# Patient Record
Sex: FEMALE | Race: WHITE | NOT HISPANIC OR LATINO | Employment: OTHER | URBAN - METROPOLITAN AREA
[De-identification: names, ages, dates, MRNs, and addresses within clinical notes are randomized per-mention and may not be internally consistent; named-entity substitution may affect disease eponyms.]

---

## 2017-01-03 DIAGNOSIS — E11.65 TYPE 2 DIABETES MELLITUS WITH HYPERGLYCEMIA, WITHOUT LONG-TERM CURRENT USE OF INSULIN (H): Primary | ICD-10-CM

## 2017-01-05 NOTE — TELEPHONE ENCOUNTER
insulin NPH (NOVOLIN N VIAL) 100 UNIT/ML VIAL         Last Written Prescription Date: 05/31/16  Last Fill Quantity: 81 mL, # refills: 1  Last Office Visit with [facility], [facility]MA[facility] or [facility] prescribing provider:  12/05/16.     Next 5 appointments (look out 90 days)     Jan 12, 2017  9:40 AM   Return Visit with Trice Medeiros MD   [facility] ([facility]    [address] [facility] [address] [phone]                   BP Readings from Last 3 Encounters:   12/05/16 124/74   08/01/16 120/60   07/23/16 120/68     MICROL       41   5/4/2016  No results found for this basename: microalbumin  CREATININE   Date Value Ref Range Status   08/01/2016 0.69 0.52 - 1.04 mg/dL Final   ]  GFR ESTIMATE   Date Value Ref Range Status   08/01/2016 85 >60 mL/min/1.7m2 Final     Comment:     Non  GFR Calc   05/04/2016 >90  Non  GFR Calc   >60 mL/min/1.7m2 Final   12/02/2015 >90  Non  GFR Calc   >60 mL/min/1.7m2 Final     GFR ESTIMATE IF BLACK   Date Value Ref Range Status   08/01/2016 >90   GFR Calc   >60 mL/min/1.7m2 Final   05/04/2016 >90   GFR Calc   >60 mL/min/1.7m2 Final   12/02/2015 >90   GFR Calc   >60 mL/min/1.7m2 Final     CHOL      159   5/4/2016  HDL       51   5/4/2016  LDL       88   5/4/2016  TRIG      101   5/4/2016  CHOLHDLRATIO      2.6   6/23/2015  AST       29   8/1/2016  ALT       37   8/1/2016  A1C      6.5   12/5/2016  A1C      7.8   8/1/2016  A1C      9.5   6/13/2016  A1C     10.7   5/4/2016  A1C      7.5   12/2/2015  POTASSIUM   Date Value Ref Range Status   08/01/2016 4.0 3.4 - 5.3 mmol/L Final

## 2017-01-06 RX ORDER — HUMAN INSULIN 100 [IU]/ML
INJECTION, SUSPENSION SUBCUTANEOUS
Qty: 80 ML | Refills: 1 | Status: SHIPPED | OUTPATIENT
Start: 2017-01-06 | End: 2017-04-03

## 2017-01-06 NOTE — TELEPHONE ENCOUNTER
Medication filled  per protocol.      Rosaura Keating RN, [facility]

## 2017-01-19 ENCOUNTER — OFFICE VISIT (OUTPATIENT)
Dept: PEDIATRICS | Facility: CLINIC | Age: 67
End: 2017-01-19
Payer: COMMERCIAL

## 2017-01-19 VITALS
SYSTOLIC BLOOD PRESSURE: 130 MMHG | HEART RATE: 109 BPM | DIASTOLIC BLOOD PRESSURE: 80 MMHG | BODY MASS INDEX: 35.7 KG/M2 | OXYGEN SATURATION: 96 % | WEIGHT: 228 LBS | TEMPERATURE: 98.1 F

## 2017-01-19 DIAGNOSIS — R26.9 GAIT DISTURBANCE: ICD-10-CM

## 2017-01-19 DIAGNOSIS — N39.41 URGE INCONTINENCE OF URINE: Primary | ICD-10-CM

## 2017-01-19 DIAGNOSIS — G89.29 CHRONIC MIDLINE LOW BACK PAIN WITHOUT SCIATICA: ICD-10-CM

## 2017-01-19 DIAGNOSIS — R29.6 FALLS FREQUENTLY: ICD-10-CM

## 2017-01-19 DIAGNOSIS — N81.6 RECTOCELE: ICD-10-CM

## 2017-01-19 DIAGNOSIS — M54.50 CHRONIC MIDLINE LOW BACK PAIN WITHOUT SCIATICA: ICD-10-CM

## 2017-01-19 PROCEDURE — 99215 OFFICE O/P EST HI 40 MIN: CPT | Performed by: INTERNAL MEDICINE

## 2017-01-19 RX ORDER — OXYBUTYNIN CHLORIDE 5 MG/1
5 TABLET, EXTENDED RELEASE ORAL DAILY
Qty: 30 TABLET | Refills: 1 | Status: SHIPPED | OUTPATIENT
Start: 2017-01-19 | End: 2017-01-19

## 2017-01-19 RX ORDER — TOLTERODINE 4 MG/1
4 CAPSULE, EXTENDED RELEASE ORAL DAILY
Qty: 30 CAPSULE | Refills: 1 | Status: SHIPPED | OUTPATIENT
Start: 2017-01-19 | End: 2017-01-31

## 2017-01-19 RX ORDER — TOLTERODINE 4 MG/1
4 CAPSULE, EXTENDED RELEASE ORAL DAILY
Qty: 30 CAPSULE | Refills: 1 | Status: SHIPPED | OUTPATIENT
Start: 2017-01-19 | End: 2017-01-19

## 2017-01-19 NOTE — MR AVS SNAPSHOT
After Visit Summary   1/19/2017    Radha Corbett    MRN: [id number]           Patient Information     Date Of Birth          1950        Visit Information        Provider Department      1/19/2017 2:00 PM Trice Medeiros MD [facility]        Today's Diagnoses     Urge incontinence of urine    -  1     Gait disturbance         Falls frequently         Rectocele         Chronic midline low back pain without sciatica           Care Instructions    You can call [phone] to schedule MRI of brain and lumbar spine.    See referral for urology.       Medication(s) prescribed today:    Orders Placed This Encounter   Medications     tolterodine (DETROL LA) 4 MG 24 hr capsule     Sig: Take 1 capsule (4 mg) by mouth daily     Dispense:  30 capsule     Refill:  1             Follow-ups after your visit        Additional Services     UROLOGY ADULT REFERRAL       Your provider has referred you to: [facility]: [facility] [phone]   [email or web address]    Please be aware that coverage of these services is subject to the terms and limitations of your health insurance plan.  Call member services at your health plan with any benefit or coverage questions.      Please bring the following with you to your appointment:    (1) Any X-Rays, CTs or MRIs which have been performed.  Contact the facility where they were done to arrange for  prior to your scheduled appointment.    (2) List of current medications  (3) This referral request   (4) Any documents/labs given to you for this referral                  Your next 10 appointments already scheduled     May 02, 2017 11:15 AM   Return Visit with Mary Lira MD   [facility] ([facility])    [id number] [place] [address] [phone]              Future tests that were ordered for you today     Open Future Orders         Priority Expected Expires Ordered    MR Lumbar Spine w/o Contrast Routine  1/19/2018 1/19/2017    MR Brain w/o & w Contrast Routine  1/19/2018 1/19/2017            Who to contact     If you have questions or need follow up information about today's clinic visit or your schedule please contact [facility] directly at [phone].  Normal or non-critical lab and imaging results will be communicated to you by [email or web address], letter or phone within 4 business days after the clinic has received the results. If you do not hear from us within 7 days, please contact the clinic through [email or web address] or phone. If you have a critical or abnormal lab result, we will notify you by phone as soon as possible.  Submit refill requests through [employer] or call your pharmacy and they will forward the refill request to us. Please allow 3 business days for your refill to be completed.          Additional Information About Your Visit        [email or web address] Information     [employer] gives you secure access to your electronic health record. If you see a primary care provider, you can also send messages to your care team and make appointments. If you have questions, please call your primary care clinic.  If you do not have a primary care provider, please call [phone] and they will assist you.      [employer] is an electronic gateway that provides easy, online access to your medical records. With [employer], you can request a clinic appointment, read your test results, renew a prescription or communicate with your care team.     To access your existing account, please contact your [facility] or call [phone] for assistance.        Care EveryWhere ID     This is your Care EveryWhere ID. This could be used by other organizations to access your [place] medical records  [id number]        Your Vitals Were     Pulse Temperature Pulse Oximetry Last Period          109 98.1  F (36.7  C) (Temporal) 96% 11/18/1991  (Within Months)         Blood Pressure from Last 3 Encounters:   01/19/17 130/80   12/05/16 124/74   08/01/16 120/60    Weight from Last 3 Encounters:   01/19/17 228 lb (103.42 kg)   12/05/16 229 lb (103.874 kg)   08/01/16 231 lb 4.8 oz (104.917 kg)              We Performed the Following     UROLOGY ADULT REFERRAL          Today's Medication Changes          These changes are accurate as of: 1/19/17  2:56 PM.  If you have any questions, ask your nurse or doctor.               Start taking these medicines.        Dose/Directions    tolterodine 4 MG 24 hr capsule   Commonly known as:  DETROL LA   Used for:  Urge incontinence of urine   Started by:  Trice Medeiros MD        Dose:  4 mg   Take 1 capsule (4 mg) by mouth daily   Quantity:  30 capsule   Refills:  1            Where to get your medicines      These medications were sent to [facility] - [place], [place] - [address], [address]  [address], [address], [facility] [address]     Phone:  [phone]    - tolterodine 4 MG 24 hr capsule             Primary Care Provider Office Phone # Fax #    Trice Medeiros [phone]       [facility] [address]  [facility] [address]        Thank you!     Thank you for choosing [facility]  for your care. Our goal is always to provide you with excellent care. Hearing back from our patients is one way we can continue to improve our services. Please take a few minutes to complete the written survey that you may receive in the mail after your visit with us. Thank you!             Your Updated Medication List - Protect others around you: Learn how to safely use, store and throw away your medicines at www.disposemymeds.org.          This list is accurate as of: 1/19/17  2:56 PM.  Always use your most recent med list.                   Brand Name Dispense Instructions for use    aspirin 81 MG tablet      1 TABLET DAILY       buPROPion 300 MG 24 hr tablet     "WELLBUTRIN XL    90 tablet    Take 1 tablet (300 mg) by mouth every morning       CALCIUM 600+D PO      1 tab daily       citalopram 20 MG tablet    celeXA    90 tablet    Take 1 tablet (20 mg) by mouth daily       glipiZIDE 10 MG tablet    GLUCOTROL    180 tablet    TAKE 1 TABLET TWICE A DAY BEFORE MEALS       IMODIUM A-D 2 MG tablet   Generic drug:  loperamide      Take 2 mg by mouth as needed       insulin syringe-needle U-100 31G X 5/16\" 1 ML    RELION INSULIN SYRINGE    200 each    Use 2 syringes daily or as directed.       lisinopril 10 MG tablet    PRINIVIL/ZESTRIL    90 tablet    Take 1 tablet (10 mg) by mouth daily       MELATONIN PO      15 mg 11mg at bedtime       metFORMIN 500 MG 24 hr tablet    GLUCOPHAGE-XR    180 tablet    Take 2 tablets (1,000 mg) by mouth daily (with dinner)       minocycline 100 MG capsule    MINOCIN/DYNACIN    180 capsule    Take 1 capsule (100 mg) by mouth every 12 hours       NovoLIN N VIAL 100 UNIT/ML injection   Generic drug:  insulin NPH     80 mL    INJECT 45 UNITS UNDER THE SKIN TWICE A DAY       omeprazole 40 MG capsule    priLOSEC    90 capsule    Take 1 capsule (40 mg) by mouth daily Take 30-60 minutes before a meal.       ONE TOUCH LANCETS      None Entered       ONE TOUCH TEST STRIPS VI      None Entered       simvastatin 20 MG tablet    ZOCOR    90 tablet    Take 1 tablet (20 mg) by mouth At Bedtime       spironolactone 100 MG tablet    ALDACTONE    90 tablet    Take 1 tablet (100 mg) by mouth every morning       tolterodine 4 MG 24 hr capsule    DETROL LA    30 capsule    Take 1 capsule (4 mg) by mouth daily       vitamin D 1000 UNITS capsule      1 CAPSULE DAILY         "

## 2017-01-19 NOTE — NURSING NOTE
"Chief Complaint   Patient presents with     Incontinence     Urine in continence, back pain, balance is off, not improved by improving A1c.        Initial /80 mmHg  Pulse 109  Temp(Src) 98.1  F (36.7  C) (Temporal)  Wt 228 lb (103.42 kg)  SpO2 96%  LMP 11/18/1991 (Within Months) Estimated body mass index is 35.7 kg/(m^2) as calculated from the following:    Height as of 1/21/16: 5' 7.01\" (1.702 m).    Weight as of this encounter: 228 lb (103.42 kg).  BP completed using cuff size: abraham Gross RMA        "

## 2017-01-19 NOTE — PROGRESS NOTES
SUBJECTIVE:                                                    Radha Corbett is a 66 year old female who presents to clinic today for the following health issues:        Urine incontinence, onset several years would like something to fix the problem.    back pain, l;ow back on left side. Has a hard time standing for long periods. Even doing the dishes.    balance is off, not improved by improving A1c.   Mary Ann Gross RMA      Multiple concerns.   1. Long-standing urinary incontinence, primarily urge incontinence. Oxybutynin didn't help. She is now wearing an adult diaper regularly. Patient reported seeing urologist a few years back. She didn't remember the recommendations. Chart review indicated urology visit back in 2013. Gave her a trial of Vesicare 10 mg daily. Patient didn't remember side effects with the medication but thought that he didn't work. A number of options were discussed including physical therapy interstim, surgery and others. The patient did not recall these.   2. Poor balance: Increasing in the last year. Patient reports mild neuropathy symptoms in terms of numbness in the feet. This is not worse. Diabetes well controlled. Patient has fallen twice. One time causing rotator cuff tear. Surgery was an option, but patient does not want to do surgery. She has done physical therapy with improvement of range of motion. She has another fall without injury. There are many times she feels like she is falling. She has to be very careful with walking and showering.  3. Reports chronic low back pain for many years. Head lumbar spine x-ray done last year showing moderate degenerative changes, more pronounced at L4 to L5. Pain is worse with prolonged standing. Is a little better with sitting and leaning forward. She has trouble walking even 2-3 blocks. Denies any calf pain.  4. Patient also has had skin breakouts from stress. Has seen dermatologist. No specific cause was discussed. Patient has improvement  of the rash from using a home remedy by applying apple cider vinegar on the face.         Problem list, Medication list, Allergies, and Medical/Social/Surgical histories reviewed in [facility] and updated as appropriate.    OBJECTIVE:                                                    /80 mmHg  Pulse 109  Temp(Src) 98.1  F (36.7  C) (Temporal)  Wt 228 lb (103.42 kg)  SpO2 96%  LMP 11/18/1991 (Within Months)    GEN: healthy, alert and no distress  HEENT: unremarkable.  Neck:     supple, no thyromegaly, no cervical lymphadenopathy.   USHA:  CTA B/L, no wheezing or crackles.  CV:  RRR no murmur.  Intact distal pulses, good cap refill.  Abd: soft, normal active bowel sounds, non tender, non distended. No mass or organomegaly.   Ext:  no cyanosis, clubbing or edema.    NEURO:  equal  strength, neg Romberg, DTR II/IV bilaterally (UE and LE), CN intact, slight shuffled gait.   Back: tender left paravertebral area around L4-5. DTR symmetric.         Diagnostic test results:  No results found for this or any previous visit (from the past 24 hour(s)).       ASSESSMENT/PLAN:                                                      66 year old female with the following diagnoses and treatment plan:      ICD-10-CM    1. Urge incontinence of urine N39.41 UROLOGY ADULT REFERRAL     tolterodine (DETROL LA) 4 MG 24 hr capsule     DISCONTINUED: oxybutynin (DITROPAN-XL) 5 MG 24 hr tablet     DISCONTINUED: tolterodine (DETROL LA) 4 MG 24 hr capsule   2. Gait disturbance R26.9 MR Brain w/o & w Contrast   3. Falls frequently R29.6 MR Brain w/o & w Contrast   4. Rectocele N81.6 UROLOGY ADULT REFERRAL   5. Chronic midline low back pain without sciatica M54.5 MR Lumbar Spine w/o Contrast    G89.29        -- Urinary incontinence, gait disturbance. They not be related to each other. We'll obtain brain MRI to see if any evidence of normal pressure hydrocephalus.  -- Chronic low back pain, possible neuro claudication. Lumbar spine MRI to  rule out spinal stenosis.  -- Urinary incontinence: Also no rectocele. I'll have her try Detrol. Refer back to urology for further evaluation, and other treatment options.    A total of 40 minutes was spent face to face with this patient. More than 50% of the time was spent on education for the above problems and management plans.     Trice Medeiros MD-PhD  [facility]    (Note: Chart documentation was done in part with Dragon Voice Recognition software. Although reviewed after completion, some word and grammatical errors may remain.)

## 2017-01-19 NOTE — PATIENT INSTRUCTIONS
You can call [phone] to schedule MRI of brain and lumbar spine.    See referral for urology.       Medication(s) prescribed today:    Orders Placed This Encounter   Medications     tolterodine (DETROL LA) 4 MG 24 hr capsule     Sig: Take 1 capsule (4 mg) by mouth daily     Dispense:  30 capsule     Refill:  1

## 2017-01-20 ENCOUNTER — PRE VISIT (OUTPATIENT)
Dept: UROLOGY | Facility: CLINIC | Age: 67
End: 2017-01-20

## 2017-01-20 NOTE — TELEPHONE ENCOUNTER
PREVISIT INFORMATION                                                    Radha Corbett scheduled for future visit at [facility] specialty clinics.    Patient is scheduled to see Huma Purvis ([place]) on 2/23/17  Reason for visit: rectocele and urge incontinence   Referring provider: Trice Medeiros  Has patient seen previous specialist? YES - Dada Baltazar  Medical Records:  Available in chart.  Patient was previously seen at a [facility] or [facility] facility.     REVIEW                                                      New patient packet mailed to patient: No  Medication reconciliation complete: No      PLAN/FOLLOW-UP NEEDED                                                      Patient Reminders Given:    Informed patient to bring an updated list of allergies, medications, pharmacy details and insurance information. Directed patient to come to the 2nd floor, check-in #4 for their appointment. Informed patient to call back if appointment needs to be cancelled or rescheduled at [phone].    Reminded patient to bring any outside records regarding this appointment or have them faxed to clinic at [phone].    Reminded patient to complete and bring in urology questionnaire. Also for patient to please come with a full bladder and to ask , if early to get staff member for sample.

## 2017-01-30 ENCOUNTER — RADIANT APPOINTMENT (OUTPATIENT)
Dept: MRI IMAGING | Facility: CLINIC | Age: 67
End: 2017-01-30
Attending: INTERNAL MEDICINE
Payer: COMMERCIAL

## 2017-01-30 ENCOUNTER — TELEPHONE (OUTPATIENT)
Dept: PEDIATRICS | Facility: CLINIC | Age: 67
End: 2017-01-30

## 2017-01-30 DIAGNOSIS — R29.6 FALLS FREQUENTLY: ICD-10-CM

## 2017-01-30 DIAGNOSIS — G89.29 CHRONIC MIDLINE LOW BACK PAIN WITHOUT SCIATICA: ICD-10-CM

## 2017-01-30 DIAGNOSIS — M54.50 CHRONIC MIDLINE LOW BACK PAIN WITHOUT SCIATICA: ICD-10-CM

## 2017-01-30 DIAGNOSIS — S22.080A T12 COMPRESSION FRACTURE, INITIAL ENCOUNTER (H): Primary | ICD-10-CM

## 2017-01-30 DIAGNOSIS — R26.9 GAIT DISTURBANCE: ICD-10-CM

## 2017-01-30 LAB — RADIOLOGIST FLAGS: ABNORMAL

## 2017-01-30 PROCEDURE — 82565 ASSAY OF CREATININE: CPT | Performed by: FAMILY MEDICINE

## 2017-01-30 PROCEDURE — 70553 MRI BRAIN STEM W/O & W/DYE: CPT | Performed by: RADIOLOGY

## 2017-01-30 PROCEDURE — 36415 COLL VENOUS BLD VENIPUNCTURE: CPT | Performed by: FAMILY MEDICINE

## 2017-01-30 PROCEDURE — 72148 MRI LUMBAR SPINE W/O DYE: CPT | Performed by: RADIOLOGY

## 2017-01-30 PROCEDURE — A9585 GADOBUTROL INJECTION: HCPCS | Performed by: RADIOLOGY

## 2017-01-30 RX ORDER — GADOBUTROL 604.72 MG/ML
10 INJECTION INTRAVENOUS ONCE
Status: ACTIVE | OUTPATIENT
Start: 2017-01-30

## 2017-01-30 NOTE — TELEPHONE ENCOUNTER
----- Message from Falguni Hammer RN sent at 1/30/2017  3:17 PM CST -----  Regarding: Lumbar Spin results  Hi Dr. Medeiros,     Dr. Durand radiologist from the [facility] called regarding this patient.    Found T12 compression fracture with 30-40% loss of vertebral body height.    He said this is not new, is subacute.  It is new since July. He states it is substantial.    Is currently in brain MR now.     Dr. Durand call back number is [phone].      Falguni SOLARES RN

## 2017-01-31 ENCOUNTER — TELEPHONE (OUTPATIENT)
Dept: PEDIATRICS | Facility: CLINIC | Age: 67
End: 2017-01-31

## 2017-01-31 DIAGNOSIS — N39.41 URGE INCONTINENCE OF URINE: Primary | ICD-10-CM

## 2017-01-31 DIAGNOSIS — M54.16 LUMBAR RADICULOPATHY: ICD-10-CM

## 2017-01-31 DIAGNOSIS — S22.080A COMPRESSION FRACTURE OF T12 VERTEBRA, INITIAL ENCOUNTER (H): Primary | ICD-10-CM

## 2017-01-31 NOTE — TELEPHONE ENCOUNTER
Call placed to patient to review Dr. Medeiros's recommendations as noted below.  Patient unavailable at this time.   left for patient requesting patient call PCP office back.      Notes Recorded by Trice Medeiros MD on 1/30/2017 at 11:06 PM  Let patient know that she had a subacute T12 compression fracture (likely more than 4 weeks old but new since last July). Can't pinpoint the date.   -- also lots of arthritis, bulging discs, narrowing of nerve roots exit sites and possibly nerve impingement.   -- needs to see spine specialist. Options are [facility] spine specialist, [facility] orthopedics. Let me know if she has a preference where she wants to go

## 2017-01-31 NOTE — TELEPHONE ENCOUNTER
Notes Recorded by Trice Medeiros MD on 1/30/2017 at 11:06 PM  Let patient know that she had a subacute T12 compression fracture (likely more than 4 weeks old but new since last July). Can't pinpoint the date.   -- also lots of arthritis, bulging discs, narrowing of nerve roots exit sites and possibly nerve impingement.   -- needs to see spine specialist. Options are [facility] spine specialist, [facility] orthopedics. Let me know if she has a preference where she wants to go

## 2017-01-31 NOTE — TELEPHONE ENCOUNTER
[facility] Call Center    Phone Message    Name of Caller: Radha    Phone Number: Home number on file [phone] (home)    Best time to return call: any    May a detailed message be left on voicemail: yes    Relation to patient: Self    Reason for Call: Medication Refill Request    Has the patient contacted the pharmacy for the refill? Yes   Name of medication being requested: tolterodine (DETROL LA) 4 MG 24 hr capsule [51948]   Provider who prescribed the medication: Dr. Medeiros  Pharmacy: [facility]  Date medication is needed: Asap         Action Taken: Message routed to:  Primary Care p [id number]

## 2017-01-31 NOTE — PROGRESS NOTES
Quick Note:    Dear Radha,   Here are your recent results.   -- brain MRI does not have findings to suggest normal pressure hydrocephalus. There is aging related changes in small vessels in the brain in the setting of hypertension, diabetes or other chronic diseases.   -- there is no specific treatment for these changes. The key is to control diabetes, hypertension and other chronic health issues.   Please call or Mychart to our office if you have further questions.     Trice Medeiros MD  ______

## 2017-02-01 ENCOUNTER — TELEPHONE (OUTPATIENT)
Dept: PHARMACY | Facility: CLINIC | Age: 67
End: 2017-02-01

## 2017-02-01 NOTE — TELEPHONE ENCOUNTER
Called patient to schedule f/u MTM appt. Patient is scheduled for 2/7 at 1pm.  Falguni Chavis, Pharm.D, BCACP  Medication Therapy Management Pharmacist

## 2017-02-01 NOTE — TELEPHONE ENCOUNTER
Gave patient results below from Dr. Medeiros.      She would like to go to [facility] in [place].      When faxing referral please write on their to call patient to schedule appt (per patients request).    Routed to Dr. Medeiros to order referral    Rosaura Keating RN,   [facility]

## 2017-02-02 ENCOUNTER — TELEPHONE (OUTPATIENT)
Dept: PEDIATRICS | Facility: CLINIC | Age: 67
End: 2017-02-02

## 2017-02-02 NOTE — TELEPHONE ENCOUNTER
Let patient know referral placed.   Unfortunately we are not able to tell them what to do about scheduling. They are not part of our system although within same network from insurance coverage purpose. We would be able to do this with [facility] spine specialist group. Here is there number for patient to call for [facility].    [facility]  Phone: [phone]

## 2017-02-02 NOTE — TELEPHONE ENCOUNTER
Called the number for [facility] [phone].  This patient is already scheduled with Dr. Solis at [facility]. They just need the referral faxed. This number is [phone].    Referral is faxed.    Falguni Hammer RN, [facility]

## 2017-02-02 NOTE — TELEPHONE ENCOUNTER
[facility] Call Center    Phone Message    Name of Caller: Radha    Phone Number: Home number on file [phone] (home)    Best time to return call: any    May a detailed message be left on voicemail: yes    Relation to patient: Self    Reason for Call: Order(s): Other:   What is being requested: referral to spine specialist, and she has appt. But she needs a referral.  She needs the images and MRI report.  Will put her through to medical records.  [facility].   Phone: [phone]  Reason for requested: needs referral and records  Date needed: 02.20.17  Provider name: Waldemar      Action Taken: Message routed to:  Primary Care p [id number]

## 2017-02-02 NOTE — TELEPHONE ENCOUNTER
Called patient, she states she left a message with Medical Records that she would like to obtain her records and report from her MRI.      If she does not hear back, she will call again.    Let her know the referral was faxed to [facility].    Falguni Hammer RN, [facility]

## 2017-02-06 ENCOUNTER — TELEPHONE (OUTPATIENT)
Dept: PEDIATRICS | Facility: CLINIC | Age: 67
End: 2017-02-06

## 2017-02-06 NOTE — TELEPHONE ENCOUNTER
Left message for pt to call back. Mar yAnn Nieto        Calling pt to clarify if she is taking both Tolterodine and Oxybutynin,Or is pt going back and forth between the two.  [facility] needing [facility].   Notice on Marianna's desk to fill out and send back.   Mary Ann LANDIS

## 2017-02-07 NOTE — TELEPHONE ENCOUNTER
Attempt #2:  Left message for patient to return call to clinic. Clinic number given.  Priyanka Cerna, FLACO

## 2017-02-08 RX ORDER — TOLTERODINE 4 MG/1
4 CAPSULE, EXTENDED RELEASE ORAL DAILY
Qty: 90 CAPSULE | Refills: 3 | Status: SHIPPED | OUTPATIENT
Start: 2017-02-08 | End: 2018-05-15

## 2017-02-08 NOTE — TELEPHONE ENCOUNTER
Pending Prescriptions:                       Disp   Refills    tolterodine (DETROL LA) 4 MG 24 hr [id number] cap*1            Sig: Take 1 capsule (4 mg) by mouth daily    Called onsite pharmacy and verified patient filled #30 on 1/19/17. Pharmacy has 1 refill of #30 remaining.    Patient is requesting to fill next refill through her mail order pharmacy. Routing to Dr. Trice Medeiros to send new Rx.  Rajwinder Rosario CMA

## 2017-02-08 NOTE — TELEPHONE ENCOUNTER
[facility] Call Center    Phone Message    Name of Caller: Radha    Phone Number: Home number on file [phone]     Best time to return call: Any    May a detailed message be left on voicemail: yes    Relation to patient: Self    Reason for Call: Patient returned Marianna's call.  She will wait for a call back.      Action Taken: Message routed to:  Primary Care p [id number]

## 2017-02-09 LAB
CREAT BLD-MCNC: 0.7 MG/DL (ref 0.52–1.04)
GFR SERPL CREATININE-BSD FRML MDRD: 84 ML/MIN/1.7M2

## 2017-02-20 ENCOUNTER — TRANSFERRED RECORDS (OUTPATIENT)
Dept: HEALTH INFORMATION MANAGEMENT | Facility: CLINIC | Age: 67
End: 2017-02-20

## 2017-02-21 ENCOUNTER — TELEPHONE (OUTPATIENT)
Dept: PHARMACY | Facility: CLINIC | Age: 67
End: 2017-02-21

## 2017-02-21 NOTE — TELEPHONE ENCOUNTER
Called patient to set up an MTM appt. Patient will look at her calendar and call to schedule.  Falguni Chavis, Pharm.D, [facility]  Medication Therapy Management Pharmacist

## 2017-02-23 ENCOUNTER — THERAPY VISIT (OUTPATIENT)
Dept: PHYSICAL THERAPY | Facility: CLINIC | Age: 67
End: 2017-02-23
Payer: COMMERCIAL

## 2017-02-23 DIAGNOSIS — M54.9 BACK PAIN, UNSPECIFIED BACK LOCATION, UNSPECIFIED BACK PAIN LATERALITY, UNSPECIFIED CHRONICITY: Primary | ICD-10-CM

## 2017-02-23 PROCEDURE — 97110 THERAPEUTIC EXERCISES: CPT | Mod: GP | Performed by: PHYSICAL THERAPIST

## 2017-02-23 PROCEDURE — 97112 NEUROMUSCULAR REEDUCATION: CPT | Mod: GP | Performed by: PHYSICAL THERAPIST

## 2017-02-23 PROCEDURE — 97161 PT EVAL LOW COMPLEX 20 MIN: CPT | Mod: GP | Performed by: PHYSICAL THERAPIST

## 2017-02-23 NOTE — LETTER
[facility]  [address]  [facility] [address]  [phone]    2017    Re: Radha Corbett   :   1950  MRN:  [id number]   REFERRING PHYSICIAN:   Kai Ledesma    [facility]    Date of Initial Evaluation:  2017  Visits:  Rxs Used: 1  Reason for Referral:  Back pain, unspecified back location, unspecified back pain laterality, unspecified chronicity    EVALUATION SUMMARY    Subjective:    Radha Corbett is a 66 year old female with a lumbar condition.  Condition occurred with:  A fall/slip.  Condition occurred: at home.  This is a chronic condition  Patient presents to PT today with c/o ongoing LBP. Patient stated she has been dealing with back pain for many years; Recent increase in pain over the past year for no reason.  Patient did try PT in 2016; unable to complete the PT due to suffering another injury due to a fall in late July.  Suffered a R shoulder dislocation and humerus fracture (had no surgery).  Patient recently told she has a T12 compression fx by MRI.  Patient referred to PT by Dr Ledesma..    Patient reports pain:  Central lumbar spine, lumbar spine left and lumbar spine right.    Pain is described as aching and sharp and is constant and reported as 3/10 and 10/10.  Associated symptoms:  Loss of motion/stiffness and loss of strength. Pain is the same all the time.  Symptoms are exacerbated by bending, standing, walking, lifting, twisting, lying down and other (transitions) and relieved by rest.  Since onset symptoms are unchanged (minimal changes).  Special tests:  MRI (see epic).  Previous treatment includes physical therapy.  There was mild improvement following previous treatment.  General health as reported by patient is fair.  Pertinent medical history includes:  History of fractures, diabetes, overweight, high blood pressure, depression,  smoking, sleep disorder/apnea, osteoarthritis and other (degenerative changes in LB).  Medical allergies: yes (sulpha and codeine).  Other surgeries include:  Orthopedic surgery and other (mulitple orthopedic and female surgeries).  Current medications:  High blood pressure medication, anti-depressants and other (diabetes medication).  Current occupation is Retired  Barriers include:  None as reported by the patient.  Red flags:  None as reported by the patient.  Oswestry Score: 44 %                     Lumbar/SI Evaluation  ROM:  Arom wnl lumbar: pain with all movements.  AROM Lumbar:   Flexion:          WNL  Ext:                    Mod loss   Side Bend:        Left:  Mid thigh    Right:  Mid thigh  Rotation:           Left:     Right:   Side Glide:        Left:     Right:   Lumbar Myotomes:    T12-L3 (Hip Flex):  Left: 4    Right: 4  L2-4 (Quads):  Left:  4    Right:  4  Lumbar DTR's:  not assessed  Lumbar Dermtomes:  normal       Hip Evaluation  Hip Strength:    Flexion:   Left: 4-/5   Pain:  Right: 4-/5   Pain:  Abduction:  Left: 4-/5     Pain:Right: 4-/5    Pain:    Assessment/Plan:    Patient is a 66 year old female with lumbar complaints.    Patient has the following significant findings with corresponding treatment plan.                Diagnosis 1:  Back pain (T12 compression Fx)  Pain -  hot/cold therapy and manual therapy  Decreased strength - therapeutic exercise, therapeutic activities and home program  Impaired gait - gait training  Impaired muscle performance - neuro re-education  Decreased function - therapeutic activities  Impaired posture - neuro re-education  Therapy Evaluation Codes:   1) History comprised of:   Personal factors that impact the plan of care:      Past/current experiences and Time since onset of symptoms.    Comorbidity factors that impact the plan of care are:      Diabetes, Depression, Dizziness, High blood pressure, Osteoarthritis, Overweight, Sleep disorder/apnea and Smoking.      Medications impacting care: Anti-depressant, High blood pressure and Diabetes.  2) Examination of Body Systems comprised of:   Body structures and functions that impact the plan of care:      Lumbar spine and Thoracic Spine.   Activity limitations that impact the plan of care are:      Bending, Dressing, Lifting, Squatting/kneeling, Stairs, Standing and Walking.  3) Clinical presentation characteristics are:   Stable/Uncomplicated.  4) Decision-Making    Low complexity using standardized patient assessment instrument and/or measureable assessment of functional outcome.  Cumulative Therapy Evaluation is: Low complexity.      Previous and current functional limitations:  (See Goal Flow Sheet for this information)    Short term and Long term goals: (See Goal Flow Sheet for this information)   Communication ability:  Patient appears to be able to clearly communicate and understand verbal and written communication and follow directions correctly.  Treatment Explanation - The following has been discussed with the patient:   RX ordered/plan of care  Anticipated outcomes  Possible risks and side effects  This patient would benefit from PT intervention to resume normal activities.   Rehab potential is good.  Frequency:  1 X week, once daily  Duration:  for 6-8 visits  Discharge Plan:  Achieve all LTG.  Independent in home treatment program.  Reach maximal therapeutic benefit.            Thank you for your referral.    INQUIRIES  Therapist: Elena Boggs, [facility]  [facility] PHYSICAL THERAPY  [address]  [place] [address]  Phone: [phone]  Fax: [phone]

## 2017-02-23 NOTE — MR AVS SNAPSHOT
After Visit Summary   2/23/2017    Radha Corbett    MRN: [id number]           Patient Information     Date Of Birth          1950        Visit Information        Provider Department      2/23/2017 1:20 PM Elena Boggs, PT [facility] Physical Therapy        Today's Diagnoses     Back pain, unspecified back location, unspecified back pain laterality, unspecified chronicity    -  1       Follow-ups after your visit        Your next 10 appointments already scheduled     Mar 02, 2017 11:30 AM CST   LOLA Spine with Elena Boggs PT   [facility] Physical Therapy ([facility]  )    [address]  [place] [facility] [address]   [phone]            Mar 09, 2017  1:20 PM CST   LOLA Spine with Elena Boggs PT   [facility] Physical Therapy ([facility]  )    [address]  [facility] [address]   [phone]            May 02, 2017 11:15 AM CDT   [facility] Visit with Mary Lira MD   [facility]    [address] [place] [address] [phone]              Who to contact     If you have questions or need follow up information about today's clinic visit or your schedule please contact [facility] PHYSICAL THERAPY directly at [phone].  Normal or non-critical lab and imaging results will be communicated to you by MyChart, letter or phone within 4 business days after the clinic has received the results. If you do not hear from us within 7 days, please contact the clinic through MyChart or phone. If you have a critical or abnormal lab result, we will notify you by phone as soon as possible.  Submit refill requests through [employer] or call your pharmacy and they will forward the refill request to us. Please allow 3 business days for your refill to be  completed.          Additional Information About Your Visit        [email or web address] Information     [employer] gives you secure access to your electronic health record. If you see a primary care provider, you can also send messages to your care team and make appointments. If you have questions, please call your primary care clinic.  If you do not have a primary care provider, please call [phone] and they will assist you.        Care EveryWhere ID     This is your Care EveryWhere ID. This could be used by other organizations to access your [facility] medical records  [id number]        Your Vitals Were     Last Period                   11/18/1991 (Within Months)            Blood Pressure from Last 3 Encounters:   01/19/17 130/80   12/05/16 124/74   08/01/16 120/60    Weight from Last 3 Encounters:   01/19/17 103.4 kg (228 lb)   12/05/16 103.9 kg (229 lb)   08/01/16 104.9 kg (231 lb 4.8 oz)              We Performed the Following     HC PT EVAL, LOW COMPLEXITY     LOLA INITIAL EVAL REPORT     NEUROMUSCULAR RE-EDUCATION     THERAPEUTIC EXERCISES        Primary Care Provider Office Phone # Fax #    Trice Medeiros [phone]       [facility] [address]  [facility] [address]        Thank you!     Thank you for choosing [facility] PHYSICAL THERAPY  for your care. Our goal is always to provide you with excellent care. Hearing back from our patients is one way we can continue to improve our services. Please take a few minutes to complete the written survey that you may receive in the mail after your visit with us. Thank you!             Your Updated Medication List - Protect others around you: Learn how to safely use, store and throw away your medicines at www.disposemymeds.org.          This list is accurate as of: 2/23/17  2:26 PM.  Always use your most recent med list.                   Brand Name Dispense Instructions for use    aspirin 81 MG tablet      1 TABLET  "DAILY       buPROPion 300 MG 24 hr tablet    WELLBUTRIN XL    90 tablet    Take 1 tablet (300 mg) by mouth every morning       CALCIUM 600+D PO      1 tab daily       citalopram 20 MG tablet    celeXA    90 tablet    Take 1 tablet (20 mg) by mouth daily       glipiZIDE 10 MG tablet    GLUCOTROL    180 tablet    TAKE 1 TABLET TWICE A DAY BEFORE MEALS       IMODIUM A-D 2 MG tablet   Generic drug:  loperamide      Take 2 mg by mouth as needed       insulin syringe-needle U-100 31G X 5/16\" 1 ML    RELION INSULIN SYRINGE    200 each    Use 2 syringes daily or as directed.       lisinopril 10 MG tablet    PRINIVIL/ZESTRIL    90 tablet    Take 1 tablet (10 mg) by mouth daily       MELATONIN PO      15 mg 11mg at bedtime       metFORMIN 500 MG 24 hr tablet    GLUCOPHAGE-XR    180 tablet    Take 2 tablets (1,000 mg) by mouth daily (with dinner)       minocycline 100 MG capsule    MINOCIN/DYNACIN    180 capsule    Take 1 capsule (100 mg) by mouth every 12 hours       NovoLIN N VIAL 100 UNIT/ML injection   Generic drug:  insulin NPH     80 mL    INJECT 45 UNITS UNDER THE SKIN TWICE A DAY       omeprazole 40 MG capsule    priLOSEC    90 capsule    Take 1 capsule (40 mg) by mouth daily Take 30-60 minutes before a meal.       ONE TOUCH LANCETS      None Entered       ONE TOUCH TEST STRIPS VI      None Entered       simvastatin 20 MG tablet    ZOCOR    90 tablet    Take 1 tablet (20 mg) by mouth At Bedtime       spironolactone 100 MG tablet    ALDACTONE    90 tablet    Take 1 tablet (100 mg) by mouth every morning       tolterodine 4 MG 24 hr capsule    DETROL LA    90 capsule    Take 1 capsule (4 mg) by mouth daily       vitamin D 1000 UNITS capsule      1 CAPSULE DAILY         "

## 2017-02-23 NOTE — PROGRESS NOTES
Subjective:    Radha Corbett is a 66 year old female with a lumbar condition.  Condition occurred with:  A fall/slip.  Condition occurred: at home.  This is a chronic condition  Patient presents to PT today with c/o ongoing LBP. Patient stated she has been dealing with back pain for many years; Recent increase in pain over the past year for no reason.  Patient did try PT in June 2016; unable to complete the PT due to suffering another injury due to a fall in late July.  Suffered a R shoulder dislocation and humerus fracture (had no surgery).  Patient recently told she has a T12 compression fx by MRI.  Patient referred to PT by Dr Ledesma..    Patient reports pain:  Central lumbar spine, lumbar spine left and lumbar spine right.    Pain is described as aching and sharp and is constant and reported as 3/10 and 10/10.  Associated symptoms:  Loss of motion/stiffness and loss of strength. Pain is the same all the time.  Symptoms are exacerbated by bending, standing, walking, lifting, twisting, lying down and other (transitions) and relieved by rest.  Since onset symptoms are unchanged (minimal changes).  Special tests:  MRI (see epic).  Previous treatment includes physical therapy.  There was mild improvement following previous treatment.  General health as reported by patient is fair.  Pertinent medical history includes:  History of fractures, diabetes, overweight, high blood pressure, depression, smoking, sleep disorder/apnea, osteoarthritis and other (degenerative changes in LB).  Medical allergies: yes (sulpha and codeine).  Other surgeries include:  Orthopedic surgery and other (mulitple orthopedic and female surgeries).  Current medications:  High blood pressure medication, anti-depressants and other (diabetes medication).  Current occupation is Retired  .        Barriers include:  None as reported by the patient.    Red flags:  None as reported by the patient.    Oswestry Score: 44 %                  Objective:    System         Lumbar/SI Evaluation  ROM:  Arom wnl lumbar: pain with all movements.  AROM Lumbar:   Flexion:          WNL  Ext:                    Mod loss   Side Bend:        Left:  Mid thigh    Right:  Mid thigh  Rotation:           Left:     Right:   Side Glide:        Left:     Right:           Lumbar Myotomes:    T12-L3 (Hip Flex):  Left: 4    Right: 4  L2-4 (Quads):  Left:  4    Right:  4        Lumbar DTR's:  not assessed        Lumbar Dermtomes:  normal                                                            Hip Evaluation    Hip Strength:    Flexion:   Left: 4-/5   Pain:  Right: 4-/5   Pain:                      Abduction:  Left: 4-/5     Pain:Right: 4-/5    Pain:                                 General     ROS    Assessment/Plan:      Patient is a 66 year old female with lumbar complaints.    Patient has the following significant findings with corresponding treatment plan.                Diagnosis 1:  Back pain (T12 compression Fx)  Pain -  hot/cold therapy and manual therapy  Decreased strength - therapeutic exercise, therapeutic activities and home program  Impaired gait - gait training  Impaired muscle performance - neuro re-education  Decreased function - therapeutic activities  Impaired posture - neuro re-education    Therapy Evaluation Codes:   1) History comprised of:   Personal factors that impact the plan of care:      Past/current experiences and Time since onset of symptoms.    Comorbidity factors that impact the plan of care are:      Diabetes, Depression, Dizziness, High blood pressure, Osteoarthritis, Overweight, Sleep disorder/apnea and Smoking.     Medications impacting care: Anti-depressant, High blood pressure and Diabetes.  2) Examination of Body Systems comprised of:   Body structures and functions that impact the plan of care:      Lumbar spine and Thoracic Spine.   Activity limitations that impact the plan of care are:      Bending, Dressing, Lifting,  Squatting/kneeling, Stairs, Standing and Walking.  3) Clinical presentation characteristics are:   Stable/Uncomplicated.  4) Decision-Making    Low complexity using standardized patient assessment instrument and/or measureable assessment of functional outcome.  Cumulative Therapy Evaluation is: Low complexity.    Previous and current functional limitations:  (See Goal Flow Sheet for this information)    Short term and Long term goals: (See Goal Flow Sheet for this information)     Communication ability:  Patient appears to be able to clearly communicate and understand verbal and written communication and follow directions correctly.  Treatment Explanation - The following has been discussed with the patient:   RX ordered/plan of care  Anticipated outcomes  Possible risks and side effects  This patient would benefit from PT intervention to resume normal activities.   Rehab potential is good.    Frequency:  1 X week, once daily  Duration:  for 6-8 visits  Discharge Plan:  Achieve all LTG.  Independent in home treatment program.  Reach maximal therapeutic benefit.    Please refer to the daily flowsheet for treatment today, total treatment time and time spent performing 1:1 timed codes.

## 2017-03-02 ENCOUNTER — THERAPY VISIT (OUTPATIENT)
Dept: PHYSICAL THERAPY | Facility: CLINIC | Age: 67
End: 2017-03-02
Payer: COMMERCIAL

## 2017-03-02 DIAGNOSIS — M54.9 BACK PAIN, UNSPECIFIED BACK LOCATION, UNSPECIFIED BACK PAIN LATERALITY, UNSPECIFIED CHRONICITY: ICD-10-CM

## 2017-03-02 PROCEDURE — 97110 THERAPEUTIC EXERCISES: CPT | Mod: GP | Performed by: PHYSICAL THERAPIST

## 2017-03-02 PROCEDURE — 97112 NEUROMUSCULAR REEDUCATION: CPT | Mod: GP | Performed by: PHYSICAL THERAPIST

## 2017-03-09 ENCOUNTER — THERAPY VISIT (OUTPATIENT)
Dept: PHYSICAL THERAPY | Facility: CLINIC | Age: 67
End: 2017-03-09
Payer: COMMERCIAL

## 2017-03-09 DIAGNOSIS — M54.9 BACK PAIN, UNSPECIFIED BACK LOCATION, UNSPECIFIED BACK PAIN LATERALITY, UNSPECIFIED CHRONICITY: ICD-10-CM

## 2017-03-09 PROCEDURE — 97112 NEUROMUSCULAR REEDUCATION: CPT | Mod: GP | Performed by: PHYSICAL THERAPIST

## 2017-03-09 PROCEDURE — 97110 THERAPEUTIC EXERCISES: CPT | Mod: GP | Performed by: PHYSICAL THERAPIST

## 2017-03-23 ENCOUNTER — TELEPHONE (OUTPATIENT)
Dept: PHARMACY | Facility: CLINIC | Age: 67
End: 2017-03-23

## 2017-03-23 DIAGNOSIS — K21.9 GASTROESOPHAGEAL REFLUX DISEASE WITHOUT ESOPHAGITIS: Primary | ICD-10-CM

## 2017-03-23 NOTE — TELEPHONE ENCOUNTER
Patient is wondering if she could get something else for acid reflux; patient is coughing more. Would like to try ranitidine in addition to omeprazole to help with her cough. [facility] [place] (off [address]). If no improvement, patient will schedule a f/u appt.    V.O. Per Dr. Medeiros for ranitidine 150mg hs.    Patient did go back to smoking (less than 1/2 pack). Has a couple of cigarettes in the evening with a drink.    Likely, increased risk of coughing is due to smoking but patient states she has been smoking for 2 years.    Patient will call back to schedule an MTM appointment.      Falguni Chavis, Pharm.D, BCACP  Medication Therapy Management Pharmacist

## 2017-04-03 ENCOUNTER — ALLIED HEALTH/NURSE VISIT (OUTPATIENT)
Dept: PHARMACY | Facility: CLINIC | Age: 67
End: 2017-04-03
Payer: COMMERCIAL

## 2017-04-03 DIAGNOSIS — R32 URINARY INCONTINENCE, UNSPECIFIED TYPE: ICD-10-CM

## 2017-04-03 DIAGNOSIS — R15.9 STOOL INCONTINENCE: ICD-10-CM

## 2017-04-03 DIAGNOSIS — E11.65 TYPE 2 DIABETES MELLITUS WITH HYPERGLYCEMIA, WITHOUT LONG-TERM CURRENT USE OF INSULIN (H): ICD-10-CM

## 2017-04-03 DIAGNOSIS — I10 HYPERTENSION GOAL BP (BLOOD PRESSURE) < 140/90: ICD-10-CM

## 2017-04-03 DIAGNOSIS — G47.00 INSOMNIA, UNSPECIFIED TYPE: ICD-10-CM

## 2017-04-03 DIAGNOSIS — M85.80 OSTEOPENIA: ICD-10-CM

## 2017-04-03 DIAGNOSIS — F41.8 DEPRESSION WITH ANXIETY: ICD-10-CM

## 2017-04-03 DIAGNOSIS — E78.5 HYPERLIPIDEMIA LDL GOAL <100: ICD-10-CM

## 2017-04-03 DIAGNOSIS — K21.9 GASTROESOPHAGEAL REFLUX DISEASE WITHOUT ESOPHAGITIS: Primary | ICD-10-CM

## 2017-04-03 DIAGNOSIS — L71.9 ROSACEA: ICD-10-CM

## 2017-04-03 PROCEDURE — 99605 MTMS BY PHARM NP 15 MIN: CPT | Performed by: PHARMACIST

## 2017-04-03 PROCEDURE — 99607 MTMS BY PHARM ADDL 15 MIN: CPT | Performed by: PHARMACIST

## 2017-04-03 NOTE — PROGRESS NOTES
SUBJECTIVE/OBJECTIVE:                                                    Radha Corbett is a 66 year old female called for an initial visit for Medication Therapy Management for 2017.  She was referred to me from Trice Medeiros.     Chief Complaint: Medication Review.    Allergies/ADRs: Reviewed in Epic  Tobacco: 0-1 pack per day - is interested in quitting Tobacco Cessation Action Plan: Pharmacotherapies : cold turkey  Alcohol: 10 or more beverages /week (3-4 drinks per night; wine or vodka)  Caffeine: no caffeine  Activity: none  PMH: Reviewed in Epic    Medication Adherence: no issues reported    Diabetes:  Pt currently taking NPH 60 units bid, metformin XR 1000mg qd, glucotrol 10mg bid. Pt is not experiencing side effects. patient is looking a larger syringe; currently is having to inject 30 units twice  SMBG: one time daily.   Ranges (patient reported): 100-130mg/dL.  Symptoms of low blood sugar? none. Frequency of hypoglycemia? rarely.  Recent symptoms of high blood sugar? none  Eye exam: up to date  Foot exam: up to date  Microalbumin is < 30 mg/g. Pt is taking an ACEi/ARB.  Aspirin: Taking 81mg daily and denies side effects     Incontinence: current therapy includes tolterodine LA 4mg daily. This has been effective in decreasing her trips to her bathroom. Patient does complain of dry mouth but feels this is tolerable at this time.  Patient has balance issues but this has been going on prior to     Hypertension: Current medications include lisinopril 10mg daily, spironolactone 100mg qam.  Patient does not self-monitor BP.  Patient reports no current medication side effects.    Depression:  Current medications include: Citalopram 20mg once daily and Bupropion 300mg XL once daily. Pt reports that depression symptoms are worsened. Patient reports the following stressors: child/teen difficulties. Patient is not currently seeing a therapist. She has thought about it in the past. Patient is unsure of what she has tried in  the past. If things aren't bad for her daughter then she is ok but if things are rough for her daughter she struggles.  PHQ-9 SCORE 5/4/2016 6/13/2016 12/5/2016   Total Score - - -   Total Score MyChart - - -   Total Score 14 13 17     GERD: Current medications include: Prilosec (omeprazole) 40 and Zantac (ranitidine) 150 once daily. Pt c/o cough. Patient just started ranitidine last week because she was experiencing more coughing and thinking it was reflux. Patient thinks this has maybe been helping a little bit.     Diarrhea: patient has immodium that she uses on long drives or if she is going out on the boat.    Rosacea: current therapy includes minocycline 100mg bid and spironolactone 100mg daily. Patient has only been taking one minocycline daily. If she goes without the medication she will breakout.     Insomnia: current therapy includes melatonin 15mg hs. Feels like this is helpful but she still wakes up in the middle of night.    Osteopenia: Current therapy includes: calcium 600/Vitamin D 800 and Vitamin D supplements: 1000 IU. Pt is not experiencing side effects.  Pt is getting the following sources of dietary calcium: rarely.  Last vitamin D level: 58 in 2015  DEXA History: 2015  Risk factors: post-menopausal  chronic PPI use  Recent broke her back    Hyperlipidemia: Current therapy includes simvastatin 20mg once daily.  Pt reports no significant myalgias or other side effects.   The 10-year ASCVD risk score ([place] KISHORE Jr, et al., 2013) is: 14.7%    Values used to calculate the score:      Age: 66 years      Sex: Female      Is Non- : No      Diabetic: Yes      Tobacco smoker: No      Systolic Blood Pressure: 130 mmHg      Is BP treated: Yes      HDL Cholesterol: 51 mg/dL      Total Cholesterol: 159 mg/dL    Current labs include:  BP Readings from Last 3 Encounters:   01/19/17 130/80   12/05/16 124/74   08/01/16 120/60     Today's Vitals: LMP 11/18/1991 (Within Months)  Lab Results    Component Value Date    A1C 6.5 12/05/2016   .  Lab Results   Component Value Date    CHOL 159 05/04/2016     Lab Results   Component Value Date    TRIG 101 05/04/2016     Lab Results   Component Value Date    HDL 51 05/04/2016     Lab Results   Component Value Date    LDL 88 05/04/2016       Liver Function Studies -   Recent Labs   Lab Test  08/01/16   1344   PROTTOTAL  7.7   ALBUMIN  3.8   BILITOTAL  0.4   ALKPHOS  117   AST  29   ALT  37       Lab Results   Component Value Date    UCRR 148 05/04/2016    MICROL 41 05/04/2016    UMALCR 27.91 (H) 05/04/2016       Last Basic Metabolic Panel:  Lab Results   Component Value Date     08/01/2016      Lab Results   Component Value Date    POTASSIUM 4.0 08/01/2016     Lab Results   Component Value Date    CHLORIDE 102 08/01/2016     Lab Results   Component Value Date    BUN 20 08/01/2016     Lab Results   Component Value Date    CR 0.69 08/01/2016     GFR Estimate   Date Value Ref Range Status   01/30/2017 84 >60 mL/min/1.7m2 Final   08/01/2016 85 >60 mL/min/1.7m2 Final     Comment:     Non  GFR Calc   05/04/2016 >90  Non  GFR Calc   >60 mL/min/1.7m2 Final     GFR Estimate If Black   Date Value Ref Range Status   01/30/2017 >90 >60 mL/min/1.7m2 Final   08/01/2016 >90   GFR Calc   >60 mL/min/1.7m2 Final   05/04/2016 >90   GFR Calc   >60 mL/min/1.7m2 Final     TSH   Date Value Ref Range Status   12/05/2016 0.79 0.40 - 4.00 mU/L Final   ]    Most Recent Immunizations   Administered Date(s) Administered     Influenza (High Dose) 3 valent vaccine 12/05/2016     Influenza (IIV3) 09/15/2014     Pneumococcal (PCV 13) 12/05/2016     Pneumococcal 23 valent 06/02/2008     TD (ADULT, 7+) 04/12/2006     TDAP Vaccine (Adacel) 03/05/2013     Zoster vaccine, live 06/10/2014     ASSESSMENT:                                                       Current medications were reviewed today.     Medication Adherence: no issues  identified    Diabetes: Stable. Patient is meeting A1c goal of < 7%. Syringes patient has on her med list should allow for larger volume of insulin. Patient will check with pharmacy.    Incontinence: Stable    Hypertension: Stable. Patient is meeting BP goal of < 140/90mmHg.     Depression: Stable. Patient would benefit from counseling. Patient declines at this time.    GERD: Continue to assess effectiveness of ranitidine.    Insomnia: Needs Improvement. Pt may benefit from decreasing melatonin to 10mg at bedtime. 15mg exceeds recommended dose.    Osteopenia: Needs Improvement. Should repeat DEXA. Pt is not meeting RDI of calcium 1200mg/day. Pt is exceeding RDI of Vitamin D 1000 IU/day.    Hyperlipidemia: Stable. No change in current medication necessary at this time.    PLAN:                                                      Recommend increasing calcium to 1 tablet twice daily and discontinue additional vitamin d supplementation.  Recommend repeat DEXA scan  Recommend decreasing melatonin to 10mg daily.    I spent 30 minutes with this patient today.  All changes were made via collaborative practice agreement with Trice Medeiros. A copy of the visit note was provided to the patient's primary care provider.    Will follow up in 3-4 weeks.    The patient was sent via [employer] a summary of these recommendations as an after visit summary.     Falguni Chavis, Pharm.D, BCACP  Medication Therapy Management Pharmacist

## 2017-04-03 NOTE — PATIENT INSTRUCTIONS
Recommendations from today's MTM visit:                                                        1. Stop Vitamin D supplement and increase calcium supplement to 1 tablet twice daily    2. Follow up with DEXA scan    3. Decrease melatonin to 10mg daily    Next MTM visit:  3-4 weeks    To schedule another MTM appointment, please call the clinic directly or you may call the MTM scheduling line at [phone] or toll-free at [phone].     My Clinical Pharmacist's contact information:                                                      It was a pleasure seeing you today!  Please feel free to contact me with any questions or concerns you have.      Falguni Chavis, Pharm.D, [facility]  Medication Therapy Management Pharmacist  [phone]    You may receive a survey about the MTM services you received.  I would appreciate your feedback to help me serve you better in the future. Please fill it out and return it when you can. Your comments will be anonymous.

## 2017-04-27 ENCOUNTER — THERAPY VISIT (OUTPATIENT)
Dept: PHYSICAL THERAPY | Facility: CLINIC | Age: 67
End: 2017-04-27
Payer: COMMERCIAL

## 2017-04-27 DIAGNOSIS — M54.9 BACK PAIN, UNSPECIFIED BACK LOCATION, UNSPECIFIED BACK PAIN LATERALITY, UNSPECIFIED CHRONICITY: ICD-10-CM

## 2017-04-27 PROCEDURE — 97112 NEUROMUSCULAR REEDUCATION: CPT | Mod: GP | Performed by: PHYSICAL THERAPIST

## 2017-04-27 PROCEDURE — 97110 THERAPEUTIC EXERCISES: CPT | Mod: GP | Performed by: PHYSICAL THERAPIST

## 2017-04-27 NOTE — PROGRESS NOTES
"Subjective:    HPI                    Objective:    Standing Alignment:    Cervical/Thoracic:  Forward head, Dowager's hump and thoracic kyphosis increased  Shoulder/UE:  Rounded shoulders  Lumbar:  Lordosis decr            Gait:  Very unstable: buckling of L knee and hip reported by pt at least 5 times during PT.  Gait Type:  Antalgic   Assistive Devices:  None  Deviations:  Lumbar:  Trunk flexionHip:  Hip hiking LKnee:  Excessive extension LAnkle:  Push off decr L and heel strike decr LGeneral Deviations:  Toe out L, base of support incr and stance time decr               Lumbar/SI Evaluation  ROM:    AROM Lumbar:   Flexion:          2\" above toes; slight pain  Ext:                    Mod loss   Side Bend:        Left:  Upper thigh; pain in back    Right:  Upper thigh; pain in back  Rotation:           Left:     Right:   Side Glide:        Left:     Right:           Lumbar Myotomes:    T12-L3 (Hip Flex):  Left: 4    Right: 4  L2-4 (Quads):  Left:  3    Right:  4-        Lumbar DTR's:  not assessed        Lumbar Dermtomes:  not assessed                  Lumbar Palpation:    Tenderness present at Left:    Quadratus Lumborum and Erector Spinae  Tenderness present at Right: Quadratus Lumborum and Erector Spinae                  Shoulder Evaluation:  ROM:  AROM:  not assessed                            PROM:  not assessed                                                                     Knee Evaluation:  ROM:  AROM: normal (c/o pain with at end range of flexion bilaterally)            Strength:     Extension:  Left: 4-/5    Pain:+/-      Right: 4/5   Pain:  Flexion:  Left: 4/5    Pain:-      Right: 4/5   Pain:    Quad Set Left: Fair    Pain:         Palpation:  Palpation of knee: bruising noted medial and anterior knee (? rupture of patellar bursae)      Edema:  Edema of the knee: pocket swelling noted over the L patellar tendon.            General     ROS    Assessment/Plan:      PROGRESS  REPORT    Progress " "reporting period is from 2/23/17 to 4/27/17.       SUBJECTIVE  Subjective changes noted by patient: Patient returns to PT today with c/o L knee and hip \"giving out\" on her when walking causing her to fall.  Patient was initially referred for treatment of LBP; has been seen 4 times over the past 2 months.  Patient cancelled multiple appointments due to car mechanical issues and injuries related to a fall on 4/17/17 at her daughters home in [place]. Patient stated prior to her fall she was doing her HEP as tolerated; noted some improvement in her pain levels and tolerance for activity.  Patient stated on 4/17/17she was walking in her daughters kitchen and the L knee buckled under her. She fell directly on her knees (L more than the R) and hit the front of the L shoulder on the corner of the fridge.   Patient stated since the fall the L knee and hip have \"give out\" on her several times causing her to almost fall. Patient also has c/o L side rib pain (pt thinks she might have broken ribs because the pain in her side is very similar to when she broke her ribs a few years ago).  Patient has chosen to self treat and has not seen a MD since her fall. She has done activities around the home and exercises as she tolerates.      Current pain level is: 3/10 L knee, 2/10 PL with L shoulder,  3/10 PL LBP .       Changes in function:  Due to recent fall pt has had an exacerbation of all pain and loss of function.  Adverse reaction to treatment or activity: activity - increase all body pain.    OBJECTIVE  Changes noted in objective findings:  Unable to do current HEP due to pain in the L shoulder, L hip and L knee area. Modifications made to current HEP.  Visual Observation: Bruising L knee and Anterior L shoulder; noted swelling along the L patellar tendon.      ASSESSMENT/PLAN  Updated problem list and treatment plan: Diagnosis 1:  LBP  Pain -  hot/cold therapy and manual therapy  Decreased ROM/flexibility - manual therapy and " therapeutic exercise  Decreased strength - therapeutic exercise and therapeutic activities  Impaired muscle performance - neuro re-education  Decreased function - therapeutic activities  Impaired posture - neuro re-education  STG/LTGs have been met or progress has been made towards goals:  None  Assessment of Progress: The patient has had set backs in their progress.  The patient's condition has exacerbated.  Self Management Plans:  Patient has been instructed in a home treatment program.  I have re-evaluated this patient and find that the nature, scope, duration and intensity of the therapy is appropriate for the medical condition of the patient.  Radha continues to require the following intervention to meet STG and LTG's:  PT    Recommendations:  This patient would benefit from continued therapy.     Frequency:  1 X week, once daily  Duration:  for 6 visits for LBP.    Orders and PN to be sent to Dr Varun Medeiros for evaluation and treatment of L knee/hip.        Please refer to the daily flowsheet for treatment today, total treatment time and time spent performing 1:1 timed codes.

## 2017-04-27 NOTE — MR AVS SNAPSHOT
After Visit Summary   4/27/2017    Radha Corbett    MRN: [id number]           Patient Information     Date Of Birth          1950        Visit Information        Provider Department      4/27/2017 2:00 PM Elena Boggs, PT [facility] Physical Therapy        Today's Diagnoses     Back pain, unspecified back location, unspecified back pain laterality, unspecified chronicity           Follow-ups after your visit        Your next 10 appointments already scheduled     May 05, 2017  1:30 PM CDT   [facility] Spine with Elena Boggs PT   [facility] Therapy ([facility]  )    [address]  [facility] [place] [address]   [phone]            May 18, 2017  1:20 PM CDT   [facility] Spine with Elena Boggs PT   [facility] Therapy ([facility]  )    [address]  [place] [address]   [phone]            May 25, 2017  1:20 PM CDT   [facility] Spine with Elena Boggs PT   [facility] Therapy ([facility]  )    [address]  [place] [address]   [phone]            Jul 28, 2017  1:00 PM CDT   Return Visit with Mary Lira MD   [facility]    [address] [place] [address] [phone]              Who to contact     If you have questions or need follow up information about today's clinic visit or your schedule please contact [facility] THERAP[facility] directly at [phone].  Normal or non-critical lab and imaging results will be communicated to you by MyChart, letter or phone within 4 business days after the clinic has received the results. If you do not hear from us within 7 days, please contact the clinic through MyChart or phone. If you have a critical  or abnormal lab result, we will notify you by phone as soon as possible.  Submit refill requests through [employer] or call your pharmacy and they will forward the refill request to us. Please allow 3 business days for your refill to be completed.          Additional Information About Your Visit        [employer] Information     [employer] gives you secure access to your electronic health record. If you see a primary care provider, you can also send messages to your care team and make appointments. If you have questions, please call your primary care clinic.  If you do not have a primary care provider, please call [phone] and they will assist you.        Care EveryWhere ID     This is your Care EveryWhere ID. This could be used by other organizations to access your [facility] medical records  [id number]        Your Vitals Were     Last Period                   11/18/1991 (Within Months)            Blood Pressure from Last 3 Encounters:   01/19/17 130/80   12/05/16 124/74   08/01/16 120/60    Weight from Last 3 Encounters:   01/19/17 103.4 kg (228 lb)   12/05/16 103.9 kg (229 lb)   08/01/16 104.9 kg (231 lb 4.8 oz)              We Performed the Following     [facility] PROGRESS NOTES REPORT     NEUROMUSCULAR RE-EDUCATION     THERAPEUTIC EXERCISES          Today's Medication Changes          These changes are accurate as of: 4/27/17  3:27 PM.  If you have any questions, ask your nurse or doctor.               These medicines have changed or have updated prescriptions.        Dose/Directions    minocycline 100 MG capsule   Commonly known as:  MINOCIN/DYNACIN   This may have changed:  when to take this   Used for:  Acne vulgaris        Dose:  100 mg   Take 1 capsule (100 mg) by mouth every 12 hours   Quantity:  180 capsule   Refills:  3                Primary Care Provider Office Phone # Fax #    Trice Medeiros [phone]       [facility] [address]  [facility] [address]        Thank you!     Thank you  "for choosing [facility]ER[facility]  for your care. Our goal is always to provide you with excellent care. Hearing back from our patients is one way we can continue to improve our services. Please take a few minutes to complete the written survey that you may receive in the mail after your visit with us. Thank you!             Your Updated Medication List - Protect others around you: Learn how to safely use, store and throw away your medicines at www.disposemymeds.org.          This list is accurate as of: 4/27/17  3:27 PM.  Always use your most recent med list.                   Brand Name Dispense Instructions for use    aspirin 81 MG tablet      1 TABLET DAILY       buPROPion 300 MG 24 hr tablet    WELLBUTRIN XL    90 tablet    Take 1 tablet (300 mg) by mouth every morning       CALCIUM 600+D PO      1 tab daily       citalopram 20 MG tablet    celeXA    90 tablet    Take 1 tablet (20 mg) by mouth daily       glipiZIDE 10 MG tablet    GLUCOTROL    180 tablet    TAKE 1 TABLET TWICE A DAY BEFORE MEALS       IMODIUM A-D 2 MG tablet   Generic drug:  loperamide      Take 2 mg by mouth as needed       insulin  UNIT/ML injection    NovoLIN N VIAL    80 mL    Inject 60 Units Subcutaneous 2 times daily       insulin syringe-needle U-100 31G X 5/16\" 1 ML    RELION INSULIN SYRINGE    200 each    Use 2 syringes daily or as directed.       lisinopril 10 MG tablet    PRINIVIL/ZESTRIL    90 tablet    Take 1 tablet (10 mg) by mouth daily       MELATONIN PO      10 mg At Bedtime       metFORMIN 500 MG 24 hr tablet    GLUCOPHAGE-XR    180 tablet    Take 2 tablets (1,000 mg) by mouth daily (with dinner)       minocycline 100 MG capsule    MINOCIN/DYNACIN    180 capsule    Take 1 capsule (100 mg) by mouth every 12 hours       omeprazole 40 MG capsule    priLOSEC    90 capsule    Take 1 capsule (40 mg) by mouth daily Take 30-60 minutes before a meal.       ONE TOUCH LANCETS      None " Entered       ONE TOUCH TEST STRIPS VI      None Entered       ranitidine 150 MG tablet    ZANTAC    30 tablet    Take 1 tablet (150 mg) by mouth At Bedtime       simvastatin 20 MG tablet    ZOCOR    90 tablet    Take 1 tablet (20 mg) by mouth At Bedtime       spironolactone 100 MG tablet    ALDACTONE    90 tablet    Take 1 tablet (100 mg) by mouth every morning       tolterodine 4 MG 24 hr capsule    DETROL LA    90 capsule    Take 1 capsule (4 mg) by mouth daily       vitamin D 1000 UNITS capsule      1 CAPSULE DAILY

## 2017-04-27 NOTE — LETTER
"[facility] PHYSICAL THERAPY  [address]  [place] [address]  [phone]    May 2, 2017    Re: Radha Corbett   :   1950  MRN:  [id number]   REFERRING PHYSICIAN:   Kai Ledesma    [facility] PHYSICAL THERAPY    Date of Initial Evaluation:  2017  Visits:  Rxs Used: 4  Reason for Referral:  Back pain, unspecified back location, unspecified back pain laterality, unspecified chronicity    EVALUATION SUMMARY      PROGRESS  REPORT  Progress reporting period is from 17 to 17.       SUBJECTIVE  Subjective changes noted by patient: Patient returns to PT today with c/o L knee and hip \"giving out\" on her when walking causing her to fall.  Patient was initially referred for treatment of LBP; has been seen 4 times over the past 2 months.  Patient cancelled multiple appointments due to car mechanical issues and injuries related to a fall on 17 at her daughters home in [place]. Patient stated prior to her fall she was doing her HEP as tolerated; noted some improvement in her pain levels and tolerance for activity.  Patient stated on 17she was walking in her daughters kitchen and the L knee buckled under her. She fell directly on her knees (L more than the R) and hit the front of the L shoulder on the corner of the fridge.   Patient stated since the fall the L knee and hip have \"give out\" on her several times causing her to almost fall. Patient also has c/o L side rib pain (pt thinks she might have broken ribs because the pain in her side is very similar to when she broke her ribs a few years ago).  Patient has chosen to self treat and has not seen a MD since her fall. She has done activities around the home and exercises as she tolerates.  Current pain level is: 3/10 L knee, 2/10 PL with L shoulder,  3/10 PL LBP .     Changes in function:  Due to recent fall pt has had an exacerbation of all pain " "and loss of function.  Adverse reaction to treatment or activity: activity - increase all body pain.  OBJECTIVE  Changes noted in objective findings:  Unable to do current HEP due to pain in the L shoulder, L hip and L knee area. Modifications made to current HEP.  Visual Observation: Bruising L knee and Anterior L shoulder; noted swelling along the L patellar tendon.      Standing Alignment:    Cervical/Thoracic:  Forward head, Dowager's hump and thoracic kyphosis increased  Shoulder/UE:  Rounded shoulders  Lumbar:  Lordosis decr  Gait:  Very unstable: buckling of L knee and hip reported by pt at least 5 times during PT.  Gait Type:  Antalgic   Assistive Devices:  None  Deviations:  Lumbar:  Trunk flexionHip:  Hip hiking LKnee:  Excessive extension LAnkle:  Push off decr L and heel strike decr LGeneral Deviations:  Toe out L, base of support incr and stance time decr       Lumbar/SI Evaluation  ROM:    AROM Lumbar:   Flexion:          2\" above toes; slight pain  Ext:                    Mod loss   Side Bend:        Left:  Upper thigh; pain in back    Right:  Upper thigh; pain in back  Rotation:           Left:     Right:   Side Glide:        Left:     Right:   Lumbar Myotomes:    T12-L3 (Hip Flex):  Left: 4    Right: 4  L2-4 (Quads):  Left:  3    Right:  4-  Lumbar DTR's:  not assessed  Lumbar Dermtomes:  not assessed  Lumbar Palpation:    Tenderness present at Left:    Quadratus Lumborum and Erector Spinae  Tenderness present at Right: Quadratus Lumborum and Erector Spinae  Shoulder Evaluation:  ROM:  AROM:  not assessed  PROM:  not assessed  Knee Evaluation:  ROM:  AROM: normal (c/o pain with at end range of flexion bilaterally)    Strength:   Extension:  Left: 4-/5    Pain:+/-      Right: 4/5   Pain:  Flexion:  Left: 4/5    Pain:-      Right: 4/5   Pain:    Quad Set Left: Fair    Pain:   Palpation:  Palpation of knee: bruising noted medial and anterior knee (? rupture of patellar bursae)  Edema:  Edema of the " knee: pocket swelling noted over the L patellar tendon.      ASSESSMENT/PLAN  Updated problem list and treatment plan: Diagnosis 1:  LBP  Pain -  hot/cold therapy and manual therapy  Decreased ROM/flexibility - manual therapy and therapeutic exercise  Decreased strength - therapeutic exercise and therapeutic activities  Impaired muscle performance - neuro re-education  Decreased function - therapeutic activities  Impaired posture - neuro re-education  STG/LTGs have been met or progress has been made towards goals:  None  Assessment of Progress: The patient has had set backs in their progress.  The patient's condition has exacerbated.  Self Management Plans:  Patient has been instructed in a home treatment program.  I have re-evaluated this patient and find that the nature, scope, duration and intensity of the therapy is appropriate for the medical condition of the patient.  Radha continues to require the following intervention to meet STG and LTG's:  PT    Recommendations:  This patient would benefit from continued therapy.     Frequency:  1 X week, once daily  Duration:  for 6 visits for LBP.    Orders and PN to be sent to Dr Varun Medeiros for evaluation and treatment of L knee/hip.                    Thank you for your referral.    INQUIRIES  Therapist: Elena Boggs, [facility]  [facility] PHYSICAL THERAPY  [address]  [facility] [place] [address]  Phone: [phone]  Fax: [phone]

## 2017-05-18 ENCOUNTER — THERAPY VISIT (OUTPATIENT)
Dept: PHYSICAL THERAPY | Facility: CLINIC | Age: 67
End: 2017-05-18
Payer: COMMERCIAL

## 2017-05-18 DIAGNOSIS — M54.9 BACK PAIN, UNSPECIFIED BACK LOCATION, UNSPECIFIED BACK PAIN LATERALITY, UNSPECIFIED CHRONICITY: ICD-10-CM

## 2017-05-18 PROCEDURE — 97112 NEUROMUSCULAR REEDUCATION: CPT | Mod: GP | Performed by: PHYSICAL THERAPIST

## 2017-05-18 PROCEDURE — 97110 THERAPEUTIC EXERCISES: CPT | Mod: GP | Performed by: PHYSICAL THERAPIST

## 2017-05-18 NOTE — PROGRESS NOTES
"Subjective:    HPI  Oswestry Score: 38 %                 Objective:    System         Lumbar/SI Evaluation  ROM:    AROM Lumbar:   Flexion:            WNL  Ext:                    MOD; pain   Side Bend:        Left:  Mid thigh    Right:  Mid thigh  Rotation:           Left:  = R    Right:  =L  Side Glide:        Left:     Right:           Lumbar Myotomes:    T12-L3 (Hip Flex):  Left: 4+    Right: 4+  L2-4 (Quads):  Left:  4+    Right:  4+  L4 (Ankle DF):  Left:  5    Right:  5      Lumbar DTR's:  not assessed        Lumbar Dermtomes:  not assessed                                                                       General     ROS    Assessment/Plan:      PROGRESS  REPORT    Progress reporting period is from 4/27/17 to 5/18/17.       SUBJECTIVE  Subjective changes noted by patient:  Patient since she was last seen on 4/27/17;  Noted improvement in the L knee.  States the L knee \"robert\" ~1x/week since she started doing the exercises which has reduced the risk for falls (last fall 4/17/17).  Patient [place]ed walk[place] but is no longer using it to help her walk; walking ~ 100 yards without AD.     Patient stated standing (>10') increases low back pain requiring her to sit down.  Patient is able to sit as long as she wants and has no issues with sit to stand, able to sleep as long as she wants and lift items that are placed conveniently.    Current pain level is 2/10 (has not done much today to cause pain) .     .   Changes in function:  Minimal changes in progress along with set backs due to fall  Adverse reaction to treatment or activity: activity - increases     OBJECTIVE  Changes noted in objective findings:  Slow with all transitions; Slow Gait.        ASSESSMENT/PLAN  Updated problem list and treatment plan: Diagnosis 1:  LBP  Pain -  hot/cold therapy  Decreased ROM/flexibility - manual therapy, therapeutic exercise and therapeutic activity  Decreased strength - therapeutic " exercise and therapeutic activities  Impaired muscle performance - neuro re-education  Decreased function - therapeutic activities  STG/LTGs have been met or progress has been made towards goals:  None  Assessment of Progress: The patient has had set backs in their progress.  The patient's condition has exacerbated.  Self Management Plans:  Patient has been instructed in a home treatment program.  I have re-evaluated this patient and find that the nature, scope, duration and intensity of the therapy is appropriate for the medical condition of the patient.  Radha continues to require the following intervention to meet STG and LTG's:  PT    Recommendations:  This patient would benefit from continued therapy.     Frequency:  1 X week, once daily  Duration:  for 6 visits        Please refer to the daily flowsheet for treatment today, total treatment time and time spent performing 1:1 timed codes.

## 2017-05-18 NOTE — MR AVS SNAPSHOT
After Visit Summary   5/18/2017    Radha Corbett    MRN: [id number]           Patient Information     Date Of Birth          1950        Visit Information        Provider Department      5/18/2017 1:20 PM Elena Boggs, PT [facility]        Today's Diagnoses     Back pain, unspecified back location, unspecified back pain laterality, unspecified chronicity           Follow-ups after your visit        Your next 10 appointments already scheduled     May 25, 2017  1:20 PM CDT   [facility] Spine with Elena Boggs PT   [facility] ([facility]  )    [address]  [place] [address]   [phone]            Jun 01, 2017  1:20 PM CDT   [facility] Spine with Elena Boggs PT   [facility] ([facility]  )    [address]  [place] [address]   [phone]            Jul 28, 2017  1:00 PM CDT   Return Visit with Mary Lira MD   [facility] ([facility])    [address] [place] [address] [phone]              Who to contact     If you have questions or need follow up information about today's clinic visit or your schedule please contact [facility] ATHLETIC [facility] PHYSICAL THERAPY directly at [phone].  Normal or non-critical lab and imaging results will be communicated to you by MyChart, letter or phone within 4 business days after the clinic has received the results. If you do not hear from us within 7 days, please contact the clinic through MyChart or phone. If you have a critical or abnormal lab result, we will notify you by phone as soon as possible.  Submit refill requests through [facility] or call your pharmacy and they will forward the refill request to us. Please allow 3 business days for your refill to be  completed.          Additional Information About Your Visit        [employer] Information     [employer] gives you secure access to your electronic health record. If you see a primary care provider, you can also send messages to your care team and make appointments. If you have questions, please call your primary care clinic.  If you do not have a primary care provider, please call [phone] and they will assist you.        Care EveryWhere ID     This is your Care EveryWhere ID. This could be used by other organizations to access your Mobile medical records  [id number]        Your Vitals Were     Last Period                   11/18/1991 (Within Months)            Blood Pressure from Last 3 Encounters:   01/19/17 130/80   12/05/16 124/74   08/01/16 120/60    Weight from Last 3 Encounters:   01/19/17 103.4 kg (228 lb)   12/05/16 103.9 kg (229 lb)   08/01/16 104.9 kg (231 lb 4.8 oz)              We Performed the Following     [facility] PROGRESS NOTES REPORT     NEUROMUSCULAR RE-EDUCATION     THERAPEUTIC EXERCISES          Today's Medication Changes          These changes are accurate as of: 5/18/17  7:22 PM.  If you have any questions, ask your nurse or doctor.               These medicines have changed or have updated prescriptions.        Dose/Directions    minocycline 100 MG capsule   Commonly known as:  MINOCIN/DYNACIN   This may have changed:  when to take this   Used for:  Acne vulgaris        Dose:  100 mg   Take 1 capsule (100 mg) by mouth every 12 hours   Quantity:  180 capsule   Refills:  3                Primary Care Provider Office Phone # Fax #    Trice Medeiros [phone]       [place] [address]  [facility] [address]        Thank you!     Thank you for choosing [facility] PHYSICAL THERAPY  for your care. Our goal is always to provide you with excellent care. Hearing back from our patients is one way we can continue to improve our services.  "Please take a few minutes to complete the written survey that you may receive in the mail after your visit with us. Thank you!             Your Updated Medication List - Protect others around you: Learn how to safely use, store and throw away your medicines at www.disposemymeds.org.          This list is accurate as of: 5/18/17  7:22 PM.  Always use your most recent med list.                   Brand Name Dispense Instructions for use    aspirin 81 MG tablet      1 TABLET DAILY       buPROPion 300 MG 24 hr tablet    WELLBUTRIN XL    90 tablet    Take 1 tablet (300 mg) by mouth every morning       CALCIUM 600+D PO      1 tab daily       citalopram 20 MG tablet    celeXA    90 tablet    Take 1 tablet (20 mg) by mouth daily       glipiZIDE 10 MG tablet    GLUCOTROL    180 tablet    TAKE 1 TABLET TWICE A DAY BEFORE MEALS       IMODIUM A-D 2 MG tablet   Generic drug:  loperamide      Take 2 mg by mouth as needed       insulin  UNIT/ML injection    NovoLIN N VIAL    80 mL    Inject 60 Units Subcutaneous 2 times daily       insulin syringe-needle U-100 31G X 5/16\" 1 ML    RELION INSULIN SYRINGE    200 each    Use 2 syringes daily or as directed.       lisinopril 10 MG tablet    PRINIVIL/ZESTRIL    90 tablet    Take 1 tablet (10 mg) by mouth daily       MELATONIN PO      10 mg At Bedtime       metFORMIN 500 MG 24 hr tablet    GLUCOPHAGE-XR    180 tablet    Take 2 tablets (1,000 mg) by mouth daily (with dinner)       minocycline 100 MG capsule    MINOCIN/DYNACIN    180 capsule    Take 1 capsule (100 mg) by mouth every 12 hours       omeprazole 40 MG capsule    priLOSEC    90 capsule    Take 1 capsule (40 mg) by mouth daily Take 30-60 minutes before a meal.       ONE TOUCH LANCETS      None Entered       ONE TOUCH TEST STRIPS VI      None Entered       ranitidine 150 MG tablet    ZANTAC    30 tablet    Take 1 tablet (150 mg) by mouth At Bedtime       simvastatin 20 MG tablet    ZOCOR    90 tablet    Take 1 tablet (20 " mg) by mouth At Bedtime       spironolactone 100 MG tablet    ALDACTONE    90 tablet    Take 1 tablet (100 mg) by mouth every morning       tolterodine 4 MG 24 hr capsule    DETROL LA    90 capsule    Take 1 capsule (4 mg) by mouth daily       vitamin D 1000 UNITS capsule      1 CAPSULE DAILY

## 2017-05-18 NOTE — LETTER
"[facility]  [address]  [place] [address]  [phone]    May 19, 2017    Re: Radha Corbett   :   1950  MRN:  [id number]   REFERRING PHYSICIAN:   Kai Ledesma    [facility]    Date of Initial Evaluation: 2017  Visits:  Rxs Used: 5  Reason for Referral:  Back pain, unspecified back location, unspecified back pain laterality, unspecified chronicity    EVALUATION SUMMARY      PROGRESS  REPORT  Progress reporting period is from 17 to 17.       SUBJECTIVE  Subjective changes noted by patient:  Patient since she was last seen on 17;  Noted improvement in the L knee.  States the L knee \"robert\" ~1x/week since she started doing the exercises which has reduced the risk for falls (last fall 17).  [place] walk[place] but is no longer using it to help her walk; walking ~ 100 yards without AD.     Patient stated standing (>10') increases low back pain requiring her to sit down.  Patient is able to sit as long as she wants and has no issues with sit to stand, able to sleep as long as she wants and lift items that are placed conveniently.    Current pain level is 2/10 (has not done much today to cause pain) .     Changes in function:  Minimal changes in progress along with set backs due to fall  Adverse reaction to treatment or activity: activity - increases   OBJECTIVE  Changes noted in objective findings:  Slow with all transitions; Slow Gait.  Oswestry Score: 38 %                    Lumbar/SI Evaluation  ROM:    AROM Lumbar:   Flexion:            WNL  Ext:                    MOD; pain   Side Bend:        Left:  Mid thigh    Right:  Mid thigh  Rotation:           Left:  = R    Right:  =L  Side Glide:        Left:     Right:         Lumbar Myotomes:    T12-L3 (Hip Flex):  Left: 4+    Right: 4+  L2-4 (Quads):  Left:  4+ "    Right:  4+  L4 (Ankle DF):  Left:  5    Right:  5  Lumbar DTR's:  not assessed  Lumbar Dermtomes:  not assessed     ASSESSMENT/PLAN  Updated problem list and treatment plan: Diagnosis 1:  LBP  Pain -  hot/cold therapy  Decreased ROM/flexibility - manual therapy, therapeutic exercise and therapeutic activity  Decreased strength - therapeutic exercise and therapeutic activities  Impaired muscle performance - neuro re-education  Decreased function - therapeutic activities  STG/LTGs have been met or progress has been made towards goals:  None  Assessment of Progress: The patient has had set backs in their progress.  The patient's condition has exacerbated.  Self Management Plans:  Patient has been instructed in a home treatment program.  I have re-evaluated this patient and find that the nature, scope, duration and intensity of the therapy is appropriate for the medical condition of the patient.  Radha continues to require the following intervention to meet STG and LTG's:  PT    Recommendations:  This patient would benefit from continued therapy.     Frequency:  1 X week, once daily  Duration:  for 6 visits                  Thank you for your referral.    INQUIRIES  Therapist: Elena Boggs, [facility]  [facility] - [facility] PHYSICAL THERAPY  [address]  [place] [address]  Phone: [phone]  Fax: [phone]

## 2017-05-25 ENCOUNTER — THERAPY VISIT (OUTPATIENT)
Dept: PHYSICAL THERAPY | Facility: CLINIC | Age: 67
End: 2017-05-25
Payer: COMMERCIAL

## 2017-05-25 DIAGNOSIS — M54.9 BACK PAIN, UNSPECIFIED BACK LOCATION, UNSPECIFIED BACK PAIN LATERALITY, UNSPECIFIED CHRONICITY: ICD-10-CM

## 2017-05-25 PROCEDURE — 97112 NEUROMUSCULAR REEDUCATION: CPT | Mod: GP | Performed by: PHYSICAL THERAPIST

## 2017-05-25 PROCEDURE — 97110 THERAPEUTIC EXERCISES: CPT | Mod: GP | Performed by: PHYSICAL THERAPIST

## 2017-05-25 NOTE — MR AVS SNAPSHOT
After Visit Summary   5/25/2017    Radha Corbett    MRN: [id number]           Patient Information     Date Of Birth          1950        Visit Information        Provider Department      5/25/2017 1:20 PM Elena Boggs, PT [facility] Physical Therapy        Today's Diagnoses     Back pain, unspecified back location, unspecified back pain laterality, unspecified chronicity           Follow-ups after your visit        Your next 10 appointments already scheduled     Jun 01, 2017  1:20 PM CDT   [place] [facility] with Elena Boggs PT   [facility] Physical Therapy ([facility]  )    [address]  [place] [address]   [phone]            Jun 08, 2017 12:10 PM CDT   [place] [facility] with Elena Boggs PT   [facility] Physical Therapy ([facility]  )    [address]  [place] [address]   [phone]            Jul 28, 2017  1:00 PM CDT   Return Visit with Mary Lira MD   [facility] ([facility])    [address] [place] [address] [phone]              Who to contact     If you have questions or need follow up information about today's clinic visit or your schedule please contact [facility] PHYSICAL THERAPY directly at [phone].  Normal or non-critical lab and imaging results will be communicated to you by MyChart, letter or phone within 4 business days after the clinic has received the results. If you do not hear from us within 7 days, please contact the clinic through MyChart or phone. If you have a critical or abnormal lab result, we will notify you by phone as soon as possible.  Submit refill requests through [employer] or call your pharmacy and they will forward the refill request to us. Please allow 3 business days for your refill to be  completed.          Additional Information About Your Visit        MyChart Information     [employer] gives you secure access to your electronic health record. If you see a primary care provider, you can also send messages to your care team and make appointments. If you have questions, please call your primary care clinic.  If you do not have a primary care provider, please call [phone] and they will assist you.        Care EveryWhere ID     This is your Care EveryWhere ID. This could be used by other organizations to access your [place] medical records  [id number]        Your Vitals Were     Last Period                   11/18/1991 (Within Months)            Blood Pressure from Last 3 Encounters:   01/19/17 130/80   12/05/16 124/74   08/01/16 120/60    Weight from Last 3 Encounters:   01/19/17 103.4 kg (228 lb)   12/05/16 103.9 kg (229 lb)   08/01/16 104.9 kg (231 lb 4.8 oz)              We Performed the Following     NEUROMUSCULAR RE-EDUCATION     THERAPEUTIC EXERCISES          Today's Medication Changes          These changes are accurate as of: 5/25/17  2:34 PM.  If you have any questions, ask your nurse or doctor.               These medicines have changed or have updated prescriptions.        Dose/Directions    minocycline 100 MG capsule   Commonly known as:  MINOCIN/DYNACIN   This may have changed:  when to take this   Used for:  Acne vulgaris        Dose:  100 mg   Take 1 capsule (100 mg) by mouth every 12 hours   Quantity:  180 capsule   Refills:  3                Primary Care Provider Office Phone # Fax #    Trice Medeiros [phone]       [facility] [address] [facility] [address]        Thank you!     Thank you for choosing [facility] PHYSICAL THERAPY  for your care. Our goal is always to provide you with excellent care. Hearing back from our patients is one way we can continue to improve our services. Please take a few minutes to  "complete the written survey that you may receive in the mail after your visit with us. Thank you!             Your Updated Medication List - Protect others around you: Learn how to safely use, store and throw away your medicines at www.disposemymeds.org.          This list is accurate as of: 5/25/17  2:34 PM.  Always use your most recent med list.                   Brand Name Dispense Instructions for use    aspirin 81 MG tablet      1 TABLET DAILY       buPROPion 300 MG 24 hr tablet    WELLBUTRIN XL    90 tablet    Take 1 tablet (300 mg) by mouth every morning       CALCIUM 600+D PO      1 tab daily       citalopram 20 MG tablet    celeXA    90 tablet    Take 1 tablet (20 mg) by mouth daily       glipiZIDE 10 MG tablet    GLUCOTROL    180 tablet    TAKE 1 TABLET TWICE A DAY BEFORE MEALS       IMODIUM A-D 2 MG tablet   Generic drug:  loperamide      Take 2 mg by mouth as needed       insulin  UNIT/ML injection    NovoLIN N VIAL    80 mL    Inject 60 Units Subcutaneous 2 times daily       insulin syringe-needle U-100 31G X 5/16\" 1 ML    RELION INSULIN SYRINGE    200 each    Use 2 syringes daily or as directed.       lisinopril 10 MG tablet    PRINIVIL/ZESTRIL    90 tablet    Take 1 tablet (10 mg) by mouth daily       MELATONIN PO      10 mg At Bedtime       metFORMIN 500 MG 24 hr tablet    GLUCOPHAGE-XR    180 tablet    Take 2 tablets (1,000 mg) by mouth daily (with dinner)       minocycline 100 MG capsule    MINOCIN/DYNACIN    180 capsule    Take 1 capsule (100 mg) by mouth every 12 hours       omeprazole 40 MG capsule    priLOSEC    90 capsule    Take 1 capsule (40 mg) by mouth daily Take 30-60 minutes before a meal.       ONE TOUCH LANCETS      None Entered       ONE TOUCH TEST STRIPS VI      None Entered       ranitidine 150 MG tablet    ZANTAC    30 tablet    Take 1 tablet (150 mg) by mouth At Bedtime       simvastatin 20 MG tablet    ZOCOR    90 tablet    Take 1 tablet (20 mg) by mouth At Bedtime       " spironolactone 100 MG tablet    ALDACTONE    90 tablet    Take 1 tablet (100 mg) by mouth every morning       tolterodine 4 MG 24 hr capsule    DETROL LA    90 capsule    Take 1 capsule (4 mg) by mouth daily       vitamin D 1000 UNITS capsule      1 CAPSULE DAILY

## 2017-05-25 NOTE — LETTER
[facility] PHYSICAL THERAPY  [address]  [facility] [facility] [address]  [phone]    2017    Re: Radha Corbett   :   1950  MRN:  [id number]   REFERRING PHYSICIAN:   Kai Ledesma    [facility]    Date of Initial Evaluation:  2017  Visits:  Rxs Used: 6  Reason for Referral:  Back pain, unspecified back location, unspecified back pain laterality, unspecified chronicity    EVALUATION SUMMARY    DISCHARGE REPORT  Progress reporting period is from 17 to 17. Patient cancelled last 2 scheduled appointment because she wanted to see the MD.     SUBJECTIVE  Subjective: pt reports no change in her back pain today; stated the knee pain got worse then got better;  patient stated the pain in the LB area gets worse with standing or walking activites.  Patient stated she has no issues with ADL.  Pt to follow up with the MD   Current Pain level: 5/10 (LB)   Changes in function: No changes noted in function since last SOAP note   The subjective and objective information are from the last SOAP note on this patient.  OBJECTIVE:The objective findings are from DOS 17   Objective: pt refuses to lay down on table (afraid she can not get back up)    ASSESSMENT/PLAN  Updated problem list and treatment plan: Diagnosis 1:  LBP  Impaired muscle performance - neuro re-education  Decreased function - therapeutic activities  STG/LTGs have been met or progress has been made towards goals:  Yes (See Goal flow sheet completed today.)  Assessment of Progress: The patient has not returned to therapy. Current status is unknown.  Self Management Plans:  Patient has been instructed in a home treatment program.    Radha continues to require the following intervention to meet STG and LTG's: PT intervention is no longer required to meet STG/LTG.  The patient failed to complete scheduled/ordered appointments so  current information is unknown.      Recommendations:  We will discharge this patient from PT            Thank you for your referral.    INQUIRIES  Therapist: Elena Boggs, [facility]  [facility]  [address]  [facility] [place] [address]  Phone: [phone]  Fax: [phone]

## 2017-05-26 DIAGNOSIS — E11.65 TYPE 2 DIABETES MELLITUS WITH HYPERGLYCEMIA, WITHOUT LONG-TERM CURRENT USE OF INSULIN (H): ICD-10-CM

## 2017-05-26 NOTE — TELEPHONE ENCOUNTER
inna N         Last Written Prescription Date: 4/3/17  Last Fill Quantity: 80ml, # refills: 1  NO PRINT OUT  Last Office Visit with [facility], [facility] or [facility] prescribing provider:  1/19/17   Next 5 appointments (look out 90 days)     Juan 15, 2017  1:40 PM CDT   Return Visit with Trice Medeiros MD   [facility] ([facility])    [address] [facility] [address]   [phone]            Jul 28, 2017  1:00 PM CDT   Return Visit with Mary Lira MD   [facility] ([facility])    [address] [facility] [address] [phone]                   BP Readings from Last 3 Encounters:   01/19/17 130/80   12/05/16 124/74   08/01/16 120/60     Lab Results   Component Value Date    MICROL 41 05/04/2016     Lab Results   Component Value Date    UMALCR 27.91 05/04/2016     Creatinine   Date Value Ref Range Status   08/01/2016 0.69 0.52 - 1.04 mg/dL Final   ]  GFR Estimate   Date Value Ref Range Status   01/30/2017 84 >60 mL/min/1.7m2 Final   08/01/2016 85 >60 mL/min/1.7m2 Final     Comment:     Non  GFR Calc   05/04/2016 >90  Non  GFR Calc   >60 mL/min/1.7m2 Final     GFR Estimate If Black   Date Value Ref Range Status   01/30/2017 >90 >60 mL/min/1.7m2 Final   08/01/2016 >90   GFR Calc   >60 mL/min/1.7m2 Final   05/04/2016 >90   GFR Calc   >60 mL/min/1.7m2 Final     Lab Results   Component Value Date    CHOL 159 05/04/2016     Lab Results   Component Value Date    HDL 51 05/04/2016     Lab Results   Component Value Date    LDL 88 05/04/2016     Lab Results   Component Value Date    TRIG 101 05/04/2016     Lab Results   Component Value Date    CHOLHDLRATIO 2.6 06/23/2015     Lab Results   Component Value Date    AST 29 08/01/2016     Lab Results   Component Value Date    ALT 37 08/01/2016     Lab Results   Component Value Date    A1C 6.5 12/05/2016    A1C 7.8 08/01/2016    A1C 9.5  06/13/2016    A1C 10.7 05/04/2016    A1C 7.5 12/02/2015     Potassium   Date Value Ref Range Status   08/01/2016 4.0 3.4 - 5.3 mmol/L Final     Medication filled for 3 months per protocol.      insulin NPH (NOVOLIN N VIAL) 100 UNIT/ML injection 80 mL 1 4/3/2017  No      Sig: Inject 60 Units Subcutaneous 2 times daily     Class: No Print Out         Rosaura Keating RN, [facility]

## 2017-06-13 ENCOUNTER — DOCUMENTATION ONLY (OUTPATIENT)
Dept: LAB | Facility: CLINIC | Age: 67
End: 2017-06-13

## 2017-06-13 DIAGNOSIS — E78.5 HYPERLIPIDEMIA LDL GOAL <100: ICD-10-CM

## 2017-06-13 DIAGNOSIS — E11.65 TYPE 2 DIABETES MELLITUS WITH HYPERGLYCEMIA, WITHOUT LONG-TERM CURRENT USE OF INSULIN (H): Primary | ICD-10-CM

## 2017-06-13 DIAGNOSIS — I10 HYPERTENSION GOAL BP (BLOOD PRESSURE) < 140/90: Chronic | ICD-10-CM

## 2017-06-16 ENCOUNTER — TELEPHONE (OUTPATIENT)
Dept: PEDIATRICS | Facility: CLINIC | Age: 67
End: 2017-06-16

## 2017-06-16 DIAGNOSIS — M54.5 CHRONIC BILATERAL LOW BACK PAIN, WITH SCIATICA PRESENCE UNSPECIFIED: Primary | ICD-10-CM

## 2017-06-16 DIAGNOSIS — G89.29 CHRONIC BILATERAL LOW BACK PAIN, WITH SCIATICA PRESENCE UNSPECIFIED: Primary | ICD-10-CM

## 2017-06-16 NOTE — TELEPHONE ENCOUNTER
[facility] Call Center    Phone Message     Name of Caller:      Phone Number:     Best time to return call:     May a detailed message be left on voicemail:     Relation to patient: Self    Reason for Call: Order(s): Other:   What is being requested: Sports med referall for back pain   Reason for requested:REFERAL  Date needed: APPT SCHEDULED FOR NEXT Tuesday PLEASE HAVE REFERRAL DONE BY THEN     Provider name: CHUCHO        Action Taken: routed to primary care

## 2017-06-16 NOTE — TELEPHONE ENCOUNTER
Called patient on home number to inform.     Patient notes her insulin NPH (NOVOLIN N VIAL) 100 UNIT/ML injection went from $80 copay to $400 because she is now in the donut hole. Patient has future OV scheduled with PCP Dr. Trice Medeiros to discuss further, but wanted to inform provider before coming in to see if anything else can be done to lower her drug cost.    Future Office visit:    Next 5 appointments (look out 90 days)     Jun 22, 2017  9:20 AM CDT   Return Visit with Trice Medeiros MD   [facility])    [address] [place] [id number] [phone]            Jul 28, 2017  1:00 PM CDT   Return Visit with Mayr Lira MD   [facility])    [address] [place] [address] [phone]                 Will route to Dr. Trice Medeiros for review and orders.  Rajwinder Rosario, [facility]

## 2017-06-19 NOTE — TELEPHONE ENCOUNTER
Please ask pt to make an appointment with our pharmacist Dr. Chavis to review her medication, and find options that is cheaper.

## 2017-06-20 ENCOUNTER — OFFICE VISIT (OUTPATIENT)
Dept: ORTHOPEDICS | Facility: CLINIC | Age: 67
End: 2017-06-20
Payer: COMMERCIAL

## 2017-06-20 VITALS — HEART RATE: 98 BPM | SYSTOLIC BLOOD PRESSURE: 127 MMHG | DIASTOLIC BLOOD PRESSURE: 71 MMHG

## 2017-06-20 DIAGNOSIS — M51.16 LUMBAR DISC HERNIATION WITH RADICULOPATHY: Primary | ICD-10-CM

## 2017-06-20 PROCEDURE — 99214 OFFICE O/P EST MOD 30 MIN: CPT | Performed by: PREVENTIVE MEDICINE

## 2017-06-20 ASSESSMENT — PAIN SCALES - GENERAL: PAINLEVEL: MODERATE PAIN (5)

## 2017-06-20 NOTE — PROGRESS NOTES
HISTORY OF PRESENT ILLNESS  Ms. Corbett is a pleasant 66 year old year old female who presents to clinic today with 2 years of lower back pain radiating into her legs.  Radha explains that the pain has been worsening she had a fall last summer in which she dislocated her shoulder. An MRI in January showed a fractured T12 and multiple degenerative changes, most severe at L4-L5.  Location: lower back  Quality:  achy pain    Severity: 9/10 at worst    Duration: hours  Timing: occurs intermittently  Context: occurs while walking or standing  Modifying factors:  resting and non-use makes it better, movement and use makes it worse  Associated signs & symptoms: leg pain  Previous similar pain: yes  Exercise: walking  Additional history: as documented    MEDICAL HISTORY  Patient Active Problem List   Diagnosis     Macular degeneration     Myopia     Hiatal hernia     IBS (irritable bowel syndrome)     Hypertension goal BP (blood pressure) < 140/90     GERD (gastroesophageal reflux disease)     Atypical ductal hyperplasia of breast     Atypical lobular hyperplasia of breast     Benign Breast Disease (PASH)     Family history of breast cancer in sister     Type 2 diabetes, HbA1C goal < 8% (H)     Hyperlipidemia LDL goal <100     Breast cancer screening-high risk     Tubular adenoma of colon     Osteopenia     Alcohol ingestion, more than 4 drinks/day on alcohol screening     Umbilical hernia     Incontinence of urine     Stool incontinence     Osteoarthritis, knee     Rosacea     Anxiety     Pseudophakia     Abnormal cervical Pap smear with positive HPV DNA test     Depression with anxiety     Retinal detachment     Elevated liver function tests     Rectocele     Unexplained endometrial cells on cervical cytology     Back pain, unspecified back location, unspecified back pain laterality, unspecified chronicity       Current Outpatient Prescriptions   Medication Sig Dispense Refill     insulin NPH (NOVOLIN N VIAL) 100 UNIT/ML  "injection Inject 60 units Subcutaneous 2 times daily 80 mL 0     ranitidine (ZANTAC) 150 MG tablet Take 1 tablet (150 mg) by mouth At Bedtime 30 tablet 0     tolterodine (DETROL LA) 4 MG 24 hr capsule Take 1 capsule (4 mg) by mouth daily 90 capsule 3     buPROPion (WELLBUTRIN XL) 300 MG 24 hr tablet Take 1 tablet (300 mg) by mouth every morning 90 tablet 1     glipiZIDE (GLUCOTROL) 10 MG tablet TAKE 1 TABLET TWICE A DAY BEFORE MEALS 180 tablet 1     metFORMIN (GLUCOPHAGE-XR) 500 MG 24 hr tablet Take 2 tablets (1,000 mg) by mouth daily (with dinner) 180 tablet 1     omeprazole (PRILOSEC) 40 MG capsule Take 1 capsule (40 mg) by mouth daily Take 30-60 minutes before a meal. 90 capsule 3     citalopram (CELEXA) 20 MG tablet Take 1 tablet (20 mg) by mouth daily 90 tablet 3     simvastatin (ZOCOR) 20 MG tablet Take 1 tablet (20 mg) by mouth At Bedtime 90 tablet 3     lisinopril (PRINIVIL,ZESTRIL) 10 MG tablet Take 1 tablet (10 mg) by mouth daily 90 tablet 3     minocycline (MINOCIN,DYNACIN) 100 MG capsule Take 1 capsule (100 mg) by mouth every 12 hours (Patient taking differently: Take 100 mg by mouth daily ) 180 capsule 3     insulin syringe-needle U-100 (RELION INSULIN SYRINGE) 31G X 5/16\" 1 ML Use 2 syringes daily or as directed. 200 each 3     spironolactone (ALDACTONE) 100 MG tablet Take 1 tablet (100 mg) by mouth every morning 90 tablet 3     MELATONIN PO 10 mg At Bedtime        loperamide (IMODIUM A-D) 2 MG tablet Take 2 mg by mouth as needed       ONE TOUCH TEST STRIPS VI None Entered       ONE TOUCH LANCETS None Entered       CALCIUM 600+D OR 1 tab daily       ASPIRIN 81 MG OR TABS 1 TABLET DAILY       VITAMIN D 1000 UNIT OR CAPS 1 CAPSULE DAILY         Allergies   Allergen Reactions     Codeine      Sulfa Drugs        Family History   Problem Relation Age of Onset     Hypertension Mother      CEREBROVASCULAR DISEASE Mother      Arthritis Mother      Cardiovascular Mother      Circulatory Mother      " CEREBROVASCULAR DISEASE Father      Prostate Cancer Father 65      at 69     Asthma Brother      Prostate Cancer Brother 48     bone metastasis     Breast Cancer Sister 39     also contralateral @53, mets to lungs     CANCER Sister 20     second uterine cancer in 30s also     DIABETES Sister      Hypertension Sister      OSTEOPOROSIS Sister      Breast Cancer Sister 57     unilateral     Depression Sister      Lipids Sister      CANCER Maternal Grandmother 95     spine     CANCER Paternal Aunt 40     unknown type     CANCER Paternal Uncle      stomach;  in his 80s     Glaucoma No family hx of        Additional medical/Social/Surgical histories reviewed in [facility] and updated as appropriate.     REVIEW OF SYSTEMS (2017)  10 point ROS of systems including Constitutional, Eyes, Respiratory, Cardiovascular, Gastroenterology, Genitourinary, Integumentary, Musculoskeletal, Psychiatric were all negative except for pertinent positives noted in my HPI.     PHYSICAL EXAM  Vitals:    17 0748   BP: 127/71   Pulse: 98     Vital Signs: /71  Pulse 98  LMP 1991 (Within Months) Patient declined being weighed. There is no height or weight on file to calculate BMI.    General  - normal appearance, in no obvious distress  CV  - normal peripheral perfusion  Pulm  - normal respiratory pattern, non-labored  Musculoskeletal - lumbar spine  - stance: antalgic gait, no obvious leg length discrepancy, normal heel and toe walk  - inspection: normal bone and joint alignment, no obvious scoliosis  - palpation: no paravertebral or bony tenderness  - ROM: flexion exacerbates pain, normal extension, sidebending, rotation  - strength: lower extremities 5/5 in all planes  - special tests:  (+) straight leg raise  (+) slump test  Neuro  - patellar and Achilles DTRs 2+ bilaterally, lower extremity sensory deficit throughout L4/5 distribution, grossly normal coordination, normal muscle tone  Skin  - no ecchymosis, erythema,  warmth, or induration, no obvious rash  Psych  - interactive, appropriate, normal mood and affect    ASSESSMENT & PLAN  66-year-old female with lower back pain and radiculopathy secondary to degenerative disc disease.  Reviewed MRI: shows herniated disc/ddd  Ordered MERON  Cont. HEP  RTC 2 weeks after MERON      Juan Jose Narvaez MD, CAQSM

## 2017-06-20 NOTE — PATIENT INSTRUCTIONS
Thanks for coming today.  Ortho/Sports Medicine Clinic  [address] [place] [address]    To schedule future appointments in Ortho Clinic, you may call [phone].    To schedule ordered imaging by your Provider: Call [facility] at [phone]    [employer] available online at:   [email or web address]    Please call if any further questions or concerns [phone] and ask for the Orthopedic Department. Clinic hours 8 am to 5 pm.    Return to clinic if symptoms worsen.

## 2017-06-20 NOTE — MR AVS SNAPSHOT
After Visit Summary   6/20/2017    Radha Corbett    MRN: [id number]           Patient Information     Date Of Birth          1950        Visit Information        Provider Department      6/20/2017 7:40 AM [place] Jose Narvaez MD [facility]        Today's Diagnoses     Lumbar disc herniation with radiculopathy    -  1      Care Instructions    Thanks for coming today.  Ortho[facility] Medicine Clinic  [address] [place] [id number]    To schedule future appointments in Ortho Clinic, you may call [phone].    To schedule ordered imaging by your Provider: Call [facility] at [phone]    [employer] available online at:   [employer]    Please call if any further questions or concerns [phone] and ask for the Orthopedic Department. Clinic hours 8 am to 5 pm.    Return to clinic if symptoms worsen.          Follow-ups after your visit        Your next 10 appointments already scheduled     Jun 22, 2017  9:00 AM CDT   LAB with LAB FIRST FLOOR [facility])    [id number] [place] [id number] [phone]           Patient must bring picture ID.  Patient should be prepared to give a urine specimen  Please do not eat 10-12 hours before your appointment if you are coming in fasting for labs on lipids, cholesterol, or glucose (sugar).  Pregnant women should follow their Care Team instructions. Water with medications is okay. Do not drink coffee or other fluids.   If you have concerns about taking  your medications, please ask at office or if scheduling via [employer], send a message by clicking on Secure Messaging, Message Your Care Team.            Jun 22, 2017  9:20 AM CDT   Return Visit with Trice Medeiros MD   [facility] ([facility])    [address] [place] [id number] [phone]            Jun 27, 2017 11:00 AM CDT   XR LUMBAR EPIDURAL  INJECTION with MGXR3, MG NEURO RAD   [facility] ([facility])    [address] [place] [id number] [phone]           For nerve root injection, please send or bring copies of any MRIs or other scans you have had.  Bring a list of your current medicines to your exam. (Include vitamins, minerals and over-the-counter medicines.) Leave your valuables at home.  Plan to have someone drive you home afterward.  Stop taking the following medicines (but talk to your doctor first):   If you take blood thinners, you may need to stop taking them a few days before treatment. Talk to your doctor before stopping these medicines.Stop taking Coumadin (warfarin) 3 days before treatment. Restart the day after treatment.   If you take Plavix, Ticlid, Pletal or Persantine, please ask your doctor if you should stop these medicines. You may need extra tests on the morning of your scan. You may take your other medicines as normal.  Stop all food and drink (including water) 3 hours before your test or treatment.  Please tell the doctor:   If you are allergic to X-ray dye (contrast fluid).   If you may be pregnant.  Injections take about 30 to 45 minutes. Most people spend up to 2 hours in the clinic or hospital.  Please call the Imaging Department at your exam site with any questions            Jul 11, 2017 10:40 AM CDT   Return Visit with [place] Narvaez MD   [facility])    [address] [place] [id number] [phone]            Jul 28, 2017  1:00 PM CDT   Return Visit with Mary Lira MD   [facility])    [address] [place] [id number] [phone]              Future tests that were ordered for you today     Open Future Orders        Priority Expected Expires Ordered    X-ray Lumbar epidural injection Routine 6/20/2017 6/20/2018 6/20/2017             Who to contact     If you have questions or need follow up information about today's clinic visit or your schedule please contact [facility] directly at [phone].  Normal or non-critical lab and imaging results will be communicated to you by [employer], letter or phone within 4 business days after the clinic has received the results. If you do not hear from us within 7 days, please contact the clinic through [employer] or phone. If you have a critical or abnormal lab result, we will notify you by phone as soon as possible.  Submit refill requests through [employer] or call your pharmacy and they will forward the refill request to us. Please allow 3 business days for your refill to be completed.          Additional Information About Your Visit        basno Information     [employer] gives you secure access to your electronic health record. If you see a primary care provider, you can also send messages to your care team and make appointments. If you have questions, please call your primary care clinic.  If you do not have a primary care provider, please call [phone] and they will assist you.      [employer] is an electronic gateway that provides easy, online access to your medical records. With [employer]no, you can request a clinic appointment, read your test results, renew a prescription or communicate with your care team.     To access your existing account, please contact your [facility] or call [phone] for assistance.        Care EveryWhere ID     This is your Care EveryWhere ID. This could be used by other organizations to access your [facility] medical records  [id number]        Your Vitals Were     Pulse Last Period                98 11/18/1991 (Within Months)           Blood Pressure from Last 3 Encounters:   06/20/17 127/71   01/19/17 130/80   12/05/16 124/74    Weight from Last 3 Encounters:   01/19/17 103.4 kg (228 lb)   12/05/16 103.9 kg (229 lb)   08/01/16  104.9 kg (231 lb 4.8 oz)                 Today's Medication Changes          These changes are accurate as of: 6/20/17  8:39 AM.  If you have any questions, ask your nurse or doctor.               These medicines have changed or have updated prescriptions.        Dose/Directions    minocycline 100 MG capsule   Commonly known as:  MINOCIN/DYNACIN   This may have changed:  when to take this   Used for:  Acne vulgaris        Dose:  100 mg   Take 1 capsule (100 mg) by mouth every 12 hours   Quantity:  180 capsule   Refills:  3                Primary Care Provider Office Phone # Fax #    Trice Medeiros [phone]       [facility] [address]  [facility] [address]        Thank you!     Thank you for choosing [facility]  for your care. Our goal is always to provide you with excellent care. Hearing back from our patients is one way we can continue to improve our services. Please take a few minutes to complete the written survey that you may receive in the mail after your visit with us. Thank you!             Your Updated Medication List - Protect others around you: Learn how to safely use, store and throw away your medicines at www.disposemymeds.org.          This list is accurate as of: 6/20/17  8:39 AM.  Always use your most recent med list.                   Brand Name Dispense Instructions for use    aspirin 81 MG tablet      1 TABLET DAILY       buPROPion 300 MG 24 hr tablet    WELLBUTRIN XL    90 tablet    Take 1 tablet (300 mg) by mouth every morning       CALCIUM 600+D PO      1 tab daily       citalopram 20 MG tablet    celeXA    90 tablet    Take 1 tablet (20 mg) by mouth daily       glipiZIDE 10 MG tablet    GLUCOTROL    180 tablet    TAKE 1 TABLET TWICE A DAY BEFORE MEALS       IMODIUM A-D 2 MG tablet   Generic drug:  loperamide      Take 2 mg by mouth as needed       insulin  UNIT/ML injection    NovoLIN N VIAL    80 mL    Inject 60 units Subcutaneous 2 times  "daily       insulin syringe-needle U-100 31G X 5/16\" 1 ML    RELION INSULIN SYRINGE    200 each    Use 2 syringes daily or as directed.       lisinopril 10 MG tablet    PRINIVIL/ZESTRIL    90 tablet    Take 1 tablet (10 mg) by mouth daily       MELATONIN PO      10 mg At Bedtime       metFORMIN 500 MG 24 hr tablet    GLUCOPHAGE-XR    180 tablet    Take 2 tablets (1,000 mg) by mouth daily (with dinner)       minocycline 100 MG capsule    MINOCIN/DYNACIN    180 capsule    Take 1 capsule (100 mg) by mouth every 12 hours       omeprazole 40 MG capsule    priLOSEC    90 capsule    Take 1 capsule (40 mg) by mouth daily Take 30-60 minutes before a meal.       ONE TOUCH LANCETS      None Entered       ONE TOUCH TEST STRIPS VI      None Entered       ranitidine 150 MG tablet    ZANTAC    30 tablet    Take 1 tablet (150 mg) by mouth At Bedtime       simvastatin 20 MG tablet    ZOCOR    90 tablet    Take 1 tablet (20 mg) by mouth At Bedtime       spironolactone 100 MG tablet    ALDACTONE    90 tablet    Take 1 tablet (100 mg) by mouth every morning       tolterodine 4 MG 24 hr capsule    DETROL LA    90 capsule    Take 1 capsule (4 mg) by mouth daily       vitamin D 1000 UNITS capsule      1 CAPSULE DAILY         "

## 2017-06-22 ENCOUNTER — OFFICE VISIT (OUTPATIENT)
Dept: PEDIATRICS | Facility: CLINIC | Age: 67
End: 2017-06-22
Payer: COMMERCIAL

## 2017-06-22 VITALS
SYSTOLIC BLOOD PRESSURE: 125 MMHG | DIASTOLIC BLOOD PRESSURE: 77 MMHG | TEMPERATURE: 97.7 F | HEART RATE: 101 BPM | BODY MASS INDEX: 35.91 KG/M2 | OXYGEN SATURATION: 95 % | WEIGHT: 228.8 LBS | HEIGHT: 67 IN

## 2017-06-22 DIAGNOSIS — M85.88 OSTEOPENIA OF SPINE: ICD-10-CM

## 2017-06-22 DIAGNOSIS — Z78.0 ASYMPTOMATIC POSTMENOPAUSAL STATUS: ICD-10-CM

## 2017-06-22 DIAGNOSIS — E11.65 TYPE 2 DIABETES MELLITUS WITH HYPERGLYCEMIA, WITHOUT LONG-TERM CURRENT USE OF INSULIN (H): ICD-10-CM

## 2017-06-22 DIAGNOSIS — E78.5 HYPERLIPIDEMIA LDL GOAL <100: ICD-10-CM

## 2017-06-22 DIAGNOSIS — F33.1 MODERATE EPISODE OF RECURRENT MAJOR DEPRESSIVE DISORDER (H): ICD-10-CM

## 2017-06-22 DIAGNOSIS — F41.1 GAD (GENERALIZED ANXIETY DISORDER): ICD-10-CM

## 2017-06-22 DIAGNOSIS — I10 HYPERTENSION GOAL BP (BLOOD PRESSURE) < 140/90: Chronic | ICD-10-CM

## 2017-06-22 DIAGNOSIS — E11.65 TYPE 2 DIABETES MELLITUS WITH HYPERGLYCEMIA, WITHOUT LONG-TERM CURRENT USE OF INSULIN (H): Primary | ICD-10-CM

## 2017-06-22 DIAGNOSIS — F17.200 TOBACCO USE DISORDER: ICD-10-CM

## 2017-06-22 DIAGNOSIS — K21.9 LARYNGOPHARYNGEAL REFLUX: ICD-10-CM

## 2017-06-22 LAB
ALT SERPL W P-5'-P-CCNC: 28 U/L (ref 0–50)
CHOLEST SERPL-MCNC: 162 MG/DL
CREAT UR-MCNC: 107 MG/DL
HBA1C MFR BLD: 5.9 % (ref 4.3–6)
HDLC SERPL-MCNC: 69 MG/DL
LDLC SERPL CALC-MCNC: 72 MG/DL
MICROALBUMIN UR-MCNC: 13 MG/L
MICROALBUMIN/CREAT UR: 11.87 MG/G CR (ref 0–25)
NONHDLC SERPL-MCNC: 93 MG/DL
TRIGL SERPL-MCNC: 106 MG/DL

## 2017-06-22 PROCEDURE — 82043 UR ALBUMIN QUANTITATIVE: CPT | Performed by: INTERNAL MEDICINE

## 2017-06-22 PROCEDURE — 80061 LIPID PANEL: CPT | Performed by: INTERNAL MEDICINE

## 2017-06-22 PROCEDURE — 83036 HEMOGLOBIN GLYCOSYLATED A1C: CPT | Performed by: INTERNAL MEDICINE

## 2017-06-22 PROCEDURE — 36415 COLL VENOUS BLD VENIPUNCTURE: CPT | Performed by: INTERNAL MEDICINE

## 2017-06-22 PROCEDURE — 84460 ALANINE AMINO (ALT) (SGPT): CPT | Performed by: INTERNAL MEDICINE

## 2017-06-22 PROCEDURE — 99215 OFFICE O/P EST HI 40 MIN: CPT | Performed by: INTERNAL MEDICINE

## 2017-06-22 RX ORDER — GLIPIZIDE 10 MG/1
TABLET ORAL
Qty: 180 TABLET | Refills: 1 | Status: SHIPPED | OUTPATIENT
Start: 2017-06-22 | End: 2017-07-03

## 2017-06-22 NOTE — NURSING NOTE
"Chief Complaint   Patient presents with     Cough     Recheck Medication       Initial /77  Pulse 101  Temp 97.7  F (36.5  C) (Oral)  Ht 5' 7\" (1.702 m)  Wt 228 lb 12.8 oz (103.8 kg)  LMP 11/18/1991 (Within Months)  SpO2 95%  BMI 35.84 kg/m2 Estimated body mass index is 35.84 kg/(m^2) as calculated from the following:    Height as of this encounter: 5' 7\" (1.702 m).    Weight as of this encounter: 228 lb 12.8 oz (103.8 kg).  Medication Reconciliation: complete     Catherine Hernandez, FLACO    "

## 2017-06-22 NOTE — PROGRESS NOTES
Dear Radha,   Here are your recent results which are within the expected range. Please continue with your current plan of care.     Please call or Mychart to our office if you have further questions.     Trice Medeiros MD

## 2017-06-22 NOTE — PATIENT INSTRUCTIONS
Make appointment(s) for:   -- dexa  -- return for annual wellness in 6 months.         Medication(s) prescribed today:    Orders Placed This Encounter   Medications     glipiZIDE (GLUCOTROL) 10 MG tablet     Sig: TAKE 1 TABLET TWICE A DAY BEFORE MEALS     Dispense:  180 tablet     Refill:  1

## 2017-06-22 NOTE — PROGRESS NOTES
SUBJECTIVE:                                                    Radha Corbett is a 66 year old female who presents to clinic today for the following health issues:    Radha has Atypical ductal hyperplasia of breast; Benign Breast Disease (PASH); Type 2 diabetes, HbA1C goal < 8% (H); Breast cancer screening-high risk; Tubular adenoma of colon; and Abnormal cervical Pap smear with positive HPV DNA test on her pertinent problem list.      Acute Illness   Acute illness concerns: Cough  Onset: Ongoing for a few years, worsened over last year    Fever: no    Chills/Sweats: no    Headache (location?): no    Sinus Pressure:no    Conjunctivitis:  no    Ear Pain: no    Rhinorrhea: no    Congestion: no    Sore Throat: no     Cough: YES-non-productive, with shortness of breath, waxing and waning over time    Wheeze: no    Decreased Appetite: no    Nausea: no    Vomiting: no    Diarrhea:  no    Dysuria/Freq.: no    Fatigue/Achiness: no    Sick/Strep Exposure: no     Therapies Tried and outcome: Redenitine , Prilosec     We have even evaluated this cough in the past, thought to be related to laryngo-acid reflux. She has been treated as such. She feels that things has resolved.    She has also started smoking due to stress. Her daughter is going through a very nasty divorce in [place]. Patient has to help her financially for legal fees. Her daughter's divorce will be finalized in a few months. She is also going to move back into the house she grew up in, which is the patient's old home.    Patient was smoking 10 cigarettes a day for a couple months, has been cutting down gradually.4 cigarettes a day.    PHQ9 is 13, TARAS 7 is 17    Medication Followup of  All medications    Pt. States she would like to go over all medications since there is some she is unable to afford.       Low back pain and T12 compression fracture. Still a lot of pain, seeing sports medicine, will have epidural injection next Tuesday.     ROS:  Constitutional,  "HEENT, cardiovascular, pulmonary, gi and gu systems are negative, except as otherwise noted.         Problem list, Medication list, Allergies, and Medical/Social/Surgical histories reviewed in EPIC and updated as appropriate.    OBJECTIVE:                                                    /77  Pulse 101  Temp 97.7  F (36.5  C) (Oral)  Ht 5' 7\" (1.702 m)  Wt 228 lb 12.8 oz (103.8 kg)  LMP 11/18/1991 (Within Months)  SpO2 95%  BMI 35.84 kg/m2    GEN: healthy, alert and no distress      Diagnostic test results:  Results for orders placed or performed in visit on 06/22/17 (from the past 24 hour(s))   ALT   Result Value Ref Range    ALT 28 0 - 50 U/L     Orders Only on 06/22/2017   Component Date Value Ref Range Status     Cholesterol 06/22/2017 162  <200 mg/dL Final     Triglycerides 06/22/2017 106  <150 mg/dL Final    Fasting specimen     HDL Cholesterol 06/22/2017 69  >49 mg/dL Final     LDL Cholesterol Calculated 06/22/2017 72  <100 mg/dL Final    Desirable:       <100 mg/dl     Non HDL Cholesterol 06/22/2017 93  <130 mg/dL Final     Creatinine Urine 06/22/2017 107  mg/dL Final     Albumin Urine mg/L 06/22/2017 13  mg/L Final     Albumin Urine mg/g Cr 06/22/2017 11.87  0 - 25 mg/g Cr Final     Hemoglobin A1C 06/22/2017 5.9  4.3 - 6.0 % Final          ASSESSMENT/PLAN:                                                      66 year old female with the following diagnoses and treatment plan:      ICD-10-CM    1. Type 2 diabetes mellitus with hyperglycemia, without long-term current use of insulin (H) E11.65 glipiZIDE (GLUCOTROL) 10 MG tablet     CANCELED: HEMOGLOBIN A1C     CANCELED: Albumin Random Urine Quantitative   2. Asymptomatic postmenopausal status Z78.0    3. Hyperlipidemia LDL goal <100 E78.5 ALT     CANCELED: Lipid panel reflex to direct LDL   4. Laryngopharyngeal reflux J38.7    5. Moderate episode of recurrent major depressive disorder (H) F33.1    6. TARAS (generalized anxiety disorder) F41.1  "   7. Tobacco use disorder F17.200    8. Hypertension goal BP (blood pressure) < 140/90 I10    9. Osteopenia of spine M85.88 ALT       -- Diabetes is under excellent control. When the options of starting glipizide. In light of her anticipated epidural injection, possibly 3 series, we will have her stay on the glipizide for now. The most expensive medication for her is her insulin. She plans to meet with them [facility] pharmacist to review our options.  -- Depression and anxiety are a little worse, also patient started smoking. Patient does not want to make any changes of her antidepressant, or any medications to help with smoking cessation. She believes someone her daughter's situation resolves, her stress level calmed down, everything will be better.  -- GERD: discussed diet change, quit smoking, offered ENT referral. Pt declined.   -- other chronic health issues stable.  -- return in 6 months for annual wellness and revisit the above issues.     A total of 40 minutes was spent face to face with this patient. More than 50% of the time was spent on education for the above problems and management plans.     Trice Medeiros MD-PhD  [facility]    (Note: Chart documentation was done in part with Dragon Voice Recognition software. Although reviewed after completion, some word and grammatical errors may remain.)    .

## 2017-06-22 NOTE — MR AVS SNAPSHOT
After Visit Summary   6/22/2017    Radha Corbett    MRN: [id number]           Patient Information     Date Of Birth          1950        Visit Information        Provider Department      6/22/2017 9:20 AM Trice Medeiros MD [facility]        Today's Diagnoses     Type 2 diabetes mellitus with hyperglycemia, without long-term current use of insulin (H)    -  1    Asymptomatic postmenopausal status        Hyperlipidemia LDL goal <100        Laryngopharyngeal reflux        Moderate episode of recurrent major depressive disorder (H)        TARAS (generalized anxiety disorder)        Tobacco use disorder          Care Instructions    Make appointment(s) for:   -- dexa  -- return for annual wellness in 6 months.         Medication(s) prescribed today:    Orders Placed This Encounter   Medications     glipiZIDE (GLUCOTROL) 10 MG tablet     Sig: TAKE 1 TABLET TWICE A DAY BEFORE MEALS     Dispense:  180 tablet     Refill:  1                    Follow-ups after your visit        Your next 10 appointments already scheduled     Jun 27, 2017 11:00 AM CDT   XR LUMBAR EPIDURAL INJECTION with MGXR3, MG NEURO RAD   [facility] ([facility])    [address] [place] [id number] [phone]           For nerve root injection, please send or bring copies of any MRIs or other scans you have had.  Bring a list of your current medicines to your exam. (Include vitamins, minerals and over-the-counter medicines.) Leave your valuables at home.  Plan to have someone drive you home afterward.  Stop taking the following medicines (but talk to your doctor first):   If you take blood thinners, you may need to stop taking them a few days before treatment. Talk to your doctor before stopping these medicines.Stop taking Coumadin (warfarin) 3 days before treatment. Restart the day after treatment.   If you take Plavix, Ticlid, Pletal or Persantine, please ask your doctor if  you should stop these medicines. You may need extra tests on the morning of your scan. You may take your other medicines as normal.  Stop all food and drink (including water) 3 hours before your test or treatment.  Please tell the doctor:   If you are allergic to X-ray dye (contrast fluid).   If you may be pregnant.  Injections take about 30 to 45 minutes. Most people spend up to 2 hours in the clinic or hospital.  Please call the Imaging Department at your exam site with any questions            Jul 11, 2017 10:40 AM CDT   Return Visit with Juan Jose Narvaez MD   [facility]    [address] [place] [id number] [phone]            Jul 28, 2017  1:00 PM CDT   Return Visit with Mary Lira MD   [facility] ([facility])    [address] [place] [id number] [phone]              Future tests that were ordered for you today     Open Future Orders        Priority Expected Expires Ordered    DEXA HIP/PELVIS/SPINE - Future Routine  6/22/2018 6/22/2017            Who to contact     If you have questions or need follow up information about today's clinic visit or your schedule please contact [facility] directly at [phone].  Normal or non-critical lab and imaging results will be communicated to you by Usermindhart, letter or phone within 4 business days after the clinic has received the results. If you do not hear from us within 7 days, please contact the clinic through Usermindhart or phone. If you have a critical or abnormal lab result, we will notify you by phone as soon as possible.  Submit refill requests through [employer] or call your pharmacy and they will forward the refill request to us. Please allow 3 business days for your refill to be completed.          Additional Information About Your Visit        Geo[employer] Information     [employer] gives you secure access to your electronic  "health record. If you see a primary care provider, you can also send messages to your care team and make appointments. If you have questions, please call your primary care clinic.  If you do not have a primary care provider, please call [phone] and they will assist you.      Frogdice is an electronic gateway that provides easy, online access to your medical records. With Frogdice, you can request a clinic appointment, read your test results, renew a prescription or communicate with your care team.     To access your existing account, please contact your [facility] or call [phone] for assistance.        Care EveryWhere ID     This is your Care EveryWhere ID. This could be used by other organizations to access your [facility] medical records  [id number]        Your Vitals Were     Pulse Temperature Height Last Period Pulse Oximetry BMI (Body Mass Index)    101 97.7  F (36.5  C) (Oral) 5' 7\" (1.702 m) 11/18/1991 (Within Months) 95% 35.84 kg/m2       Blood Pressure from Last 3 Encounters:   06/22/17 125/77   06/20/17 127/71   01/19/17 130/80    Weight from Last 3 Encounters:   06/22/17 228 lb 12.8 oz (103.8 kg)   01/19/17 228 lb (103.4 kg)   12/05/16 229 lb (103.9 kg)              We Performed the Following     ALT          Today's Medication Changes          These changes are accurate as of: 6/22/17  9:49 AM.  If you have any questions, ask your nurse or doctor.               These medicines have changed or have updated prescriptions.        Dose/Directions    minocycline 100 MG capsule   Commonly known as:  MINOCIN/DYNACIN   This may have changed:  when to take this   Used for:  Acne vulgaris        Dose:  100 mg   Take 1 capsule (100 mg) by mouth every 12 hours   Quantity:  180 capsule   Refills:  3            Where to get your medicines      These medications were sent to [facility] - [place], [place] - [address], [address]  [address], [address], [facility] [address]     Phone:  [phone]     glipiZIDE 10 MG tablet                Primary Care Provider Office Phone # Fax #    Trice Medeiros [phone]       [facility] [address]  [facility] [address]        Equal Access to Services     MAIA GARCIA : Hadii aad ku hadasho Soomaali, waaxda luqadaha, qaybta kaalmada adeegyada, waxay idiin hayaan adeeg kharash la'aan . So [facility] [phone].    ATENCIÓN: Si habla español, tiene a arauz disposición servicios gratuitos de asistencia lingüística. Llame al [phone].    We comply with applicable federal civil rights laws and [place] laws. We do not discriminate on the basis of race, color, national origin, age, disability sex, sexual orientation or gender identity.            Thank you!     Thank you for choosing [facility]  for your care. Our goal is always to provide you with excellent care. Hearing back from our patients is one way we can continue to improve our services. Please take a few minutes to complete the written survey that you may receive in the mail after your visit with us. Thank you!             Your Updated Medication List - Protect others around you: Learn how to safely use, store and throw away your medicines at www.disposemymeds.org.          This list is accurate as of: 6/22/17  9:49 AM.  Always use your most recent med list.                   Brand Name Dispense Instructions for use Diagnosis    aspirin 81 MG tablet      1 TABLET DAILY        buPROPion 300 MG 24 hr tablet    WELLBUTRIN XL    90 tablet    Take 1 tablet (300 mg) by mouth every morning    Depression with anxiety       CALCIUM 600+D PO      1 tab daily        citalopram 20 MG tablet    celeXA    90 tablet    Take 1 tablet (20 mg) by mouth daily    Major depressive disorder, recurrent episode, mild (H)       glipiZIDE 10 MG tablet    GLUCOTROL    180 tablet    TAKE 1 TABLET TWICE A DAY BEFORE MEALS    Type 2 diabetes mellitus  "with hyperglycemia, without long-term current use of insulin (H)       IMODIUM A-D 2 MG tablet   Generic drug:  loperamide      Take 2 mg by mouth as needed        insulin  UNIT/ML injection    NovoLIN N VIAL    80 mL    Inject 60 units Subcutaneous 2 times daily    Type 2 diabetes mellitus with hyperglycemia, without long-term current use of insulin (H)       insulin syringe-needle U-100 31G X 5/16\" 1 ML    RELION INSULIN SYRINGE    200 each    Use 2 syringes daily or as directed.    Type 2 diabetes mellitus without complication (H)       lisinopril 10 MG tablet    PRINIVIL/ZESTRIL    90 tablet    Take 1 tablet (10 mg) by mouth daily    Essential hypertension with goal blood pressure less than 140/90       MELATONIN PO      10 mg At Bedtime        metFORMIN 500 MG 24 hr tablet    GLUCOPHAGE-XR    180 tablet    Take 2 tablets (1,000 mg) by mouth daily (with dinner)    Type 2 diabetes mellitus with hyperglycemia, without long-term current use of insulin (H)       minocycline 100 MG capsule    MINOCIN/DYNACIN    180 capsule    Take 1 capsule (100 mg) by mouth every 12 hours    Acne vulgaris       omeprazole 40 MG capsule    priLOSEC    90 capsule    Take 1 capsule (40 mg) by mouth daily Take 30-60 minutes before a meal.    Gastroesophageal reflux disease without esophagitis       ONE TOUCH LANCETS      None Entered        ONE TOUCH TEST STRIPS VI      None Entered        ranitidine 150 MG tablet    ZANTAC    30 tablet    Take 1 tablet (150 mg) by mouth At Bedtime    Gastroesophageal reflux disease without esophagitis       simvastatin 20 MG tablet    ZOCOR    90 tablet    Take 1 tablet (20 mg) by mouth At Bedtime    Hyperlipidemia LDL goal <100       spironolactone 100 MG tablet    ALDACTONE    90 tablet    Take 1 tablet (100 mg) by mouth every morning    Essential hypertension with goal blood pressure less than 140/90       tolterodine 4 MG 24 hr capsule    DETROL LA    90 capsule    Take 1 capsule (4 mg) by " mouth daily    Urge incontinence of urine       vitamin D 1000 UNITS capsule      1 CAPSULE DAILY

## 2017-06-23 ASSESSMENT — PATIENT HEALTH QUESTIONNAIRE - PHQ9: SUM OF ALL RESPONSES TO PHQ QUESTIONS 1-9: 13

## 2017-06-27 ENCOUNTER — RADIANT APPOINTMENT (OUTPATIENT)
Dept: GENERAL RADIOLOGY | Facility: CLINIC | Age: 67
End: 2017-06-27
Attending: PREVENTIVE MEDICINE
Payer: COMMERCIAL

## 2017-06-27 VITALS — SYSTOLIC BLOOD PRESSURE: 133 MMHG | HEART RATE: 104 BPM | OXYGEN SATURATION: 95 % | DIASTOLIC BLOOD PRESSURE: 85 MMHG

## 2017-06-27 DIAGNOSIS — M51.16 LUMBAR DISC HERNIATION WITH RADICULOPATHY: ICD-10-CM

## 2017-06-27 PROCEDURE — 62323 NJX INTERLAMINAR LMBR/SAC: CPT | Performed by: RADIOLOGY

## 2017-06-27 RX ORDER — LIDOCAINE HYDROCHLORIDE 10 MG/ML
5 INJECTION, SOLUTION EPIDURAL; INFILTRATION; INTRACAUDAL; PERINEURAL ONCE
Status: COMPLETED | OUTPATIENT
Start: 2017-06-27 | End: 2017-06-27

## 2017-06-27 RX ORDER — IOPAMIDOL 408 MG/ML
10 INJECTION, SOLUTION INTRATHECAL ONCE
Status: COMPLETED | OUTPATIENT
Start: 2017-06-27 | End: 2017-06-27

## 2017-06-27 RX ORDER — BUPIVACAINE HYDROCHLORIDE 5 MG/ML
3 INJECTION, SOLUTION PERINEURAL ONCE
Status: COMPLETED | OUTPATIENT
Start: 2017-06-27 | End: 2017-06-27

## 2017-06-27 RX ORDER — METHYLPREDNISOLONE ACETATE 80 MG/ML
80 INJECTION, SUSPENSION INTRA-ARTICULAR; INTRALESIONAL; INTRAMUSCULAR; SOFT TISSUE ONCE
Status: COMPLETED | OUTPATIENT
Start: 2017-06-27 | End: 2017-06-27

## 2017-06-27 RX ADMIN — IOPAMIDOL 10 ML: 408 INJECTION, SOLUTION INTRATHECAL at 12:04

## 2017-06-27 RX ADMIN — LIDOCAINE HYDROCHLORIDE 50 MG: 10 INJECTION, SOLUTION EPIDURAL; INFILTRATION; INTRACAUDAL; PERINEURAL at 12:04

## 2017-06-27 RX ADMIN — BUPIVACAINE HYDROCHLORIDE 15 MG: 5 INJECTION, SOLUTION PERINEURAL at 12:04

## 2017-06-27 RX ADMIN — METHYLPREDNISOLONE ACETATE 80 MG: 80 INJECTION, SUSPENSION INTRA-ARTICULAR; INTRALESIONAL; INTRAMUSCULAR; SOFT TISSUE at 12:04

## 2017-07-03 ENCOUNTER — ALLIED HEALTH/NURSE VISIT (OUTPATIENT)
Dept: PHARMACY | Facility: CLINIC | Age: 67
End: 2017-07-03
Payer: COMMERCIAL

## 2017-07-03 DIAGNOSIS — G47.00 INSOMNIA, UNSPECIFIED TYPE: ICD-10-CM

## 2017-07-03 DIAGNOSIS — R32 URINARY INCONTINENCE, UNSPECIFIED TYPE: ICD-10-CM

## 2017-07-03 DIAGNOSIS — E11.65 TYPE 2 DIABETES MELLITUS WITH HYPERGLYCEMIA, WITHOUT LONG-TERM CURRENT USE OF INSULIN (H): ICD-10-CM

## 2017-07-03 DIAGNOSIS — K21.9 GASTROESOPHAGEAL REFLUX DISEASE WITHOUT ESOPHAGITIS: Primary | ICD-10-CM

## 2017-07-03 PROCEDURE — 99606 MTMS BY PHARM EST 15 MIN: CPT | Performed by: PHARMACIST

## 2017-07-03 PROCEDURE — 99607 MTMS BY PHARM ADDL 15 MIN: CPT | Performed by: PHARMACIST

## 2017-07-03 NOTE — PROGRESS NOTES
SUBJECTIVE/OBJECTIVE:                Radha Corbett is a 66 year old female called for a follow-up visit for Medication Therapy Management.  She was referred to me from Trice Medeiros.     Chief Complaint: Follow up from our visit on 4/3/2017.  Cost of medications. Patient is in the coverage gap.    Tobacco: 0-1 pack per day - is interested in quitting Tobacco Cessation Action Plan: pt is planning on quitting when her daughter quits    Medication Adherence: no issues reported    Diabetes:  Pt currently taking NPH 60u bid, metformin XR 1000mg at night . Pt is not experiencing side effects. Insulin was costing her ~$80 per month and now has gone up to ~$400/month. Patient is not sure what her copay is on her metformin XR. She has been on IR version in the past but was thinking this was contributing to her diarrhea but she would be willing to try again.   SMBG: one time daily.   Ranges (patient reported): 80-150mg/dL.  Patient is not experiencing hypoglycemia  Recent symptoms of high blood sugar? none  Eye exam: up to date  Foot exam: up to date  Microalbumin is < 30 mg/g. Pt is taking an ACEi/ARB.  Aspirin: Taking 81mg daily and denies side effects     Urinary Incontinence: current therapy includes tolterodine LA 4mg daily. Patient is wondering if this could decrease her blood sugars. Patient feels this is helping her decrease her trips to the bathroom during the night. Patient has noticed more firm stools but she is not constipated. Patient has tried oxybutynin in the past and doesn't feel like this has helped.     GERD: Current medications include: Prilosec (omeprazole) 40mg once daily. Pt c/o cough.  Patient feels that current regimen is effective. Patient feels like this is effective but she does have cough. Patient did try ranitidine in addition to omeprazole but didn't feel like this improved her cough.     Sleep: current therapy includes melatonin 10mg daily and diphenhydramine 25mg nightly. Patient has been taking  melatonin but doesn't seem like it is not helping anymore.    Current labs include:  BP Readings from Last 3 Encounters:   06/27/17 133/85   06/22/17 125/77   06/20/17 127/71     Today's Vitals: LMP 11/18/1991 (Within Months)  Lab Results   Component Value Date    A1C 5.9 06/22/2017   .  Lab Results   Component Value Date    CHOL 162 06/22/2017     Lab Results   Component Value Date    TRIG 106 06/22/2017     Lab Results   Component Value Date    HDL 69 06/22/2017     Lab Results   Component Value Date    LDL 72 06/22/2017       Liver Function Studies -   Recent Labs   Lab Test  06/22/17   0858  08/01/16   1344   PROTTOTAL   --   7.7   ALBUMIN   --   3.8   BILITOTAL   --   0.4   ALKPHOS   --   117   AST   --   29   ALT  28  37       Lab Results   Component Value Date    UCRR 107 06/22/2017    MICROL 13 06/22/2017    UMALCR 11.87 06/22/2017       Last Basic Metabolic Panel:  Lab Results   Component Value Date     08/01/2016      Lab Results   Component Value Date    POTASSIUM 4.0 08/01/2016     Lab Results   Component Value Date    CHLORIDE 102 08/01/2016     Lab Results   Component Value Date    BUN 20 08/01/2016     Lab Results   Component Value Date    CR 0.69 08/01/2016     GFR Estimate   Date Value Ref Range Status   01/30/2017 84 >60 mL/min/1.7m2 Final   08/01/2016 85 >60 mL/min/1.7m2 Final     Comment:     Non  GFR Calc   05/04/2016 >90  Non  GFR Calc   >60 mL/min/1.7m2 Final     GFR Estimate If Black   Date Value Ref Range Status   01/30/2017 >90 >60 mL/min/1.7m2 Final   08/01/2016 >90   GFR Calc   >60 mL/min/1.7m2 Final   05/04/2016 >90   GFR Calc   >60 mL/min/1.7m2 Final     TSH   Date Value Ref Range Status   12/05/2016 0.79 0.40 - 4.00 mU/L Final   ]    Most Recent Immunizations   Administered Date(s) Administered     Influenza (High Dose) 3 valent vaccine 12/05/2016     Influenza (IIV3) 09/15/2014     Pneumococcal (PCV 13) 12/05/2016      Pneumococcal 23 valent 06/02/2008     TD (ADULT, 7+) 04/12/2006     TDAP Vaccine (Adacel) 03/05/2013     Zoster vaccine, live 06/10/2014       ASSESSMENT:              Current medications were reviewed today as discussed above.      Medication Adherence: no issues identified    Diabetes: Stable. Patient is meeting A1c goal of < 7%. Patient could buy insulin over the counter at [facility] for $75/month. Patient was also given the Prescription Assistance Program phone number. Patient will check on cost of metformin XR with insurance. Could consider a trial of IR metformin if ER formulation too expensive.    Urinary Incontinence: Stable; patient to explore cost of tolterodine through insurance.    GERD: Stable.  Current treatment is effective.    Sleep: patient would benefit from avoiding diphenhydramine on a regular basis. Patient could consider magnesium supplementation (magnesium glycinate) or could explore prescription options.    PLAN:                  Patient to follow-up with insurance company and  Prescription Assistance Progarm  Recommend patient avoid diphenhydramine for sleep.    I spent 45 minutes with this patient today.  All changes were made via collaborative practice agreement with Trice Medeiros. A copy of the visit note was provided to the patient's primary care provider.     Will follow up in 1 month.    The patient was sent via [employer] a summary of these recommendations as an after visit summary.    Falguni Chavis, Pharm.D, [facility]  Medication Therapy Management Pharmacist

## 2017-07-03 NOTE — MR AVS SNAPSHOT
After Visit Summary   7/3/2017    Radha Corbett    MRN: [id number]           Patient Information     Date Of Birth          1950        Visit Information        Provider Department      7/3/2017 1:30 PM Falguni Chavis, [facility] MTM        Today's Diagnoses     Gastroesophageal reflux disease without esophagitis    -  1    Type 2 diabetes mellitus with hyperglycemia, without long-term current use of insulin (H)        Insomnia, unspecified type        Urinary incontinence, unspecified type          Care Instructions    Recommendations from today's MTM visit:                                                        1. Stop taking diphenydramine (allergy pill) for sleep. Can try magnesium glycinate over the counter for sleep or we can consider a prescription option.    2. Call your insurance company to check on copays    3. Call [email or web address] Prescription Assistance Program [phone]    Next MTM visit:  1 month    To schedule another MTM appointment, please call the clinic directly or you may call the MTM scheduling line at [phone] or toll-free at [phone].     My Clinical Pharmacist's contact information:                                                      It was a pleasure seeing you today!  Please feel free to contact me with any questions or concerns you have.      Falguni Chavis, Pharm.D, [facility]  Medication Therapy Management Pharmacist  [phone]    You may receive a survey about the MTM services you received.  I would appreciate your feedback to help me serve you better in the future. Please fill it out and return it when you can. Your comments will be anonymous.      My healthcare goals:                                                      Tips for Getting Restful Sleep (Also known as-Sleep Hygiene)  Healthy sleep is about more than not feeling tired! Sleep influences our mood, concentration and memory, it can influence disorders such as high blood  pressure, cardiovascular health, and even diabetes.  While most of us will experience occasional sleepless nights due to anxiety, excitement, too much coffee, shift work, or staying up with sick children, disruption of sleep that lasts more than a few days can be a sign of, or contribute, to serious medical conditions.  Be sure to keep your medical provider informed of changes in your sleep pattern and amount of sleep.   Here are some simple hints to a better nights sleep.  Feel the rhythm:  Like the stars and the tides, our bodies have cycles and rhythms. Your own individual sleep cycle is set by genes, habits, work schedules and a host of other factors. Most people do best with around eight hours of sleep per night. Children and adolescents sleep somewhat more, seniors  somewhat less. If you are experiencing a sleep disturbance, most often it is due to a disruption of the normal sleep rhythm or cycle. The key will be to reestablish a normal sleep cycle.  Set a time to go to sleep and a time to wake up and stick to it, even on the weekends.    Avoid napping. A 30-40 minute catnap in the afternoon is OK, anything longer will start to scramble your sleep pattern.   Avoid caffeine (including coffee, tea, many sodas and, sadly, chocolate) 4-6 hours before bedtime. This will mean 3-5pm for most people.   Avoid alcohol 4-6 hours before bedtime. Small amounts of alcohol may help people get to sleep, however, as the alcohol wears off it actually has a stimulant effect which wakes people up. Drinking alcohol is one of the worst things you can do to your sleep cycle!  Avoid heavy, spicy, sugary foods 4-6 hours before sleep. These can make it hard to stay asleep by causing heartburn or other problems.    Exercise moderately and regularly but not right before bed.  Strenuous exercise right before bed can make it harder to fall asleep.     Make your sleep area a refuge.  Many people with sleep problems come to fear their  bedrooms. Anything you can do to avoid this and make it a safe, restful place will help.   Comfortable bedding: Is your bed to hard, too soft, is your pillow to hard or soft, do you wake up with a stiff neck? Sometimes adding or removing an extra blanket depending on the weather can make a big difference.  Pillows need to be replaced from time to time.   Temperature and Ventilation: Most people find a cool but not cold room with good ventilation to be most conductive to a good nights sleep. Fans (depending on noise level) can add  white noise  which may help keep outside noise (traffic, airplanes) from interfering with your sleep.  Block out outside light and noise.  Reserve your bed for sleep and sex:  Your bed is not an office, workroom or study salguero. Your bed needs to be associated with relaxation, not with things that might cause tension.  Getting in sleep mode:  A light snack before bed can help: Warm milk actually works! So do foods rich in the amino acid Tryptophan (Bananas, Turkey). We re not talking a feast here. A small slice of turkey or   a banana is all that s needed.  Relax on purpose: Relaxation techniques including meditation, deep diaphragmatic breathing, and progressive muscle relaxation can help produce a  relaxation response  which relieves tension and anxiety.   Leave your worries outside:  If you feel you need to worry about something schedule a  worry period  during the day but not before bed. Once the worry time is up, move on to other things.   Establish a pre sleep ritual: This can include a shower or bath, listening to music, inspirational readings. Many find incorporating a relaxing visualization once they are in bed completes the ritual.   If at first you don t succeed: Get into a comfortable position relax and see if you fall asleep.  If you don t fall asleep within 15-30 minutes GET OUT OF BED and do some reading or relaxation until you are tired. Then go back to bed. Remember,  associate bed with rest and relaxation!   Get off the clock: Most people reporting sleep problems actually sleep about twice as much as they think they do. Staring at the clock when trying to fall asleep only heightens frustration. Make sure your alarm is set (if needed) and then forget about it! Turn your clock to the wall or put it someplace you can not see it. This will reduce your sleep stress.   See your healthcare provider:  Physical ailments known to upset sleep include arthritis, acid reflux, menstruation, headaches, hot flashes and many more.   Mental health issues such as depression, anxiety and stress are also known to upset sleep.   Chemical abuse, especially alcohol abuse upsets sleep patterns.  The best long term solution is to reestablish normal sleep patterns. Medications can help in the short term but should not be considered long term solutions.  For chronic insomnia the research overwhelmingly supports cognitive-behavioral therapy (including sleep hygiene) over medication.                  Follow-ups after your visit        Your next 10 appointments already scheduled     Jul 03, 2017  1:30 PM CDT   TELEMEDICINE with Falguni Chavis [facility] ([facility])    [address] [place] [address] [address]  [facility] [place] [id number] [phone]           Note: this is not an onsite visit; there is no need to come to the facility.            Jul 11, 2017 10:40 AM CDT   Return Visit with Juan Jose Narvaez MD   [facility])    [address] [facility] [address]   [phone]            Jul 25, 2017  1:30 PM CDT   DX HIP/PELVIS/SPINE with MGDX1   [facility])    [address] [facility] [id number] [phone]           Please do not take any of the following 24 hours prior to the day of your exam: vitamins, calcium tablets, antacids.             Jul 28, 2017  1:00 PM CDT   [facility] Visit with Mary Lira MD   [facility]    [address] [place] [id number] [phone]              Who to contact     If you have questions or need follow up information about today's clinic visit or your schedule please contact [facility] MTM directly at [phone].  Normal or non-critical lab and imaging results will be communicated to you by Appercodehart, letter or phone within 4 business days after the clinic has received the results. If you do not hear from us within 7 days, please contact the clinic through Minutizert or phone. If you have a critical or abnormal lab result, we will notify you by phone as soon as possible.  Submit refill requests through Ifinity or call your pharmacy and they will forward the refill request to us. Please allow 3 business days for your refill to be completed.          Additional Information About Your Visit        AppercodeharMy Luv My Life My Heartbeats Information     Ifinity gives you secure access to your electronic health record. If you see a primary care provider, you can also send messages to your care team and make appointments. If you have questions, please call your primary care clinic.  If you do not have a primary care provider, please call [phone] and they will assist you.        Care EveryWhere ID     This is your Care EveryWhere ID. This could be used by other organizations to access your [facility] medical records  [id number]        Your Vitals Were     Last Period                   11/18/1991 (Within Months)            Blood Pressure from Last 3 Encounters:   06/27/17 133/85   06/22/17 125/77   06/20/17 127/71    Weight from Last 3 Encounters:   06/22/17 228 lb 12.8 oz (103.8 kg)   01/19/17 228 lb (103.4 kg)   12/05/16 229 lb (103.9 kg)              Today, you had the following     No orders found for display         Today's Medication Changes          These changes are  accurate as of: 7/3/17 11:46 AM.  If you have any questions, ask your nurse or doctor.               These medicines have changed or have updated prescriptions.        Dose/Directions    minocycline 100 MG capsule   Commonly known as:  MINOCIN/DYNACIN   This may have changed:  when to take this   Used for:  Acne vulgaris        Dose:  100 mg   Take 1 capsule (100 mg) by mouth every 12 hours   Quantity:  180 capsule   Refills:  3                Primary Care Provider Office Phone # Fax #    Trice Medeiros [phone]       [facility] [address]  [facility] [address]        Equal Access to Services     [facility]: Hadii kyree dawson hadasho Sokaren, waaxda luqadaha, qaybta kaalmada adetyleryasayda, michael hallman . So [facility] [phone].    ATENCIÓN: Si habla español, tiene a arauz disposición servicios gratuitos de asistencia lingüística. [facility] [phone].    We comply with applicable federal civil rights laws and [place] laws. We do not discriminate on the basis of race, color, national origin, age, disability sex, sexual orientation or gender identity.            Thank you!     Thank you for choosing [facility]  for your care. Our goal is always to provide you with excellent care. Hearing back from our patients is one way we can continue to improve our services. Please take a few minutes to complete the written survey that you may receive in the mail after your visit with us. Thank you!             Your Updated Medication List - Protect others around you: Learn how to safely use, store and throw away your medicines at www.disposemymeds.org.          This list is accurate as of: 7/3/17 11:46 AM.  Always use your most recent med list.                   Brand Name Dispense Instructions for use Diagnosis    aspirin 81 MG tablet      1 TABLET DAILY        buPROPion 300 MG 24 hr tablet    WELLBUTRIN XL    90 tablet    Take 1 tablet (300 mg) by mouth  "every morning    Depression with anxiety       CALCIUM 600+D PO      1 tab daily        citalopram 20 MG tablet    celeXA    90 tablet    Take 1 tablet (20 mg) by mouth daily    Major depressive disorder, recurrent episode, mild (H)       IMODIUM A-D 2 MG tablet   Generic drug:  loperamide      Take 2 mg by mouth as needed        insulin  UNIT/ML injection    NovoLIN N VIAL    80 mL    Inject 60 units Subcutaneous 2 times daily    Type 2 diabetes mellitus with hyperglycemia, without long-term current use of insulin (H)       insulin syringe-needle U-100 31G X 5/16\" 1 ML    RELION INSULIN SYRINGE    200 each    Use 2 syringes daily or as directed.    Type 2 diabetes mellitus without complication (H)       lisinopril 10 MG tablet    PRINIVIL/ZESTRIL    90 tablet    Take 1 tablet (10 mg) by mouth daily    Essential hypertension with goal blood pressure less than 140/90       MELATONIN PO      10 mg At Bedtime        metFORMIN 500 MG 24 hr tablet    GLUCOPHAGE-XR    180 tablet    Take 2 tablets (1,000 mg) by mouth daily (with dinner)    Type 2 diabetes mellitus with hyperglycemia, without long-term current use of insulin (H)       minocycline 100 MG capsule    MINOCIN/DYNACIN    180 capsule    Take 1 capsule (100 mg) by mouth every 12 hours    Acne vulgaris       omeprazole 40 MG capsule    priLOSEC    90 capsule    Take 1 capsule (40 mg) by mouth daily Take 30-60 minutes before a meal.    Gastroesophageal reflux disease without esophagitis       ONE TOUCH LANCETS      None Entered        ONE TOUCH TEST STRIPS VI      None Entered        simvastatin 20 MG tablet    ZOCOR    90 tablet    Take 1 tablet (20 mg) by mouth At Bedtime    Hyperlipidemia LDL goal <100       spironolactone 100 MG tablet    ALDACTONE    90 tablet    Take 1 tablet (100 mg) by mouth every morning    Essential hypertension with goal blood pressure less than 140/90       tolterodine 4 MG 24 hr capsule    DETROL LA    90 capsule    Take 1 " capsule (4 mg) by mouth daily    Urge incontinence of urine       VISION FORMULA EYE HEALTH PO      Take 1 tablet by mouth daily        vitamin D 1000 UNITS capsule      1 CAPSULE DAILY

## 2017-07-03 NOTE — PATIENT INSTRUCTIONS
Recommendations from today's MTM visit:                                                        1. Stop taking diphenydramine (allergy pill) for sleep. Can try magnesium glycinate over the counter for sleep or we can consider a prescription option.    2. Call your insurance company to check on copays    3. Call [employer] Prescription Assistance Program [phone]    Next MTM visit:  1 month    To schedule another MTM appointment, please call the clinic directly or you may call the MTM scheduling line at [phone] or toll-free at [phone].     My Clinical Pharmacist's contact information:                                                      It was a pleasure seeing you today!  Please feel free to contact me with any questions or concerns you have.      Falguni Cahvis, Pharm.D, [facility]  Medication Therapy Management Pharmacist  [phone]    You may receive a survey about the MTM services you received.  I would appreciate your feedback to help me serve you better in the future. Please fill it out and return it when you can. Your comments will be anonymous.      My healthcare goals:                                                      Tips for Getting Restful Sleep (Also known as-Sleep Hygiene)  Healthy sleep is about more than not feeling tired! Sleep influences our mood, concentration and memory, it can influence disorders such as high blood pressure, cardiovascular health, and even diabetes.  While most of us will experience occasional sleepless nights due to anxiety, excitement, too much coffee, shift work, or staying up with sick children, disruption of sleep that lasts more than a few days can be a sign of, or contribute, to serious medical conditions.  Be sure to keep your medical provider informed of changes in your sleep pattern and amount of sleep.   Here are some simple hints to a better nights sleep.  Feel the rhythm:  Like the stars and the tides, our bodies have cycles and rhythms. Your own  individual sleep cycle is set by genes, habits, work schedules and a host of other factors. Most people do best with around eight hours of sleep per night. Children and adolescents sleep somewhat more, seniors  somewhat less. If you are experiencing a sleep disturbance, most often it is due to a disruption of the normal sleep rhythm or cycle. The key will be to reestablish a normal sleep cycle.  Set a time to go to sleep and a time to wake up and stick to it, even on the weekends.    Avoid napping. A 30-40 minute catnap in the afternoon is OK, anything longer will start to scramble your sleep pattern.   Avoid caffeine (including coffee, tea, many sodas and, sadly, chocolate) 4-6 hours before bedtime. This will mean 3-5pm for most people.   Avoid alcohol 4-6 hours before bedtime. Small amounts of alcohol may help people get to sleep, however, as the alcohol wears off it actually has a stimulant effect which wakes people up. Drinking alcohol is one of the worst things you can do to your sleep cycle!  Avoid heavy, spicy, sugary foods 4-6 hours before sleep. These can make it hard to stay asleep by causing heartburn or other problems.    Exercise moderately and regularly but not right before bed.  Strenuous exercise right before bed can make it harder to fall asleep.     Make your sleep area a refuge.  Many people with sleep problems come to fear their bedrooms. Anything you can do to avoid this and make it a safe, restful place will help.   Comfortable bedding: Is your bed to hard, too soft, is your pillow to hard or soft, do you wake up with a stiff neck? Sometimes adding or removing an extra blanket depending on the weather can make a big difference.  Pillows need to be replaced from time to time.   Temperature and Ventilation: Most people find a cool but not cold room with good ventilation to be most conductive to a good nights sleep. Fans (depending on noise level) can add  white noise  which may help keep outside  noise (traffic, airplanes) from interfering with your sleep.  Block out outside light and noise.  Reserve your bed for sleep and sex:  Your bed is not an office, workroom or study salguero. Your bed needs to be associated with relaxation, not with things that might cause tension.  Getting in sleep mode:  A light snack before bed can help: Warm milk actually works! So do foods rich in the amino acid Tryptophan (Bananas, Turkey). We re not talking a feast here. A small slice of turkey or   a banana is all that s needed.  Relax on purpose: Relaxation techniques including meditation, deep diaphragmatic breathing, and progressive muscle relaxation can help produce a  relaxation response  which relieves tension and anxiety.   Leave your worries outside:  If you feel you need to worry about something schedule a  worry period  during the day but not before bed. Once the worry time is up, move on to other things.   Establish a pre sleep ritual: This can include a shower or bath, listening to music, inspirational readings. Many find incorporating a relaxing visualization once they are in bed completes the ritual.   If at first you don t succeed: Get into a comfortable position relax and see if you fall asleep.  If you don t fall asleep within 15-30 minutes GET OUT OF BED and do some reading or relaxation until you are tired. Then go back to bed. Remember, associate bed with rest and relaxation!   Get off the clock: Most people reporting sleep problems actually sleep about twice as much as they think they do. Staring at the clock when trying to fall asleep only heightens frustration. Make sure your alarm is set (if needed) and then forget about it! Turn your clock to the wall or put it someplace you can not see it. This will reduce your sleep stress.   See your healthcare provider:  Physical ailments known to upset sleep include arthritis, acid reflux, menstruation, headaches, hot flashes and many more.   Mental health issues such  as depression, anxiety and stress are also known to upset sleep.   Chemical abuse, especially alcohol abuse upsets sleep patterns.  The best long term solution is to reestablish normal sleep patterns. Medications can help in the short term but should not be considered long term solutions.  For chronic insomnia the research overwhelmingly supports cognitive-behavioral therapy (including sleep hygiene) over medication.

## 2017-07-11 ENCOUNTER — TELEPHONE (OUTPATIENT)
Dept: PEDIATRICS | Facility: CLINIC | Age: 67
End: 2017-07-11

## 2017-07-11 NOTE — TELEPHONE ENCOUNTER
Social Work Contact - Initial - 7-11-17    SW received call from pt regarding pt's inability to pay for her insulin now that pt is in Medicare Part D prescription coverage cyndie brown.  Pt contacted the [facility] though was told she is not eligible as she does not go to a [facility].  Pt had a recent visit, 7-3-17, with CHU Beck, who offered pt the option [facility].  SW discussed with pt the additional options of:    1.  Contacting or going to [facility] in [place] to see if pt eligible for any [facility] assistance programs  2.  Taking advantage of [facility] food shelf in [place] to free up grocery money to pay for medications  3.  The [employer] resource where pt can e-mail them, explain need, and they will let pt know if she is eligible for any financial assistance from them  4.  Applying for the [employer] Assistance Program for pt's novolog insulin once she has paid $1,000.00 out of pocket expenses for medications.    5.  Pt contacting the [employer] to determine if there is a comparable insurance plan that would provide better coverage of pt's medications/insulin.  Pt understands above options and may pursue some of them though is reluctant to consider going to a food shelf.  SW provided active listening, affirmations, and support to pt during contact.    IBRAHIMA Mcclain  Care Coordinator - Social Work  [facility]  Office:  [phone]  7/11/2017 2:03 PM

## 2017-07-20 ENCOUNTER — OFFICE VISIT (OUTPATIENT)
Dept: ORTHOPEDICS | Facility: CLINIC | Age: 67
End: 2017-07-20
Payer: COMMERCIAL

## 2017-07-20 VITALS — HEART RATE: 111 BPM | DIASTOLIC BLOOD PRESSURE: 70 MMHG | SYSTOLIC BLOOD PRESSURE: 125 MMHG

## 2017-07-20 DIAGNOSIS — M51.26 LUMBAR HERNIATED DISC: ICD-10-CM

## 2017-07-20 DIAGNOSIS — M54.5 LOW BACK PAIN, UNSPECIFIED BACK PAIN LATERALITY, UNSPECIFIED CHRONICITY, WITH SCIATICA PRESENCE UNSPECIFIED: Primary | ICD-10-CM

## 2017-07-20 PROCEDURE — 99214 OFFICE O/P EST MOD 30 MIN: CPT | Performed by: PREVENTIVE MEDICINE

## 2017-07-20 ASSESSMENT — PAIN SCALES - GENERAL: PAINLEVEL: MODERATE PAIN (5)

## 2017-07-20 NOTE — PROGRESS NOTES
HISTORY OF PRESENT ILLNESS  Ms. Corbett returns after having MERON. She did not get much relief from her MERON.  She does say that her back pain comes and goes with her activity. An MRI in January showed a fractured T12 and multiple degenerative changes, most severe at L4-L5.    Location: lower back  Quality:  achy pain    Severity: 9/10 at worst    Duration: hours  Timing: occurs intermittently  Context: occurs while walking or standing  Modifying factors:  resting and non-use makes it better, movement and use makes it worse  Associated signs & symptoms: leg pain  Previous similar pain: yes  Exercise: walking  Additional history: as documented    MEDICAL HISTORY  Patient Active Problem List   Diagnosis     Macular degeneration     Myopia     Hiatal hernia     IBS (irritable bowel syndrome)     Hypertension goal BP (blood pressure) < 140/90     GERD (gastroesophageal reflux disease)     Atypical ductal hyperplasia of breast     Atypical lobular hyperplasia of breast     Benign Breast Disease (PASH)     Family history of breast cancer in sister     Type 2 diabetes, HbA1C goal < 8% (H)     Hyperlipidemia LDL goal <100     Breast cancer screening-high risk     Tubular adenoma of colon     Osteopenia     Alcohol ingestion, more than 4 drinks/day on alcohol screening     Umbilical hernia     Incontinence of urine     Stool incontinence     Osteoarthritis, knee     Rosacea     Anxiety     Pseudophakia     Abnormal cervical Pap smear with positive HPV DNA test     Depression with anxiety     Retinal detachment     Elevated liver function tests     Rectocele     Unexplained endometrial cells on cervical cytology     Back pain, unspecified back location, unspecified back pain laterality, unspecified chronicity       Current Outpatient Prescriptions   Medication Sig Dispense Refill     Multiple Vitamins-Minerals (VISION FORMULA EYE HEALTH PO) Take 1 tablet by mouth daily       insulin NPH (NOVOLIN N VIAL) 100 UNIT/ML injection Inject  "60 units Subcutaneous 2 times daily 80 mL 0     tolterodine (DETROL LA) 4 MG 24 hr capsule Take 1 capsule (4 mg) by mouth daily 90 capsule 3     buPROPion (WELLBUTRIN XL) 300 MG 24 hr tablet Take 1 tablet (300 mg) by mouth every morning 90 tablet 1     metFORMIN (GLUCOPHAGE-XR) 500 MG 24 hr tablet Take 2 tablets (1,000 mg) by mouth daily (with dinner) 180 tablet 1     omeprazole (PRILOSEC) 40 MG capsule Take 1 capsule (40 mg) by mouth daily Take 30-60 minutes before a meal. 90 capsule 3     citalopram (CELEXA) 20 MG tablet Take 1 tablet (20 mg) by mouth daily 90 tablet 3     simvastatin (ZOCOR) 20 MG tablet Take 1 tablet (20 mg) by mouth At Bedtime 90 tablet 3     lisinopril (PRINIVIL,ZESTRIL) 10 MG tablet Take 1 tablet (10 mg) by mouth daily 90 tablet 3     minocycline (MINOCIN,DYNACIN) 100 MG capsule Take 1 capsule (100 mg) by mouth every 12 hours (Patient taking differently: Take 100 mg by mouth daily ) 180 capsule 3     insulin syringe-needle U-100 (RELION INSULIN SYRINGE) 31G X 5/16\" 1 ML Use 2 syringes daily or as directed. 200 each 3     spironolactone (ALDACTONE) 100 MG tablet Take 1 tablet (100 mg) by mouth every morning 90 tablet 3     MELATONIN PO 10 mg At Bedtime        loperamide (IMODIUM A-D) 2 MG tablet Take 2 mg by mouth as needed       ONE TOUCH TEST STRIPS VI None Entered       ONE TOUCH LANCETS None Entered       CALCIUM 600+D OR 1 tab daily       ASPIRIN 81 MG OR TABS 1 TABLET DAILY       VITAMIN D 1000 UNIT OR CAPS 1 CAPSULE DAILY         Allergies   Allergen Reactions     Codeine      Sulfa Drugs        Family History   Problem Relation Age of Onset     Hypertension Mother      CEREBROVASCULAR DISEASE Mother      Arthritis Mother      Cardiovascular Mother      Circulatory Mother      CEREBROVASCULAR DISEASE Father      Prostate Cancer Father 65      at 69     Asthma Brother      Prostate Cancer Brother 48     bone metastasis     DIABETES Sister      Hypertension Sister      OSTEOPOROSIS " Sister      Depression Sister      Lipids Sister      CANCER Maternal Grandmother 95     spine     Breast Cancer Sister 39     also contralateral @53, mets to lungs     CANCER Sister 20     second uterine cancer in 30s also     Breast Cancer Sister 57     unilateral     CANCER Paternal Aunt 40     unknown type     CANCER Paternal Uncle      stomach;  in his 80s     Glaucoma No family hx of        Additional medical/Social/Surgical histories reviewed in [facility] and updated as appropriate.     REVIEW OF SYSTEMS (2017)  10 point ROS of systems including Constitutional, Eyes, Respiratory, Cardiovascular, Gastroenterology, Genitourinary, Integumentary, Musculoskeletal, Psychiatric were all negative except for pertinent positives noted in my HPI.     PHYSICAL EXAM  Vitals:    17 1341   BP: 125/70   Pulse: 111     Vital Signs: /70  Pulse 111  LMP 1991 (Within Months) Patient declined being weighed. There is no height or weight on file to calculate BMI.    General  - normal appearance, in no obvious distress  CV  - normal peripheral perfusion  Pulm  - normal respiratory pattern, non-labored  Musculoskeletal - lumbar spine  - stance: antalgic gait, no obvious leg length discrepancy, normal heel and toe walk  - inspection: normal bone and joint alignment, no obvious scoliosis  - palpation: no paravertebral or bony tenderness  - ROM: flexion exacerbates pain, normal extension, sidebending, rotation  - strength: lower extremities 5/5 in all planes  - special tests:  (+) straight leg raise  (+) slump test  Neuro  - patellar and Achilles DTRs 2+ bilaterally, lower extremity sensory deficit throughout L4/5 distribution, grossly normal coordination, normal muscle tone  Skin  - no ecchymosis, erythema, warmth, or induration, no obvious rash  Psych  - interactive, appropriate, normal mood and affect    ASSESSMENT & PLAN  66-year-old female with lower back pain and radiculopathy secondary to degenerative disc  disease, not resolved  Reviewed MRI: shows herniated disc/ddd     Ordered MERON #2 to occur in august  Cont. HEP  Will refer to spine surgery for consultation  Will refer to balance center as well as she has had some balance issues addressed by Dr Medeiros and would like to go to PT  RTC 2 weeks after MERON in August      Juan Jose Narvaez MD, CAQSM

## 2017-07-20 NOTE — PATIENT INSTRUCTIONS
Thanks for coming today.  Ortho/Sports Medicine Clinic  [address] [place] [address]    To schedule future appointments in Ortho Clinic, you may call [phone].    To schedule ordered imaging by your Provider: Call [facility] at [phone]    [employer] available online at:   [email or web address]    Please call if any further questions or concerns [phone] and ask for the Orthopedic Department. Clinic hours 8 am to 5 pm.    Return to clinic if symptoms worsen.

## 2017-07-20 NOTE — MR AVS SNAPSHOT
After Visit Summary   7/20/2017    Radha Corbett    MRN: [id number]           Patient Information     Date Of Birth          1950        Visit Information        Provider Department      7/20/2017 2:00 PM Juan Jose Narvaez MD [facility]        Today's Diagnoses     Low back pain, unspecified back pain laterality, unspecified chronicity, with sciatica presence unspecified    -  1    Lumbar herniated disc          Care Instructions    Thanks for coming today.  Ortho/Sports Medicine Clinic  [address] [place] [address]    To schedule future appointments in Ortho Clinic, you may call [phone].    To schedule ordered imaging by your Provider: Call [facility] at [phone]    [employer] available online at:   [email or web address]    Please call if any further questions or concerns [phone] and ask for the Orthopedic Department. Clinic hours 8 am to 5 pm.    Return to clinic if symptoms worsen.          Follow-ups after your visit        Additional Services     PHYSICAL THERAPY REFERRAL       *This order will print in the [facility] Central Scheduling Office*    [facility] provides Physical Therapy evaluation and treatment and many specialty services across the [place] system.  If requesting a specialty program, please choose from the list below.    Call one number to schedule at any [facility] location   [phone].    Treatment: Evaluation & Treatment  Special Instructions/Modalities: Balance Therapy  Special Programs: Balance/Vestibular    Please be aware that coverage of these services is subject to the terms and limitations of your health insurance plan.  Call member services at your health plan with any benefit or coverage questions.      **Note to Provider** To refer patients to therapy outside of the location list, change the order class to External Referral in the order  .            SPINE SURGERY REFERRAL       Please choose Medical Spine Specialist (unless patient was seen by a Medical Spine Specialist within the past 6 months).  Surgical Evaluation is advised if the patient presents with one or more of the following red flags:     **Cauda Equina Syndrome  **Evidence of Spinal Tumor  **Fracture  **Infection  **Loss of Bowel or Bladder Control  **Sudden or Progressive Weakness  **Any other documented emergent neurological condition resulting from a Lumbar Spinal Condition.    You have been referred to: Spine Lumbar: Spine Surgeon: Dr. Carpio    Please be aware that coverage of these services is subject to the terms and limitations of your health insurance plan.  Call member services at your health plan with any benefit or coverage questions.      Please bring the following to your appointment:    **Any x-rays, CTs or MRIs which have been performed.  Contact the facility where they were done to arrange for  prior to your scheduled appointment.    **List of current medications   **This referral request   **Any documents/labs given to you regarding this referral                  Your next 10 appointments already scheduled     Jul 25, 2017  1:30 PM CDT   DX HIP/PELVIS/SPINE with MGDX1   [facility])    [address] [place] [address]   [phone]           Please do not take any of the following 24 hours prior to the day of your exam: vitamins, calcium tablets, antacids.            Jul 25, 2017  3:45 PM CDT   Vestibular Eval with Ruby Shook PT   [facility] ([facility])    [address] [facility] [address]   [phone]            Jul 28, 2017  1:00 PM CDT   Return Visit with Mary Lira MD   [facility])    [address] [facility] [address]   [phone]            Aug 15, 2017  9:30 AM CDT    XR LUMBAR EPIDURAL INJECTION with MGXR3, MG NEURO RAD   [facility]    [address] [place] [address] [phone]           For nerve root injection, please send or bring copies of any MRIs or other scans you have had.  Bring a list of your current medicines to your exam. (Include vitamins, minerals and over-the-counter medicines.) Leave your valuables at home.  Plan to have someone drive you home afterward.  Stop taking the following medicines (but talk to your doctor first):   If you take blood thinners, you may need to stop taking them a few days before treatment. Talk to your doctor before stopping these medicines.Stop taking Coumadin (warfarin) 3 days before treatment. Restart the day after treatment.   If you take Plavix, Ticlid, Pletal or Persantine, please ask your doctor if you should stop these medicines. You may need extra tests on the morning of your scan. You may take your other medicines as normal.  Stop all food and drink (including water) 3 hours before your test or treatment.  Please tell the doctor:   If you are allergic to X-ray dye (contrast fluid).   If you may be pregnant.  Injections take about 30 to 45 minutes. Most people spend up to 2 hours in the clinic or hospital.  Please call the Imaging Department at your exam site with any questions            Oct 12, 2017  8:00 AM CDT   (Arrive by 7:30 AM)   NEW SPINE with Antonio Carpio MD   [facility] ([facility])    [address] [address] [phone]              Future tests that were ordered for you today     Open Future Orders        Priority Expected Expires Ordered    X-ray Lumbar epidural injection Routine 8/14/2017 7/20/2018 7/20/2017            Who to contact     If you have questions or need follow up information about today's clinic visit or your schedule please contact [facility] directly at [phone].  Normal or non-critical lab and imaging results will be communicated to you by [employer], letter or phone within 4 business days after the clinic has received the results. If you do not hear from us within 7 days, please contact the clinic through [employer] or phone. If you have a critical or abnormal lab result, we will notify you by phone as soon as possible.  Submit refill requests through [employer] or call your pharmacy and they will forward the refill request to us. Please allow 3 business days for your refill to be completed.          Additional Information About Your Visit        [employer] Information     [employer] gives you secure access to your electronic health record. If you see a primary care provider, you can also send messages to your care team and make appointments. If you have questions, please call your primary care clinic.  If you do not have a primary care provider, please call [phone] and they will assist you.      [employer] is an electronic gateway that provides easy, online access to your medical records. With [employer], you can request a clinic appointment, read your test results, renew a prescription or communicate with your care team.     To access your existing account, please contact your [facility] or call [phone] for assistance.        Care EveryWhere ID     This is your Care EveryWhere ID. This could be used by other organizations to access your [facility] medical records  [id number]        Your Vitals Were     Pulse Last Period                111 11/18/1991 (Within Months)           Blood Pressure from Last 3 Encounters:   07/20/17 125/70   06/27/17 133/85   06/22/17 125/77    Weight from Last 3 Encounters:   06/22/17 103.8 kg (228 lb 12.8 oz)   01/19/17 103.4 kg (228 lb)   12/05/16 103.9 kg (229 lb)              We Performed the Following     PHYSICAL THERAPY REFERRAL     SPINE SURGERY REFERRAL          Today's  Medication Changes          These changes are accurate as of: 7/20/17  2:45 PM.  If you have any questions, ask your nurse or doctor.               These medicines have changed or have updated prescriptions.        Dose/Directions    minocycline 100 MG capsule   Commonly known as:  MINOCIN/DYNACIN   This may have changed:  when to take this   Used for:  Acne vulgaris        Dose:  100 mg   Take 1 capsule (100 mg) by mouth every 12 hours   Quantity:  180 capsule   Refills:  3                Primary Care Provider Office Phone # Fax #    Trice Medeiros [phone]       [facility] [address]  [facility] [address]        Equal Access to Services     [facility]: Hadii kyree dawson hadasho Soomaali, waaxda luqadaha, qaybta kaalmada adetyleryada, michael hallman . So [facility] [phone].    ATENCIÓN: Si habla español, tiene a arauz disposición servicios gratuitos de asistencia lingüística. Llame al [phone].    We comply with applicable federal civil rights laws and [place] laws. We do not discriminate on the basis of race, color, national origin, age, disability sex, sexual orientation or gender identity.            Thank you!     Thank you for choosing [facility]  for your care. Our goal is always to provide you with excellent care. Hearing back from our patients is one way we can continue to improve our services. Please take a few minutes to complete the written survey that you may receive in the mail after your visit with us. Thank you!             Your Updated Medication List - Protect others around you: Learn how to safely use, store and throw away your medicines at www.disposemymeds.org.          This list is accurate as of: 7/20/17  2:45 PM.  Always use your most recent med list.                   Brand Name Dispense Instructions for use Diagnosis    aspirin 81 MG tablet      1 TABLET DAILY        buPROPion 300 MG 24 hr tablet    WELLBUTRIN XL    90  "tablet    Take 1 tablet (300 mg) by mouth every morning    Depression with anxiety       CALCIUM 600+D PO      1 tab daily        citalopram 20 MG tablet    celeXA    90 tablet    Take 1 tablet (20 mg) by mouth daily    Major depressive disorder, recurrent episode, mild (H)       IMODIUM A-D 2 MG tablet   Generic drug:  loperamide      Take 2 mg by mouth as needed        insulin  UNIT/ML injection    NovoLIN N VIAL    80 mL    Inject 60 units Subcutaneous 2 times daily    Type 2 diabetes mellitus with hyperglycemia, without long-term current use of insulin (H)       insulin syringe-needle U-100 31G X 5/16\" 1 ML    RELION INSULIN SYRINGE    200 each    Use 2 syringes daily or as directed.    Type 2 diabetes mellitus without complication (H)       lisinopril 10 MG tablet    PRINIVIL/ZESTRIL    90 tablet    Take 1 tablet (10 mg) by mouth daily    Essential hypertension with goal blood pressure less than 140/90       MELATONIN PO      10 mg At Bedtime        metFORMIN 500 MG 24 hr tablet    GLUCOPHAGE-XR    180 tablet    Take 2 tablets (1,000 mg) by mouth daily (with dinner)    Type 2 diabetes mellitus with hyperglycemia, without long-term current use of insulin (H)       minocycline 100 MG capsule    MINOCIN/DYNACIN    180 capsule    Take 1 capsule (100 mg) by mouth every 12 hours    Acne vulgaris       omeprazole 40 MG capsule    priLOSEC    90 capsule    Take 1 capsule (40 mg) by mouth daily Take 30-60 minutes before a meal.    Gastroesophageal reflux disease without esophagitis       ONE TOUCH LANCETS      None Entered        ONE TOUCH TEST STRIPS VI      None Entered        simvastatin 20 MG tablet    ZOCOR    90 tablet    Take 1 tablet (20 mg) by mouth At Bedtime    Hyperlipidemia LDL goal <100       spironolactone 100 MG tablet    ALDACTONE    90 tablet    Take 1 tablet (100 mg) by mouth every morning    Essential hypertension with goal blood pressure less than 140/90       tolterodine 4 MG 24 hr capsule "    DETROL LA    90 capsule    Take 1 capsule (4 mg) by mouth daily    Urge incontinence of urine       VISION FORMULA EYE HEALTH PO      Take 1 tablet by mouth daily        vitamin D 1000 UNITS capsule      1 CAPSULE DAILY

## 2017-07-21 DIAGNOSIS — I10 ESSENTIAL HYPERTENSION WITH GOAL BLOOD PRESSURE LESS THAN 140/90: ICD-10-CM

## 2017-07-21 DIAGNOSIS — E11.65 TYPE 2 DIABETES MELLITUS WITH HYPERGLYCEMIA, WITHOUT LONG-TERM CURRENT USE OF INSULIN (H): ICD-10-CM

## 2017-07-21 DIAGNOSIS — E78.5 HYPERLIPIDEMIA LDL GOAL <100: ICD-10-CM

## 2017-07-21 DIAGNOSIS — N39.41 URGE INCONTINENCE OF URINE: ICD-10-CM

## 2017-07-21 DIAGNOSIS — L70.0 ACNE VULGARIS: ICD-10-CM

## 2017-07-21 DIAGNOSIS — F41.8 DEPRESSION WITH ANXIETY: ICD-10-CM

## 2017-07-21 DIAGNOSIS — F33.0 MAJOR DEPRESSIVE DISORDER, RECURRENT EPISODE, MILD (H): ICD-10-CM

## 2017-07-21 RX ORDER — TOLTERODINE 4 MG/1
4 CAPSULE, EXTENDED RELEASE ORAL DAILY
Qty: 90 CAPSULE | Refills: 3 | OUTPATIENT
Start: 2017-07-21

## 2017-07-21 RX ORDER — MINOCYCLINE HYDROCHLORIDE 100 MG/1
100 CAPSULE ORAL EVERY 12 HOURS
Qty: 180 CAPSULE | Refills: 2 | Status: SHIPPED | OUTPATIENT
Start: 2017-07-21 | End: 2018-05-15

## 2017-07-21 RX ORDER — SPIRONOLACTONE 100 MG/1
100 TABLET, FILM COATED ORAL EVERY MORNING
Qty: 90 TABLET | Refills: 0 | Status: SHIPPED | OUTPATIENT
Start: 2017-07-21 | End: 2018-01-05

## 2017-07-21 RX ORDER — BUPROPION HYDROCHLORIDE 300 MG/1
300 TABLET ORAL EVERY MORNING
Qty: 90 TABLET | Refills: 1 | Status: SHIPPED | OUTPATIENT
Start: 2017-07-21 | End: 2017-09-18

## 2017-07-21 RX ORDER — CITALOPRAM HYDROBROMIDE 20 MG/1
20 TABLET ORAL DAILY
Qty: 90 TABLET | Refills: 1 | Status: SHIPPED | OUTPATIENT
Start: 2017-07-21 | End: 2017-09-18

## 2017-07-21 RX ORDER — LISINOPRIL 10 MG/1
TABLET ORAL
Qty: 90 TABLET | Refills: 2 | Status: SHIPPED | OUTPATIENT
Start: 2017-07-21 | End: 2017-07-24

## 2017-07-21 RX ORDER — SIMVASTATIN 20 MG
TABLET ORAL
Qty: 90 TABLET | Refills: 2 | Status: SHIPPED | OUTPATIENT
Start: 2017-07-21 | End: 2017-07-24

## 2017-07-21 RX ORDER — METFORMIN HCL 500 MG
TABLET, EXTENDED RELEASE 24 HR ORAL
Qty: 180 TABLET | Refills: 1 | Status: SHIPPED | OUTPATIENT
Start: 2017-07-21 | End: 2017-09-22

## 2017-07-21 NOTE — TELEPHONE ENCOUNTER
lisinopril (PRINIVIL/ZESTRIL) 10 MG tablet    Last Written Prescription Date: 5/27/2016  Last Fill Quantity: 90, # refills: 3    Last Office Visit with [facility], [facility] or [facility] prescribing provider:  6/22/2017   Future Office Visit:        BP Readings from Last 3 Encounters:   07/20/17 125/70   06/27/17 133/85   06/22/17 125/77     simvastatin (ZOCOR) 20 MG tablet  Last Written Prescription Date: 5/27/2016  Last Fill Quantity: 90, # refills: 3  Last Office Visit with [facility], [facility] or [facility] prescribing provider: 6/22/2017       Lab Results   Component Value Date    CHOL 162 06/22/2017     Lab Results   Component Value Date    HDL 69 06/22/2017     Lab Results   Component Value Date    LDL 72 06/22/2017     Lab Results   Component Value Date    TRIG 106 06/22/2017     Lab Results   Component Value Date    CHOLHDLRATIO 2.6 06/23/2015       metFORMIN (GLUCOPHAGE-XR) 500 MG 24 hr tablet      Last Written Prescription Date: 12/5/2016  Last Fill Quantity: 180, # refills: 1  Last Office Visit with [facility], [facility] or [facility] prescribing provider:  6/22/2017        BP Readings from Last 3 Encounters:   07/20/17 125/70   06/27/17 133/85   06/22/17 125/77     Lab Results   Component Value Date    MICROL 13 06/22/2017     Lab Results   Component Value Date    UMALCR 11.87 06/22/2017     Creatinine   Date Value Ref Range Status   08/01/2016 0.69 0.52 - 1.04 mg/dL Final   ]  GFR Estimate   Date Value Ref Range Status   01/30/2017 84 >60 mL/min/1.7m2 Final   08/01/2016 85 >60 mL/min/1.7m2 Final     Comment:     Non  GFR Calc   05/04/2016 >90  Non  GFR Calc   >60 mL/min/1.7m2 Final     GFR Estimate If Black   Date Value Ref Range Status   01/30/2017 >90 >60 mL/min/1.7m2 Final   08/01/2016 >90   GFR Calc   >60 mL/min/1.7m2 Final   05/04/2016 >90   GFR Calc   >60 mL/min/1.7m2 Final     Lab Results   Component Value Date    CHOL 162 06/22/2017     Lab Results   Component Value Date     HDL 69 06/22/2017     Lab Results   Component Value Date    LDL 72 06/22/2017     Lab Results   Component Value Date    TRIG 106 06/22/2017     Lab Results   Component Value Date    CHOLHDLRATIO 2.6 06/23/2015     Lab Results   Component Value Date    AST 29 08/01/2016     Lab Results   Component Value Date    ALT 28 06/22/2017     Lab Results   Component Value Date    A1C 5.9 06/22/2017    A1C 6.5 12/05/2016    A1C 7.8 08/01/2016    A1C 9.5 06/13/2016    A1C 10.7 05/04/2016     Potassium   Date Value Ref Range Status   08/01/2016 4.0 3.4 - 5.3 mmol/L Final       buPROPion (WELLBUTRIN XL) 300 mg 24 hr tablet  Last Written Prescription Date: 12/5/2016  Last Fill Quantity: 90; # refills: 1  Last Office Visit with [facility] or [facility] prescribing provider:  6/22/2017     Last PHQ-9 score on record= Depression with anxiety [F41.8]   PHQ-9 SCORE 6/22/2017   Total Score -   Total Score MyChart -   Total Score 13     Citalopram (CELEXA) 20 MG tablet  Last Written Prescription Date: 5/27/2016  Last Fill Quantity: 90, # refills: 3  Last Office Visit with [facility] primary care provider:  6/22/2017      Last PHQ-9 score on record= Depression with anxiety [F41.8]   PHQ-9 SCORE 6/22/2017   Total Score -   Total Score MyChart -   Total Score 13       Spironolactone (ALDACTONE 100 MG tablet  Last Written Prescription Date: 5/27/2016  Last Fill Quantity: 90, # refills: 3  Last Office Visit with [facility], [facility] or [facility] prescribing provider: 6/22/2017       Potassium   Date Value Ref Range Status   08/01/2016 4.0 3.4 - 5.3 mmol/L Final     Creatinine   Date Value Ref Range Status   08/01/2016 0.69 0.52 - 1.04 mg/dL Final     BP Readings from Last 3 Encounters:   07/20/17 125/70   06/27/17 133/85   06/22/17 125/77       tolterodone (DELTROL LA) 4 mg 24 hr capsule  Refill refused too soon to refill. Patient notified and verbalized understanding.   Last Written Prescription Date: 2/8/2017  Last Fill Quantity: 90,  # refills: 3   Last Office  Visit with [facility], [facility] or [facility] prescribing provider: 6/22/2017                                             Minocycline (MINOCIN/DYNACIN) 100 mg capsule  Last Written Prescription Date: 5/27/2016  Last Fill Quantity: 180,  # refills: 3   Last Office Visit with [facility], [facility] or [facility] prescribing provider: 6/22/2017                                             Refilled per [facility] protocol.    Charity Mcdonough RN

## 2017-07-24 ENCOUNTER — TELEPHONE (OUTPATIENT)
Dept: PHYSICAL THERAPY | Facility: CLINIC | Age: 67
End: 2017-07-24

## 2017-07-24 ENCOUNTER — TELEPHONE (OUTPATIENT)
Dept: PHARMACY | Facility: CLINIC | Age: 67
End: 2017-07-24

## 2017-07-24 DIAGNOSIS — E78.5 HYPERLIPIDEMIA LDL GOAL <100: ICD-10-CM

## 2017-07-24 DIAGNOSIS — I10 ESSENTIAL HYPERTENSION WITH GOAL BLOOD PRESSURE LESS THAN 140/90: ICD-10-CM

## 2017-07-24 RX ORDER — LISINOPRIL 10 MG/1
10 TABLET ORAL DAILY
Qty: 90 TABLET | Refills: 3 | Status: SHIPPED | OUTPATIENT
Start: 2017-07-24 | End: 2017-09-29

## 2017-07-24 RX ORDER — SIMVASTATIN 20 MG
20 TABLET ORAL AT BEDTIME
Qty: 90 TABLET | Refills: 3 | Status: SHIPPED | OUTPATIENT
Start: 2017-07-24 | End: 2018-05-14

## 2017-07-24 NOTE — TELEPHONE ENCOUNTER
"Pt called  speciality rehab to cancel her appt for raiza. She is confused about why she is having balance therapy. She had some back PT at [facility] and that cost her $20.00 a co-pay per visit she is wondering if she will have to pay that again. I explained that I did not know what her co-pay amount would be. She has [employer] and we have to submit for authorization after the evaluation. We would make recommendations for how many PT appts needed based on the evaluation. I explained how we would assess her balance and this is different than the [facility] back therapy. I read her Dr Narvaez's assessment and recommendation for balance therapy. She told me \"something came up\" so she wants to cancel the appt for raiza and she will call back when she wants to schedule the appt. I gave her the direct number to call  speciality rehab.    Barbie Hussein DPT, MPT, NCS  "

## 2017-07-24 NOTE — TELEPHONE ENCOUNTER
Medina,    Pt needs these 2 rx's because her mail order did not get them out to her on time.  Can you please send 30 days rx's for both so we can get her what she needs.        Thank You,  Huma Browning  Pharmacy Technician  [facility]  [phone]

## 2017-07-24 NOTE — TELEPHONE ENCOUNTER
Patient paged to ask questions about wellbutrin and generic insulin.     Patient is wondering if she can she taper off of wellbutrin. Patient recently tapered off of citalopram and didn't experience any withdrawal effects and feels like it did not affect her mood. Patient would really like to try coming off of her wellbutrin. Patient has wellbutrin XL 300mg daily. Patient advised not to split tablet in 1/2. Patient would like to try taking 1 tablet every other day. Patient does not want a prescription for lower strength tablet.    Patient is wondering if there is any generic insulin. Patient informed that there is no generic insulin. The least expensive insulin is the Relion brand.     Patient will call with any questions or concerns.    Falguni Chavis, Pharm.D, [facility]  Medication Therapy Management Pharmacist

## 2017-07-27 ENCOUNTER — TELEPHONE (OUTPATIENT)
Dept: PHARMACY | Facility: CLINIC | Age: 67
End: 2017-07-27

## 2017-07-27 NOTE — TELEPHONE ENCOUNTER
Care Coordination Contact - Social Work - Follow-Up - 7-27-17    SW contacted pt via phone today to follow-up regarding resources SW had provided to pt in previous contact regarding pt being unable to afford her insulin while she is in her doughnut hole of her Medicare Part D prescription coverage.  Pt indicated that she decided to purchase her insulin at [facility], as Falguni Roberts MT, recommended, as it costs much less there.  Pt reports that she is able to manage the cost of insulin there.  Pt denies further SW/CC needs though SW is available for future needs of pt.    IBRAHIMA Mcclain     Social Work  [facility]  Office:  [phone]  E-Mail:  [email or web address]  7/27/2017 9:09 AM

## 2017-07-30 ENCOUNTER — NURSE TRIAGE (OUTPATIENT)
Dept: NURSING | Facility: CLINIC | Age: 67
End: 2017-07-30

## 2017-07-30 ENCOUNTER — TELEPHONE (OUTPATIENT)
Dept: NURSING | Facility: CLINIC | Age: 67
End: 2017-07-30

## 2017-07-31 ENCOUNTER — TRANSFERRED RECORDS (OUTPATIENT)
Dept: HEALTH INFORMATION MANAGEMENT | Facility: CLINIC | Age: 67
End: 2017-07-31

## 2017-07-31 NOTE — TELEPHONE ENCOUNTER
Reason for Call: Patient called stating that she started to have loose bowel movements on Sunday and she had an episode of rectal bleeding with the stool.  The blood was cheyenne red, she has no fever or pain.  She will call the clinic this morning to see if she could be seen today.      Routed to: Care Team    Rosaura Ochoa RN  [facility]  [phone]

## 2017-07-31 NOTE — TELEPHONE ENCOUNTER
"  Reason for Disposition    [1] Blood in the stool AND [2] small amount of blood   (Exception: only on toilet paper. Reason: diarrhea can cause rectal irritation with blood on wiping)    Additional Information    Negative: Shock suspected (e.g., cold/pale/clammy skin, too weak to stand, low BP, rapid pulse)    Negative: Difficult to awaken or acting confused  (e.g., disoriented, slurred speech)    Negative: Passed out (i.e., lost consciousness, collapsed and was not responding)    Negative: [1] Vomiting AND [2] contains red blood or black (\"coffee ground\") material  (Exception: few red streaks in vomit that only happened once)    Negative: Sounds like a life-threatening emergency to the triager    Diarrhea is main symptom    Negative: Shock suspected (e.g., cold/pale/clammy skin, too weak to stand, low BP, rapid pulse)    Negative: Difficult to awaken or acting confused  (e.g., disoriented, slurred speech)    Negative: Sounds like a life-threatening emergency to the triager    Negative: Vomiting also present and worse than the diarrhea    Negative: [1] Blood in stool AND [2] without diarrhea    Negative: [1] SEVERE abdominal pain (e.g., excruciating) AND [2] present > 1 hour    Negative: [1] SEVERE abdominal pain AND [2] age > 60    Negative: [1] Blood in the stool AND [2] moderate or large amount of blood    Negative: Black or tarry bowel movements  (Exception: chronic-unchanged  black-grey bowel movements AND is taking iron pills or Pepto-bismol)    Negative: [1] Drinking very little AND [2] dehydration suspected (e.g., no urine > 12 hours, very dry mouth, very lightheaded)    Negative: Patient sounds very sick or weak to the triager    Negative: [1] SEVERE diarrhea (e.g., 7 or more times / day more than normal) AND [2]  age > 60 years    Negative: [1] Constant abdominal pain AND [2] present > 2 hours    Negative: [1] Fever > 103 F (39.4 C) AND [2] not able to get the fever down using Fever Care Advice    " Negative: [1] SEVERE diarrhea (e.g., 7 or more times / day more than normal) AND [2] present > 24 hours (1 day)    Negative: [1] MODERATE diarrhea (e.g., 4-6 times / day more than normal) AND [2] present > 48 hours (2 days)    Negative: [1] MODERATE diarrhea (e.g., 4-6 times / day more than normal) AND [2] age > 70 years    Negative: Fever > 101 F (38.3 C)    Negative: Fever present > 3 days (72 hours)    Negative: Abdominal pain  (Exception: Pain clears with each passage of diarrhea stool)    Protocols used: DIARRHEA-ADULT-AH, RECTAL BLEEDING-ADULT-AH  Patient called stating that she started having loose stools today and noted that there was cheyenne blood in her stool.  She will call her clinic in the morning to see about an appointment.  Message sent to care team.

## 2017-07-31 NOTE — TELEPHONE ENCOUNTER
Patient called this morning.  She states she had called last evening and spoke to FNA regarding having blood in stool with diarrhea. She has no other symptoms such as lightheadedness, weakness abdominal pain.  She is alert and feels ok.  Please see FNA triage note.  She has a history of polyps, different cancers in her immediate family. This has never happened before.   The diarrhea has stopped, but still sees cheyenne blood in her stool when she wipes.      Patient is hoping not to go to the hospital.    Scheduled with Grace Carias NP this morning, but will ask to advise next step.  If patient should go to the hospital, which hospital do you recommend?     Her last colonoscopy was 1/2016 and was to recheck in 3-5 years due to polyps.      Please advise.      Falguni Hammer RN, [facility]

## 2017-07-31 NOTE — TELEPHONE ENCOUNTER
Huddle with Grace Carias NP.   Would recommend ER, called patient, she will go to [facility] per one of the hospitals Grace Carias's recommended.  Patient agrees to plan.    Falguni Hammer RN, [facility]

## 2017-08-01 ENCOUNTER — TRANSFERRED RECORDS (OUTPATIENT)
Dept: HEALTH INFORMATION MANAGEMENT | Facility: CLINIC | Age: 67
End: 2017-08-01

## 2017-08-04 DIAGNOSIS — K21.9 GASTROESOPHAGEAL REFLUX DISEASE WITHOUT ESOPHAGITIS: ICD-10-CM

## 2017-08-04 RX ORDER — OMEPRAZOLE 40 MG/1
CAPSULE, DELAYED RELEASE ORAL
Qty: 90 CAPSULE | Refills: 2 | Status: SHIPPED | OUTPATIENT
Start: 2017-08-04 | End: 2018-05-14

## 2017-08-04 NOTE — TELEPHONE ENCOUNTER
Signed Prescriptions:                        Disp   Refills    omeprazole (PRILOSEC) 40 MG capsule        90 cap*2        Sig: TAKE 1 CAPSULE DAILY 30 TO 60 MINUTES BEFORE A MEAL  Authorizing Provider: DEV RANIKN  Ordering User: UTE GAONA    Prescription approved per [facility] Refill Protocol.    Ute Gaona RN   .[facility], [facility]

## 2017-08-14 ENCOUNTER — TELEPHONE (OUTPATIENT)
Dept: PEDIATRICS | Facility: CLINIC | Age: 67
End: 2017-08-14

## 2017-08-14 DIAGNOSIS — K92.2 GASTROINTESTINAL HEMORRHAGE, UNSPECIFIED GASTROINTESTINAL HEMORRHAGE TYPE: Primary | ICD-10-CM

## 2017-08-14 NOTE — TELEPHONE ENCOUNTER
Spoke to patient.  She went to [facility] on 7/30/17 for rectal hemmorhage.  She wants to thank all of the staff for referring her their.  It was excellent care.   She is coming in to have a spine injection tomorrow at 0930 and was wondering since she is coming in if she could have her hgb checked prior to her appt.  She states when she was at [facility] her hgb went from 11 down to 9, then 9.4 then 9.1.  She knows she needs to get in for a follow up but since she is in the clinic tomorrow would like to check her hgb.    Rosaura Keating RN,   [facility], [facility]

## 2017-08-14 NOTE — TELEPHONE ENCOUNTER
[facility] Call Center    Phone Message    Name of Caller: Radha    Phone Number: Cell number on file:    Telephone Information:   Mobile [phone]       Best time to return call: ANY    May a detailed message be left on voicemail: yes    Relation to patient: Self    Reason for Call: Other: Patient is wondering if she can get hemoglobin levels checked tomorrow. she is coming in for an injection     Action Taken: Message routed to:  Primary Care p [phone]

## 2017-08-15 ENCOUNTER — RADIANT APPOINTMENT (OUTPATIENT)
Dept: GENERAL RADIOLOGY | Facility: CLINIC | Age: 67
End: 2017-08-15
Attending: PREVENTIVE MEDICINE
Payer: COMMERCIAL

## 2017-08-15 VITALS — HEART RATE: 103 BPM | OXYGEN SATURATION: 95 % | SYSTOLIC BLOOD PRESSURE: 151 MMHG | DIASTOLIC BLOOD PRESSURE: 72 MMHG

## 2017-08-15 DIAGNOSIS — K92.2 GASTROINTESTINAL HEMORRHAGE, UNSPECIFIED GASTROINTESTINAL HEMORRHAGE TYPE: ICD-10-CM

## 2017-08-15 DIAGNOSIS — M51.26 LUMBAR HERNIATED DISC: ICD-10-CM

## 2017-08-15 DIAGNOSIS — M54.5 LOW BACK PAIN, UNSPECIFIED BACK PAIN LATERALITY, UNSPECIFIED CHRONICITY, WITH SCIATICA PRESENCE UNSPECIFIED: ICD-10-CM

## 2017-08-15 LAB
ERYTHROCYTE [DISTWIDTH] IN BLOOD BY AUTOMATED COUNT: 14 % (ref 10–15)
HCT VFR BLD AUTO: 30 % (ref 35–47)
HGB BLD-MCNC: 9.6 G/DL (ref 11.7–15.7)
MCH RBC QN AUTO: 29.3 PG (ref 26.5–33)
MCHC RBC AUTO-ENTMCNC: 32 G/DL (ref 31.5–36.5)
MCV RBC AUTO: 92 FL (ref 78–100)
PLATELET # BLD AUTO: 322 10E9/L (ref 150–450)
RBC # BLD AUTO: 3.28 10E12/L (ref 3.8–5.2)
WBC # BLD AUTO: 8.6 10E9/L (ref 4–11)

## 2017-08-15 PROCEDURE — 36415 COLL VENOUS BLD VENIPUNCTURE: CPT | Performed by: NURSE PRACTITIONER

## 2017-08-15 PROCEDURE — 62323 NJX INTERLAMINAR LMBR/SAC: CPT | Performed by: RADIOLOGY

## 2017-08-15 PROCEDURE — 85027 COMPLETE CBC AUTOMATED: CPT | Performed by: NURSE PRACTITIONER

## 2017-08-15 RX ORDER — IOPAMIDOL 408 MG/ML
10 INJECTION, SOLUTION INTRATHECAL ONCE
Status: COMPLETED | OUTPATIENT
Start: 2017-08-15 | End: 2017-08-15

## 2017-08-15 RX ORDER — METHYLPREDNISOLONE ACETATE 80 MG/ML
80 INJECTION, SUSPENSION INTRA-ARTICULAR; INTRALESIONAL; INTRAMUSCULAR; SOFT TISSUE ONCE
Status: COMPLETED | OUTPATIENT
Start: 2017-08-15 | End: 2017-08-15

## 2017-08-15 RX ORDER — BUPIVACAINE HYDROCHLORIDE 5 MG/ML
10 INJECTION, SOLUTION EPIDURAL; INTRACAUDAL ONCE
Status: COMPLETED | OUTPATIENT
Start: 2017-08-15 | End: 2017-08-15

## 2017-08-15 RX ORDER — LIDOCAINE HYDROCHLORIDE 10 MG/ML
5 INJECTION, SOLUTION EPIDURAL; INFILTRATION; INTRACAUDAL; PERINEURAL ONCE
Status: COMPLETED | OUTPATIENT
Start: 2017-08-15 | End: 2017-08-15

## 2017-08-15 RX ADMIN — BUPIVACAINE HYDROCHLORIDE 2 ML: 5 INJECTION, SOLUTION EPIDURAL; INTRACAUDAL at 09:47

## 2017-08-15 RX ADMIN — IOPAMIDOL 2 ML: 408 INJECTION, SOLUTION INTRATHECAL at 09:47

## 2017-08-15 RX ADMIN — METHYLPREDNISOLONE ACETATE 80 MG: 80 INJECTION, SUSPENSION INTRA-ARTICULAR; INTRALESIONAL; INTRAMUSCULAR; SOFT TISSUE at 09:47

## 2017-08-15 RX ADMIN — LIDOCAINE HYDROCHLORIDE 50 MG: 10 INJECTION, SOLUTION EPIDURAL; INFILTRATION; INTRACAUDAL; PERINEURAL at 09:47

## 2017-08-15 NOTE — PROGRESS NOTES
: Radha was seen in X-ray today for a lumbar epidural injection. Patient rated pain before procedure 1/10. After procedure patient rated pain 1/10. This pain level is (is/is not) acceptable to patient. Patient discharged home with .

## 2017-08-15 NOTE — PROGRESS NOTES
Medina Corbett,    Attached are your test results.  -Hemoglobin is decreased indicating anemia but it looks like you were actually lower at the hospital so looks like it has improved   -White blood cell and platelet counts are normal.   Please contact us if you have any questions.    Perri Carias, CNP

## 2017-09-18 ENCOUNTER — OFFICE VISIT (OUTPATIENT)
Dept: PSYCHOLOGY | Facility: CLINIC | Age: 67
End: 2017-09-18
Payer: COMMERCIAL

## 2017-09-18 ENCOUNTER — OFFICE VISIT (OUTPATIENT)
Dept: PEDIATRICS | Facility: CLINIC | Age: 67
End: 2017-09-18
Payer: COMMERCIAL

## 2017-09-18 VITALS
HEART RATE: 108 BPM | DIASTOLIC BLOOD PRESSURE: 70 MMHG | BODY MASS INDEX: 37.37 KG/M2 | SYSTOLIC BLOOD PRESSURE: 120 MMHG | TEMPERATURE: 97.8 F | WEIGHT: 238.6 LBS | OXYGEN SATURATION: 95 %

## 2017-09-18 DIAGNOSIS — R82.90 ABNORMAL URINE FINDINGS: ICD-10-CM

## 2017-09-18 DIAGNOSIS — E11.65 TYPE 2 DIABETES MELLITUS WITH HYPERGLYCEMIA, WITHOUT LONG-TERM CURRENT USE OF INSULIN (H): Primary | ICD-10-CM

## 2017-09-18 DIAGNOSIS — F41.8 DEPRESSION WITH ANXIETY: ICD-10-CM

## 2017-09-18 DIAGNOSIS — Z23 NEED FOR PROPHYLACTIC VACCINATION AND INOCULATION AGAINST INFLUENZA: ICD-10-CM

## 2017-09-18 DIAGNOSIS — F41.8 DEPRESSION WITH ANXIETY: Primary | ICD-10-CM

## 2017-09-18 DIAGNOSIS — N39.0 URINARY TRACT INFECTION WITHOUT HEMATURIA, SITE UNSPECIFIED: ICD-10-CM

## 2017-09-18 DIAGNOSIS — R53.83 OTHER FATIGUE: ICD-10-CM

## 2017-09-18 DIAGNOSIS — M54.9 BACK PAIN, UNSPECIFIED BACK LOCATION, UNSPECIFIED BACK PAIN LATERALITY, UNSPECIFIED CHRONICITY: ICD-10-CM

## 2017-09-18 DIAGNOSIS — M25.50 MULTIPLE JOINT PAIN: ICD-10-CM

## 2017-09-18 DIAGNOSIS — R30.0 DYSURIA: ICD-10-CM

## 2017-09-18 DIAGNOSIS — D64.9 ANEMIA, UNSPECIFIED TYPE: ICD-10-CM

## 2017-09-18 DIAGNOSIS — F41.9 ANXIETY: ICD-10-CM

## 2017-09-18 LAB
ALBUMIN UR-MCNC: NEGATIVE MG/DL
APPEARANCE UR: ABNORMAL
BACTERIA #/AREA URNS HPF: ABNORMAL /HPF
BASOPHILS # BLD AUTO: 0 10E9/L (ref 0–0.2)
BASOPHILS NFR BLD AUTO: 0.2 %
BILIRUB UR QL STRIP: NEGATIVE
CK SERPL-CCNC: 35 U/L (ref 30–225)
COLOR UR AUTO: YELLOW
CREAT UR-MCNC: 144 MG/DL
CRP SERPL-MCNC: 5.4 MG/L (ref 0–8)
DIFFERENTIAL METHOD BLD: ABNORMAL
EOSINOPHIL # BLD AUTO: 0.3 10E9/L (ref 0–0.7)
EOSINOPHIL NFR BLD AUTO: 3.1 %
ERYTHROCYTE [DISTWIDTH] IN BLOOD BY AUTOMATED COUNT: 17.6 % (ref 10–15)
ERYTHROCYTE [SEDIMENTATION RATE] IN BLOOD BY WESTERGREN METHOD: 24 MM/H (ref 0–30)
FERRITIN SERPL-MCNC: 9 NG/ML (ref 8–252)
GLUCOSE UR STRIP-MCNC: 30 MG/DL
HBA1C MFR BLD: 7.1 % (ref 4.3–6)
HCT VFR BLD AUTO: 35.8 % (ref 35–47)
HGB BLD-MCNC: 11.4 G/DL (ref 11.7–15.7)
HGB UR QL STRIP: NEGATIVE
KETONES UR STRIP-MCNC: NEGATIVE MG/DL
LEUKOCYTE ESTERASE UR QL STRIP: ABNORMAL
LYMPHOCYTES # BLD AUTO: 2.5 10E9/L (ref 0.8–5.3)
LYMPHOCYTES NFR BLD AUTO: 26.8 %
MCH RBC QN AUTO: 28.5 PG (ref 26.5–33)
MCHC RBC AUTO-ENTMCNC: 31.8 G/DL (ref 31.5–36.5)
MCV RBC AUTO: 90 FL (ref 78–100)
MICROALBUMIN UR-MCNC: 19 MG/L
MICROALBUMIN/CREAT UR: 12.99 MG/G CR (ref 0–25)
MONOCYTES # BLD AUTO: 0.6 10E9/L (ref 0–1.3)
MONOCYTES NFR BLD AUTO: 6.5 %
NEUTROPHILS # BLD AUTO: 6 10E9/L (ref 1.6–8.3)
NEUTROPHILS NFR BLD AUTO: 63.4 %
NITRATE UR QL: POSITIVE
NON-SQ EPI CELLS #/AREA URNS LPF: ABNORMAL /LPF
PH UR STRIP: 5.5 PH (ref 5–7)
PLATELET # BLD AUTO: 275 10E9/L (ref 150–450)
RBC # BLD AUTO: 4 10E12/L (ref 3.8–5.2)
RBC #/AREA URNS AUTO: ABNORMAL /HPF
SOURCE: ABNORMAL
SP GR UR STRIP: 1.02 (ref 1–1.03)
TSH SERPL DL<=0.005 MIU/L-ACNC: 0.63 MU/L (ref 0.4–4)
UROBILINOGEN UR STRIP-MCNC: NORMAL MG/DL (ref 0–2)
WBC # BLD AUTO: 9.4 10E9/L (ref 4–11)
WBC #/AREA URNS AUTO: ABNORMAL /HPF

## 2017-09-18 PROCEDURE — 87086 URINE CULTURE/COLONY COUNT: CPT | Performed by: NURSE PRACTITIONER

## 2017-09-18 PROCEDURE — 85652 RBC SED RATE AUTOMATED: CPT | Performed by: NURSE PRACTITIONER

## 2017-09-18 PROCEDURE — 85025 COMPLETE CBC W/AUTO DIFF WBC: CPT | Performed by: NURSE PRACTITIONER

## 2017-09-18 PROCEDURE — 82043 UR ALBUMIN QUANTITATIVE: CPT | Performed by: NURSE PRACTITIONER

## 2017-09-18 PROCEDURE — 86140 C-REACTIVE PROTEIN: CPT | Performed by: NURSE PRACTITIONER

## 2017-09-18 PROCEDURE — 87088 URINE BACTERIA CULTURE: CPT | Performed by: NURSE PRACTITIONER

## 2017-09-18 PROCEDURE — 86200 CCP ANTIBODY: CPT | Performed by: NURSE PRACTITIONER

## 2017-09-18 PROCEDURE — 82728 ASSAY OF FERRITIN: CPT | Performed by: NURSE PRACTITIONER

## 2017-09-18 PROCEDURE — 86618 LYME DISEASE ANTIBODY: CPT | Performed by: NURSE PRACTITIONER

## 2017-09-18 PROCEDURE — 82550 ASSAY OF CK (CPK): CPT | Performed by: NURSE PRACTITIONER

## 2017-09-18 PROCEDURE — 36415 COLL VENOUS BLD VENIPUNCTURE: CPT | Performed by: NURSE PRACTITIONER

## 2017-09-18 PROCEDURE — 90662 IIV NO PRSV INCREASED AG IM: CPT | Performed by: NURSE PRACTITIONER

## 2017-09-18 PROCEDURE — 99207 ZZC NO CHARGE BEHAVIORAL WARM HANDOFF: CPT | Performed by: SOCIAL WORKER

## 2017-09-18 PROCEDURE — G0008 ADMIN INFLUENZA VIRUS VAC: HCPCS | Performed by: NURSE PRACTITIONER

## 2017-09-18 PROCEDURE — 99214 OFFICE O/P EST MOD 30 MIN: CPT | Mod: 25 | Performed by: NURSE PRACTITIONER

## 2017-09-18 PROCEDURE — 83036 HEMOGLOBIN GLYCOSYLATED A1C: CPT | Performed by: NURSE PRACTITIONER

## 2017-09-18 PROCEDURE — 84443 ASSAY THYROID STIM HORMONE: CPT | Performed by: NURSE PRACTITIONER

## 2017-09-18 PROCEDURE — 86431 RHEUMATOID FACTOR QUANT: CPT | Performed by: NURSE PRACTITIONER

## 2017-09-18 PROCEDURE — 81001 URINALYSIS AUTO W/SCOPE: CPT | Performed by: NURSE PRACTITIONER

## 2017-09-18 PROCEDURE — 86038 ANTINUCLEAR ANTIBODIES: CPT | Performed by: NURSE PRACTITIONER

## 2017-09-18 PROCEDURE — 87186 SC STD MICRODIL/AGAR DIL: CPT | Performed by: NURSE PRACTITIONER

## 2017-09-18 RX ORDER — GABAPENTIN 300 MG/1
CAPSULE ORAL
Qty: 90 CAPSULE | Refills: 1 | Status: SHIPPED | OUTPATIENT
Start: 2017-09-18 | End: 2017-09-22

## 2017-09-18 RX ORDER — DULOXETIN HYDROCHLORIDE 30 MG/1
30 CAPSULE, DELAYED RELEASE ORAL 2 TIMES DAILY
Qty: 90 CAPSULE | Refills: 1 | Status: SHIPPED | OUTPATIENT
Start: 2017-09-18 | End: 2018-01-04

## 2017-09-18 RX ORDER — CEPHALEXIN 500 MG/1
500 CAPSULE ORAL 2 TIMES DAILY
Qty: 14 CAPSULE | Refills: 0 | Status: SHIPPED | OUTPATIENT
Start: 2017-09-18 | End: 2017-09-22

## 2017-09-18 ASSESSMENT — ANXIETY QUESTIONNAIRES
3. WORRYING TOO MUCH ABOUT DIFFERENT THINGS: NEARLY EVERY DAY
7. FEELING AFRAID AS IF SOMETHING AWFUL MIGHT HAPPEN: NEARLY EVERY DAY
IF YOU CHECKED OFF ANY PROBLEMS ON THIS QUESTIONNAIRE, HOW DIFFICULT HAVE THESE PROBLEMS MADE IT FOR YOU TO DO YOUR WORK, TAKE CARE OF THINGS AT HOME, OR GET ALONG WITH OTHER PEOPLE: VERY DIFFICULT
2. NOT BEING ABLE TO STOP OR CONTROL WORRYING: NEARLY EVERY DAY
5. BEING SO RESTLESS THAT IT IS HARD TO SIT STILL: NEARLY EVERY DAY
GAD7 TOTAL SCORE: 21
6. BECOMING EASILY ANNOYED OR IRRITABLE: NEARLY EVERY DAY
1. FEELING NERVOUS, ANXIOUS, OR ON EDGE: NEARLY EVERY DAY

## 2017-09-18 ASSESSMENT — PATIENT HEALTH QUESTIONNAIRE - PHQ9
SUM OF ALL RESPONSES TO PHQ QUESTIONS 1-9: 24
5. POOR APPETITE OR OVEREATING: NEARLY EVERY DAY

## 2017-09-18 NOTE — NURSING NOTE
"Chief Complaint   Patient presents with     Pain     patient notes pain is feeling worst, patient feels like its all over body, would like to have a pain patch     Bladder Problems     notes having urinary frequency more often       Initial /70 (BP Location: Right arm, Patient Position: Sitting, Cuff Size: Adult Large)  Pulse 108  Temp 97.8  F (36.6  C) (Temporal)  Wt 238 lb 9.6 oz (108.2 kg)  LMP 11/18/1991 (Within Months)  SpO2 95%  Breastfeeding? No  BMI 37.37 kg/m2 Estimated body mass index is 37.37 kg/(m^2) as calculated from the following:    Height as of 6/22/17: 5' 7\" (1.702 m).    Weight as of this encounter: 238 lb 9.6 oz (108.2 kg).  Medication Reconciliation: complete      BOBY Lyons      "

## 2017-09-18 NOTE — MR AVS SNAPSHOT
After Visit Summary   9/18/2017    Radha Corbett    MRN: [id number]           Patient Information     Date Of Birth          1950        Visit Information        Provider Department      9/18/2017 1:30 PM Perri Carias APRN CNP [facility]        Today's Diagnoses     Back pain, unspecified back location, unspecified back pain laterality, unspecified chronicity    -  1    Depression with anxiety        Anxiety        Multiple joint pain        Anemia, unspecified type        Other fatigue        Type 2 diabetes mellitus with hyperglycemia, without long-term current use of insulin (H)        Need for prophylactic vaccination and inoculation against influenza        Dysuria          Care Instructions    PLAN:   1.   Symptomatic therapy suggested:   Will start on Cymbalta and gabapentin.   2.  Orders Placed This Encounter   Medications     DULoxetine (CYMBALTA) 30 MG EC capsule     Sig: Take 1 capsule (30 mg) by mouth 2 times daily     Dispense:  90 capsule     Refill:  1     gabapentin (NEURONTIN) 300 MG capsule     Sig: Take 1 tablet (300 mg) every night for 1-3 days, then 1 tablet twice daily for 1-3 days, then 1 tablet three times daily     Dispense:  90 capsule     Refill:  1     Orders Placed This Encounter   Procedures     FLU VACCINE, INCREASED ANTIGEN, PRESV FREE, AGE 65+ [51975]     ADMIN INFLUENZA (For MEDICARE Patients ONLY) []     TSH with free T4 reflex     CBC with platelets differential     Ferritin     UA with Microscopic reflex to Culture     CRP inflammation     Hemoglobin A1c     Rheumatoid factor     Cyclic Citrullinated Peptide Antibody IgG     Erythrocyte sedimentation rate auto     CK total     Lyme Disease Rosi with reflex to WB Serum     Albumin Random Urine Quantitative with Creat Ratio     Anti Nuclear Rosi IgG by IFA with Reflex     [facility] ([place]) REFERRAL       3. Patient needs to follow up in if no improvement,or sooner if  worsening of symptoms or other symptoms develop.  CONSULTATION/REFERRAL to Pain management   Will follow up and/or notify patient of  results via My Chart to determine further need for followup      It was a pleasure seeing you today at the [facility]. Thank you for allowing us to care for you today. We truly hope we provided you with the excellent service you deserve. Please let us know if there is anything else we can do for you so we can be sure you are leaving completley satisfied with your care experience.       General information about your clinic   Clinic Hours Lab Hours (Appointments are required)   Mon-Thurs: 7:30 AM - 7 PM Mon-Thurs: 7:30 AM - 7 PM   Fri: 7:30 AM - 5 PM Fri: 7:30 AM - 5 PM        After Hours Nurse Advise & Appts:  [facility]: [phone]  [facility] On Call: to make appointments anytime: [phone] On Call Physician: call [phone] and answering service will page the on call physician.        For urgent appointments, please call [phone] and ask for the triage nurse or your care team clinic nurse.  How to contact my care team:  MyChart: [email or web address]   Phone: [phone]   Fax: [phone]       [facility]:   Phone: [phone]  Hours: 8:00 AM - 6:00 PM  Medication Refills:  Call your pharmacy and they will forward the refill to us. Please allow 3 business days for your refills to be completed.       Normal or non-critical lab and imaging results will be communicated to you by MyChart, letter or phone within 7 days.  If you do not hear from us within 10 days, please call the clinic. If you have a critical or abnormal lab result, we will notify you by phone as soon as possible.       We now have PWIC (Pediatric Walk in Care)  Monday-Friday from 7:30-4. Simply walk in and be seen for your urgent needs like cough, fever, rash, diarrhea or vomiting, pink eye, UTI. No appointments needed. Ask one of the team for more  information      -Your Care Team:    Dr. Trice Medeiros - Internal Medicine/Pediatrics   Dr. Guillaume Pisano - Family Medicine  Dr. Areli Pathak - Pediatrics  Dr. Jacqui Gil - Pediatrics  Perri Carias CNP - Family Practice Nurse Practitioner                           Follow-ups after your visit        Additional Services     PAIN [facility]NAGEMENT CENTER ([place]) REFERRAL       Your provider has referred you to the [facility].    Reason for Referral: Comprehensive Evaluation and Management    Please complete the following questions:    What is your diagnosis for the patient's pain? Low back pain     Do you have any specific questions for the pain specialist? No    Are there any red flags that may impact the assessment or management of the patient? Mental Illness / Communication Difficulties (explain): depression which she attributes to the pain     **ANY DIAGNOSTIC TESTS THAT ARE NOT IN EPIC SHOULD BE SENT TO THE PAIN CENTER**    Please note the Pre-Op Pain Consult must be scheduled 2-3 weeks prior to the patient's surgery.  Patient's trying to schedule within 2 weeks of surgery may not be accommodated.     Pre-Op Pain Consults are only good for 30 days.    REGARDING OPIOID MEDICATIONS:  We will always address appropriateness of opioid pain medications, but we generally will not automatically take on a prescribing role. When we do take on prescribing of opioids for chronic pain, it is in collaboration with the referring physician for an intermediate period of time (months), with an expectation that the primary physician or provider will assume the prescribing role if medications are effective at stable doses with demonstrated compliance.  Therefore, please do not assume that your prescribing responsibilities end on the day of pain clinic consultation.  Is this agreeable to you? YES    For any questions, contact the [facility] at [phone].    Please be aware that coverage of  these services is subject to the terms and limitations of your health insurance plan.  Call member services at your health plan with any benefit or coverage questions.      Please bring the following with you to your appointment:    (1) Any X-Rays, CTs or MRIs which have been performed.  Contact the facility where they were done to arrange for  prior to your scheduled appointment.    (2) List of current medications   (3) This referral request   (4) Any documents/labs given to you for this referral                  Your next 10 appointments already scheduled     Oct 12, 2017  8:00 AM CDT   (Arrive by 7:30 AM)   NEW SPINE with Antonio Carpio MD   [facility] ([facility])    [address]  4th Floor  [facility] [address] [phone]            Nov 16, 2017  1:45 PM CST   [facility]    [id number] [place] [address] [phone]              Who to contact     If you have questions or need follow up information about today's clinic visit or your schedule please contact [facility] directly at [phone].  Normal or non-critical lab and imaging results will be communicated to you by MyChart, letter or phone within 4 business days after the clinic has received the results. If you do not hear from us within 7 days, please contact the clinic through [employer] or phone. If you have a critical or abnormal lab result, we will notify you by phone as soon as possible.  Submit refill requests through [employer] or call your pharmacy and they will forward the refill request to us. Please allow 3 business days for your refill to be completed.          Additional Information About Your Visit        [employer] Information     [employer] gives you secure access to your electronic health record. If you see a primary care provider, you can also send  messages to your care team and make appointments. If you have questions, please call your primary care clinic.  If you do not have a primary care provider, please call [phone] and they will assist you.      [employer] is an electronic gateway that provides easy, online access to your medical records. With [employer], you can request a clinic appointment, read your test results, renew a prescription or communicate with your care team.     To access your existing account, please contact your [facility] or call [phone] for assistance.        Care EveryWhere ID     This is your Care EveryWhere ID. This could be used by other organizations to access your [facility] medical records  [id number]        Your Vitals Were     Pulse Temperature Last Period Pulse Oximetry Breastfeeding? BMI (Body Mass Index)    108 97.8  F (36.6  C) (Temporal) 11/18/1991 (Within Months) 95% No 37.37 kg/m2       Blood Pressure from Last 3 Encounters:   09/18/17 120/70   08/15/17 151/72   07/20/17 125/70    Weight from Last 3 Encounters:   09/18/17 238 lb 9.6 oz (108.2 kg)   06/22/17 228 lb 12.8 oz (103.8 kg)   01/19/17 228 lb (103.4 kg)              We Performed the Following     ADMIN INFLUENZA (For MEDICARE Patients ONLY) []     Albumin Random Urine Quantitative with Creat Ratio     Anti Nuclear Rosi IgG by IFA with Reflex     CBC with platelets differential     CK total     CRP inflammation     Cyclic Citrullinated Peptide Antibody IgG     Erythrocyte sedimentation rate auto     Ferritin     FLU VACCINE, INCREASED ANTIGEN, PRESV FREE, AGE 65+ [00161]     Hemoglobin A1c     Lyme Disease Rosi with reflex to  Serum     PAIN MANAGEMENT CENTER ([place]) REFERRAL     Rheumatoid factor     TSH with free T4 reflex     UA with Microscopic reflex to Culture          Today's Medication Changes          These changes are accurate as of: 9/18/17  2:24 PM.  If you have any questions, ask your nurse or doctor.                Start taking these medicines.        Dose/Directions    DULoxetine 30 MG EC capsule   Commonly known as:  CYMBALTA   Used for:  Anxiety, Depression with anxiety   Started by:  Perri Carias APRN CNP        Dose:  30 mg   Take 1 capsule (30 mg) by mouth 2 times daily   Quantity:  90 capsule   Refills:  1       gabapentin 300 MG capsule   Commonly known as:  NEURONTIN   Used for:  Back pain, unspecified back location, unspecified back pain laterality, unspecified chronicity   Started by:  Perri Carias APRN CNP        Take 1 tablet (300 mg) every night for 1-3 days, then 1 tablet twice daily for 1-3 days, then 1 tablet three times daily   Quantity:  90 capsule   Refills:  1            Where to get your medicines      These medications were sent to [facility] - [place], [place] - [address], [address]  [address], [address], [facility] [address]     Phone:  [phone]     DULoxetine 30 MG EC capsule    gabapentin 300 MG capsule                Primary Care Provider Office Phone # Fax #    Beatapresley Medeiros, PhD [phone]       [address]  [facility] [address]        Equal Access to Services     [facility] CenterGLORIA : Hadii aad ku hadasho Soomaali, waaxda luqadaha, qaybta kaalmada adeegyada, waxay yasir hayleonardo hallman . So [facility] [phone].    ATENCIÓN: Si habla español, tiene a arauz disposición servicios gratuitos de asistencia lingüística. Llame al [phone].    We comply with applicable federal civil rights laws and [place] laws. We do not discriminate on the basis of race, color, national origin, age, disability sex, sexual orientation or gender identity.            Thank you!     Thank you for choosing [facility]  for your care. Our goal is always to provide you with excellent care. Hearing back from our patients is one way we can continue to improve our services. Please take a few minutes to complete the written  "survey that you may receive in the mail after your visit with us. Thank you!             Your Updated Medication List - Protect others around you: Learn how to safely use, store and throw away your medicines at www.disposemcoCommenteds.org.          This list is accurate as of: 9/18/17  2:24 PM.  Always use your most recent med list.                   Brand Name Dispense Instructions for use Diagnosis    aspirin 81 MG tablet      1 TABLET DAILY        CALCIUM 600+D PO      1 tab daily        DULoxetine 30 MG EC capsule    CYMBALTA    90 capsule    Take 1 capsule (30 mg) by mouth 2 times daily    Anxiety, Depression with anxiety       gabapentin 300 MG capsule    NEURONTIN    90 capsule    Take 1 tablet (300 mg) every night for 1-3 days, then 1 tablet twice daily for 1-3 days, then 1 tablet three times daily    Back pain, unspecified back location, unspecified back pain laterality, unspecified chronicity       insulin  UNIT/ML injection    NovoLIN N VIAL    80 mL    Inject 60 units Subcutaneous 2 times daily    Type 2 diabetes mellitus with hyperglycemia, without long-term current use of insulin (H)       insulin syringe-needle U-100 31G X 5/16\" 1 ML    RELION INSULIN SYRINGE    200 each    Use 2 syringes daily or as directed.    Type 2 diabetes mellitus without complication (H)       lisinopril 10 MG tablet    PRINIVIL/ZESTRIL    90 tablet    Take 1 tablet (10 mg) by mouth daily    Essential hypertension with goal blood pressure less than 140/90       MELATONIN PO      10 mg At Bedtime        metFORMIN 500 MG 24 hr tablet    GLUCOPHAGE-XR    180 tablet    TAKE 2 TABLETS DAILY WITH DINNER    Type 2 diabetes mellitus with hyperglycemia, without long-term current use of insulin (H)       minocycline 100 MG capsule    MINOCIN/DYNACIN    180 capsule    Take 1 capsule (100 mg) by mouth every 12 hours    Acne vulgaris       omeprazole 40 MG capsule    priLOSEC    90 capsule    TAKE 1 CAPSULE DAILY 30 TO 60 MINUTES BEFORE A " MEAL    Gastroesophageal reflux disease without esophagitis       ONE TOUCH LANCETS      None Entered        ONE TOUCH TEST STRIPS VI      None Entered        simvastatin 20 MG tablet    ZOCOR    90 tablet    Take 1 tablet (20 mg) by mouth At Bedtime    Hyperlipidemia LDL goal <100       spironolactone 100 MG tablet    ALDACTONE    90 tablet    Take 1 tablet (100 mg) by mouth every morning    Essential hypertension with goal blood pressure less than 140/90       tolterodine 4 MG 24 hr capsule    DETROL LA    90 capsule    Take 1 capsule (4 mg) by mouth daily    Urge incontinence of urine       VISION FORMULA EYE HEALTH PO      Take 1 tablet by mouth daily        vitamin D 1000 UNITS capsule      1 CAPSULE DAILY

## 2017-09-18 NOTE — PATIENT INSTRUCTIONS
PLAN:   1.   Symptomatic therapy suggested:   Will start on Cymbalta and gabapentin.   2.  Orders Placed This Encounter   Medications     DULoxetine (CYMBALTA) 30 MG EC capsule     Sig: Take 1 capsule (30 mg) by mouth 2 times daily     Dispense:  90 capsule     Refill:  1     gabapentin (NEURONTIN) 300 MG capsule     Sig: Take 1 tablet (300 mg) every night for 1-3 days, then 1 tablet twice daily for 1-3 days, then 1 tablet three times daily     Dispense:  90 capsule     Refill:  1     Orders Placed This Encounter   Procedures     FLU VACCINE, INCREASED ANTIGEN, PRESV FREE, AGE 65+ [28552]     ADMIN INFLUENZA (For MEDICARE Patients ONLY) []     TSH with free T4 reflex     CBC with platelets differential     Ferritin     UA with Microscopic reflex to Culture     CRP inflammation     Hemoglobin A1c     Rheumatoid factor     Cyclic Citrullinated Peptide Antibody IgG     Erythrocyte sedimentation rate auto     CK total     Lyme Disease Rosi with reflex to WB Serum     Albumin Random Urine Quantitative with Creat Ratio     Anti Nuclear Rosi IgG by IFA with Reflex     PAIN MANAGEMENT CENTER ([place]) REFERRAL       3. Patient needs to follow up in if no improvement,or sooner if worsening of symptoms or other symptoms develop.  CONSULTATION/REFERRAL to Pain management   Will follow up and/or notify patient of  results via My Chart to determine further need for followup      It was a pleasure seeing you today at the [facility]. Thank you for allowing us to care for you today. We truly hope we provided you with the excellent service you deserve. Please let us know if there is anything else we can do for you so we can be sure you are leaving completley satisfied with your care experience.       General information about your clinic   Clinic Hours Lab Hours (Appointments are required)   Mon-Thurs: 7:30 AM - 7 PM Mon-Thurs: 7:30 AM - 7 PM   Fri: 7:30 AM - 5 PM Fri: 7:30 AM - 5 PM        After  Hours Nurse Advise & Appts:  [facility] Nurse Advisors: [phone]  [facility] On Call: to make appointments anytime: [phone] On Call Physician: call [phone] and answering service will page the on call physician.        For urgent appointments, please call [phone] and ask for the triage nurse or your care team clinic nurse.  How to contact my care team:  MyChart: [email or web address]   Phone: [phone]   Fax: [phone]       [facility]:   Phone: [phone]  Hours: 8:00 AM - 6:00 PM  Medication Refills:  Call your pharmacy and they will forward the refill to us. Please allow 3 business days for your refills to be completed.       Normal or non-critical lab and imaging results will be communicated to you by MyChart, letter or phone within 7 days.  If you do not hear from us within 10 days, please call the clinic. If you have a critical or abnormal lab result, we will notify you by phone as soon as possible.       We now have PWIC (Pediatric Walk in Care)  Monday-Friday from 7:30-4. Simply walk in and be seen for your urgent needs like cough, fever, rash, diarrhea or vomiting, pink eye, UTI. No appointments needed. Ask one of the team for more information      -Your Care Team:    Dr. Trice Medeiros - Internal Medicine/Pediatrics   Dr. Guillaume Pisano - Family Medicine  Dr. Areli Pathak - Pediatrics  Dr. Jacqui Gil - Pediatrics  Perri Carias CNP - Family Practice Nurse Practitioner

## 2017-09-18 NOTE — PROGRESS NOTES
SUBJECTIVE:   Radha Corbett is a 66 year old female who presents to clinic today for the following health issues:    1. Patient notes pain is feeling worst, patient feels like its all over body, would like to have a pain patch.  Low back pain and T12 compression fracture. Still a lot of pain, seeing sports medicine,  States has pain everywhere not just her back  Started off pretty well in her back   Pain in now everywhere     2. Notes having urinary frequency more often, patient is on tolterodine for the urinary frequency.     3. Has been more fatigue ongoing for awhile.  The fatigue has been going on for while. Now she is worried about her heart as well.   Lost her job last year.     4. Would like to discuss iron levels.    Depression and Anxiety Follow-Up    Status since last visit: Worsened     Was taking Citalapram and Wellbutrin and stopped over the summer. Thought she took enough stuff.     States is safe. Does not see a counselor     States it is hurting her marriage     Other associated symptoms:now can not get out of bed.     Complicating factors:     Significant life event: Yes-  Pain      Current substance abuse: Alcohol about 3 a night and started smoking again     PHQ-9 SCORE 12/5/2016 6/22/2017 9/18/2017   Total Score - - -   Total Score MyChart - - -   Total Score 17 13 24     TARAS-7 SCORE 6/13/2016 12/5/2016 9/18/2017   Total Score - - -   Total Score 12 15 21       PHQ-9  English  PHQ-9   Any Language  GAD7      Problem list and histories reviewed & adjusted, as indicated.  Additional history: as documented    Patient Active Problem List   Diagnosis     Macular degeneration     Myopia     Hiatal hernia     IBS (irritable bowel syndrome)     Hypertension goal BP (blood pressure) < 140/90     GERD (gastroesophageal reflux disease)     Atypical ductal hyperplasia of breast     Atypical lobular hyperplasia of breast     Benign Breast Disease (PASH)     Family history of breast cancer in sister     Type 2  diabetes, HbA1C goal < 8% (H)     Hyperlipidemia LDL goal <100     Breast cancer screening-high risk     Tubular adenoma of colon     Osteopenia     Alcohol ingestion, more than 4 drinks/day on alcohol screening     Umbilical hernia     Incontinence of urine     Stool incontinence     Osteoarthritis, knee     Rosacea     Anxiety     Pseudophakia     Abnormal cervical Pap smear with positive HPV DNA test     Depression with anxiety     Retinal detachment     Elevated liver function tests     Rectocele     Unexplained endometrial cells on cervical cytology     Back pain, unspecified back location, unspecified back pain laterality, unspecified chronicity     Past Surgical History:   Procedure Laterality Date     BREAST LUMPECTOMY, RT/LT      x2, left     CATARACT IOL, RT/LT  4/99    left, [id number] 7.0D     COLONOSCOPY       COLONOSCOPY N/A 1/22/2016    Procedure: COMBINED COLONOSCOPY, SINGLE OR MULTIPLE BIOPSY/POLYPECTOMY BY BIOPSY;  Surgeon: Praful Benjamin MD;  Location: [facility]     COLONOSCOPY WITH CO2 INSUFFLATION N/A 1/22/2016    Procedure: COLONOSCOPY WITH CO2 INSUFFLATION;  Surgeon: Praful Benjamin MD;  Location: [facility]     CRYOTHERAPY  2000    cervical     CRYOTHERAPY Left 3/19/2015    Procedure: CRYOTHERAPY;  Surgeon: Keiko Puri MD;  Location: [facility]     DILATION AND CURETTAGE, HYSTEROSCOPY DIAGNOSTIC, COMBINED N/A 1/19/2016    Procedure: COMBINED DILATION AND CURETTAGE, HYSTEROSCOPY DIAGNOSTIC;  Surgeon: Yeni Aparicio DO;  Location: [facility]     LAPAROSCOPIC TUBAL LIGATION  1981     PHACOEMULSIFICATION CLEAR CORNEA WITH STANDARD INTRAOCULAR LENS IMPLANT  8/15/2013    Procedure: PHACOEMULSIFICATION CLEAR CORNEA WITH STANDARD INTRAOCULAR LENS IMPLANT;  RIGHT PHACOEMULSIFICATION CLEAR CORNEA WITH STANDARD INTRAOCULAR LENS IMPLANT ;  Surgeon: Trae Taylor MD;  Location: [facility]     SURGICAL HISTORY OF -       x4, ankle surgery       Social History   Substance Use Topics      Smoking status: Former Smoker     Smokeless tobacco: Never Used     Alcohol use Yes      Comment: 2-3/night     Family History   Problem Relation Age of Onset     Hypertension Mother      CEREBROVASCULAR DISEASE Mother      Arthritis Mother      Cardiovascular Mother      Circulatory Mother      CEREBROVASCULAR DISEASE Father      Prostate Cancer Father 65      at 69     Asthma Brother      Prostate Cancer Brother 48     bone metastasis     DIABETES Sister      Hypertension Sister      OSTEOPOROSIS Sister      Depression Sister      Lipids Sister      CANCER Maternal Grandmother 95     spine     Breast Cancer Sister 39     also contralateral @53, mets to lungs     CANCER Sister 20     second uterine cancer in 30s also     DIABETES Sister      Hypertension Sister      Depression Sister      OSTEOPOROSIS Sister      Breast Cancer Sister 57     unilateral     CANCER Paternal Aunt 40     unknown type     CANCER Paternal Uncle      stomach;  in his 80s     Prostate Cancer Brother      Glaucoma No family hx of          Current Outpatient Prescriptions   Medication Sig Dispense Refill     omeprazole (PRILOSEC) 40 MG capsule TAKE 1 CAPSULE DAILY 30 TO 60 MINUTES BEFORE A MEAL 90 capsule 2     simvastatin (ZOCOR) 20 MG tablet Take 1 tablet (20 mg) by mouth At Bedtime 90 tablet 3     lisinopril (PRINIVIL/ZESTRIL) 10 MG tablet Take 1 tablet (10 mg) by mouth daily 90 tablet 3     metFORMIN (GLUCOPHAGE-XR) 500 MG 24 hr tablet TAKE 2 TABLETS DAILY WITH DINNER 180 tablet 1     spironolactone (ALDACTONE) 100 MG tablet Take 1 tablet (100 mg) by mouth every morning 90 tablet 0     minocycline (MINOCIN/DYNACIN) 100 MG capsule Take 1 capsule (100 mg) by mouth every 12 hours 180 capsule 2     Multiple Vitamins-Minerals (VISION FORMULA EYE HEALTH PO) Take 1 tablet by mouth daily       insulin NPH (NOVOLIN N VIAL) 100 UNIT/ML injection Inject 60 units Subcutaneous 2 times daily 80 mL 0     tolterodine (DETROL LA) 4 MG 24 hr  "capsule Take 1 capsule (4 mg) by mouth daily 90 capsule 3     insulin syringe-needle U-100 (RELION INSULIN SYRINGE) 31G X 5/16\" 1 ML Use 2 syringes daily or as directed. 200 each 3     MELATONIN PO 10 mg At Bedtime        ONE TOUCH TEST STRIPS VI None Entered       ONE TOUCH LANCETS None Entered       CALCIUM 600+D OR 1 tab daily       ASPIRIN 81 MG OR TABS 1 TABLET DAILY       VITAMIN D 1000 UNIT OR CAPS 1 CAPSULE DAILY       Allergies   Allergen Reactions     Codeine      Sulfa Drugs      Recent Labs   Lab Test  06/22/17   0858  06/22/17   0857  01/30/17   1407  12/05/16   1045  08/01/16   1344   05/04/16   1116   06/23/15   1030   10/01/14   0925   A1C   --   5.9   --   6.5*  7.8*   < >  10.7*   < >  6.9*   < >   --    LDL   --   72   --    --    --    --   88   --   60   --    --    HDL   --   69   --    --    --    --   51   --   49*   --    --    TRIG   --   106   --    --    --    --   101   --   79   --    --    ALT  28   --    --    --   37   --   49   < >  49   < >   --    CR   --    --    --    --   0.69   --   0.43*   < >  0.43*   --    --    GFRESTIMATED   --    --   84   --   85   --   >90  Non  GFR Calc     < >  >90  Non  GFR Calc     < >   --    GFRESTBLACK   --    --   >90   --   >90   GFR Calc     --   >90   GFR Calc     < >  >90   GFR Calc     < >   --    POTASSIUM   --    --    --    --   4.0   --   4.1   < >   --    --    --    TSH   --    --    --   0.79   --    --    --    --    --    --   1.00    < > = values in this interval not displayed.      BP Readings from Last 3 Encounters:   09/18/17 120/70   08/15/17 151/72   07/20/17 125/70    Wt Readings from Last 3 Encounters:   09/18/17 238 lb 9.6 oz (108.2 kg)   06/22/17 228 lb 12.8 oz (103.8 kg)   01/19/17 228 lb (103.4 kg)            Labs reviewed in EPIC      Reviewed and updated as needed this visit by clinical staffTobacco  Allergies  Meds  Med Hx  Surg Hx  " Fam Hx  Soc Hx      Reviewed and updated as needed this visit by Provider         ROS:  CONSTITUTIONAL:POSITIVE  for fatigue and NEGATIVE  for weight gain and weight loss  RESP:NEGATIVE for significant cough or SOB  CV: NEGATIVE for chest pain, palpitations or peripheral edema  GI: NEGATIVE for nausea, abdominal pain, heartburn, or change in bowel habits  MUSCULOSKELETAL: POSITIVE  for joint pain  and joint stiffness  and NEGATIVE for joint swelling  and joint warmth   NEURO: POSITIVE for tremor  and NEGATIVE for HX CVA, HX seizure D/O, involuntary movements and loss of consciousness  ENDOCRINE: NEGATIVE for temperature intolerance, skin/hair changes and POSITIVE  PSYCHIATRIC: POSITIVE foralcohol abuse, Hx anxiety, Hx depression and suicidal thoughts with out a plan and NEGATIVE forsuicidal thoughts with specific plan and thoughts of hurting someone else    OBJECTIVE:     /70 (BP Location: Right arm, Patient Position: Sitting, Cuff Size: Adult Large)  Pulse 108  Temp 97.8  F (36.6  C) (Temporal)  Wt 238 lb 9.6 oz (108.2 kg)  LMP 11/18/1991 (Within Months)  SpO2 95%  Breastfeeding? No  BMI 37.37 kg/m2  Body mass index is 37.37 kg/(m^2).  GENERAL APPEARANCE: alert, active and no distress  RESP: lungs clear to auscultation - no rales, rhonchi or wheezes  CV: regular rates and rhythm, no murmur, click or rub and no irregular beats  MS: gait normal and normal muscle tone  no musculoskeletal defects are noted, gait is age appropriate without ataxia  SKIN: no suspicious lesions or rashes  NEURO: Normal strength and tone, mentation intact and speech normal  PSYCH: patient appearance--, affect flat and anxious , fatigued and hearing poor  MENTAL STATUS EXAM:  Appearance/Behavior: No apparent distress  Speech: Normal  Mood/Affect: flat  Insight: Fair      Diagnostic Test Results:  Results for orders placed or performed in visit on 09/18/17   TSH with free T4 reflex   Result Value Ref Range    TSH 0.63 0.40 -  4.00 mU/L   CBC with platelets differential   Result Value Ref Range    WBC 9.4 4.0 - 11.0 10e9/L    RBC Count 4.00 3.8 - 5.2 10e12/L    Hemoglobin 11.4 (L) 11.7 - 15.7 g/dL    Hematocrit 35.8 35.0 - 47.0 %    MCV 90 78 - 100 fl    MCH 28.5 26.5 - 33.0 pg    MCHC 31.8 31.5 - 36.5 g/dL    RDW 17.6 (H) 10.0 - 15.0 %    Platelet Count 275 150 - 450 10e9/L    Diff Method Automated Method     % Neutrophils 63.4 %    % Lymphocytes 26.8 %    % Monocytes 6.5 %    % Eosinophils 3.1 %    % Basophils 0.2 %    Absolute Neutrophil 6.0 1.6 - 8.3 10e9/L    Absolute Lymphocytes 2.5 0.8 - 5.3 10e9/L    Absolute Monocytes 0.6 0.0 - 1.3 10e9/L    Absolute Eosinophils 0.3 0.0 - 0.7 10e9/L    Absolute Basophils 0.0 0.0 - 0.2 10e9/L   Ferritin   Result Value Ref Range    Ferritin 9 8 - 252 ng/mL   UA with Microscopic reflex to Culture   Result Value Ref Range    Color Urine Yellow     Appearance Urine Slightly Cloudy     Glucose Urine 30 (A) NEG^Negative mg/dL    Bilirubin Urine Negative NEG^Negative    Ketones Urine Negative NEG^Negative mg/dL    Specific Gravity Urine 1.018 1.003 - 1.035    Blood Urine Negative NEG^Negative    pH Urine 5.5 5.0 - 7.0 pH    Protein Albumin Urine Negative NEG^Negative mg/dL    Urobilinogen mg/dL Normal 0.0 - 2.0 mg/dL    Nitrite Urine Positive (A) NEG^Negative    Leukocyte Esterase Urine Moderate (A) NEG^Negative    Source Midstream Urine     WBC Urine 5-10 (A) OTO2^O - 2 /HPF    RBC Urine O - 2 OTO2^O - 2 /HPF    Bacteria Urine Many (A) NEG^Negative /HPF    Squamous Epithelial /LPF Urine Many (A) FEW^Few /LPF   CRP inflammation   Result Value Ref Range    CRP Inflammation 5.4 0.0 - 8.0 mg/L   Hemoglobin A1c   Result Value Ref Range    Hemoglobin A1C 7.1 (H) 4.3 - 6.0 %   Rheumatoid factor   Result Value Ref Range    Rheumatoid Factor <20 <20 IU/mL   Cyclic Citrullinated Peptide Antibody IgG   Result Value Ref Range    Cyclic Citrullinated Peptide Antibody, IgG 1 <7 U/mL   Erythrocyte sedimentation rate  auto   Result Value Ref Range    Sed Rate 24 0 - 30 mm/h   CK total   Result Value Ref Range    CK Total 35 30 - 225 U/L   Lyme Disease Rosi with reflex to WB Serum   Result Value Ref Range    Lyme Disease Antibodies Serum 0.03 0.00 - 0.89   Albumin Random Urine Quantitative with Creat Ratio   Result Value Ref Range    Creatinine Urine 144 mg/dL    Albumin Urine mg/L 19 mg/L    Albumin Urine mg/g Cr 12.99 0 - 25 mg/g Cr   Anti Nuclear Rosi IgG by IFA with Reflex   Result Value Ref Range    SHAKEEL interpretation Negative NEG^Negative   Urine Culture Aerobic Bacterial   Result Value Ref Range    Specimen Description Unspecified Urine     Culture Micro >100,000 colonies/mL  Escherichia coli   (A)        Susceptibility    Escherichia coli - ALICE     AMPICILLIN >=32 Resistant ug/mL     CEFAZOLIN* >=64 Resistant ug/mL      * Cefazolin ALICE breakpoints are for the treatment of uncomplicated urinary tract infections.  For the treatment of systemic infections, please contact the laboratory for additional testing.     CEFOXITIN 16 Intermediate ug/mL     CEFTAZIDIME <=1 Sensitive ug/mL     CEFTRIAXONE <=1 Sensitive ug/mL     CIPROFLOXACIN <=0.25 Sensitive ug/mL     GENTAMICIN <=1 Sensitive ug/mL     LEVOFLOXACIN <=0.12 Sensitive ug/mL     NITROFURANTOIN 32 Sensitive ug/mL     TOBRAMYCIN <=1 Sensitive ug/mL     Trimethoprim/Sulfa <=1/19 Sensitive ug/mL     AMPICILLIN/SULBACTAM >=32 Resistant ug/mL     Piperacillin/Tazo 8 Sensitive ug/mL     CEFEPIME <=1 Sensitive ug/mL       ASSESSMENT/PLAN:     Radha was seen today for pain and bladder problems.    Diagnoses and all orders for this visit:    Type 2 diabetes mellitus with hyperglycemia, without long-term current use of insulin (H)  -     UA with Microscopic reflex to Culture  -     Hemoglobin A1c    Urinary tract infection without hematuria, site unspecified  -     nitroFURantoin, macrocrystal-monohydrate, (MACROBID) 100 MG capsule; Take 1 capsule (100 mg) by mouth 2 times  daily    Back pain, unspecified back location, unspecified back pain laterality, unspecified chronicity  -     Start:  gabapentin (NEURONTIN) 300 MG capsule; Take 1 tablet (300 mg) every night for 1-3 days, then 1 tablet twice daily for 1-3 days, then 1 tablet three times daily  -     [facility]    Depression with anxiety  -     DULoxetine (CYMBALTA) 30 MG EC capsule; Take 1 capsule (30 mg) by mouth 2 times daily  Initiate medication with cymbalta 30 mg a day   Reviewed concept of depression as function of biochemical imbalance of neurotransmitters/rationale for treatment.  Risks and benefits of medication(s) reviewed with patient.  Questions answered.  Counseling advised  Followup appointment in 6 month(s)  Patient instructed to call for significant side effects medications or problems  Patient advised immediate presentation to hospital for suicidal thought, etc.      Anxiety  -     DULoxetine (CYMBALTA) 30 MG EC capsule; Take 1 capsule (30 mg) by mouth 2 times daily  Discussed the pathophysiology of anxiety episodes and the various symptoms seen associated with anxiety episodes.  Discussed possible triggers including fatigue, depression, stress, and chemicals such as alcohol, caffeine and certain drugs.  Discussed the treatment including an aerobic exercise program, adequate rest, and both rescue meds and maintenance meds.    Multiple joint pain  -     CRP inflammation  -     Rheumatoid factor  -     Cyclic Citrullinated Peptide Antibody IgG  -     Erythrocyte sedimentation rate auto  -     CK total  -     Lyme Disease Rosi with reflex to WB Serum  -     Anti Nuclear Rosi IgG by IFA with Reflex    Anemia, unspecified type  -     TSH with free T4 reflex  -     CBC with platelets differential  -     Ferritin    Other fatigue    Dysuria  -     Albumin Random Urine Quantitative with Creat Ratio    Need for prophylactic vaccination and inoculation against influenza  -     FLU VACCINE,  INCREASED ANTIGEN, PRESV FREE, AGE 65+ [01858]  -     ADMIN INFLUENZA (For MEDICARE Patients ONLY) []    Abnormal urine findings  -     Urine Culture Aerobic Bacterial  -     Discontinue: cephALEXin (KEFLEX) 500 MG capsule; Take 1 capsule (500 mg) by mouth 2 times daily for 7 days      PLAN:   Symptomatic therapy suggested:   Will start on Cymbalta and gabapentin.    Patient needs to follow up in if no improvement,or sooner if worsening of symptoms or other symptoms develop.  CONSULTATION/REFERRAL to Pain management   Will follow up and/or notify patient of  results via My Chart to determine further need for followup      See Patient Instructions    NIKKY Lutz CNP  [facility]  Injectable Influenza Immunization Documentation    1.  Is the person to be vaccinated sick today? NO     2. Does the person to be vaccinated have an allergy to a component   of the vaccine? NO     3. Has the person to be vaccinated ever had a serious reaction   to influenza vaccine in the past? NO    4. Has the person to be vaccinated ever had Guillain-Barré syndrome? NO    Form completed by Perri Carias NP, APRN CNP

## 2017-09-18 NOTE — PROGRESS NOTES
Patient had appointment with her primary care physician. [facility] services were offered. No immediate safety/risk issues were reported or identified.  Explained the role of the [facility] and provided contact information for the [facility].  Patient expressed belief that depression and anxiety is closely linked with uncontrolled pain. She stated that she has felt suicidal when she feels overwhelmed by pain.  Patient denied a plan, but expressed concern that thoughts will return if pain does not improve. Patient expressed hope after today's appointment with PCP since she felt heard and appreciates knowing that there is a plan in place. Patient also stated that she feels hopeful when she thinks about her grandson.  Patient stated that she will contact [facility] to schedule in the future if needs arise.    Catherine Kwok, [facility]   Behavioral Health Clinician

## 2017-09-18 NOTE — PROGRESS NOTES
Subjective:    HPI                    Objective:    System    Physical Exam    General     ROS    Assessment/Plan:      DISCHARGE REPORT    Progress reporting period is from 5/18/17 to 5/25/17. Patient cancelled last 2 scheduled appointment because she wanted to see the MD.     SUBJECTIVE  Subjective: pt reports no change in her back pain today; stated the knee pain got worse then got better;  patient stated the pain in the LB area gets worse with standing or walking activites.  Patient stated she has no issues with ADL.  Pt to follow up with the MD   Current Pain level: 5/10 (LB)       Changes in function: No changes noted in function since last SOAP note     The subjective and objective information are from the last SOAP note on this patient.  .    OBJECTIVE:The objective findings are from DOS 5/25/17   Objective: pt refuses to lay down on table (afraid she can not get back up)      ASSESSMENT/PLAN  Updated problem list and treatment plan: Diagnosis 1:  LBP  Impaired muscle performance - neuro re-education  Decreased function - therapeutic activities  STG/LTGs have been met or progress has been made towards goals:  Yes (See Goal flow sheet completed today.)  Assessment of Progress: The patient has not returned to therapy. Current status is unknown.  Self Management Plans:  Patient has been instructed in a home treatment program.    Radha continues to require the following intervention to meet STG and LTG's: PT intervention is no longer required to meet STG/LTG.  The patient failed to complete scheduled/ordered appointments so current information is unknown.  .    Recommendations:  We will discharge this patient from PT    Please refer to the daily flowsheet for treatment today, total treatment time and time spent performing 1:1 timed codes.

## 2017-09-19 LAB
ANA SER QL IF: NEGATIVE
B BURGDOR IGG+IGM SER QL: 0.03 (ref 0–0.89)
RHEUMATOID FACT SER NEPH-ACNC: <20 IU/ML (ref 0–20)

## 2017-09-19 ASSESSMENT — ANXIETY QUESTIONNAIRES: GAD7 TOTAL SCORE: 21

## 2017-09-19 NOTE — PROGRESS NOTES
Medina Corbett,    Attached are your test results.  It looks like you have a urinary tract infection. I am sending you out an antibiotic   -TSH (thyroid stimulating hormone) level is normal which indicates normal thyroid function.  -Normal red blood cell (hgb) levels, normal white blood cell count and normal platelet levels.  -Ferritin (iron) level is normal.  -Microalbumin (urine protein) test is normal.  ADVISE: recheck annually   Please contact us if you have any questions.    Perri Carias, CNP

## 2017-09-20 ENCOUNTER — OFFICE VISIT (OUTPATIENT)
Dept: PSYCHOLOGY | Facility: CLINIC | Age: 67
End: 2017-09-20
Payer: COMMERCIAL

## 2017-09-20 ENCOUNTER — TELEPHONE (OUTPATIENT)
Dept: PEDIATRICS | Facility: CLINIC | Age: 67
End: 2017-09-20

## 2017-09-20 DIAGNOSIS — F41.9 ANXIETY: Primary | ICD-10-CM

## 2017-09-20 LAB
BACTERIA SPEC CULT: ABNORMAL
CCP AB SER IA-ACNC: 1 U/ML
SPECIMEN SOURCE: ABNORMAL

## 2017-09-20 PROCEDURE — 90834 PSYTX W PT 45 MINUTES: CPT | Performed by: SOCIAL WORKER

## 2017-09-20 NOTE — PROGRESS NOTES
"[facility]  September 20, 2017      Behavioral Health Clinician Progress Note    Patient Name: Radha Corbett           Service Type:  Individual      Service Location:   Face to Face in Clinic     Session Start Time: 11:49 am  Session End Time: 12:30 pm      Session Length: 38 - 52      Attendees: Patient    Visit Activities (Refresh list every visit): NEW and [facility] Only    Diagnostic Assessment Date: To be completed by end of second visit.   Treatment Plan Review Date: To be completed upon completion of DA.  See Flowsheets for today's PHQ-9 and TARAS-7 results  Previous PHQ-9:   PHQ-9 SCORE 12/5/2016 6/22/2017 9/18/2017   Total Score - - -   Total Score MyChart - - -   Total Score 17 13 24     Previous TARAS-7:   TARAS-7 SCORE 6/13/2016 12/5/2016 9/18/2017   Total Score - - -   Total Score 12 15 21       MACHELLE LEVEL:  MACHELLE Score (Last Two) 5/23/2011   MACHELLE Raw Score 49   Activation Score 82.8   MACHELLE Level 4       DATA  Extended Session (60+ minutes): No  Interactive Complexity: No  Crisis: No  [facility] Patient: No    Treatment Objective(s) Addressed in This Session:  Target Behavior(s): disease management/lifestyle changes , mental health management    Anxiety: will experience a reduction in anxiety    Current Stressors / Issues:  Patient arrived to clinic requesting a same day appointment with PCP.  PCP recommended [facility] visit. Patient agreed to speak with [facility], who [facility] met during a warm hand-off on 9/18/17.  Patient discussed anxiety due to continuing to feeling frustrated and overwhelmed for not knowing why she continues to experience pain.  Per patient, she felt \"hope\" that she would receive an answer after her visit with Grace Carias.  She shared that her test results came back \"normal\", and she feels that she has lost hope again.  [facility] provided supportive listening, and explored strategies with patient to assist her to cope with anxiety and increase feelings of hope. Patient stated that she has an appointment " with her PCP on 9/22, and discussed awareness of how there will continue to be attempts to be able to identify source of her health problems.  [facility] explored with patient distraction techniques and activity scheduling to assist her to cope until her appointment on 9/22.  [facility] continued to also discuss potential benefits of scheduling appointments with [facility] to assist her to learn additional relaxation techniques.  Patient shared that she may schedule an appointment next week.        Progress on Treatment Objective(s) / Homework:  No improvement - CONTEMPLATION (Considering change and yet undecided); Intervened by assessing the negative and positive thinking (ambivalence) about behavior change.    Motivational Interviewing    MI Intervention: Expressed Empathy/Understanding, Supported Autonomy, Collaboration, Evocation, Permission to raise concern or advise, Open-ended questions, Rolled with resistance: Emphasized patient autonomy, Simple reflection and Shifted topic to defuse resistance, Change talk (evoked) and Reframe     Change Talk Expressed by the Patient: Reasons to change Need to change    Provider Response to Change Talk: E - Evoked more info from patient about behavior change, A - Affirmed patient's thoughts, decisions, or attempts at behavior change, R - Reflected patient's change talk and S - Summarized patient's change talk statements      Care Plan review completed: No    Medication Review:  No changes to current psychiatric medication(s)    Medication Compliance:  Yes    Changes in Health Issues:   Yes: ongoing pain, no answers from lab results    Chemical Use Review:   Substance Use: Chemical use reviewed, no active concerns identified      Tobacco Use: No current tobacco use.      Assessment: Current Emotional / Mental Status (status of significant symptoms):  Risk status (Self / Other harm or suicidal ideation)  Patient has had a history of suicidal ideation: fears SI if pan does not resolve.  Patient  denies current fears or concerns for personal safety.  Patient denies current or recent suicidal ideation or behaviors.  Patient denies current or recent homicidal ideation or behaviors.  Patient denies current or recent self injurious behavior or ideation.  Patient denies other safety concerns.  A safety and risk management plan has not been developed at this time, however patient was encouraged to call [facility] should there be a change in any of these risk factors.    Appearance:   Appropriate   Eye Contact:   Fair   Psychomotor Behavior: Restless   Attitude:   Cooperative   Orientation:   All  Speech   Rate / Production: Hyperverbal    Volume:  Normal   Mood:    Anxious   Affect:    Appropriate   Thought Content:  Clear   Thought Form:  Tangential   Insight:    Fair     Diagnoses:  1. Anxiety        Collateral Reports Completed:  Communicated with: PCP    Plan: (Homework, other):  Patient was given information about behavioral services and encouraged to schedule a follow up appointment with the clinic [facility] when ready.  She was also given Cognitive Behavioral Therapy skills to practice when experiencing anxiety.  CD Recommendations: did not assess.  JOSE Ngo

## 2017-09-20 NOTE — TELEPHONE ENCOUNTER
Please schedule her to see me to evaluate. I haven't seen her since June, she was doing quite well back in June. Can use 2 same day spots on Friday.

## 2017-09-20 NOTE — TELEPHONE ENCOUNTER
Patient already scheduled for Friday 9/22 2:20pm.  Spoke to patient.  Gave her the message.  She will discuss the lab results with Dr. Medeiros on Friday.    Next 5 appointments (look out 90 days)     Sep 22, 2017  2:20 PM CDT   Return Visit with Trice Medeiros, PhD   [facility] ([facility]    [address] [place] [id number]   [phone]            Nov 16, 2017  1:45 PM CST   Return Visit with Mary Lira MD   [facility] ([facility])    [address] [place] [id number]   [phone]                Rosaura Keating RN,   [facility]

## 2017-09-20 NOTE — TELEPHONE ENCOUNTER
[facility] Call Center    Phone Message    Name of Caller: self     Phone Number: Cell number on file:    Telephone Information:   Mobile [phone]       Best time to return call: soon    May a detailed message be left on voicemail: no    Relation to patient: Self    Reason for Call: Requesting Results   Name/type of test: pt states she received [employer] messages about results and has some questions  Date of test: 9/18/17   Was test done at a location other than [facility] (Please fill in the location if not [facility])?: No        Action Taken: Message routed to:  Primary Care p [phone]

## 2017-09-20 NOTE — TELEPHONE ENCOUNTER
"Attached are your test results.   It looks like you have a urinary tract infection. I am sending you out an antibiotic   -TSH (thyroid stimulating hormone) level is normal which indicates normal thyroid function.   -Normal red blood cell (hgb) levels, normal white blood cell count and normal platelet levels.   -Ferritin (iron) level is normal.   -Microalbumin (urine protein) test is normal.  ADVISE: recheck annually    Please contact us if you have any questions    Patient calling clinic with concern and distress.    Patient blind in one eye, so she only drives to near by healthcare facilities. All of the pain clinics are far away and patient does not feel comfortable driving this far. Patient feels helpless. Patient wondering what she should do. Patient can still not walk very well, lower back pain is still severe. Patient wondering what the plan is? Patient say's that she is beginning to feel depressed and her marriage is affected by everything. Patient said that Grace mentioned Lupus, but the test was never done - patient expressed being frustrated about this. Patient was tearful on the phone, saying she is absolutely desperate for help. Patient will be coming to the clinic around noon today and wondering if Dr. Medeiros would see her. Writer informed patient that Dr. Medeiros is booked back to back all day today but would mention it to her. Patient was very appreciative for the phone call. Patient kept asking \"what am I supposed to do now, something is not right?\"    Okay to leave detailed message per patient. Charity Mcdonough RN    "

## 2017-09-21 RX ORDER — NITROFURANTOIN 25; 75 MG/1; MG/1
100 CAPSULE ORAL 2 TIMES DAILY
Qty: 14 CAPSULE | Refills: 0 | Status: SHIPPED | OUTPATIENT
Start: 2017-09-21 | End: 2018-01-04

## 2017-09-21 NOTE — PROGRESS NOTES
Medina Corbett,    Attached are your test results.  -Urine culture is abnormal and grew out bacteria that is NOT sensitive to the antibiotic you have been given.   I have sent a new antibiotic to your pharmacy to replace the previous antibiotic.    Please contact us if you have any questions.    Perri Carias, CNP

## 2017-09-22 ENCOUNTER — OFFICE VISIT (OUTPATIENT)
Dept: PEDIATRICS | Facility: CLINIC | Age: 67
End: 2017-09-22
Payer: COMMERCIAL

## 2017-09-22 ENCOUNTER — OFFICE VISIT (OUTPATIENT)
Dept: PSYCHOLOGY | Facility: CLINIC | Age: 67
End: 2017-09-22
Payer: COMMERCIAL

## 2017-09-22 VITALS
OXYGEN SATURATION: 96 % | DIASTOLIC BLOOD PRESSURE: 72 MMHG | TEMPERATURE: 98.2 F | BODY MASS INDEX: 37.43 KG/M2 | WEIGHT: 239 LBS | HEART RATE: 117 BPM | SYSTOLIC BLOOD PRESSURE: 126 MMHG

## 2017-09-22 DIAGNOSIS — M54.9 BACK PAIN, UNSPECIFIED BACK LOCATION, UNSPECIFIED BACK PAIN LATERALITY, UNSPECIFIED CHRONICITY: Primary | ICD-10-CM

## 2017-09-22 DIAGNOSIS — F41.9 ANXIETY: Primary | ICD-10-CM

## 2017-09-22 DIAGNOSIS — E11.65 TYPE 2 DIABETES MELLITUS WITH HYPERGLYCEMIA, WITHOUT LONG-TERM CURRENT USE OF INSULIN (H): ICD-10-CM

## 2017-09-22 PROCEDURE — 99214 OFFICE O/P EST MOD 30 MIN: CPT | Performed by: INTERNAL MEDICINE

## 2017-09-22 PROCEDURE — 99207 ZZC NO CHARGE LOS: CPT | Performed by: SOCIAL WORKER

## 2017-09-22 RX ORDER — METFORMIN HCL 500 MG
TABLET, EXTENDED RELEASE 24 HR ORAL
Qty: 180 TABLET | Refills: 1 | Status: SHIPPED | OUTPATIENT
Start: 2017-09-22 | End: 2018-03-08

## 2017-09-22 RX ORDER — METHYLPREDNISOLONE 4 MG
TABLET, DOSE PACK ORAL
Qty: 21 TABLET | Refills: 0 | Status: SHIPPED | OUTPATIENT
Start: 2017-09-22 | End: 2017-11-30

## 2017-09-22 RX ORDER — GABAPENTIN 600 MG/1
600 TABLET ORAL 3 TIMES DAILY
Qty: 270 TABLET | Refills: 1 | Status: SHIPPED | OUTPATIENT
Start: 2017-09-22 | End: 2018-02-08

## 2017-09-22 NOTE — PROGRESS NOTES
SUBJECTIVE:   Radha Corbett is a 66 year old female who presents to clinic today for the following health issues:      Wants to know what is wrong, had labs done 9-18-17, Wants to be treated for pain here, unable to get to pain clinic. Black toenail left 2nd toe, concerned about melanoma.     Patient with history of low back pain. Seen two spine surgeons, didn't think it was surgical. Sent to sports medicine for medical management. Had cortisone injection without improvement. Had two injections, advised to get a third one but she has not returned. She has an appointment with a spine surgeon next month.     Had GI bleed in August, colonoscopy showed diverticular bleed. No recurrence.     Yesterday at dental office BP 99 systolic. She didn't take lisinopril today. BP is normal  Today.     She was also dx with UTI. Given nitrofurantoin. She just picked it up today.     She was given duloxetine for anxiety/depression, she started taking it yesterday. She was also given gabapentin for pain but she started taking it 3 days ago.     She was given referral to see pain specialist. She cannot go to [facility] due to distance. Only drives locally due to legally blind in one eye.     She reports if she has to live with this pain and there is no cure, she does not want to live. She said she will end her life if there is no cure for the pain. She will not do any thing right now. After talking with me she feels hopeful.       Current Outpatient Prescriptions on File Prior to Visit:  DULoxetine (CYMBALTA) 30 MG EC capsule Take 1 capsule (30 mg) by mouth 2 times daily   omeprazole (PRILOSEC) 40 MG capsule TAKE 1 CAPSULE DAILY 30 TO 60 MINUTES BEFORE A MEAL   simvastatin (ZOCOR) 20 MG tablet Take 1 tablet (20 mg) by mouth At Bedtime   lisinopril (PRINIVIL/ZESTRIL) 10 MG tablet Take 1 tablet (10 mg) by mouth daily   spironolactone (ALDACTONE) 100 MG tablet Take 1 tablet (100 mg) by mouth every morning   minocycline (MINOCIN/DYNACIN)  "100 MG capsule Take 1 capsule (100 mg) by mouth every 12 hours   Multiple Vitamins-Minerals (VISION FORMULA EYE HEALTH PO) Take 1 tablet by mouth daily   insulin NPH (NOVOLIN N VIAL) 100 UNIT/ML injection Inject 60 units Subcutaneous 2 times daily   tolterodine (DETROL LA) 4 MG 24 hr capsule Take 1 capsule (4 mg) by mouth daily   CALCIUM 600+D OR 1 tab daily   ASPIRIN 81 MG OR TABS 1 TABLET DAILY   VITAMIN D 1000 UNIT OR CAPS 1 CAPSULE DAILY   nitroFURantoin, macrocrystal-monohydrate, (MACROBID) 100 MG capsule Take 1 capsule (100 mg) by mouth 2 times daily   [DISCONTINUED] gabapentin (NEURONTIN) 300 MG capsule Take 1 tablet (300 mg) every night for 1-3 days, then 1 tablet twice daily for 1-3 days, then 1 tablet three times daily   [DISCONTINUED] metFORMIN (GLUCOPHAGE-XR) 500 MG 24 hr tablet TAKE 2 TABLETS DAILY WITH DINNER   insulin syringe-needle U-100 (RELION INSULIN SYRINGE) 31G X 5/16\" 1 ML Use 2 syringes daily or as directed.   MELATONIN PO 10 mg At Bedtime    ONE TOUCH TEST STRIPS VI None Entered   ONE TOUCH LANCETS None Entered     Current Facility-Administered Medications on File Prior to Visit:  gadobutrol (GADAVIST) injection 10 mL         Problem list, Medication list, Allergies, and Medical/Social/Surgical histories reviewed in [facility] and updated as appropriate.    OBJECTIVE:                                                    /72  Pulse 117  Temp 98.2  F (36.8  C) (Temporal)  Wt 239 lb (108.4 kg)  LMP 11/18/1991 (Within Months)  SpO2 96%  BMI 37.43 kg/m2    GEN: healthy, alert and no distress  HEENT: unremarkable.  Neck:     supple, no thyromegaly, no cervical lymphadenopathy.   USHA:  CTA B/L, no wheezing or crackles.  CV:  RRR no murmur.  Intact distal pulses, good cap refill.  Abd: soft, normal active bowel sounds, non tender, non distended. No mass or organomegaly.   Ext:  no cyanosis, clubbing or edema.         Diagnostic test results:  No results found for this or any previous visit (from " the past 24 hour(s)).       ASSESSMENT/PLAN:                                                      66 year old female with the following diagnoses and treatment plan:      ICD-10-CM    1. Back pain, unspecified back location, unspecified back pain laterality, unspecified chronicity M54.9 gabapentin (NEURONTIN) 600 MG tablet     methylPREDNISolone (MEDROL DOSEPAK) 4 MG tablet   2. Type 2 diabetes mellitus with hyperglycemia, without long-term current use of insulin (H) E11.65 metFORMIN (GLUCOPHAGE-XR) 500 MG 24 hr tablet       -- I asked her to titrate gabapentin to 600 mg tid if she can tolerate it. She is upward titrating on the 300 mg to tid. I sent her 600 mg tabs to her mail order pharmacy for upward titration.   -- duloxetine will also help with her chronic pain, she feels much relieved to know that both of these medications can potentially help with pain.   -- discussed trial of Medrol dose pack. Pt is worried about steroids. Ok to hold off.  -- follow up in one week.     Will call or return to clinic if worsening or symptoms not improving as discussed.  See Patient Instructions.    A total of 25 minutes was spent face to face with this patient. More than 50% of the time was spent on education for the above problems and management plans.     Trice Medeiros MD-PhD  [facility]    (Note: Chart documentation was done in part with Dragon Voice Recognition software. Although reviewed after completion, some word and grammatical errors may remain.)

## 2017-09-22 NOTE — MR AVS SNAPSHOT
After Visit Summary   9/22/2017    Radha Corbett    MRN: [id number]           Patient Information     Date Of Birth          1950        Visit Information        Provider Department      9/22/2017 2:20 PM Trice Medeiros MD PhD [facility]        Today's Diagnoses     Back pain, unspecified back location, unspecified back pain laterality, unspecified chronicity    -  1    Type 2 diabetes mellitus with hyperglycemia, without long-term current use of insulin (H)          Care Instructions    Make appointment(s) for:   -- clinic follow up in one week.     Additional instructions:  1. Titrate gabapentin dose up to 600 mg three times a day  2. Hold lisinopril for now.         Medication(s) prescribed today:    Orders Placed This Encounter   Medications     metFORMIN (GLUCOPHAGE-XR) 500 MG 24 hr tablet     Sig: TAKE 2 TABLETS DAILY WITH DINNER     Dispense:  180 tablet     Refill:  1     gabapentin (NEURONTIN) 600 MG tablet     Sig: Take 1 tablet (600 mg) by mouth 3 times daily     Dispense:  270 tablet     Refill:  1     methylPREDNISolone (MEDROL DOSEPAK) 4 MG tablet     Sig: Follow package instructions     Dispense:  21 tablet     Refill:  0                  Follow-ups after your visit        Your next 10 appointments already scheduled     Oct 12, 2017  8:00 AM CDT   (Arrive by 7:30 AM)   NEW SPINE with Antonio Carpio MD   [facility] ([facility])    [address] [address] [phone]            Nov 16, 2017  1:45 PM CST   Return Visit with Mary Lira MD   [facility] ([facility])    [address] [place] [address] [phone]              Who to contact     If you have questions or need follow up information about today's clinic visit or your schedule please contact [facility] directly at [phone].  Normal or non-critical lab  and imaging results will be communicated to you by [employer], letter or phone within 4 business days after the clinic has received the results. If you do not hear from us within 7 days, please contact the clinic through [employer] or phone. If you have a critical or abnormal lab result, we will notify you by phone as soon as possible.  Submit refill requests through [employer] or call your pharmacy and they will forward the refill request to us. Please allow 3 business days for your refill to be completed.          Additional Information About Your Visit        [employer] Information     [employer] gives you secure access to your electronic health record. If you see a primary care provider, you can also send messages to your care team and make appointments. If you have questions, please call your primary care clinic.  If you do not have a primary care provider, please call [phone] and they will assist you.      [employer] is an electronic gateway that provides easy, online access to your medical records. With [employer], you can request a clinic appointment, read your test results, renew a prescription or communicate with your care team.     To access your existing account, please contact your [facility] or call [phone] for assistance.        Care EveryWhere ID     This is your Care EveryWhere ID. This could be used by other organizations to access your [place] medical records  [id number]        Your Vitals Were     Pulse Temperature Last Period Pulse Oximetry BMI (Body Mass Index)       117 98.2  F (36.8  C) (Temporal) 11/18/1991 (Within Months) 96% 37.43 kg/m2        Blood Pressure from Last 3 Encounters:   09/22/17 126/72   09/18/17 120/70   08/15/17 151/72    Weight from Last 3 Encounters:   09/22/17 239 lb (108.4 kg)   09/18/17 238 lb 9.6 oz (108.2 kg)   06/22/17 228 lb 12.8 oz (103.8 kg)              Today, you had the following     No orders found for display         Today's Medication  Changes          These changes are accurate as of: 9/22/17  3:03 PM.  If you have any questions, ask your nurse or doctor.               Start taking these medicines.        Dose/Directions    gabapentin 600 MG tablet   Commonly known as:  NEURONTIN   Used for:  Back pain, unspecified back location, unspecified back pain laterality, unspecified chronicity   Replaces:  gabapentin 300 MG capsule   Started by:  Trice Medeiros MD PhD        Dose:  600 mg   Take 1 tablet (600 mg) by mouth 3 times daily   Quantity:  270 tablet   Refills:  1       methylPREDNISolone 4 MG tablet   Commonly known as:  MEDROL DOSEPAK   Used for:  Back pain, unspecified back location, unspecified back pain laterality, unspecified chronicity   Started by:  Trice Medeiros MD PhD        Follow package instructions   Quantity:  21 tablet   Refills:  0         These medicines have changed or have updated prescriptions.        Dose/Directions    metFORMIN 500 MG 24 hr tablet   Commonly known as:  GLUCOPHAGE-XR   This may have changed:  See the new instructions.   Used for:  Type 2 diabetes mellitus with hyperglycemia, without long-term current use of insulin (H)   Changed by:  Trice Medeiros MD PhD        TAKE 2 TABLETS DAILY WITH DINNER   Quantity:  180 tablet   Refills:  1         Stop taking these medicines if you haven't already. Please contact your care team if you have questions.     gabapentin 300 MG capsule   Commonly known as:  NEURONTIN   Replaced by:  gabapentin 600 MG tablet   Stopped by:  Trice Medeiros MD PhD                Where to get your medicines      These medications were sent to [facility] - [address] [address]     Phone:  [phone]     gabapentin 600 MG tablet    metFORMIN 500 MG 24 hr tablet         These medications were sent to [facility] - [place], [place] - [address]  [address], [facility] [address]      Phone:  [phone]     methylPREDNISolone 4 MG tablet                Primary Care Provider Office Phone # Fax #    Trice Medeiros MD PhD [phone] [address]  [facility] [address]        Equal Access to Services     GABINOCAM RADHA : Hadii kyree dawson jacquelineo Sodipakali, waaxda luqadaha, qaybta kaalmada adeegyada, michael sheridanin hayaan tristontyler soliman lexx tineo. So [facility] [phone].    ATENCIÓN: Si habla español, tiene a arauz disposición servicios gratuitos de asistencia lingüística. Llame al [phone].    We comply with applicable federal civil rights laws and [place] laws. We do not discriminate on the basis of race, color, national origin, age, disability sex, sexual orientation or gender identity.            Thank you!     Thank you for choosing [facility]  for your care. Our goal is always to provide you with excellent care. Hearing back from our patients is one way we can continue to improve our services. Please take a few minutes to complete the written survey that you may receive in the mail after your visit with us. Thank you!             Your Updated Medication List - Protect others around you: Learn how to safely use, store and throw away your medicines at www.disposemymeds.org.          This list is accurate as of: 9/22/17  3:03 PM.  Always use your most recent med list.                   Brand Name Dispense Instructions for use Diagnosis    aspirin 81 MG tablet      1 TABLET DAILY        CALCIUM 600+D PO      1 tab daily        DULoxetine 30 MG EC capsule    CYMBALTA    90 capsule    Take 1 capsule (30 mg) by mouth 2 times daily    Anxiety, Depression with anxiety       gabapentin 600 MG tablet    NEURONTIN    270 tablet    Take 1 tablet (600 mg) by mouth 3 times daily    Back pain, unspecified back location, unspecified back pain laterality, unspecified chronicity       insulin  UNIT/ML injection    NovoLIN N VIAL    80 mL    Inject 60 units Subcutaneous 2  "times daily    Type 2 diabetes mellitus with hyperglycemia, without long-term current use of insulin (H)       insulin syringe-needle U-100 31G X 5/16\" 1 ML    RELION INSULIN SYRINGE    200 each    Use 2 syringes daily or as directed.    Type 2 diabetes mellitus without complication (H)       lisinopril 10 MG tablet    PRINIVIL/ZESTRIL    90 tablet    Take 1 tablet (10 mg) by mouth daily    Essential hypertension with goal blood pressure less than 140/90       MELATONIN PO      10 mg At Bedtime        metFORMIN 500 MG 24 hr tablet    GLUCOPHAGE-XR    180 tablet    TAKE 2 TABLETS DAILY WITH DINNER    Type 2 diabetes mellitus with hyperglycemia, without long-term current use of insulin (H)       methylPREDNISolone 4 MG tablet    MEDROL DOSEPAK    21 tablet    Follow package instructions    Back pain, unspecified back location, unspecified back pain laterality, unspecified chronicity       minocycline 100 MG capsule    MINOCIN/DYNACIN    180 capsule    Take 1 capsule (100 mg) by mouth every 12 hours    Acne vulgaris       nitroFURantoin (macrocrystal-monohydrate) 100 MG capsule    MACROBID    14 capsule    Take 1 capsule (100 mg) by mouth 2 times daily    Urinary tract infection without hematuria, site unspecified       omeprazole 40 MG capsule    priLOSEC    90 capsule    TAKE 1 CAPSULE DAILY 30 TO 60 MINUTES BEFORE A MEAL    Gastroesophageal reflux disease without esophagitis       ONE TOUCH LANCETS      None Entered        ONE TOUCH TEST STRIPS VI      None Entered        simvastatin 20 MG tablet    ZOCOR    90 tablet    Take 1 tablet (20 mg) by mouth At Bedtime    Hyperlipidemia LDL goal <100       spironolactone 100 MG tablet    ALDACTONE    90 tablet    Take 1 tablet (100 mg) by mouth every morning    Essential hypertension with goal blood pressure less than 140/90       tolterodine 4 MG 24 hr capsule    DETROL LA    90 capsule    Take 1 capsule (4 mg) by mouth daily    Urge incontinence of urine       VISION " FORMULA EYE HEALTH PO      Take 1 tablet by mouth daily        vitamin D 1000 UNITS capsule      1 CAPSULE DAILY

## 2017-09-22 NOTE — PATIENT INSTRUCTIONS
Make appointment(s) for:   -- clinic follow up in one week.     Additional instructions:  1. Titrate gabapentin dose up to 600 mg three times a day  2. Hold lisinopril for now.         Medication(s) prescribed today:    Orders Placed This Encounter   Medications     metFORMIN (GLUCOPHAGE-XR) 500 MG 24 hr tablet     Sig: TAKE 2 TABLETS DAILY WITH DINNER     Dispense:  180 tablet     Refill:  1     gabapentin (NEURONTIN) 600 MG tablet     Sig: Take 1 tablet (600 mg) by mouth 3 times daily     Dispense:  270 tablet     Refill:  1     methylPREDNISolone (MEDROL DOSEPAK) 4 MG tablet     Sig: Follow package instructions     Dispense:  21 tablet     Refill:  0

## 2017-09-22 NOTE — NURSING NOTE
"Chief Complaint   Patient presents with     Mult issues     Wants to know what is wrong, had labs done 9-18-17, Wants to be treated for pain here, unable to get to pain clinic. Black toenail left 2nd toe, concerned about melanoma.        Initial /72  Pulse 117  Temp 98.2  F (36.8  C) (Temporal)  Wt 239 lb (108.4 kg)  LMP 11/18/1991 (Within Months)  SpO2 96%  BMI 37.43 kg/m2 Estimated body mass index is 37.43 kg/(m^2) as calculated from the following:    Height as of 6/22/17: 5' 7\" (1.702 m).    Weight as of this encounter: 239 lb (108.4 kg).  Medication Reconciliation: complete    "

## 2017-09-29 ENCOUNTER — OFFICE VISIT (OUTPATIENT)
Dept: PEDIATRICS | Facility: CLINIC | Age: 67
End: 2017-09-29
Payer: COMMERCIAL

## 2017-09-29 VITALS
DIASTOLIC BLOOD PRESSURE: 87 MMHG | HEART RATE: 99 BPM | WEIGHT: 239.1 LBS | TEMPERATURE: 97.5 F | OXYGEN SATURATION: 94 % | BODY MASS INDEX: 37.45 KG/M2 | SYSTOLIC BLOOD PRESSURE: 143 MMHG

## 2017-09-29 DIAGNOSIS — M48.062 SPINAL STENOSIS, LUMBAR REGION, WITH NEUROGENIC CLAUDICATION: Primary | ICD-10-CM

## 2017-09-29 DIAGNOSIS — I10 ESSENTIAL HYPERTENSION WITH GOAL BLOOD PRESSURE LESS THAN 140/90: ICD-10-CM

## 2017-09-29 PROCEDURE — 99213 OFFICE O/P EST LOW 20 MIN: CPT | Performed by: INTERNAL MEDICINE

## 2017-09-29 RX ORDER — LISINOPRIL 10 MG/1
5 TABLET ORAL DAILY
Refills: 3 | COMMUNITY
Start: 2017-09-29 | End: 2018-05-15

## 2017-09-29 ASSESSMENT — PAIN SCALES - GENERAL: PAINLEVEL: WORST PAIN (10)

## 2017-09-29 NOTE — NURSING NOTE
SUBJECTIVE:   Radha Corbett is a 66 year old female who presents to clinic today for the following health issues:        Problem list and histories reviewed & adjusted, as indicated.  Additional history: as documented    Labs reviewed in EPIC    Reviewed and updated as needed this visit by clinical staffTobacco  Allergies       Reviewed and updated as needed this visit by Provider         {PROVIDER CHARTING PREFERENCE:[id number]}

## 2017-09-29 NOTE — MR AVS SNAPSHOT
After Visit Summary   9/29/2017    Radha Corbett    MRN: [id number]           Patient Information     Date Of Birth          1950        Visit Information        Provider Department      9/29/2017 2:20 PM Trice Medeiros MD PhD [facility]        Today's Diagnoses     Spinal stenosis, lumbar region, with neurogenic claudication    -  1    Essential hypertension with goal blood pressure less than 140/90          Care Instructions    Make appointment(s) for:   -- clinic follow up in 2 weeks.         Medication(s) prescribed today:    Orders Placed This Encounter   Medications     lisinopril (PRINIVIL/ZESTRIL) 10 MG tablet     Sig: Take 0.5 tablets (5 mg) by mouth daily     Refill:  3     Restart at 5 mg, prescription not sent.                  Follow-ups after your visit        Follow-up notes from your care team     Return in about 2 weeks (around 10/13/2017).      Your next 10 appointments already scheduled     Oct 12, 2017  8:00 AM CDT   (Arrive by 7:30 AM)   NEW SPINE with Antonio Carpio MD   [facility] ([facility])    [address] [address] [phone]            Nov 16, 2017  1:45 PM CST   Return Visit with Mary Lira MD   [facility] ([facility])    [address] [place] [address] [phone]              Who to contact     If you have questions or need follow up information about today's clinic visit or your schedule please contact [facility] directly at [phone].  Normal or non-critical lab and imaging results will be communicated to you by MyChart, letter or phone within 4 business days after the clinic has received the results. If you do not hear from us within 7 days, please contact the clinic through MyChart or phone. If you have a critical or abnormal lab result, we will notify you by phone as soon as  possible.  Submit refill requests through [employer] or call your pharmacy and they will forward the refill request to us. Please allow 3 business days for your refill to be completed.          Additional Information About Your Visit        [employer] Information     [employer] gives you secure access to your electronic health record. If you see a primary care provider, you can also send messages to your care team and make appointments. If you have questions, please call your primary care clinic.  If you do not have a primary care provider, please call [phone] and they will assist you.      [employer] is an electronic gateway that provides easy, online access to your medical records. With [employer], you can request a clinic appointment, read your test results, renew a prescription or communicate with your care team.     To access your existing account, please contact your [facility] or call [phone] for assistance.        Care EveryWhere ID     This is your Care EveryWhere ID. This could be used by other organizations to access your [place] medical records  [id number]        Your Vitals Were     Pulse Temperature Last Period Pulse Oximetry BMI (Body Mass Index)       99 97.5  F (36.4  C) (Oral) 11/18/1991 (Within Months) 94% 37.45 kg/m2        Blood Pressure from Last 3 Encounters:   09/29/17 143/87   09/22/17 126/72   09/18/17 120/70    Weight from Last 3 Encounters:   09/29/17 239 lb 1.6 oz (108.5 kg)   09/22/17 239 lb (108.4 kg)   09/18/17 238 lb 9.6 oz (108.2 kg)              Today, you had the following     No orders found for display         Today's Medication Changes          These changes are accurate as of: 9/29/17  3:32 PM.  If you have any questions, ask your nurse or doctor.               These medicines have changed or have updated prescriptions.        Dose/Directions    lisinopril 10 MG tablet   Commonly known as:  PRINIVIL/ZESTRIL   This may have changed:  how much to  take   Used for:  Essential hypertension with goal blood pressure less than 140/90   Changed by:  Trice Medeiros MD PhD        Dose:  5 mg   Take 0.5 tablets (5 mg) by mouth daily   Refills:  3                Primary Care Provider Office Phone # Fax #    Trice Medeiros MD PhD [phone] [address]  [facility] [address]        Equal Access to Services     [facility]: Hadii aad ku hadasho Soomaali, waaxda luqadaha, qaybta kaalmada adeegyada, waxay idiin hayaan adeeg kharash la'aan ah. So [facility] [phone].    ATENCIÓN: Si habla español, tiene a arauz disposición servicios gratuitos de asistencia lingüística. Llame al [phone].    We comply with applicable federal civil rights laws and [place] laws. We do not discriminate on the basis of race, color, national origin, age, disability, sex, sexual orientation, or gender identity.            Thank you!     Thank you for choosing [facility]  for your care. Our goal is always to provide you with excellent care. Hearing back from our patients is one way we can continue to improve our services. Please take a few minutes to complete the written survey that you may receive in the mail after your visit with us. Thank you!             Your Updated Medication List - Protect others around you: Learn how to safely use, store and throw away your medicines at www.disposemymeds.org.          This list is accurate as of: 9/29/17  3:32 PM.  Always use your most recent med list.                   Brand Name Dispense Instructions for use Diagnosis    aspirin 81 MG tablet      1 TABLET DAILY        CALCIUM 600+D PO      1 tab daily        DULoxetine 30 MG EC capsule    CYMBALTA    90 capsule    Take 1 capsule (30 mg) by mouth 2 times daily    Anxiety, Depression with anxiety       gabapentin 600 MG tablet    NEURONTIN    270 tablet    Take 1 tablet (600 mg) by mouth 3 times daily    Back pain, unspecified back location, unspecified back pain  "laterality, unspecified chronicity       insulin  UNIT/ML injection    NovoLIN N VIAL    80 mL    Inject 60 units Subcutaneous 2 times daily    Type 2 diabetes mellitus with hyperglycemia, without long-term current use of insulin (H)       insulin syringe-needle U-100 31G X 5/16\" 1 ML    RELION INSULIN SYRINGE    200 each    Use 2 syringes daily or as directed.    Type 2 diabetes mellitus without complication (H)       lisinopril 10 MG tablet    PRINIVIL/ZESTRIL     Take 0.5 tablets (5 mg) by mouth daily    Essential hypertension with goal blood pressure less than 140/90       MELATONIN PO      10 mg At Bedtime        metFORMIN 500 MG 24 hr tablet    GLUCOPHAGE-XR    180 tablet    TAKE 2 TABLETS DAILY WITH DINNER    Type 2 diabetes mellitus with hyperglycemia, without long-term current use of insulin (H)       methylPREDNISolone 4 MG tablet    MEDROL DOSEPAK    21 tablet    Follow package instructions    Back pain, unspecified back location, unspecified back pain laterality, unspecified chronicity       minocycline 100 MG capsule    MINOCIN/DYNACIN    180 capsule    Take 1 capsule (100 mg) by mouth every 12 hours    Acne vulgaris       nitroFURantoin (macrocrystal-monohydrate) 100 MG capsule    MACROBID    14 capsule    Take 1 capsule (100 mg) by mouth 2 times daily    Urinary tract infection without hematuria, site unspecified       omeprazole 40 MG capsule    priLOSEC    90 capsule    TAKE 1 CAPSULE DAILY 30 TO 60 MINUTES BEFORE A MEAL    Gastroesophageal reflux disease without esophagitis       ONE TOUCH LANCETS      None Entered        ONE TOUCH TEST STRIPS VI      None Entered        simvastatin 20 MG tablet    ZOCOR    90 tablet    Take 1 tablet (20 mg) by mouth At Bedtime    Hyperlipidemia LDL goal <100       spironolactone 100 MG tablet    ALDACTONE    90 tablet    Take 1 tablet (100 mg) by mouth every morning    Essential hypertension with goal blood pressure less than 140/90       tolterodine 4 MG " 24 hr capsule    DETROL LA    90 capsule    Take 1 capsule (4 mg) by mouth daily    Urge incontinence of urine       VISION FORMULA EYE HEALTH PO      Take 1 tablet by mouth daily        vitamin D 1000 UNITS capsule      1 CAPSULE DAILY

## 2017-09-29 NOTE — PATIENT INSTRUCTIONS
Make appointment(s) for:   -- clinic follow up in 2 weeks.         Medication(s) prescribed today:    Orders Placed This Encounter   Medications     lisinopril (PRINIVIL/ZESTRIL) 10 MG tablet     Sig: Take 0.5 tablets (5 mg) by mouth daily     Refill:  3     Restart at 5 mg, prescription not sent.

## 2017-09-29 NOTE — PROGRESS NOTES
SUBJECTIVE:                                                    Radha Corbett is a 66 year old female who presents to clinic today for the following health issues:      Joint or Musculoskeletal Pain  Duration of complaint: recheck gabapentin and cymbalta   Description:   Location: Back     Intensity: moderate  Progression of Symptoms: worse  Accompanying Signs & Symptoms: Other symptoms: none  History: Previous similar pain: YES    Precipitating factors: Trauma or overuse: no   Alleviating factors: Improved by: nothing  Therapies Tried and outcome:         Constantly tired       Duration: Week     Description (location/character/radiation): sleeping 4-5 hours and up for a few then sleeping again    Intensity:  NA    Accompanying signs and symptoms:     History (similar episodes/previous evaluation): Uti was keeping her up,  Resolved now.    Precipitating or alleviating factors: None    Therapies tried and outcome: None         Follow up on back pain. With increased gabapentin, she had a good day yesterday. Her pain is manageable. She has an appointment in 2 weeks with spine specialist.     She has a delay in starting Cybambalta and only on it for a few days.     Sitting pain level 1-2/10. When she gets up the first few steps are bad. Yesterday she was able to do a little more. She is sleeping more than her normal.     Face is breaking out, saw dermatology in the past, has follow up in 6 weeks.        Current Outpatient Prescriptions on File Prior to Visit:  metFORMIN (GLUCOPHAGE-XR) 500 MG 24 hr tablet TAKE 2 TABLETS DAILY WITH DINNER   gabapentin (NEURONTIN) 600 MG tablet Take 1 tablet (600 mg) by mouth 3 times daily   methylPREDNISolone (MEDROL DOSEPAK) 4 MG tablet Follow package instructions   nitroFURantoin, macrocrystal-monohydrate, (MACROBID) 100 MG capsule Take 1 capsule (100 mg) by mouth 2 times daily   DULoxetine (CYMBALTA) 30 MG EC capsule Take 1 capsule (30 mg) by mouth 2 times daily   omeprazole (PRILOSEC)  "40 MG capsule TAKE 1 CAPSULE DAILY 30 TO 60 MINUTES BEFORE A MEAL   simvastatin (ZOCOR) 20 MG tablet Take 1 tablet (20 mg) by mouth At Bedtime   spironolactone (ALDACTONE) 100 MG tablet Take 1 tablet (100 mg) by mouth every morning   minocycline (MINOCIN/DYNACIN) 100 MG capsule Take 1 capsule (100 mg) by mouth every 12 hours   Multiple Vitamins-Minerals (VISION FORMULA EYE HEALTH PO) Take 1 tablet by mouth daily   insulin NPH (NOVOLIN N VIAL) 100 UNIT/ML injection Inject 60 units Subcutaneous 2 times daily   tolterodine (DETROL LA) 4 MG 24 hr capsule Take 1 capsule (4 mg) by mouth daily   insulin syringe-needle U-100 (RELION INSULIN SYRINGE) 31G X 5/16\" 1 ML Use 2 syringes daily or as directed.   MELATONIN PO 10 mg At Bedtime    ONE TOUCH TEST STRIPS VI None Entered   ONE TOUCH LANCETS None Entered   CALCIUM 600+D OR 1 tab daily   ASPIRIN 81 MG OR TABS 1 TABLET DAILY   VITAMIN D 1000 UNIT OR CAPS 1 CAPSULE DAILY     Current Facility-Administered Medications on File Prior to Visit:  gadobutrol (GADAVIST) injection 10 mL       Problem list, Medication list, Allergies, and Medical/Social/Surgical histories reviewed in [facility] and updated as appropriate.    OBJECTIVE:                                                    /87  Pulse 99  Temp 97.5  F (36.4  C) (Oral)  Wt 239 lb 1.6 oz (108.5 kg)  LMP 11/18/1991 (Within Months)  SpO2 94%  BMI 37.45 kg/m2    GEN: healthy, alert and no distress         Diagnostic test results:  No results found for this or any previous visit (from the past 24 hour(s)).       ASSESSMENT/PLAN:                                                      66 year old female with the following diagnoses and treatment plan:      ICD-10-CM    1. Spinal stenosis, lumbar region, with neurogenic claudication M48.06    2. Essential hypertension with goal blood pressure less than 140/90 I10 lisinopril (PRINIVIL/ZESTRIL) 10 MG tablet       Overall patient is doing better. Her BP is a little high. She has " quit taking her BP med's, will restart lisinopril at 5 mg. She has old med's to use.     She wants to give gabapentin a little more time. Will see spine specialist and see what the next step is.     Follow up in 2 weeks after seeing spine specialist.     Will call or return to clinic if worsening or symptoms not improving as discussed.  See Patient Instructions.          Trice Medeiros MD-PhD  [facility]    (Note: Chart documentation was done in part with Dragon Voice Recognition software. Although reviewed after completion, some word and grammatical errors may remain.)

## 2017-10-02 ENCOUNTER — PRE VISIT (OUTPATIENT)
Dept: ORTHOPEDICS | Facility: CLINIC | Age: 67
End: 2017-10-02

## 2017-10-02 NOTE — TELEPHONE ENCOUNTER
1.  Date/reason for appt: 10/12/17 8AM - Low Back Pain    2.  Referring provider: Dr. Trice Medeiros    3.  Call to patient (Yes / No - short description): no, pt is referred    4.  Previous care at / records requested from:   - [facility] -- Records in [facility] with Dr. Trice Medeiros   - [facility] -- Records in [facility] with Dr. Narvaez    - [facility] -- Office note 2/20/17 scanned in Epic, faxed request to O for XR Thoracolumbar Spine 2/20/17 to push imaging (report within office notes)   - [facility] -- XR L Spine 6/13/16, XR L Spine Injection 6/27/17 & 8/15/17, MRI L Spine 1/30/17   - PT notes are in Epic

## 2017-10-04 ENCOUNTER — TELEPHONE (OUTPATIENT)
Dept: ORTHOPEDICS | Facility: CLINIC | Age: 67
End: 2017-10-04

## 2017-10-04 NOTE — TELEPHONE ENCOUNTER
[facility] Call Center    Phone Message    Name of Caller: self    Phone Number: Cell number on file:    Telephone Information:   Mobile [phone]       Best time to return call: soon    May a detailed message be left on voicemail: no    Relation to patient: Self    Reason for Call: Other: Patient states the 2 injections she recieved did not work and would like to get information on a spine specialist in [place] because she wont make it to the [facility]     Action Taken: Message routed to:  Adult Clinics: Sports Medicine p [phone]

## 2017-10-05 NOTE — TELEPHONE ENCOUNTER
Returned call to patient and discussed. She is wondering if there are any other spine surgeons in the [facility] area that she could see. She was unaware that the appointment on 10/12 with Dr. Carpio was in [place] and she is unable to get down to the [place]. I let her know that I will talk with Dr. Narvaez and see what he can suggest. She also states she would like me to look into Dr. Sachin Luevano in [place], [place]. I let her know that I will do some research for her and get back to her.

## 2017-10-06 ENCOUNTER — TELEPHONE (OUTPATIENT)
Dept: PSYCHOLOGY | Facility: CLINIC | Age: 67
End: 2017-10-06

## 2017-10-06 NOTE — TELEPHONE ENCOUNTER
Noted.    [facility] provided financial counselor, Milvia Will, with information. Financial counselor to follow up regarding patient's request.

## 2017-10-10 NOTE — TELEPHONE ENCOUNTER
Spoke to Enedina at [facility] - she stated they did receive my request to have imaging pushed. She will let her IT dept to push imaging. Informed her appt is Thurs 10/12/17

## 2017-10-11 ENCOUNTER — TELEPHONE (OUTPATIENT)
Dept: ORTHOPEDICS | Facility: CLINIC | Age: 67
End: 2017-10-11

## 2017-10-11 DIAGNOSIS — M54.9 BACK PAIN, UNSPECIFIED BACK LOCATION, UNSPECIFIED BACK PAIN LATERALITY, UNSPECIFIED CHRONICITY: Primary | ICD-10-CM

## 2017-10-11 DIAGNOSIS — M51.26 DISPLACEMENT OF LUMBAR INTERVERTEBRAL DISC WITHOUT MYELOPATHY: Primary | ICD-10-CM

## 2017-10-11 NOTE — TELEPHONE ENCOUNTER
Spoke to Kai at [facility] -- he will send another request to their IT dept to push imaging

## 2017-10-11 NOTE — TELEPHONE ENCOUNTER
[facility] Call Center    Phone Message    Name of Caller: HAVEN FORTE    Phone Number: Home number on file [phone] (home)    Best time to return call: TODAY    May a detailed message be left on voicemail: yes    Relation to patient: Self    Reason for Call: Other: Dr Narvaez referred patient to a spine doctor at the .  Pt cannot get a ride to the  and is asking for a different doctor closer to her home.  Thanks.      Action Taken: Message routed to:  Adult Clinics: Sports Medicine p [phone]

## 2017-10-12 NOTE — TELEPHONE ENCOUNTER
Returned call to patient. Gave her the information for [facility] here in [place]. Will place LYNN to be signed by patient at check in [place]. Patient will sign tomorrow, Friday 10/13.     Will fax referral to [facility].

## 2017-10-20 ENCOUNTER — TRANSFERRED RECORDS (OUTPATIENT)
Dept: HEALTH INFORMATION MANAGEMENT | Facility: CLINIC | Age: 67
End: 2017-10-20

## 2017-10-31 NOTE — PROGRESS NOTES
[facility] briefly spoke with patient following appointment with PCP.  Patient reported that she continues to be anxious about her health, and discussed fears that she has cancer. C explored evidence for and against this belief, and patient acknowledged that her anxiety is leading her to have this fear.  Patient stated that she is hopeful that she will feel better soon.  [facility] reviewed with patient how to schedule a full appointment with [facility] in the future. Patient to schedule as needs arise.   HTN (hypertension)

## 2017-11-17 ENCOUNTER — TELEPHONE (OUTPATIENT)
Dept: PEDIATRICS | Facility: CLINIC | Age: 67
End: 2017-11-17

## 2017-11-17 NOTE — TELEPHONE ENCOUNTER
"Patient states that she is having a procedure \"medial controlled block?\" (patient wasn't completely sure of name.)  On Tuesday and the surgery center wanted her to check with her PCP to make sure it was ok to hold her NPH insulin that morning.  Patient states she will be NPO prior to procedure.  Informed patient that yes, if she is NPO she can hold her insulin the morning of procedure.  Patient verbalized understanding.    She states she will be having spinal surgery on Dec 7.    Rosaura Keating RN,   [facility]      "

## 2017-11-17 NOTE — TELEPHONE ENCOUNTER
[facility] Call Center    Phone Message    Name of Caller: aquilino    Phone Number: Cell number on file:    Telephone Information:   Mobile [phone]       Best time to return call: soon    May a detailed message be left on voicemail: no    Relation to patient: Self    Reason for Call: Other: pt is requesting a call back regarding taking her insulin. Please give pt a call back to discuss     Action Taken: Message routed to:  Primary Care p [phone]

## 2017-11-29 ENCOUNTER — TELEPHONE (OUTPATIENT)
Dept: PHARMACY | Facility: CLINIC | Age: 67
End: 2017-11-29

## 2017-11-29 NOTE — TELEPHONE ENCOUNTER
Called patient to schedule f/u MTM appt. LM for patient to return call.  Falguni Chavis, Pharm.D, BCACP  Medication Therapy Management Pharmacist

## 2017-11-30 ENCOUNTER — OFFICE VISIT (OUTPATIENT)
Dept: PEDIATRICS | Facility: CLINIC | Age: 67
End: 2017-11-30
Payer: COMMERCIAL

## 2017-11-30 VITALS
BODY MASS INDEX: 37.67 KG/M2 | TEMPERATURE: 97.2 F | SYSTOLIC BLOOD PRESSURE: 110 MMHG | WEIGHT: 240 LBS | HEIGHT: 67 IN | HEART RATE: 112 BPM | DIASTOLIC BLOOD PRESSURE: 50 MMHG | OXYGEN SATURATION: 94 %

## 2017-11-30 DIAGNOSIS — Z01.818 PREOP GENERAL PHYSICAL EXAM: Primary | ICD-10-CM

## 2017-11-30 DIAGNOSIS — M54.9 BACK PAIN, UNSPECIFIED BACK LOCATION, UNSPECIFIED BACK PAIN LATERALITY, UNSPECIFIED CHRONICITY: ICD-10-CM

## 2017-11-30 LAB
ANION GAP SERPL CALCULATED.3IONS-SCNC: 9 MMOL/L (ref 3–14)
BUN SERPL-MCNC: 18 MG/DL (ref 7–30)
CALCIUM SERPL-MCNC: 9.1 MG/DL (ref 8.5–10.1)
CHLORIDE SERPL-SCNC: 101 MMOL/L (ref 94–109)
CO2 SERPL-SCNC: 26 MMOL/L (ref 20–32)
CREAT SERPL-MCNC: 0.53 MG/DL (ref 0.52–1.04)
ERYTHROCYTE [DISTWIDTH] IN BLOOD BY AUTOMATED COUNT: 17.2 % (ref 10–15)
GFR SERPL CREATININE-BSD FRML MDRD: >90 ML/MIN/1.7M2
GLUCOSE SERPL-MCNC: 371 MG/DL (ref 70–99)
HCT VFR BLD AUTO: 39.1 % (ref 35–47)
HGB BLD-MCNC: 12 G/DL (ref 11.7–15.7)
MCH RBC QN AUTO: 25.6 PG (ref 26.5–33)
MCHC RBC AUTO-ENTMCNC: 30.7 G/DL (ref 31.5–36.5)
MCV RBC AUTO: 83 FL (ref 78–100)
PLATELET # BLD AUTO: 302 10E9/L (ref 150–450)
POTASSIUM SERPL-SCNC: 4 MMOL/L (ref 3.4–5.3)
RBC # BLD AUTO: 4.69 10E12/L (ref 3.8–5.2)
SODIUM SERPL-SCNC: 136 MMOL/L (ref 133–144)
WBC # BLD AUTO: 10.3 10E9/L (ref 4–11)

## 2017-11-30 PROCEDURE — 80048 BASIC METABOLIC PNL TOTAL CA: CPT | Performed by: NURSE PRACTITIONER

## 2017-11-30 PROCEDURE — 36415 COLL VENOUS BLD VENIPUNCTURE: CPT | Performed by: NURSE PRACTITIONER

## 2017-11-30 PROCEDURE — 99214 OFFICE O/P EST MOD 30 MIN: CPT | Performed by: NURSE PRACTITIONER

## 2017-11-30 PROCEDURE — 85027 COMPLETE CBC AUTOMATED: CPT | Performed by: NURSE PRACTITIONER

## 2017-11-30 PROCEDURE — 93000 ELECTROCARDIOGRAM COMPLETE: CPT | Performed by: NURSE PRACTITIONER

## 2017-11-30 NOTE — NURSING NOTE
"Chief Complaint   Patient presents with     Pre-Op Exam     DOS:12/15/17       Initial /50 (BP Location: Right arm, Patient Position: Sitting, Cuff Size: Adult Large)  Pulse 112  Temp 97.2  F (36.2  C) (Temporal)  Ht 5' 7\" (1.702 m)  Wt 240 lb (108.9 kg)  LMP 11/18/1991 (Within Months)  SpO2 94%  Breastfeeding? No  BMI 37.59 kg/m2 Estimated body mass index is 37.59 kg/(m^2) as calculated from the following:    Height as of this encounter: 5' 7\" (1.702 m).    Weight as of this encounter: 240 lb (108.9 kg).  Medication Reconciliation: complete      BOBY Lyons      "

## 2017-11-30 NOTE — MR AVS SNAPSHOT
After Visit Summary   11/30/2017    Radha Corbett    MRN: [id number]           Patient Information     Date Of Birth          1950        Visit Information        Provider Department      11/30/2017 2:10 PM Perri Carias APRN CNP [facility]        Today's Diagnoses     Preop general physical exam    -  1    Back pain, unspecified back location, unspecified back pain laterality, unspecified chronicity          Care Instructions      Diabetes Medication Use  -----Hold usual  oral diabetic meds (e.g. Metformin, Actos, Glipizide) while NPO.   -----Take 80% of long acting insulin (e.g. Lantus, NPH) while NPO (fasting)      ACE Inhibitor or Angiotensin Receptor Blocker (ARB) Use  Ace inhibitor or Angiotensin Receptor Blocker (ARB) and should HOLD this medication for the 24 hours prior to surgery.      Will follow up and/or notify patient of  results via My Chart to determine further need for followup      Before Your Surgery      Call your surgeon if there is any change in your health. This includes signs of a cold or flu (such as a sore throat, runny nose, cough, rash or fever).    Do not smoke, drink alcohol or take over the counter medicine (unless your surgeon or primary care doctor tells you to) for the 24 hours before and after surgery.    If you take prescribed drugs: Follow your doctor s orders about which medicines to take and which to stop until after surgery.    Eating and drinking prior to surgery: follow the instructions from your surgeon    Take a shower or bath the night before surgery. Use the soap your surgeon gave you to gently clean your skin. If you do not have soap from your surgeon, use your regular soap. Do not shave or scrub the surgery site.  Wear clean pajamas and have clean sheets on your bed.     It was a pleasure seeing you today at the [facility]. Thank you for allowing us to care for you today. We truly hope we  provided you with the excellent service you deserve. Please let us know if there is anything else we can do for you so we can be sure you are leaving completley satisfied with your care experience.       General information about your clinic   Clinic Hours Lab Hours (Appointments are required)   Mon-Thurs: 7:30 AM - 7 PM Mon-Thurs: 7:30 AM - 7 PM   Fri: 7:30 AM - 5 PM Fri: 7:30 AM - 5 PM        After Hours Nurse Advise & Appts:  [facility] Nurse Advisors: [phone]  [facility] On Call: to make appointments anytime: [phone] On Call Physician: call [phone] and answering service will page the on call physician.        For urgent appointments, please call [phone] and ask for the triage nurse or your care team clinic nurse.  How to contact my care team:  MyChart: [email or web address]   Phone: [phone]   Fax: [phone]       [facility]:   Phone: [phone]  Hours: 8:00 AM - 6:00 PM  Medication Refills:  Call your pharmacy and they will forward the refill to us. Please allow 3 business days for your refills to be completed.       Normal or non-critical lab and imaging results will be communicated to you by MyChart, letter or phone within 7 days.  If you do not hear from us within 10 days, please call the clinic. If you have a critical or abnormal lab result, we will notify you by phone as soon as possible.       We now have PWIC (Pediatric Walk in Care)  Monday-Friday from 7:30-4. Simply walk in and be seen for your urgent needs like cough, fever, rash, diarrhea or vomiting, pink eye, UTI. No appointments needed. Ask one of the team for more information      -Your Care Team:    Dr. Trice Medeiros - Internal Medicine/Pediatrics   Dr. Guillaume Pisano - Family Medicine  Dr. Areli Pathak - Pediatrics  Perri Carias CNP - Family Practice Nurse Practitioner  Dr. Jacqui Gil - Pediatrics                       Follow-ups after your visit        Your next 10 appointments already scheduled     Jan 04,  2018  1:10 PM CST   PHYSICAL with Trice Medeiros MD PhD   [facility] ([facility])    [address] [place] [id number] [phone]              Who to contact     If you have questions or need follow up information about today's clinic visit or your schedule please contact [facility] directly at [phone].  Normal or non-critical lab and imaging results will be communicated to you by Room n Househart, letter or phone within 4 business days after the clinic has received the results. If you do not hear from us within 7 days, please contact the clinic through Room n Househart or phone. If you have a critical or abnormal lab result, we will notify you by phone as soon as possible.  Submit refill requests through FORMTEK or call your pharmacy and they will forward the refill request to us. Please allow 3 business days for your refill to be completed.          Additional Information About Your Visit        FORMTEK Information     [employer]TEK gives you secure access to your electronic health record. If you see a primary care provider, you can also send messages to your care team and make appointments. If you have questions, please call your primary care clinic.  If you do not have a primary care provider, please call [phone] and they will assist you.      FORMTEK is an electronic gateway that provides easy, online access to your medical records. With FORMTEK, you can request a clinic appointment, read your test results, renew a prescription or communicate with your care team.     To access your existing account, please contact your [facility] or call [phone] for assistance.        Care EveryWhere ID     This is your Care EveryWhere ID. This could be used by other organizations to access your [place] medical records  [id number]        Your Vitals Were     Pulse Temperature Height Last Period Pulse Oximetry Breastfeeding?    112 97.2  F  "(36.2  C) (Temporal) 5' 7\" (1.702 m) 11/18/1991 (Within Months) 94% No    BMI (Body Mass Index)                   37.59 kg/m2            Blood Pressure from Last 3 Encounters:   11/30/17 110/50   09/29/17 143/87   09/22/17 126/72    Weight from Last 3 Encounters:   11/30/17 240 lb (108.9 kg)   09/29/17 239 lb 1.6 oz (108.5 kg)   09/22/17 239 lb (108.4 kg)              We Performed the Following     Basic metabolic panel     CBC with platelets     EKG 12-lead complete w/read - Clinics        Primary Care Provider Office Phone # Fax #    Trice Medeiros MD PhD [phone]       [address] [facility] [address]        Equal Access to Services     MAIA GARCIA : Hadii aad ku hadasho Sokaren, waaxda luqadaha, qaybta kaalmada adesocorro, michael hallman . So [facility] [phone].    ATENCIÓN: Si habla español, tiene a arauz disposición servicios gratuitos de asistencia lingüística. Nadja al [phone].    We comply with applicable federal civil rights laws and [place] laws. We do not discriminate on the basis of race, color, national origin, age, disability, sex, sexual orientation, or gender identity.            Thank you!     Thank you for choosing [facility]  for your care. Our goal is always to provide you with excellent care. Hearing back from our patients is one way we can continue to improve our services. Please take a few minutes to complete the written survey that you may receive in the mail after your visit with us. Thank you!             Your Updated Medication List - Protect others around you: Learn how to safely use, store and throw away your medicines at www.disposemymeds.org.          This list is accurate as of: 11/30/17  3:10 PM.  Always use your most recent med list.                   Brand Name Dispense Instructions for use Diagnosis    aspirin 81 MG tablet      1 TABLET DAILY        CALCIUM 600+D PO      1 tab daily        DULoxetine 30 MG EC " "capsule    CYMBALTA    90 capsule    Take 1 capsule (30 mg) by mouth 2 times daily    Anxiety, Depression with anxiety       gabapentin 600 MG tablet    NEURONTIN    270 tablet    Take 1 tablet (600 mg) by mouth 3 times daily    Back pain, unspecified back location, unspecified back pain laterality, unspecified chronicity       insulin  UNIT/ML injection    NovoLIN N VIAL    80 mL    Inject 60 units Subcutaneous 2 times daily    Type 2 diabetes mellitus with hyperglycemia, without long-term current use of insulin (H)       insulin syringe-needle U-100 31G X 5/16\" 1 ML    RELION INSULIN SYRINGE    200 each    Use 2 syringes daily or as directed.    Type 2 diabetes mellitus without complication (H)       lisinopril 10 MG tablet    PRINIVIL/ZESTRIL     Take 0.5 tablets (5 mg) by mouth daily    Essential hypertension with goal blood pressure less than 140/90       MELATONIN PO      10 mg At Bedtime        metFORMIN 500 MG 24 hr tablet    GLUCOPHAGE-XR    180 tablet    TAKE 2 TABLETS DAILY WITH DINNER    Type 2 diabetes mellitus with hyperglycemia, without long-term current use of insulin (H)       minocycline 100 MG capsule    MINOCIN/DYNACIN    180 capsule    Take 1 capsule (100 mg) by mouth every 12 hours    Acne vulgaris       nitroFURantoin (macrocrystal-monohydrate) 100 MG capsule    MACROBID    14 capsule    Take 1 capsule (100 mg) by mouth 2 times daily    Urinary tract infection without hematuria, site unspecified       omeprazole 40 MG capsule    priLOSEC    90 capsule    TAKE 1 CAPSULE DAILY 30 TO 60 MINUTES BEFORE A MEAL    Gastroesophageal reflux disease without esophagitis       ONE TOUCH LANCETS      None Entered        ONE TOUCH TEST STRIPS VI      None Entered        simvastatin 20 MG tablet    ZOCOR    90 tablet    Take 1 tablet (20 mg) by mouth At Bedtime    Hyperlipidemia LDL goal <100       spironolactone 100 MG tablet    ALDACTONE    90 tablet    Take 1 tablet (100 mg) by mouth every morning "    Essential hypertension with goal blood pressure less than 140/90       tolterodine 4 MG 24 hr capsule    DETROL LA    90 capsule    Take 1 capsule (4 mg) by mouth daily    Urge incontinence of urine       VISION Layer EYE HEALTH PO      Take 1 tablet by mouth daily        vitamin D 1000 UNITS capsule      1 CAPSULE DAILY

## 2017-11-30 NOTE — PROGRESS NOTES
Medina Corbett,    Attached are your test results.  -Normal red blood cell (hgb) levels, normal white blood cell count and normal platelet levels.   Please contact us if you have any questions.    Perri Carias, CNP

## 2017-11-30 NOTE — PROGRESS NOTES
[address] [address]  [phone]  Dept: [phone]    PRE-OP EVALUATION:  Today's date: 2017    Radha Corbett (: 1950) presents for pre-operative evaluation assessment as requested by Dr. Sina Ansari.  She requires evaluation and anesthesia risk assessment prior to undergoing surgery/procedure for treatment of back .  Proposed procedure: Endoscopic nerve resection     Date of Surgery/ Procedure: 12/15/17  Time of Surgery/ Procedure: [facility]  Hospital/Surgical Facility: [facility]   Fax number for surgical facility: [phone]  Primary Physician: Trice Medeiros  Type of Anesthesia Anticipated: to be determined    Patient has a Health Care Directive or Living Will:  NO    1. NO - Do you have a history of heart attack, stroke, stent, bypass or surgery on an artery in the head, neck, heart or legs?  2. NO - Do you ever have any pain or discomfort in your chest?  3. NO - Do you have a history of  Heart Failure?  4. NO - Are you troubled by shortness of breath when: walking on the level, up a slight hill or at night?  5. NO - Do you currently have a cold, bronchitis or other respiratory infection?  6. NO - Do you have a cough, shortness of breath or wheezing?  7. NO - Do you sometimes get pains in the calves of your legs when you walk?  8. NO - Do you or anyone in your family have previous history of blood clots?  9. NO - Do you or does anyone in your family have a serious bleeding problem such as prolonged bleeding following surgeries or cuts?  10. NO - Have you ever had problems with anemia or been told to take iron pills?  11. NO - Have you had any abnormal blood loss such as black, tarry or bloody stools, or abnormal vaginal bleeding?  12. NO - Have you ever had a blood transfusion?  13. NO - Have you or any of your relatives ever had problems with anesthesia?  14. NO - Do you have sleep apnea, excessive snoring or  daytime drowsiness? Has snoring and patient thinks may have sleep apnea as will wake up self snoring   15. NO - Do you have any prosthetic heart valves?  16. NO - Do you have prosthetic joints?  17. NO - Is there any chance that you may be pregnant?      HPI:                                                      Brief HPI related to upcoming procedure: treatment of back .  Proposed procedure: Endoscopic nerve resection         See problem list for active medical problems.  Problems all longstanding and stable, except as noted/documented.  See ROS for pertinent symptoms related to these conditions.                                                                                                  .  DIABETES - Patient has a longstanding history of DiabetesType Type II . Patient is being treated with diet, oral agents and insulin injections and denies significant side effects. Control has been good. Complicating factors include but are not limited to: high cholesterol  and HTN .                                                                                                             .  HYPERTENSION - Patient has longstanding history of HTN , currently denies any symptoms referable to elevated blood pressure. Specifically denies chest pain, palpitations, dyspnea, orthopnea, PND or peripheral edema. Blood pressure readings have been in normal range. Current medication regimen is as listed below. Patient denies any side effects of medication.                                                                                                                                                                                          .  DEPRESSION - Patient has a long history of Depression of moderate severity requiring medication for control with recent symptoms being stable..Current symptoms of depression include none.                                                                                                                                                                                     .    MEDICAL HISTORY:                                                    Patient Active Problem List    Diagnosis Date Noted     Type 2 diabetes, HbA1C goal < 8% (H) 10/31/2010     Priority: High     Back pain, unspecified back location, unspecified back pain laterality, unspecified chronicity 02/23/2017     Priority: Medium     Unexplained endometrial cells on cervical cytology 12/08/2015     Priority: Medium     12/2/15 pap NIL/neg HPV with endometrial cells.   12/4/15 pelvic ultrasound completed.  12/17/15 gyn consult. Tracking.       Rectocele 12/02/2015     Priority: Medium     Elevated liver function tests 06/23/2015     Priority: Medium     Alcohol related.        Retinal detachment 03/16/2015     Priority: Medium     Depression with anxiety 10/04/2014     Priority: Medium     Abnormal cervical Pap smear with positive HPV DNA test 10/01/2014     Priority: Medium     Dx 2001. Normal since.        Pseudophakia 08/22/2013     Priority: Medium     Rosacea 03/05/2013     Priority: Medium     Alcohol ingestion, more than 4 drinks/day on alcohol screening 07/21/2012     Priority: Medium     4 vodka at night before dinner --> recommend changing to 1-2 wine instead.       Umbilical hernia 07/21/2012     Priority: Medium     Incontinence of urine 07/21/2012     Priority: Medium     Stool incontinence 07/21/2012     Priority: Medium     Tubular adenoma of colon 12/31/2010     Priority: Medium     Last colonoscopy 2008, due in 2011  Tubular adenoma  Next colonoscopy due in 2015         Breast cancer screening-high risk 12/22/2010     Priority: Medium     Mammogram every 6 months   On tamoxifen       Hyperlipidemia LDL goal <100 10/31/2010     Priority: Medium     Atypical ductal hyperplasia of breast 08/24/2010     Priority: Medium     Atypical lobular hyperplasia of breast 08/24/2010     Priority: Medium     Benign Breast Disease (PASH) 08/24/2010      Priority: Medium     Family history of breast cancer in sister 08/24/2010     Priority: Medium     Hypertension goal BP (blood pressure) < 140/90      Priority: Medium     Anxiety 07/29/2013     Priority: Low     Osteoarthritis, knee 03/05/2013     Priority: Low     Osteopenia 12/31/2010     Priority: Low     dexa 2008. Repeat in 2010  (Problem list name updated by automated process. Provider to review and confirm.)       Macular degeneration      Priority: Low     Myopia      Priority: Low     Hiatal hernia      Priority: Low     IBS (irritable bowel syndrome)      Priority: Low     GERD (gastroesophageal reflux disease)      Priority: Low      Past Medical History:   Diagnosis Date     Arthritis      DM (diabetes mellitus) (H)      GERD (gastroesophageal reflux disease)      Hiatal hernia      HPV in female      HTN (hypertension)      IBS (irritable bowel syndrome)      Major depression      Myopia      Other and unspecified hyperlipidemia      Pseudoangiomatous stromal hyperplasia of breast 2010    [facility]     Rotator cuff injury 7/2016     Sleep apnea      Vitamin D deficiency      Past Surgical History:   Procedure Laterality Date     BREAST LUMPECTOMY, RT/LT      x2, left     CATARACT IOL, RT/LT  4/99    left, MZ60CD 7.0D     COLONOSCOPY       COLONOSCOPY N/A 1/22/2016    Procedure: COMBINED COLONOSCOPY, SINGLE OR MULTIPLE BIOPSY/POLYPECTOMY BY BIOPSY;  Surgeon: Praful Benjamin MD;  Location: [facility] OR     COLONOSCOPY WITH CO2 INSUFFLATION N/A 1/22/2016    Procedure: COLONOSCOPY WITH CO2 INSUFFLATION;  Surgeon: Praful Benjamin MD;  Location: [facility] OR     CRYOTHERAPY  2000    cervical     CRYOTHERAPY Left 3/19/2015    Procedure: CRYOTHERAPY;  Surgeon: Keiko Puri MD;  Location: [facility]     DILATION AND CURETTAGE, HYSTEROSCOPY DIAGNOSTIC, COMBINED N/A 1/19/2016    Procedure: COMBINED DILATION AND CURETTAGE, HYSTEROSCOPY DIAGNOSTIC;  Surgeon: Yeni Aparicio DO;  Location:  OR      "LAPAROSCOPIC TUBAL LIGATION  1981     PHACOEMULSIFICATION CLEAR CORNEA WITH STANDARD INTRAOCULAR LENS IMPLANT  8/15/2013    Procedure: PHACOEMULSIFICATION CLEAR CORNEA WITH STANDARD INTRAOCULAR LENS IMPLANT;  RIGHT PHACOEMULSIFICATION CLEAR CORNEA WITH STANDARD INTRAOCULAR LENS IMPLANT ;  Surgeon: Trae Taylor MD;  Location: [facility]     SURGICAL HISTORY OF -       x4, ankle surgery     Current Outpatient Prescriptions   Medication Sig Dispense Refill     lisinopril (PRINIVIL/ZESTRIL) 10 MG tablet Take 0.5 tablets (5 mg) by mouth daily  3     metFORMIN (GLUCOPHAGE-XR) 500 MG 24 hr tablet TAKE 2 TABLETS DAILY WITH DINNER 180 tablet 1     gabapentin (NEURONTIN) 600 MG tablet Take 1 tablet (600 mg) by mouth 3 times daily 270 tablet 1     nitroFURantoin, macrocrystal-monohydrate, (MACROBID) 100 MG capsule Take 1 capsule (100 mg) by mouth 2 times daily 14 capsule 0     DULoxetine (CYMBALTA) 30 MG EC capsule Take 1 capsule (30 mg) by mouth 2 times daily 90 capsule 1     omeprazole (PRILOSEC) 40 MG capsule TAKE 1 CAPSULE DAILY 30 TO 60 MINUTES BEFORE A MEAL 90 capsule 2     simvastatin (ZOCOR) 20 MG tablet Take 1 tablet (20 mg) by mouth At Bedtime 90 tablet 3     spironolactone (ALDACTONE) 100 MG tablet Take 1 tablet (100 mg) by mouth every morning 90 tablet 0     minocycline (MINOCIN/DYNACIN) 100 MG capsule Take 1 capsule (100 mg) by mouth every 12 hours 180 capsule 2     Multiple Vitamins-Minerals (VISION FORMULA EYE HEALTH PO) Take 1 tablet by mouth daily       insulin NPH (NOVOLIN N VIAL) 100 UNIT/ML injection Inject 60 units Subcutaneous 2 times daily 80 mL 0     tolterodine (DETROL LA) 4 MG 24 hr capsule Take 1 capsule (4 mg) by mouth daily 90 capsule 3     insulin syringe-needle U-100 (RELION INSULIN SYRINGE) 31G X 5/16\" 1 ML Use 2 syringes daily or as directed. 200 each 3     MELATONIN PO 10 mg At Bedtime        ONE TOUCH TEST STRIPS VI None Entered       ONE TOUCH LANCETS None Entered       CALCIUM 600+D " "OR 1 tab daily       ASPIRIN 81 MG OR TABS 1 TABLET DAILY       VITAMIN D 1000 UNIT OR CAPS 1 CAPSULE DAILY       OTC products: no recent use of OTC ASA, NSAIDS or Steroids and no use of herbal medications or other supplements    Allergies   Allergen Reactions     Codeine      Sulfa Drugs       Latex Allergy: NO    Social History   Substance Use Topics     Smoking status: Current Every Day Smoker     Types: Cigarettes     Smokeless tobacco: Never Used     Alcohol use Yes      Comment: 2-3/night     History   Drug Use No       REVIEW OF SYSTEMS:                                                    CONSTITUTIONAL:POSITIVE  for weight gain and NEGATIVE  for anorexia and fever   INTEGUMENTARY/SKIN: NEGATIVE for rash   E/M: NEGATIVE for ear, mouth and throat problems  R: NEGATIVE for significant cough or SOB  CV: NEGATIVE for chest pain, palpitations or peripheral edema  GI: POSITIVE for Hx GERD and NEGATIVE for nausea, poor appetite and vomiting   female: incontinence-urge and incontinence-stress  MUSCULOSKELETAL:POSITIVE  for back pain  and NEGATIVE for joint swelling  and joint warmth   NEURO: POSITIVE for paresthesias in her feet  and NEGATIVE for involuntary movements, gait disturbance and loss of consciousness  ENDOCRINE: POSITIVE  for HX diabetes and NEGATIVE for HX thyroid disease  H: NEGATIVE for bleeding problems  PSYCHIATRIC: POSITIVE forHx anxiety and Hx depression and NEGATIVE forthoughts of hurting someone else and thoughts of self harm    EXAM:                                                    /50 (BP Location: Right arm, Patient Position: Sitting, Cuff Size: Adult Large)  Pulse 112  Temp 97.2  F (36.2  C) (Temporal)  Ht 5' 7\" (1.702 m)  Wt 240 lb (108.9 kg)  LMP 11/18/1991 (Within Months)  SpO2 94%  Breastfeeding? No  BMI 37.59 kg/m2   Wt Readings from Last 4 Encounters:   11/30/17 240 lb (108.9 kg)   09/29/17 239 lb 1.6 oz (108.5 kg)   09/22/17 239 lb (108.4 kg)   09/18/17 238 lb 9.6 oz " (108.2 kg)         GENERAL APPEARANCE: alert, active and obese     EYES: Eyes grossly normal to inspection and conjunctivae and sclerae normal     HENT: ear canals and TM's normal and nose and mouth without ulcers or lesions     NECK: no adenopathy, no asymmetry, masses, or scars and thyroid normal to palpation     RESP: lungs clear to auscultation - no rales, rhonchi or wheezes     CV: regular rates and rhythm, no murmur, click or rub and no irregular beats     ABDOMEN:  soft, nontender, no HSM or masses and bowel sounds normal     MS: extremities normal- no gross deformities noted, no evidence of inflammation in joints, FROM in all extremities.     SKIN: no suspicious lesions or rashes     NEURO: Normal strength and tone, sensory exam grossly normal, mentation intact and speech normal     PSYCH: mentation appears normal. and affect normal/bright    DIAGNOSTICS:                                                      EKG: sinus tachycardia, normal axis, normal intervals, no acute ST/T changes c/w ischemia, no LVH by voltage criteria,   Labs Drawn and in Process:   Results for orders placed or performed in visit on 11/30/17   Basic metabolic panel   Result Value Ref Range    Sodium 136 133 - 144 mmol/L    Potassium 4.0 3.4 - 5.3 mmol/L    Chloride 101 94 - 109 mmol/L    Carbon Dioxide 26 20 - 32 mmol/L    Anion Gap 9 3 - 14 mmol/L    Glucose 371 (H) 70 - 99 mg/dL    Urea Nitrogen 18 7 - 30 mg/dL    Creatinine 0.53 0.52 - 1.04 mg/dL    GFR Estimate >90 >60 mL/min/1.7m2    GFR Estimate If Black >90 >60 mL/min/1.7m2    Calcium 9.1 8.5 - 10.1 mg/dL   CBC with platelets   Result Value Ref Range    WBC 10.3 4.0 - 11.0 10e9/L    RBC Count 4.69 3.8 - 5.2 10e12/L    Hemoglobin 12.0 11.7 - 15.7 g/dL    Hematocrit 39.1 35.0 - 47.0 %    MCV 83 78 - 100 fl    MCH 25.6 (L) 26.5 - 33.0 pg    MCHC 30.7 (L) 31.5 - 36.5 g/dL    RDW 17.2 (H) 10.0 - 15.0 %    Platelet Count 302 150 - 450 10e9/L     Lab Results   Component Value Date     A1C 7.1 09/18/2017    A1C 5.9 06/22/2017    A1C 6.5 12/05/2016    A1C 7.8 08/01/2016    A1C 9.5 06/13/2016         IMPRESSION:                                                    Reason for surgery/procedure: treatment of back .  Proposed procedure: Endoscopic nerve resection     Diagnosis/reason for consult: preop evaluation     The proposed surgical procedure is considered INTERMEDIATE risk.    REVISED CARDIAC RISK INDEX  The patient has the following serious cardiovascular risks for perioperative complications such as (MI, PE, VFib and 3  AV Block):  Diabetes Mellitus (on Insulin)  INTERPRETATION: 2 risks: Class III (moderate risk - 6.6% complication rate)    The patient has the following additional risks for perioperative complications:  The 10-year ASCVD risk score ([place]joe NOVAK Jr, et al., 2013) is: 16.9%    Values used to calculate the score:      Age: 66 years      Sex: Female      Is Non- : No      Diabetic: Yes      Tobacco smoker: Yes      Systolic Blood Pressure: 110 mmHg      Is BP treated: Yes      HDL Cholesterol: 69 mg/dL      Total Cholesterol: 162 mg/dL    Radha was seen today for pre-op exam.    Diagnoses and all orders for this visit:    Preop general physical exam  -     EKG 12-lead complete w/read - Clinics  -     Basic metabolic panel  -     CBC with platelets    Back pain, unspecified back location, unspecified back pain laterality, unspecified chronicity  -     EKG 12-lead complete w/read - Clinics  -     Basic metabolic panel  -     CBC with platelets      RECOMMENDATIONS:                                                      --Consult hospital rounder / IM to assist post-op medical management    Obstructive Sleep Apnea (or suspected sleep apnea)  Hospital staff are advised to monitor for sleep related oxygen desaturations due to suspicion of NIKITA      Diabetes Medication Use  -----Hold usual  oral diabetic meds (e.g. Metformin, Actos, Glipizide) while NPO.   -----Take 80% of  long acting insulin (e.g. Lantus, NPH) while NPO (fasting)      ACE Inhibitor or Angiotensin Receptor Blocker (ARB) Use  Ace inhibitor or Angiotensin Receptor Blocker (ARB) and should HOLD this medication for the 24 hours prior to surgery.      APPROVAL GIVEN to proceed with proposed procedure, without further diagnostic evaluation       Signed Electronically by: NIKKY Lutz CNP    Copy of this evaluation report is provided to requesting physician.    [facility] Preop Guidelines

## 2017-11-30 NOTE — PATIENT INSTRUCTIONS
Diabetes Medication Use  -----Hold usual  oral diabetic meds (e.g. Metformin, Actos, Glipizide) while NPO.   -----Take 80% of long acting insulin (e.g. Lantus, NPH) while NPO (fasting)      ACE Inhibitor or Angiotensin Receptor Blocker (ARB) Use  Ace inhibitor or Angiotensin Receptor Blocker (ARB) and should HOLD this medication for the 24 hours prior to surgery.      Will follow up and/or notify patient of  results via My Chart to determine further need for followup      Before Your Surgery      Call your surgeon if there is any change in your health. This includes signs of a cold or flu (such as a sore throat, runny nose, cough, rash or fever).    Do not smoke, drink alcohol or take over the counter medicine (unless your surgeon or primary care doctor tells you to) for the 24 hours before and after surgery.    If you take prescribed drugs: Follow your doctor s orders about which medicines to take and which to stop until after surgery.    Eating and drinking prior to surgery: follow the instructions from your surgeon    Take a shower or bath the night before surgery. Use the soap your surgeon gave you to gently clean your skin. If you do not have soap from your surgeon, use your regular soap. Do not shave or scrub the surgery site.  Wear clean pajamas and have clean sheets on your bed.     It was a pleasure seeing you today at the [facility]. Thank you for allowing us to care for you today. We truly hope we provided you with the excellent service you deserve. Please let us know if there is anything else we can do for you so we can be sure you are leaving completley satisfied with your care experience.       General information about your clinic   Clinic Hours Lab Hours (Appointments are required)   Mon-Thurs: 7:30 AM - 7 PM Mon-Thurs: 7:30 AM - 7 PM   Fri: 7:30 AM - 5 PM Fri: 7:30 AM - 5 PM        After Hours Nurse Advise & Appts:  [facility]: [phone]  Rachel Cast  Call: to make appointments anytime: [phone] On Call Physician: call [phone] and answering service will page the on call physician.        For urgent appointments, please call [phone] and ask for the triage nurse or your care team clinic nurse.  How to contact my care team:  [facility]: [email or web address]   Phone: [phone]   Fax: [phone]       [facility]:   Phone: [phone]  Hours: 8:00 AM - 6:00 PM  Medication Refills:  Call your pharmacy and they will forward the refill to us. Please allow 3 business days for your refills to be completed.       Normal or non-critical lab and imaging results will be communicated to you by MyChart, letter or phone within 7 days.  If you do not hear from us within 10 days, please call the clinic. If you have a critical or abnormal lab result, we will notify you by phone as soon as possible.       We now have PWIC (Pediatric Walk in Care)  Monday-Friday from 7:30-4. Simply walk in and be seen for your urgent needs like cough, fever, rash, diarrhea or vomiting, pink eye, UTI. No appointments needed. Ask one of the team for more information      -Your Care Team:    Dr. Trice Medeiros - Internal Medicine/Pediatrics   Dr. Guillaume Pisano - Family Medicine  Dr. Areli Pathak - Pediatrics  Perri Carias CNP - Family Practice Nurse Practitioner  Dr. Jacqui Gil - Pediatrics

## 2017-12-03 NOTE — PROGRESS NOTES
Medina Corbett,    Attached are your test results.  -Kidney function (GFR) is normal.  -Sodium is normal.  -Potassium is normal.  Glucose elevated but not fasting and know diabetes.   Please contact us if you have any questions.    Perri Carias, CNP

## 2018-01-04 ENCOUNTER — OFFICE VISIT (OUTPATIENT)
Dept: PEDIATRICS | Facility: CLINIC | Age: 68
End: 2018-01-04
Payer: COMMERCIAL

## 2018-01-04 ENCOUNTER — TELEPHONE (OUTPATIENT)
Dept: PEDIATRICS | Facility: CLINIC | Age: 68
End: 2018-01-04

## 2018-01-04 VITALS
BODY MASS INDEX: 37.39 KG/M2 | DIASTOLIC BLOOD PRESSURE: 64 MMHG | WEIGHT: 238.2 LBS | TEMPERATURE: 99.1 F | SYSTOLIC BLOOD PRESSURE: 124 MMHG | HEIGHT: 67 IN | HEART RATE: 114 BPM | OXYGEN SATURATION: 96 %

## 2018-01-04 DIAGNOSIS — R32 URINARY INCONTINENCE, UNSPECIFIED TYPE: ICD-10-CM

## 2018-01-04 DIAGNOSIS — N81.6 RECTOCELE: ICD-10-CM

## 2018-01-04 DIAGNOSIS — Z78.0 MENOPAUSE: ICD-10-CM

## 2018-01-04 DIAGNOSIS — Z12.31 ENCOUNTER FOR SCREENING MAMMOGRAM FOR BREAST CANCER: ICD-10-CM

## 2018-01-04 DIAGNOSIS — Z00.00 MEDICARE ANNUAL WELLNESS VISIT, SUBSEQUENT: Primary | ICD-10-CM

## 2018-01-04 PROCEDURE — 99213 OFFICE O/P EST LOW 20 MIN: CPT | Mod: 25 | Performed by: INTERNAL MEDICINE

## 2018-01-04 PROCEDURE — 99397 PER PM REEVAL EST PAT 65+ YR: CPT | Performed by: INTERNAL MEDICINE

## 2018-01-04 NOTE — PROGRESS NOTES
SUBJECTIVE:   Radha Corbett is a 67 year old female who presents for Preventive Visit.    Are you in the first 12 months of your Medicare Part B coverage?  No    Healthy Habits:    Do you get at least three servings of calcium containing foods daily (dairy, green leafy vegetables, etc.)? yes and no, taking calcium and/or vitamin D supplement: yes - Calcium and Vitamin D    Amount of exercise or daily activities, outside of work: No    Problems taking medications regularly No    Medication side effects: No    Have you had an eye exam in the past two years? yes    Do you see a dentist twice per year? yes    Do you have sleep apnea, excessive snoring or daytime drowsiness?yes undiagnosed sleep apnea      Ability to successfully perform activities of daily living: No, needs assistance with: Uses her walker and carts for shopping etc    Home safety:  none identified     Hearing impairment: No    Fall risk: Fell twice this year after back injury         COGNITIVE SCREEN  1) Repeat 3 items (Banana, Sunrise, Chair)      2) Clock draw:   NORMAL  3) 3 item recall: Recalls 3 objects  Results: 3 items recalled: COGNITIVE IMPAIRMENT LESS LIKELY    Mini-CogTM Copyright S Eben. Licensed by the author for use in [facility]; reprinted with permission (aida@[facility]). All rights reserved.        PROBLEMS TO ADD ON...  -- had diverticular bleed in summer 2017  -- has urinary incontinence. Has to wear a diaper now. Takes Detrol without help.   -- had T12 compression fracture dx early last year, had surgery. Still having problems. Uses a walker with longer distance. She failed 2 steroid injections. She is following up with her orthopedics.        Reviewed and updated as needed this visit by clinical staff  Tobacco  Allergies  Meds  Med Hx  Surg Hx  Fam Hx  Soc Hx        Reviewed and updated as needed this visit by Provider        Social History   Substance Use Topics     Smoking status: Current Every Day Smoker      Types: Cigarettes     Smokeless tobacco: Never Used     Alcohol use Yes      Comment: 2-3/night       If you drink alcohol do you typically have >3 drinks per day or >7 drinks per week? No                        Today's PHQ-2 Score:   PHQ-2 ( 1999 Pf[employer]) 1/4/2018 11/30/2017   Q1: Little interest or pleasure in doing things 2 0   Q2: Feeling down, depressed or hopeless - 0   PHQ-2 Score - 0   Q1: Little interest or pleasure in doing things - -   Q2: Feeling down, depressed or hopeless - -   PHQ-2 Score - -         Do you feel safe in your environment - Yes    Do you have a Health Care Directive?: No: Advance care planning was reviewed with patient; patient declined at this time.      Current providers sharing in care for this patient include: Patient Care Team:  Trice Medeiros MD PhD as PCP - General (Internal Medicine)  Falguni Chavis [facility] as Pharmacist (Pharmacy)    The following health maintenance items are reviewed in Epic and correct as of today:  Health Maintenance   Topic Date Due     DEXA SCAN SCREENING (SYSTEM ASSIGNED)  12/09/2015     ADVANCE DIRECTIVE PLANNING Q5 YRS  05/23/2016     EYE EXAM Q1 YEAR  11/01/2017     FOOT EXAM Q1 YEAR  12/05/2017     DEPRESSION ACTION PLAN Q1 YR  12/05/2017     MAMMO SCREEN Q2 YR (SYSTEM ASSIGNED)  01/05/2018     A1C Q6 MO  03/18/2018     PHQ-9 Q6 MONTHS  03/18/2018     LIPID MONITORING Q1 YEAR  06/22/2018     MICROALBUMIN Q1 YEAR  09/18/2018     PNEUMOCOCCAL (2 of 2 - PPSV23) 11/03/2018     CREATININE Q1 YEAR  11/30/2018     FALL RISK ASSESSMENT  11/30/2018     TSH W/ FREE T4 REFLEX Q2 YEAR  09/18/2019     COLONOSCOPY Q5 YR  01/22/2021     TETANUS IMMUNIZATION (SYSTEM ASSIGNED)  03/05/2023     INFLUENZA VACCINE (SYSTEM ASSIGNED)  Completed     HEPATITIS C SCREENING  Completed     Labs reviewed in [facility]    Mammogram Screening: Patient over age 50, mutual decision to screen reflected in health maintenance.      ROS:  Constitutional, HEENT, cardiovascular, pulmonary, gi  "and gu systems are negative, except as otherwise noted.      OBJECTIVE:   /64  Pulse 114  Temp 99.1  F (37.3  C) (Temporal)  Ht 5' 6.5\" (1.689 m)  Wt 238 lb 3.2 oz (108 kg)  LMP 11/18/1991 (Within Months)  SpO2 96%  BMI 37.87 kg/m2 Estimated body mass index is 37.87 kg/(m^2) as calculated from the following:    Height as of this encounter: 5' 6.5\" (1.689 m).    Weight as of this encounter: 238 lb 3.2 oz (108 kg).  EXAM:   GENERAL: 67 year old female, alert and no distress  EYES: Eyes grossly normal to inspection, PERRL and conjunctivae and sclerae normal  HENT: ear canals and TM's normal, nose and mouth without ulcers or lesions  NECK: no adenopathy, no asymmetry, masses, or scars and thyroid normal to palpation  RESP: lungs clear to auscultation - no rales, rhonchi or wheezes  BREAST: normal without masses, tenderness or nipple discharge and no palpable axillary masses or adenopathy  CV: regular rate and rhythm, normal S1 S2, no S3 or S4, no murmur, click or rub, no peripheral edema and peripheral pulses strong  ABDOMEN: soft, nontender, no hepatosplenomegaly, no masses and bowel sounds normal  : prominent rectocele present. Cervix in normal position. Not protruding with valsalva.   MS: no gross musculoskeletal defects noted, no edema  SKIN: no suspicious lesions or rashes  NEURO: walks with walker, unsteady  PSYCH: mentation appears normal, affect normal/bright    ASSESSMENT / PLAN:       ICD-10-CM    1. Medicare annual wellness visit, subsequent Z00.00    2. Encounter for screening mammogram for breast cancer Z12.31 *MA Screening Digital Bilateral   3. Menopause Z78.0 DX Hip/Pelvis/Spine   4. Urinary incontinence, unspecified type R32 UROLOGY ADULT REFERRAL   5. Rectocele N81.6 UROLOGY ADULT REFERRAL     -- rectocele present. May be contributing to her incontinence. Will refer to urology for evaluation  -- recent spine fracture from a fall. Check Dexa.   -- return in March for diabetes follow up. " "    End of Life Planning:  Patient currently has an advanced directive: No.  I have verified the patient's ablity to prepare an advanced directive/make health care decisions.  Literature was provided to assist patient in preparing an advanced directive.    COUNSELING:  Reviewed preventive health counseling, as reflected in patient instructions        Estimated body mass index is 37.87 kg/(m^2) as calculated from the following:    Height as of this encounter: 5' 6.5\" (1.689 m).    Weight as of this encounter: 238 lb 3.2 oz (108 kg).  Weight management plan: unable to exercise due to back pain     reports that she has been smoking Cigarettes.  She has never used smokeless tobacco.  Tobacco Cessation Action Plan: Information offered: Patient not interested at this time    Appropriate preventive services were discussed with this patient, including applicable screening as appropriate for cardiovascular disease, diabetes, osteopenia/osteoporosis, and glaucoma.  As appropriate for age/gender, discussed screening for colorectal cancer, prostate cancer, breast cancer, and cervical cancer. Checklist reviewing preventive services available has been given to the patient.    Reviewed patients plan of care and provided an AVS. The Intermediate Care Plan ( asthma action plan, low back pain action plan, and migraine action plan) for Radha meets the Care Plan requirement. This Care Plan has been established and reviewed with the Patient.    Counseling Resources:  ATP IV Guidelines  Pooled Cohorts Equation Calculator  Breast Cancer Risk Calculator  FRAX Risk Assessment  ICSI Preventive Guidelines  Dietary Guidelines for Americans, 2010  USDA's MyPlate  ASA Prophylaxis  Lung CA Screening    Trice Medeiros MD PhD  [facility]  "

## 2018-01-04 NOTE — MR AVS SNAPSHOT
After Visit Summary   1/4/2018    Radha Corbett    MRN: [id number]           Patient Information     Date Of Birth          1950        Visit Information        Provider Department      1/4/2018 1:10 PM Trice Medeiros MD PhD [facility]        Today's Diagnoses     Medicare annual wellness visit, subsequent    -  1    Encounter for screening mammogram for breast cancer        Menopause        Urinary incontinence, unspecified type        Rectocele          Care Instructions    Make appointment(s) for:   -- diabetes follow up in March with non fasting lab.   -- urology consult with Dr. Baltazar.            Preventive Health Recommendations    Female Ages 65 +    Yearly exam:     See your health care provider every year in order to  o Review health changes.   o Discuss preventive care.    o Review your medicines if your doctor has prescribed any.      You no longer need a yearly Pap test unless you've had an abnormal Pap test in the past 10 years. If you have vaginal symptoms, such as bleeding or discharge, be sure to talk with your provider about a Pap test.      Every 1 to 2 years, have a mammogram.  If you are over 69, talk with your health care provider about whether or not you want to continue having screening mammograms.      Every 10 years, have a colonoscopy. Or, have a yearly FIT test (stool test). These exams will check for colon cancer.       Have a cholesterol test every 5 years, or more often if your doctor advises it.       Have a diabetes test (fasting glucose) every three years. If you are at risk for diabetes, you should have this test more often.       At age 65, have a bone density scan (DEXA) to check for osteoporosis (brittle bone disease).    Shots:    Get a flu shot each year.    Get a tetanus shot every 10 years.    Talk to your doctor about your pneumonia vaccines. There are now two you should receive - Pneumovax (PPSV 23) and Prevnar (PCV 13).    Talk to your doctor  about the shingles vaccine.    Talk to your doctor about the hepatitis B vaccine.    Nutrition:     Eat at least 5 servings of fruits and vegetables each day.      Eat whole-grain bread, whole-wheat pasta and brown rice instead of white grains and rice.      Talk to your provider about Calcium and Vitamin D.     Lifestyle    Exercise at least 150 minutes a week (30 minutes a day, 5 days a week). This will help you control your weight and prevent disease.      Limit alcohol to one drink per day.      No smoking.       Wear sunscreen to prevent skin cancer.       See your dentist twice a year for an exam and cleaning.      See your eye doctor every 1 to 2 years to screen for conditions such as glaucoma, macular degeneration and cataracts.          Follow-ups after your visit        Additional Services     UROLOGY ADULT REFERRAL       Your provider has referred you to: [facility]: [facility] [phone]   [email or web address]    Urinary incontinence and rectocele    Please be aware that coverage of these services is subject to the terms and limitations of your health insurance plan.  Call member services at your health plan with any benefit or coverage questions.      Please bring the following with you to your appointment:    (1) Any X-Rays, CTs or MRIs which have been performed.  Contact the facility where they were done to arrange for  prior to your scheduled appointment.    (2) List of current medications  (3) This referral request   (4) Any documents/labs given to you for this referral                  Future tests that were ordered for you today     Open Future Orders        Priority Expected Expires Ordered    DX Hip/Pelvis/Spine Routine  1/4/2019 1/4/2018    *MA Screening Digital Bilateral Routine  1/4/2019 1/4/2018            Who to contact     If you have questions or need follow up information about  "today's clinic visit or your schedule please contact [facility] directly at [phone].  Normal or non-critical lab and imaging results will be communicated to you by Vyuhart, letter or phone within 4 business days after the clinic has received the results. If you do not hear from us within 7 days, please contact the clinic through Vyuhart or phone. If you have a critical or abnormal lab result, we will notify you by phone as soon as possible.  Submit refill requests through [employer] or call your pharmacy and they will forward the refill request to us. Please allow 3 business days for your refill to be completed.          Additional Information About Your Visit        [employer] State Information     [employer] gives you secure access to your electronic health record. If you see a primary care provider, you can also send messages to your care team and make appointments. If you have questions, please call your primary care clinic.  If you do not have a primary care provider, please call [phone] and they will assist you.      [employer] is an electronic gateway that provides easy, online access to your medical records. With [employer], you can request a clinic appointment, read your test results, renew a prescription or communicate with your care team.     To access your existing account, please contact your [facility] or call [phone] for assistance.        Care EveryWhere ID     This is your Care EveryWhere ID. This could be used by other organizations to access your [facility] medical records  [id number]        Your Vitals Were     Pulse Temperature Height Last Period Pulse Oximetry BMI (Body Mass Index)    114 99.1  F (37.3  C) (Temporal) 5' 6.5\" (1.689 m) 11/18/1991 (Within Months) 96% 37.87 kg/m2       Blood Pressure from Last 3 Encounters:   01/04/18 124/64   11/30/17 110/50   09/29/17 143/87    Weight from Last 3 Encounters:   01/04/18 238 lb 3.2 oz (108 kg)   11/30/17 " 240 lb (108.9 kg)   09/29/17 239 lb 1.6 oz (108.5 kg)              We Performed the Following     UROLOGY ADULT REFERRAL        Primary Care Provider Office Phone # Fax #    Trice Medeiros MD PhD [phone] [address]  [facility] [address]        Equal Access to Services     [facility] : Hadii aad ku hadasho Soomaali, waaxda luqadaha, qaybta kaalmada adeegyada, waxay idiin hayaan adeeg kharash laMunaaan . So [facility] [phone].    ATENCIÓN: Si habla español, tiene a arauz disposición servicios gratuitos de asistencia lingüística. Llame al [phone].    We comply with applicable federal civil rights laws and [place] laws. We do not discriminate on the basis of race, color, national origin, age, disability, sex, sexual orientation, or gender identity.            Thank you!     Thank you for choosing [facility]  for your care. Our goal is always to provide you with excellent care. Hearing back from our patients is one way we can continue to improve our services. Please take a few minutes to complete the written survey that you may receive in the mail after your visit with us. Thank you!             Your Updated Medication List - Protect others around you: Learn how to safely use, store and throw away your medicines at www.disposemymeds.org.          This list is accurate as of: 1/4/18  2:12 PM.  Always use your most recent med list.                   Brand Name Dispense Instructions for use Diagnosis    aspirin 81 MG tablet      1 TABLET DAILY        CALCIUM 600+D PO      1 tab daily        gabapentin 600 MG tablet    NEURONTIN    270 tablet    Take 1 tablet (600 mg) by mouth 3 times daily    Back pain, unspecified back location, unspecified back pain laterality, unspecified chronicity       insulin  UNIT/ML injection    NovoLIN N VIAL    80 mL    Inject 60 units Subcutaneous 2 times daily    Type 2 diabetes mellitus with hyperglycemia, without long-term current use of  "insulin (H)       insulin syringe-needle U-100 31G X 5/16\" 1 ML    RELION INSULIN SYRINGE    200 each    Use 2 syringes daily or as directed.    Type 2 diabetes mellitus without complication (H)       lisinopril 10 MG tablet    PRINIVIL/ZESTRIL     Take 0.5 tablets (5 mg) by mouth daily    Essential hypertension with goal blood pressure less than 140/90       MELATONIN PO      10 mg At Bedtime        metFORMIN 500 MG 24 hr tablet    GLUCOPHAGE-XR    180 tablet    TAKE 2 TABLETS DAILY WITH DINNER    Type 2 diabetes mellitus with hyperglycemia, without long-term current use of insulin (H)       minocycline 100 MG capsule    MINOCIN/DYNACIN    180 capsule    Take 1 capsule (100 mg) by mouth every 12 hours    Acne vulgaris       omeprazole 40 MG capsule    priLOSEC    90 capsule    TAKE 1 CAPSULE DAILY 30 TO 60 MINUTES BEFORE A MEAL    Gastroesophageal reflux disease without esophagitis       ONE TOUCH LANCETS      None Entered        ONE TOUCH TEST STRIPS VI      None Entered        simvastatin 20 MG tablet    ZOCOR    90 tablet    Take 1 tablet (20 mg) by mouth At Bedtime    Hyperlipidemia LDL goal <100       spironolactone 100 MG tablet    ALDACTONE    90 tablet    Take 1 tablet (100 mg) by mouth every morning    Essential hypertension with goal blood pressure less than 140/90       tolterodine 4 MG 24 hr capsule    DETROL LA    90 capsule    Take 1 capsule (4 mg) by mouth daily    Urge incontinence of urine       VISION FORMULA EYE HEALTH PO      Take 1 tablet by mouth daily        vitamin D 1000 UNITS capsule      1 CAPSULE DAILY          "

## 2018-01-04 NOTE — PATIENT INSTRUCTIONS
Make appointment(s) for:   -- diabetes follow up in March with non fasting lab.   -- urology consult with Dr. Baltazar.            Preventive Health Recommendations    Female Ages 65 +    Yearly exam:     See your health care provider every year in order to  o Review health changes.   o Discuss preventive care.    o Review your medicines if your doctor has prescribed any.      You no longer need a yearly Pap test unless you've had an abnormal Pap test in the past 10 years. If you have vaginal symptoms, such as bleeding or discharge, be sure to talk with your provider about a Pap test.      Every 1 to 2 years, have a mammogram.  If you are over 69, talk with your health care provider about whether or not you want to continue having screening mammograms.      Every 10 years, have a colonoscopy. Or, have a yearly FIT test (stool test). These exams will check for colon cancer.       Have a cholesterol test every 5 years, or more often if your doctor advises it.       Have a diabetes test (fasting glucose) every three years. If you are at risk for diabetes, you should have this test more often.       At age 65, have a bone density scan (DEXA) to check for osteoporosis (brittle bone disease).    Shots:    Get a flu shot each year.    Get a tetanus shot every 10 years.    Talk to your doctor about your pneumonia vaccines. There are now two you should receive - Pneumovax (PPSV 23) and Prevnar (PCV 13).    Talk to your doctor about the shingles vaccine.    Talk to your doctor about the hepatitis B vaccine.    Nutrition:     Eat at least 5 servings of fruits and vegetables each day.      Eat whole-grain bread, whole-wheat pasta and brown rice instead of white grains and rice.      Talk to your provider about Calcium and Vitamin D.     Lifestyle    Exercise at least 150 minutes a week (30 minutes a day, 5 days a week). This will help you control your weight and prevent disease.      Limit alcohol to one drink per day.      No  smoking.       Wear sunscreen to prevent skin cancer.       See your dentist twice a year for an exam and cleaning.      See your eye doctor every 1 to 2 years to screen for conditions such as glaucoma, macular degeneration and cataracts.

## 2018-01-04 NOTE — NURSING NOTE
"Chief Complaint   Patient presents with     Physical       Initial /64  Pulse 114  Temp 99.1  F (37.3  C) (Temporal)  Ht 5' 6.5\" (1.689 m)  Wt 238 lb 3.2 oz (108 kg)  LMP 11/18/1991 (Within Months)  SpO2 96%  BMI 37.87 kg/m2 Estimated body mass index is 37.87 kg/(m^2) as calculated from the following:    Height as of this encounter: 5' 6.5\" (1.689 m).    Weight as of this encounter: 238 lb 3.2 oz (108 kg).  Medication Reconciliation: complete     Marimar Toure CMA  "

## 2018-01-05 DIAGNOSIS — I10 ESSENTIAL HYPERTENSION WITH GOAL BLOOD PRESSURE LESS THAN 140/90: ICD-10-CM

## 2018-01-08 RX ORDER — SPIRONOLACTONE 100 MG/1
TABLET, FILM COATED ORAL
Qty: 90 TABLET | Refills: 3 | Status: SHIPPED | OUTPATIENT
Start: 2018-01-08 | End: 2018-05-15

## 2018-01-08 NOTE — TELEPHONE ENCOUNTER
spironolactone (ALDACTONE) 100 MG tablet  Last Written Prescription Date:  7/21/2017  Last Fill Quantity: 90,  # refills: 0   Last Office Visit with MARJAN, ISHMAEL or [facility] prescribing provider:  1/4/2018   Future Office Visit:    Next 5 appointments (look out 90 days)     Mar 08, 2018  1:30 PM CST   Return Visit with Trice Medeiros MD PhD   [facility]    [address] [place] [id number] [phone]                 Creatinine   Date Value Ref Range Status   11/30/2017 0.53 0.52 - 1.04 mg/dL Final     Potassium   Date Value Ref Range Status   11/30/2017 4.0 3.4 - 5.3 mmol/L Final     Refilled per [facility] protocol.    Charity Mcdonough RN

## 2018-01-23 ENCOUNTER — TELEPHONE (OUTPATIENT)
Dept: UROLOGY | Facility: CLINIC | Age: 68
End: 2018-01-23

## 2018-01-23 NOTE — TELEPHONE ENCOUNTER
[facility] Call Center    Phone Message    Name of Caller: HAVEN FORTE    Phone Number: Home number on file [phone] (home)    Best time to return call: TODAY    May a detailed message be left on voicemail: yes    Relation to patient: Self    Reason for Call: Other: Pt is asking if she can get a 'ring' put in.  Has some questions. Please call.      Action Taken: Message routed to:  Adult Clinics: Urology p [phone]

## 2018-01-23 NOTE — TELEPHONE ENCOUNTER
"Returned call and spoke to patient regarding message below. Patient reports that she saw Dr. Medeiros who informed her that she has a rectocele and stated, \"They can put a ring in for that. I have had a lot of appointments lately and I am wondering if the ring could be done during my appointment.\" Informed patient that during your appointment with Dr. Baltazar and if a pessary ring is decided then we should be able to accommodate this on the same day. Patient verbalized understanding. The following pre-visit information was discussed:    PREVISIT INFORMATION                                                    Radha Corbett scheduled for future visit at [facility].    Patient is scheduled to see Dr. Baltazar on 2/12/18 at 3:00pm  Reason for visit: rectocele and urinary incontinence  Referring provider: Dr. Trice Medeiros  Has patient seen previous specialist? Yes, Dr. Baltazar in 2013  Medical Records:  Available in chart.  Patient was previously seen at a [facility] or [facility] facility.     REVIEW                                                      New patient packet mailed to patient: Yes  Medication reconciliation complete: No      Current Outpatient Prescriptions   Medication Sig Dispense Refill     spironolactone (ALDACTONE) 100 MG tablet TAKE 1 TABLET EVERY MORNING 90 tablet 3     lisinopril (PRINIVIL/ZESTRIL) 10 MG tablet Take 0.5 tablets (5 mg) by mouth daily  3     metFORMIN (GLUCOPHAGE-XR) 500 MG 24 hr tablet TAKE 2 TABLETS DAILY WITH DINNER 180 tablet 1     gabapentin (NEURONTIN) 600 MG tablet Take 1 tablet (600 mg) by mouth 3 times daily 270 tablet 1     omeprazole (PRILOSEC) 40 MG capsule TAKE 1 CAPSULE DAILY 30 TO 60 MINUTES BEFORE A MEAL 90 capsule 2     simvastatin (ZOCOR) 20 MG tablet Take 1 tablet (20 mg) by mouth At Bedtime 90 tablet 3     minocycline (MINOCIN/DYNACIN) 100 MG capsule Take 1 capsule (100 mg) by mouth every 12 hours 180 capsule 2     Multiple " "Vitamins-Minerals (VISION FORMULA EYE HEALTH PO) Take 1 tablet by mouth daily       insulin NPH (NOVOLIN N VIAL) 100 UNIT/ML injection Inject 60 units Subcutaneous 2 times daily 80 mL 0     tolterodine (DETROL LA) 4 MG 24 hr capsule Take 1 capsule (4 mg) by mouth daily 90 capsule 3     insulin syringe-needle U-100 (RELION INSULIN SYRINGE) 31G X 5/16\" 1 ML Use 2 syringes daily or as directed. 200 each 3     MELATONIN PO 10 mg At Bedtime        ONE TOUCH TEST STRIPS VI None Entered       ONE TOUCH LANCETS None Entered       CALCIUM 600+D OR 1 tab daily       ASPIRIN 81 MG OR TABS 1 TABLET DAILY       VITAMIN D 1000 UNIT OR CAPS 1 CAPSULE DAILY         Allergies: Codeine and Sulfa drugs      PLAN/FOLLOW-UP NEEDED                                                      Previsit review complete.  Patient will see provider at future scheduled appointment. Patient aware that she will likely need to leave a urine sample during the appointment time.      Sophia Seo RN, BSN    "

## 2018-02-08 ENCOUNTER — OFFICE VISIT (OUTPATIENT)
Dept: PEDIATRICS | Facility: CLINIC | Age: 68
End: 2018-02-08
Payer: COMMERCIAL

## 2018-02-08 VITALS
WEIGHT: 244 LBS | DIASTOLIC BLOOD PRESSURE: 70 MMHG | TEMPERATURE: 97.7 F | HEIGHT: 67 IN | SYSTOLIC BLOOD PRESSURE: 132 MMHG | BODY MASS INDEX: 38.3 KG/M2 | HEART RATE: 112 BPM | OXYGEN SATURATION: 96 %

## 2018-02-08 DIAGNOSIS — F10.90 ALCOHOL USE DISORDER: ICD-10-CM

## 2018-02-08 DIAGNOSIS — M54.16 LUMBAR RADICULOPATHY: ICD-10-CM

## 2018-02-08 DIAGNOSIS — R82.90 NONSPECIFIC FINDING ON EXAMINATION OF URINE: ICD-10-CM

## 2018-02-08 DIAGNOSIS — E08.22 DIABETES MELLITUS DUE TO UNDERLYING CONDITION WITH STAGE 3 CHRONIC KIDNEY DISEASE, WITH LONG-TERM CURRENT USE OF INSULIN (H): ICD-10-CM

## 2018-02-08 DIAGNOSIS — F17.200 TOBACCO USE DISORDER: ICD-10-CM

## 2018-02-08 DIAGNOSIS — N39.46 MIXED STRESS AND URGE URINARY INCONTINENCE: ICD-10-CM

## 2018-02-08 DIAGNOSIS — Z01.818 PREOP GENERAL PHYSICAL EXAM: Primary | ICD-10-CM

## 2018-02-08 DIAGNOSIS — Z79.4 DIABETES MELLITUS DUE TO UNDERLYING CONDITION WITH STAGE 3 CHRONIC KIDNEY DISEASE, WITH LONG-TERM CURRENT USE OF INSULIN (H): ICD-10-CM

## 2018-02-08 DIAGNOSIS — N18.30 DIABETES MELLITUS DUE TO UNDERLYING CONDITION WITH STAGE 3 CHRONIC KIDNEY DISEASE, WITH LONG-TERM CURRENT USE OF INSULIN (H): ICD-10-CM

## 2018-02-08 DIAGNOSIS — N30.00 ACUTE CYSTITIS WITHOUT HEMATURIA: ICD-10-CM

## 2018-02-08 DIAGNOSIS — I10 HYPERTENSION GOAL BP (BLOOD PRESSURE) < 140/90: Chronic | ICD-10-CM

## 2018-02-08 LAB
ALBUMIN UR-MCNC: 10 MG/DL
ANION GAP SERPL CALCULATED.3IONS-SCNC: 10 MMOL/L (ref 3–14)
APPEARANCE UR: ABNORMAL
BACTERIA #/AREA URNS HPF: ABNORMAL /HPF
BILIRUB UR QL STRIP: NEGATIVE
BUN SERPL-MCNC: 12 MG/DL (ref 7–30)
CALCIUM SERPL-MCNC: 9 MG/DL (ref 8.5–10.1)
CHLORIDE SERPL-SCNC: 101 MMOL/L (ref 94–109)
CO2 SERPL-SCNC: 26 MMOL/L (ref 20–32)
COLOR UR AUTO: YELLOW
CREAT SERPL-MCNC: 0.5 MG/DL (ref 0.52–1.04)
ERYTHROCYTE [DISTWIDTH] IN BLOOD BY AUTOMATED COUNT: 17.3 % (ref 10–15)
GFR SERPL CREATININE-BSD FRML MDRD: >90 ML/MIN/1.7M2
GLUCOSE SERPL-MCNC: 241 MG/DL (ref 70–99)
GLUCOSE UR STRIP-MCNC: 300 MG/DL
HBA1C MFR BLD: 9.2 % (ref 4.3–6)
HCT VFR BLD AUTO: 38.8 % (ref 35–47)
HGB BLD-MCNC: 11.6 G/DL (ref 11.7–15.7)
HGB UR QL STRIP: NEGATIVE
KETONES UR STRIP-MCNC: 10 MG/DL
LEUKOCYTE ESTERASE UR QL STRIP: ABNORMAL
MCH RBC QN AUTO: 24.8 PG (ref 26.5–33)
MCHC RBC AUTO-ENTMCNC: 29.9 G/DL (ref 31.5–36.5)
MCV RBC AUTO: 83 FL (ref 78–100)
NITRATE UR QL: POSITIVE
NON-SQ EPI CELLS #/AREA URNS LPF: ABNORMAL /LPF
PH UR STRIP: 5.5 PH (ref 5–7)
PLATELET # BLD AUTO: 318 10E9/L (ref 150–450)
POTASSIUM SERPL-SCNC: 3.8 MMOL/L (ref 3.4–5.3)
RBC # BLD AUTO: 4.68 10E12/L (ref 3.8–5.2)
RBC #/AREA URNS AUTO: ABNORMAL /HPF
SODIUM SERPL-SCNC: 137 MMOL/L (ref 133–144)
SOURCE: ABNORMAL
SP GR UR STRIP: 1.02 (ref 1–1.03)
UROBILINOGEN UR STRIP-MCNC: NORMAL MG/DL (ref 0–2)
WBC # BLD AUTO: 11.2 10E9/L (ref 4–11)
WBC #/AREA URNS AUTO: ABNORMAL /HPF

## 2018-02-08 PROCEDURE — 87086 URINE CULTURE/COLONY COUNT: CPT | Performed by: INTERNAL MEDICINE

## 2018-02-08 PROCEDURE — 83036 HEMOGLOBIN GLYCOSYLATED A1C: CPT | Performed by: INTERNAL MEDICINE

## 2018-02-08 PROCEDURE — 99215 OFFICE O/P EST HI 40 MIN: CPT | Performed by: INTERNAL MEDICINE

## 2018-02-08 PROCEDURE — 85027 COMPLETE CBC AUTOMATED: CPT | Performed by: INTERNAL MEDICINE

## 2018-02-08 PROCEDURE — 80048 BASIC METABOLIC PNL TOTAL CA: CPT | Performed by: INTERNAL MEDICINE

## 2018-02-08 PROCEDURE — 36415 COLL VENOUS BLD VENIPUNCTURE: CPT | Performed by: INTERNAL MEDICINE

## 2018-02-08 PROCEDURE — 81001 URINALYSIS AUTO W/SCOPE: CPT | Performed by: INTERNAL MEDICINE

## 2018-02-08 PROCEDURE — 93000 ELECTROCARDIOGRAM COMPLETE: CPT | Performed by: INTERNAL MEDICINE

## 2018-02-08 RX ORDER — TOLTERODINE 4 MG/1
4 CAPSULE, EXTENDED RELEASE ORAL DAILY
Qty: 30 CAPSULE | Refills: 0 | Status: SHIPPED | OUTPATIENT
Start: 2018-02-08 | End: 2018-05-15

## 2018-02-08 NOTE — MR AVS SNAPSHOT
After Visit Summary   2/8/2018    Radha Corbett    MRN: [id number]           Patient Information     Date Of Birth          1950        Visit Information        Provider Department      2/8/2018 1:30 PM Trice Medeiros MD PhD [facility]        Today's Diagnoses     Preop general physical exam    -  1    Lumbar radiculopathy        Mixed stress and urge urinary incontinence        Hypertension goal BP (blood pressure) < 140/90        Diabetes mellitus due to underlying condition with stage 3 chronic kidney disease, with long-term current use of insulin (H)          Care Instructions    Before your surgery:  10 days before: stop all over the counter supplements  1 week before: stop aspirin if you are taking aspirin.  2 days before: stop ibuprofen, naproxen or similar antiinflammatory medications. You may use Tylenol for pain or headache.     On the day of your surgery:  You may take minocycline and spironolactone with the smallest amount of water possible.     After surgery:   You may resume all your medications after the surgery unless your surgeon instructs you otherwise.     Insulin:  The evening before the surgery: take NPH 60 units  Morning of the surge yr: NPH 48 units  No metformin the morning of surgery.       Before Your Surgery      Call your surgeon if there is any change in your health. This includes signs of a cold or flu (such as a sore throat, runny nose, cough, rash or fever).    Do not smoke, drink alcohol or take over the counter medicine (unless your surgeon or primary care doctor tells you to) for the 24 hours before and after surgery.    If you take prescribed drugs: Follow your doctor s orders about which medicines to take and which to stop until after surgery.    Eating and drinking prior to surgery: follow the instructions from your surgeon    Take a shower or bath the night before surgery. Use the soap your surgeon gave you to gently clean your skin. If you do not  have soap from your surgeon, use your regular soap. Do not shave or scrub the surgery site.  Wear clean pajamas and have clean sheets on your bed.           Follow-ups after your visit        Your next 10 appointments already scheduled     Feb 12, 2018  3:00 PM CST   New Visit with Dada [facility]zar MD   [facility]    [id number] [place] [address] [id number] [phone]            Mar 08, 2018  1:00 PM CST   LAB with LAB FIRST FLOOR [facility] ([facility])    [address] [place] [address] [id number] [phone]           Please do not eat 10-12 hours before your appointment if you are coming in fasting for labs on lipids, cholesterol, or glucose (sugar). This does not apply to pregnant women. Water, hot tea and black coffee (with nothing added) are okay. Do not drink other fluids, diet soda or chew gum.            Mar 08, 2018  1:30 PM CST   Return Visit with Trice Medeiros MD PhD   [facility] ([facility])    [address] [place] [address] [id number] [phone]              Who to contact     If you have questions or need follow up information about today's clinic visit or your schedule please contact [facility] directly at [phone].  Normal or non-critical lab and imaging results will be communicated to you by MyChart, letter or phone within 4 business days after the clinic has received the results. If you do not hear from us within 7 days, please contact the clinic through [employer] or phone. If you have a critical or abnormal lab result, we will notify you by phone as soon as possible.  Submit refill requests through [employer] or call your pharmacy and they will forward the refill request to us. Please allow 3 business days for your refill to be completed.          Additional Information About Your Visit        MyChart Information     [employer] gives  "you secure access to your electronic health record. If you see a primary care provider, you can also send messages to your care team and make appointments. If you have questions, please call your primary care clinic.  If you do not have a primary care provider, please call [phone] and they will assist you.      [employer] is an electronic gateway that provides easy, online access to your medical records. With [employer], you can request a clinic appointment, read your test results, renew a prescription or communicate with your care team.     To access your existing account, please contact your [facility] or call [phone] for assistance.        Care EveryWhere ID     This is your Care EveryWhere ID. This could be used by other organizations to access your [place] medical records  [id number]        Your Vitals Were     Pulse Temperature Height Last Period Pulse Oximetry BMI (Body Mass Index)    112 97.7  F (36.5  C) (Temporal) 5' 6.5\" (1.689 m) 11/18/1991 (Within Months) 96% 38.79 kg/m2       Blood Pressure from Last 3 Encounters:   02/08/18 132/70   01/04/18 124/64   11/30/17 110/50    Weight from Last 3 Encounters:   02/08/18 244 lb (110.7 kg)   01/04/18 238 lb 3.2 oz (108 kg)   11/30/17 240 lb (108.9 kg)              We Performed the Following     Basic metabolic panel     CBC with platelets     Hemoglobin A1c     UA with Microscopic reflex to Culture ([place]; [facility])          Today's Medication Changes          These changes are accurate as of 2/8/18  2:23 PM.  If you have any questions, ask your nurse or doctor.               These medicines have changed or have updated prescriptions.        Dose/Directions    * tolterodine 4 MG 24 hr capsule   Commonly known as:  DETROL LA   This may have changed:  Another medication with the same name was added. Make sure you understand how and when to take each.   Used for:  Urge incontinence of urine   Changed by:  Waldemar, " MD Trice PhD        Dose:  4 mg   Take 1 capsule (4 mg) by mouth daily   Quantity:  90 capsule   Refills:  3       * tolterodine 4 MG 24 hr capsule   Commonly known as:  DETROL LA   This may have changed:  You were already taking a medication with the same name, and this prescription was added. Make sure you understand how and when to take each.   Used for:  Mixed stress and urge urinary incontinence, Preop general physical exam, Lumbar radiculopathy   Changed by:  Trice Medeiros MD PhD        Dose:  4 mg   Take 1 capsule (4 mg) by mouth daily   Quantity:  30 capsule   Refills:  0       * Notice:  This list has 2 medication(s) that are the same as other medications prescribed for you. Read the directions carefully, and ask your doctor or other care provider to review them with you.         Where to get your medicines      These medications were sent to [facility] - [place], [place] - [address], [address]  [address], [address], [facility] [address]     Phone:  [phone]     tolterodine 4 MG 24 hr capsule                Primary Care Provider Office Phone # Fax #    Trice Medeiros MD PhD [phone]       [address]  [facility] [address]        Equal Access to Services     CAM GARCIA : Hadii kyree ku hadasho Soomaali, waaxda luqadaha, qaybta kaalmada adeegyada, waxay yasir hayleonardo tineo. So [facility] [phone].    ATENCIÓN: Si habla español, tiene a arauz disposición servicios gratuitos de asistencia lingüística. Llame al [phone].    We comply with applicable federal civil rights laws and [place] laws. We do not discriminate on the basis of race, color, national origin, age, disability, sex, sexual orientation, or gender identity.            Thank you!     Thank you for choosing [facility]  for your care. Our goal is always to provide you with excellent care. Hearing back from our patients is one way we can continue to improve our  "services. Please take a few minutes to complete the written survey that you may receive in the mail after your visit with us. Thank you!             Your Updated Medication List - Protect others around you: Learn how to safely use, store and throw away your medicines at www.disposemymeds.org.          This list is accurate as of 2/8/18  2:23 PM.  Always use your most recent med list.                   Brand Name Dispense Instructions for use Diagnosis    aspirin 81 MG tablet      1 TABLET DAILY        CALCIUM 600+D PO      1 tab daily        insulin  UNIT/ML injection    NovoLIN N VIAL    80 mL    Inject 60 units Subcutaneous 2 times daily    Type 2 diabetes mellitus with hyperglycemia, without long-term current use of insulin (H)       insulin syringe-needle U-100 31G X 5/16\" 1 ML    RELION INSULIN SYRINGE    200 each    Use 2 syringes daily or as directed.    Type 2 diabetes mellitus without complication (H)       lisinopril 10 MG tablet    PRINIVIL/ZESTRIL     Take 0.5 tablets (5 mg) by mouth daily    Essential hypertension with goal blood pressure less than 140/90       MELATONIN PO      10 mg At Bedtime        metFORMIN 500 MG 24 hr tablet    GLUCOPHAGE-XR    180 tablet    TAKE 2 TABLETS DAILY WITH DINNER    Type 2 diabetes mellitus with hyperglycemia, without long-term current use of insulin (H)       minocycline 100 MG capsule    MINOCIN/DYNACIN    180 capsule    Take 1 capsule (100 mg) by mouth every 12 hours    Acne vulgaris       omeprazole 40 MG capsule    priLOSEC    90 capsule    TAKE 1 CAPSULE DAILY 30 TO 60 MINUTES BEFORE A MEAL    Gastroesophageal reflux disease without esophagitis       ONE TOUCH LANCETS      None Entered        ONE TOUCH TEST STRIPS VI      None Entered        simvastatin 20 MG tablet    ZOCOR    90 tablet    Take 1 tablet (20 mg) by mouth At Bedtime    Hyperlipidemia LDL goal <100       spironolactone 100 MG tablet    ALDACTONE    90 tablet    TAKE 1 TABLET EVERY MORNING    " Essential hypertension with goal blood pressure less than 140/90       * tolterodine 4 MG 24 hr capsule    DETROL LA    90 capsule    Take 1 capsule (4 mg) by mouth daily    Urge incontinence of urine       * tolterodine 4 MG 24 hr capsule    DETROL LA    30 capsule    Take 1 capsule (4 mg) by mouth daily    Mixed stress and urge urinary incontinence, Preop general physical exam, Lumbar radiculopathy       VISION FORMULA EYE HEALTH PO      Take 1 tablet by mouth daily        vitamin D 1000 UNITS capsule      1 CAPSULE DAILY        * Notice:  This list has 2 medication(s) that are the same as other medications prescribed for you. Read the directions carefully, and ask your doctor or other care provider to review them with you.

## 2018-02-08 NOTE — NURSING NOTE
"Chief Complaint   Patient presents with     Pre-Op Exam       Initial /70  Pulse 112  Temp 97.7  F (36.5  C) (Temporal)  Ht 5' 6.5\" (1.689 m)  Wt 244 lb (110.7 kg)  LMP 11/18/1991 (Within Months)  SpO2 96%  BMI 38.79 kg/m2 Estimated body mass index is 38.79 kg/(m^2) as calculated from the following:    Height as of this encounter: 5' 6.5\" (1.689 m).    Weight as of this encounter: 244 lb (110.7 kg).  Medication Reconciliation: complete    "

## 2018-02-08 NOTE — PATIENT INSTRUCTIONS
Before your surgery:  10 days before: stop all over the counter supplements  1 week before: stop aspirin if you are taking aspirin.  2 days before: stop ibuprofen, naproxen or similar antiinflammatory medications. You may use Tylenol for pain or headache.     On the day of your surgery:  You may take minocycline and spironolactone with the smallest amount of water possible.     After surgery:   You may resume all your medications after the surgery unless your surgeon instructs you otherwise.     Insulin:  The evening before the surgery: take NPH 60 units  Morning of the surge yr: NPH 48 units  No metformin the morning of surgery.       Before Your Surgery      Call your surgeon if there is any change in your health. This includes signs of a cold or flu (such as a sore throat, runny nose, cough, rash or fever).    Do not smoke, drink alcohol or take over the counter medicine (unless your surgeon or primary care doctor tells you to) for the 24 hours before and after surgery.    If you take prescribed drugs: Follow your doctor s orders about which medicines to take and which to stop until after surgery.    Eating and drinking prior to surgery: follow the instructions from your surgeon    Take a shower or bath the night before surgery. Use the soap your surgeon gave you to gently clean your skin. If you do not have soap from your surgeon, use your regular soap. Do not shave or scrub the surgery site.  Wear clean pajamas and have clean sheets on your bed.

## 2018-02-08 NOTE — PROGRESS NOTES
[address] [address]  [phone]  Dept: [phone]    PRE-OP EVALUATION:  Today's date: 2018    Radha Corbett (: 1950) presents for pre-operative evaluation assessment as requested by Dr. Sina Ansari.  She requires evaluation and anesthesia risk assessment prior to undergoing surgery/procedure for treatment of Lower back Laser Surgery .  Proposed procedure: Lower back Laser Surgery    Date of Surgery/ Procedure: 18  Time of Surgery/ Procedure: 2:00pm  Hospital/Surgical Facility: [facility]  Fax number for surgical facility:   Primary Physician: Trice Medeiros  Type of Anesthesia Anticipated: General?    Patient has a Health Care Directive or Living Will:  NO    1. NO - Do you have a history of heart attack, stroke, stent, bypass or surgery on an artery in the head, neck, heart or legs?  2. NO - Do you ever have any pain or discomfort in your chest?  3. NO - Do you have a history of  Heart Failure?  4. YES - Are you troubled by shortness of breath when: walking on the level, up a slight hill or at night? No URI. Thinks this is from GERD.   5. NO - Do you currently have a cold, bronchitis or other respiratory infection?  6. YES - Do you have a cough, shortness of breath or wheezing? Acid reflux?  7. NO - Do you sometimes get pains in the calves of your legs when you walk?  8. NO - Do you or anyone in your family have previous history of blood clots?  9. NO - Do you or does anyone in your family have a serious bleeding problem such as prolonged bleeding following surgeries or cuts?  10. NO - Have you ever had problems with anemia or been told to take iron pills?  11. YES - Have you had any abnormal blood loss such as black, tarry or bloody stools, or abnormal vaginal bleeding? Last Summer, rectal bleeding. Resolved.   12. NO - Have you ever had a blood transfusion?  13. NO - Have you or any of your relatives ever had problems with  anesthesia?  14. YES - Do you have sleep apnea, excessive snoring or daytime drowsiness? Snores and apnea spells but not formally diagnosed, no apnea.   15. NO - Do you have any prosthetic heart valves?  16. NO - Do you have prosthetic joints?  17. NO - Is there any chance that you may be pregnant?      HPI:                                                      Brief HPI related to upcoming procedure: patient with lumbar radiculopathy. She is scheduled for spine surgery. She didn't bring any preop paper. I'm not clear what procedure is going to be done.     Radha has Atypical ductal hyperplasia of breast; Benign Breast Disease (PASH); Type 2 diabetes, HbA1C goal < 8% (H); Breast cancer screening-high risk; Tubular adenoma of colon; and Abnormal cervical Pap smear with positive HPV DNA test on her pertinent problem list.     Diabetes: glucose is running high. Has not been good with diet. Too much pain to care about controlling diabetes.     HTN: controlled.     Depression: unchanged. She quit taking antidepressant, didn't think she is any worse    Quit taking pain medication, gabapentin, didn't help anyways.     Still smoking 1/2 pack per day and drinking 3-4 hard liquor every night. There is nothing that will give her pleasure now than smoking and drinking.     Other health issues are stable.     MEDICAL HISTORY:                                                      Patient Active Problem List    Diagnosis Date Noted     Abnormal cervical Pap smear with positive HPV DNA test 10/01/2014     Priority: High     Dx 2001. Normal since.        Tubular adenoma of colon 12/31/2010     Priority: High     Last colonoscopy 2008, due in 2011  Tubular adenoma  Next colonoscopy due in 2015         Breast cancer screening-high risk 12/22/2010     Priority: High     Mammogram every 6 months   On tamoxifen       Type 2 diabetes, HbA1C goal < 8% (H) 10/31/2010     Priority: High     Atypical ductal hyperplasia of breast 08/24/2010      Priority: High     Benign Breast Disease (PASH) 08/24/2010     Priority: High     Back pain, unspecified back location, unspecified back pain laterality, unspecified chronicity 02/23/2017     Priority: Medium     Unexplained endometrial cells on cervical cytology 12/08/2015     Priority: Medium     12/2/15 pap NIL/neg HPV with endometrial cells.   12/4/15 pelvic ultrasound completed.  12/17/15 gyn consult. Tracking.       Rectocele 12/02/2015     Priority: Medium     Elevated liver function tests 06/23/2015     Priority: Medium     Alcohol related.        Retinal detachment 03/16/2015     Priority: Medium     Depression with anxiety 10/04/2014     Priority: Medium     Alcohol ingestion, more than 4 drinks/day on alcohol screening 07/21/2012     Priority: Medium     4 vodka at night before dinner --> recommend changing to 1-2 wine instead.       Hyperlipidemia LDL goal <100 10/31/2010     Priority: Medium     Atypical lobular hyperplasia of breast 08/24/2010     Priority: Medium     Family history of breast cancer in sister 08/24/2010     Priority: Medium     Hypertension goal BP (blood pressure) < 140/90      Priority: Medium     Pseudophakia 08/22/2013     Priority: Low     Anxiety 07/29/2013     Priority: Low     Osteoarthritis, knee 03/05/2013     Priority: Low     Rosacea 03/05/2013     Priority: Low     Umbilical hernia 07/21/2012     Priority: Low     Incontinence of urine 07/21/2012     Priority: Low     Stool incontinence 07/21/2012     Priority: Low     Osteopenia 12/31/2010     Priority: Low     dexa 2008. Repeat in 2010  (Problem list name updated by automated process. Provider to review and confirm.)       Macular degeneration      Priority: Low     Myopia      Priority: Low     Hiatal hernia      Priority: Low     IBS (irritable bowel syndrome)      Priority: Low     GERD (gastroesophageal reflux disease)      Priority: Low      Past Medical History:   Diagnosis Date     Arthritis      DM (diabetes  mellitus) (H)      GERD (gastroesophageal reflux disease)      Hiatal hernia      HPV in female      HTN (hypertension)      IBS (irritable bowel syndrome)      Major depression      Myopia      Other and unspecified hyperlipidemia      Pseudoangiomatous stromal hyperplasia of breast 2010    [facility]     Rotator cuff injury 7/2016     Sleep apnea      Vitamin D deficiency      Past Surgical History:   Procedure Laterality Date     BREAST LUMPECTOMY, RT/LT      x2, left     CATARACT IOL, RT/LT  4/99    left, [id number] 7.0D     COLONOSCOPY       COLONOSCOPY N/A 1/22/2016    Procedure: COMBINED COLONOSCOPY, SINGLE OR MULTIPLE BIOPSY/POLYPECTOMY BY BIOPSY;  Surgeon: Praful Benjamin MD;  Location: [facility] OR     COLONOSCOPY WITH CO2 INSUFFLATION N/A 1/22/2016    Procedure: COLONOSCOPY WITH CO2 INSUFFLATION;  Surgeon: Praful Benjamin MD;  Location: [facility] OR     CRYOTHERAPY  2000    cervical     CRYOTHERAPY Left 3/19/2015    Procedure: CRYOTHERAPY;  Surgeon: Keiko Puri MD;  Location: [facility]     DILATION AND CURETTAGE, HYSTEROSCOPY DIAGNOSTIC, COMBINED N/A 1/19/2016    Procedure: COMBINED DILATION AND CURETTAGE, HYSTEROSCOPY DIAGNOSTIC;  Surgeon: Yeni Aparicio DO;  Location: [facility] OR     LAPAROSCOPIC TUBAL LIGATION  1981     PHACOEMULSIFICATION CLEAR CORNEA WITH STANDARD INTRAOCULAR LENS IMPLANT  8/15/2013    Procedure: PHACOEMULSIFICATION CLEAR CORNEA WITH STANDARD INTRAOCULAR LENS IMPLANT;  RIGHT PHACOEMULSIFICATION CLEAR CORNEA WITH STANDARD INTRAOCULAR LENS IMPLANT ;  Surgeon: Trae Taylor MD;  Location: [facility]     SURGICAL HISTORY OF -       x4, ankle surgery     Current Outpatient Prescriptions   Medication Sig Dispense Refill     tolterodine (DETROL LA) 4 MG 24 hr capsule Take 1 capsule (4 mg) by mouth daily 30 capsule 0     spironolactone (ALDACTONE) 100 MG tablet TAKE 1 TABLET EVERY MORNING 90 tablet 3     lisinopril (PRINIVIL/ZESTRIL) 10 MG tablet Take 0.5 tablets (5 mg) by mouth  "daily  3     metFORMIN (GLUCOPHAGE-XR) 500 MG 24 hr tablet TAKE 2 TABLETS DAILY WITH DINNER 180 tablet 1     omeprazole (PRILOSEC) 40 MG capsule TAKE 1 CAPSULE DAILY 30 TO 60 MINUTES BEFORE A MEAL 90 capsule 2     simvastatin (ZOCOR) 20 MG tablet Take 1 tablet (20 mg) by mouth At Bedtime 90 tablet 3     minocycline (MINOCIN/DYNACIN) 100 MG capsule Take 1 capsule (100 mg) by mouth every 12 hours 180 capsule 2     Multiple Vitamins-Minerals (VISION FORMULA EYE HEALTH PO) Take 1 tablet by mouth daily       insulin NPH (NOVOLIN N VIAL) 100 UNIT/ML injection Inject 60 units Subcutaneous 2 times daily 80 mL 0     tolterodine (DETROL LA) 4 MG 24 hr capsule Take 1 capsule (4 mg) by mouth daily 90 capsule 3     CALCIUM 600+D OR 1 tab daily       ASPIRIN 81 MG OR TABS 1 TABLET DAILY       VITAMIN D 1000 UNIT OR CAPS 1 CAPSULE DAILY       insulin syringe-needle U-100 (RELION INSULIN SYRINGE) 31G X 5/16\" 1 ML Use 2 syringes daily or as directed. 200 each 3     MELATONIN PO 10 mg At Bedtime        ONE TOUCH TEST STRIPS VI None Entered       ONE TOUCH LANCETS None Entered       OTC products: None, except as noted above    Allergies   Allergen Reactions     Codeine      Sulfa Drugs       Latex Allergy: NO    Social History   Substance Use Topics     Smoking status: Current Every Day Smoker     Types: Cigarettes     Smokeless tobacco: Never Used     Alcohol use Yes      Comment: 2-3/night     History   Drug Use No       REVIEW OF SYSTEMS:                                                      Constitutional, HEENT, cardiovascular, pulmonary, gi and gu systems are negative, except as otherwise noted.     EXAM:                                                    /70  Pulse 112  Temp 97.7  F (36.5  C) (Temporal)  Ht 5' 6.5\" (1.689 m)  Wt 244 lb (110.7 kg)  LMP 11/18/1991 (Within Months)  SpO2 96%  BMI 38.79 kg/m2  GENERAL APPEARANCE: healthy, alert and no distress  HENT: ear canals and TM's normal and nose and mouth without " ulcers or lesions  RESP: lungs clear to auscultation - no rales, rhonchi or wheezes  CV: regular rate and rhythm, normal S1 S2, no S3 or S4 and no murmur, click or rub   ABDOMEN: soft, nontender, no HSM or masses and bowel sounds normal  MS: extremities normal- no gross deformities noted    DIAGNOSTICS:                                                    EKG: sinus tachycardia;       IMPRESSION:                                                    Reason for surgery/procedure: lumbar radiculopathy  Diagnosis/reason for consult:     The proposed surgical procedure is considered INTERMEDIATE risk.    REVISED CARDIAC RISK INDEX  The patient has the following serious cardiovascular risks for perioperative complications such as (MI, PE, VFib and 3  AV Block):  Diabetes Mellitus (on Insulin)  INTERPRETATION: 1 risks: Class II (low risk - 0.9% complication rate)    The patient has the following additional risks for perioperative complications:  No identified additional risks      ICD-10-CM    1. Preop general physical exam Z01.818 CBC with platelets     Basic metabolic panel     Hemoglobin A1c     tolterodine (DETROL LA) 4 MG 24 hr capsule     UA with Microscopic reflex to Culture ([facility])   2. Lumbar radiculopathy M54.16 CBC with platelets     Basic metabolic panel     Hemoglobin A1c     tolterodine (DETROL LA) 4 MG 24 hr capsule     UA with Microscopic reflex to Culture ([facility])   3. Mixed stress and urge urinary incontinence N39.46 CBC with platelets     Basic metabolic panel     tolterodine (DETROL LA) 4 MG 24 hr capsule     UA with Microscopic reflex to Culture ([facility])   4. Hypertension goal BP (blood pressure) < 140/90 I10    5. Diabetes mellitus due to underlying condition with stage 3 chronic kidney disease, with long-term current use of insulin (H) E08.22 Hemoglobin A1c    N18.3     Z79.4    6. Tobacco use disorder F17.200    7. Alcohol use  disorder (H) F10.99        RECOMMENDATIONS:                                                        Cardiovascular Risk  Patient is already on a Beta Blocker. Continue Betablocker therapy after surgery, using Beta blocker order set as necessary for NPO status.      Pulmonary Risk  Incentive spirometry post op  Respiratory Therapy (Respiratory Care IP Consult)  post op  NG tube decompression if abdominal distension or significant vomiting       Obstructive Sleep Apnea (or suspected sleep apnea)  Suspected NIKITA, undiagnosed  Monitor for respiratory status.       --Patient is to take all scheduled medications on the day of surgery EXCEPT for modifications listed below.    Diabetes Medication Use  -----Take 80% of long acting insulin (e.g. Lantus, NPH) while NPO (fasting)  -----Hold short acting insulin (e.g. Novolog, Humalog) while NPO (fasting)  -----Hold metformin.       Anticoagulant or Antiplatelet Medication Use  ASPIRIN: Discontinue ASA 7-10 days prior to procedure to reduce bleeding risk.  It should be resumed post-operatively.        ACE Inhibitor or Angiotensin Receptor Blocker (ARB) Use  Ace inhibitor or Angiotensin Receptor Blocker (ARB) and should HOLD this medication for the 24 hours prior to surgery.    Alcohol use disorder  -----monitor for withdraw if patient needs to be hospitalized.       APPROVAL GIVEN to proceed with proposed procedure, without further diagnostic evaluation       Signed Electronically by: Trice Medeiros MD PhD    Copy of this evaluation report is provided to requesting physician.

## 2018-02-09 ENCOUNTER — TELEPHONE (OUTPATIENT)
Dept: PEDIATRICS | Facility: CLINIC | Age: 68
End: 2018-02-09

## 2018-02-09 LAB
BACTERIA SPEC CULT: NORMAL
SPECIMEN SOURCE: NORMAL

## 2018-02-09 RX ORDER — GLIMEPIRIDE 2 MG/1
2 TABLET ORAL
Qty: 30 TABLET | Refills: 1 | Status: SHIPPED | OUTPATIENT
Start: 2018-02-09 | End: 2018-02-09

## 2018-02-09 RX ORDER — CIPROFLOXACIN 500 MG/1
500 TABLET, FILM COATED ORAL 2 TIMES DAILY
Qty: 20 TABLET | Refills: 0 | Status: SHIPPED | OUTPATIENT
Start: 2018-02-09 | End: 2018-05-24

## 2018-02-09 NOTE — TELEPHONE ENCOUNTER
Called patient and gave her message per Dr. Medeiros.    She had a rough night getting up to use the bathroom frequently.    She asked the Rx be sent to the [facility] in [place].  She asked if RN can transfer the Rx.   Called [facility] and they will transfer the Rx.    Falguni Hammer RN, [facility]

## 2018-02-09 NOTE — TELEPHONE ENCOUNTER
Notes Recorded by Trice Medeiros MD PhD on 2/9/2018 at 7:00 AM  Let pt know that she has an urinary tract infection. Slight anemia and elevated white count. We need to treat the bladder infection. I will give her 10 days of cipro. We'll recheck UA on 2/15/18 to be sure this is cleared before her surgery on 2/19/18.     Diabetes is poorly controlled, at risk for difficult wound healing. I'd like her to increase Metformin to 2 tablets twice a day to get her sugar down some more prior to surgery.

## 2018-02-09 NOTE — TELEPHONE ENCOUNTER
Pre-op faxed.    Rosaura Keating RN,   [facility], [facility]

## 2018-02-09 NOTE — TELEPHONE ENCOUNTER
[facility] Call Center    Phone Message    May a detailed message be left on voicemail: yes    Reason for Call: Other: Fax number for pre op is [phone]. PAtient is having general anesthesia. Patient's operation is called an endoscopic interlaminar bilateral hemilaminectomy    Action Taken: Message routed to:  Primary Care p [phone]

## 2018-02-12 ENCOUNTER — TELEPHONE (OUTPATIENT)
Dept: PEDIATRICS | Facility: CLINIC | Age: 68
End: 2018-02-12

## 2018-02-12 ENCOUNTER — OFFICE VISIT (OUTPATIENT)
Dept: UROLOGY | Facility: CLINIC | Age: 68
End: 2018-02-12
Attending: INTERNAL MEDICINE
Payer: COMMERCIAL

## 2018-02-12 VITALS
OXYGEN SATURATION: 94 % | HEIGHT: 66 IN | HEART RATE: 99 BPM | SYSTOLIC BLOOD PRESSURE: 133 MMHG | WEIGHT: 244 LBS | DIASTOLIC BLOOD PRESSURE: 72 MMHG | BODY MASS INDEX: 39.21 KG/M2

## 2018-02-12 DIAGNOSIS — R35.0 URINARY FREQUENCY: ICD-10-CM

## 2018-02-12 DIAGNOSIS — R39.15 URINARY URGENCY: Primary | ICD-10-CM

## 2018-02-12 DIAGNOSIS — R39.15 URINARY URGENCY: ICD-10-CM

## 2018-02-12 DIAGNOSIS — N39.41 URGENCY INCONTINENCE: ICD-10-CM

## 2018-02-12 PROCEDURE — 99204 OFFICE O/P NEW MOD 45 MIN: CPT | Performed by: UROLOGY

## 2018-02-12 RX ORDER — OXYBUTYNIN CHLORIDE 15 MG/1
15 TABLET, EXTENDED RELEASE ORAL DAILY
Qty: 30 TABLET | Refills: 11 | Status: SHIPPED | OUTPATIENT
Start: 2018-02-12 | End: 2018-03-07

## 2018-02-12 RX ORDER — MIRABEGRON 50 MG/1
50 TABLET, EXTENDED RELEASE ORAL DAILY
Qty: 30 TABLET | Refills: 11 | Status: SHIPPED | OUTPATIENT
Start: 2018-02-12 | End: 2018-02-12

## 2018-02-12 RX ORDER — MIRABEGRON 50 MG/1
50 TABLET, EXTENDED RELEASE ORAL DAILY
Qty: 30 TABLET | Refills: 11 | Status: SHIPPED | OUTPATIENT
Start: 2018-02-12 | End: 2018-06-22

## 2018-02-12 RX ORDER — OXYBUTYNIN CHLORIDE 15 MG/1
15 TABLET, EXTENDED RELEASE ORAL DAILY
Qty: 30 TABLET | Refills: 0 | Status: SHIPPED | OUTPATIENT
Start: 2018-02-12 | End: 2018-02-12

## 2018-02-12 RX ORDER — OXYBUTYNIN CHLORIDE 15 MG/1
15 TABLET, EXTENDED RELEASE ORAL DAILY
Qty: 30 TABLET | Refills: 11 | Status: SHIPPED | OUTPATIENT
Start: 2018-02-12 | End: 2018-02-12

## 2018-02-12 RX ORDER — MIRABEGRON 50 MG/1
50 TABLET, EXTENDED RELEASE ORAL DAILY
Qty: 30 TABLET | Refills: 0 | Status: SHIPPED | OUTPATIENT
Start: 2018-02-12 | End: 2018-02-12

## 2018-02-12 ASSESSMENT — PAIN SCALES - GENERAL: PAINLEVEL: NO PAIN (0)

## 2018-02-12 NOTE — MR AVS SNAPSHOT
After Visit Summary   2/12/2018    Radha Corbett    MRN: [id number]           Patient Information     Date Of Birth          1950        Visit Information        Provider Department      2/12/2018 3:00 PM Dada Baltazar MD [facility]        Today's Diagnoses     Urinary urgency    -  1    Urgency incontinence        Urinary frequency           Follow-ups after your visit        Follow-up notes from your care team     Return in about 8 weeks (around 4/9/2018).      Your next 10 appointments already scheduled     Feb 15, 2018 11:00 AM CST   LAB with LAB FIRST FLOOR [facility] ([facility])    [address] [place] [id number]   [phone]           Please do not eat 10-12 hours before your appointment if you are coming in fasting for labs on lipids, cholesterol, or glucose (sugar). This does not apply to pregnant women. Water, hot tea and black coffee (with nothing added) are okay. Do not drink other fluids, diet soda or chew gum.            Mar 08, 2018  1:00 PM CST   LAB with LAB FIRST FLOOR [facility] ([facility])    [address] [place] [id number] [phone]           Please do not eat 10-12 hours before your appointment if you are coming in fasting for labs on lipids, cholesterol, or glucose (sugar). This does not apply to pregnant women. Water, hot tea and black coffee (with nothing added) are okay. Do not drink other fluids, diet soda or chew gum.            Mar 08, 2018  1:30 PM CST   Return Visit with Trice Medeiros MD PhD   [facility]    [address] [place] [id number]   [phone]            Mar 19, 2018  9:30 AM CDT   CYSTO with Dada Baltazar MD   [facility])    [address] [place] [id number]   [phone]              Who  "to contact     If you have questions or need follow up information about today's clinic visit or your schedule please contact [facility] directly at [phone].  Normal or non-critical lab and imaging results will be communicated to you by [employer], letter or phone within 4 business days after the clinic has received the results. If you do not hear from us within 7 days, please contact the clinic through [employer] or phone. If you have a critical or abnormal lab result, we will notify you by phone as soon as possible.  Submit refill requests through [facility] or call your pharmacy and they will forward the refill request to us. Please allow 3 business days for your refill to be completed.          Additional Information About Your Visit        [facility] Information     [facility] gives you secure access to your electronic health record. If you see a primary care provider, you can also send messages to your care team and make appointments. If you have questions, please call your primary care clinic.  If you do not have a primary care provider, please call [phone] and they will assist you.      [facility] is an electronic gateway that provides easy, online access to your medical records. With [facility], you can request a clinic appointment, read your test results, renew a prescription or communicate with your care team.     To access your existing account, please contact your [facility] or call [phone] for assistance.        Care EveryWhere ID     This is your Care EveryWhere ID. This could be used by other organizations to access your [place] medical records  [id number]        Your Vitals Were     Pulse Height Last Period Pulse Oximetry BMI (Body Mass Index)       99 1.676 m (5' 6\") 11/18/1991 (Within Months) 94% 39.38 kg/m2        Blood Pressure from Last 3 Encounters:   02/12/18 133/72   02/08/18 132/70   01/04/18 124/64    Weight from Last 3 Encounters:   02/12/18 " 110.7 kg (244 lb)   02/08/18 110.7 kg (244 lb)   01/04/18 108 kg (238 lb 3.2 oz)              Today, you had the following     No orders found for display         Today's Medication Changes          These changes are accurate as of 2/12/18  4:14 PM.  If you have any questions, ask your nurse or doctor.               Start taking these medicines.        Dose/Directions    mirabegron 50 MG 24 hr tablet   Commonly known as:  MYRBETRIQ   Used for:  Urinary urgency, Urgency incontinence, Urinary frequency   Started by:  Dada Baltazar MD        Dose:  50 mg   Take 1 tablet (50 mg) by mouth daily   Quantity:  30 tablet   Refills:  11       oxybutynin chloride 15 MG Tb24   Used for:  Urinary urgency, Urgency incontinence, Urinary frequency   Started by:  Dada Baltazar MD        Dose:  15 mg   Take 1 tablet (15 mg) by mouth daily   Quantity:  30 tablet   Refills:  11            Where to get your medicines      These medications were sent to [facility] [address] [address]     Phone:  [phone]     mirabegron 50 MG 24 hr tablet    oxybutynin chloride 15 MG Tb24                Primary Care Provider Office Phone # Fax #    Trice Medeiros MD PhD [phone]       [address] [facility] [address]        Equal Access to Services     [facility] Center[facility] : Hadii kyree ku hadasho Soomaali, waaxda luqadaha, qaybta kaalmada adeegyada, michael tineo. So [facility] [phone].    ATENCIÓN: Si habla español, tiene a arauz disposición servicios gratuitos de asistencia lingüística. Llkatlyn al [phone].    We comply with applicable federal civil rights laws and [place] laws. We do not discriminate on the basis of race, color, national origin, age, disability, sex, sexual orientation, or gender identity.            Thank you!     Thank you for choosing [facility]  for your care. Our goal is always to  "provide you with excellent care. Hearing back from our patients is one way we can continue to improve our services. Please take a few minutes to complete the written survey that you may receive in the mail after your visit with us. Thank you!             Your Updated Medication List - Protect others around you: Learn how to safely use, store and throw away your medicines at www.disposemymeds.org.          This list is accurate as of 2/12/18  4:14 PM.  Always use your most recent med list.                   Brand Name Dispense Instructions for use Diagnosis    aspirin 81 MG tablet      1 TABLET DAILY        CALCIUM 600+D PO      1 tab daily        ciprofloxacin 500 MG tablet    CIPRO    20 tablet    Take 1 tablet (500 mg) by mouth 2 times daily    Acute cystitis without hematuria       insulin  UNIT/ML injection    NovoLIN N VIAL    80 mL    Inject 60 units Subcutaneous 2 times daily    Type 2 diabetes mellitus with hyperglycemia, without long-term current use of insulin (H)       insulin syringe-needle U-100 31G X 5/16\" 1 ML    RELION INSULIN SYRINGE    200 each    Use 2 syringes daily or as directed.    Type 2 diabetes mellitus without complication (H)       lisinopril 10 MG tablet    PRINIVIL/ZESTRIL     Take 0.5 tablets (5 mg) by mouth daily    Essential hypertension with goal blood pressure less than 140/90       MELATONIN PO      10 mg At Bedtime        metFORMIN 500 MG 24 hr tablet    GLUCOPHAGE-XR    180 tablet    TAKE 2 TABLETS DAILY WITH DINNER    Type 2 diabetes mellitus with hyperglycemia, without long-term current use of insulin (H)       minocycline 100 MG capsule    MINOCIN/DYNACIN    180 capsule    Take 1 capsule (100 mg) by mouth every 12 hours    Acne vulgaris       mirabegron 50 MG 24 hr tablet    MYRBETRIQ    30 tablet    Take 1 tablet (50 mg) by mouth daily    Urinary urgency, Urgency incontinence, Urinary frequency       omeprazole 40 MG capsule    priLOSEC    90 capsule    TAKE 1 CAPSULE " DAILY 30 TO 60 MINUTES BEFORE A MEAL    Gastroesophageal reflux disease without esophagitis       ONE TOUCH LANCETS      None Entered        ONE TOUCH TEST STRIPS VI      None Entered        oxybutynin chloride 15 MG Tb24     30 tablet    Take 1 tablet (15 mg) by mouth daily    Urinary urgency, Urgency incontinence, Urinary frequency       simvastatin 20 MG tablet    ZOCOR    90 tablet    Take 1 tablet (20 mg) by mouth At Bedtime    Hyperlipidemia LDL goal <100       spironolactone 100 MG tablet    ALDACTONE    90 tablet    TAKE 1 TABLET EVERY MORNING    Essential hypertension with goal blood pressure less than 140/90       * tolterodine 4 MG 24 hr capsule    DETROL LA    90 capsule    Take 1 capsule (4 mg) by mouth daily    Urge incontinence of urine       * tolterodine 4 MG 24 hr capsule    DETROL LA    30 capsule    Take 1 capsule (4 mg) by mouth daily    Mixed stress and urge urinary incontinence, Preop general physical exam, Lumbar radiculopathy       VISION FORMULA EYE HEALTH PO      Take 1 tablet by mouth daily        vitamin D 1000 UNITS capsule      1 CAPSULE DAILY        * Notice:  This list has 2 medication(s) that are the same as other medications prescribed for you. Read the directions carefully, and ask your doctor or other care provider to review them with you.

## 2018-02-12 NOTE — TELEPHONE ENCOUNTER
Per note below, Prior Authorization is needed for patinet's mirabegron (MYRBETRIQ) 50 MG 24 hr tablet. Routing message to PCP for review and okay to initiate PA or to change medication.  Priyanka Cerna, CMA

## 2018-02-12 NOTE — TELEPHONE ENCOUNTER
**Please Do Not Close This Encounter**               ** Until This Has Been Addressed**          PRIOR AUTHORIZATION REQUIED PER INSURANCE       PATIENT: Radha Corbett YOB: 1950          MEDICATION: Myrbetiq 50mg tb24                    SIG: Take one tab po every day       NDC: [address]-92      INSURANCE: [facility]       INSURANCE PHONE #: [phone]      ID #: [id number]      INSURANCE REJECT: Product not on formulary      [facility]      PHARMACY NPI: [id number]      PHARMACY PHONE #: [phone]                                                          Please let us know when Prior Auth approved/denied, prescriber refusal to complete prior auth or if you would like to change medication/discontinue                                                            Thank you

## 2018-02-12 NOTE — PROGRESS NOTES
CC:  Urgency and frequency as well as urge incontinence    HPI:  Radha Corbett is a 67 year old female asked to be seen in consultation by Dr. Medeiros for the above.  This problem has been going on for a few years and has been getting worse.  It is associated with urgency incontinence.  She has multiple episodes of incontinence per day and has been using a large diaper.  She has tolteridine for several months but it did not help.  The patient voids q1-2 hours, nocturia X 1-2.  She drinks water mainly and a couple of alcoholic drinks in the evening.  She denies any dysuria, hematuria, hesitancy, intermittency, feeling of incomplete emptying, or any recent hx of UTI's or stones.    The patient has no constipation or splinting.  She is notsexually active and denies any dyspareunia or pelvic pain.   She denies any vaginal bulge.  She has a lot of back pain and is planning to have back surgery soon.     Obstetric Hx:  She is .  The weight of her largest baby was 7 lbs 1oz.  Babies were delivered vaginally.  Menopause around age 40, HRT:  For a while, and then her sisters got breast cancer so she stopped it.    Past Medical History:   Diagnosis Date     Arthritis      DM (diabetes mellitus) (H)      GERD (gastroesophageal reflux disease)      Hiatal hernia      HPV in female      HTN (hypertension)      IBS (irritable bowel syndrome)      Major depression      Myopia      Other and unspecified hyperlipidemia      Pseudoangiomatous stromal hyperplasia of breast     [facility]     Rotator cuff injury 2016     Sleep apnea      Vitamin D deficiency        Past Surgical History:   Procedure Laterality Date     BREAST LUMPECTOMY, RT/LT      x2, left     CATARACT IOL, RT/LT      left, [id number] 7.0D     COLONOSCOPY       COLONOSCOPY N/A 2016    Procedure: COMBINED COLONOSCOPY, SINGLE OR MULTIPLE BIOPSY/POLYPECTOMY BY BIOPSY;  Surgeon: Praful Benjamin MD;  Location: [facility] OR     COLONOSCOPY WITH CO2 INSUFFLATION N/A  "1/22/2016    Procedure: COLONOSCOPY WITH CO2 INSUFFLATION;  Surgeon: Praful Benjamin MD;  Location: [facility] OR     CRYOTHERAPY  2000    cervical     CRYOTHERAPY Left 3/19/2015    Procedure: CRYOTHERAPY;  Surgeon: Keiko Puri MD;  Location: [facility]     DILATION AND CURETTAGE, HYSTEROSCOPY DIAGNOSTIC, COMBINED N/A 1/19/2016    Procedure: COMBINED DILATION AND CURETTAGE, HYSTEROSCOPY DIAGNOSTIC;  Surgeon: Yeni Aparicio DO;  Location: [facility] OR     LAPAROSCOPIC TUBAL LIGATION  1981     PHACOEMULSIFICATION CLEAR CORNEA WITH STANDARD INTRAOCULAR LENS IMPLANT  8/15/2013    Procedure: PHACOEMULSIFICATION CLEAR CORNEA WITH STANDARD INTRAOCULAR LENS IMPLANT;  RIGHT PHACOEMULSIFICATION CLEAR CORNEA WITH STANDARD INTRAOCULAR LENS IMPLANT ;  Surgeon: Trae Taylor MD;  Location: [facility]     SURGICAL HISTORY OF -       x4, ankle surgery       Meds, Allergies, FHx and SHx reviewed per nurse's intake note.    ROS is negative on a 14 point scale except for severe back pain.  All other positive and pertinent information is mentioned in the HPI.    PEx:   Blood pressure 133/72, pulse 99, height 1.676 m (5' 6\"), weight 110.7 kg (244 lb), last menstrual period 11/18/1991, SpO2 94 %, not currently breastfeeding.  5' 6\", Body mass index is 39.38 kg/(m^2)., 244 lbs 0 oz  Gen appearance:  Well groomed  HEENT:  EOMI, AT NC  Psych:  Normal Affect  Neuro:  A/O X 3  Skin:  Warm to touch  Resp:  No increased respiratory effort  Vasc:  RRR  lymph:  1+ LE edema    Musk:  Full ROM in extremities  :  deferred    RU: 40 cc on bladder scan by nursing.    UA: UA RESULTS:  Recent Labs   Lab Test  02/08/18   1446   COLOR  Yellow   APPEARANCE  Slightly Cloudy   URINEGLC  300*   URINEBILI  Negative   URINEKETONE  10*   SG  1.020   UBLD  Negative   URINEPH  5.5   PROTEIN  10*   NITRITE  Positive*   LEUKEST  Small*   RBCU  O - 2   WBCU  5-10*       Office Visit on 02/08/2018   Component Date Value Ref Range Status     WBC " 02/08/2018 11.2* 4.0 - 11.0 10e9/L Final     RBC Count 02/08/2018 4.68  3.8 - 5.2 10e12/L Final     Hemoglobin 02/08/2018 11.6* 11.7 - 15.7 g/dL Final     Hematocrit 02/08/2018 38.8  35.0 - 47.0 % Final     MCV 02/08/2018 83  78 - 100 fl Final     MCH 02/08/2018 24.8* 26.5 - 33.0 pg Final     MCHC 02/08/2018 29.9* 31.5 - 36.5 g/dL Final     RDW 02/08/2018 17.3* 10.0 - 15.0 % Final     Platelet Count 02/08/2018 318  150 - 450 10e9/L Final     Sodium 02/08/2018 137  133 - 144 mmol/L Final     Potassium 02/08/2018 3.8  3.4 - 5.3 mmol/L Final     Chloride 02/08/2018 101  94 - 109 mmol/L Final     Carbon Dioxide 02/08/2018 26  20 - 32 mmol/L Final     Anion Gap 02/08/2018 10  3 - 14 mmol/L Final     Glucose 02/08/2018 241* 70 - 99 mg/dL Final    Non Fasting     Urea Nitrogen 02/08/2018 12  7 - 30 mg/dL Final     Creatinine 02/08/2018 0.50* 0.52 - 1.04 mg/dL Final     GFR Estimate 02/08/2018 >90  >60 mL/min/1.7m2 Final    Non  GFR Calc     GFR Estimate If Black 02/08/2018 >90  >60 mL/min/1.7m2 Final    African American GFR Calc     Calcium 02/08/2018 9.0  8.5 - 10.1 mg/dL Final     Hemoglobin A1C 02/08/2018 9.2* 4.3 - 6.0 % Final     Color Urine 02/08/2018 Yellow   Final     Appearance Urine 02/08/2018 Slightly Cloudy   Final     Glucose Urine 02/08/2018 300* NEG^Negative mg/dL Final     Bilirubin Urine 02/08/2018 Negative  NEG^Negative Final     Ketones Urine 02/08/2018 10* NEG^Negative mg/dL Final     Specific Gravity Urine 02/08/2018 1.020  1.003 - 1.035 Final     Blood Urine 02/08/2018 Negative  NEG^Negative Final     pH Urine 02/08/2018 5.5  5.0 - 7.0 pH Final     Protein Albumin Urine 02/08/2018 10* NEG^Negative mg/dL Final     Urobilinogen mg/dL 02/08/2018 Normal  0.0 - 2.0 mg/dL Final     Nitrite Urine 02/08/2018 Positive* NEG^Negative Final     Leukocyte Esterase Urine 02/08/2018 Small* NEG^Negative Final     Source 02/08/2018 Midstream Urine   Final     WBC Urine 02/08/2018 5-10* OTO2^O - 2 /HPF  Final     RBC Urine 02/08/2018 O - 2  OTO2^O - 2 /HPF Final     Bacteria Urine 02/08/2018 Many* NEG^Negative /HPF Final     Squamous Epithelial /LPF Urine 02/08/2018 Many* FEW^Few /LPF Final     Specimen Description 02/08/2018 Midstream Urine   Final     Culture Micro 02/08/2018    Final                    Value:>100,000 colonies/mL  mixed urogenital symone  Susceptibility testing not routinely done       ASSESSMENT and PLAN:  This is a 67 year old female with urinary urgency, frequency, and urge incontinence.  Different management options were discussed with the patient including observation, other medication, PT, interstim, PTNS, and botox.  The patient has elected to proceed with another medication for now.  -oxybutynin 15 mg daily to start with  -will also start Myrbetriq.  -f/u with me in 8 weeks for cystoscopy    Thank you for allowing me to participate in Ms. Corbett's care.  I will keep you updated on her progress.    Dada Baltazar MD

## 2018-02-12 NOTE — TELEPHONE ENCOUNTER
Spoke with patient. Relayed information from Dr. Medeiros. Patient verbalized understanding. No further questions/concerns at this time. Charity Mcdonough RN

## 2018-02-12 NOTE — PROGRESS NOTES
Please let pt know that:   -- urine culture actually didn't grow out any pathologic bacteria. Has only common bacteria in the urinary tract. Probably inadequate wiping when collecting the urine sample. She can stop Cipro now. Do not need to have urine culture recheck. She can proceed with surgery.

## 2018-02-12 NOTE — TELEPHONE ENCOUNTER
Notes Recorded by Trice Medeiros MD PhD on 2/12/2018 at 8:57 AM  Please let pt know that:   -- urine culture actually didn't grow out any pathologic bacteria. Has only common bacteria in the urinary tract. Probably inadequate wiping when collecting the urine sample. She can stop Cipro now. Do not need to have urine culture recheck. She can proceed with surgery.        4d ago       Specimen Description Midstream Urine     Culture Micro >100,000 colonies/mL  mixed urogenital symone  Susceptibility testing not routinely done      Resulting Agency [facility]          Specimen Collected: 02/08/18  2:46 PM Last Resulted: 02/09/18  1:52 PM

## 2018-02-12 NOTE — NURSING NOTE
"Radha Corbett's goals for this visit include:   Chief Complaint   Patient presents with     Consult     Rectocele and incontinence Urine done on the 2/8        She requests these members of her care team be copied on today's visit information: yes    PCP: Trice Medeiros    Referring Provider:  Trice Medeiros MD PhD  [address] [place] [address]    Chief Complaint   Patient presents with     Consult     Rectocele and incontinence Urine done on the 2/8        Initial /72  Pulse 99  Ht 1.676 m (5' 6\")  Wt 110.7 kg (244 lb)  LMP 11/18/1991 (Within Months)  SpO2 94%  BMI 39.38 kg/m2 Estimated body mass index is 39.38 kg/(m^2) as calculated from the following:    Height as of this encounter: 1.676 m (5' 6\").    Weight as of this encounter: 110.7 kg (244 lb).  Medication Reconciliation: complete    Do you need any medication refills at today's visit? Ania Nayak CMA        "

## 2018-02-13 NOTE — TELEPHONE ENCOUNTER
Central Prior Authorization Team   Phone: [phone]      PA Initiation    Medication: mirabegron (MYRBETRIQ) 50 MG 24 hr tablet  Insurance Company: [employer] - Phone [phone] Fax [phone]  Pharmacy Filling the Rx: [facility] [address] [facility]  Filling Pharmacy Phone: [phone]  Filling Pharmacy Fax:    Start Date: 2/13/2018

## 2018-02-13 NOTE — TELEPHONE ENCOUNTER
Prior Authorization Approval    Authorization Effective Date: 1/14/2018  Authorization Expiration Date: 2/13/2019  Medication: mirabegron (MYRBETRIQ) 50 MG 24 hr tablet- PA APPROVED  Approved Dose/Quantity:   Reference #:     Insurance Company: [employer] - Phone [phone] Fax [phone]  Expected CoPay:       CoPay Card Available:      Foundation Assistance Needed:    Which Pharmacy is filling the prescription (Not needed for infusion/clinic administered): [facility] - [place], [place] - [address], [address]  Pharmacy Notified: Yes  Patient Notified: Yes    PATIENT PICKED UP OXYBUTYNIN ALREADY.

## 2018-02-13 NOTE — TELEPHONE ENCOUNTER
"Patient called clinic to discuss medications. Patient stated, \"The myrbetric isn't covered and will cost me $400. The oxybutynin was $44.\" Informed patient that the prior authorization for mybetriq was approved and offered to send to the PA team for a possible tier reduction. Patient reports that she is on limited income and stated, \"Even the oxybutynin at $44 a month is too much.\" patient reports that she was on hold with insurance company for 2 hours and found out that the tolterodine would be more affordable and stated, \"But I already tried that and it didn't work for me.\" patient reports that she will take the oxybutynin for now, but wanted Dr. Baltazar to be updated about the myrbetriq.     Will send message to PA team regarding mybetriq tier-reduction.    Sophia Seo RN, BSN    "

## 2018-02-13 NOTE — TELEPHONE ENCOUNTER
Tier-reduction Request:  Please submit a request for tier-reduction  Medication/Dose: mirabegron (MYRBETRIQ) 50 MG 24 hr tablet Diagnosis and ICD code: urinary urgency R39.15, Urgency incontinence N39.41, Urinary Frequency R35.0  New/Renewal/Insurance Change PA:   Previously Tried and Failed Therapies: tolterodine    Insurance ID (if provided):   Insurance Phone (if provided):     Any additional info from fax request:     If you received a fax notification from an outside Pharmacy:  Pharmacy Name:  Pharmacy #:  Pharmacy Fax:    Sophia Seo RN, BSN

## 2018-02-14 NOTE — TELEPHONE ENCOUNTER
Called and spoke with WINIFRED Cordero rep, at insurance.  Was informed that the patient's plan is a Medicare Part D and the Myrbetriq was a non-formulary medication and it was approved and is not available for tier exceptions since they are now covering the non-formulary medication.

## 2018-02-16 NOTE — TELEPHONE ENCOUNTER
Discussed medications and prior authorizations with Huma Hernandez PA-C as Dr. Baltazar is out of the office. Per Huma Hernandez PA-C, the patient could try oxybutynin IR 5mg twice daily in place of the oxybutynin XL 15mg daily.     Called and spoke to patient who is aware of the above information and that the myrbetriq tier reduction request was denied. Patient reports that she just recently started the oxybutynin XL and would like to see how it goes. Patient reports that she will call the clinic if she would like to switch to the oxybutynin IR tablets. Will close encounter at this time.    Sophia Seo RN, BSN

## 2018-03-02 ENCOUNTER — TELEPHONE (OUTPATIENT)
Dept: UROLOGY | Facility: CLINIC | Age: 68
End: 2018-03-02

## 2018-03-02 NOTE — TELEPHONE ENCOUNTER
"PREVISIT INFORMATION                                                    Radha Corbett scheduled for future visit at [facility] specialty clinics.    Patient is scheduled to see Giovanny on 3/19 at 930  Reason for visit: CYSTO  Referring provider Giovanny  Has patient seen previous specialist? No  Medical Records:      REVIEW                                                          Current Outpatient Prescriptions   Medication Sig Dispense Refill     oxybutynin chloride 15 MG TB24 Take 1 tablet (15 mg) by mouth daily 30 tablet 11     mirabegron (MYRBETRIQ) 50 MG 24 hr tablet Take 1 tablet (50 mg) by mouth daily 30 tablet 11     ciprofloxacin (CIPRO) 500 MG tablet Take 1 tablet (500 mg) by mouth 2 times daily 20 tablet 0     tolterodine (DETROL LA) 4 MG 24 hr capsule Take 1 capsule (4 mg) by mouth daily 30 capsule 0     spironolactone (ALDACTONE) 100 MG tablet TAKE 1 TABLET EVERY MORNING 90 tablet 3     lisinopril (PRINIVIL/ZESTRIL) 10 MG tablet Take 0.5 tablets (5 mg) by mouth daily  3     metFORMIN (GLUCOPHAGE-XR) 500 MG 24 hr tablet TAKE 2 TABLETS DAILY WITH DINNER 180 tablet 1     omeprazole (PRILOSEC) 40 MG capsule TAKE 1 CAPSULE DAILY 30 TO 60 MINUTES BEFORE A MEAL 90 capsule 2     simvastatin (ZOCOR) 20 MG tablet Take 1 tablet (20 mg) by mouth At Bedtime 90 tablet 3     minocycline (MINOCIN/DYNACIN) 100 MG capsule Take 1 capsule (100 mg) by mouth every 12 hours 180 capsule 2     Multiple Vitamins-Minerals ([employer] EYE HEALTH PO) Take 1 tablet by mouth daily       insulin NPH (NOVOLIN N VIAL) 100 UNIT/ML injection Inject 60 units Subcutaneous 2 times daily 80 mL 0     tolterodine (DETROL LA) 4 MG 24 hr capsule Take 1 capsule (4 mg) by mouth daily 90 capsule 3     insulin syringe-needle U-100 (RELION INSULIN SYRINGE) 31G X 5/16\" 1 ML Use 2 syringes daily or as directed. 200 each 3     MELATONIN PO 10 mg At Bedtime        ONE TOUCH TEST STRIPS VI None Entered       ONE TOUCH LANCETS None Entered  "      CALCIUM 600+D OR 1 tab daily       ASPIRIN 81 MG OR TABS 1 TABLET DAILY       VITAMIN D 1000 UNIT OR CAPS 1 CAPSULE DAILY         Allergies: Codeine and Sulfa drugs        PLAN/FOLLOW-UP NEEDED                                                      Previsit review complete.  Patient will see provider at future scheduled appointment.     Patient Reminders Given:  Please, make sure you bring an updated list of your medications.   If you are having a procedure, please, present 15 minutes early.  If you need to cancel or reschedule,please call [phone].    Jay Nayak

## 2018-03-06 ENCOUNTER — DOCUMENTATION ONLY (OUTPATIENT)
Dept: LAB | Facility: CLINIC | Age: 68
End: 2018-03-06

## 2018-03-06 DIAGNOSIS — R35.0 URINARY FREQUENCY: ICD-10-CM

## 2018-03-06 DIAGNOSIS — N39.41 URGENCY INCONTINENCE: ICD-10-CM

## 2018-03-06 DIAGNOSIS — R39.15 URINARY URGENCY: ICD-10-CM

## 2018-03-06 NOTE — TELEPHONE ENCOUNTER
Hello,  last fill date:02-  Last quantity:30    Thank You,  Huma Browning  Pharmacy Technician  [facility]  [phone]

## 2018-03-06 NOTE — PROGRESS NOTES
Per health maintenance review, labs are up to date. She does, however, need an updated mammo and DEXA scan (has future orders for these). Patient has future orders for urine testing.    Routing patient provider for review.    Georgiana Bravo RN   [facility], Primary Care

## 2018-03-07 RX ORDER — OXYBUTYNIN CHLORIDE 15 MG/1
TABLET, EXTENDED RELEASE ORAL
Qty: 30 TABLET | Refills: 0 | Status: SHIPPED | OUTPATIENT
Start: 2018-03-07 | End: 2018-05-15

## 2018-03-08 ENCOUNTER — OFFICE VISIT (OUTPATIENT)
Dept: PEDIATRICS | Facility: CLINIC | Age: 68
End: 2018-03-08
Payer: COMMERCIAL

## 2018-03-08 VITALS
SYSTOLIC BLOOD PRESSURE: 128 MMHG | OXYGEN SATURATION: 94 % | HEART RATE: 103 BPM | BODY MASS INDEX: 38.41 KG/M2 | WEIGHT: 238 LBS | TEMPERATURE: 99.1 F | DIASTOLIC BLOOD PRESSURE: 72 MMHG

## 2018-03-08 DIAGNOSIS — Z98.890 H/O LUMBOSACRAL SPINE SURGERY: ICD-10-CM

## 2018-03-08 DIAGNOSIS — M17.0 PRIMARY OSTEOARTHRITIS OF BOTH KNEES: ICD-10-CM

## 2018-03-08 DIAGNOSIS — M16.0 BILATERAL HIP JOINT ARTHRITIS: ICD-10-CM

## 2018-03-08 PROCEDURE — 99214 OFFICE O/P EST MOD 30 MIN: CPT | Performed by: INTERNAL MEDICINE

## 2018-03-08 RX ORDER — METFORMIN HCL 500 MG
1000 TABLET, EXTENDED RELEASE 24 HR ORAL 2 TIMES DAILY WITH MEALS
Qty: 360 TABLET | Refills: 1 | Status: SHIPPED | OUTPATIENT
Start: 2018-03-08 | End: 2018-05-15

## 2018-03-08 NOTE — PROGRESS NOTES
SUBJECTIVE:   Radha Corbett is a 67 year old female who presents to clinic today for the following health issues:    Diabetes Follow-up    Patient is checking blood sugars: twice daily for the past few days, was testing 4 times a day prior and after back surgery   Blood sugar testing frequency justification: On insulin, frequency appropriate   Results are as follows: average of 150, sometimes below 100, rarely above 200    Diabetic concerns: None     Symptoms of hypoglycemia (low blood sugar): none     Paresthesias (numbness or burning in feet) or sores: Yes-numbness in bilateral feet     Date of last diabetic eye exam: over 1 year ago    She had lumbar spine surgery on 2/19/18. She took oxycodone for a few days and quit taking them. She didn't think it helped the back pain. She still has the back pain. She is not sure if this is better than before the surgery.  She has a phone follow-up with the surgeon's office next Tuesday.     When she came out of the surgery, they couldn't get her oxygen level. Almost got sent to the hospital. Her same day discharge was delayed by a few hours.     She quit smoking the day of the surgery and has not gone back.     She gets NPH from [employer], over-the-counter, not through prescription.  She is taking 60 units twice a day.    In addition...  1. Arthritis in knees and hips, bilateral leg pain while laying in bed. Azeb had Synvisc injection in 2011 that helped. Now left knee is worse.   2. Sore legs since the surgery especially when she lays in bed at night. Not as noticeable when she walks or sits.   3. Patient worried about Alzheimer's Diease- frequently can't find a word that she knows in the last few years. Lately it ha been a little more. She passed 3 word recall and clock draw.       BP Readings from Last 2 Encounters:   03/08/18 128/72   02/12/18 133/72     Hemoglobin A1C (%)   Date Value   02/08/2018 9.2 (H)   09/18/2017 7.1 (H)     LDL Cholesterol Calculated (mg/dL)  "  Date Value   06/22/2017 72   05/04/2016 88       Amount of exercise or physical activity: Unable currently due to back surgery    Problems taking medications regularly: No    Medication side effects: none    Diet: diabetic      ROS:  Constitutional, HEENT, cardiovascular, pulmonary, gi and gu systems are negative, except as otherwise noted.         Current Outpatient Prescriptions on File Prior to Visit:  oxybutynin chloride 15 MG TB24 TAKE ONE TABLET BY MOUTH EVERY DAY   mirabegron (MYRBETRIQ) 50 MG 24 hr tablet Take 1 tablet (50 mg) by mouth daily   spironolactone (ALDACTONE) 100 MG tablet TAKE 1 TABLET EVERY MORNING   lisinopril (PRINIVIL/ZESTRIL) 10 MG tablet Take 0.5 tablets (5 mg) by mouth daily   omeprazole (PRILOSEC) 40 MG capsule TAKE 1 CAPSULE DAILY 30 TO 60 MINUTES BEFORE A MEAL   simvastatin (ZOCOR) 20 MG tablet Take 1 tablet (20 mg) by mouth At Bedtime   minocycline (MINOCIN/DYNACIN) 100 MG capsule Take 1 capsule (100 mg) by mouth every 12 hours   Multiple Vitamins-Minerals (VISION FORMULA EYE HEALTH PO) Take 1 tablet by mouth daily   insulin syringe-needle U-100 (RELION INSULIN SYRINGE) 31G X 5/16\" 1 ML Use 2 syringes daily or as directed.   ONE TOUCH TEST STRIPS VI None Entered   ONE TOUCH LANCETS None Entered   CALCIUM 600+D OR 1 tab daily   ASPIRIN 81 MG OR TABS 1 TABLET DAILY   VITAMIN D 1000 UNIT OR CAPS 1 CAPSULE DAILY   ciprofloxacin (CIPRO) 500 MG tablet Take 1 tablet (500 mg) by mouth 2 times daily (Patient not taking: Reported on 3/8/2018)   tolterodine (DETROL LA) 4 MG 24 hr capsule Take 1 capsule (4 mg) by mouth daily (Patient not taking: Reported on 3/8/2018)   [DISCONTINUED] metFORMIN (GLUCOPHAGE-XR) 500 MG 24 hr tablet TAKE 2 TABLETS DAILY WITH DINNER (Patient taking differently: 2 times daily (with meals) TAKE 2 TABLETS DAILY WITH DINNER)   [DISCONTINUED] insulin NPH (NOVOLIN N VIAL) 100 UNIT/ML injection Inject 60 units Subcutaneous 2 times daily   tolterodine (DETROL LA) 4 MG 24 hr " capsule Take 1 capsule (4 mg) by mouth daily (Patient not taking: Reported on 3/8/2018)   MELATONIN PO 10 mg At Bedtime      Current Facility-Administered Medications on File Prior to Visit:  gadobutrol (GADAVIST) injection 10 mL          Patient Active Problem List   Diagnosis     Macular degeneration     Myopia     Hiatal hernia     IBS (irritable bowel syndrome)     Hypertension goal BP (blood pressure) < 140/90     GERD (gastroesophageal reflux disease)     Atypical ductal hyperplasia of breast     Atypical lobular hyperplasia of breast     Benign Breast Disease (PASH)     Family history of breast cancer in sister     Type 2 diabetes, HbA1C goal < 8% (H)     Hyperlipidemia LDL goal <100     Breast cancer screening-high risk     Tubular adenoma of colon     Osteopenia     Alcohol ingestion, more than 4 drinks/day on alcohol screening     Umbilical hernia     Incontinence of urine     Stool incontinence     Osteoarthritis, knee     Rosacea     Anxiety     Pseudophakia     Abnormal cervical Pap smear with positive HPV DNA test     Depression with anxiety     Retinal detachment     Elevated liver function tests     Rectocele     Unexplained endometrial cells on cervical cytology     Back pain, unspecified back location, unspecified back pain laterality, unspecified chronicity     Past Surgical History:   Procedure Laterality Date     BREAST LUMPECTOMY, RT/LT      x2, left     CATARACT IOL, RT/LT  4/99    left, [id number] 7.0D     COLONOSCOPY       COLONOSCOPY N/A 1/22/2016    Procedure: COMBINED COLONOSCOPY, SINGLE OR MULTIPLE BIOPSY/POLYPECTOMY BY BIOPSY;  Surgeon: Praful Benjamin MD;  Location:  OR     COLONOSCOPY WITH CO2 INSUFFLATION N/A 1/22/2016    Procedure: COLONOSCOPY WITH CO2 INSUFFLATION;  Surgeon: Praful Benjamin MD;  Location:  OR     CRYOTHERAPY  2000    cervical     CRYOTHERAPY Left 3/19/2015    Procedure: CRYOTHERAPY;  Surgeon: Keiko Puri MD;  Location: [facility]      DILATION AND CURETTAGE, HYSTEROSCOPY DIAGNOSTIC, COMBINED N/A 2016    Procedure: COMBINED DILATION AND CURETTAGE, HYSTEROSCOPY DIAGNOSTIC;  Surgeon: Yeni Aparicio DO;  Location: [facility] OR     LAPAROSCOPIC TUBAL LIGATION       PHACOEMULSIFICATION CLEAR CORNEA WITH STANDARD INTRAOCULAR LENS IMPLANT  8/15/2013    Procedure: PHACOEMULSIFICATION CLEAR CORNEA WITH STANDARD INTRAOCULAR LENS IMPLANT;  RIGHT PHACOEMULSIFICATION CLEAR CORNEA WITH STANDARD INTRAOCULAR LENS IMPLANT ;  Surgeon: Trae Taylor MD;  Location: [facility]     SURGICAL HISTORY OF -       x4, ankle surgery       Social History   Substance Use Topics     Smoking status: Current Every Day Smoker     Types: Cigarettes     Smokeless tobacco: Never Used     Alcohol use Yes      Comment: 2-3/night     Family History   Problem Relation Age of Onset     Hypertension Mother      CEREBROVASCULAR DISEASE Mother      Arthritis Mother      Cardiovascular Mother      Circulatory Mother      CEREBROVASCULAR DISEASE Father      Prostate Cancer Father 65      at 69     Asthma Brother      Prostate Cancer Brother 48     bone metastasis     DIABETES Sister      Hypertension Sister      OSTEOPOROSIS Sister      Depression Sister      Lipids Sister      CANCER Maternal Grandmother 95     spine     Breast Cancer Sister 39     also contralateral @53, mets to lungs     CANCER Sister 20     second uterine cancer in 30s also     DIABETES Sister      Hypertension Sister      Depression Sister      OSTEOPOROSIS Sister      Breast Cancer Sister 57     unilateral     CANCER Paternal Aunt 40     unknown type     CANCER Paternal Uncle      stomach;  in his 80s     Prostate Cancer Brother      Glaucoma No family hx of              Problem list, Medication list, Allergies, and Medical/Social/Surgical histories reviewed in [facility] and updated as appropriate.    OBJECTIVE:                                                    /72 (BP Location: Right arm,  Patient Position: Chair, Cuff Size: Adult Regular)  Pulse 103  Temp 99.1  F (37.3  C) (Temporal)  Wt 238 lb (108 kg)  LMP 11/18/1991 (Within Months)  SpO2 94%  BMI 38.41 kg/m2    GENERAL: healthy, alert and no distress  Back: There is a vertical incision at the lumbar spine area.  It is healing well.  No erythema.  Ext: no cyanosis/clubbing/edema; no calf tenderness, no tenderness to palpation over the thighs or lower extremity currently.      Diagnostic test results:        ASSESSMENT/PLAN:                                                      67 year old female with the following diagnoses and treatment plan:      ICD-10-CM    1. Uncontrolled type 2 diabetes mellitus with stage 3 chronic kidney disease, with long-term current use of insulin (H) E11.22 metFORMIN (GLUCOPHAGE-XR) 500 MG 24 hr tablet    E11.65 insulin NPH (NOVOLIN N VIAL) 100 UNIT/ML injection    N18.3 **A1C FUTURE anytime    Z79.4    2. H/O lumbosacral spine surgery Z98.890    3. Primary osteoarthritis of both knees M17.0 SPORTS MEDICINE REFERRAL   4. Bilateral hip joint arthritis M16.0 SPORTS MEDICINE REFERRAL       --Based on patient's glucose readings, diabetes likely has improved.  Her A1c was just done a month ago.  We opted to wait another 6 weeks before we check it again.  Continue her current dose without change.  -- Bilateral knee and hip osteoarthritis: Refer to sports medicine.  --Legs are sore bilaterally, exam is normal, not suggestive of DVT.  Advised the patient to discuss with her surgeon a follow-up phone call.    Will call or return to clinic if worsening or symptoms not improving as discussed.  See Patient Instructions.      Trice Medeiros MD-PhD  [facility]    (Note: Chart documentation was done in part with Dragon Voice Recognition software. Although reviewed after completion, some word and grammatical errors may remain.)

## 2018-03-08 NOTE — PATIENT INSTRUCTIONS
Make appointment(s) for:   -- clinic follow up in 6 weeks.   -- sports medicine.         Medication(s) prescribed today:    Orders Placed This Encounter   Medications     metFORMIN (GLUCOPHAGE-XR) 500 MG 24 hr tablet     Sig: Take 2 tablets (1,000 mg) by mouth 2 times daily (with meals)     Dispense:  360 tablet     Refill:  1     insulin NPH (NOVOLIN N VIAL) 100 UNIT/ML injection     Sig: Inject 60 units Subcutaneous 2 times daily. (gets this from OTC NPH at [facility])

## 2018-03-08 NOTE — NURSING NOTE
"Chief Complaint   Patient presents with     Recheck Medication       Initial /72 (BP Location: Right arm, Patient Position: Chair, Cuff Size: Adult Regular)  Pulse 103  Temp 99.1  F (37.3  C) (Temporal)  Wt 238 lb (108 kg)  LMP 11/18/1991 (Within Months)  SpO2 94%  BMI 38.41 kg/m2 Estimated body mass index is 38.41 kg/(m^2) as calculated from the following:    Height as of 2/12/18: 5' 6\" (1.676 m).    Weight as of this encounter: 238 lb (108 kg).  Medication Reconciliation: complete   Priyanka Cerna CMA      "

## 2018-03-08 NOTE — MR AVS SNAPSHOT
After Visit Summary   3/8/2018    Radha Corbett    MRN: [id number]           Patient Information     Date Of Birth          1950        Visit Information        Provider Department      3/8/2018 1:30 PM Trice Medeiros MD PhD [facility]        Today's Diagnoses     Uncontrolled type 1 diabetes mellitus without complication (H)    -  1    H/O lumbosacral spine surgery        Primary osteoarthritis of both knees        Bilateral hip joint arthritis        Type 2 diabetes mellitus with hyperglycemia, without long-term current use of insulin (H)          Care Instructions    Make appointment(s) for:   -- clinic follow up in 6 weeks.   -- sports medicine.         Medication(s) prescribed today:    Orders Placed This Encounter   Medications     metFORMIN (GLUCOPHAGE-XR) 500 MG 24 hr tablet     Sig: Take 2 tablets (1,000 mg) by mouth 2 times daily (with meals)     Dispense:  360 tablet     Refill:  1     insulin NPH (NOVOLIN N VIAL) 100 UNIT/ML injection     Sig: Inject 60 units Subcutaneous 2 times daily. (gets this from [facility] NPH at [facility])                     Follow-ups after your visit        Additional Services     SPORTS MEDICINE REFERRAL       Your provider has referred you to:  [facility]: [facility] [phone]   [email or web address]    Please be aware that coverage of these services is subject to the terms and limitations of your health insurance plan.  Call member services at your health plan with any benefit or coverage questions.      Please bring the following to your appointment:    >>   Any x-rays, CTs or MRIs which have been performed.  Contact the facility where they were done to arrange for  prior to your scheduled appointment.    >>   List of current medications   >>   This referral request   >>   Any documents/labs given to you for this referral                  Your next 10 appointments already scheduled      Mar 19, 2018  9:30 AM CDT   CYSTO with Dada Baltazar MD   [facility]    [address] [address] [place] [id number] [phone]              Who to contact     If you have questions or need follow up information about today's clinic visit or your schedule please contact [facility] directly at [phone].  Normal or non-critical lab and imaging results will be communicated to you by 4Bloxhart, letter or phone within 4 business days after the clinic has received the results. If you do not hear from us within 7 days, please contact the clinic through 4Bloxhart or phone. If you have a critical or abnormal lab result, we will notify you by phone as soon as possible.  Submit refill requests through SingleFeed or call your pharmacy and they will forward the refill request to us. Please allow 3 business days for your refill to be completed.          Additional Information About Your Visit        SingleFeed Information     SingleFeed gives you secure access to your electronic health record. If you see a primary care provider, you can also send messages to your care team and make appointments. If you have questions, please call your primary care clinic.  If you do not have a primary care provider, please call [phone] and they will assist you.      SingleFeed is an electronic gateway that provides easy, online access to your medical records. With SingleFeed, you can request a clinic appointment, read your test results, renew a prescription or communicate with your care team.     To access your existing account, please contact your [facility] or call [phone] for assistance.        Care EveryWhere ID     This is your Care EveryWhere ID. This could be used by other organizations to access your [facility] medical records  [id number]        Your Vitals Were     Pulse Temperature Last Period Pulse Oximetry BMI (Body Mass Index)        103 99.1  F (37.3  C) (Temporal) 11/18/1991 (Within Months) 94% 38.41 kg/m2        Blood Pressure from Last 3 Encounters:   03/08/18 128/72   02/12/18 133/72   02/08/18 132/70    Weight from Last 3 Encounters:   03/08/18 238 lb (108 kg)   02/12/18 244 lb (110.7 kg)   02/08/18 244 lb (110.7 kg)              We Performed the Following     SPORTS MEDICINE REFERRAL          Today's Medication Changes          These changes are accurate as of 3/8/18  2:23 PM.  If you have any questions, ask your nurse or doctor.               These medicines have changed or have updated prescriptions.        Dose/Directions    metFORMIN 500 MG 24 hr tablet   Commonly known as:  GLUCOPHAGE-XR   This may have changed:    - how much to take  - how to take this  - when to take this  - additional instructions   Used for:  Type 2 diabetes mellitus with hyperglycemia, without long-term current use of insulin (H)   Changed by:  Trice Medeiros MD PhD        Dose:  1000 mg   Take 2 tablets (1,000 mg) by mouth 2 times daily (with meals)   Quantity:  360 tablet   Refills:  1       NovoLIN N VIAL 100 UNIT/ML injection   This may have changed:  additional instructions   Used for:  Type 2 diabetes mellitus with hyperglycemia, without long-term current use of insulin (H)   Generic drug:  insulin NPH   Changed by:  Trice Medeiros MD PhD        Inject 60 units Subcutaneous 2 times daily. (gets this from [facility]TC NPH at [facility])   Refills:  0            Where to get your medicines      These medications were sent to [facility] - [address] [address]     Phone:  [phone]     metFORMIN 500 MG 24 hr tablet                Primary Care Provider Office Phone # Fax #    Trice Medeiros MD PhD [phone] [address]  [facility] [address]        Equal Access to Services     MAIA GARCIA AH: Claudine Siu, evelyne tanner, william hagan, michael cooley  "maribell moiselara ah. So [facility] [phone].    ATENCIÓN: Si beccala miryam, tiene a arauz disposición servicios gratuitos de asistencia lingüística. Nadja al [phone].    We comply with applicable federal civil rights laws and [place] laws. We do not discriminate on the basis of race, color, national origin, age, disability, sex, sexual orientation, or gender identity.            Thank you!     Thank you for choosing [facility]  for your care. Our goal is always to provide you with excellent care. Hearing back from our patients is one way we can continue to improve our services. Please take a few minutes to complete the written survey that you may receive in the mail after your visit with us. Thank you!             Your Updated Medication List - Protect others around you: Learn how to safely use, store and throw away your medicines at www.disposemymeds.org.          This list is accurate as of 3/8/18  2:23 PM.  Always use your most recent med list.                   Brand Name Dispense Instructions for use Diagnosis    aspirin 81 MG tablet      1 TABLET DAILY        CALCIUM 600+D PO      1 tab daily        ciprofloxacin 500 MG tablet    CIPRO    20 tablet    Take 1 tablet (500 mg) by mouth 2 times daily    Acute cystitis without hematuria       insulin syringe-needle U-100 31G X 5/16\" 1 ML    RELION INSULIN SYRINGE    200 each    Use 2 syringes daily or as directed.    Type 2 diabetes mellitus without complication (H)       lisinopril 10 MG tablet    PRINIVIL/ZESTRIL     Take 0.5 tablets (5 mg) by mouth daily    Essential hypertension with goal blood pressure less than 140/90       MELATONIN PO      10 mg At Bedtime        metFORMIN 500 MG 24 hr tablet    GLUCOPHAGE-XR    360 tablet    Take 2 tablets (1,000 mg) by mouth 2 times daily (with meals)    Type 2 diabetes mellitus with hyperglycemia, without long-term current use of insulin (H)       minocycline 100 MG capsule    " MINOCIN/DYNACIN    180 capsule    Take 1 capsule (100 mg) by mouth every 12 hours    Acne vulgaris       mirabegron 50 MG 24 hr tablet    MYRBETRIQ    30 tablet    Take 1 tablet (50 mg) by mouth daily    Urinary urgency, Urgency incontinence, Urinary frequency       NovoLIN N VIAL 100 UNIT/ML injection   Generic drug:  insulin NPH      Inject 60 units Subcutaneous 2 times daily. (gets this from [facility]TC NPH at [facility])    Type 2 diabetes mellitus with hyperglycemia, without long-term current use of insulin (H)       omeprazole 40 MG capsule    priLOSEC    90 capsule    TAKE 1 CAPSULE DAILY 30 TO 60 MINUTES BEFORE A MEAL    Gastroesophageal reflux disease without esophagitis       ONE TOUCH LANCETS      None Entered        ONE TOUCH TEST STRIPS VI      None Entered        oxybutynin chloride 15 MG Tb24     30 tablet    TAKE ONE TABLET BY MOUTH EVERY DAY    Urinary urgency, Urgency incontinence, Urinary frequency       simvastatin 20 MG tablet    ZOCOR    90 tablet    Take 1 tablet (20 mg) by mouth At Bedtime    Hyperlipidemia LDL goal <100       spironolactone 100 MG tablet    ALDACTONE    90 tablet    TAKE 1 TABLET EVERY MORNING    Essential hypertension with goal blood pressure less than 140/90       * tolterodine 4 MG 24 hr capsule    DETROL LA    90 capsule    Take 1 capsule (4 mg) by mouth daily    Urge incontinence of urine       * tolterodine 4 MG 24 hr capsule    DETROL LA    30 capsule    Take 1 capsule (4 mg) by mouth daily    Mixed stress and urge urinary incontinence, Preop general physical exam, Lumbar radiculopathy       VISION FORMULA EYE HEALTH PO      Take 1 tablet by mouth daily        vitamin D 1000 UNITS capsule      1 CAPSULE DAILY        * Notice:  This list has 2 medication(s) that are the same as other medications prescribed for you. Read the directions carefully, and ask your doctor or other care provider to review them with you.

## 2018-03-19 ENCOUNTER — OFFICE VISIT (OUTPATIENT)
Dept: UROLOGY | Facility: CLINIC | Age: 68
End: 2018-03-19
Payer: COMMERCIAL

## 2018-03-19 VITALS — HEART RATE: 98 BPM | SYSTOLIC BLOOD PRESSURE: 130 MMHG | DIASTOLIC BLOOD PRESSURE: 66 MMHG

## 2018-03-19 DIAGNOSIS — N39.41 URGE INCONTINENCE OF URINE: Primary | ICD-10-CM

## 2018-03-19 DIAGNOSIS — R82.90 NONSPECIFIC FINDING ON EXAMINATION OF URINE: ICD-10-CM

## 2018-03-19 LAB
ALBUMIN UR-MCNC: 10 MG/DL
APPEARANCE UR: ABNORMAL
BACTERIA #/AREA URNS HPF: ABNORMAL /HPF
BILIRUB UR QL STRIP: NEGATIVE
COLOR UR AUTO: YELLOW
GLUCOSE UR STRIP-MCNC: NEGATIVE MG/DL
HGB UR QL STRIP: NEGATIVE
KETONES UR STRIP-MCNC: NEGATIVE MG/DL
LEUKOCYTE ESTERASE UR QL STRIP: ABNORMAL
MUCOUS THREADS #/AREA URNS LPF: PRESENT /LPF
NITRATE UR QL: NEGATIVE
NON-SQ EPI CELLS #/AREA URNS LPF: ABNORMAL /LPF
PH UR STRIP: 6 PH (ref 5–7)
RBC #/AREA URNS AUTO: ABNORMAL /HPF
SOURCE: ABNORMAL
SP GR UR STRIP: 1.02 (ref 1–1.03)
UROBILINOGEN UR STRIP-MCNC: NORMAL MG/DL (ref 0–2)
WBC #/AREA URNS AUTO: ABNORMAL /HPF

## 2018-03-19 PROCEDURE — 87086 URINE CULTURE/COLONY COUNT: CPT | Performed by: UROLOGY

## 2018-03-19 PROCEDURE — 81001 URINALYSIS AUTO W/SCOPE: CPT | Performed by: UROLOGY

## 2018-03-19 PROCEDURE — 52000 CYSTOURETHROSCOPY: CPT | Performed by: UROLOGY

## 2018-03-19 ASSESSMENT — PAIN SCALES - GENERAL: PAINLEVEL: NO PAIN (0)

## 2018-03-19 NOTE — MR AVS SNAPSHOT
After Visit Summary   3/19/2018    Radha Corbett    MRN: [id number]           Patient Information     Date Of Birth          1950        Visit Information        Provider Department      3/19/2018 9:30 AM Dada Baltazar MD [facility], [place]        Today's Diagnoses     Urge incontinence of urine    -  1    Nonspecific finding on examination of urine          Care Instructions    URODYNAMIC TESTING      Where should I go for this test?  o The procedure is performed at:     [facility]   [address] [place] [address]   Floor 4    o If you have questions about your test, please call our nurse triage line at [phone], option #2. If you need to cancel or reschedule your test for any reason, please notify us as soon as possible.    o Please check in approximately 15 minutes prior to your procedure time.        What is urodynamic testing?   o Urodynamic testing refers to a group of tests used to assess bladder function by measuring various aspects of urine storage and emptying. The test takes about 75 minutes. For most patients, the test is not painful.       How should I get ready for this test?  o Please do not drink anything for 2 hours prior to your test. If you need to take medication it is okay to have sips of water with your medicines.    o Please try to arrive with a comfortably full bladder if possible because you will be asked to try and urinate prior to your study.  o If you received a bladder diary, please complete this prior to your urodynamic test and bring it with you to your appointment. A bladder diary measures how much fluid you are drinking and how often and how much you are urinating. You can also record any urinary leakage that may have occurred and what you were doing when you leaked.    o If you have chronic constipation, please take stool softeners for two days before your test.      What happens  "during the test?  o You will first be asked to empty your bladder in a private restroom into a special toilet called a uroflow machine which measures the rate of urine flow.  o A nurse will then place a very small tube (called a catheter) into your bladder. This drains any urine left over after urinating and also measures the pressures inside of your bladder during your test. Another small catheter will then be placed into your rectum to measure abdominal pressures. Two small sticky patches will be placed on the skin near your anus to measure pelvic floor function.   o We will then instill contrast dye into your bladder through the bladder catheter. The contrast is very dense and will allow us to take x-ray pictures of your bladder intermittently during your test.  o You will be asked to tell us when you first start to feel like your bladder is filling up, when you have moderate urgency to urinate, when you have very strong urgency to urinate and finally when you feel that your bladder is full. Once you feel full you will be asked to try and urinate.    o You may be asked to cough or bear down several times during your procedure. The provider running your study will be looking for urine leakage during this time.        What happens after the test?  o The provider running your urodynamic study will share the results of your test on the day of your procedure or very soon after.  o After your test, you may go about your day as normal. You may notice some blood in your urine for a couple of days which should clear up on its own. You may also feel a more urgent need to use the toilet or you may need to go more often - this is due to having a catheter placed and should resolve on its own in a few days.        AFTER YOUR CYSTOSCOPY        You have just completed a cystoscopy, or \"cysto\", which allowed your physician to learn more about your bladder (or to remove a stent placed after surgery). We suggest that you continue " "to avoid caffeine, fruit juice, and alcohol for the next 24 hours, however, you are encouraged to return to your normal activities.         A few things that are considered normal after your cystoscopy:     * Small amount of bleeding (or spotting) that clears within the next 24 hours     * Slight burning sensation with urination     * Sensation to of needing to avoid more frequently     * The feeling of \"air\" in your urine     * Mild discomfort that is relieved with Tylenol        Please contact our office promptly if you:     * Develop a fever above 101 degrees     * Are unable to urinate     * Develop bright red blood that does not stop     * Severe pain or swelling         Please contact our office with any concerns or questions @[facility].          Follow-ups after your visit        Follow-up notes from your care team     Return in 12 months (on 3/19/2019).      Who to contact     If you have questions or need follow up information about today's clinic visit or your schedule please contact [facility] directly at [phone].  Normal or non-critical lab and imaging results will be communicated to you by [employer], letter or phone within 4 business days after the clinic has received the results. If you do not hear from us within 7 days, please contact the clinic through [employer] or phone. If you have a critical or abnormal lab result, we will notify you by phone as soon as possible.  Submit refill requests through [employer] or call your pharmacy and they will forward the refill request to us. Please allow 3 business days for your refill to be completed.          Additional Information About Your Visit        [employer] Information     [employer] gives you secure access to your electronic health record. If you see a primary care provider, you can also send messages to your care team and make appointments. If you have questions, please call your primary care clinic.  If you do not have a primary care provider, " please call [phone] and they will assist you.      [employer] is an electronic gateway that provides easy, online access to your medical records. With [employer], you can request a clinic appointment, read your test results, renew a prescription or communicate with your care team.     To access your existing account, please contact your [facility] or call [phone] for assistance.        Care EveryWhere ID     This is your Care EveryWhere ID. This could be used by other organizations to access your [facility] medical records  [id number]        Your Vitals Were     Pulse Last Period                98 11/18/1991 (Within Months)           Blood Pressure from Last 3 Encounters:   03/19/18 130/66   03/08/18 128/72   02/12/18 133/72    Weight from Last 3 Encounters:   03/08/18 108 kg (238 lb)   02/12/18 110.7 kg (244 lb)   02/08/18 110.7 kg (244 lb)              We Performed the Following     CYSTOURETHROSCOPY     MEASURE POST-VOID RESIDUAL URINE/BLADDER CAPACITY, US NON-IMAGING     UA reflex to Microscopic and Culture     Urine Culture Aerobic Bacterial     Urine Microscopic        Primary Care Provider Office Phone # Fax #    Trice Medeiros MD PhD [phone]       [address]  [facility] [address]        Equal Access to Services     MAIA GARCIA : Hadii aad ku hadasho Soomaali, waaxda luqadaha, qaybta kaalmada adeegyada, waxay yasir tineo. So [facility] [phone].    ATENCIÓN: Si habla español, tiene a arauz disposición servicios gratuitos de asistencia lingüística. Llame al [phone].    We comply with applicable federal civil rights laws and [place] laws. We do not discriminate on the basis of race, color, national origin, age, disability, sex, sexual orientation, or gender identity.            Thank you!     Thank you for choosing [facility]  for your care. Our goal is always to provide you with excellent care. Hearing back from  "our patients is one way we can continue to improve our services. Please take a few minutes to complete the written survey that you may receive in the mail after your visit with us. Thank you!             Your Updated Medication List - Protect others around you: Learn how to safely use, store and throw away your medicines at www.disposemymeds.org.          This list is accurate as of 3/19/18 10:39 AM.  Always use your most recent med list.                   Brand Name Dispense Instructions for use Diagnosis    aspirin 81 MG tablet      1 TABLET DAILY        CALCIUM 600+D PO      1 tab daily        ciprofloxacin 500 MG tablet    CIPRO    20 tablet    Take 1 tablet (500 mg) by mouth 2 times daily    Acute cystitis without hematuria       insulin syringe-needle U-100 31G X 5/16\" 1 ML    RELION INSULIN SYRINGE    200 each    Use 2 syringes daily or as directed.    Type 2 diabetes mellitus without complication (H)       lisinopril 10 MG tablet    PRINIVIL/ZESTRIL     Take 0.5 tablets (5 mg) by mouth daily    Essential hypertension with goal blood pressure less than 140/90       MELATONIN PO      10 mg At Bedtime        metFORMIN 500 MG 24 hr tablet    GLUCOPHAGE-XR    360 tablet    Take 2 tablets (1,000 mg) by mouth 2 times daily (with meals)    Uncontrolled type 2 diabetes mellitus with stage 3 chronic kidney disease, with long-term current use of insulin (H)       minocycline 100 MG capsule    MINOCIN/DYNACIN    180 capsule    Take 1 capsule (100 mg) by mouth every 12 hours    Acne vulgaris       mirabegron 50 MG 24 hr tablet    MYRBETRIQ    30 tablet    Take 1 tablet (50 mg) by mouth daily    Urinary urgency, Urgency incontinence, Urinary frequency       NovoLIN N VIAL 100 UNIT/ML injection   Generic drug:  insulin NPH      Inject 60 units Subcutaneous 2 times daily. (gets this [facility] NPH at [facility])    Uncontrolled type 2 diabetes mellitus with stage 3 chronic kidney disease, with long-term current use of insulin " (H)       omeprazole 40 MG capsule    priLOSEC    90 capsule    TAKE 1 CAPSULE DAILY 30 TO 60 MINUTES BEFORE A MEAL    Gastroesophageal reflux disease without esophagitis       ONE TOUCH LANCETS      None Entered        ONE TOUCH TEST STRIPS VI      None Entered        oxybutynin chloride 15 MG Tb24     30 tablet    TAKE ONE TABLET BY MOUTH EVERY DAY    Urinary urgency, Urgency incontinence, Urinary frequency       simvastatin 20 MG tablet    ZOCOR    90 tablet    Take 1 tablet (20 mg) by mouth At Bedtime    Hyperlipidemia LDL goal <100       spironolactone 100 MG tablet    ALDACTONE    90 tablet    TAKE 1 TABLET EVERY MORNING    Essential hypertension with goal blood pressure less than 140/90       * tolterodine 4 MG 24 hr capsule    DETROL LA    90 capsule    Take 1 capsule (4 mg) by mouth daily    Urge incontinence of urine       * tolterodine 4 MG 24 hr capsule    DETROL LA    30 capsule    Take 1 capsule (4 mg) by mouth daily    Mixed stress and urge urinary incontinence, Preop general physical exam, Lumbar radiculopathy       VISION FORMULA EYE HEALTH PO      Take 1 tablet by mouth daily        vitamin D 1000 UNITS capsule      1 CAPSULE DAILY        * Notice:  This list has 2 medication(s) that are the same as other medications prescribed for you. Read the directions carefully, and ask your doctor or other care provider to review them with you.

## 2018-03-19 NOTE — PATIENT INSTRUCTIONS
URODYNAMIC TESTING      Where should I go for this test?  o The procedure is performed at:     [facility] [place] [facility]   [address] [place] [id number]   Floor 4    o If you have questions about your test, please call our nurse triage line at [phone], option #2. If you need to cancel or reschedule your test for any reason, please notify us as soon as possible.    o Please check in approximately 15 minutes prior to your procedure time.        What is urodynamic testing?   o Urodynamic testing refers to a group of tests used to assess bladder function by measuring various aspects of urine storage and emptying. The test takes about 75 minutes. For most patients, the test is not painful.       How should I get ready for this test?  o Please do not drink anything for 2 hours prior to your test. If you need to take medication it is okay to have sips of water with your medicines.    o Please try to arrive with a comfortably full bladder if possible because you will be asked to try and urinate prior to your study.  o If you received a bladder diary, please complete this prior to your urodynamic test and bring it with you to your appointment. A bladder diary measures how much fluid you are drinking and how often and how much you are urinating. You can also record any urinary leakage that may have occurred and what you were doing when you leaked.    o If you have chronic constipation, please take stool softeners for two days before your test.      What happens during the test?  o You will first be asked to empty your bladder in a private restroom into a special toilet called a uroflow machine which measures the rate of urine flow.  o A nurse will then place a very small tube (called a catheter) into your bladder. This drains any urine left over after urinating and also measures the pressures inside of your bladder during your test. Another small catheter will then be  "placed into your rectum to measure abdominal pressures. Two small sticky patches will be placed on the skin near your anus to measure pelvic floor function.   o We will then instill contrast dye into your bladder through the bladder catheter. The contrast is very dense and will allow us to take x-ray pictures of your bladder intermittently during your test.  o You will be asked to tell us when you first start to feel like your bladder is filling up, when you have moderate urgency to urinate, when you have very strong urgency to urinate and finally when you feel that your bladder is full. Once you feel full you will be asked to try and urinate.    o You may be asked to cough or bear down several times during your procedure. The provider running your study will be looking for urine leakage during this time.        What happens after the test?  o The provider running your urodynamic study will share the results of your test on the day of your procedure or very soon after.  o After your test, you may go about your day as normal. You may notice some blood in your urine for a couple of days which should clear up on its own. You may also feel a more urgent need to use the toilet or you may need to go more often - this is due to having a catheter placed and should resolve on its own in a few days.        AFTER YOUR CYSTOSCOPY        You have just completed a cystoscopy, or \"cysto\", which allowed your physician to learn more about your bladder (or to remove a stent placed after surgery). We suggest that you continue to avoid caffeine, fruit juice, and alcohol for the next 24 hours, however, you are encouraged to return to your normal activities.         A few things that are considered normal after your cystoscopy:     * Small amount of bleeding (or spotting) that clears within the next 24 hours     * Slight burning sensation with urination     * Sensation to of needing to avoid more frequently     * The feeling of \"air\" in " your urine     * Mild discomfort that is relieved with Tylenol        Please contact our office promptly if you:     * Develop a fever above 101 degrees     * Are unable to urinate     * Develop bright red blood that does not stop     * Severe pain or swelling         Please contact our office with any concerns or questions @[facility].

## 2018-03-19 NOTE — PROGRESS NOTES
Reason for Visit:  Cystoscopy    Clinical Data: Ms. Radha Corbett is a 67 year old female with a hx of urinary urgency and urge incontinence.  She was changed from Detrol to oxybutynin and also prescribed Myrbetriq but the latter  Was too expensive for her so she never started it.    Cystoscopy procedure:  Pt. Was consented and placed in the lithotomy position.  She was cleaned and preparred in the usual fashion.  Lidocain gel was inserted into the urethra and given time to take effect.  A 16 fr flexible cystoscope was then inserted through the urethra and into the bladder.  The urethra was wnl.  The bladder was with 2+ trabeculation.  No tumors, or stones.  But she also has a diverticulum at the dome of the bladder. Bilateral u/o's were effluxing clear urine.  The cystoscope was then withdrawn.  The pt. Tolerated the procedure well.    A/P:  67 year old female with a trabeculated bladder in the setting of back problems.  She has tried multiple anticholinergics with minimal relief.  We discussed the benefit of uds and she would like to do that.  -schedule UDS  -f/u with me to review.    Thank you for allowing me to participate in the care of  Ms. Radha Corbett and I will keep you updated on her progress.    Dada Baltazar MD

## 2018-03-20 LAB
BACTERIA SPEC CULT: NORMAL
SPECIMEN SOURCE: NORMAL

## 2018-04-03 ENCOUNTER — RADIANT APPOINTMENT (OUTPATIENT)
Dept: GENERAL RADIOLOGY | Facility: CLINIC | Age: 68
End: 2018-04-03
Attending: PREVENTIVE MEDICINE
Payer: COMMERCIAL

## 2018-04-03 ENCOUNTER — OFFICE VISIT (OUTPATIENT)
Dept: ORTHOPEDICS | Facility: CLINIC | Age: 68
End: 2018-04-03
Attending: INTERNAL MEDICINE
Payer: COMMERCIAL

## 2018-04-03 ENCOUNTER — TELEPHONE (OUTPATIENT)
Dept: ORTHOPEDICS | Facility: CLINIC | Age: 68
End: 2018-04-03

## 2018-04-03 ENCOUNTER — OFFICE VISIT (OUTPATIENT)
Dept: PSYCHOLOGY | Facility: CLINIC | Age: 68
End: 2018-04-03
Payer: COMMERCIAL

## 2018-04-03 VITALS — SYSTOLIC BLOOD PRESSURE: 121 MMHG | DIASTOLIC BLOOD PRESSURE: 51 MMHG | HEART RATE: 97 BPM

## 2018-04-03 DIAGNOSIS — F41.8 DEPRESSION WITH ANXIETY: Primary | ICD-10-CM

## 2018-04-03 DIAGNOSIS — M25.561 CHRONIC PAIN OF BOTH KNEES: Primary | ICD-10-CM

## 2018-04-03 DIAGNOSIS — M51.379 DEGENERATION OF LUMBAR OR LUMBOSACRAL INTERVERTEBRAL DISC: ICD-10-CM

## 2018-04-03 DIAGNOSIS — M25.561 CHRONIC PAIN OF BOTH KNEES: ICD-10-CM

## 2018-04-03 DIAGNOSIS — M25.562 CHRONIC PAIN OF BOTH KNEES: Primary | ICD-10-CM

## 2018-04-03 DIAGNOSIS — G89.29 CHRONIC PAIN OF BOTH KNEES: Primary | ICD-10-CM

## 2018-04-03 DIAGNOSIS — M17.12 PRIMARY LOCALIZED OSTEOARTHRITIS OF LEFT KNEE: ICD-10-CM

## 2018-04-03 DIAGNOSIS — M17.0 PRIMARY OSTEOARTHRITIS OF BOTH KNEES: ICD-10-CM

## 2018-04-03 DIAGNOSIS — G89.29 CHRONIC PAIN OF BOTH KNEES: ICD-10-CM

## 2018-04-03 DIAGNOSIS — M25.562 CHRONIC PAIN OF BOTH KNEES: ICD-10-CM

## 2018-04-03 PROCEDURE — 73562 X-RAY EXAM OF KNEE 3: CPT | Mod: RT | Performed by: RADIOLOGY

## 2018-04-03 PROCEDURE — 99214 OFFICE O/P EST MOD 30 MIN: CPT | Mod: 25 | Performed by: PREVENTIVE MEDICINE

## 2018-04-03 PROCEDURE — 20610 DRAIN/INJ JOINT/BURSA W/O US: CPT | Mod: LT | Performed by: PREVENTIVE MEDICINE

## 2018-04-03 PROCEDURE — 99207 ZZC NO CHARGE BEHAVIORAL WARM HANDOFF: CPT | Performed by: SOCIAL WORKER

## 2018-04-03 RX ORDER — GABAPENTIN 300 MG/1
CAPSULE ORAL
Qty: 90 CAPSULE | Refills: 0 | Status: SHIPPED | OUTPATIENT
Start: 2018-04-03 | End: 2018-06-22

## 2018-04-03 RX ORDER — TRAZODONE HYDROCHLORIDE 50 MG/1
50-100 TABLET, FILM COATED ORAL
Qty: 30 TABLET | Refills: 1 | Status: SHIPPED | OUTPATIENT
Start: 2018-04-03 | End: 2018-04-25

## 2018-04-03 ASSESSMENT — PAIN SCALES - GENERAL: PAINLEVEL: WORST PAIN (10)

## 2018-04-03 NOTE — TELEPHONE ENCOUNTER
Per Salena at [facility] Synvisc One injection CPT code:  no auth or pre-D is needed and is a covered product for this member. Call ref. # Salena 04/03/18 9:55am

## 2018-04-03 NOTE — Clinical Note
Hi Dr. Narvaez,  Unfortunately I was minimally successful getting her to schedule appointments with myself or with Dr. Medeiros as she continues to not believe that her depression and anxiety are worth treating, and continues to not see how her mental health is having an adverse effect on her physical health.  I talked about CBT for Chronic Pain, and how this treatment modality may assist her, and then she began to express some interest (it was the most interest I've heard from her when discussing therapy). I'll keep her on my radar, and will be sure to check in with her via telephone and/or during her next appointment with Dr. Medeiros if she does not schedule with me. Thanks for reaching out to us today.  Thanks, Catherine

## 2018-04-03 NOTE — MR AVS SNAPSHOT
After Visit Summary   4/3/2018    Radha Corbett    MRN: [id number]           Patient Information     Date Of Birth          1950        Visit Information        Provider Department      4/3/2018 9:00 AM Juan Jose Narvaez MD [facility]        Today's Diagnoses     Chronic pain of both knees    -  1    Primary osteoarthritis of both knees        Degeneration of lumbar or lumbosacral intervertebral disc        Primary localized osteoarthritis of left knee          Care Instructions    Thanks for coming today.  Ortho/Sports Medicine Clinic  [address] [place] [address]    To schedule future appointments in Ortho Clinic, you may call [phone].    To schedule ordered imaging by your provider:   Call Central Imaging [phone]    To schedule an injection ordered by your provider:  Call Central Imaging Injection scheduling line: [phone]  MyChart available online at:  [email or web address]    Please call if any further questions or concerns ([phone]).  Clinic hours 8 am to 5 pm.    Return to clinic (call) if symptoms worsen or fail to improve.          Follow-ups after your visit        Additional Services     PHYSICAL THERAPY REFERRAL (External-Prints)       Physical Therapy Referral                  Your next 10 appointments already scheduled     May 24, 2018  1:00 PM CDT   (Arrive by 12:45 PM)   Urodynamics with Huma Hernandez PA-C   [facility] ([facility])    [address] [place] [id number] [phone]            2018  4:00 PM CDT   CYSTO with Dada Baltazar MD   [facility] ([facility]    [address] [place] [id number] [phone]              Who to contact     If you have questions or need follow up information about today's clinic visit or your schedule please  contact [facility] directly at [phone].  Normal or non-critical lab and imaging results will be communicated to you by EDMdesignerhart, letter or phone within 4 business days after the clinic has received the results. If you do not hear from us within 7 days, please contact the clinic through EDMdesignerhart or phone. If you have a critical or abnormal lab result, we will notify you by phone as soon as possible.  Submit refill requests through geolad or call your pharmacy and they will forward the refill request to us. Please allow 3 business days for your refill to be completed.          Additional Information About Your Visit        geolad Information     geolad gives you secure access to your electronic health record. If you see a primary care provider, you can also send messages to your care team and make appointments. If you have questions, please call your primary care clinic.  If you do not have a primary care provider, please call [phone] and they will assist you.      geolad is an electronic gateway that provides easy, online access to your medical records. With geolad, you can request a clinic appointment, read your test results, renew a prescription or communicate with your care team.     To access your existing account, please contact your [facility] or call [phone] for assistance.        Care EveryWhere ID     This is your Care EveryWhere ID. This could be used by other organizations to access your [place] medical records  [id number]        Your Vitals Were     Pulse Last Period                97 11/18/1991 (Within Months)           Blood Pressure from Last 3 Encounters:   04/03/18 121/51   03/19/18 130/66   03/08/18 128/72    Weight from Last 3 Encounters:   03/08/18 108 kg (238 lb)   02/12/18 110.7 kg (244 lb)   02/08/18 110.7 kg (244 lb)              We Performed the Following     DRAIN/INJECT LARGE JOINT/BURSA     PHYSICAL THERAPY REFERRAL  (External-Prints)     SYNVISC PER 1 MG [] - specify units in quantity          Today's Medication Changes          These changes are accurate as of 4/3/18 11:59 PM.  If you have any questions, ask your nurse or doctor.               Start taking these medicines.        Dose/Directions    gabapentin 300 MG capsule   Commonly known as:  NEURONTIN   Used for:  Chronic pain of both knees   Started by:  Juan Jose Narvaez MD        Take 1 tablet (300 mg) every night for 1-3 days, then 1 tablet twice daily for 1-3 days, then 1 tablet three times daily   Quantity:  90 capsule   Refills:  0       traZODone 50 MG tablet   Commonly known as:  DESYREL   Used for:  Chronic pain of both knees   Started by:  Juan Jose Narvaez MD        Dose:   mg   Take 1-2 tablets ( mg) by mouth nightly as needed for sleep   Quantity:  30 tablet   Refills:  1            Where to get your medicines      These medications were sent to [facility] - [place], [place] - [address], [address]  [address], [address], [facility] [address]     Phone:  [phone]     gabapentin 300 MG capsule    traZODone 50 MG tablet                Primary Care Provider Office Phone # Fax #    Trice Medeiros MD PhD [phone]       [address]  [facility] [address]        Equal Access to Services     [facility]: Hadii kyree dawson hadasho Sodipakali, waaxda luqadaha, qaybta kaalmada adeegyada, michael hallman . So [facility] [phone].    ATENCIÓN: Si habla español, tiene a arauz disposición servicios gratuitos de asistencia lingüística. Nadja al [phone].    We comply with applicable federal civil rights laws and [place] laws. We do not discriminate on the basis of race, color, national origin, age, disability, sex, sexual orientation, or gender identity.            Thank you!     Thank you for choosing [facility]  for your care. Our goal is always to  "provide you with excellent care. Hearing back from our patients is one way we can continue to improve our services. Please take a few minutes to complete the written survey that you may receive in the mail after your visit with us. Thank you!             Your Updated Medication List - Protect others around you: Learn how to safely use, store and throw away your medicines at www.disposemymeds.org.          This list is accurate as of 4/3/18 11:59 PM.  Always use your most recent med list.                   Brand Name Dispense Instructions for use Diagnosis    aspirin 81 MG tablet      1 TABLET DAILY        CALCIUM 600+D PO      1 tab daily        ciprofloxacin 500 MG tablet    CIPRO    20 tablet    Take 1 tablet (500 mg) by mouth 2 times daily    Acute cystitis without hematuria       gabapentin 300 MG capsule    NEURONTIN    90 capsule    Take 1 tablet (300 mg) every night for 1-3 days, then 1 tablet twice daily for 1-3 days, then 1 tablet three times daily    Chronic pain of both knees       insulin syringe-needle U-100 31G X 5/16\" 1 ML    RELION INSULIN SYRINGE    200 each    Use 2 syringes daily or as directed.    Type 2 diabetes mellitus without complication (H)       lisinopril 10 MG tablet    PRINIVIL/ZESTRIL     Take 0.5 tablets (5 mg) by mouth daily    Essential hypertension with goal blood pressure less than 140/90       MELATONIN PO      10 mg At Bedtime        metFORMIN 500 MG 24 hr tablet    GLUCOPHAGE-XR    360 tablet    Take 2 tablets (1,000 mg) by mouth 2 times daily (with meals)    Uncontrolled type 2 diabetes mellitus with stage 3 chronic kidney disease, with long-term current use of insulin (H)       minocycline 100 MG capsule    MINOCIN/DYNACIN    180 capsule    Take 1 capsule (100 mg) by mouth every 12 hours    Acne vulgaris       mirabegron 50 MG 24 hr tablet    MYRBETRIQ    30 tablet    Take 1 tablet (50 mg) by mouth daily    Urinary urgency, Urgency incontinence, Urinary frequency       " NovoLIN N VIAL 100 UNIT/ML injection   Generic drug:  insulin NPH      Inject 60 units Subcutaneous 2 times daily. (gets this from OTC NPH at [facility])    Uncontrolled type 2 diabetes mellitus with stage 3 chronic kidney disease, with long-term current use of insulin (H)       omeprazole 40 MG capsule    priLOSEC    90 capsule    TAKE 1 CAPSULE DAILY 30 TO 60 MINUTES BEFORE A MEAL    Gastroesophageal reflux disease without esophagitis       ONE TOUCH LANCETS      None Entered        ONE TOUCH TEST STRIPS VI      None Entered        oxybutynin chloride 15 MG Tb24     30 tablet    TAKE ONE TABLET BY MOUTH EVERY DAY    Urinary urgency, Urgency incontinence, Urinary frequency       simvastatin 20 MG tablet    ZOCOR    90 tablet    Take 1 tablet (20 mg) by mouth At Bedtime    Hyperlipidemia LDL goal <100       spironolactone 100 MG tablet    ALDACTONE    90 tablet    TAKE 1 TABLET EVERY MORNING    Essential hypertension with goal blood pressure less than 140/90       * tolterodine 4 MG 24 hr capsule    DETROL LA    90 capsule    Take 1 capsule (4 mg) by mouth daily    Urge incontinence of urine       * tolterodine 4 MG 24 hr capsule    DETROL LA    30 capsule    Take 1 capsule (4 mg) by mouth daily    Mixed stress and urge urinary incontinence, Preop general physical exam, Lumbar radiculopathy       traZODone 50 MG tablet    DESYREL    30 tablet    Take 1-2 tablets ( mg) by mouth nightly as needed for sleep    Chronic pain of both knees       VISION FORMULA EYE HEALTH PO      Take 1 tablet by mouth daily        vitamin D 1000 UNITS capsule      1 CAPSULE DAILY        * Notice:  This list has 2 medication(s) that are the same as other medications prescribed for you. Read the directions carefully, and ask your doctor or other care provider to review them with you.

## 2018-04-03 NOTE — LETTER
4/3/2018         RE: Radha Corbett  [address] [facility] [address]        Dear Colleague,    Thank you for referring your patient, Radha Corbett, to the [facility]. Please see a copy of my visit note below.    See other note.      HISTORY OF PRESENT ILLNESS  Ms. Corbett is a pleasant 67 year old year old female who presents to clinic today with left knee pain that has worsened over the past week, when her left knee gave out on her.  She has recently had low back surgery(endoscopic interlaminar bilateral reed-laminectomy), which causes back pain    Radha explains that she has had synvisc in the right knee in the past that helped with similar pain in her knee.  Location: bilateral knees, left knee worse  Quality:  achy pain    Severity: 5/10 at worst    Duration: over a week getting worse, has been for many months worse  Timing: occurs with walking  Modifying factors:  resting and non-use makes it better, movement and use makes it worse  Associated signs & symptoms: swelling  Previous similar pain: yes, has had injection in right knee in the past of synvisc which really helped her  Additional history: as documented    MEDICAL HISTORY  Patient Active Problem List   Diagnosis     Macular degeneration     Myopia     Hiatal hernia     IBS (irritable bowel syndrome)     Hypertension goal BP (blood pressure) < 140/90     GERD (gastroesophageal reflux disease)     Atypical ductal hyperplasia of breast     Atypical lobular hyperplasia of breast     Benign Breast Disease (PASH)     Family history of breast cancer in sister     Type 2 diabetes, HbA1C goal < 8% (H)     Hyperlipidemia LDL goal <100     Breast cancer screening-high risk     Tubular adenoma of colon     Osteopenia     Alcohol ingestion, more than 4 drinks/day on alcohol screening     Umbilical hernia     Incontinence of urine     Stool incontinence     Osteoarthritis, knee     Rosacea     Anxiety     Pseudophakia     Abnormal cervical  "Pap smear with positive HPV DNA test     Depression with anxiety     Retinal detachment     Elevated liver function tests     Rectocele     Unexplained endometrial cells on cervical cytology     Back pain, unspecified back location, unspecified back pain laterality, unspecified chronicity       Current Outpatient Prescriptions   Medication Sig Dispense Refill     metFORMIN (GLUCOPHAGE-XR) 500 MG 24 hr tablet Take 2 tablets (1,000 mg) by mouth 2 times daily (with meals) 360 tablet 1     insulin NPH (NOVOLIN N VIAL) 100 UNIT/ML injection Inject 60 units Subcutaneous 2 times daily. (gets this from OTC NPH at [facility])       oxybutynin chloride 15 MG TB24 TAKE ONE TABLET BY MOUTH EVERY DAY 30 tablet 0     mirabegron (MYRBETRIQ) 50 MG 24 hr tablet Take 1 tablet (50 mg) by mouth daily 30 tablet 11     ciprofloxacin (CIPRO) 500 MG tablet Take 1 tablet (500 mg) by mouth 2 times daily 20 tablet 0     tolterodine (DETROL LA) 4 MG 24 hr capsule Take 1 capsule (4 mg) by mouth daily 30 capsule 0     spironolactone (ALDACTONE) 100 MG tablet TAKE 1 TABLET EVERY MORNING 90 tablet 3     lisinopril (PRINIVIL/ZESTRIL) 10 MG tablet Take 0.5 tablets (5 mg) by mouth daily  3     omeprazole (PRILOSEC) 40 MG capsule TAKE 1 CAPSULE DAILY 30 TO 60 MINUTES BEFORE A MEAL 90 capsule 2     simvastatin (ZOCOR) 20 MG tablet Take 1 tablet (20 mg) by mouth At Bedtime 90 tablet 3     minocycline (MINOCIN/DYNACIN) 100 MG capsule Take 1 capsule (100 mg) by mouth every 12 hours 180 capsule 2     Multiple Vitamins-Minerals (VISION FORMULA EYE HEALTH PO) Take 1 tablet by mouth daily       tolterodine (DETROL LA) 4 MG 24 hr capsule Take 1 capsule (4 mg) by mouth daily 90 capsule 3     insulin syringe-needle U-100 (RELION INSULIN SYRINGE) 31G X 5/16\" 1 ML Use 2 syringes daily or as directed. 200 each 3     MELATONIN PO 10 mg At Bedtime        ONE TOUCH TEST STRIPS VI None Entered       ONE TOUCH LANCETS None Entered       CALCIUM 600+D OR 1 tab daily       " ASPIRIN 81 MG OR TABS 1 TABLET DAILY       VITAMIN D 1000 UNIT OR CAPS 1 CAPSULE DAILY         Allergies   Allergen Reactions     Codeine      Sulfa Drugs        Family History   Problem Relation Age of Onset     Hypertension Mother      CEREBROVASCULAR DISEASE Mother      Arthritis Mother      Cardiovascular Mother      Circulatory Mother      CEREBROVASCULAR DISEASE Father      Prostate Cancer Father 65      at 69     Asthma Brother      Prostate Cancer Brother 48     bone metastasis     DIABETES Sister      Hypertension Sister      OSTEOPOROSIS Sister      Depression Sister      Lipids Sister      CANCER Maternal Grandmother 95     spine     Breast Cancer Sister 39     also contralateral @53, mets to lungs     CANCER Sister 20     second uterine cancer in 30s also     DIABETES Sister      Hypertension Sister      Depression Sister      OSTEOPOROSIS Sister      Breast Cancer Sister 57     unilateral     CANCER Paternal Aunt 40     unknown type     CANCER Paternal Uncle      stomach;  in his 80s     Prostate Cancer Brother      Glaucoma No family hx of        Additional medical/Social/Surgical histories reviewed in [facility] and updated as appropriate.     REVIEW OF SYSTEMS (4/3/2018)  10 point ROS of systems including Constitutional, Eyes, Respiratory, Cardiovascular, Gastroenterology, Genitourinary, Integumentary, Musculoskeletal, Psychiatric were all negative except for pertinent positives noted in my HPI.     PHYSICAL EXAM  Vitals:    18 0911   BP: 121/51   Pulse: 97     Vital Signs: /51  Pulse 97  LMP 1991 (Within Months) Patient declined being weighed. There is no height or weight on file to calculate BMI.    General  - normal appearance, in mild distress, overweight, NO HI or SI  CV  - normal popliteal pulse  Pulm  - normal respiratory pattern, non-labored  Musculoskeletal - left knee  - stance: mildly antalgic gait, genu varum  - inspection: no generalized swelling, trace effusion  -  palpation: medial joint line tenderness, patella and patellar tendon non-tender, normal popliteal pulse  - ROM: 100 degrees flexion, 0 degrees extension, painful active ROM  - strength: 5/5 in flexion, 5/5 in extension  - neuro: no sensory or motor deficit  - special tests:  (-) varus at 0 and 30 degrees flexion  (-) valgus at 0 and 30 degrees flexion  (+) Jayesh s compression test    Neuro  - no sensory or motor deficit, grossly normal coordination, normal muscle tone  Skin  - no ecchymosis, erythema, warmth, or induration, no obvious rash  Psych  - interactive, appropriate, normal mood and affect      ASSESSMENT & PLAN  68 yo female with bilateral knee osteoarthritis, left worse  Reviewed xrays: shows djd  Given synvisc injection  Given PT for pool   Recommend counseling as she has been struggling with depression since her back surgery, had primary care behavioral health speak with her about a plan  Start gabapentin again, and trazadone nightly for pain and sleep  F/u in a few months for her knee      Juan Jose Narvaez MD, CAQSM  PROCEDURE:           left Knee joint injection   SYNVISC-ONE NDC [address]-97 WAS INJECTED       The patient was apprised of the risks and the benefits of the procedure and consented and a written consent was signed.   The patient s  KNEE was sterilely prepped with chloraprep.   The patient was injected with synvisc one with a 1.5-inch 22-gauge needle at the_superiorlateral aspect of their knee joint  There were no complications. The patient tolerated the procedure well. There was minimal bleeding.   The patient was instructed to ice the knee upon leaving clinic and refrain from overuse over the next 3 days.   The patient was instructed to go to the emergency room with any usual pain, swelling, or redness occurred in the injected area.   The patient was given a followup appointment to evaluate response to the injection to their increased range of motion and reduction of pain.  Follow up  PRN  Dr Juan Jose Narvaez    Again, thank you for allowing me to participate in the care of your patient.        Sincerely,        Juan Jose Narvaez MD

## 2018-04-03 NOTE — PATIENT INSTRUCTIONS
Thanks for coming today.  Ortho/Sports Medicine Clinic  [address] [place] [address]    To schedule future appointments in Ortho Clinic, you may call [phone].    To schedule ordered imaging by your provider:   Call Central Imaging [phone]    To schedule an injection ordered by your provider:  Call Central Imaging Injection scheduling line: [phone]  Digital Riverhart available online at:  [email or web address]    Please call if any further questions or concerns ([phone]).  Clinic hours 8 am to 5 pm.    Return to clinic (call) if symptoms worsen or fail to improve.

## 2018-04-03 NOTE — PROGRESS NOTES
HISTORY OF PRESENT ILLNESS  Ms. Corbett is a pleasant 67 year old year old female who presents to clinic today with left knee pain that has worsened over the past week, when her left knee gave out on her.  She has recently had low back surgery(endoscopic interlaminar bilateral reed-laminectomy), which causes back pain    Radha explains that she has had synvisc in the right knee in the past that helped with similar pain in her knee.  Location: bilateral knees, left knee worse  Quality:  achy pain    Severity: 5/10 at worst    Duration: over a week getting worse, has been for many months worse  Timing: occurs with walking  Modifying factors:  resting and non-use makes it better, movement and use makes it worse  Associated signs & symptoms: swelling  Previous similar pain: yes, has had injection in right knee in the past of synvisc which really helped her  Additional history: as documented    MEDICAL HISTORY  Patient Active Problem List   Diagnosis     Macular degeneration     Myopia     Hiatal hernia     IBS (irritable bowel syndrome)     Hypertension goal BP (blood pressure) < 140/90     GERD (gastroesophageal reflux disease)     Atypical ductal hyperplasia of breast     Atypical lobular hyperplasia of breast     Benign Breast Disease (PASH)     Family history of breast cancer in sister     Type 2 diabetes, HbA1C goal < 8% (H)     Hyperlipidemia LDL goal <100     Breast cancer screening-high risk     Tubular adenoma of colon     Osteopenia     Alcohol ingestion, more than 4 drinks/day on alcohol screening     Umbilical hernia     Incontinence of urine     Stool incontinence     Osteoarthritis, knee     Rosacea     Anxiety     Pseudophakia     Abnormal cervical Pap smear with positive HPV DNA test     Depression with anxiety     Retinal detachment     Elevated liver function tests     Rectocele     Unexplained endometrial cells on cervical cytology     Back pain, unspecified back location, unspecified back pain laterality,  "unspecified chronicity       Current Outpatient Prescriptions   Medication Sig Dispense Refill     metFORMIN (GLUCOPHAGE-XR) 500 MG 24 hr tablet Take 2 tablets (1,000 mg) by mouth 2 times daily (with meals) 360 tablet 1     insulin NPH (NOVOLIN N VIAL) 100 UNIT/ML injection Inject 60 units Subcutaneous 2 times daily. (gets this from [facility] at [facility])       oxybutynin chloride 15 MG TB24 TAKE ONE TABLET BY MOUTH EVERY DAY 30 tablet 0     mirabegron (MYRBETRIQ) 50 MG 24 hr tablet Take 1 tablet (50 mg) by mouth daily 30 tablet 11     ciprofloxacin (CIPRO) 500 MG tablet Take 1 tablet (500 mg) by mouth 2 times daily 20 tablet 0     tolterodine (DETROL LA) 4 MG 24 hr capsule Take 1 capsule (4 mg) by mouth daily 30 capsule 0     spironolactone (ALDACTONE) 100 MG tablet TAKE 1 TABLET EVERY MORNING 90 tablet 3     lisinopril (PRINIVIL/ZESTRIL) 10 MG tablet Take 0.5 tablets (5 mg) by mouth daily  3     omeprazole (PRILOSEC) 40 MG capsule TAKE 1 CAPSULE DAILY 30 TO 60 MINUTES BEFORE A MEAL 90 capsule 2     simvastatin (ZOCOR) 20 MG tablet Take 1 tablet (20 mg) by mouth At Bedtime 90 tablet 3     minocycline (MINOCIN/DYNACIN) 100 MG capsule Take 1 capsule (100 mg) by mouth every 12 hours 180 capsule 2     Multiple Vitamins-Minerals (VISION FORMULA EYE HEALTH PO) Take 1 tablet by mouth daily       tolterodine (DETROL LA) 4 MG 24 hr capsule Take 1 capsule (4 mg) by mouth daily 90 capsule 3     insulin syringe-needle U-100 (RELION INSULIN SYRINGE) 31G X 5/16\" 1 ML Use 2 syringes daily or as directed. 200 each 3     MELATONIN PO 10 mg At Bedtime        ONE TOUCH TEST STRIPS VI None Entered       ONE TOUCH LANCETS None Entered       CALCIUM 600+D OR 1 tab daily       ASPIRIN 81 MG OR TABS 1 TABLET DAILY       VITAMIN D 1000 UNIT OR CAPS 1 CAPSULE DAILY         Allergies   Allergen Reactions     Codeine      Sulfa Drugs        Family History   Problem Relation Age of Onset     Hypertension Mother      CEREBROVASCULAR DISEASE Mother "      Arthritis Mother      Cardiovascular Mother      Circulatory Mother      CEREBROVASCULAR DISEASE Father      Prostate Cancer Father 65      at 69     Asthma Brother      Prostate Cancer Brother 48     bone metastasis     DIABETES Sister      Hypertension Sister      OSTEOPOROSIS Sister      Depression Sister      Lipids Sister      CANCER Maternal Grandmother 95     spine     Breast Cancer Sister 39     also contralateral @53, mets to lungs     CANCER Sister 20     second uterine cancer in 30s also     DIABETES Sister      Hypertension Sister      Depression Sister      OSTEOPOROSIS Sister      Breast Cancer Sister 57     unilateral     CANCER Paternal Aunt 40     unknown type     CANCER Paternal Uncle      stomach;  in his 80s     Prostate Cancer Brother      Glaucoma No family hx of        Additional medical/Social/Surgical histories reviewed in [facility] and updated as appropriate.     REVIEW OF SYSTEMS (4/3/2018)  10 point ROS of systems including Constitutional, Eyes, Respiratory, Cardiovascular, Gastroenterology, Genitourinary, Integumentary, Musculoskeletal, Psychiatric were all negative except for pertinent positives noted in my HPI.     PHYSICAL EXAM  Vitals:    18 0911   BP: 121/51   Pulse: 97     Vital Signs: /51  Pulse 97  LMP 1991 (Within Months) Patient declined being weighed. There is no height or weight on file to calculate BMI.    General  - normal appearance, in mild distress, overweight, NO HI or SI  CV  - normal popliteal pulse  Pulm  - normal respiratory pattern, non-labored  Musculoskeletal - left knee  - stance: mildly antalgic gait, genu varum  - inspection: no generalized swelling, trace effusion  - palpation: medial joint line tenderness, patella and patellar tendon non-tender, normal popliteal pulse  - ROM: 100 degrees flexion, 0 degrees extension, painful active ROM  - strength: 5/5 in flexion, 5/5 in extension  - neuro: no sensory or motor deficit  - special  tests:  (-) varus at 0 and 30 degrees flexion  (-) valgus at 0 and 30 degrees flexion  (+) Jayesh s compression test    Neuro  - no sensory or motor deficit, grossly normal coordination, normal muscle tone  Skin  - no ecchymosis, erythema, warmth, or induration, no obvious rash  Psych  - interactive, appropriate, normal mood and affect      ASSESSMENT & PLAN  68 yo female with bilateral knee osteoarthritis, left worse  Reviewed xrays: shows djd  Given synvisc injection  Given PT for pool   Recommend counseling as she has been struggling with depression since her back surgery, had primary care behavioral health speak with her about a plan  Start gabapentin again, and trazadone nightly for pain and sleep  F/u in a few months for her knee      [place] Jose Narvaez MD, CAQSM  PROCEDURE:           left Knee joint injection   SYNVISC-ONE NDC [address]-42 WAS INJECTED       The patient was apprised of the risks and the benefits of the procedure and consented and a written consent was signed.   The patient s  KNEE was sterilely prepped with chloraprep.   The patient was injected with synvisc one with a 1.5-inch 22-gauge needle at the_superiorlateral aspect of their knee joint  There were no complications. The patient tolerated the procedure well. There was minimal bleeding.   The patient was instructed to ice the knee upon leaving clinic and refrain from overuse over the next 3 days.   The patient was instructed to go to the emergency room with any usual pain, swelling, or redness occurred in the injected area.   The patient was given a followup appointment to evaluate response to the injection to their increased range of motion and reduction of pain.  Follow up PRN  Dr [place] Jose Narvaez

## 2018-04-03 NOTE — PROGRESS NOTES
"Patient had appointment with sports medicine. [facility] services were requested by Dr. Narvaez due to concerns about untreated depression and anxiety and request for assistance to create a mental health plan of care.  [facility] has briefly met with patient during clinic visits following appointments with providers, but has declined offer to schedule with [facility] for behavioral health intervention and therapy.      Patient remembered [facility] upon arrival to clinic room.  Patient stated that she continues to have pain despite recent back surgery, and reported that she now has knee pain. She voiced frustration due to ongoing pain, anxiety about the uncertainty of her health in the future, and stated that she it is difficult to be the mother and grandmother she wants to be because of her pain. Patient reported that she had made a gesture to the clinic today about wanting to shoot herself due to the pain, and she stated that she immediately regretted the decision.  Per patient, she is not suicidal, could never commit suicide, and just feels frustrated and overwhelmed by the pain.  Patient stated that her  also asked why she made that gesture since it was not a genuine reflection of how she feels. Patient displayed future orientated thought processes as she discussed upcoming trip to [place] in order to see her grandchildren.    Patient reported history of being prescribed psychotropic medications, but stated that she self-discontinued because she did not believe that they were helpful. Patient stated that she is not interested in following up with her PCP at this time to discuss medications since she does not feel as there is a need. Patient originally declined offer to schedule formal appointment with [facility], and stated ,\"there isn't anything wrong\", \"once I physically feel better, I'll emotionally feel better\".  [facility] provided information and education on CBT for Chronic Pain. Patient began to express interest in the treatment modality, " and asked how she could schedule with [facility].  Patient declined offer to schedule appointment at this time, and stated that she will be going to [place] in approximately one week. Patient agreed to continue to consider potential benefits of meeting with [facility] and scheduling when she returns.  She stated that she is going to have an appointment with Dr. Medeiros in 2-3 weeks to check her A1C, and agreed to have [facility] complete check-in during this appointment. [facility] to update PCP.    No additional needs or acute concerns.      Catherine Kwok, [facility]   Behavioral Health Clinician

## 2018-04-04 ENCOUNTER — TELEPHONE (OUTPATIENT)
Dept: PHARMACY | Facility: CLINIC | Age: 68
End: 2018-04-04

## 2018-04-04 NOTE — TELEPHONE ENCOUNTER
Called patient to schedule a follow-up MTM appt. Patient has been dealing with back surgery and knee pain. Patient is depressed and is doctoring too much lately and doesn't want to schedule at this time.    Falguni Chavis, Pharm.D, BCACP  Medication Therapy Management Pharmacist

## 2018-04-05 ENCOUNTER — TELEPHONE (OUTPATIENT)
Dept: ORTHOPEDICS | Facility: CLINIC | Age: 68
End: 2018-04-05

## 2018-04-05 NOTE — TELEPHONE ENCOUNTER
[facility] Call Center    Phone Message    May a detailed message be left on voicemail: no    Reason for Call: Other: pt states she had an injection on tuesday wants to know how long does it take to work? and she also would like refills on her Trazodone 50mg 3 month supply sent to [facility]. Please gve pt a call to discuss     Action Taken: Message routed to:  Adult Clinics: Orthopedics p [phone]

## 2018-04-20 ENCOUNTER — TELEPHONE (OUTPATIENT)
Dept: ORTHOPEDICS | Facility: CLINIC | Age: 68
End: 2018-04-20

## 2018-04-20 DIAGNOSIS — G89.29 CHRONIC PAIN OF BOTH KNEES: ICD-10-CM

## 2018-04-20 DIAGNOSIS — M25.561 CHRONIC PAIN OF BOTH KNEES: ICD-10-CM

## 2018-04-20 DIAGNOSIS — G47.01 INSOMNIA DUE TO MEDICAL CONDITION: Primary | ICD-10-CM

## 2018-04-20 DIAGNOSIS — M25.562 CHRONIC PAIN OF BOTH KNEES: ICD-10-CM

## 2018-04-20 NOTE — TELEPHONE ENCOUNTER
Received a phone call from Radha,   She is reporting that she feels the Trazadone has been helping her sleep. She would like to relay this to Dr. Narvaez. She is also requesting a refill but she would like the RX to be 2 a day for a 90 day supply since she goes through [facility]. I let her know that her refill request will be sent to Dr. Narvaez and we will get this refilled as soon as possible.     All questions answered and concerns addressed.

## 2018-04-25 RX ORDER — TRAZODONE HYDROCHLORIDE 50 MG/1
100 TABLET, FILM COATED ORAL
Qty: 90 TABLET | Refills: 0 | Status: SHIPPED | OUTPATIENT
Start: 2018-04-25 | End: 2018-05-15

## 2018-04-27 ENCOUNTER — PRE VISIT (OUTPATIENT)
Dept: UROLOGY | Facility: CLINIC | Age: 68
End: 2018-04-27

## 2018-05-14 DIAGNOSIS — E78.5 HYPERLIPIDEMIA LDL GOAL <100: ICD-10-CM

## 2018-05-14 DIAGNOSIS — K21.9 GASTROESOPHAGEAL REFLUX DISEASE WITHOUT ESOPHAGITIS: ICD-10-CM

## 2018-05-15 ENCOUNTER — OFFICE VISIT (OUTPATIENT)
Dept: PEDIATRICS | Facility: CLINIC | Age: 68
End: 2018-05-15
Payer: COMMERCIAL

## 2018-05-15 VITALS
WEIGHT: 238.1 LBS | SYSTOLIC BLOOD PRESSURE: 118 MMHG | OXYGEN SATURATION: 94 % | DIASTOLIC BLOOD PRESSURE: 72 MMHG | TEMPERATURE: 98.2 F | BODY MASS INDEX: 38.43 KG/M2 | HEART RATE: 110 BPM

## 2018-05-15 DIAGNOSIS — M25.561 CHRONIC PAIN OF BOTH KNEES: ICD-10-CM

## 2018-05-15 DIAGNOSIS — G89.29 CHRONIC PAIN OF BOTH KNEES: ICD-10-CM

## 2018-05-15 DIAGNOSIS — G47.19 EXCESSIVE DAYTIME SLEEPINESS: ICD-10-CM

## 2018-05-15 DIAGNOSIS — N39.41 URGENCY INCONTINENCE: ICD-10-CM

## 2018-05-15 DIAGNOSIS — F41.8 DEPRESSION WITH ANXIETY: Primary | ICD-10-CM

## 2018-05-15 DIAGNOSIS — Z79.4 TYPE 2 DIABETES MELLITUS WITH HYPERGLYCEMIA, WITH LONG-TERM CURRENT USE OF INSULIN (H): ICD-10-CM

## 2018-05-15 DIAGNOSIS — R39.15 URINARY URGENCY: ICD-10-CM

## 2018-05-15 DIAGNOSIS — E11.65 TYPE 2 DIABETES MELLITUS WITH HYPERGLYCEMIA, WITH LONG-TERM CURRENT USE OF INSULIN (H): ICD-10-CM

## 2018-05-15 DIAGNOSIS — K21.9 GASTROESOPHAGEAL REFLUX DISEASE WITHOUT ESOPHAGITIS: ICD-10-CM

## 2018-05-15 DIAGNOSIS — R10.84 ABDOMINAL PAIN, GENERALIZED: ICD-10-CM

## 2018-05-15 DIAGNOSIS — I10 ESSENTIAL HYPERTENSION WITH GOAL BLOOD PRESSURE LESS THAN 140/90: ICD-10-CM

## 2018-05-15 DIAGNOSIS — M25.562 CHRONIC PAIN OF BOTH KNEES: ICD-10-CM

## 2018-05-15 DIAGNOSIS — L70.0 ACNE VULGARIS: ICD-10-CM

## 2018-05-15 DIAGNOSIS — B37.0 THRUSH: ICD-10-CM

## 2018-05-15 DIAGNOSIS — E78.5 HYPERLIPIDEMIA LDL GOAL <100: ICD-10-CM

## 2018-05-15 LAB — HBA1C MFR BLD: 8.5 % (ref 0–5.6)

## 2018-05-15 PROCEDURE — 36415 COLL VENOUS BLD VENIPUNCTURE: CPT | Performed by: INTERNAL MEDICINE

## 2018-05-15 PROCEDURE — 99214 OFFICE O/P EST MOD 30 MIN: CPT | Performed by: NURSE PRACTITIONER

## 2018-05-15 PROCEDURE — 83036 HEMOGLOBIN GLYCOSYLATED A1C: CPT | Performed by: INTERNAL MEDICINE

## 2018-05-15 RX ORDER — LISINOPRIL 10 MG/1
5 TABLET ORAL DAILY
Qty: 30 TABLET | Refills: 3 | Status: SHIPPED | OUTPATIENT
Start: 2018-05-15 | End: 2018-08-23

## 2018-05-15 RX ORDER — DULOXETIN HYDROCHLORIDE 30 MG/1
30 CAPSULE, DELAYED RELEASE ORAL 2 TIMES DAILY
Qty: 180 CAPSULE | Refills: 1 | Status: SHIPPED | OUTPATIENT
Start: 2018-05-15 | End: 2018-05-15

## 2018-05-15 RX ORDER — OMEPRAZOLE 40 MG/1
CAPSULE, DELAYED RELEASE ORAL
Qty: 90 CAPSULE | Refills: 3 | Status: SHIPPED | OUTPATIENT
Start: 2018-05-15 | End: 2019-06-10

## 2018-05-15 RX ORDER — LISINOPRIL 10 MG/1
5 TABLET ORAL DAILY
Qty: 30 TABLET | Refills: 3 | Status: SHIPPED | OUTPATIENT
Start: 2018-05-15 | End: 2018-05-15

## 2018-05-15 RX ORDER — OXYBUTYNIN CHLORIDE 15 MG/1
1 TABLET, EXTENDED RELEASE ORAL DAILY
Qty: 90 TABLET | Refills: 1 | Status: SHIPPED | OUTPATIENT
Start: 2018-05-15 | End: 2019-01-10

## 2018-05-15 RX ORDER — SIMVASTATIN 20 MG
20 TABLET ORAL AT BEDTIME
Qty: 90 TABLET | Refills: 3 | Status: SHIPPED | OUTPATIENT
Start: 2018-05-15 | End: 2019-08-23

## 2018-05-15 RX ORDER — METFORMIN HCL 500 MG
1000 TABLET, EXTENDED RELEASE 24 HR ORAL 2 TIMES DAILY WITH MEALS
Qty: 360 TABLET | Refills: 1 | Status: SHIPPED | OUTPATIENT
Start: 2018-05-15 | End: 2018-05-15

## 2018-05-15 RX ORDER — OMEPRAZOLE 40 MG/1
CAPSULE, DELAYED RELEASE ORAL
Qty: 90 CAPSULE | Refills: 3 | Status: SHIPPED | OUTPATIENT
Start: 2018-05-15 | End: 2018-05-15

## 2018-05-15 RX ORDER — SPIRONOLACTONE 100 MG/1
TABLET, FILM COATED ORAL
Qty: 90 TABLET | Refills: 3 | Status: SHIPPED | OUTPATIENT
Start: 2018-05-15 | End: 2018-05-15

## 2018-05-15 RX ORDER — MINOCYCLINE HYDROCHLORIDE 100 MG/1
100 CAPSULE ORAL EVERY 12 HOURS
Qty: 180 CAPSULE | Refills: 2 | Status: SHIPPED | OUTPATIENT
Start: 2018-05-15 | End: 2019-12-09

## 2018-05-15 RX ORDER — SPIRONOLACTONE 100 MG/1
TABLET, FILM COATED ORAL
Qty: 90 TABLET | Refills: 3 | Status: SHIPPED | OUTPATIENT
Start: 2018-05-15 | End: 2019-06-10

## 2018-05-15 RX ORDER — OXYBUTYNIN CHLORIDE 15 MG/1
1 TABLET, EXTENDED RELEASE ORAL DAILY
Qty: 30 TABLET | Refills: 0 | Status: SHIPPED | OUTPATIENT
Start: 2018-05-15 | End: 2018-05-15

## 2018-05-15 RX ORDER — TRAZODONE HYDROCHLORIDE 50 MG/1
100 TABLET, FILM COATED ORAL
Qty: 90 TABLET | Refills: 0 | Status: SHIPPED | OUTPATIENT
Start: 2018-05-15 | End: 2018-05-15

## 2018-05-15 RX ORDER — OXYBUTYNIN CHLORIDE 15 MG/1
1 TABLET, EXTENDED RELEASE ORAL DAILY
Qty: 90 TABLET | Refills: 1 | Status: SHIPPED | OUTPATIENT
Start: 2018-05-15 | End: 2018-05-15

## 2018-05-15 RX ORDER — DULOXETIN HYDROCHLORIDE 30 MG/1
30 CAPSULE, DELAYED RELEASE ORAL 2 TIMES DAILY
Qty: 60 CAPSULE | Refills: 1 | Status: SHIPPED | OUTPATIENT
Start: 2018-05-15 | End: 2018-05-15

## 2018-05-15 RX ORDER — SIMVASTATIN 20 MG
20 TABLET ORAL AT BEDTIME
Qty: 90 TABLET | Refills: 3 | Status: SHIPPED | OUTPATIENT
Start: 2018-05-15 | End: 2018-05-15

## 2018-05-15 RX ORDER — TRAZODONE HYDROCHLORIDE 50 MG/1
100 TABLET, FILM COATED ORAL
Qty: 90 TABLET | Refills: 0 | Status: SHIPPED | OUTPATIENT
Start: 2018-05-15 | End: 2018-06-22

## 2018-05-15 RX ORDER — CLOTRIMAZOLE 10 MG/1
1 LOZENGE ORAL
Qty: 70 TROCHE | Refills: 0 | Status: SHIPPED | OUTPATIENT
Start: 2018-05-15 | End: 2018-06-14

## 2018-05-15 RX ORDER — MINOCYCLINE HYDROCHLORIDE 100 MG/1
100 CAPSULE ORAL EVERY 12 HOURS
Qty: 180 CAPSULE | Refills: 2 | Status: SHIPPED | OUTPATIENT
Start: 2018-05-15 | End: 2018-05-15

## 2018-05-15 RX ORDER — DULOXETIN HYDROCHLORIDE 30 MG/1
30 CAPSULE, DELAYED RELEASE ORAL 2 TIMES DAILY
Qty: 180 CAPSULE | Refills: 1 | Status: SHIPPED | OUTPATIENT
Start: 2018-05-15 | End: 2018-12-06

## 2018-05-15 RX ORDER — SIMVASTATIN 20 MG
TABLET ORAL
Qty: 90 TABLET | Refills: 0 | Status: SHIPPED | OUTPATIENT
Start: 2018-05-15 | End: 2018-05-15

## 2018-05-15 RX ORDER — METFORMIN HCL 500 MG
1000 TABLET, EXTENDED RELEASE 24 HR ORAL 2 TIMES DAILY WITH MEALS
Qty: 360 TABLET | Refills: 1 | Status: SHIPPED | OUTPATIENT
Start: 2018-05-15 | End: 2019-03-13

## 2018-05-15 ASSESSMENT — ANXIETY QUESTIONNAIRES
2. NOT BEING ABLE TO STOP OR CONTROL WORRYING: NEARLY EVERY DAY
5. BEING SO RESTLESS THAT IT IS HARD TO SIT STILL: NEARLY EVERY DAY
GAD7 TOTAL SCORE: 21
6. BECOMING EASILY ANNOYED OR IRRITABLE: NEARLY EVERY DAY
7. FEELING AFRAID AS IF SOMETHING AWFUL MIGHT HAPPEN: NEARLY EVERY DAY
1. FEELING NERVOUS, ANXIOUS, OR ON EDGE: NEARLY EVERY DAY
3. WORRYING TOO MUCH ABOUT DIFFERENT THINGS: NEARLY EVERY DAY

## 2018-05-15 ASSESSMENT — PAIN SCALES - GENERAL: PAINLEVEL: NO PAIN (0)

## 2018-05-15 ASSESSMENT — PATIENT HEALTH QUESTIONNAIRE - PHQ9: 5. POOR APPETITE OR OVEREATING: NEARLY EVERY DAY

## 2018-05-15 NOTE — PROGRESS NOTES
SUBJECTIVE:   Radha Corbett is a 67 year old female who presents to clinic today for the following health issues:    Concerns: Pt is here to follow up on her Diabetic status as well to discuss some other concerns that will discuss when the provider arrives. Pt states that she would like her prescription review by the provider and send the prescription to [facility]. Pt states that the only one that she wants to  here at [facility] is her Oxybutynin Chloride 15 MG    Diabetes Follow-up    Patient is checking blood sugars: once daily.  Results are as follows:              postprandial after breakfast - Over 100    Diabetic concerns: None     Symptoms of hypoglycemia (low blood sugar): none     Paresthesias (numbness or burning in feet) or sores: Yes , for years on the feets     Date of last diabetic eye exam: A year 1 1/2 ago    BP Readings from Last 2 Encounters:   05/15/18 118/72     Hemoglobin A1C (%)   Date Value   05/15/2018 8.5 (H)   02/08/2018 9.2 (H)     LDL Cholesterol Calculated (mg/dL)   Date Value   06/22/2017 72   05/04/2016 88       Amount of exercise or physical activity: None    Problems taking medications regularly: Yes,  problems remembering to take    Medication side effects: none    Diet: regular (no restrictions)      PROBLEMS TO ADD ON...  Depression and Anxiety Follow-Up    Status since last visit: Worsened     Worse in the last months with back pain     Was on Wellbutrin in the past     Feels like does not care about anything     Was very discouraged because she stated that she was going to shoot herself    She wants to move back to her daughter in [place]     She is worried about her daughter         Other associated symptoms:anxiety     Complicating factors:     Significant life event: Yes-  Back pain      Current substance abuse: None    PHQ-9 12/5/2016 6/22/2017 9/18/2017   Total Score 17 13 24   Q9: Suicide Ideation Not at all Not at all Nearly every day     TARAS-7 SCORE  6/13/2016 12/5/2016 9/18/2017   Total Score - - -   Total Score 12 15 21     In the past two weeks have you had thoughts of suicide or self-harm?  No.    Do you have concerns about your personal safety or the safety of others?   No  PHQ-9  English  PHQ-9   Any Language  TARAS-7  Suicide Assessment Five-step Evaluation and Treatment (SAFE-T)    Problem list and histories reviewed & adjusted, as indicated.  Additional history: as documented    Patient Active Problem List   Diagnosis     Macular degeneration     Myopia     Hiatal hernia     IBS (irritable bowel syndrome)     Hypertension goal BP (blood pressure) < 140/90     GERD (gastroesophageal reflux disease)     Atypical ductal hyperplasia of breast     Atypical lobular hyperplasia of breast     Benign Breast Disease (PASH)     Family history of breast cancer in sister     Type 2 diabetes, HbA1C goal < 8% (H)     Hyperlipidemia LDL goal <100     Breast cancer screening-high risk     Tubular adenoma of colon     Osteopenia     Alcohol ingestion, more than 4 drinks/day on alcohol screening     Umbilical hernia     Incontinence of urine     Stool incontinence     Osteoarthritis, knee     Rosacea     Anxiety     Pseudophakia     Abnormal cervical Pap smear with positive HPV DNA test     Depression with anxiety     Retinal detachment     Elevated liver function tests     Rectocele     Unexplained endometrial cells on cervical cytology     Back pain, unspecified back location, unspecified back pain laterality, unspecified chronicity     Past Surgical History:   Procedure Laterality Date     BREAST LUMPECTOMY, RT/LT      x2, left     CATARACT IOL, RT/LT  4/99    left, [id number] 7.0D     COLONOSCOPY       COLONOSCOPY N/A 1/22/2016    Procedure: COMBINED COLONOSCOPY, SINGLE OR MULTIPLE BIOPSY/POLYPECTOMY BY BIOPSY;  Surgeon: Praful Benjamin MD;  Location: [facility] OR     COLONOSCOPY WITH CO2 INSUFFLATION N/A 1/22/2016    Procedure: COLONOSCOPY WITH CO2 INSUFFLATION;   Surgeon: Praful Benjamin MD;  Location: [facility]     CRYOTHERAPY      cervical     CRYOTHERAPY Left 3/19/2015    Procedure: CRYOTHERAPY;  Surgeon: Keiko Puri MD;  Location: [facility]     DILATION AND CURETTAGE, HYSTEROSCOPY DIAGNOSTIC, COMBINED N/A 2016    Procedure: COMBINED DILATION AND CURETTAGE, HYSTEROSCOPY DIAGNOSTIC;  Surgeon: Yeni Aparicio DO;  Location: [facility] OR     LAPAROSCOPIC TUBAL LIGATION       PHACOEMULSIFICATION CLEAR CORNEA WITH STANDARD INTRAOCULAR LENS IMPLANT  8/15/2013    Procedure: PHACOEMULSIFICATION CLEAR CORNEA WITH STANDARD INTRAOCULAR LENS IMPLANT;  RIGHT PHACOEMULSIFICATION CLEAR CORNEA WITH STANDARD INTRAOCULAR LENS IMPLANT ;  Surgeon: Trae Taylor MD;  Location: [facility]     SURGICAL HISTORY OF -       x4, ankle surgery       Social History   Substance Use Topics     Smoking status: Current Every Day Smoker     Types: Cigarettes     Smokeless tobacco: Never Used     Alcohol use Yes      Comment: 2-3/night     Family History   Problem Relation Age of Onset     Hypertension Mother      CEREBROVASCULAR DISEASE Mother      Arthritis Mother      Cardiovascular Mother      Circulatory Mother      CEREBROVASCULAR DISEASE Father      Prostate Cancer Father 65      at 69     Asthma Brother      Prostate Cancer Brother 48     bone metastasis     DIABETES Sister      Hypertension Sister      OSTEOPOROSIS Sister      Depression Sister      Lipids Sister      CANCER Maternal Grandmother 95     spine     Breast Cancer Sister 39     also contralateral @53, mets to lungs     CANCER Sister 20     second uterine cancer in 30s also     DIABETES Sister      Hypertension Sister      Depression Sister      OSTEOPOROSIS Sister      Breast Cancer Sister 57     unilateral     CANCER Paternal Aunt 40     unknown type     CANCER Paternal Uncle      stomach;  in his 80s     Prostate Cancer Brother      Glaucoma No family hx of          Current Outpatient  "Prescriptions   Medication Sig Dispense Refill     ASPIRIN 81 MG OR TABS 1 TABLET DAILY       CALCIUM 600+D OR 1 tab daily       ciprofloxacin (CIPRO) 500 MG tablet Take 1 tablet (500 mg) by mouth 2 times daily 20 tablet 0     gabapentin (NEURONTIN) 300 MG capsule Take 1 tablet (300 mg) every night for 1-3 days, then 1 tablet twice daily for 1-3 days, then 1 tablet three times daily 90 capsule 0     insulin NPH (NOVOLIN N VIAL) 100 UNIT/ML injection Inject 60 units Subcutaneous 2 times daily. (gets this from [facility] at [facility])       insulin syringe-needle U-100 (RELION INSULIN SYRINGE) 31G X 5/16\" 1 ML Use 2 syringes daily or as directed. 200 each 3     lisinopril (PRINIVIL/ZESTRIL) 10 MG tablet Take 0.5 tablets (5 mg) by mouth daily  3     MELATONIN PO 10 mg At Bedtime        metFORMIN (GLUCOPHAGE-XR) 500 MG 24 hr tablet Take 2 tablets (1,000 mg) by mouth 2 times daily (with meals) 360 tablet 1     minocycline (MINOCIN/DYNACIN) 100 MG capsule Take 1 capsule (100 mg) by mouth every 12 hours 180 capsule 2     Multiple Vitamins-Minerals (VISION FORMULA EYE HEALTH PO) Take 1 tablet by mouth daily       omeprazole (PRILOSEC) 40 MG capsule TAKE 1 CAPSULE DAILY 30 TO 60 MINUTES BEFORE A MEAL 90 capsule 3     ONE TOUCH LANCETS None Entered       ONE TOUCH TEST STRIPS VI None Entered       oxybutynin chloride 15 MG TB24 TAKE ONE TABLET BY MOUTH EVERY DAY 30 tablet 0     simvastatin (ZOCOR) 20 MG tablet TAKE 1 TABLET AT BEDTIME 90 tablet 0     spironolactone (ALDACTONE) 100 MG tablet TAKE 1 TABLET EVERY MORNING 90 tablet 3     traZODone (DESYREL) 50 MG tablet Take 2 tablets (100 mg) by mouth nightly as needed for sleep 90 tablet 0     VITAMIN D 1000 UNIT OR CAPS 1 CAPSULE DAILY       mirabegron (MYRBETRIQ) 50 MG 24 hr tablet Take 1 tablet (50 mg) by mouth daily (Patient not taking: Reported on 5/15/2018) 30 tablet 11     tolterodine (DETROL LA) 4 MG 24 hr capsule Take 1 capsule (4 mg) by mouth daily (Patient not taking: " Reported on 5/15/2018) 90 capsule 3     tolterodine (DETROL LA) 4 MG 24 hr capsule Take 1 capsule (4 mg) by mouth daily (Patient not taking: Reported on 5/15/2018) 30 capsule 0     Allergies   Allergen Reactions     Codeine      Sulfa Drugs      Recent Labs   Lab Test  05/15/18   1318  02/08/18   1431  11/30/17   1522  09/18/17   1450  06/22/17   0858  06/22/17   0857   12/05/16   1045  08/01/16   1344   05/04/16   1116   06/23/15   1030   A1C  8.5*  9.2*   --   7.1*   --   5.9   --   6.5*  7.8*   < >  10.7*   < >  6.9*   LDL   --    --    --    --    --   72   --    --    --    --   88   --   60   HDL   --    --    --    --    --   69   --    --    --    --   51   --   49*   TRIG   --    --    --    --    --   106   --    --    --    --   101   --   79   ALT   --    --    --    --   28   --    --    --   37   --   49   < >  49   CR   --   0.50*  0.53   --    --    --    --    --   0.69   --   0.43*   < >  0.43*   GFRESTIMATED   --   >90  >90   --    --    --    < >   --   85   --   >90  Non  GFR Calc     < >  >90  Non  GFR Calc     GFRESTBLACK   --   >90  >90   --    --    --    < >   --   >90   GFR Calc     --   >90   GFR Calc     < >  >90   GFR Calc     POTASSIUM   --   3.8  4.0   --    --    --    --    --   4.0   --   4.1   < >   --    TSH   --    --    --   0.63   --    --    --   0.79   --    --    --    --    --     < > = values in this interval not displayed.      BP Readings from Last 3 Encounters:   05/15/18 118/72   04/03/18 121/51   03/19/18 130/66    Wt Readings from Last 3 Encounters:   05/15/18 238 lb 1.6 oz (108 kg)   03/08/18 238 lb (108 kg)   02/12/18 244 lb (110.7 kg)                  Labs reviewed in EPIC    Reviewed and updated as needed this visit by clinical staff       Reviewed and updated as needed this visit by Provider         ROS:  CONSTITUTIONAL:POSITIVE  for fatigue and NEGATIVE  for sweats  ENT/MOUTH:  POSITIVE for thrush  and NEGATIVE for epistaxis, fever and nasal congestion  RESP:NEGATIVE for significant cough or SOB  Can fall asleep on   CV: POSITIVE for dyspnea on exertion and NEGATIVE for palpitations and syncope or near-syncope  GI: POSITIVE for abdominal pain intermittently  and NEGATIVE for melena  : Does not drink much   Is peeing more at night. Urine does not burn. Occasional caffeine.   Alcohol at night maybe a couple drinks of alcohol in a week.   MUSCULOSKELETAL: NEGATIVE for significant arthralgias or myalgia  NEURO: NEGATIVE for weakness, dizziness or paresthesias  ENDOCRINE: NEGATIVE for temperature intolerance, skin/hair changes  PSYCHIATRIC: POSITIVE foranxiety and Hx anxiety and NEGATIVE forthoughts of hurting someone else and thoughts of self harm    OBJECTIVE:     /72 (BP Location: Right arm, Patient Position: Sitting, Cuff Size: Adult Large)  Pulse 110  Temp 98.2  F (36.8  C) (Temporal)  Wt 238 lb 1.6 oz (108 kg)  LMP 11/18/1991 (Approximate)  SpO2 94%  BMI 38.43 kg/m2  Body mass index is 38.43 kg/(m^2).   Wt Readings from Last 4 Encounters:   05/15/18 238 lb 1.6 oz (108 kg)   03/08/18 238 lb (108 kg)   02/12/18 244 lb (110.7 kg)   02/08/18 244 lb (110.7 kg)       GENERAL: Patient is well nourished, well developed, well groomed and in no acute distress, anxious,in no apparent distress  EYES: Eyes grossly normal to inspection and conjunctivae and sclerae normal  HENT:ear canals and TM's normal and nose and mouth without ulcers or lesions   NECK:normal, supple and no adenopathy  CARDIAC:regular rates and rhythm and no murmur, click or rub  without extra heart sounds and carotids with brisk upstroke/without bruit bilaterally  RESP: normal respiratory rate and rhythm, lungs clear to auscultation  unlabored respirations, no intercostal retractions or accessory muscle use  ABD:soft, nontender, without hepatosplenomegaly or masses  SKIN: Skin color, texture, turgor normal. No rashes  or lesions.  MS: extremities normal- no gross deformities noted, gait normal and normal muscle tone  NEURO: Normal strength and tone, sensory exam grossly normal, mentation intact and speech normal  PSYCH: Alert, oriented, thought content appropriate, affect: anxieties , thought content exhibits flight of ideas, when questioned about suicide, the patient expresses no suicidal ideation, no homicidal ideation,mentation appears normal., patient appears normal, oriented X 3    Diagnostic Test Results:  Results for orders placed or performed in visit on 05/15/18   **A1C FUTURE anytime   Result Value Ref Range    Hemoglobin A1C 8.5 (H) 0 - 5.6 %       ASSESSMENT/PLAN:     Radha was seen today for diabetes.    Diagnoses and all orders for this visit:    Depression with anxiety  --     DULoxetine (CYMBALTA) 30 MG EC capsule; Take 1 capsule (30 mg) by mouth 2 times daily  Initiate medication with Cymbalta   Reviewed concept of depression as function of biochemical imbalance of neurotransmitters/rationale for treatment.  Risks and benefits of medication(s) reviewed with patient.  Questions answered.  Counseling advised  Followup appointment in 1 month(s)  Patient instructed to call for significant side effects medications or problems  Patient advised immediate presentation to hospital for suicidal thought, etc.      Essential hypertension with goal blood pressure less than 140/90  -     lisinopril (PRINIVIL/ZESTRIL) 10 MG tablet; Take 0.5 tablets (5 mg) by mouth daily  -     spironolactone (ALDACTONE) 100 MG tablet; TAKE 1 TABLET EVERY MORNING  The current medical regimen is effective;  continue present plan and medications.  HTN Plan:  1)  Medication: continue current medication regimen unchanged  2)  Dietary sodium restriction  3)  Regular aerobic exercise  4)  Recheck in 6 months, sooner should new symptoms or   problems arise.  5) See todays orders.    Patient Education: Reviewed risks of hypertension and principles of    treatment.      Urinary urgency  -    Continue current medications.  -     oxybutynin chloride 15 MG TB24; Take 1 tablet (15 mg) by mouth daily    Urgency incontinence  -     oxybutynin chloride 15 MG TB24; Take 1 tablet (15 mg) by mouth daily    Acne vulgaris  --     minocycline (MINOCIN/DYNACIN) 100 MG capsule; Take 1 capsule (100 mg) by mouth every 12 hours    Gastroesophageal reflux disease without esophagitis  -  -     omeprazole (PRILOSEC) 40 MG capsule; TAKE 1 CAPSULE DAILY 30 TO 60 MINUTES BEFORE A MEAL    Hyperlipidemia LDL goal <100  -     simvastatin (ZOCOR) 20 MG tablet; Take 1 tablet (20 mg) by mouth At Bedtime  Continue current medications.    Chronic pain of both knees  -     traZODone (DESYREL) 50 MG tablet; Take 2 tablets (100 mg) by mouth nightly as needed for sleep  Continue current medications.    Thrush  -     clotrimazole 10 MG venkat; Place 1 Venkat (10 mg) inside cheek 5 times daily    Abdominal pain, generalized  -     XR Abdomen 2 Views; Future    Excessive daytime sleepiness  -     SLEEP EVALUATION & MANAGEMENT REFERRAL - [facility] [place]  [phone] (Age 15 and up); Future    Type 2 diabetes mellitus with hyperglycemia, with long-term current use of insulin (H)  -     metFORMIN (GLUCOPHAGE-XR) 500 MG 24 hr tablet; Take 2 tablets (1,000 mg) by mouth 2 times daily (with meals)  foot care discussed and Podiatry visits discussed, annual eye examinations at Ophthalmology discussed and glycohemoglobin monitoring discussed  Attempt to improve diet  Weight loss advised  Increased exercise advised      PLAN:   Symptomatic therapy suggested: Will start on Cymbalta once a day for week then increase to twice a day  Patient needs to follow up in if no improvement,or sooner if worsening of symptoms or other symptoms develop.  Xray abdomen   Sleep study   Follow up one month Dr Medeiros    See Patient Instructions    NIKKY Lutz [facility]  CLINICS

## 2018-05-15 NOTE — MR AVS SNAPSHOT
After Visit Summary   5/15/2018    Radha Corbett    MRN: [id number]           Patient Information     Date Of Birth          1950        Visit Information        Provider Department      5/15/2018 1:30 PM Perri Carias APRN CNP [facility]        Today's Diagnoses     Depression with anxiety    -  1    Uncontrolled type 2 diabetes mellitus with stage 3 chronic kidney disease, with long-term current use of insulin (H)        Essential hypertension with goal blood pressure less than 140/90        Urinary urgency        Urgency incontinence        Acne vulgaris        Gastroesophageal reflux disease without esophagitis        Hyperlipidemia LDL goal <100        Chronic pain of both knees        Thrush        Abdominal pain, generalized        Excessive daytime sleepiness          Care Instructions    PLAN:   1.   Symptomatic therapy suggested: Will start on Cymbalta once a day for week then increase to twice a day    2.  Orders Placed This Encounter   Medications     DISCONTD: oxybutynin chloride 15 MG TB24     Sig: Take 1 tablet (15 mg) by mouth daily     Dispense:  30 tablet     Refill:  0     DISCONTD: lisinopril (PRINIVIL/ZESTRIL) 10 MG tablet     Sig: Take 0.5 tablets (5 mg) by mouth daily     Dispense:  30 tablet     Refill:  3     DISCONTD: metFORMIN (GLUCOPHAGE-XR) 500 MG 24 hr tablet     Sig: Take 2 tablets (1,000 mg) by mouth 2 times daily (with meals)     Dispense:  360 tablet     Refill:  1     DISCONTD: minocycline (MINOCIN/DYNACIN) 100 MG capsule     Sig: Take 1 capsule (100 mg) by mouth every 12 hours     Dispense:  180 capsule     Refill:  2     DISCONTD: omeprazole (PRILOSEC) 40 MG capsule     Sig: TAKE 1 CAPSULE DAILY 30 TO 60 MINUTES BEFORE A MEAL     Dispense:  90 capsule     Refill:  3     DISCONTD: simvastatin (ZOCOR) 20 MG tablet     Sig: Take 1 tablet (20 mg) by mouth At Bedtime     Dispense:  90 tablet     Refill:  3     DISCONTD: traZODone (DESYREL) 50  MG tablet     Sig: Take 2 tablets (100 mg) by mouth nightly as needed for sleep     Dispense:  90 tablet     Refill:  0     DISCONTD: spironolactone (ALDACTONE) 100 MG tablet     Sig: TAKE 1 TABLET EVERY MORNING     Dispense:  90 tablet     Refill:  3     DISCONTD: oxybutynin chloride 15 MG TB24     Sig: Take 1 tablet (15 mg) by mouth daily     Dispense:  90 tablet     Refill:  1     DISCONTD: DULoxetine (CYMBALTA) 30 MG EC capsule     Sig: Take 1 capsule (30 mg) by mouth 2 times daily Take one a day for a week     Dispense:  60 capsule     Refill:  1     DISCONTD: DULoxetine (CYMBALTA) 30 MG EC capsule     Sig: Take 1 capsule (30 mg) by mouth 2 times daily     Dispense:  180 capsule     Refill:  1     clotrimazole 10 MG venkat     Sig: Place 1 Venkat (10 mg) inside cheek 5 times daily     Dispense:  70 Venkat     Refill:  0     DULoxetine (CYMBALTA) 30 MG EC capsule     Sig: Take 1 capsule (30 mg) by mouth 2 times daily     Dispense:  180 capsule     Refill:  1     lisinopril (PRINIVIL/ZESTRIL) 10 MG tablet     Sig: Take 0.5 tablets (5 mg) by mouth daily     Dispense:  30 tablet     Refill:  3     metFORMIN (GLUCOPHAGE-XR) 500 MG 24 hr tablet     Sig: Take 2 tablets (1,000 mg) by mouth 2 times daily (with meals)     Dispense:  360 tablet     Refill:  1     minocycline (MINOCIN/DYNACIN) 100 MG capsule     Sig: Take 1 capsule (100 mg) by mouth every 12 hours     Dispense:  180 capsule     Refill:  2     omeprazole (PRILOSEC) 40 MG capsule     Sig: TAKE 1 CAPSULE DAILY 30 TO 60 MINUTES BEFORE A MEAL     Dispense:  90 capsule     Refill:  3     oxybutynin chloride 15 MG TB24     Sig: Take 1 tablet (15 mg) by mouth daily     Dispense:  90 tablet     Refill:  1     simvastatin (ZOCOR) 20 MG tablet     Sig: Take 1 tablet (20 mg) by mouth At Bedtime     Dispense:  90 tablet     Refill:  3     spironolactone (ALDACTONE) 100 MG tablet     Sig: TAKE 1 TABLET EVERY MORNING     Dispense:  90 tablet     Refill:  3     traZODone  (DESYREL) 50 MG tablet     Sig: Take 2 tablets (100 mg) by mouth nightly as needed for sleep     Dispense:  90 tablet     Refill:  0     Orders Placed This Encounter   Procedures     XR Abdomen 2 Views     SLEEP EVALUATION & MANAGEMENT REFERRAL - ADULT -[facility] [place]  [phone] (Age 15 and up)       3. Patient needs to follow up in if no improvement,or sooner if worsening of symptoms or other symptoms develop.  Xray abdomen   Sleep study   Follow up one month Dr Medeiros         It was a pleasure seeing you today at the [facility]. Thank you for allowing us to care for you today. We truly hope we provided you with the excellent service you deserve. Please let us know if there is anything else we can do for you so we can be sure you are leaving completley satisfied with your care experience.       General information about your clinic   Clinic Hours Lab Hours (Appointments are required)   Mon-Thurs: 7:30 AM - 7 PM Mon-Thurs: 7:30 AM - 7 PM   Fri: 7:30 AM - 5 PM Fri: 7:30 AM - 5 PM        After Hours Nurse Advise & Appts:  [facility]: [phone]  [facility] On Call: to make appointments anytime: [phone] On Call Physician: call [phone] and answering service will page the on call physician.        For urgent appointments, please call [phone] and ask for the triage nurse or your care team clinic nurse.  How to contact my care team:  MyChart: [email or web address]   Phone: [phone]   Fax: [phone]       [facility]:   Phone: [phone]  Hours: 8:00 AM - 6:00 PM  Medication Refills:  Call your pharmacy and they will forward the refill to us. Please allow 3 business days for your refills to be completed.       Normal or non-critical lab and imaging results will be communicated to you by MyChart, letter or phone within 7 days.  If you do not hear from us within 10 days, please call the clinic. If you have a critical or  abnormal lab result, we will notify you by phone as soon as possible.       We now have PWIC (Pediatric Walk in Care)  Monday-Friday from 7:30-4. Simply walk in and be seen for your urgent needs like cough, fever, rash, diarrhea or vomiting, pink eye, UTI. No appointments needed. Ask one of the team for more information      -Your Care Team:    Dr. Trice Medeiros - Internal Medicine/Pediatrics   Dr. Guillaume Pisano - Family Medicine  Dr. Areli Pathak - Pediatrics  Dr. Jacqui Gil - Pediatrics  Perri Carias CNP - Family Practice Nurse Practitioner                         Follow-ups after your visit        Additional Services     SLEEP EVALUATION & MANAGEMENT REFERRAL - [facility] [place][note 2]  [phone] (Age 15 and up)       Please be aware that coverage of these services is subject to the terms and limitations of your health insurance plan.  Call member services at your health plan with any benefit or coverage questions.      Please bring the following to your appointment:    >>   List of current medications   >>   This referral request   >>   Any documents/labs given to you for this referral                      Your next 10 appointments already scheduled     May 24, 2018  1:00 PM CDT   (Arrive by 12:45 PM)   Urodynamics with Huma Hernandez PA-C   [facility] ([facility])    [address] [place] [address] [phone]            Jun 11, 2018  4:00 PM CDT   Return Visit with Dada Baltazar MD   [facility] ([facility])    [id number] [place] [address] [phone]              Future tests that were ordered for you today     Open Future Orders        Priority Expected Expires Ordered    SLEEP EVALUATION & MANAGEMENT REFERRAL - [facility] [place][note 2]  [phone] (Age 15 and up) Routine  5/15/2019 5/15/2018     XR Abdomen 2 Views Routine 5/15/2018 5/15/2019 5/15/2018            Who to contact     If you have questions or need follow up information about today's clinic visit or your schedule please contact [facility] directly at [phone].  Normal or non-critical lab and imaging results will be communicated to you by [employer], letter or phone within 4 business days after the clinic has received the results. If you do not hear from us within 7 days, please contact the clinic through [employer] or phone. If you have a critical or abnormal lab result, we will notify you by phone as soon as possible.  Submit refill requests through aaTag or call your pharmacy and they will forward the refill request to us. Please allow 3 business days for your refill to be completed.          Additional Information About Your Visit        [employer] Information     aaTag gives you secure access to your electronic health record. If you see a primary care provider, you can also send messages to your care team and make appointments. If you have questions, please call your primary care clinic.  If you do not have a primary care provider, please call [phone] and they will assist you.      aaTag is an electronic gateway that provides easy, online access to your medical records. With aaTag, you can request a clinic appointment, read your test results, renew a prescription or communicate with your care team.     To access your existing account, please contact your [facility] or call [phone] for assistance.        Care EveryWhere ID     This is your Care EveryWhere ID. This could be used by other organizations to access your [facility] medical records  [id number]        Your Vitals Were     Pulse Temperature Last Period Pulse Oximetry BMI (Body Mass Index)       110 98.2  F (36.8  C) (Temporal) 11/18/1991 (Approximate) 94% 38.43 kg/m2        Blood Pressure from Last 3 Encounters:    05/15/18 118/72   04/03/18 121/51   03/19/18 130/66    Weight from Last 3 Encounters:   05/15/18 238 lb 1.6 oz (108 kg)   03/08/18 238 lb (108 kg)   02/12/18 244 lb (110.7 kg)                 Today's Medication Changes          These changes are accurate as of 5/15/18  2:27 PM.  If you have any questions, ask your nurse or doctor.               Start taking these medicines.        Dose/Directions    clotrimazole 10 MG venkat   Used for:  Thrush   Started by:  Perri Carias APRN CNP        Dose:  1 Venkat   Place 1 Venkat (10 mg) inside cheek 5 times daily   Quantity:  70 Venkat   Refills:  0       DULoxetine 30 MG EC capsule   Commonly known as:  CYMBALTA   Used for:  Depression with anxiety   Started by:  Perri Carias APRN CNP        Dose:  30 mg   Take 1 capsule (30 mg) by mouth 2 times daily   Quantity:  180 capsule   Refills:  1       lisinopril 10 MG tablet   Commonly known as:  PRINIVIL/ZESTRIL   Used for:  Essential hypertension with goal blood pressure less than 140/90   Started by:  Perri Carias APRN CNP        Dose:  5 mg   Take 0.5 tablets (5 mg) by mouth daily   Quantity:  30 tablet   Refills:  3       metFORMIN 500 MG 24 hr tablet   Commonly known as:  GLUCOPHAGE-XR   Used for:  Uncontrolled type 2 diabetes mellitus with stage 3 chronic kidney disease, with long-term current use of insulin (H)   Started by:  Perri Carias APRN CNP        Dose:  1000 mg   Take 2 tablets (1,000 mg) by mouth 2 times daily (with meals)   Quantity:  360 tablet   Refills:  1       minocycline 100 MG capsule   Commonly known as:  MINOCIN/DYNACIN   Used for:  Acne vulgaris   Started by:  Perri Carias APRN CNP        Dose:  100 mg   Take 1 capsule (100 mg) by mouth every 12 hours   Quantity:  180 capsule   Refills:  2       omeprazole 40 MG capsule   Commonly known as:  priLOSEC   Used for:  Gastroesophageal reflux disease without esophagitis   Started by:  Perri Carias  APRN CNP        TAKE 1 CAPSULE DAILY 30 TO 60 MINUTES BEFORE A MEAL   Quantity:  90 capsule   Refills:  3       oxybutynin chloride 15 MG Tb24   Used for:  Urinary urgency, Urgency incontinence   Started by:  Perri Carias APRN CNP        Dose:  1 tablet   Take 1 tablet (15 mg) by mouth daily   Quantity:  90 tablet   Refills:  1       simvastatin 20 MG tablet   Commonly known as:  ZOCOR   Used for:  Hyperlipidemia LDL goal <100   Started by:  Perri Carias APRN CNP        Dose:  20 mg   Take 1 tablet (20 mg) by mouth At Bedtime   Quantity:  90 tablet   Refills:  3       spironolactone 100 MG tablet   Commonly known as:  ALDACTONE   Used for:  Essential hypertension with goal blood pressure less than 140/90   Started by:  Perri Carias APRN CNP        TAKE 1 TABLET EVERY MORNING   Quantity:  90 tablet   Refills:  3       traZODone 50 MG tablet   Commonly known as:  DESYREL   Used for:  Chronic pain of both knees   Started by:  Perri Carias APRN CNP        Dose:  100 mg   Take 2 tablets (100 mg) by mouth nightly as needed for sleep   Quantity:  90 tablet   Refills:  0            Where to get your medicines      These medications were sent to [facility] [address] [address]     Phone:  [phone]     DULoxetine 30 MG EC capsule    lisinopril 10 MG tablet    metFORMIN 500 MG 24 hr tablet    minocycline 100 MG capsule    omeprazole 40 MG capsule    oxybutynin chloride 15 MG Tb24    simvastatin 20 MG tablet    spironolactone 100 MG tablet    traZODone 50 MG tablet         These medications were sent to [facility] - [place], [place] - [address], [address]  [address], [address], [facility] [address]     Phone:  [phone]     clotrimazole 10 MG kelly                Primary Care Provider Office Phone # Fax #    Trice Medeiros MD PhD [phone] [address]  [facility] [address]        Equal Access to Services     MAIA GARCIA : Hadii kyree ku hadbola Sodipakali, waaxda luqadaha, qaybta kaalmada shekharjohnsonsayda, michael betancourtmlcindy tineo. So [facility] [phone].    ATENCIÓN: Si habla español, tiene a arauz disposición servicios gratuitos de asistencia lingüística. Llame al [phone].    We comply with applicable federal civil rights laws and [place] laws. We do not discriminate on the basis of race, color, national origin, age, disability, sex, sexual orientation, or gender identity.            Thank you!     Thank you for choosing [facility]  for your care. Our goal is always to provide you with excellent care. Hearing back from our patients is one way we can continue to improve our services. Please take a few minutes to complete the written survey that you may receive in the mail after your visit with us. Thank you!             Your Updated Medication List - Protect others around you: Learn how to safely use, store and throw away your medicines at www.disposemymeds.org.          This list is accurate as of 5/15/18  2:27 PM.  Always use your most recent med list.                   Brand Name Dispense Instructions for use Diagnosis    aspirin 81 MG tablet      1 TABLET DAILY        CALCIUM 600+D PO      1 tab daily        ciprofloxacin 500 MG tablet    CIPRO    20 tablet    Take 1 tablet (500 mg) by mouth 2 times daily    Acute cystitis without hematuria       clotrimazole 10 MG kelly     70 Kelly    Place 1 Kelly (10 mg) inside cheek 5 times daily    Thrush       DULoxetine 30 MG EC capsule    CYMBALTA    180 capsule    Take 1 capsule (30 mg) by mouth 2 times daily    Depression with anxiety       gabapentin 300 MG capsule    NEURONTIN    90 capsule    Take 1 tablet (300 mg) every night for 1-3 days, then 1 tablet twice daily for 1-3 days, then 1 tablet three times daily    Chronic pain of both knees       insulin syringe-needle  "U-100 31G X 5/16\" 1 ML    RELION INSULIN SYRINGE    200 each    Use 2 syringes daily or as directed.    Type 2 diabetes mellitus without complication (H)       lisinopril 10 MG tablet    PRINIVIL/ZESTRIL    30 tablet    Take 0.5 tablets (5 mg) by mouth daily    Essential hypertension with goal blood pressure less than 140/90       MELATONIN PO      10 mg At Bedtime        metFORMIN 500 MG 24 hr tablet    GLUCOPHAGE-XR    360 tablet    Take 2 tablets (1,000 mg) by mouth 2 times daily (with meals)    Uncontrolled type 2 diabetes mellitus with stage 3 chronic kidney disease, with long-term current use of insulin (H)       minocycline 100 MG capsule    MINOCIN/DYNACIN    180 capsule    Take 1 capsule (100 mg) by mouth every 12 hours    Acne vulgaris       mirabegron 50 MG 24 hr tablet    MYRBETRIQ    30 tablet    Take 1 tablet (50 mg) by mouth daily    Urinary urgency, Urgency incontinence, Urinary frequency       NovoLIN N VIAL 100 UNIT/ML injection   Generic drug:  insulin NPH      Inject 60 units Subcutaneous 2 times daily. (gets this from [facility] at [facility])    Uncontrolled type 2 diabetes mellitus with stage 3 chronic kidney disease, with long-term current use of insulin (H)       omeprazole 40 MG capsule    priLOSEC    90 capsule    TAKE 1 CAPSULE DAILY 30 TO 60 MINUTES BEFORE A MEAL    Gastroesophageal reflux disease without esophagitis       ONE TOUCH LANCETS      None Entered        ONE TOUCH TEST STRIPS VI      None Entered        oxybutynin chloride 15 MG Tb24     90 tablet    Take 1 tablet (15 mg) by mouth daily    Urinary urgency, Urgency incontinence       simvastatin 20 MG tablet    ZOCOR    90 tablet    Take 1 tablet (20 mg) by mouth At Bedtime    Hyperlipidemia LDL goal <100       spironolactone 100 MG tablet    ALDACTONE    90 tablet    TAKE 1 TABLET EVERY MORNING    Essential hypertension with goal blood pressure less than 140/90       traZODone 50 MG tablet    DESYREL    90 tablet    Take 2 tablets " (100 mg) by mouth nightly as needed for sleep    Chronic pain of both knees       VISION FORMULA EYE HEALTH PO      Take 1 tablet by mouth daily        vitamin D 1000 units capsule      1 CAPSULE DAILY

## 2018-05-15 NOTE — PATIENT INSTRUCTIONS
PLAN:   1.   Symptomatic therapy suggested: Will start on Cymbalta once a day for week then increase to twice a day    2.  Orders Placed This Encounter   Medications     DISCONTD: oxybutynin chloride 15 MG TB24     Sig: Take 1 tablet (15 mg) by mouth daily     Dispense:  30 tablet     Refill:  0     DISCONTD: lisinopril (PRINIVIL/ZESTRIL) 10 MG tablet     Sig: Take 0.5 tablets (5 mg) by mouth daily     Dispense:  30 tablet     Refill:  3     DISCONTD: metFORMIN (GLUCOPHAGE-XR) 500 MG 24 hr tablet     Sig: Take 2 tablets (1,000 mg) by mouth 2 times daily (with meals)     Dispense:  360 tablet     Refill:  1     DISCONTD: minocycline (MINOCIN/DYNACIN) 100 MG capsule     Sig: Take 1 capsule (100 mg) by mouth every 12 hours     Dispense:  180 capsule     Refill:  2     DISCONTD: omeprazole (PRILOSEC) 40 MG capsule     Sig: TAKE 1 CAPSULE DAILY 30 TO 60 MINUTES BEFORE A MEAL     Dispense:  90 capsule     Refill:  3     DISCONTD: simvastatin (ZOCOR) 20 MG tablet     Sig: Take 1 tablet (20 mg) by mouth At Bedtime     Dispense:  90 tablet     Refill:  3     DISCONTD: traZODone (DESYREL) 50 MG tablet     Sig: Take 2 tablets (100 mg) by mouth nightly as needed for sleep     Dispense:  90 tablet     Refill:  0     DISCONTD: spironolactone (ALDACTONE) 100 MG tablet     Sig: TAKE 1 TABLET EVERY MORNING     Dispense:  90 tablet     Refill:  3     DISCONTD: oxybutynin chloride 15 MG TB24     Sig: Take 1 tablet (15 mg) by mouth daily     Dispense:  90 tablet     Refill:  1     DISCONTD: DULoxetine (CYMBALTA) 30 MG EC capsule     Sig: Take 1 capsule (30 mg) by mouth 2 times daily Take one a day for a week     Dispense:  60 capsule     Refill:  1     DISCONTD: DULoxetine (CYMBALTA) 30 MG EC capsule     Sig: Take 1 capsule (30 mg) by mouth 2 times daily     Dispense:  180 capsule     Refill:  1     clotrimazole 10 MG venkat     Sig: Place 1 Venkat (10 mg) inside cheek 5 times daily     Dispense:  70 Venkat     Refill:  0      DULoxetine (CYMBALTA) 30 MG EC capsule     Sig: Take 1 capsule (30 mg) by mouth 2 times daily     Dispense:  180 capsule     Refill:  1     lisinopril (PRINIVIL/ZESTRIL) 10 MG tablet     Sig: Take 0.5 tablets (5 mg) by mouth daily     Dispense:  30 tablet     Refill:  3     metFORMIN (GLUCOPHAGE-XR) 500 MG 24 hr tablet     Sig: Take 2 tablets (1,000 mg) by mouth 2 times daily (with meals)     Dispense:  360 tablet     Refill:  1     minocycline (MINOCIN/DYNACIN) 100 MG capsule     Sig: Take 1 capsule (100 mg) by mouth every 12 hours     Dispense:  180 capsule     Refill:  2     omeprazole (PRILOSEC) 40 MG capsule     Sig: TAKE 1 CAPSULE DAILY 30 TO 60 MINUTES BEFORE A MEAL     Dispense:  90 capsule     Refill:  3     oxybutynin chloride 15 MG TB24     Sig: Take 1 tablet (15 mg) by mouth daily     Dispense:  90 tablet     Refill:  1     simvastatin (ZOCOR) 20 MG tablet     Sig: Take 1 tablet (20 mg) by mouth At Bedtime     Dispense:  90 tablet     Refill:  3     spironolactone (ALDACTONE) 100 MG tablet     Sig: TAKE 1 TABLET EVERY MORNING     Dispense:  90 tablet     Refill:  3     traZODone (DESYREL) 50 MG tablet     Sig: Take 2 tablets (100 mg) by mouth nightly as needed for sleep     Dispense:  90 tablet     Refill:  0     Orders Placed This Encounter   Procedures     XR Abdomen 2 Views     SLEEP EVALUATION & MANAGEMENT REFERRAL - [facility]  [phone] (Age 15 and up)       3. Patient needs to follow up in if no improvement,or sooner if worsening of symptoms or other symptoms develop.  Xray abdomen   Sleep study   Follow up one month Dr Medeiros         It was a pleasure seeing you today at the [facility]. Thank you for allowing us to care for you today. We truly hope we provided you with the excellent service you deserve. Please let us know if there is anything else we can do for you so we can be sure you are leaving completley satisfied with your  care experience.       General information about your clinic   Clinic Hours Lab Hours (Appointments are required)   Mon-Thurs: 7:30 AM - 7 PM Mon-Thurs: 7:30 AM - 7 PM   Fri: 7:30 AM - 5 PM Fri: 7:30 AM - 5 PM        After Hours Nurse Advise & Appts:  [facility]: [phone]  [facility] On Call: to make appointments anytime: [phone] On Call Physician: call [phone] and answering service will page the on call physician.        For urgent appointments, please call [phone] and ask for the triage nurse or your care team clinic nurse.  How to contact my care team:  MyChart: [email or web address]   Phone: [phone]   Fax: [phone]       [facility]:   Phone: [phone]  Hours: 8:00 AM - 6:00 PM  Medication Refills:  Call your pharmacy and they will forward the refill to us. Please allow 3 business days for your refills to be completed.       Normal or non-critical lab and imaging results will be communicated to you by MyChart, letter or phone within 7 days.  If you do not hear from us within 10 days, please call the clinic. If you have a critical or abnormal lab result, we will notify you by phone as soon as possible.       We now have PWIC (Pediatric Walk in Care)  Monday-Friday from 7:30-4. Simply walk in and be seen for your urgent needs like cough, fever, rash, diarrhea or vomiting, pink eye, UTI. No appointments needed. Ask one of the team for more information      -Your Care Team:    Dr. Trice Medeiros - Internal Medicine/Pediatrics   Dr. Guillaume Pisano - Family Medicine  Dr. Areli Pathak - Pediatrics  Dr. Jacqui Gil - Pediatrics  Perri Carias CNP - Family Practice Nurse Practitioner

## 2018-05-15 NOTE — TELEPHONE ENCOUNTER
simvastatin (ZOCOR) 20 MG tablet 90 tablet 3 7/24/2017  No   Sig: Take 1 tablet (20 mg) by mouth At Bedtime     Last OV with Dr. Medeiros: 3/8/2018    NOTE: patient has lab apt sched for 5/15/18    Next 5 appointments (look out 90 days)     May 15, 2018  1:30 PM CDT   [facility] Visit with NIKKY Lutz CNP   [facility]    [address] [address] [place] [address] [id number] [phone]            Jun 11, 2018  4:00 PM CDT   Return Visit with Dada Baltazar MD   [facility])    [address] [address] [place] [address] [id number] [phone]                LDL Cholesterol Calculated   Date Value Ref Range Status   06/22/2017 72 <100 mg/dL Final     Comment:     Desirable:       <100 mg/dl     Creatinine   Date Value Ref Range Status   02/08/2018 0.50 (L) 0.52 - 1.04 mg/dL Final     Statins Protocol Passed5/14 11:20 AM   LDL on file in past 12 months    No abnormal creatine kinase in past 12 months    Recent (12 mo) or future (30 days) visit within the authorizing provider's specialty    Patient is age 18 or older    No active pregnancy on record    No positive pregnancy test in past 12 months     omeprazole (PRILOSEC) 40 MG capsule 90 capsule 2 8/4/2017  No   Sig: TAKE 1 CAPSULE DAILY 30 TO 60 MINUTES BEFORE A MEAL     PPI Protocol Passed5/14 11:20 AM   Not on Clopidogrel (unless Pantoprazole ordered)    No diagnosis of osteoporosis on record    Recent (12 mo) or future (30 days) visit within the authorizing provider's specialty    Patient is age 18 or older    No active pregnacy on record    No positive pregnancy test in past 12 months

## 2018-05-16 ENCOUNTER — TELEPHONE (OUTPATIENT)
Dept: PEDIATRICS | Facility: CLINIC | Age: 68
End: 2018-05-16

## 2018-05-16 ASSESSMENT — PATIENT HEALTH QUESTIONNAIRE - PHQ9: SUM OF ALL RESPONSES TO PHQ QUESTIONS 1-9: 27

## 2018-05-16 ASSESSMENT — ANXIETY QUESTIONNAIRES: GAD7 TOTAL SCORE: 21

## 2018-05-16 NOTE — TELEPHONE ENCOUNTER
[facility] Call Center    Phone Message    May a detailed message be left on voicemail: no    Reason for Call: Symptoms or Concerns     If patient has red-flag symptoms, warm transfer to triage line    Current symptom or concern: hair loss, loose stools, pt also would like to discuss diagnosis she found on her paper work    Symptoms have been present for:  A couple day(s)    Has patient previously been seen for this? No    Are there any new or worsening symptoms? Yes      Action Taken: Message routed to:  Primary Care p [phone]

## 2018-05-16 NOTE — TELEPHONE ENCOUNTER
Left message for patient to return call to clinic and ask to speak with RN.    Rosaura Keating RN,   [facility], [facility]      Lab Results   Component Value Date    A1C 8.5 05/15/2018    A1C 9.2 02/08/2018

## 2018-05-16 NOTE — TELEPHONE ENCOUNTER
Patient states she saw her AVS and it stated   Diabetes mellitus due to underlying condition with stage 3 chronic kidney disease, with long-term current use of insulin.    She has never been told she has kidney disease let alone the stage 3?  She asks when did this happen and why?  She is very concerned about this.    Also she wanted to update Grace, she forgot to tell her yesterday at office visit:  She has been having hair loss, and she is still continuing to have diarrrhea.  She thought she was constipated but she is having diarrhea know.  She states she knows she still needs to get her abd xray imaging done.    She is aware that Grace is not in clinic today and is ok with waiting until tomorrow.  Will also route to patients PCP regarding the kidney disease.      Rosaura Keating RN,   . [facility], [facility]

## 2018-05-16 NOTE — TELEPHONE ENCOUNTER
The stress she is having can be aggravating her hair loss  Needs to get the xray done of abdomen  Fixed her diagnosis to remove the renal issue as in 2016 she was spilling protein in her urine which shows strain on the kidney but I see the last one done in September had no more protein

## 2018-05-16 NOTE — TELEPHONE ENCOUNTER
She does not have stage 3 kidney disease currently. Likely it is probably got on the AVS by mistake. She can ignore this. It is not on her medical diagnosis list on her chart.

## 2018-05-20 NOTE — PROGRESS NOTES
Dear Radha,   Here are your recent results.   -- your A1c improved some but not at goal yet.  -- you saw Grace last week, did you increase the NPH dose?     Please call or Mychart to our office if you have further questions.     Trice Medeiros MD-PhD

## 2018-05-22 ENCOUNTER — RADIANT APPOINTMENT (OUTPATIENT)
Dept: MAMMOGRAPHY | Facility: CLINIC | Age: 68
End: 2018-05-22
Attending: INTERNAL MEDICINE
Payer: COMMERCIAL

## 2018-05-22 ENCOUNTER — RADIANT APPOINTMENT (OUTPATIENT)
Dept: GENERAL RADIOLOGY | Facility: CLINIC | Age: 68
End: 2018-05-22
Attending: NURSE PRACTITIONER
Payer: COMMERCIAL

## 2018-05-22 DIAGNOSIS — J90 PLEURAL EFFUSION ON LEFT: Primary | ICD-10-CM

## 2018-05-22 DIAGNOSIS — R10.84 ABDOMINAL PAIN, GENERALIZED: ICD-10-CM

## 2018-05-22 DIAGNOSIS — Z12.31 ENCOUNTER FOR SCREENING MAMMOGRAM FOR BREAST CANCER: ICD-10-CM

## 2018-05-22 PROCEDURE — 74019 RADEX ABDOMEN 2 VIEWS: CPT | Performed by: RADIOLOGY

## 2018-05-22 PROCEDURE — 77063 BREAST TOMOSYNTHESIS BI: CPT

## 2018-05-22 PROCEDURE — 77067 SCR MAMMO BI INCL CAD: CPT

## 2018-05-24 ENCOUNTER — TELEPHONE (OUTPATIENT)
Dept: PEDIATRICS | Facility: CLINIC | Age: 68
End: 2018-05-24

## 2018-05-24 ENCOUNTER — RADIANT APPOINTMENT (OUTPATIENT)
Dept: RADIOLOGY | Facility: AMBULATORY SURGERY CENTER | Age: 68
End: 2018-05-24
Attending: PHYSICIAN ASSISTANT
Payer: COMMERCIAL

## 2018-05-24 ENCOUNTER — OFFICE VISIT (OUTPATIENT)
Dept: UROLOGY | Facility: CLINIC | Age: 68
End: 2018-05-24
Payer: COMMERCIAL

## 2018-05-24 VITALS
HEIGHT: 67 IN | SYSTOLIC BLOOD PRESSURE: 122 MMHG | BODY MASS INDEX: 37.35 KG/M2 | DIASTOLIC BLOOD PRESSURE: 69 MMHG | HEART RATE: 102 BPM | WEIGHT: 238 LBS

## 2018-05-24 DIAGNOSIS — N39.41 URGE INCONTINENCE OF URINE: ICD-10-CM

## 2018-05-24 DIAGNOSIS — R39.15 URINARY URGENCY: ICD-10-CM

## 2018-05-24 DIAGNOSIS — N30.00 ACUTE CYSTITIS WITHOUT HEMATURIA: ICD-10-CM

## 2018-05-24 DIAGNOSIS — R39.15 URINARY URGENCY: Primary | ICD-10-CM

## 2018-05-24 LAB
APPEARANCE UR: CLEAR
BILIRUB UR QL: NORMAL
COLOR UR: YELLOW
GLUCOSE URINE: 1000 MG/DL
HGB UR QL: NORMAL
KETONES UR QL: NORMAL MG/DL
LEUKOCYTE ESTERASE URINE: NORMAL
NITRITE UR QL STRIP: NORMAL
PH UR STRIP: 6 PH (ref 5–7)
PROTEIN ALBUMIN URINE: NORMAL MG/DL
SOURCE: NORMAL
SP GR UR STRIP: 1.02 (ref 1–1.03)
UROBILINOGEN UR QL STRIP: 0.2 EU/DL (ref 0.2–1)

## 2018-05-24 RX ORDER — OXYCODONE HYDROCHLORIDE 5 MG/1
TABLET ORAL
COMMUNITY
Start: 2018-02-19 | End: 2018-05-24

## 2018-05-24 RX ORDER — TOLTERODINE 4 MG/1
4 CAPSULE, EXTENDED RELEASE ORAL
COMMUNITY
End: 2018-05-24

## 2018-05-24 RX ORDER — CIPROFLOXACIN 250 MG/1
250 TABLET, FILM COATED ORAL 2 TIMES DAILY
Qty: 10 TABLET | Refills: 0 | Status: SHIPPED | OUTPATIENT
Start: 2018-05-24 | End: 2018-05-29

## 2018-05-24 RX ORDER — PNEUMOCOCCAL 13-VALENT CONJUGATE VACCINE 2.2; 2.2; 2.2; 2.2; 2.2; 4.4; 2.2; 2.2; 2.2; 2.2; 2.2; 2.2; 2.2 UG/.5ML; UG/.5ML; UG/.5ML; UG/.5ML; UG/.5ML; UG/.5ML; UG/.5ML; UG/.5ML; UG/.5ML; UG/.5ML; UG/.5ML; UG/.5ML; UG/.5ML
INJECTION, SUSPENSION INTRAMUSCULAR
COMMUNITY
Start: 2017-11-03 | End: 2018-08-14

## 2018-05-24 RX ORDER — GABAPENTIN 600 MG/1
TABLET ORAL
COMMUNITY
Start: 2017-09-26 | End: 2018-06-22

## 2018-05-24 RX ORDER — NITROFURANTOIN 25; 75 MG/1; MG/1
CAPSULE ORAL
COMMUNITY
Start: 2017-09-21 | End: 2018-06-11

## 2018-05-24 RX ORDER — DIPHENHYDRAMINE HCL 25 MG
50 CAPSULE ORAL
COMMUNITY
End: 2018-06-22 | Stop reason: ALTCHOICE

## 2018-05-24 RX ORDER — BUPROPION HYDROCHLORIDE 300 MG/1
300 TABLET ORAL
COMMUNITY
End: 2018-06-22

## 2018-05-24 RX ORDER — CEPHALEXIN 500 MG/1
CAPSULE ORAL
COMMUNITY
Start: 2018-02-19 | End: 2018-05-24

## 2018-05-24 ASSESSMENT — PAIN SCALES - GENERAL: PAINLEVEL: NO PAIN (0)

## 2018-05-24 NOTE — PROGRESS NOTES
PREPROCEDURE DIAGNOSES:    1. Urinary urgency and urge incontinence     POSTPROCEDURE DIAGNOSES:  1. Detrusor overactivity  2. UDS shows:  -Multiple uninhibited detrusor contractions starting at bladder volumes >100 mL with Pdet pressures ranging from  cm H2O. She never leaks with any of these episodes of DO.  -Otherwise good bladder compliance between UDC's.  -Small/normal bladder capacity (293 mL)  -Normal bladder filling sensations.  -No reproducible FRANCHESKA or DOI.  -Strong detrusor contraction during voiding to 81.5 cm H2O with slightly slow flow (Qmax 14.7 ml/s) and some incomplete bladder emptying ( mL).  -No evidence for DSD.  -No evidence for outlet obstruction.  -Fluoroscopy reveals a mildly trabeculated bladder wall without diverticuli or VUR. The bladder neck was closed during filling, open during episodes of DO with contrast stopping at the level of the pelvic floor vs. external urinary sphincter, and open during voiding.     PROCEDURE:    1. Sterile urethral atheterization for measurement of postvoid residual urine volume.  2. Complex filling cystometrogram with measurement of bladder and rectal pressures.  3. Complex voiding cystometrogram with measurement of bladder and rectal pressures.  4. Electromyography of the pelvic floor during urodynamics.  5. Fluoroscopic imaging of the bladder during urodynamics, at least 3 views.    6. Interpretation of urodynamics and flouroscopic imaging.      INDICATIONS FOR PROCEDURE:  Ms. Radha Corbett is a pleasant 67 year old female with urinary urgency and urge incontinence refractory to multiple anticholinergic medications in the setting of back problems. Recent cystoscopy revealed a moderately trabeculated bladder. Baseline video urodynamic assessment is requested today by Dr. Baltazar to better characterize Ms. Radha Corbett's voiding dysfunction.      VOIDING DIARY:  Patient did not complete.     DESCRIPTION OF PROCEDURE:  Risks, benefits, and alternatives to  urodynamics were discussed with the patient and she wished to proceed.  Urodynamics are planned to better assess the primary etiology for Ms. Corbett's urologic dysfunction.  The patient last took anticholinergic medication 16 hours ago.  After informed consent was obtained, the patient was taken to the procedure room where uroflowmetry was performed. Findings below.     PRE-STUDY UROFLOWMETRY:  Not performed as patient voided prior to appointment and had no urge to void.  Postvoid residual by catheter: 75 mL.  Pretest urine dipstick was positive for nitrites and leukocytes. The patient denies any UTI symptoms today; specifically, denies dysuria, frequency, urgency, hematuria, fevers, chills, N/V.   -Recommendation was to cancel today's urodynamic study due to possible infection, however, patient really wants to proceed with test as she is desperate for treatment of her urinary urgency and urge incontinence.   -We discussed the risks vs benefits of proceeding with today's study in the setting of possible infection. The patient verbalized understanding and wishes to proceed. Urine will be sent for formal culture and she will be started empirically on Cipro until culture results are available.     Next a 7F double-lumen urodynamics catheter was inserted into the bladder under sterile technique via native urethra.  A 7F abdominal manometry catheter was placed in the rectum.  EMG pads were placed on both sides of the anal verge.  The bladder was filled with 200 mL of Cystografin at 25 mL/minute and serial pressures were recorded.  With coughing there was an appropriate rise in vesical and abdominal pressures with no change in detrusor pressure, confirming good study catheter placement.    DURING THE FILLING PHASE:  First sensation: 98 mL.  First Desire: 113 mL.  Strong Desire: 248 mL.  Maximum Capacity: 293 mL.    Uninhibited detrusor contractions: several UDC's starting at bladder volumes >100 mL with Pdet pressures  ranging from  cm H2O. She never leaks with any of these episodes of DO.  Compliance: otherwise good between UDC's. PDet=19 cmH20 at capacity. Compliance ratio of 15.  Continence: no FRANCHESKA or DOI  EMG: concordant during filling.    DURING THE VOIDING PHASE:  Maximum detrusor contraction with void: 81.5 cm of H2O pressure.  Voided volume: 154 mL.  Maximum flow rate: 14.7 mL/sec.  Average flow rate: 3.0 mL/sec.  Postvoid Residual: 140 mL.  Detrusor sphincter dyssynergia: no evidence to suggest this.  Character of voiding curve: somewhat intermittent.  BOOI: -19.4 (suggesting no obstruction - see key below)  [obstructed (BECERRIL index [BOOI] ? 40); equivocal (no definite   obstruction; BOOI 20-40); and no obstruction (BOOI ? 20)]    FLUOROSCOPIC IMAGING OF THE BLADDER DURING URODYNAMICS:  Fluoroscopy during today's procedure demonstrated a mildly trabeculated bladder wall without diverticulae or cellules.  No vesicoureteral reflux was observed.  The bladder neck was closed during filling, open during episodes of DO with contrast stopping at the level of the pelvic floor vs external urinary sphincter, and open during voiding.  After voiding to completion, all catheters were removed and the patient was brought back into the consultation room to further discuss today's study results.      ASSESSMENT/PLAN:  Ms. Radha Corbett is a pleasant 67 year old female with urinary urgency and urge incontinence who demonstrated the following findings today on urodynamic evaluation:    -Multiple uninhibited detrusor contractions starting at bladder volumes >100 mL with Pdet pressures ranging from  cm H2O. She never leaks with any of these episodes of DO.  -Otherwise good bladder compliance between UDC's.  -Small/normal bladder capacity (293 mL)  -Normal bladder filling sensations.  -No reproducible FRANCHESKA or DOI.  -Strong detrusor contraction during voiding to 81.5 cm H2O with slightly slow flow (Qmax 14.7 ml/s) and some incomplete  bladder emptying ( mL).  -No evidence for DSD.  -No evidence for outlet obstruction.  -Fluoroscopy reveals a mildly trabeculated bladder wall without diverticuli or VUR. The bladder neck was closed during filling, open during episodes of DO with contrast stopping at the level of the pelvic floor vs external urinary sphincter, and open during voiding.     The patient will follow up as scheduled with Dr. Baltazar to further discuss today's study results and make plans for how best to proceed.      - A single Cipro antibiotic was provided for UTI prophylaxis following completion of today's study per department protocol.  The risk of UTI with VUDS is low at ~2.5-3%. Of note, pre-test urine dipstick was positive for nitrites and leukocytes. She denies any UTI symptoms. Recommendation was to cancel today's urodynamic study due to possible infection, however, patient really wants to proceed with the test as she is desperate for treatment of her urinary urgency and urge incontinence. We discussed the risks vs benefits of proceeding with today's study in the setting of possible infection. The patient verbalized understanding and wishes to proceed. Urine will be sent for formal culture and she will be started empirically on Cipro until culture results are available.     Thank you for allowing me to participate in the care of Ms. Radha Corbett and please don't hesitate to contact me with any questions or concerns.      Huma Hernandez PA-C  Urology Physician Assistant

## 2018-05-24 NOTE — TELEPHONE ENCOUNTER
XR Abdomen 2 Views   Status:  Final result   Visible to patient:  Yes (MyChart) Dx:  Abdominal pain, generalized Order: [id number]       Notes Recorded by Perri Carias APRN CNP on 5/23/2018 at 7:34 PM  Please call   Xray shows moderate amount of stool  Would start on Miralax one scoop a day  Also need a closer look at her lungs as some changes in the bases of lungs on xray   I will place order. Please call [phone] to schedule.    Perri Carias, NP, APRN CNP         Details        Reading Physician Reading Date Result Priority       Shakeel Fisher MD 5/22/2018            Narrative             Exam: Abdominal x-ray, 2 views, 5/20/2018.    COMPARISON: No similar study.    HISTORY: Abdominal pain, generalized.    FINDINGS: Supine and upright views of the abdomen were obtained.  Nonobstructive bowel gas pattern. Moderate colonic stool burden. No  pneumatosis. No portal venous gas. Radiodense structures projecting  over the pelvis, likely due to postoperative changes. No free air  beneath the diaphragm. Degenerative changes of the spine. Small left  pleural effusion. Bilateral lower lobes opacities, left greater than  right, likely representing streaky atelectasis versus consolidation.             Impression             IMPRESSION:  1. Nonobstructive bowel gas pattern. Moderate colonic stool burden.  2. Small left pleural effusion. Bilateral lower lobes opacities, left  greater than right, atelectasis versus consolidation.    SHAKEEL FISHER MD               Last Resulted: 05/22/18  2:57 PM                     Charity Mcdonough RN

## 2018-05-24 NOTE — TELEPHONE ENCOUNTER
Left message for patient to return call to clinic and ask to speak with RN OR can check results on [employer].      Rosaura Keating RN,   [facility], [facility]

## 2018-05-24 NOTE — LETTER
5/24/2018       RE: Radha Corbett  [address] [id number]     Dear Colleague,    Thank you for referring your patient, Radha Corbett, to the [facility] UR[facility] AND [facility]ST FOR PROSTATE AND UROLOGIC CANCERS at [facility]. Please see a copy of my visit note below.    PREPROCEDURE DIAGNOSES:    1. Urinary urgency and urge incontinence     POSTPROCEDURE DIAGNOSES:  1. Detrusor overactivity  2. UDS shows:  -Multiple uninhibited detrusor contractions starting at bladder volumes >100 mL with Pdet pressures ranging from  cm H2O. She never leaks with any of these episodes of DO.  -Otherwise good bladder compliance between UDC's.  -Small/normal bladder capacity (293 mL)  -Normal bladder filling sensations.  -No reproducible FRANCHESKA or DOI.  -Strong detrusor contraction during voiding to 81.5 cm H2O with slightly slow flow (Qmax 14.7 ml/s) and some incomplete bladder emptying ( mL).  -No evidence for DSD.  -No evidence for outlet obstruction.  -Fluoroscopy reveals a mildly trabeculated bladder wall without diverticuli or VUR. The bladder neck was closed during filling, open during episodes of DO with contrast stopping at the level of the pelvic floor vs. external urinary sphincter, and open during voiding.     PROCEDURE:    1. Sterile urethral atheterization for measurement of postvoid residual urine volume.  2. Complex filling cystometrogram with measurement of bladder and rectal pressures.  3. Complex voiding cystometrogram with measurement of bladder and rectal pressures.  4. Electromyography of the pelvic floor during urodynamics.  5. Fluoroscopic imaging of the bladder during urodynamics, at least 3 views.    6. Interpretation of urodynamics and flouroscopic imaging.      INDICATIONS FOR PROCEDURE:  Ms. Radha Corbett is a pleasant 67 year old female with urinary urgency and urge incontinence refractory to multiple anticholinergic medications in the setting of back  problems. Recent cystoscopy revealed a moderately trabeculated bladder. Baseline video urodynamic assessment is requested today by Dr. Baltazar to better characterize Ms. Radha Corbett's voiding dysfunction.      VOIDING DIARY:  Patient did not complete.     DESCRIPTION OF PROCEDURE:  Risks, benefits, and alternatives to urodynamics were discussed with the patient and she wished to proceed.  Urodynamics are planned to better assess the primary etiology for Ms. Corbett's urologic dysfunction.  The patient last took anticholinergic medication 16 hours ago.  After informed consent was obtained, the patient was taken to the procedure room where uroflowmetry was performed. Findings below.     PRE-STUDY UROFLOWMETRY:  Not performed as patient voided prior to appointment and had no urge to void.  Postvoid residual by catheter: 75 mL.  Pretest urine dipstick was positive for nitrites and leukocytes. The patient denies any UTI symptoms today; specifically, denies dysuria, frequency, urgency, hematuria, fevers, chills, N/V.   -Recommendation was to cancel today's urodynamic study due to possible infection, however, patient really wants to proceed with test as she is desperate for treatment of her urinary urgency and urge incontinence.   -We discussed the risks vs benefits of proceeding with today's study in the setting of possible infection. The patient verbalized understanding and wishes to proceed. Urine will be sent for formal culture and she will be started empirically on Cipro until culture results are available.     Next a 7F double-lumen urodynamics catheter was inserted into the bladder under sterile technique via native urethra.  A 7F abdominal manometry catheter was placed in the rectum.  EMG pads were placed on both sides of the anal verge.  The bladder was filled with 200 mL of Cystografin at 25 mL/minute and serial pressures were recorded.  With coughing there was an appropriate rise in vesical and abdominal pressures with  no change in detrusor pressure, confirming good study catheter placement.    DURING THE FILLING PHASE:  First sensation: 98 mL.  First Desire: 113 mL.  Strong Desire: 248 mL.  Maximum Capacity: 293 mL.    Uninhibited detrusor contractions: several UDC's starting at bladder volumes >100 mL with Pdet pressures ranging from  cm H2O. She never leaks with any of these episodes of DO.  Compliance: otherwise good between UDC's. PDet=19 cmH20 at capacity. Compliance ratio of 15.  Continence: no FRANCHESKA or DOI  EMG: concordant during filling.    DURING THE VOIDING PHASE:  Maximum detrusor contraction with void: 81.5 cm of H2O pressure.  Voided volume: 154 mL.  Maximum flow rate: 14.7 mL/sec.  Average flow rate: 3.0 mL/sec.  Postvoid Residual: 140 mL.  Detrusor sphincter dyssynergia: no evidence to suggest this.  Character of voiding curve: somewhat intermittent.  BOOI: -19.4 (suggesting no obstruction - see key below)  [obstructed (BECERRIL index [BOOI] ? 40); equivocal (no definite   obstruction; BOOI 20-40); and no obstruction (BOOI ? 20)]    FLUOROSCOPIC IMAGING OF THE BLADDER DURING URODYNAMICS:  Fluoroscopy during today's procedure demonstrated a mildly trabeculated bladder wall without diverticulae or cellules.  No vesicoureteral reflux was observed.  The bladder neck was closed during filling, open during episodes of DO with contrast stopping at the level of the pelvic floor vs external urinary sphincter, and open during voiding.  After voiding to completion, all catheters were removed and the patient was brought back into the consultation room to further discuss today's study results.      ASSESSMENT/PLAN:  Ms. Radha Corbett is a pleasant 67 year old female with urinary urgency and urge incontinence who demonstrated the following findings today on urodynamic evaluation:    -Multiple uninhibited detrusor contractions starting at bladder volumes >100 mL with Pdet pressures ranging from  cm H2O. She never leaks with any  of these episodes of DO.  -Otherwise good bladder compliance between UDC's.  -Small/normal bladder capacity (293 mL)  -Normal bladder filling sensations.  -No reproducible FRANCHESKA or DOI.  -Strong detrusor contraction during voiding to 81.5 cm H2O with slightly slow flow (Qmax 14.7 ml/s) and some incomplete bladder emptying ( mL).  -No evidence for DSD.  -No evidence for outlet obstruction.  -Fluoroscopy reveals a mildly trabeculated bladder wall without diverticuli or VUR. The bladder neck was closed during filling, open during episodes of DO with contrast stopping at the level of the pelvic floor vs external urinary sphincter, and open during voiding.     The patient will follow up as scheduled with Dr. Baltazar to further discuss today's study results and make plans for how best to proceed.      - A single Cipro antibiotic was provided for UTI prophylaxis following completion of today's study per department protocol.  The risk of UTI with VUDS is low at ~2.5-3%. Of note, pre-test urine dipstick was positive for nitrites and leukocytes. She denies any UTI symptoms. Recommendation was to cancel today's urodynamic study due to possible infection, however, patient really wants to proceed with the test as she is desperate for treatment of her urinary urgency and urge incontinence. We discussed the risks vs benefits of proceeding with today's study in the setting of possible infection. The patient verbalized understanding and wishes to proceed. Urine will be sent for formal culture and she will be started empirically on Cipro until culture results are available.     Thank you for allowing me to participate in the care of Ms. Radha Corbett and please don't hesitate to contact me with any questions or concerns.      Huma Hernandez PA-C  Urology Physician Assistant

## 2018-05-24 NOTE — TELEPHONE ENCOUNTER
Patient given results below.  Verbalized understanding.  She will call back to reschedule lung xray.    Rosaura Keating RN,   [facility]

## 2018-05-24 NOTE — MR AVS SNAPSHOT
After Visit Summary   5/24/2018    Radha Corbett    MRN: [id number]           Patient Information     Date Of Birth          1950        Visit Information        Provider Department      5/24/2018 1:00 PM Huma Hernandez PA-C [facility]        Today's Diagnoses     Urinary urgency    -  1    Urge incontinence of urine        Acute cystitis without hematuria           Follow-ups after your visit        Your next 10 appointments already scheduled     May 25, 2018 12:30 PM CDT   (Arrive by 12:15 PM)   XR CHEST 2 VIEWS with MGXR2   [facility]    [address] [place] [id number] [phone]           Please bring a list of your current medicines to your exam. (Include vitamins, minerals and over-thecounter medicines.) Leave your valuables at home.  Tell your doctor if there is a chance you may be pregnant.  You do not need to do anything special for this exam.            Jun 07, 2018  2:45 PM CDT   (Arrive by 2:15 PM)   MR LUMBAR SPINE W/O & W CONTRAST with MGMR1   [facility])    [address] [place] [id number] [phone]           Take your medicines as usual, unless your doctor tells you not to. Bring a list of your current medicines to your exam (including vitamins, minerals and over-the-counter drugs).  You may or may not receive intravenous (IV) contrast for this exam pending the discretion of the Radiologist.  You do not need to do anything special to prepare.  The MRI machine uses a strong magnet. Please wear clothes without metal (snaps, zippers). A sweatsuit works well, or we may give you a hospital gown.  Please remove any body piercings and hair extensions before you arrive. You will also remove watches, jewelry, hairpins, wallets, dentures, partial dental plates and hearing aids. You may wear contact lenses,  and you may be able to wear your rings. We have a safe place to keep your personal items, but it is safer to leave them at home.  **IMPORTANT** THE INSTRUCTIONS BELOW ARE ONLY FOR THOSE PATIENTS WHO HAVE BEEN PRESCRIBED SEDATION OR GENERAL ANESTHESIA DURING THEIR MRI PROCEDURE:  IF YOUR DOCTOR PRESCRIBED ORAL SEDATION (take medicine to help you relax during your exam):   You must get the medicine from your doctor (oral medication) before you arrive. Bring the medicine to the exam. Do not take it at home. You ll be told when to take it upon arriving for your exam.   Arrive one hour early. Bring someone who can take you home after the test. Your medicine will make you sleepy. After the exam, you may not drive, take a bus or take a taxi by yourself.  IF YOUR DOCTOR PRESCRIBED IV SEDATION:   Arrive one hour early. Bring someone who can take you home after the test. Your medicine will make you sleepy. After the exam, you may not drive, take a bus or take a taxi by yourself.   No eating 6 hours before your exam. You may have clear liquids up until 4 hours before your exam. (Clear liquids include water, clear tea, black coffee and fruit juice without pulp.)  IF YOUR DOCTOR PRESCRIBED ANESTHESIA (be asleep for your exam):   Arrive 1 1/2 hours early. Bring someone who can take you home after the test. You may not drive, take a bus or take a taxi by yourself.   No eating 8 hours before your exam. You may have clear liquids up until 4 hours before your exam. (Clear liquids include water, clear tea, black coffee and fruit juice without pulp.)   You will spend four to five hours in the recovery room.  Please call the Imaging Department at your exam site with any questions.            Jun 11, 2018  4:00 PM CDT   Return Visit with Dada Baltazar MD   [facility] ([facility])    [id number] [place] [id number] [phone]            Jun 22, 2018  2:30 PM CDT   Return Visit  "with Trice Medeiros MD PhD   [facility] ([facility])    [address] [address] [place] [id number] [phone]              Future tests that were ordered for you today     Open Future Orders        Priority Expected Expires Ordered    MR Lumbar Spine w/o & w Contrast Routine  5/23/2019 5/23/2018            Who to contact     Please call your clinic at [phone] to:    Ask questions about your health    Make or cancel appointments    Discuss your medicines    Learn about your test results    Speak to your doctor            Additional Information About Your Visit        [employer] Information     [employer] gives you secure access to your electronic health record. If you see a primary care provider, you can also send messages to your care team and make appointments. If you have questions, please call your primary care clinic.  If you do not have a primary care provider, please call [phone] and they will assist you.      [employer] is an electronic gateway that provides easy, online access to your medical records. With [employer], you can request a clinic appointment, read your test results, renew a prescription or communicate with your care team.     To access your existing account, please contact your [facility] or call [phone] for assistance.        Care EveryWhere ID     This is your Care EveryWhere ID. This could be used by other organizations to access your [place] medical records  [id number]        Your Vitals Were     Pulse Height Last Period BMI (Body Mass Index)          102 1.702 m (5' 7\") 11/18/1991 (Approximate) 37.28 kg/m2         Blood Pressure from Last 3 Encounters:   05/24/18 122/69   05/15/18 118/72   04/03/18 121/51    Weight from Last 3 Encounters:   05/24/18 108 kg (238 lb)   05/15/18 108 kg (238 lb 1.6 oz)   03/08/18 108 kg (238 lb)              We Performed the Following     COMPLEX UROFLOWMETRY     CYSTOMETROGRAM COMPLEX " W VOIDING PRESS PROFILE     EMG ANAL/URETH SPHINCTER, OTHER THAN NEEDLE     HC CONTRAST ISOVUE 370 MG/ML, PER ML     Urinalysis chemical screen POCT     Urine Culture Aerobic Bacterial     VOIDING PRESSURE STUDY, INTRA-ABDOMINAL          Today's Medication Changes          These changes are accurate as of 5/24/18  2:28 PM.  If you have any questions, ask your nurse or doctor.               Start taking these medicines.        Dose/Directions    ciprofloxacin 250 MG tablet   Commonly known as:  CIPRO   Used for:  Acute cystitis without hematuria   Started by:  Huma Hernandez PA-C        Dose:  250 mg   Take 1 tablet (250 mg) by mouth 2 times daily for 5 days   Quantity:  10 tablet   Refills:  0            Where to get your medicines      These medications were sent to [facility] [address] [place] [address] [phone]  [address] [place] [address] 1-[address] [address]    Hours:  TRANSPLANT PHONE NUMBER [phone] Phone:  [phone]     ciprofloxacin 250 MG tablet                Primary Care Provider Office Phone # Fax #    Trice Medeiros MD PhD [phone]       [address]  [facility] [address]        Equal Access to Services     [facility] AH: Hadii kyree ku hadasho Soomaali, waaxda luqadaha, qaybta kaalmada adeegyada, michael hallman . So [facility] [phone].    ATENCIÓN: Si habla español, tiene a arauz disposición servicios gratuitos de asistencia lingüística. Llame al [phone].    We comply with applicable federal civil rights laws and [place] laws. We do not discriminate on the basis of race, color, national origin, age, disability, sex, sexual orientation, or gender identity.            Thank you!     Thank you for choosing [facility] AND [facility] PROSTATE [facility] UROLOGIC CANCERS  for your care. Our goal is always to provide you with excellent care. Hearing back from our patients is one way we can continue to improve our  "services. Please take a few minutes to complete the written survey that you may receive in the mail after your visit with us. Thank you!             Your Updated Medication List - Protect others around you: Learn how to safely use, store and throw away your medicines at www.disposemymeds.org.          This list is accurate as of 5/24/18  2:28 PM.  Always use your most recent med list.                   Brand Name Dispense Instructions for use Diagnosis    aspirin 81 MG tablet      1 TABLET DAILY        buPROPion 300 MG 24 hr tablet    WELLBUTRIN XL     Take 300 mg by mouth        CALCIUM 600+D PO      1 tab daily        ciprofloxacin 250 MG tablet    CIPRO    10 tablet    Take 1 tablet (250 mg) by mouth 2 times daily for 5 days    Acute cystitis without hematuria       clotrimazole 10 MG venkat     70 Venkat    Place 1 Venkat (10 mg) inside cheek 5 times daily    Thrush       diphenhydrAMINE 25 MG capsule    BENADRYL     Take 50 mg by mouth        DULoxetine 30 MG EC capsule    CYMBALTA    180 capsule    Take 1 capsule (30 mg) by mouth 2 times daily    Depression with anxiety       * gabapentin 600 MG tablet    NEURONTIN          * gabapentin 300 MG capsule    NEURONTIN    90 capsule    Take 1 tablet (300 mg) every night for 1-3 days, then 1 tablet twice daily for 1-3 days, then 1 tablet three times daily    Chronic pain of both knees       insulin syringe-needle U-100 31G X 5/16\" 1 ML    RELION INSULIN SYRINGE    200 each    Use 2 syringes daily or as directed.    Type 2 diabetes mellitus without complication (H)       lisinopril 10 MG tablet    PRINIVIL/ZESTRIL    30 tablet    Take 0.5 tablets (5 mg) by mouth daily    Essential hypertension with goal blood pressure less than 140/90       MELATONIN PO      10 mg At Bedtime        metFORMIN 500 MG 24 hr tablet    GLUCOPHAGE-XR    360 tablet    Take 2 tablets (1,000 mg) by mouth 2 times daily (with meals)        minocycline 100 MG capsule    MINOCIN/DYNACIN    180 " capsule    Take 1 capsule (100 mg) by mouth every 12 hours    Acne vulgaris       mirabegron 50 MG 24 hr tablet    MYRBETRIQ    30 tablet    Take 1 tablet (50 mg) by mouth daily    Urinary urgency, Urgency incontinence, Urinary frequency       nitroFURantoin (macrocrystal-monohydrate) 100 MG capsule    MACROBID          NovoLIN N VIAL 100 UNIT/ML injection   Generic drug:  insulin NPH      Inject 60 units Subcutaneous 2 times daily. (gets this from [facility] at [facility])    Uncontrolled type 2 diabetes mellitus with stage 3 chronic kidney disease, with long-term current use of insulin (H)       omeprazole 40 MG capsule    priLOSEC    90 capsule    TAKE 1 CAPSULE DAILY 30 TO 60 MINUTES BEFORE A MEAL    Gastroesophageal reflux disease without esophagitis       ONE TOUCH LANCETS      None Entered        ONE TOUCH TEST STRIPS VI      None Entered        oxybutynin chloride 15 MG Tb24     90 tablet    Take 1 tablet (15 mg) by mouth daily    Urinary urgency, Urgency incontinence       PREVNAR 13 Susp injection   Generic drug:  pneumococcal           simvastatin 20 MG tablet    ZOCOR    90 tablet    Take 1 tablet (20 mg) by mouth At Bedtime    Hyperlipidemia LDL goal <100       spironolactone 100 MG tablet    ALDACTONE    90 tablet    TAKE 1 TABLET EVERY MORNING    Essential hypertension with goal blood pressure less than 140/90       tiZANidine 4 MG tablet    ZANAFLEX          traZODone 50 MG tablet    DESYREL    90 tablet    Take 2 tablets (100 mg) by mouth nightly as needed for sleep    Chronic pain of both knees       VISION FORMULA EYE HEALTH PO      Take 1 tablet by mouth daily        vitamin D 1000 units capsule      1 CAPSULE DAILY        * Notice:  This list has 2 medication(s) that are the same as other medications prescribed for you. Read the directions carefully, and ask your doctor or other care provider to review them with you.

## 2018-05-25 ENCOUNTER — TELEPHONE (OUTPATIENT)
Dept: PEDIATRICS | Facility: CLINIC | Age: 68
End: 2018-05-25

## 2018-05-25 ENCOUNTER — RADIANT APPOINTMENT (OUTPATIENT)
Dept: GENERAL RADIOLOGY | Facility: CLINIC | Age: 68
End: 2018-05-25
Attending: NURSE PRACTITIONER
Payer: COMMERCIAL

## 2018-05-25 DIAGNOSIS — J90 PLEURAL EFFUSION ON LEFT: ICD-10-CM

## 2018-05-25 PROCEDURE — 71046 X-RAY EXAM CHEST 2 VIEWS: CPT | Performed by: RADIOLOGY

## 2018-05-25 NOTE — TELEPHONE ENCOUNTER
[facility] Call Center    Phone Message    May a detailed message be left on voicemail: yes    Reason for Call: Other: Patient calling regarding her mychart message that was sent last night. After her surgery she couldn't get O2 levels up. Call to discuss     Action Taken: Message routed to:  Primary Care p [phone]

## 2018-05-25 NOTE — TELEPHONE ENCOUNTER
Patient got her abd xray results via [employer].  She googled atelectasis and it said it could be from post-op surgery or cancer.  She had back surgery 2/19/18 and they had a hard time getting her oxygen sats back up after surgery.  Was supposed to be a day surgery but she was there until midnight due to the oxygen levels.      Informed patient that usually atelectasis post-op happens within days to a week after surgery, not months.    She is going to have her chest xray done at 1215 today.      Rosaura Keating RN,   [facility], [facility]

## 2018-05-25 NOTE — PROGRESS NOTES
Medina Corbett,    Attached are your test results.  No pleural effusion which is good  Some small atelectasis on the left. Have you had any Upper respiratory infection recently?   Please contact us if you have any questions.    Perri Carias, CNP

## 2018-05-25 NOTE — TELEPHONE ENCOUNTER
Patient had her xray done today.  Looking for results to be released via [employer].    Rosaura Keating RN,   [facility]

## 2018-05-26 LAB
BACTERIA SPEC CULT: ABNORMAL
SPECIMEN SOURCE: ABNORMAL

## 2018-06-07 ENCOUNTER — RADIANT APPOINTMENT (OUTPATIENT)
Dept: MRI IMAGING | Facility: CLINIC | Age: 68
End: 2018-06-07
Attending: ORTHOPAEDIC SURGERY
Payer: COMMERCIAL

## 2018-06-07 DIAGNOSIS — M54.50 LOW BACK PAIN: ICD-10-CM

## 2018-06-07 LAB
CREAT BLD-MCNC: 0.6 MG/DL (ref 0.52–1.04)
GFR SERPL CREATININE-BSD FRML MDRD: >90 ML/MIN/1.7M2

## 2018-06-07 PROCEDURE — 82565 ASSAY OF CREATININE: CPT | Performed by: RADIOLOGY

## 2018-06-07 PROCEDURE — A9579 GAD-BASE MR CONTRAST NOS,1ML: HCPCS | Performed by: ORTHOPAEDIC SURGERY

## 2018-06-07 PROCEDURE — 72148 MRI LUMBAR SPINE W/O DYE: CPT | Performed by: RADIOLOGY

## 2018-06-07 RX ORDER — GADOBUTROL 604.72 MG/ML
10 INJECTION INTRAVENOUS ONCE
Status: DISCONTINUED | OUTPATIENT
Start: 2018-06-07 | End: 2018-06-07 | Stop reason: CLARIF

## 2018-06-11 ENCOUNTER — TELEPHONE (OUTPATIENT)
Dept: UROLOGY | Facility: CLINIC | Age: 68
End: 2018-06-11

## 2018-06-11 ENCOUNTER — OFFICE VISIT (OUTPATIENT)
Dept: UROLOGY | Facility: CLINIC | Age: 68
End: 2018-06-11
Payer: COMMERCIAL

## 2018-06-11 DIAGNOSIS — N39.41 URGE INCONTINENCE: Primary | ICD-10-CM

## 2018-06-11 PROCEDURE — 99212 OFFICE O/P EST SF 10 MIN: CPT | Performed by: UROLOGY

## 2018-06-11 NOTE — TELEPHONE ENCOUNTER
Spoke to the patient surgery scheduled for 06/19/18 at 9:05am with a 7:30am check in. She is aware a pre-op is needed and a . She said that she would work on getting her pre-op scheduled today. The patient was in the [facility] at the time I was talking to her so she was also going to schedule her 1 week post-op. Surgery packet being mailed out today.

## 2018-06-11 NOTE — NURSING NOTE
"Radha Corbett's goals for this visit include:   Chief Complaint   Patient presents with     RECHECK     discuss UDS results      She requests these members of her care team be copied on today's visit information: Yes - WaldemarTrice    Initial LMP 11/18/1991 (Approximate) Estimated body mass index is 37.28 kg/(m^2) as calculated from the following:    Height as of 5/24/18: 1.702 m (5' 7\").    Weight as of 5/24/18: 108 kg (238 lb).  BP completed using cuff size: NA (Not Taken)        "

## 2018-06-11 NOTE — MR AVS SNAPSHOT
After Visit Summary   6/11/2018    Radha Corbett    MRN: [id number]           Patient Information     Date Of Birth          1950        Visit Information        Provider Department      6/11/2018 4:00 PM Dada Baltazar MD [facility]        Today's Diagnoses     Urge incontinence    -  1      Care Instructions    Surgery Instructions    Always follow your surgeon s instructions. If you don t, your surgery could be cancelled. Please use the following checklist.  Your surgery is on: The surgery scheduler will contact you within 1 week of your consult with the surgeon. If you do not hear from them, please call the clinic or RN Care Coordinator for your provider.    Time: Prearrival times can vary depending on location/type of surgery.  [place] - 2 hour pre-arrival  [facility] - 2 hour pre-arrival  [place] - 1 hour pre-arrival    Note:  These times may change. A nurse will call you before surgery to confirm. If you have not received a call or if you have more questions, please call us on the working day before your surgery:  ? [place]: [phone] or [phone] (9am to 5:30pm)  ? [facility]: [phone] (8am to 6pm)  ? [place]: [phone] (9am to 5pm)  ? [facility] [phone] (7am to 4pm)  Prior to surgery  ? Have a pre-op physical exam with your Primary Doctor within 30 days of surgery  - Ask your doctor to send all of your results to the surgery center/hospital before surgery. Your doctor also may ask you to bring the results with you on the day of surgery.  - Tell your doctor if:  - You are allergic to latex or rubber (latex and rubber gloves are often used in medical care).  - You are taking any medicines (including aspirin), vitamins, or herbal products. You may need to stop taking some medicines before surgery.  - You have any medical problems (allergies, diabetes, or heart disease, for example).  - You have a pacemaker or an AICD (automatic  implanted cardiac defibrillator). If you do, please bring the ID card with you on the day of surgery.  - People who smoke have a higher risk of infection after surgery. Ask your doctor how you can quit smoking.  - If you Primary Doctor is not within the [employer] system, you will need to have your pre-op physical faxed to us to be scanned into your chart.  - [place]: [phone] or [phone]  - [facility] ([place]): [phone]  - [facility] ([facility]): [phone]  ? Call your insurance company. Ask if you need pre-approval for your surgery. If you do not have insurance, please let us know. If you wish to speak to the , please alert the clinic staff so this can be arranged.  ? Arrange for someone to drive you home after surgery.  will need to be a responsible adult (18 years or older) that will provide transportation to and from surgery and stay in the waiting room during your surgery. You may not drive yourself or take public transportation to and from surgery.  ? Arrange for someone to stay with you for 24 hours after you go home. This person must be a responsible adult (18 years or older).  ? Call your surgeon or their nurse if there is any change in your health (cold, flu, infections, hospitalizations).  ? Do not smoke, drink alcohol, or take over-the-counter medicine for 24 hours before and after surgery.  ? If you take prescribed drugs, you may need to stop them until after the surgery.  Discuss what medications to take or not take prior to surgery with your Primary Doctor at your pre-op physical. Avoid over-the-counter blood-thinning medications such as Aspirin, Ibuprofen, vitamin E, or fish oil 7 days prior to surgery (unless otherwise directed by your Primary Doctor). Tylenol is a good alternative for mild pain relief prior to surgery.  ? Eating and drinking guidelines prior to surgery:  - Stop all solid food consumption 8 hours prior to surgery  - You may  drink clear liquids up to 2 hours prior to surgery (water, fruit juices without pulp, jello, tea/coffee without creamer, sports drinks, clear-fat free broth (bouillon or consomme), popsicles (without milk, bits of fruit, or seeds/nuts)  ? Follow instructions given for showering or bathing before surgery.    - Use 8 ounces of antiseptic surgical soap, like:  - Hibiclens, Scrub Care, or Exidine  - You can find it at your local pharmacy, clinic, or retail store. If you have trouble, ask your pharmacist to help you find the right substitute.  - Please wash with one of the above soaps twice before coming to the hospital for your surgery. This will decrease bacteria (germs) on your skin. It will also help reduce your chance of infection after surgery.  - Items you will need for showering:  - 4 newly washed washcloths  - 2 newly washed towels  - 8 ounces of one of the above soaps  - Following these instructions:  - The evening before surgery: Shower or bathe as you normally would, using your regular soap and a clean washcloth. Give special attention to places where your incision (surgical cut) or catheters will be. This includes your groin area. Rinse well. You may wash your hair with your regular shampoo. Next, wash your body with 4 ounces of the antiseptic soap. Use a clean, damp washcloth and gently clean your body (from the chin down). If your surgery involves your head, use the special soap on your head and scalp. Rinse well and dry off using a newly washed towel.  - The morning of surgery: Repeat the same process as the evening shower.  - Other suggestions:    Do not shave within 12 inches of your incision (surgical cut) area for at least 3 days before surgery. Shaving can make small cuts in the skin. This puts you at higher risk of infection.    Wear freshly washed pajamas or clothing after your evening shower.    Wear freshly washed clothes the day of surgery.    Wash and change your bed sheets the day before  surgery to have clean bed sheets after your shower and when you get home from surgery.    If you have trouble washing all areas, make sure someone helps you.    Don't use any deodorant, lotion or powder after your shower.    Women who are menstruating should wear a fresh sanitary pad to the hospital.  ? Do not wear or add deodorant, cologne, lotion, makeup, nail polish or jewelry to surgery. If you wear fake nails, please remove at least one nail before coming to surgery (an oxygen monitor needs to be placed on your finger during surgery).  ? Bring these items to the surgery center/hospital:  - Insurance card  - Money for parking and co-pays, if needed  - A list of all the medicines you take. Include vitamins, minerals, herbs, and over-the-counter drugs.  Note any drug allergies.  - A copy of your advance health care directive, if you have one. This tells us what treatment you would want--and who would make health care decisions--if you could no longer speak for yourself. You may request this form in advance or download it from [email or web address].  - A case for glasses, contact lenses, hearing aids, or dentures.  - Your inhaler or CPAP machine, if you use these at home.  ? Leave extra cash, jewelry, and other valuables at home.  When you arrive  When you get to the surgery center/hospital, you will:  ? Check in. If you are under age 18, you must be with a parent or legal guardian.  ? Sign consent forms, if you haven t already. These forms state that you know the risks and benefits of surgery. When you sign the forms, you give us permission to do the surgery. Do not sign them unless you understand what will happen during and after your surgery. If you have any questions about your surgery, ask to speak with your doctor before you sign the forms. If you don t understand the answers, ask again.  ? Receive a copy of the Patient s Bill of Rights. If you do not receive a copy, please ask for one.  ? Change into  hospital clothes. Your belongings will be placed in a bag. We will return them to you after surgery.  ? Meet with the anesthesia provider. He or she will tell you what kind of anesthesia (medicine) will be used to keep you comfortable during surgery.  Remember: it s okay to remind doctors and nurses to wash their hands before touching you.  In most cases, your surgeon will use a marker to write his or her initials on the surgery site. This ensures that the exact site is operated on.  For safety reasons, we will ask you the same questions many times. For example, we may ask your name and birth date over and over again.  Friends and family can stay with you until it s time for surgery. While you re in surgery, they will be in the waiting area. Please note that cell phones are not allowed in some patient care areas.  If you have questions about what will happen in the operating room, talk to your care team.  After surgery  We will move you to a recovery room, where we will watch you closely. If you have any pain or discomfort, tell your nurse. He or she will try to make you comfortable.  If you are staying overnight, we will move you to your hospital room after you are awake.  If you are going home, we will move you to another room. Friends and family may be able to join you. The length of time you spend in recovery depend on the type of medicine you received, your medical condition, the type of surgery you had, or your response to the anesthesia given during your procedure.  When you are discharged from the recovery room, the nurses will review instructions with you and your caregiver.  ? Please wash your hands every time you touch the wound or change bandages or dressings.  ? Do not submerge the wound in water.  You may not use a bathtub or hot tub until the wound is closed. The wait time frame is generally 2-3 weeks, but any open area can be a source of incoming bacteria, so it is better to be on the safe side and  avoid water submersion until your wound is fully healed.  ? You may take a shower 24 hours after surgery. Double check with your surgeon if it is OK for water to run over the wound, whether it has been sutured, stapled, glued, or is open. You may gently wash the wound using the antiseptic soap provided for your pre-surgery showering (do not use a washcloth). Any mild soap will work as well.  ? Many surgical wounds will have small white strips of tape on them called steri-strips.  Do not remove these. The edges will curl and fall off within 7-10 days with normal showering.  ? If you are going home with sutures (stitches) or staples, you must return to the clinic to have them taken out, usually within 1-2 weeks. Some stitches are dissolvable and do not require removal. Make sure to clarify with your surgeon or surgery nurse reviewing discharge paperwork what kind of sutures you have.  ? Signs and symptoms of infection include:  - Fever, temperature over 101.5   F  - Redness  - Swelling  - Increased pain  - Green or yellow drainage which may or may not have a foul odor  Dealing with pain  A nurse will check your comfort level often during your stay. He or she will work with you to manage your pain.  Remember:  ? All pain is real. There are many ways to control pain. We can help you decide what works best for you.  ? Ask for pain medicine when you need it. Don t try to  tough it out --this can make you feel worse. Always take your medicine as ordered.  ? Medicine doesn t work the same for everyone. If your medicine isn t working, tell your nurse. There may be other medicines or treatments we can try.  Going home  We will let you know when you re ready to leave the surgery center or hospital. Before you leave, we will tell you how to care for yourself at home and prevent infections. If you do not understand something, please say so. We will answer any questions you have. We will then help you get ready to  leave.  Remember, you must have a responsible adult (18 years or older) to stay with you 24 hours after you leave the hospital.  Take it easy when you get home. You will need some time to recover--you may be more tired than you realize at first. Rest and relax for at least the first 24 hours at home. You ll feel better and heal faster if you take good care of yourself.  Follow the discharge instructions that are given to you when you leave the surgery center or hospital  Please call the clinic if you experience any problems during regular clinic hours (Monday-Friday 8:00am-5:00pm).  If you experience problems during non-clinic hours, please call the [facility] on-call line at [phone] and ask the  to page the on-call Provider for your specialty. The on-call Provider will call you back and can triage your symptoms and further advise. If you are having an emergency, always call 911 or seek immediate evaluation at the Emergency Room.  Locations  [facility] ([facility])  [address] [place] [address]  [phone]   [email or web address]              Follow-ups after your visit        Follow-up notes from your care team     Return for interstim trial.      Your next 10 appointments already scheduled     Jun 22, 2018  2:30 PM CDT   Return Visit with Trice Medeiros MD PhD   [facility] ([facility])    [id number] [place] [id number] [phone]              Who to contact     If you have questions or need follow up information about today's clinic visit or your schedule please contact [facility] directly at [phone].  Normal or non-critical lab and imaging results will be communicated to you by MyChart, letter or phone within 4 business days after the clinic has received the results. If you do not hear from us within 7 days,  please contact the clinic through [employer] or phone. If you have a critical or abnormal lab result, we will notify you by phone as soon as possible.  Submit refill requests through [employer] or call your pharmacy and they will forward the refill request to us. Please allow 3 business days for your refill to be completed.          Additional Information About Your Visit        [employer] Information     [employer] gives you secure access to your electronic health record. If you see a primary care provider, you can also send messages to your care team and make appointments. If you have questions, please call your primary care clinic.  If you do not have a primary care provider, please call [phone] and they will assist you.      [employer] is an electronic gateway that provides easy, online access to your medical records. With [employer], you can request a clinic appointment, read your test results, renew a prescription or communicate with your care team.     To access your existing account, please contact your [facility] or call [phone] for assistance.        Care EveryWhere ID     This is your Care EveryWhere ID. This could be used by other organizations to access your [facility] medical records  [id number]        Your Vitals Were     Last Period                   11/18/1991 (Approximate)            Blood Pressure from Last 3 Encounters:   05/24/18 122/69   05/15/18 118/72   04/03/18 121/51    Weight from Last 3 Encounters:   05/24/18 108 kg (238 lb)   05/15/18 108 kg (238 lb 1.6 oz)   03/08/18 108 kg (238 lb)              Today, you had the following     No orders found for display         Today's Medication Changes          These changes are accurate as of 6/11/18  4:43 PM.  If you have any questions, ask your nurse or doctor.               Stop taking these medicines if you haven't already. Please contact your care team if you have questions.     nitroFURantoin (macrocrystal-monohydrate)  100 MG capsule   Commonly known as:  MACROBID   Stopped by:  Dada Baltazar MD                    Primary Care Provider Office Phone # Fax #    Trice Medeiros MD PhD [phone] [address]  [facility] [address]        Equal Access to Services     [facility]: Hadii aad ku hadasho Soomaali, waaxda luqadaha, qaybta kaalmada adeegyada, waxay sheridanin haywiln shekhar kiaracindy layang . So [facility] [phone].    ATENCIÓN: Si habla español, tiene a arauz disposición servicios gratuitos de asistencia lingüística. Sheylaame al [phone].    We comply with applicable federal civil rights laws and [place] laws. We do not discriminate on the basis of race, color, national origin, age, disability, sex, sexual orientation, or gender identity.            Thank you!     Thank you for choosing [facility]  for your care. Our goal is always to provide you with excellent care. Hearing back from our patients is one way we can continue to improve our services. Please take a few minutes to complete the written survey that you may receive in the mail after your visit with us. Thank you!             Your Updated Medication List - Protect others around you: Learn how to safely use, store and throw away your medicines at www.disposemymeds.org.          This list is accurate as of 6/11/18  4:43 PM.  Always use your most recent med list.                   Brand Name Dispense Instructions for use Diagnosis    aspirin 81 MG tablet      1 TABLET DAILY        buPROPion 300 MG 24 hr tablet    WELLBUTRIN XL     Take 300 mg by mouth        CALCIUM 600+D PO      1 tab daily        clotrimazole 10 MG kelly     70 Kelly    Place 1 Kelly (10 mg) inside cheek 5 times daily    Thrush       diphenhydrAMINE 25 MG capsule    BENADRYL     Take 50 mg by mouth        DULoxetine 30 MG EC capsule    CYMBALTA    180 capsule    Take 1 capsule (30 mg) by mouth 2 times daily    Depression with anxiety       * gabapentin 600 MG  "tablet    NEURONTIN          * gabapentin 300 MG capsule    NEURONTIN    90 capsule    Take 1 tablet (300 mg) every night for 1-3 days, then 1 tablet twice daily for 1-3 days, then 1 tablet three times daily    Chronic pain of both knees       insulin syringe-needle U-100 31G X 5/16\" 1 ML    RELION INSULIN SYRINGE    200 each    Use 2 syringes daily or as directed.    Type 2 diabetes mellitus without complication (H)       lisinopril 10 MG tablet    PRINIVIL/ZESTRIL    30 tablet    Take 0.5 tablets (5 mg) by mouth daily    Essential hypertension with goal blood pressure less than 140/90       MELATONIN PO      10 mg At Bedtime        metFORMIN 500 MG 24 hr tablet    GLUCOPHAGE-XR    360 tablet    Take 2 tablets (1,000 mg) by mouth 2 times daily (with meals)    Type 2 diabetes mellitus with hyperglycemia, with long-term current use of insulin (H)       minocycline 100 MG capsule    MINOCIN/DYNACIN    180 capsule    Take 1 capsule (100 mg) by mouth every 12 hours    Acne vulgaris       mirabegron 50 MG 24 hr tablet    MYRBETRIQ    30 tablet    Take 1 tablet (50 mg) by mouth daily    Urinary urgency, Urgency incontinence, Urinary frequency       NovoLIN N VIAL 100 UNIT/ML injection   Generic drug:  insulin NPH      Inject 60 units Subcutaneous 2 times daily. (gets this from [facility] NPH at [facility])    Uncontrolled type 2 diabetes mellitus with stage 3 chronic kidney disease, with long-term current use of insulin (H)       omeprazole 40 MG capsule    priLOSEC    90 capsule    TAKE 1 CAPSULE DAILY 30 TO 60 MINUTES BEFORE A MEAL    Gastroesophageal reflux disease without esophagitis       ONE TOUCH LANCETS      None Entered        ONE TOUCH TEST STRIPS VI      None Entered        oxybutynin chloride 15 MG Tb24     90 tablet    Take 1 tablet (15 mg) by mouth daily    Urinary urgency, Urgency incontinence       PREVNAR 13 Susp injection   Generic drug:  pneumococcal           simvastatin 20 MG tablet    ZOCOR    90 tablet    " Take 1 tablet (20 mg) by mouth At Bedtime    Hyperlipidemia LDL goal <100       spironolactone 100 MG tablet    ALDACTONE    90 tablet    TAKE 1 TABLET EVERY MORNING    Essential hypertension with goal blood pressure less than 140/90       tiZANidine 4 MG tablet    ZANAFLEX          traZODone 50 MG tablet    DESYREL    90 tablet    Take 2 tablets (100 mg) by mouth nightly as needed for sleep    Chronic pain of both knees       VISION FORMULA EYE HEALTH PO      Take 1 tablet by mouth daily        vitamin D 1000 units capsule      1 CAPSULE DAILY        * Notice:  This list has 2 medication(s) that are the same as other medications prescribed for you. Read the directions carefully, and ask your doctor or other care provider to review them with you.

## 2018-06-11 NOTE — PROGRESS NOTES
Reason for Visit:  Review UDS    Clinical Data:  Ms. Corbett is a 66 y/o female with urinary urgency and urge incontinence.  She was changed from Detrol to oxybutynin and also prescribed Myrbetriq but the latter  Was too expensive for her so she never started it.      Cystoscopy on 3/19/18 was normal except for 2+ trabeculation and a diverticulum at the dome of the bladder.    UDS on 5/24/18:  -Multiple uninhibited detrusor contractions starting at bladder volumes >100 mL with Pdet pressures ranging from  cm H2O. She never leaks with any of these episodes of DO.  -Otherwise good bladder compliance between UDC's.  -Small/normal bladder capacity (293 mL)  -Normal bladder filling sensations.  -No reproducible FRANCHESKA or DOI.  -Strong detrusor contraction during voiding to 81.5 cm H2O with slightly slow flow (Qmax 14.7 ml/s) and some incomplete bladder emptying ( mL).  -No evidence for DSD.  -No evidence for outlet obstruction.  -Fluoroscopy reveals a mildly trabeculated bladder wall without diverticuli or VUR. The bladder neck was closed during filling, open during episodes of DO with contrast stopping at the level of the pelvic floor vs. external urinary sphincter, and open during voiding.     A/P:  66 y/o female with urinary urgency and urge incontinence.  She has some improvement at night as she is taking oxybutynin at that time but she has many accidents during the day.  She is planning to go to [place] in a month and would like to try something to help.  On UDS she has severe DO's.  We discussed options of PTNS, other medication, Interstim, and botox.  She would like to undergo an interstim trial.  -schedule interstim trial asap.    Thank you for allowing me to participate in the care of  Ms. Radha Corbett and I will keep you updated on her progress.    Dada Baltazar MD

## 2018-06-12 ENCOUNTER — TELEPHONE (OUTPATIENT)
Dept: UROLOGY | Facility: CLINIC | Age: 68
End: 2018-06-12

## 2018-06-12 DIAGNOSIS — N39.41 URGE INCONTINENCE: Primary | ICD-10-CM

## 2018-06-12 NOTE — TELEPHONE ENCOUNTER
Received call from patient who reports that she contacted her [employer] insurance company and per patient, [facility] needs for Dr. Baltazar's office to call [facility] to discuss the procedures and codes.     Attempted to reach [facility], but writer on hold for 15 minutes. Called and updated patient that we will continue to work on this.    Sophia Seo RN, BSN

## 2018-06-13 ENCOUNTER — CARE COORDINATION (OUTPATIENT)
Dept: UROLOGY | Facility: CLINIC | Age: 68
End: 2018-06-13

## 2018-06-13 NOTE — TELEPHONE ENCOUNTER
Called and discussed information with Hang (6/13/18) at [facility]. The following procedures and CPT codes were discussed.    -PNE (percutaneous neural examination- trial for interstim) temporary lead placement- CPT code 78978  Fluoroscopy 95507    -Full lead implant- Insterstim device  Permanent lead placement CPT code 44786  IPG placement CPT code 78627  Fluoroscopy 33569    Per Hang, the above procedures and CPT codes are billable codes and would be covered if medically necessary and no authorization is needed. Per Hang, the patient has an outpatient surgery copay of $150 and an out of pocket max of $3400 (pt has met $147 of out of pocket max).     Called and spoke to patient who is aware of the above information. Patient verbalized understanding and was grateful for the call and information.    Sophia Seo RN, BSN

## 2018-06-13 NOTE — PROGRESS NOTES
Pre Op Teaching Flowsheet       Pre and Post op Patient Education  Relevant Diagnosis:  Urge incontinence  Surgical procedure:  Percutaneous neural examination (trial for interstim)  Teaching Topic:  Pre and post op teaching  Person Involved in teaching: Radha Corbett    Motivation Level:  Asks Questions: Yes  Eager to Learn:  Yes  Cooperative: Yes  Receptive (willing/able to accept information):  Yes    Patient demonstrates understanding of the following:  Date of surgery:  6/19/18  Location of surgery:  [facility] [place]- 5th [facility]  History and Physical and any other testing necessary prior to surgery: Yes  Required time line for completion of History and Physical and any pre-op testing: Yes    Patient demonstrates understanding of the following:  Pre-op bowel prep:  None needed  Pre-op showering/scrub information with PCMX Soap: Yes  Blood thinner medications discussed and when to stop (if applicable):  Yes      Infection Prevention:   Patient demonstrates understanding of the following:  Surgical procedure site care taught: at time of discharge  Signs and symptoms of infection taught:  Yes      Post-op follow-up:  Discussed how to contact the hospital, nurse, and clinic scheduling staff if necessary.    Instructional materials used/given/mailed:  [place] [facility] Booklet, post op teaching sheet, Map, Soap, and arrival/location information.    Surgical instructions packet mailed to patient.     Patient has a  and someone to stay with her for the first 24 hours.

## 2018-06-14 ENCOUNTER — OFFICE VISIT (OUTPATIENT)
Dept: FAMILY MEDICINE | Facility: CLINIC | Age: 68
End: 2018-06-14
Payer: COMMERCIAL

## 2018-06-14 VITALS
DIASTOLIC BLOOD PRESSURE: 64 MMHG | HEIGHT: 67 IN | SYSTOLIC BLOOD PRESSURE: 131 MMHG | TEMPERATURE: 98.2 F | WEIGHT: 234 LBS | OXYGEN SATURATION: 95 % | BODY MASS INDEX: 36.73 KG/M2 | HEART RATE: 116 BPM

## 2018-06-14 DIAGNOSIS — N39.41 URGE INCONTINENCE: ICD-10-CM

## 2018-06-14 DIAGNOSIS — Z01.818 PRE-OP EXAM: Primary | ICD-10-CM

## 2018-06-14 DIAGNOSIS — N30.00 ACUTE CYSTITIS WITHOUT HEMATURIA: ICD-10-CM

## 2018-06-14 DIAGNOSIS — K21.9 GASTROESOPHAGEAL REFLUX DISEASE, ESOPHAGITIS PRESENCE NOT SPECIFIED: ICD-10-CM

## 2018-06-14 DIAGNOSIS — Z13.89 SCREENING FOR DIABETIC PERIPHERAL NEUROPATHY: ICD-10-CM

## 2018-06-14 DIAGNOSIS — E78.5 HYPERLIPIDEMIA LDL GOAL <100: ICD-10-CM

## 2018-06-14 DIAGNOSIS — B37.0 THRUSH: ICD-10-CM

## 2018-06-14 DIAGNOSIS — Z79.4 TYPE 2 DIABETES MELLITUS WITH HYPERGLYCEMIA, WITH LONG-TERM CURRENT USE OF INSULIN (H): ICD-10-CM

## 2018-06-14 DIAGNOSIS — R82.90 NONSPECIFIC FINDING ON EXAMINATION OF URINE: ICD-10-CM

## 2018-06-14 DIAGNOSIS — E11.65 TYPE 2 DIABETES MELLITUS WITH HYPERGLYCEMIA, WITH LONG-TERM CURRENT USE OF INSULIN (H): ICD-10-CM

## 2018-06-14 LAB
ALBUMIN UR-MCNC: NEGATIVE MG/DL
APPEARANCE UR: ABNORMAL
BACTERIA #/AREA URNS HPF: ABNORMAL /HPF
BILIRUB UR QL STRIP: NEGATIVE
COLOR UR AUTO: YELLOW
CREAT SERPL-MCNC: 0.56 MG/DL (ref 0.52–1.04)
GFR SERPL CREATININE-BSD FRML MDRD: >90 ML/MIN/1.7M2
GLUCOSE SERPL-MCNC: 130 MG/DL (ref 70–99)
GLUCOSE UR STRIP-MCNC: NEGATIVE MG/DL
HGB UR QL STRIP: NEGATIVE
KETONES UR STRIP-MCNC: NEGATIVE MG/DL
LDLC SERPL DIRECT ASSAY-MCNC: 138 MG/DL
LEUKOCYTE ESTERASE UR QL STRIP: ABNORMAL
MUCOUS THREADS #/AREA URNS LPF: PRESENT /LPF
NITRATE UR QL: POSITIVE
NON-SQ EPI CELLS #/AREA URNS LPF: ABNORMAL /LPF
PH UR STRIP: 6 PH (ref 5–7)
POTASSIUM SERPL-SCNC: 4.3 MMOL/L (ref 3.4–5.3)
RBC #/AREA URNS AUTO: ABNORMAL /HPF
SOURCE: ABNORMAL
SP GR UR STRIP: 1.02 (ref 1–1.03)
UROBILINOGEN UR STRIP-ACNC: 0.2 EU/DL (ref 0.2–1)
WBC #/AREA URNS AUTO: ABNORMAL /HPF

## 2018-06-14 PROCEDURE — 84132 ASSAY OF SERUM POTASSIUM: CPT | Performed by: NURSE PRACTITIONER

## 2018-06-14 PROCEDURE — 81001 URINALYSIS AUTO W/SCOPE: CPT | Performed by: NURSE PRACTITIONER

## 2018-06-14 PROCEDURE — 36415 COLL VENOUS BLD VENIPUNCTURE: CPT | Performed by: NURSE PRACTITIONER

## 2018-06-14 PROCEDURE — 83721 ASSAY OF BLOOD LIPOPROTEIN: CPT | Performed by: NURSE PRACTITIONER

## 2018-06-14 PROCEDURE — 82947 ASSAY GLUCOSE BLOOD QUANT: CPT | Performed by: NURSE PRACTITIONER

## 2018-06-14 PROCEDURE — 87086 URINE CULTURE/COLONY COUNT: CPT | Performed by: NURSE PRACTITIONER

## 2018-06-14 PROCEDURE — 99215 OFFICE O/P EST HI 40 MIN: CPT | Performed by: NURSE PRACTITIONER

## 2018-06-14 PROCEDURE — 82565 ASSAY OF CREATININE: CPT | Performed by: NURSE PRACTITIONER

## 2018-06-14 RX ORDER — CIPROFLOXACIN 500 MG/1
500 TABLET, FILM COATED ORAL 2 TIMES DAILY
Qty: 14 TABLET | Refills: 0 | Status: SHIPPED | OUTPATIENT
Start: 2018-06-14 | End: 2018-06-22

## 2018-06-14 RX ORDER — CLOTRIMAZOLE 10 MG/1
1 LOZENGE ORAL
Qty: 70 TROCHE | Refills: 0 | Status: SHIPPED | OUTPATIENT
Start: 2018-06-14 | End: 2018-07-19

## 2018-06-14 RX ORDER — CLOTRIMAZOLE 10 MG/1
1 LOZENGE ORAL
Qty: 70 TROCHE | Refills: 0 | Status: SHIPPED | OUTPATIENT
Start: 2018-06-14 | End: 2018-06-14

## 2018-06-14 NOTE — MR AVS SNAPSHOT
After Visit Summary   6/14/2018    Radha Corbett    MRN: [id number]           Patient Information     Date Of Birth          1950        Visit Information        Provider Department      6/14/2018 1:00 PM Mary Ann Patel APRN CNP [facility]        Today's Diagnoses     Pre-op exam    -  1    Urge incontinence        Type 2 diabetes mellitus with hyperglycemia, with long-term current use of insulin (H)        Hyperlipidemia LDL goal <100        Gastroesophageal reflux disease, esophagitis presence not specified        Screening for diabetic peripheral neuropathy        Thrush          Care Instructions    At [facility], we strive to deliver an exceptional experience to you, every time we see you.  If you receive a survey in the mail, please send us back your thoughts. We really do value your feedback.    Based on your medical history, these are the current health maintenance/preventive care services that you are due for (some may have been done at this visit.)  Health Maintenance Due   Topic Date Due     DEXA SCAN SCREENING (SYSTEM ASSIGNED)  12/09/2015     ADVANCE DIRECTIVE PLANNING Q5 YRS  05/23/2016     EYE EXAM Q1 YEAR  11/01/2017     FOOT EXAM Q1 YEAR  12/05/2017     DEPRESSION ACTION PLAN Q1 YR  12/05/2017     LIPID MONITORING Q1 YEAR  06/22/2018         Suggested websites for health information:  Www.[employer] : Up to date and easily searchable information on multiple topics.  Www.medlineplus.gov : medication info, interactive tutorials, watch real surgeries online  Www.familydoctor.org : good info from the Academy of Family Physicians  Www.cdc.gov : public health info, travel advisories, epidemics (H1N1)  Www.aap.org : children's health info, normal development, vaccinations  Www.health.[facility].[place].us : [place] dept of health, public health issues in [place], N1N1    Your care team:                            Family Medicine Internal Medicine   Antonio  MD Yann Resendiz MD Shantel Branch-Fleming, MD Katya Georgiev PA-C Nam Ho, MD Pediatrics   SERAFIN Cortez, MD Magaly Waller CNP, MD Deborah Mielke, MD Kim Thein, APRN [facility]      Clinic hours: Monday - Thursday 7 am-7 pm; Fridays 7 am-5 pm.   Urgent care: Monday - Friday 11 am-9 pm; Saturday and Sunday 9 am-5 pm.  Pharmacy : Monday -Thursday 8 am-8 pm; Friday 8 am-6 pm; Saturday and Sunday 9 am-5 pm.     Clinic: [phone]   Pharmacy: [phone]    Before Your Surgery      Call your surgeon if there is any change in your health. This includes signs of a cold or flu (such as a sore throat, runny nose, cough, rash or fever).    Do not smoke, drink alcohol or take over the counter medicine (unless your surgeon or primary care doctor tells you to) for the 24 hours before and after surgery.    If you take prescribed drugs: Follow your doctor s orders about which medicines to take and which to stop until after surgery.    Eating and drinking prior to surgery: follow the instructions from your surgeon  Take a shower or bath the night before surgery. Use the soap your surgeon gave you to gently clean your skin. If you do not have soap from your surgeon, use your regular soap. Do not shave or scrub the surgery site.  Wear clean pajamas and have clean sheets on your bed.   Presurgery Checklist  You are scheduled to have surgery. The healthcare staff will try to make your stay comfortable. Use the guidelines below to remind yourself what to do before surgery. Be sure to follow any specific pre-op instructions from your surgeon or nurse.  Preparing for Surgery  Ask your surgeon if you ll need a blood transfusion during surgery and if so, how to prepare for it. In some cases, you can donate blood before surgery. If needed, this blood can be given back (transfused) to you during or after surgery.  If you are having abdominal surgery, ask what  you need to do to clear your bowel.  Tell your surgeon if you have allergies to any medications or foods.  Arrange for an adult family member or friend to drive you home after surgery. If possible, have someone ready to help you at home as you recover.  Call the surgeon if you get a cold, fever, sore throat, diarrhea, or other health problem just before surgery. Your surgeon can decide whether or not to postpone the surgery.  Medications  Tell your surgeon about all medications you take, including prescription and over-the-counter products such as herbal remedies and vitamins. Ask if you should continue taking them.  If you take ibuprofen, naproxen, or  blood thinners  such as aspirin, clopidogrel (Plavix), or warfarin (Coumadin), ask your surgeon whether you should stop taking them and how long before surgery you should stop.  You may be told to take antibiotics just before surgery to prevent infection. If so, follow instructions carefully on how to take them.  If you are told to take medications called anticoagulants to prevent blood clots after surgery, be sure to follow the instructions on how to take them.  Stop Smoking  If you smoke, healing may take longer. So at least 2 week(s) before surgery, stop smoking.  Bathing or Showering Before Surgery  If instructed, wash with antibacterial soap. Afterward, do not use lotions or powders.  If you are having surgery on the head, you may be asked to shampoo with antibacterial soap. Follow instructions for doing so.  Do Not Remove Hair from the Surgery Site  Do not shave hair from the incision site, unless you are given specific instructions to do so. Usually, if hair needs to be removed, it will be done at the hospital right before surgery.  Don t Eat or Drink  Your doctor will tell you when to stop eating and drinking. If you do not follow your doctor's instructions, your procedure may be postponed or rescheduled for another day.  If your surgeon tells you to continue  any medications, take them with small sips of water.  You can brush your teeth and rinse your mouth, but don t swallow any water.  Day of Surgery  Do not wear makeup. Do not use perfume, deodorant, or hairspray. Remove nail polish and artificial nails.  Leave jewelry (including rings), watches, and other valuables at home.  Be sure to bring health insurance cards or forms and a photo ID.  Bring a list of your medications (include the name, dose, how often you take them, and the time last dose was taken).  Arrive on time at the hospital or surgery facility.    4341-5357 [employer]. [address] [place] [address]. All rights reserved. This information is not intended as a substitute for professional medical care. Always follow your healthcare professional's instructions.  This information has been modified by your health care provider with permission from the publisher.              Follow-ups after your visit        Your next 10 appointments already scheduled     Jun 19, 2018   Procedure with Dada Baltazar MD   [facility] ([facility])    [address] [place] [address]   [phone]           Located in the Clinics and Surgery Center at [address] [place] [address].   parking is very convenient and highly recommended.  is a $6 flat rate fee.  Both  and self parkers should enter the main arrival plaza from [facility]; parking attendants will direct you based on your parking preference.            Jun 22, 2018  2:30 PM CDT   Return Visit with Trice Medeiros MD PhD   [facility])    [id number] [place] [id number]   [phone]            Jun 25, 2018  9:45 AM CDT   Return Visit with Dada Baltazar MD   [facility])    [address] [place] [id number]  "  [phone]              Who to contact     If you have questions or need follow up information about today's clinic visit or your schedule please contact [facility] directly at [phone].  Normal or non-critical lab and imaging results will be communicated to you by MyChart, letter or phone within 4 business days after the clinic has received the results. If you do not hear from us within 7 days, please contact the clinic through [email or web address] or phone. If you have a critical or abnormal lab result, we will notify you by phone as soon as possible.  Submit refill requests through [employer] or call your pharmacy and they will forward the refill request to us. Please allow 3 business days for your refill to be completed.          Additional Information About Your Visit        whoplusyouhart Information     [employer] gives you secure access to your electronic health record. If you see a primary care provider, you can also send messages to your care team and make appointments. If you have questions, please call your primary care clinic.  If you do not have a primary care provider, please call [phone] and they will assist you.        Care EveryWhere ID     This is your Care EveryWhere ID. This could be used by other organizations to access your [facility] medical records  [id number]        Your Vitals Were     Pulse Temperature Height Last Period Pulse Oximetry BMI (Body Mass Index)    116 98.2  F (36.8  C) (Oral) 5' 7\" (1.702 m) 11/18/1991 (Approximate) 95% 36.65 kg/m2       Blood Pressure from Last 3 Encounters:   06/14/18 131/64   05/24/18 122/69   05/15/18 118/72    Weight from Last 3 Encounters:   06/14/18 234 lb (106.1 kg)   05/24/18 238 lb (108 kg)   05/15/18 238 lb 1.6 oz (108 kg)              We Performed the Following     Creatinine     FOOT EXAM  NO CHARGE [96014.114]     Glucose     LDL cholesterol direct     PAF COMPLETED     Potassium     UA reflex to Microscopic and Culture          Where to " get your medicines      These medications were sent to [facility] - [place], [place] - [address]  [address] [facility] [address]     Phone:  [phone]     clotrimazole 10 MG kelly          Primary Care Provider Office Phone # Fax #    Trice Medeiros MD PhD [phone] [address]  [facility] [address]        Equal Access to Services     [facility] : Hadii aad ku hadasho Soomaali, waaxda luqadaha, qaybta kaalmada adeegyada, waxay idiin hayaan adeeg kharash la'leonardo . So [facility] [phone].    ATENCIÓN: Si beccala espgerman, tiene a arauz disposición servicios gratuitos de asistencia lingüística. [facility] [phone].    We comply with applicable federal civil rights laws and [place] laws. We do not discriminate on the basis of race, color, national origin, age, disability, sex, sexual orientation, or gender identity.            Thank you!     Thank you for choosing [facility]  for your care. Our goal is always to provide you with excellent care. Hearing back from our patients is one way we can continue to improve our services. Please take a few minutes to complete the written survey that you may receive in the mail after your visit with us. Thank you!             Your Updated Medication List - Protect others around you: Learn how to safely use, store and throw away your medicines at www.disposemymeds.org.          This list is accurate as of 6/14/18  1:52 PM.  Always use your most recent med list.                   Brand Name Dispense Instructions for use Diagnosis    aspirin 81 MG tablet      1 TABLET DAILY        buPROPion 300 MG 24 hr tablet    WELLBUTRIN XL     Take 300 mg by mouth        CALCIUM 600+D PO      1 tab daily        clotrimazole 10 MG kelly     70 Kelly    Place 1 Kelly (10 mg) inside cheek 5 times daily    Thrush       diphenhydrAMINE 25 MG capsule    BENADRYL     Take 50 mg by mouth        DULoxetine 30 MG EC  "capsule    CYMBALTA    180 capsule    Take 1 capsule (30 mg) by mouth 2 times daily    Depression with anxiety       * gabapentin 600 MG tablet    NEURONTIN          * gabapentin 300 MG capsule    NEURONTIN    90 capsule    Take 1 tablet (300 mg) every night for 1-3 days, then 1 tablet twice daily for 1-3 days, then 1 tablet three times daily    Chronic pain of both knees       insulin syringe-needle U-100 31G X 5/16\" 1 ML    RELION INSULIN SYRINGE    200 each    Use 2 syringes daily or as directed.    Type 2 diabetes mellitus without complication (H)       lisinopril 10 MG tablet    PRINIVIL/ZESTRIL    30 tablet    Take 0.5 tablets (5 mg) by mouth daily    Essential hypertension with goal blood pressure less than 140/90       MELATONIN PO      10 mg At Bedtime        metFORMIN 500 MG 24 hr tablet    GLUCOPHAGE-XR    360 tablet    Take 2 tablets (1,000 mg) by mouth 2 times daily (with meals)    Type 2 diabetes mellitus with hyperglycemia, with long-term current use of insulin (H)       minocycline 100 MG capsule    MINOCIN/DYNACIN    180 capsule    Take 1 capsule (100 mg) by mouth every 12 hours    Acne vulgaris       mirabegron 50 MG 24 hr tablet    MYRBETRIQ    30 tablet    Take 1 tablet (50 mg) by mouth daily    Urinary urgency, Urgency incontinence, Urinary frequency       NovoLIN N VIAL 100 UNIT/ML injection   Generic drug:  insulin NPH      Inject 60 units Subcutaneous 2 times daily. (gets this from [facility] NPH at [facility])    Uncontrolled type 2 diabetes mellitus with stage 3 chronic kidney disease, with long-term current use of insulin (H)       omeprazole 40 MG capsule    priLOSEC    90 capsule    TAKE 1 CAPSULE DAILY 30 TO 60 MINUTES BEFORE A MEAL    Gastroesophageal reflux disease without esophagitis       ONE TOUCH LANCETS      None Entered        ONE TOUCH TEST STRIPS VI      None Entered        oxybutynin chloride 15 MG Tb24     90 tablet    Take 1 tablet (15 mg) by mouth daily    Urinary urgency, Urgency " incontinence       PREVNAR 13 Susp injection   Generic drug:  pneumococcal           simvastatin 20 MG tablet    ZOCOR    90 tablet    Take 1 tablet (20 mg) by mouth At Bedtime    Hyperlipidemia LDL goal <100       spironolactone 100 MG tablet    ALDACTONE    90 tablet    TAKE 1 TABLET EVERY MORNING    Essential hypertension with goal blood pressure less than 140/90       tiZANidine 4 MG tablet    ZANAFLEX          traZODone 50 MG tablet    DESYREL    90 tablet    Take 2 tablets (100 mg) by mouth nightly as needed for sleep    Chronic pain of both knees       VISION FORMULA EYE HEALTH PO      Take 1 tablet by mouth daily        vitamin D 1000 units capsule      1 CAPSULE DAILY        * Notice:  This list has 2 medication(s) that are the same as other medications prescribed for you. Read the directions carefully, and ask your doctor or other care provider to review them with you.

## 2018-06-14 NOTE — PATIENT INSTRUCTIONS
At [facility], we strive to deliver an exceptional experience to you, every time we see you.  If you receive a survey in the mail, please send us back your thoughts. We really do value your feedback.    Based on your medical history, these are the current health maintenance/preventive care services that you are due for (some may have been done at this visit.)  Health Maintenance Due   Topic Date Due     DEXA SCAN SCREENING (SYSTEM ASSIGNED)  12/09/2015     ADVANCE DIRECTIVE PLANNING Q5 YRS  05/23/2016     EYE EXAM Q1 YEAR  11/01/2017     FOOT EXAM Q1 YEAR  12/05/2017     DEPRESSION ACTION PLAN Q1 YR  12/05/2017     LIPID MONITORING Q1 YEAR  06/22/2018         Suggested websites for health information:  Www.[employer].org : Up to date and easily searchable information on multiple topics.  Www.medlineplus.gov : medication info, interactive tutorials, watch real surgeries online  Www.familydoctor.org : good info from the Academy of Family Physicians  Www.cdc.gov : public health info, travel advisories, epidemics (H1N1)  Www.aap.org : children's health info, normal development, vaccinations  Www.health.state.[place].us : [place] dept of health, public health issues in [place], N1N1    Your care team:                            Family Medicine Internal Medicine   MD Yann Oliver MD Shantel Branch-Fleming, MD Katya Georgiev PA-C Nam Ho, MD Pediatrics   SERAFIN Cortez, MD Magaly Waller CNP, MD Deborah Mielke, MD Kim Thein, APRN CNP      Clinic hours: Monday - Thursday 7 am-7 pm; Fridays 7 am-5 pm.   Urgent care: Monday - Friday 11 am-9 pm; Saturday and Sunday 9 am-5 pm.  Pharmacy : Monday -Thursday 8 am-8 pm; Friday 8 am-6 pm; Saturday and Sunday 9 am-5 pm.     Clinic: [phone]   Pharmacy: [phone]    Before Your Surgery      Call your surgeon if there is any change in your health. This includes signs of a cold or  flu (such as a sore throat, runny nose, cough, rash or fever).    Do not smoke, drink alcohol or take over the counter medicine (unless your surgeon or primary care doctor tells you to) for the 24 hours before and after surgery.    If you take prescribed drugs: Follow your doctor s orders about which medicines to take and which to stop until after surgery.    Eating and drinking prior to surgery: follow the instructions from your surgeon  Take a shower or bath the night before surgery. Use the soap your surgeon gave you to gently clean your skin. If you do not have soap from your surgeon, use your regular soap. Do not shave or scrub the surgery site.  Wear clean pajamas and have clean sheets on your bed.   Presurgery Checklist  You are scheduled to have surgery. The healthcare staff will try to make your stay comfortable. Use the guidelines below to remind yourself what to do before surgery. Be sure to follow any specific pre-op instructions from your surgeon or nurse.  Preparing for Surgery  Ask your surgeon if you ll need a blood transfusion during surgery and if so, how to prepare for it. In some cases, you can donate blood before surgery. If needed, this blood can be given back (transfused) to you during or after surgery.  If you are having abdominal surgery, ask what you need to do to clear your bowel.  Tell your surgeon if you have allergies to any medications or foods.  Arrange for an adult family member or friend to drive you home after surgery. If possible, have someone ready to help you at home as you recover.  Call the surgeon if you get a cold, fever, sore throat, diarrhea, or other health problem just before surgery. Your surgeon can decide whether or not to postpone the surgery.  Medications  Tell your surgeon about all medications you take, including prescription and over-the-counter products such as herbal remedies and vitamins. Ask if you should continue taking them.  If you take ibuprofen,  naproxen, or  blood thinners  such as aspirin, clopidogrel (Plavix), or warfarin (Coumadin), ask your surgeon whether you should stop taking them and how long before surgery you should stop.  You may be told to take antibiotics just before surgery to prevent infection. If so, follow instructions carefully on how to take them.  If you are told to take medications called anticoagulants to prevent blood clots after surgery, be sure to follow the instructions on how to take them.  Stop Smoking  If you smoke, healing may take longer. So at least 2 week(s) before surgery, stop smoking.  Bathing or Showering Before Surgery  If instructed, wash with antibacterial soap. Afterward, do not use lotions or powders.  If you are having surgery on the head, you may be asked to shampoo with antibacterial soap. Follow instructions for doing so.  Do Not Remove Hair from the Surgery Site  Do not shave hair from the incision site, unless you are given specific instructions to do so. Usually, if hair needs to be removed, it will be done at the hospital right before surgery.  Don t Eat or Drink  Your doctor will tell you when to stop eating and drinking. If you do not follow your doctor's instructions, your procedure may be postponed or rescheduled for another day.  If your surgeon tells you to continue any medications, take them with small sips of water.  You can brush your teeth and rinse your mouth, but don t swallow any water.  Day of Surgery  Do not wear makeup. Do not use perfume, deodorant, or hairspray. Remove nail polish and artificial nails.  Leave jewelry (including rings), watches, and other valuables at home.  Be sure to bring health insurance cards or forms and a photo ID.  Bring a list of your medications (include the name, dose, how often you take them, and the time last dose was taken).  Arrive on time at the hospital or surgery facility.    2209-2312 [employer]. [address] [place] [address], [place] [address].  All rights reserved. This information is not intended as a substitute for professional medical care. Always follow your healthcare professional's instructions.  This information has been modified by your health care provider with permission from the publisher.

## 2018-06-14 NOTE — PROGRESS NOTES
[address] [address]  [phone]  Dept: [phone]    PRE-OP EVALUATION:  Today's date: 2018    Radha Corbett (: 1950) presents for pre-operative evaluation assessment as requested by Dr. Baltazar.  She requires evaluation and anesthesia risk assessment prior to undergoing surgery/procedure for treatment of Bladder .    Proposed Surgery/ Procedure: IMPLANT STIMULATOR SACRAL NERVE PERCUTANEOUS TRIAL  Date of Surgery/ Procedure: 18  Time of Surgery/ Procedure: 9:05am  Hospital/Surgical Facility: [facility] [place]    Fax number for surgical facility:   Primary Physician: Trice Medeiros  Type of Anesthesia Anticipated: to be determined    Patient has a Health Care Directive or Living Will:  NO    1. NO - DO YOU HAVE A HISTORY OF HEART ATTACK, STROKE, STENT, BYPASS OR SURGERY ON AN ARTERY IN THE HEAD, NECK, HEART OR LEG?   1. NO - Do you have a history of heart attack, stroke, stent, bypass or surgery on an artery in the head, neck, heart or legs?  2. NO - Do you ever have any pain or discomfort in your chest?  3. NO - Do you have a history of  Heart Failure?  4. YES - Are you troubled by shortness of breath when: walking on the level, up a slight hill or at night? Due to poor conditioning per patient   5. NO - Do you currently have a cold, bronchitis or other respiratory infection?  6. YES - Do you have a cough, shortness of breath or wheezing?  7. NO - Do you sometimes get pains in the calves of your legs when you walk?  8. NO - Do you or anyone in your family have previous history of blood clots?  9. NO - Do you or does anyone in your family have a serious bleeding problem such as prolonged bleeding following surgeries or cuts?  10. NO - Have you ever had problems with anemia or been told to take iron pills?  11. YES - Have you had any abnormal blood loss such as black, tarry or bloody stools, or  abnormal vaginal bleeding?  Diverticular bleeding last summer, had colonoscopy  that showed diverticula throughout colon and has had no further episodes  12. NO - Have you ever had a blood transfusion?  13. NO - Have you or any of your relatives ever had problems with anesthesia?  14. YES - Do you have sleep apnea, excessive snoring or daytime drowsiness? Uriel, referred to sleep Medicine for sleep study but has not yet had it done.  15. NO - Do you have any prosthetic heart valves?  16. NO - Do you have prosthetic joints?  17. NO - Is there any chance that you may be pregnant?      HPI:     HPI related to upcoming procedure: Patient has urge incontinence and will have  IMPLANT STIMULATOR SACRAL NERVE PERCUTANEOUS TRIAL on  6/19/18 and if tolerated well will have Interstim.        See problem list for active medical problems.  Problems all longstanding and stable, except as noted/documented.  See ROS for pertinent symptoms related to these conditions.                                                                                                                                                          .  DIABETES - Patient has a longstanding history of DiabetesType Type II . Patient is being treated with oral agents, exercise, SMBG, insulin injections and ASA and denies significant side effects. Control has been poor. Complicating factors include but are not limited to: hypertension, hyperlipidemia, retinopathy, morbid obesity  and alcohol abuse.                                                                                                                            .  HYPERLIPIDEMIA - Patient has a long history of significant Hyperlipidemia requiring medication for treatment with recent good control. Patient reports no problems or side effects with the medication.                                                                                                                                                        .  HYPERTENSION - Patient has longstanding history of HTN , currently denies any symptoms referable to elevated blood pressure. Specifically denies chest pain, palpitations, dyspnea, orthopnea, PND or peripheral edema. Blood pressure readings have been in normal range. Current medication regimen is as listed below. Patient denies any side effects of medication.                                                                                                                                                                                          .  SLEEP PROBLEM - Patient has a longstanding history of snoring.. Patient has tried OTC medications with limited success. She has not yet schedule sleep study.                                                                                                                                         .    MEDICAL HISTORY:     Patient Active Problem List    Diagnosis Date Noted     Type 2 diabetes, HbA1C goal < 8% (H) 10/31/2010     Priority: High     Urge incontinence 06/11/2018     Priority: Medium     Back pain, unspecified back location, unspecified back pain laterality, unspecified chronicity 02/23/2017     Priority: Medium     Unexplained endometrial cells on cervical cytology 12/08/2015     Priority: Medium     12/2/15 pap NIL/neg HPV with endometrial cells.   12/4/15 pelvic ultrasound completed.  12/17/15 EMB--benign. Plan: ECC  01/19/16 ECC--benign. Plan: co-test 5 years from previous, due 12/02/20.          Rectocele 12/02/2015     Priority: Medium     Elevated liver function tests 06/23/2015     Priority: Medium     Alcohol related.        Retinal detachment 03/16/2015     Priority: Medium     Depression with anxiety 10/04/2014     Priority: Medium     Abnormal cervical Pap smear with positive HPV DNA test 10/01/2014     Priority: Medium     Dx 2001. Normal since.        Pseudophakia 08/22/2013     Priority: Medium     Rosacea 03/05/2013     Priority: Medium     Alcohol  ingestion, more than 4 drinks/day on alcohol screening 07/21/2012     Priority: Medium     4 vodka at night before dinner --> recommend changing to 1-2 wine instead.       Umbilical hernia 07/21/2012     Priority: Medium     Incontinence of urine 07/21/2012     Priority: Medium     Stool incontinence 07/21/2012     Priority: Medium     Tubular adenoma of colon 12/31/2010     Priority: Medium     Last colonoscopy 2008, due in 2011  Tubular adenoma  Next colonoscopy due in 2015         Breast cancer screening-high risk 12/22/2010     Priority: Medium     Mammogram every 6 months   On tamoxifen       Hyperlipidemia LDL goal <100 10/31/2010     Priority: Medium     Atypical ductal hyperplasia of breast 08/24/2010     Priority: Medium     Atypical lobular hyperplasia of breast 08/24/2010     Priority: Medium     Benign Breast Disease (PASH) 08/24/2010     Priority: Medium     Family history of breast cancer in sister 08/24/2010     Priority: Medium     Hypertension goal BP (blood pressure) < 140/90      Priority: Medium     Anxiety 07/29/2013     Priority: Low     Osteoarthritis, knee 03/05/2013     Priority: Low     Osteopenia 12/31/2010     Priority: Low     dexa 2008. Repeat in 2010  (Problem list name updated by automated process. Provider to review and confirm.)       Macular degeneration      Priority: Low     Myopia      Priority: Low     Hiatal hernia      Priority: Low     IBS (irritable bowel syndrome)      Priority: Low     GERD (gastroesophageal reflux disease)      Priority: Low      Past Medical History:   Diagnosis Date     Arthritis      DM (diabetes mellitus) (H)      GERD (gastroesophageal reflux disease)      Hiatal hernia      HPV in female      HTN (hypertension)      IBS (irritable bowel syndrome)      Major depression      Myopia      Other and unspecified hyperlipidemia      Pseudoangiomatous stromal hyperplasia of breast 2010    PASH     Rotator cuff injury 7/2016     Sleep apnea      Vitamin D  deficiency      Past Surgical History:   Procedure Laterality Date     BREAST LUMPECTOMY, RT/LT      x2, left     CATARACT IOL, RT/LT  4/99    left, [id number] 7.0D     COLONOSCOPY       COLONOSCOPY N/A 1/22/2016    Procedure: COMBINED COLONOSCOPY, SINGLE OR MULTIPLE BIOPSY/POLYPECTOMY BY BIOPSY;  Surgeon: Praful Benjamin MD;  Location: [facility]     COLONOSCOPY WITH CO2 INSUFFLATION N/A 1/22/2016    Procedure: COLONOSCOPY WITH CO2 INSUFFLATION;  Surgeon: Praful Benjamin MD;  Location: [facility]     CRYOTHERAPY  2000    cervical     CRYOTHERAPY Left 3/19/2015    Procedure: CRYOTHERAPY;  Surgeon: Keiko Puri MD;  Location: [facility]     DILATION AND CURETTAGE, HYSTEROSCOPY DIAGNOSTIC, COMBINED N/A 1/19/2016    Procedure: COMBINED DILATION AND CURETTAGE, HYSTEROSCOPY DIAGNOSTIC;  Surgeon: Yeni Aparicio DO;  Location: [facility]     LAPAROSCOPIC TUBAL LIGATION  1981     PHACOEMULSIFICATION CLEAR CORNEA WITH STANDARD INTRAOCULAR LENS IMPLANT  8/15/2013    Procedure: PHACOEMULSIFICATION CLEAR CORNEA WITH STANDARD INTRAOCULAR LENS IMPLANT;  RIGHT PHACOEMULSIFICATION CLEAR CORNEA WITH STANDARD INTRAOCULAR LENS IMPLANT ;  Surgeon: Trae Taylor MD;  Location: [facility]     SURGICAL HISTORY OF -       x4, ankle surgery     Current Outpatient Prescriptions   Medication Sig Dispense Refill     ASPIRIN 81 MG OR TABS 1 TABLET DAILY       buPROPion (WELLBUTRIN XL) 300 MG 24 hr tablet Take 300 mg by mouth       CALCIUM 600+D OR 1 tab daily       ciprofloxacin (CIPRO) 500 MG tablet Take 1 tablet (500 mg) by mouth 2 times daily 14 tablet 0     clotrimazole 10 MG kelly Place 1 Kelly (10 mg) inside cheek 5 times daily 70 Kelly 0     diphenhydrAMINE (BENADRYL) 25 MG capsule Take 50 mg by mouth       DULoxetine (CYMBALTA) 30 MG EC capsule Take 1 capsule (30 mg) by mouth 2 times daily 180 capsule 1     gabapentin (NEURONTIN) 300 MG capsule Take 1 tablet (300 mg) every night for 1-3 days, then 1 tablet twice  "daily for 1-3 days, then 1 tablet three times daily 90 capsule 0     gabapentin (NEURONTIN) 600 MG tablet        insulin NPH (NOVOLIN N VIAL) 100 UNIT/ML injection Inject 60 units Subcutaneous 2 times daily. (gets this from [facility] NPH at [facility])       insulin syringe-needle U-100 (RELION INSULIN SYRINGE) 31G X 5/16\" 1 ML Use 2 syringes daily or as directed. 200 each 3     lisinopril (PRINIVIL/ZESTRIL) 10 MG tablet Take 0.5 tablets (5 mg) by mouth daily 30 tablet 3     MELATONIN PO 10 mg At Bedtime        metFORMIN (GLUCOPHAGE-XR) 500 MG 24 hr tablet Take 2 tablets (1,000 mg) by mouth 2 times daily (with meals) 360 tablet 1     minocycline (MINOCIN/DYNACIN) 100 MG capsule Take 1 capsule (100 mg) by mouth every 12 hours 180 capsule 2     mirabegron (MYRBETRIQ) 50 MG 24 hr tablet Take 1 tablet (50 mg) by mouth daily 30 tablet 11     Multiple Vitamins-Minerals (VISION FORMULA EYE HEALTH PO) Take 1 tablet by mouth daily       omeprazole (PRILOSEC) 40 MG capsule TAKE 1 CAPSULE DAILY 30 TO 60 MINUTES BEFORE A MEAL 90 capsule 3     ONE TOUCH LANCETS None Entered       ONE TOUCH TEST STRIPS VI None Entered       oxybutynin chloride 15 MG TB24 Take 1 tablet (15 mg) by mouth daily 90 tablet 1     PREVNAR 13 SUSP injection        simvastatin (ZOCOR) 20 MG tablet Take 1 tablet (20 mg) by mouth At Bedtime 90 tablet 3     simvastatin (ZOCOR) 40 MG tablet Take 1 tablet (40 mg) by mouth At Bedtime 90 tablet 1     spironolactone (ALDACTONE) 100 MG tablet TAKE 1 TABLET EVERY MORNING 90 tablet 3     traZODone (DESYREL) 50 MG tablet Take 2 tablets (100 mg) by mouth nightly as needed for sleep 90 tablet 0     VITAMIN D 1000 UNIT OR CAPS 1 CAPSULE DAILY       OTC products: None, except as noted above    Allergies   Allergen Reactions     Codeine      Sulfa Drugs       Latex Allergy: NO    Social History   Substance Use Topics     Smoking status: Current Every Day Smoker     Types: Cigarettes     Smokeless tobacco: Never Used     Alcohol use " "Yes      Comment: 2-3/night     History   Drug Use No       REVIEW OF SYSTEMS:   Constitutional, HEENT, cardiovascular, pulmonary, gi and gu systems are negative, except as otherwise noted.    EXAM:   /64 (BP Location: Left arm, Patient Position: Chair, Cuff Size: Adult Large)  Pulse 116  Temp 98.2  F (36.8  C) (Oral)  Ht 5' 7\" (1.702 m)  Wt 234 lb (106.1 kg)  LMP 11/18/1991 (Approximate)  SpO2 95%  BMI 36.65 kg/m2    GENERAL APPEARANCE: healthy, alert and no distress     EYES: EOMI, PERRL     HENT: ear canals and TM's normal and nose and mouth without ulcers or lesions     NECK: no adenopathy, no asymmetry, masses, or scars and thyroid normal to palpation     RESP: lungs clear to auscultation - no rales, rhonchi or wheezes     CV: regular rates and rhythm, normal S1 S2, no S3 or S4 and no murmur, click or rub     ABDOMEN:  soft, nontender, no HSM or masses and bowel sounds normal     MS: extremities normal- no gross deformities noted, no evidence of inflammation in joints, FROM in all extremities.     SKIN: no suspicious lesions or rashes     NEURO: Normal strength and tone, sensory exam grossly normal, mentation intact and speech normal     PSYCH: mentation appears normal. and affect normal/bright     LYMPHATICS: No cervical adenopathy    DIAGNOSTICS:   EKG: Not indicated due to non-vascular surgery and last ekg on 11-30-17 (within 30 days for CAD history or last year for cardiac risk factors)    Recent Labs   Lab Test  05/15/18   1318  02/08/18   1431  11/30/17   1522   HGB   --   11.6*  12.0   PLT   --   318  302   NA   --   137  136   POTASSIUM   --   3.8  4.0   CR   --   0.50*  0.53   A1C  8.5*  9.2*   --       Component      Latest Ref Rng & Units 6/14/2018   Color Urine       Yellow   Appearance Urine       Slightly Cloudy   Glucose Urine      NEG:Negative mg/dL Negative   Bilirubin Urine      NEG:Negative Negative   Ketones Urine      NEG:Negative mg/dL Negative   Specific Gravity Urine      " 1.003 - 1.035 1.020   Blood Urine      NEG:Negative Negative   pH Urine      5.0 - 7.0 pH 6.0   Protein Albumin Urine      NEG:Negative mg/dL Negative   Urobilinogen Urine      0.2 - 1.0 EU/dL 0.2   Nitrite Urine      NEG:Negative Positive (A)   Leukocyte Esterase Urine      NEG:Negative Small (A)   Source       Midstream Urine   WBC Urine      OTO5:0 - 5 /HPF 5-10 (A)   RBC Urine      OTO2:O - 2 /HPF O - 2   Squamous Epithelial /LPF Urine      FEW:Few /LPF Few   Bacteria Urine      NEG:Negative /HPF Many (A)   Mucous Urine      NEG:Negative /LPF Present (A)     Component      Latest Ref Rng & Units 6/14/2018   Creatinine      0.52 - 1.04 mg/dL 0.56   GFR Estimate      >60 mL/min/1.7m2 >90   GFR Estimate If Black      >60 mL/min/1.7m2 >90   Glucose      70 - 99 mg/dL 130 (H)   Potassium      3.4 - 5.3 mmol/L 4.3   LDL Cholesterol Direct      <100 mg/dL 138 (H)       IMPRESSION:   Reason for surgery/procedure: urinary urge incontinence/  IMPLANT STIMULATOR SACRAL NERVE PERCUTANEOUS TRIAL    Diagnosis/reason for consult: preoperative exam    The proposed surgical procedure is considered INTERMEDIATE risk.    REVISED CARDIAC RISK INDEX  The patient has the following serious cardiovascular risks for perioperative complications such as (MI, PE, VFib and 3  AV Block):  Diabetes Mellitus (on Insulin)  INTERPRETATION: 1 risks: Class II (low risk - 0.9% complication rate)    The patient has the following additional risks for perioperative complications:  The 10-year ASCVD risk score (Shavon DC Jr, et al., 2013) is: 25.4%    Values used to calculate the score:      Age: 67 years      Sex: Female      Is Non- : No      Diabetic: Yes      Tobacco smoker: Yes      Systolic Blood Pressure: 131 mmHg      Is BP treated: Yes      HDL Cholesterol: 69 mg/dL      Total Cholesterol: 162 mg/dL  Morbid obesity      ICD-10-CM    1. Pre-op exam Z01.818 Glucose     Creatinine     Potassium     UA reflex to Microscopic  and Culture     Urine Microscopic     CANCELED: UA reflex to Microscopic and Culture   2. Urge incontinence N39.41 UA reflex to Microscopic and Culture     Urine Culture Aerobic Bacterial   3. Type 2 diabetes mellitus with hyperglycemia, with long-term current use of insulin (H) E11.65     Z79.4    4. Acute cystitis without hematuria N30.00 ciprofloxacin (CIPRO) 500 MG tablet   5. Hyperlipidemia LDL goal <100 E78.5 LDL cholesterol direct     simvastatin (ZOCOR) 40 MG tablet   6. Gastroesophageal reflux disease, esophagitis presence not specified K21.9    7. Screening for diabetic peripheral neuropathy Z13.89 FOOT EXAM  NO CHARGE [57282.114]   8. Thrush B37.0 clotrimazole 10 MG kelly     DISCONTINUED: clotrimazole 10 MG kelly   9. Nonspecific finding on examination of urine R82.90 Urine Culture Aerobic Bacterial       RECOMMENDATIONS:       Cardiovascular Risk  Performs 4 METs exercise without symptoms (Light housework (dusting, washing dishes) and Climb a flight of stairs) .       Pulmonary Risk  Incentive spirometry post op  NG tube decompression if abdominal distension or significant vomiting       --Patient is to take all scheduled medications on the day of surgery EXCEPT for modifications listed below.    Diabetes Medication Use    -----Hold usual oral and non-insulin diabetic meds (e.g. Metformin, Actos, Glipizide) while NPO.   -----Take 80% of long acting insulin (e.g. Lantus, NPH) while NPO (fasting)  -----Use usual sliding scale correction of insulin while NPO (fasting)      Anticoagulant or Antiplatelet Medication Use  ASPIRIN: Discontinue ASA 7-10 days prior to procedure to reduce bleeding risk.  It should be resumed post-operatively.        ACE Inhibitor or Angiotensin Receptor Blocker (ARB) Use  Ace inhibitor or Angiotensin Receptor Blocker (ARB) and should HOLD this medication for the 24 hours prior to surgery.      APPROVAL GIVEN to proceed with proposed procedure, without further diagnostic  evaluation       Signed Electronically by: NIKKY Alvarez CNP    Copy of this evaluation report is provided to requesting physician.    [place] Preop Guidelines    Revised Cardiac Risk Indexzocor

## 2018-06-15 PROBLEM — K92.2 ACUTE LOWER GI BLEEDING: Status: ACTIVE | Noted: 2017-07-31

## 2018-06-15 LAB
BACTERIA SPEC CULT: NORMAL
SPECIMEN SOURCE: NORMAL

## 2018-06-15 RX ORDER — SIMVASTATIN 40 MG
40 TABLET ORAL AT BEDTIME
Qty: 90 TABLET | Refills: 1 | Status: SHIPPED | OUTPATIENT
Start: 2018-06-15 | End: 2018-06-22

## 2018-06-18 ENCOUNTER — TELEPHONE (OUTPATIENT)
Dept: UROLOGY | Facility: CLINIC | Age: 68
End: 2018-06-18

## 2018-06-18 NOTE — TELEPHONE ENCOUNTER
Contacted patient and informed her about medications which she should stop prior to surgery, which per PCP is oral non-insulin diabetic medications and to use only 80% long acting insulin while NPO (see PCP H&P note). Patent state understanding and agreement with plan.   Well appearing, well nourished, awake, alert, oriented to person, place, time/situation and in no apparent distress. normal...

## 2018-06-18 NOTE — TELEPHONE ENCOUNTER
[facility] Call Center    Phone Message    May a detailed message be left on voicemail: no    Reason for Call: Other: Urgent call back about medication restrictions for tomorrows procedure.  Pt asking for a call back as soon as possible.      Action Taken: Message routed to:  [facility] ([facility]

## 2018-06-19 ENCOUNTER — HOSPITAL ENCOUNTER (OUTPATIENT)
Facility: AMBULATORY SURGERY CENTER | Age: 68
End: 2018-06-19
Attending: UROLOGY
Payer: COMMERCIAL

## 2018-06-19 ENCOUNTER — SURGERY (OUTPATIENT)
Age: 68
End: 2018-06-19

## 2018-06-19 ENCOUNTER — RADIANT APPOINTMENT (OUTPATIENT)
Dept: RADIOLOGY | Facility: AMBULATORY SURGERY CENTER | Age: 68
End: 2018-06-19
Attending: UROLOGY
Payer: COMMERCIAL

## 2018-06-19 VITALS
HEIGHT: 66 IN | WEIGHT: 234 LBS | SYSTOLIC BLOOD PRESSURE: 110 MMHG | HEART RATE: 93 BPM | OXYGEN SATURATION: 95 % | DIASTOLIC BLOOD PRESSURE: 66 MMHG | TEMPERATURE: 97 F | BODY MASS INDEX: 37.61 KG/M2 | RESPIRATION RATE: 16 BRPM

## 2018-06-19 DIAGNOSIS — R32 INCONTINENCE: ICD-10-CM

## 2018-06-19 LAB — GLUCOSE BLDC GLUCOMTR-MCNC: 147 MG/DL (ref 70–99)

## 2018-06-19 DEVICE — KIT NEUROSTIMULATIOR TEST INTERSTIM TL23: Type: IMPLANTABLE DEVICE | Site: BACK | Status: FUNCTIONAL

## 2018-06-19 RX ORDER — BUPIVACAINE HYDROCHLORIDE 2.5 MG/ML
INJECTION, SOLUTION INFILTRATION; PERINEURAL PRN
Status: DISCONTINUED | OUTPATIENT
Start: 2018-06-19 | End: 2018-06-19 | Stop reason: HOSPADM

## 2018-06-19 RX ORDER — ACETAMINOPHEN 325 MG/1
650 TABLET ORAL ONCE
Status: DISCONTINUED | OUTPATIENT
Start: 2018-06-19 | End: 2018-06-20 | Stop reason: HOSPADM

## 2018-06-19 RX ADMIN — BUPIVACAINE HYDROCHLORIDE 6 ML: 2.5 INJECTION, SOLUTION INFILTRATION; PERINEURAL at 09:24

## 2018-06-19 NOTE — IP AVS SNAPSHOT
[facility]    [address] [place] [address]    Phone:  [phone]    Fax:  [phone]                                       After Visit Summary   6/19/2018    Radha Corbett    MRN: [id number]           After Visit Summary Signature Page     I have received my discharge instructions, and my questions have been answered. I have discussed any challenges I see with this plan with the nurse or doctor.    ..........................................................................................................................................  Patient/Patient Representative Signature      ..........................................................................................................................................  Patient Representative Print Name and Relationship to Patient    ..................................................               ................................................  Date                                            Time    ..........................................................................................................................................  Reviewed by Signature/Title    ...................................................              ..............................................  Date                                                            Time

## 2018-06-19 NOTE — OP NOTE
Preop Diagnosis: Urinary urgency and frequency    Postop Diagnosis: Same    Procedure: Percutaneous neural examination    Surgeon: Dada Baltazar MD    Resident: Nazario Santana MD    EBL: 1cc    Specimens: None    Drains: None    Indications:  Ms. Radha Corbett is a 67 year old woman with a history of urinary urgency and urge incontinence.  On UDS she has severe DO's.  Anticholinergics have provided inadequate relief.  We discussed options of PTNS, other medication, Interstim, and botox.  She would like to undergo an interstim trial.    After discussing further management options, the patient has elected to proceed with a percutaneous neural examination as a trial for a permanent interstim implant.    Procedure:  The patient was identified correctly, consented and placed in the prone position.  The patient's sacral area was prepped and draped in the usual sterile fashion.  Using the fluoroscope in the AP position the medial aspects of the sacral foramena were identified and marked.  The fluoroscope was then placed in the lateral position and the S3 foramen was identified and marked.  Using marcaine with bicarb bilateral insertion sites were injected to anesthetize the skin.  Once this was achieved a 3 1/2 inch needle was inserted on the right side until it was passed through the S3 foramen as seen on fluoroscopy.  Next the needle was tested and the patient had a woody response at about 1.0 with toe flexion at 1.5.        The same was then done on the left and a woody response was seen at about 1.0 and toe flexion at 1.7.    The left was higher in the foramen.    At this point the stilette was removed from the insertion needle and the electrode was passed until the 3 1/2 inch temo on both sides.  The needle was pulled out leaving the electrode in place.  These were secured down with steri-strips and a dressing was applied.    All appropriate wires were put in place and attached to the generator and the WHILL  representative went over instructions with the patient.    Ms. Radha Corbett will f/u with me in 1 week for lead removal and to go over results of the trial.      Patient was seen, evaluated and plan was formulated in conjunction with me and I agree with the above.  I performed the entire procedure.  Dada Baltazar MD

## 2018-06-19 NOTE — DISCHARGE INSTRUCTIONS
[facility]  Home Care Following Your Procedure  Call a doctor if you have signs of infection (fever, growing tenderness at the surgery site, a large amount of drainage or bleeding, severe pain, foul-smelling drainage, redness, swelling).         Tylenol/Acetaminophen Consumption  To help encourage the safe use of acetaminophen, the makers of TYLENOL  have lowered the maximum daily dose for single-ingredient Extra Strength TYLENOL  (acetaminophen) products sold in the [place] from 8 pills per day (4,000 mg) to 6 pills per day (3,000 mg). The dosing interval has also changed from 2 pills every 4-6 hours to 2 pills every 6 hours.    If you feel your pain relief is insufficient, you may take Tylenol/Acetaminophen in addition to your narcotic pain medication.     Be careful not to exceed 3,000 mg of Tylenol/Acetaminophen in a 24 hour period from all sources.    If you are taking extra strength Tylenol/acetaminophen (500 mg), the maximum dose is 6 tablets in 24 hours.    If you are taking regular strength acetaminophen (325 mg), the maximum dose is 9 tablets in 24 hours.  Your doctor is:  Giovanny                                    Or daksha [phone] and ask for the resident on call for:  Prostate Urology  For emergency care, call the:  [place]:  [phone] (TTY for hearing impaired: [phone])

## 2018-06-19 NOTE — IP AVS SNAPSHOT
MRN:[id number]                      After Visit Summary   6/19/2018    Radha Corbett    MRN: [id number]           Thank you!     Thank you for choosing [facility] for your care. Our goal is always to provide you with excellent care. Hearing back from our patients is one way we can continue to improve our services. Please take a few minutes to complete the written survey that you may receive in the mail after you visit with us. Thank you!        Patient Information     Date Of Birth          1950        About your hospital stay     You were admitted on:  June 19, 2018 [facility]    You were discharged on:  June 19, 2018       Who to Call     For medical emergencies, please call 911.  For non-urgent questions about your medical care, please call your primary care provider or clinic, [phone]  For questions related to your surgery, please call your surgery clinic        Attending Provider     Provider Specialty    Dada Baltazar MD Urology       Primary Care Provider Office Phone # Fax #    Trice Medeiros MD PhD [phone]      After Care Instructions     Discharge Instructions       Discharge Instructions: Percutaneous Neural Examination    Diet:  You may return to your normal diet that you were taking before surgery.      Activity Restrictions: No lifting, pushing, or pulling more than 10 pounds. No strenuous exercising. You are encouraged to walk as much as possible to prevent DVT. No sexual intercourse for 1 to 2 weeks.     Incision Care: Keep device dry.    Bathing: Sponge baths only    Medications:  - No narcotic pain medications are required and over the counter tylenol should suffice.  Wean yourself off all pain medications as you are able.  - Some pain medications contain both tylenol (acetaminophen) and a narcotic (Norco, vicodin, percocet), do not take more than 4,000mg of Tylenol (acetaminophen) from all sources  in any 24 hour period.  - Resume all other home medications    Device:  -Follow instructions as provided by the [employer] representative.  Plan to switch stimulated side half way through the week.      Monitor: Call sooner or go the emergency department if you develop fevers, chills, uncontrolled pain, nausea or vomiting, or increased redness or drainage from the incision site.     Follow-up:  Call Dr. Baltazar's clinic to set up an appointment for follow up in one week's time  Be sure to record your symptoms in your diary as instructed  Call or return sooner than your regularly scheduled visit if you develop any of the following: fever (greater than 101.5), uncontrolled pain, uncontrolled nausea or vomiting    If you have any questions, please call.  1.  Nursing phone helpline at the Urology clinic (8A-5P M-F): [phone]  2.  If after hours, call [phone] and ask to speak with the Urology resident on call.  3. For emergencies, call 911                  Your next 10 appointments already scheduled     Jun 22, 2018  2:30 PM CDT   Return Visit with Trice Medeiros MD PhD   [facility])    [id number] [place] [address] [phone]            Jun 25, 2018  9:45 AM CDT   Return Visit with Dada Baltazar MD   [facility])    [address] [place] [phone]              Further instructions from your care team       [facility]  Home Care Following Your Procedure  Call a doctor if you have signs of infection (fever, growing tenderness at the surgery site, a large amount of drainage or bleeding, severe pain, foul-smelling drainage, redness, swelling).         Tylenol/Acetaminophen Consumption  To help encourage the safe use of acetaminophen, the makers of TYLENOL  have lowered the maximum daily dose for single-ingredient Extra Strength TYLENOL   "(acetaminophen) products sold in the [place] from 8 pills per day (4,000 mg) to 6 pills per day (3,000 mg). The dosing interval has also changed from 2 pills every 4-6 hours to 2 pills every 6 hours.    If you feel your pain relief is insufficient, you may take Tylenol/Acetaminophen in addition to your narcotic pain medication.     Be careful not to exceed 3,000 mg of Tylenol/Acetaminophen in a 24 hour period from all sources.    If you are taking extra strength Tylenol/acetaminophen (500 mg), the maximum dose is 6 tablets in 24 hours.    If you are taking regular strength acetaminophen (325 mg), the maximum dose is 9 tablets in 24 hours.  Your doctor is:  Giovanny                                    Or dial [phone] and ask for the resident on call for:  Prostate Urology  For emergency care, call the:  [place]:  [phone] (TT[place] for hearing impaired: [phone])                Pending Results     No orders found from 6/17/2018 to 6/20/2018.            Admission Information     Date & Time Provider Department Dept. Phone    6/19/2018 Dada Baltazar MD [facility] [phone]      Your Vitals Were     Blood Pressure Pulse Temperature Respirations Height Weight    110/66 93 97  F (36.1  C) 16 1.676 m (5' 6\") 106.1 kg (234 lb)    Last Period Pulse Oximetry BMI (Body Mass Index)             11/18/1991 (Approximate) 95% 37.77 kg/m2         [employer] Information     [employer] gives you secure access to your electronic health record. If you see a primary care provider, you can also send messages to your care team and make appointments. If you have questions, please call your primary care clinic.  If you do not have a primary care provider, please call [phone] and they will assist you.      [employer] is an electronic gateway that provides easy, online access to your medical records. With [employer], you can request a clinic appointment, read your test results, renew a prescription or communicate " with your care team.     To access your existing account, please contact your [facility] or call [phone] for assistance.        Care EveryWhere ID     This is your Care EveryWhere ID. This could be used by other organizations to access your [facility] medical records  [id number]        Equal Access to Services     MAIA GARCIA : Claudine corrigan Sokaren, waaxda luqadaha, qaybta kaalmada adetyleryada, michael tineo. So [facility] [phone].    ATENCIÓN: Si habla español, tiene a arauz disposición servicios gratuitos de asistencia lingüística. Nadja al [phone].    We comply with applicable federal civil rights laws and [place] laws. We do not discriminate on the basis of race, color, national origin, age, disability, sex, sexual orientation, or gender identity.               Review of your medicines      CONTINUE these medicines which have NOT CHANGED        Dose / Directions    aspirin 81 MG tablet        1 TABLET DAILY   Refills:  0       buPROPion 300 MG 24 hr tablet   Commonly known as:  WELLBUTRIN XL        Dose:  300 mg   Take 300 mg by mouth   Refills:  0       CALCIUM 600+D PO        1 tab daily   Refills:  0       ciprofloxacin 500 MG tablet   Commonly known as:  CIPRO   Used for:  Acute cystitis without hematuria        Dose:  500 mg   Take 1 tablet (500 mg) by mouth 2 times daily   Quantity:  14 tablet   Refills:  0       clotrimazole 10 MG kelly   Used for:  Thrush        Dose:  1 Kelly   Place 1 Kelly (10 mg) inside cheek 5 times daily   Quantity:  70 Kelly   Refills:  0       diphenhydrAMINE 25 MG capsule   Commonly known as:  BENADRYL        Dose:  50 mg   Take 50 mg by mouth   Refills:  0       DULoxetine 30 MG EC capsule   Commonly known as:  CYMBALTA   Used for:  Depression with anxiety        Dose:  30 mg   Take 1 capsule (30 mg) by mouth 2 times daily   Quantity:  180 capsule   Refills:  1       * gabapentin 600 MG tablet  "  Commonly known as:  NEURONTIN        Refills:  0       * gabapentin 300 MG capsule   Commonly known as:  NEURONTIN   Used for:  Chronic pain of both knees        Take 1 tablet (300 mg) every night for 1-3 days, then 1 tablet twice daily for 1-3 days, then 1 tablet three times daily   Quantity:  90 capsule   Refills:  0       insulin syringe-needle U-100 31G X 5/16\" 1 ML   Commonly known as:  RELION INSULIN SYRINGE   Used for:  Type 2 diabetes mellitus without complication (H)        Use 2 syringes daily or as directed.   Quantity:  200 each   Refills:  3       lisinopril 10 MG tablet   Commonly known as:  PRINIVIL/ZESTRIL   Used for:  Essential hypertension with goal blood pressure less than 140/90        Dose:  5 mg   Take 0.5 tablets (5 mg) by mouth daily   Quantity:  30 tablet   Refills:  3       MELATONIN PO        Dose:  10 mg   10 mg At Bedtime   Refills:  0       metFORMIN 500 MG 24 hr tablet   Commonly known as:  GLUCOPHAGE-XR   Used for:  Type 2 diabetes mellitus with hyperglycemia, with long-term current use of insulin (H)        Dose:  1000 mg   Take 2 tablets (1,000 mg) by mouth 2 times daily (with meals)   Quantity:  360 tablet   Refills:  1       minocycline 100 MG capsule   Commonly known as:  MINOCIN/DYNACIN   Used for:  Acne vulgaris        Dose:  100 mg   Take 1 capsule (100 mg) by mouth every 12 hours   Quantity:  180 capsule   Refills:  2       mirabegron 50 MG 24 hr tablet   Commonly known as:  MYRBETRIQ   Used for:  Urinary urgency, Urgency incontinence, Urinary frequency        Dose:  50 mg   Take 1 tablet (50 mg) by mouth daily   Quantity:  30 tablet   Refills:  11       NovoLIN N VIAL 100 UNIT/ML injection   Used for:  Uncontrolled type 2 diabetes mellitus with stage 3 chronic kidney disease, with long-term current use of insulin (H)   Generic drug:  insulin NPH        Inject 60 units Subcutaneous 2 times daily. (gets this from OTC NPH at [facility])   Refills:  0       omeprazole 40 MG " capsule   Commonly known as:  priLOSEC   Used for:  Gastroesophageal reflux disease without esophagitis        TAKE 1 CAPSULE DAILY 30 TO 60 MINUTES BEFORE A MEAL   Quantity:  90 capsule   Refills:  3       ONE TOUCH LANCETS        None Entered   Refills:  0       ONE TOUCH TEST STRIPS VI        None Entered   Refills:  0       oxybutynin chloride 15 MG Tb24   Used for:  Urinary urgency, Urgency incontinence        Dose:  1 tablet   Take 1 tablet (15 mg) by mouth daily   Quantity:  90 tablet   Refills:  1       PREVNAR 13 Susp injection   Generic drug:  pneumococcal        Refills:  0       * simvastatin 20 MG tablet   Commonly known as:  ZOCOR   Used for:  Hyperlipidemia LDL goal <100        Dose:  20 mg   Take 1 tablet (20 mg) by mouth At Bedtime   Quantity:  90 tablet   Refills:  3       * simvastatin 40 MG tablet   Commonly known as:  ZOCOR   Used for:  Hyperlipidemia LDL goal <100        Dose:  40 mg   Take 1 tablet (40 mg) by mouth At Bedtime   Quantity:  90 tablet   Refills:  1       spironolactone 100 MG tablet   Commonly known as:  ALDACTONE   Used for:  Essential hypertension with goal blood pressure less than 140/90        TAKE 1 TABLET EVERY MORNING   Quantity:  90 tablet   Refills:  3       traZODone 50 MG tablet   Commonly known as:  DESYREL   Used for:  Chronic pain of both knees        Dose:  100 mg   Take 2 tablets (100 mg) by mouth nightly as needed for sleep   Quantity:  90 tablet   Refills:  0       VISION FORMULA EYE HEALTH PO        Dose:  1 tablet   Take 1 tablet by mouth daily   Refills:  0       vitamin D 1000 units capsule        1 CAPSULE DAILY   Refills:  0       * Notice:  This list has 4 medication(s) that are the same as other medications prescribed for you. Read the directions carefully, and ask your doctor or other care provider to review them with you.             Protect others around you: Learn how to safely use, store and throw away your medicines at www.disposemymeds.org.       "       Medication List: This is a list of all your medications and when to take them. Check marks below indicate your daily home schedule. Keep this list as a reference.      Medications           Morning Afternoon Evening Bedtime As Needed    aspirin 81 MG tablet   1 TABLET DAILY                                buPROPion 300 MG 24 hr tablet   Commonly known as:  WELLBUTRIN XL   Take 300 mg by mouth                                CALCIUM 600+D PO   1 tab daily                                ciprofloxacin 500 MG tablet   Commonly known as:  CIPRO   Take 1 tablet (500 mg) by mouth 2 times daily                                clotrimazole 10 MG venkat   Place 1 Venkat (10 mg) inside cheek 5 times daily                                diphenhydrAMINE 25 MG capsule   Commonly known as:  BENADRYL   Take 50 mg by mouth                                DULoxetine 30 MG EC capsule   Commonly known as:  CYMBALTA   Take 1 capsule (30 mg) by mouth 2 times daily                                * gabapentin 600 MG tablet   Commonly known as:  NEURONTIN                                * gabapentin 300 MG capsule   Commonly known as:  NEURONTIN   Take 1 tablet (300 mg) every night for 1-3 days, then 1 tablet twice daily for 1-3 days, then 1 tablet three times daily                                insulin syringe-needle U-100 31G X 5/16\" 1 ML   Commonly known as:  RELION INSULIN SYRINGE   Use 2 syringes daily or as directed.                                lisinopril 10 MG tablet   Commonly known as:  PRINIVIL/ZESTRIL   Take 0.5 tablets (5 mg) by mouth daily                                MELATONIN PO   10 mg At Bedtime                                metFORMIN 500 MG 24 hr tablet   Commonly known as:  GLUCOPHAGE-XR   Take 2 tablets (1,000 mg) by mouth 2 times daily (with meals)                                minocycline 100 MG capsule   Commonly known as:  MINOCIN/DYNACIN   Take 1 capsule (100 mg) by mouth every 12 hours                     "            mirabegron 50 MG 24 hr tablet   Commonly known as:  MYRBETRIQ   Take 1 tablet (50 mg) by mouth daily                                NovoLIN N VIAL 100 UNIT/ML injection   Inject 60 units Subcutaneous 2 times daily. (gets this from [facility] NPH at [facility])   Generic drug:  insulin NPH                                omeprazole 40 MG capsule   Commonly known as:  priLOSEC   TAKE 1 CAPSULE DAILY 30 TO 60 MINUTES BEFORE A MEAL                                ONE TOUCH LANCETS   None Entered                                ONE TOUCH TEST STRIPS VI   None Entered                                oxybutynin chloride 15 MG Tb24   Take 1 tablet (15 mg) by mouth daily                                PREVNAR 13 Susp injection   Generic drug:  pneumococcal                                * simvastatin 20 MG tablet   Commonly known as:  ZOCOR   Take 1 tablet (20 mg) by mouth At Bedtime                                * simvastatin 40 MG tablet   Commonly known as:  ZOCOR   Take 1 tablet (40 mg) by mouth At Bedtime                                spironolactone 100 MG tablet   Commonly known as:  ALDACTONE   TAKE 1 TABLET EVERY MORNING                                traZODone 50 MG tablet   Commonly known as:  DESYREL   Take 2 tablets (100 mg) by mouth nightly as needed for sleep                                VISION FORMULA EYE HEALTH PO   Take 1 tablet by mouth daily                                vitamin D 1000 units capsule   1 CAPSULE DAILY                                * Notice:  This list has 4 medication(s) that are the same as other medications prescribed for you. Read the directions carefully, and ask your doctor or other care provider to review them with you.

## 2018-06-19 NOTE — BRIEF OP NOTE
[facility] Brief Operative Note      Pre-operative diagnosis: Urge Incontinence    Post-operative diagnosis: Same as above,    Procedure: Procedure(s):  Percutaneous Neural Examination (trial for interstim) - Wound Class: I-Clean     Surgeon: Surgeon(s) and Role:     * Dada Baltazar MD - Primary   Assistant(s): Nazario Santana MD - Resident          Anesthesia: Local   Estimated blood loss: Minimal           Drains: None     Specimens: * No specimens in log *   Complications: None   Condition: Patient taken to recovery in stable condition.         Findings: Unremarkable PNE     Postop plan: DC to home - follow up in 1 week

## 2018-06-22 ENCOUNTER — OFFICE VISIT (OUTPATIENT)
Dept: PEDIATRICS | Facility: CLINIC | Age: 68
End: 2018-06-22
Payer: COMMERCIAL

## 2018-06-22 ENCOUNTER — CARE COORDINATION (OUTPATIENT)
Dept: UROLOGY | Facility: CLINIC | Age: 68
End: 2018-06-22

## 2018-06-22 VITALS
BODY MASS INDEX: 37.82 KG/M2 | SYSTOLIC BLOOD PRESSURE: 103 MMHG | DIASTOLIC BLOOD PRESSURE: 65 MMHG | OXYGEN SATURATION: 96 % | TEMPERATURE: 98 F | HEART RATE: 112 BPM | WEIGHT: 234.3 LBS

## 2018-06-22 DIAGNOSIS — E78.5 HYPERLIPIDEMIA LDL GOAL <100: ICD-10-CM

## 2018-06-22 DIAGNOSIS — M54.50 CHRONIC BILATERAL LOW BACK PAIN WITHOUT SCIATICA: ICD-10-CM

## 2018-06-22 DIAGNOSIS — Z96.89 SPINAL CORD STIMULATOR STATUS: ICD-10-CM

## 2018-06-22 DIAGNOSIS — Z79.4 TYPE 2 DIABETES MELLITUS WITH HYPERGLYCEMIA, WITH LONG-TERM CURRENT USE OF INSULIN (H): Primary | ICD-10-CM

## 2018-06-22 DIAGNOSIS — M25.562 CHRONIC PAIN OF BOTH KNEES: ICD-10-CM

## 2018-06-22 DIAGNOSIS — G89.29 CHRONIC BILATERAL LOW BACK PAIN WITHOUT SCIATICA: ICD-10-CM

## 2018-06-22 DIAGNOSIS — M25.561 CHRONIC PAIN OF BOTH KNEES: ICD-10-CM

## 2018-06-22 DIAGNOSIS — G89.29 CHRONIC PAIN OF BOTH KNEES: ICD-10-CM

## 2018-06-22 DIAGNOSIS — E11.65 TYPE 2 DIABETES MELLITUS WITH HYPERGLYCEMIA, WITH LONG-TERM CURRENT USE OF INSULIN (H): Primary | ICD-10-CM

## 2018-06-22 PROBLEM — E66.01 MORBID OBESITY (H): Status: ACTIVE | Noted: 2018-06-22

## 2018-06-22 PROCEDURE — 99214 OFFICE O/P EST MOD 30 MIN: CPT | Performed by: INTERNAL MEDICINE

## 2018-06-22 RX ORDER — GLIPIZIDE 10 MG/1
10 TABLET ORAL
Qty: 90 TABLET | Refills: 0 | Status: SHIPPED | OUTPATIENT
Start: 2018-06-22 | End: 2019-04-01

## 2018-06-22 RX ORDER — GABAPENTIN 300 MG/1
300 CAPSULE ORAL 3 TIMES DAILY
Qty: 90 CAPSULE | Refills: 0 | COMMUNITY
Start: 2018-06-22 | End: 2018-11-16

## 2018-06-22 RX ORDER — TRAZODONE HYDROCHLORIDE 50 MG/1
100 TABLET, FILM COATED ORAL
Qty: 90 TABLET | Refills: 2 | Status: SHIPPED | OUTPATIENT
Start: 2018-06-22 | End: 2018-08-23

## 2018-06-22 ASSESSMENT — ANXIETY QUESTIONNAIRES
5. BEING SO RESTLESS THAT IT IS HARD TO SIT STILL: NOT AT ALL
3. WORRYING TOO MUCH ABOUT DIFFERENT THINGS: SEVERAL DAYS
7. FEELING AFRAID AS IF SOMETHING AWFUL MIGHT HAPPEN: MORE THAN HALF THE DAYS
1. FEELING NERVOUS, ANXIOUS, OR ON EDGE: SEVERAL DAYS
GAD7 TOTAL SCORE: 5
6. BECOMING EASILY ANNOYED OR IRRITABLE: NOT AT ALL
2. NOT BEING ABLE TO STOP OR CONTROL WORRYING: SEVERAL DAYS

## 2018-06-22 ASSESSMENT — PAIN SCALES - GENERAL: PAINLEVEL: NO PAIN (0)

## 2018-06-22 ASSESSMENT — PATIENT HEALTH QUESTIONNAIRE - PHQ9: 5. POOR APPETITE OR OVEREATING: NOT AT ALL

## 2018-06-22 NOTE — PROGRESS NOTES
SUBJECTIVE:   Radha Corbett is a 67 year old female who presents to clinic today for the following health issues:      Depression and Anxiety Follow-Up    Status since last visit: Improved     Other associated symptoms:None    Complicating factors:     Significant life event: Yes-  Surgery with improving symptoms since      Current substance abuse: None    PHQ-9 9/18/2017 5/15/2018 6/22/2018   Total Score 24 27 7   Q9: Suicide Ideation Nearly every day Nearly every day Not at all     TARAS-7 SCORE 9/18/2017 5/15/2018 6/22/2018   Total Score - - -   Total Score 21 21 5         Amount of exercise or physical activity: None    Problems taking medications regularly: No    Medication side effects: none    Diet: regular (no restrictions)      Has chronic back pain, failed spine surgery, now had a trial nerve stimulator implant on Tuesday. It has helped a great deal. She has a follow up next week but told to get this checked today as well.     Depression has lightened up a little bit. On Cymbalta, but did not take the Wellbutrin.  She does not want to increase her dose of Cymbalta.    She was seen for preop and had diabetes labs done in May.  A1c was 8.5.  She is on over-the-counter Novolin and 60 units twice a day.  She reports her morning glucose usually in the 130s.  She does not check it at any other time.  Cost is a big issue for her.  She cannot for brand-name medications.    ROS:  Constitutional, HEENT, cardiovascular, pulmonary, gi and gu systems are negative, except as otherwise noted.         Current Outpatient Prescriptions on File Prior to Visit:  ASPIRIN 81 MG OR TABS 1 TABLET DAILY   CALCIUM 600+D OR 1 tab daily   clotrimazole 10 MG kelly Place 1 Kelly (10 mg) inside cheek 5 times daily   DULoxetine (CYMBALTA) 30 MG EC capsule Take 1 capsule (30 mg) by mouth 2 times daily   insulin NPH (NOVOLIN N VIAL) 100 UNIT/ML injection Inject 60 units Subcutaneous 2 times daily. (gets this from OTC NPH at [facility])  "  insulin syringe-needle U-100 (RELION INSULIN SYRINGE) 31G X 5/16\" 1 ML Use 2 syringes daily or as directed.   lisinopril (PRINIVIL/ZESTRIL) 10 MG tablet Take 0.5 tablets (5 mg) by mouth daily   MELATONIN PO 10 mg At Bedtime    metFORMIN (GLUCOPHAGE-XR) 500 MG 24 hr tablet Take 2 tablets (1,000 mg) by mouth 2 times daily (with meals)   minocycline (MINOCIN/DYNACIN) 100 MG capsule Take 1 capsule (100 mg) by mouth every 12 hours   Multiple Vitamins-Minerals (VISION FORMULA EYE HEALTH PO) Take 1 tablet by mouth daily   omeprazole (PRILOSEC) 40 MG capsule TAKE 1 CAPSULE DAILY 30 TO 60 MINUTES BEFORE A MEAL   ONE TOUCH LANCETS None Entered   ONE TOUCH TEST STRIPS VI None Entered   oxybutynin chloride 15 MG TB24 Take 1 tablet (15 mg) by mouth daily   PREVNAR 13 SUSP injection    simvastatin (ZOCOR) 20 MG tablet Take 1 tablet (20 mg) by mouth At Bedtime   spironolactone (ALDACTONE) 100 MG tablet TAKE 1 TABLET EVERY MORNING   VITAMIN D 1000 UNIT OR CAPS 1 CAPSULE DAILY   [DISCONTINUED] gabapentin (NEURONTIN) 300 MG capsule Take 1 tablet (300 mg) every night for 1-3 days, then 1 tablet twice daily for 1-3 days, then 1 tablet three times daily   [DISCONTINUED] gabapentin (NEURONTIN) 600 MG tablet    [DISCONTINUED] simvastatin (ZOCOR) 40 MG tablet Take 1 tablet (40 mg) by mouth At Bedtime (Patient not taking: Reported on 6/22/2018)   [DISCONTINUED] traZODone (DESYREL) 50 MG tablet Take 2 tablets (100 mg) by mouth nightly as needed for sleep     Current Facility-Administered Medications on File Prior to Visit:  gadobutrol (GADAVIST) injection 10 mL          Patient Active Problem List   Diagnosis     Macular degeneration     Myopia     Hiatal hernia     IBS (irritable bowel syndrome)     Hypertension goal BP (blood pressure) < 140/90     GERD (gastroesophageal reflux disease)     Atypical ductal hyperplasia of breast     Atypical lobular hyperplasia of breast     Benign Breast Disease (PASH)     Family history of breast cancer " in sister     Type 2 diabetes, HbA1C goal < 8% (H)     Hyperlipidemia LDL goal <100     Breast cancer screening-high risk     Tubular adenoma of colon     Osteopenia     Alcohol ingestion, more than 4 drinks/day on alcohol screening     Umbilical hernia     Incontinence of urine     Stool incontinence     Osteoarthritis, knee     Rosacea     Anxiety     Pseudophakia     Abnormal cervical Pap smear with positive HPV DNA test     Depression with anxiety     Retinal detachment     Elevated liver function tests     Rectocele     Unexplained endometrial cells on cervical cytology     Back pain, unspecified back location, unspecified back pain laterality, unspecified chronicity     Urge incontinence     Dislocation of shoulder region     Acute lower GI bleeding     Morbid obesity (H)     Past Surgical History:   Procedure Laterality Date     BREAST LUMPECTOMY, RT/LT      x2, left     CATARACT IOL, RT/LT  4/99    left, MZ60CD 7.0D     COLONOSCOPY       COLONOSCOPY N/A 1/22/2016    Procedure: COMBINED COLONOSCOPY, SINGLE OR MULTIPLE BIOPSY/POLYPECTOMY BY BIOPSY;  Surgeon: Praful Benjamin MD;  Location: [facility] OR     COLONOSCOPY WITH CO2 INSUFFLATION N/A 1/22/2016    Procedure: COLONOSCOPY WITH CO2 INSUFFLATION;  Surgeon: Praful Benjamin MD;  Location: [facility] OR     CRYOTHERAPY  2000    cervical     CRYOTHERAPY Left 3/19/2015    Procedure: CRYOTHERAPY;  Surgeon: Keiko Puri MD;  Location:  [facility]     DILATION AND CURETTAGE, HYSTEROSCOPY DIAGNOSTIC, COMBINED N/A 1/19/2016    Procedure: COMBINED DILATION AND CURETTAGE, HYSTEROSCOPY DIAGNOSTIC;  Surgeon: Yeni Aparicio DO;  Location: [facility] OR     IMPLANT STIMULATOR SACRAL NERVE PERCUTANEOUS TRIAL N/A 6/19/2018    Procedure: IMPLANT STIMULATOR SACRAL NERVE PERCUTANEOUS TRIAL;  Percutaneous Neural Examination (trial for interstim);  Surgeon: Dada Baltazar MD;  Location: [facility]     LAPAROSCOPIC TUBAL LIGATION  1981     PHACOEMULSIFICATION CLEAR  CORNEA WITH STANDARD INTRAOCULAR LENS IMPLANT  8/15/2013    Procedure: PHACOEMULSIFICATION CLEAR CORNEA WITH STANDARD INTRAOCULAR LENS IMPLANT;  RIGHT PHACOEMULSIFICATION CLEAR CORNEA WITH STANDARD INTRAOCULAR LENS IMPLANT ;  Surgeon: Trae Taylor MD;  Location: [facility]     SURGICAL HISTORY OF -       x4, ankle surgery       Social History   Substance Use Topics     Smoking status: Current Every Day Smoker     Packs/day: 0.25     Types: Cigarettes     Smokeless tobacco: Never Used     Alcohol use Yes      Comment: social     Family History   Problem Relation Age of Onset     Hypertension Mother      Cerebrovascular Disease Mother      Arthritis Mother      Cardiovascular Mother      Circulatory Mother      Cerebrovascular Disease Father      Prostate Cancer Father 65      at 69     Asthma Brother      Prostate Cancer Brother 48     bone metastasis     Diabetes Sister      Hypertension Sister      Osteoperosis Sister      Depression Sister      Lipids Sister      Cancer Maternal Grandmother 95     spine     Breast Cancer Sister 39     also contralateral @53, mets to lungs     Cancer Sister 20     second uterine cancer in 30s also     Diabetes Sister      Hypertension Sister      Depression Sister      Osteoperosis Sister      Breast Cancer Sister 57     unilateral     Cancer Paternal Aunt 40     unknown type     Cancer Paternal Uncle      stomach;  in his 80s     Prostate Cancer Brother      Glaucoma No family hx of              Problem list, Medication list, Allergies, and Medical/Social/Surgical histories reviewed in [facility] and updated as appropriate.    OBJECTIVE:                                                    /65 (BP Location: Right arm, Patient Position: Sitting, Cuff Size: Adult Large)  Pulse 112  Temp 98  F (36.7  C) (Oral)  Wt 234 lb 4.8 oz (106.3 kg)  LMP 1991 (Approximate)  SpO2 96%  BMI 37.82 kg/m2    GENERAL: healthy, alert and no distress  Back: There are 2 large  pieces of gauze covering the low back in the right buttocks area.  The adhesive tapes are intact.  There is no significant drainage on the gauze.         Component      Latest Ref Rng & Units 5/15/2018 6/14/2018   Creatinine      0.52 - 1.04 mg/dL  0.56   GFR Estimate      >60 mL/min/1.7m2  >90   GFR Estimate If Black      >60 mL/min/1.7m2  >90   Hemoglobin A1C      0 - 5.6 % 8.5 (H)    Potassium      3.4 - 5.3 mmol/L  4.3   LDL Cholesterol Direct      <100 mg/dL  138 (H)        ASSESSMENT/PLAN:                                                      67 year old female with the following diagnoses and treatment plan:      ICD-10-CM    1. Type 2 diabetes mellitus with hyperglycemia, with long-term current use of insulin (H) E11.65 glipiZIDE (GLUCOTROL) 10 MG tablet    Z79.4 **A1C FUTURE anytime   2. Chronic pain of both knees M25.561 gabapentin (NEURONTIN) 300 MG capsule    M25.562 traZODone (DESYREL) 50 MG tablet    G89.29    3. Hyperlipidemia LDL goal <100 E78.5 Lipid panel reflex to direct LDL Fasting   4. Chronic bilateral low back pain without sciatica M54.5     G89.29    5. Spinal cord stimulator status Z96.89        --Diabetes: She will need to improve mealtime glucose control.  Add glipizide back into the dinnertime.  Patient reports she is not good in taking pills multiple times a day.  Extended release was previously too expensive to afford.  She also needs refill of Gabapentin and trazodone, this was done.  --Lipid panel elevated, LDL not at goal.  Patient reports that was related to excessive peripheral eating.  She has since cut grapefruit after she got the lab results.  We will plan to recheck this in 2 months.  --Follow-up in 2 months for diabetes and lipid recheck.    Will call or return to clinic if worsening or symptoms not improving as discussed.  See Patient Instructions.      Trice Medeiros MD-PhD  [facility]    (Note: Chart documentation was done in part with Dragon Voice Recognition  software. Although reviewed after completion, some word and grammatical errors may remain.)

## 2018-06-22 NOTE — PATIENT INSTRUCTIONS
Make appointment(s) for:   -- diabetes follow up in 2 months with fasting lab.          Medication(s) prescribed today:    Orders Placed This Encounter   Medications     gabapentin (NEURONTIN) 300 MG capsule     Sig: Take 1 capsule (300 mg) by mouth 3 times daily     Dispense:  90 capsule     Refill:  0     traZODone (DESYREL) 50 MG tablet     Sig: Take 2 tablets (100 mg) by mouth nightly as needed for sleep     Dispense:  90 tablet     Refill:  2     Please put on file. Do not fill until patient calls.     glipiZIDE (GLUCOTROL) 10 MG tablet     Sig: Take 1 tablet (10 mg) by mouth daily (with dinner)     Dispense:  90 tablet     Refill:  0

## 2018-06-22 NOTE — PROGRESS NOTES
Urology Postop Phone Note:    Ms. Radha Corbett is a 67 year old female who underwent  on Percutaneous neural examination (trial for interstim) with Dr. Baltazar for a history of urge incontinence.  Her postoperative course was unremarkable and the patient was d/c to home on 6/19/18.    Fevers/chills: Patient denies any fever or chills.  Eating/drinking: Patient is able to eat and drink without any complaints.  Bowel habits: Patient reports having a normal bowel movement.  Urine output voiding  Color yellow  Clarity clear Odor none    Incisions: Patient denies any signs and symptoms of infection.  Pain: Patient reports pain as a 0 out of 10.  Denies pain  Narcotics: The patient denies taking any pain medication.    Follow up appointment scheduled on 6/25/18 at 945 with Dr. Baltazar in [place].  Wants surgery on Tuesday for second stage  Informed the patient of the clinic triage nursing # ([phone] option 2) and urology resident on call # for after hours concerns.   Symptoms decreased to the point of she was able to hold her urine to prevent incontinence. Patient rates decrease of her symptoms  9/10. Patient is very anxious to have her second stage.  Thanks,   Noni Mcdaniel, RN  Department of Urologic Surgery

## 2018-06-22 NOTE — MR AVS SNAPSHOT
After Visit Summary   6/22/2018    Radha Corbett    MRN: [id number]           Patient Information     Date Of Birth          1950        Visit Information        Provider Department      6/22/2018 2:30 PM Trice Medeiros MD PhD [facility]        Today's Diagnoses     Type 2 diabetes mellitus with hyperglycemia, with long-term current use of insulin (H)    -  1    Chronic pain of both knees        Morbid obesity (H)        Hyperlipidemia LDL goal <100          Care Instructions    Make appointment(s) for:   -- diabetes follow up in 2 months with fasting lab.          Medication(s) prescribed today:    Orders Placed This Encounter   Medications     gabapentin (NEURONTIN) 300 MG capsule     Sig: Take 1 capsule (300 mg) by mouth 3 times daily     Dispense:  90 capsule     Refill:  0     traZODone (DESYREL) 50 MG tablet     Sig: Take 2 tablets (100 mg) by mouth nightly as needed for sleep     Dispense:  90 tablet     Refill:  2     Please put on file. Do not fill until patient calls.     glipiZIDE (GLUCOTROL) 10 MG tablet     Sig: Take 1 tablet (10 mg) by mouth daily (with dinner)     Dispense:  90 tablet     Refill:  0                  Follow-ups after your visit        Follow-up notes from your care team     Return in about 2 months (around 8/22/2018) for Fasting lab work, Diabetic check.      Your next 10 appointments already scheduled     Jun 25, 2018  9:45 AM CDT   Return Visit with Dada Baltazar MD   [facility] ([facility]    [address] [place] [id number] [phone]              Future tests that were ordered for you today     Open Future Orders        Priority Expected Expires Ordered    **A1C FUTURE anytime Routine 8/1/2018 12/22/2018 6/22/2018    Lipid panel reflex to direct LDL Fasting Routine 8/1/2018 12/22/2018 6/22/2018            Who to contact     If you have questions or need follow up information about today's  clinic visit or your schedule please contact [facility] directly at [phone].  Normal or non-critical lab and imaging results will be communicated to you by GiveLoophart, letter or phone within 4 business days after the clinic has received the results. If you do not hear from us within 7 days, please contact the clinic through GiveLoophart or phone. If you have a critical or abnormal lab result, we will notify you by phone as soon as possible.  Submit refill requests through [employer]FIN[employer] or call your pharmacy and they will forward the refill request to us. Please allow 3 business days for your refill to be completed.          Additional Information About Your Visit        SERV[employer] Information     [employer] gives you secure access to your electronic health record. If you see a primary care provider, you can also send messages to your care team and make appointments. If you have questions, please call your primary care clinic.  If you do not have a primary care provider, please call [phone] and they will assist you.      SERV[employer]FINITY is an electronic gateway that provides easy, online access to your medical records. With [employer], you can request a clinic appointment, read your test results, renew a prescription or communicate with your care team.     To access your existing account, please contact your [facility] or call [phone] for assistance.        Care EveryWhere ID     This is your Care EveryWhere ID. This could be used by other organizations to access your [facility] medical records  [id number]        Your Vitals Were     Pulse Temperature Last Period Pulse Oximetry BMI (Body Mass Index)       112 98  F (36.7  C) (Oral) 11/18/1991 (Approximate) 96% 37.82 kg/m2        Blood Pressure from Last 3 Encounters:   06/22/18 103/65   06/19/18 110/66   06/14/18 131/64    Weight from Last 3 Encounters:   06/22/18 234 lb 4.8 oz (106.3 kg)   06/19/18 234 lb (106.1 kg)   06/14/18 234 lb  (106.1 kg)                 Today's Medication Changes          These changes are accurate as of 6/22/18  3:06 PM.  If you have any questions, ask your nurse or doctor.               Start taking these medicines.        Dose/Directions    glipiZIDE 10 MG tablet   Commonly known as:  GLUCOTROL   Used for:  Type 2 diabetes mellitus with hyperglycemia, with long-term current use of insulin (H)   Started by:  Trice Medeiros MD PhD        Dose:  10 mg   Take 1 tablet (10 mg) by mouth daily (with dinner)   Quantity:  90 tablet   Refills:  0         These medicines have changed or have updated prescriptions.        Dose/Directions    gabapentin 300 MG capsule   Commonly known as:  NEURONTIN   This may have changed:    - how much to take  - how to take this  - when to take this  - additional instructions   Used for:  Chronic pain of both knees   Changed by:  Trice Medeiros MD PhD        Dose:  300 mg   Take 1 capsule (300 mg) by mouth 3 times daily   Quantity:  90 capsule   Refills:  0         Stop taking these medicines if you haven't already. Please contact your care team if you have questions.     diphenhydrAMINE 25 MG capsule   Commonly known as:  BENADRYL   Stopped by:  Trice Medeiros MD PhD                Where to get your medicines      These medications were sent to [facility] - [address] [address]     Phone:  [phone]     glipiZIDE 10 MG tablet    traZODone 50 MG tablet                Primary Care Provider Office Phone # Fax #    Trice Medeiros MD PhD [phone]       [address]  [facility] [address]        Equal Access to Services     [facility] AH: Hadii kyree phillipso Sokaren, waaxda luqadaha, qaybta kaalmada adeegyada, michael tineo. So [facility] [phone].    ATENCIÓN: Si habla español, tiene a arauz disposición servicios gratuitos de asistencia lingüística. Llame al [phone].    We comply with  "applicable federal civil rights laws and [place] laws. We do not discriminate on the basis of race, color, national origin, age, disability, sex, sexual orientation, or gender identity.            Thank you!     Thank you for choosing [facility]  for your care. Our goal is always to provide you with excellent care. Hearing back from our patients is one way we can continue to improve our services. Please take a few minutes to complete the written survey that you may receive in the mail after your visit with us. Thank you!             Your Updated Medication List - Protect others around you: Learn how to safely use, store and throw away your medicines at www.disposemymeds.org.          This list is accurate as of 6/22/18  3:06 PM.  Always use your most recent med list.                   Brand Name Dispense Instructions for use Diagnosis    aspirin 81 MG tablet      1 TABLET DAILY        CALCIUM 600+D PO      1 tab daily        clotrimazole 10 MG venkat     70 Venkat    Place 1 Venkat (10 mg) inside cheek 5 times daily    Thrush       DULoxetine 30 MG EC capsule    CYMBALTA    180 capsule    Take 1 capsule (30 mg) by mouth 2 times daily    Depression with anxiety       gabapentin 300 MG capsule    NEURONTIN    90 capsule    Take 1 capsule (300 mg) by mouth 3 times daily    Chronic pain of both knees       glipiZIDE 10 MG tablet    GLUCOTROL    90 tablet    Take 1 tablet (10 mg) by mouth daily (with dinner)    Type 2 diabetes mellitus with hyperglycemia, with long-term current use of insulin (H)       insulin syringe-needle U-100 31G X 5/16\" 1 ML    RELION INSULIN SYRINGE    200 each    Use 2 syringes daily or as directed.    Type 2 diabetes mellitus without complication (H)       lisinopril 10 MG tablet    PRINIVIL/ZESTRIL    30 tablet    Take 0.5 tablets (5 mg) by mouth daily    Essential hypertension with goal blood pressure less than 140/90       MELATONIN PO      10 mg At Bedtime        " metFORMIN 500 MG 24 hr tablet    GLUCOPHAGE-XR    360 tablet    Take 2 tablets (1,000 mg) by mouth 2 times daily (with meals)    Type 2 diabetes mellitus with hyperglycemia, with long-term current use of insulin (H)       minocycline 100 MG capsule    MINOCIN/DYNACIN    180 capsule    Take 1 capsule (100 mg) by mouth every 12 hours    Acne vulgaris       NovoLIN N VIAL 100 UNIT/ML injection   Generic drug:  insulin NPH      Inject 60 units Subcutaneous 2 times daily. (gets this from [facility] NPH at [facility])    Uncontrolled type 2 diabetes mellitus with stage 3 chronic kidney disease, with long-term current use of insulin (H)       omeprazole 40 MG capsule    priLOSEC    90 capsule    TAKE 1 CAPSULE DAILY 30 TO 60 MINUTES BEFORE A MEAL    Gastroesophageal reflux disease without esophagitis       ONE TOUCH LANCETS      None Entered        ONE TOUCH TEST STRIPS VI      None Entered        oxybutynin chloride 15 MG Tb24     90 tablet    Take 1 tablet (15 mg) by mouth daily    Urinary urgency, Urgency incontinence       PREVNAR 13 Susp injection   Generic drug:  pneumococcal           simvastatin 20 MG tablet    ZOCOR    90 tablet    Take 1 tablet (20 mg) by mouth At Bedtime    Hyperlipidemia LDL goal <100       spironolactone 100 MG tablet    ALDACTONE    90 tablet    TAKE 1 TABLET EVERY MORNING    Essential hypertension with goal blood pressure less than 140/90       traZODone 50 MG tablet    DESYREL    90 tablet    Take 2 tablets (100 mg) by mouth nightly as needed for sleep    Chronic pain of both knees       VISION FORMULA EYE HEALTH PO      Take 1 tablet by mouth daily        vitamin D 1000 units capsule      1 CAPSULE DAILY

## 2018-06-23 ASSESSMENT — PATIENT HEALTH QUESTIONNAIRE - PHQ9: SUM OF ALL RESPONSES TO PHQ QUESTIONS 1-9: 7

## 2018-06-23 ASSESSMENT — ANXIETY QUESTIONNAIRES: GAD7 TOTAL SCORE: 5

## 2018-06-25 ENCOUNTER — TELEPHONE (OUTPATIENT)
Dept: UROLOGY | Facility: CLINIC | Age: 68
End: 2018-06-25

## 2018-06-25 ENCOUNTER — OFFICE VISIT (OUTPATIENT)
Dept: UROLOGY | Facility: CLINIC | Age: 68
End: 2018-06-25
Payer: COMMERCIAL

## 2018-06-25 VITALS — DIASTOLIC BLOOD PRESSURE: 67 MMHG | SYSTOLIC BLOOD PRESSURE: 123 MMHG | HEART RATE: 98 BPM

## 2018-06-25 DIAGNOSIS — R39.15 URINARY URGENCY: ICD-10-CM

## 2018-06-25 DIAGNOSIS — N39.41 URGE INCONTINENCE: Primary | ICD-10-CM

## 2018-06-25 PROCEDURE — 99024 POSTOP FOLLOW-UP VISIT: CPT | Performed by: UROLOGY

## 2018-06-25 RX ORDER — CEFAZOLIN SODIUM 1 G
1 VIAL (EA) INJECTION SEE ADMIN INSTRUCTIONS
Status: CANCELLED | OUTPATIENT
Start: 2018-06-25

## 2018-06-25 ASSESSMENT — PAIN SCALES - GENERAL: PAINLEVEL: NO PAIN (0)

## 2018-06-25 NOTE — PATIENT INSTRUCTIONS
Surgery Instructions    Always follow your surgeon s instructions. If you don t, your surgery could be cancelled. Please use the following checklist.  Your surgery is on: The surgery scheduler will contact you within 1 week of your consult with the surgeon. If you do not hear from them, please call the clinic or RN Care Coordinator for your provider.    Time: Prearrival times can vary depending on location/type of surgery.  [place] - 2 hour pre-arrival  [facility] [place] - 2 hour pre-arrival  [place] - 1 hour pre-arrival    Note:  These times may change. A nurse will call you before surgery to confirm. If you have not received a call or if you have more questions, please call us on the working day before your surgery:  ? [place]: [phone] or [phone] (9am to 5:30pm)  ? [facility] [place] [phone] (8am to 6pm)  ? [place]: [phone] (9am to 5pm)  ? [facility] [phone] (7am to 4pm)  Prior to surgery  ? Have a pre-op physical exam with your Primary Doctor within 30 days of surgery  - Ask your doctor to send all of your results to the surgery center/hospital before surgery. Your doctor also may ask you to bring the results with you on the day of surgery.  - Tell your doctor if:  - You are allergic to latex or rubber (latex and rubber gloves are often used in medical care).  - You are taking any medicines (including aspirin), vitamins, or herbal products. You may need to stop taking some medicines before surgery.  - You have any medical problems (allergies, diabetes, or heart disease, for example).  - You have a pacemaker or an AICD (automatic implanted cardiac defibrillator). If you do, please bring the ID card with you on the day of surgery.  - People who smoke have a higher risk of infection after surgery. Ask your doctor how you can quit smoking.  - If you Primary Doctor is not within the [employer] system, you will need to have your pre-op physical faxed to us to be scanned into your  chart.  - [place]: [phone] or [phone]  - [facility] ([place]): [phone]  - [facility] ([facility] [place]): [phone]  ? Call your insurance company. Ask if you need pre-approval for your surgery. If you do not have insurance, please let us know. If you wish to speak to the , please alert the clinic staff so this can be arranged.  ? Arrange for someone to drive you home after surgery.  will need to be a responsible adult (18 years or older) that will provide transportation to and from surgery and stay in the waiting room during your surgery. You may not drive yourself or take public transportation to and from surgery.  ? Arrange for someone to stay with you for 24 hours after you go home. This person must be a responsible adult (18 years or older).  ? Call your surgeon or their nurse if there is any change in your health (cold, flu, infections, hospitalizations).  ? Do not smoke, drink alcohol, or take over-the-counter medicine for 24 hours before and after surgery.  ? If you take prescribed drugs, you may need to stop them until after the surgery.  Discuss what medications to take or not take prior to surgery with your Primary Doctor at your pre-op physical. Avoid over-the-counter blood-thinning medications such as Aspirin, Ibuprofen, vitamin E, or fish oil 7 days prior to surgery (unless otherwise directed by your Primary Doctor). Tylenol is a good alternative for mild pain relief prior to surgery.  ? Eating and drinking guidelines prior to surgery:  - Stop all solid food consumption 8 hours prior to surgery  - You may drink clear liquids up to 2 hours prior to surgery (water, fruit juices without pulp, jello, tea/coffee without creamer, sports drinks, clear-fat free broth (bouillon or consomme), popsicles (without milk, bits of fruit, or seeds/nuts)  ? Follow instructions given for showering or bathing before surgery.    - Use 8 ounces of antiseptic surgical soap,  like:  - Hibiclens, Scrub Care, or Exidine  - You can find it at your local pharmacy, clinic, or retail store. If you have trouble, ask your pharmacist to help you find the right substitute.  - Please wash with one of the above soaps twice before coming to the hospital for your surgery. This will decrease bacteria (germs) on your skin. It will also help reduce your chance of infection after surgery.  - Items you will need for showering:  - 4 newly washed washcloths  - 2 newly washed towels  - 8 ounces of one of the above soaps  - Following these instructions:  - The evening before surgery: Shower or bathe as you normally would, using your regular soap and a clean washcloth. Give special attention to places where your incision (surgical cut) or catheters will be. This includes your groin area. Rinse well. You may wash your hair with your regular shampoo. Next, wash your body with 4 ounces of the antiseptic soap. Use a clean, damp washcloth and gently clean your body (from the chin down). If your surgery involves your head, use the special soap on your head and scalp. Rinse well and dry off using a newly washed towel.  - The morning of surgery: Repeat the same process as the evening shower.  - Other suggestions:    Do not shave within 12 inches of your incision (surgical cut) area for at least 3 days before surgery. Shaving can make small cuts in the skin. This puts you at higher risk of infection.    Wear freshly washed pajamas or clothing after your evening shower.    Wear freshly washed clothes the day of surgery.    Wash and change your bed sheets the day before surgery to have clean bed sheets after your shower and when you get home from surgery.    If you have trouble washing all areas, make sure someone helps you.    Don't use any deodorant, lotion or powder after your shower.    Women who are menstruating should wear a fresh sanitary pad to the hospital.  ? Do not wear or add deodorant, cologne, lotion,  makeup, nail polish or jewelry to surgery. If you wear fake nails, please remove at least one nail before coming to surgery (an oxygen monitor needs to be placed on your finger during surgery).  ? Bring these items to the surgery center/hospital:  - Insurance card  - Money for parking and co-pays, if needed  - A list of all the medicines you take. Include vitamins, minerals, herbs, and over-the-counter drugs.  Note any drug allergies.  - A copy of your advance health care directive, if you have one. This tells us what treatment you would want--and who would make health care decisions--if you could no longer speak for yourself. You may request this form in advance or download it from [email or web address].  - A case for glasses, contact lenses, hearing aids, or dentures.  - Your inhaler or CPAP machine, if you use these at home.  ? Leave extra cash, jewelry, and other valuables at home.  When you arrive  When you get to the surgery center/hospital, you will:  ? Check in. If you are under age 18, you must be with a parent or legal guardian.  ? Sign consent forms, if you haven t already. These forms state that you know the risks and benefits of surgery. When you sign the forms, you give us permission to do the surgery. Do not sign them unless you understand what will happen during and after your surgery. If you have any questions about your surgery, ask to speak with your doctor before you sign the forms. If you don t understand the answers, ask again.  ? Receive a copy of the Patient s Bill of Rights. If you do not receive a copy, please ask for one.  ? Change into hospital clothes. Your belongings will be placed in a bag. We will return them to you after surgery.  ? Meet with the anesthesia provider. He or she will tell you what kind of anesthesia (medicine) will be used to keep you comfortable during surgery.  Remember: it s okay to remind doctors and nurses to wash their hands before touching you.  In most  cases, your surgeon will use a marker to write his or her initials on the surgery site. This ensures that the exact site is operated on.  For safety reasons, we will ask you the same questions many times. For example, we may ask your name and birth date over and over again.  Friends and family can stay with you until it s time for surgery. While you re in surgery, they will be in the waiting area. Please note that cell phones are not allowed in some patient care areas.  If you have questions about what will happen in the operating room, talk to your care team.  After surgery  We will move you to a recovery room, where we will watch you closely. If you have any pain or discomfort, tell your nurse. He or she will try to make you comfortable.  If you are staying overnight, we will move you to your hospital room after you are awake.  If you are going home, we will move you to another room. Friends and family may be able to join you. The length of time you spend in recovery depend on the type of medicine you received, your medical condition, the type of surgery you had, or your response to the anesthesia given during your procedure.  When you are discharged from the recovery room, the nurses will review instructions with you and your caregiver.  ? Please wash your hands every time you touch the wound or change bandages or dressings.  ? Do not submerge the wound in water.  You may not use a bathtub or hot tub until the wound is closed. The wait time frame is generally 2-3 weeks, but any open area can be a source of incoming bacteria, so it is better to be on the safe side and avoid water submersion until your wound is fully healed.  ? You may take a shower 24 hours after surgery. Double check with your surgeon if it is OK for water to run over the wound, whether it has been sutured, stapled, glued, or is open. You may gently wash the wound using the antiseptic soap provided for your pre-surgery showering (do not use a  washcloth). Any mild soap will work as well.  ? Many surgical wounds will have small white strips of tape on them called steri-strips.  Do not remove these. The edges will curl and fall off within 7-10 days with normal showering.  ? If you are going home with sutures (stitches) or staples, you must return to the clinic to have them taken out, usually within 1-2 weeks. Some stitches are dissolvable and do not require removal. Make sure to clarify with your surgeon or surgery nurse reviewing discharge paperwork what kind of sutures you have.  ? Signs and symptoms of infection include:  - Fever, temperature over 101.5   F  - Redness  - Swelling  - Increased pain  - Green or yellow drainage which may or may not have a foul odor  Dealing with pain  A nurse will check your comfort level often during your stay. He or she will work with you to manage your pain.  Remember:  ? All pain is real. There are many ways to control pain. We can help you decide what works best for you.  ? Ask for pain medicine when you need it. Don t try to  tough it out --this can make you feel worse. Always take your medicine as ordered.  ? Medicine doesn t work the same for everyone. If your medicine isn t working, tell your nurse. There may be other medicines or treatments we can try.  Going home  We will let you know when you re ready to leave the surgery center or hospital. Before you leave, we will tell you how to care for yourself at home and prevent infections. If you do not understand something, please say so. We will answer any questions you have. We will then help you get ready to leave.  Remember, you must have a responsible adult (18 years or older) to stay with you 24 hours after you leave the hospital.  Take it easy when you get home. You will need some time to recover--you may be more tired than you realize at first. Rest and relax for at least the first 24 hours at home. You ll feel better and heal faster if you take good care of  yourself.  Follow the discharge instructions that are given to you when you leave the surgery center or hospital  Please call the clinic if you experience any problems during regular clinic hours (Monday-Friday 8:00am-5:00pm).  If you experience problems during non-clinic hours, please call the [facility] on-call line at [phone] and ask the  to page the on-call Provider for your specialty. The on-call Provider will call you back and can triage your symptoms and further advise. If you are having an emergency, always call 911 or seek immediate evaluation at the Emergency Room.  Locations  [facility] ([facility] [place] [place]  [address] [place] [id number]  [phone]   [email or web address]

## 2018-06-25 NOTE — MR AVS SNAPSHOT
After Visit Summary   6/25/2018    Radha Corbett    MRN: [id number]           Patient Information     Date Of Birth          1950        Visit Information        Provider Department      6/25/2018 9:45 AM Dada Baltazar MD [facility]        Today's Diagnoses     Urge incontinence    -  1    Urinary urgency          Care Instructions    Surgery Instructions    Always follow your surgeon s instructions. If you don t, your surgery could be cancelled. Please use the following checklist.  Your surgery is on: The surgery scheduler will contact you within 1 week of your consult with the surgeon. If you do not hear from them, please call the clinic or RN Care Coordinator for your provider.    Time: Prearrival times can vary depending on location/type of surgery.  [facility] - 2 hour pre-arrival  [facility] - 2 hour pre-arrival  [facility] - 1 hour pre-arrival    Note:  These times may change. A nurse will call you before surgery to confirm. If you have not received a call or if you have more questions, please call us on the working day before your surgery:  ? [facility]: [phone] or [phone] (9am to 5:30pm)  ? [facility]: [phone] (8am to 6pm)  ? [place]: [phone] (9am to 5pm)  ? [facility] [phone] (7am to 4pm)  Prior to surgery  ? Have a pre-op physical exam with your Primary Doctor within 30 days of surgery  - Ask your doctor to send all of your results to the surgery center/hospital before surgery. Your doctor also may ask you to bring the results with you on the day of surgery.  - Tell your doctor if:  - You are allergic to latex or rubber (latex and rubber gloves are often used in medical care).  - You are taking any medicines (including aspirin), vitamins, or herbal products. You may need to stop taking some medicines before surgery.  - You have any medical problems (allergies, diabetes, or heart disease, for example).  - You have a pacemaker or  an AICD (automatic implanted cardiac defibrillator). If you do, please bring the ID card with you on the day of surgery.  - People who smoke have a higher risk of infection after surgery. Ask your doctor how you can quit smoking.  - If you Primary Doctor is not within the [employer] system, you will need to have your pre-op physical faxed to us to be scanned into your chart.  - [facility]: [phone] or [phone]  - [facility] [phone]  - [facility] ([facility]): [phone]  ? Call your insurance company. Ask if you need pre-approval for your surgery. If you do not have insurance, please let us know. If you wish to speak to the , please alert the clinic staff so this can be arranged.  ? Arrange for someone to drive you home after surgery.  will need to be a responsible adult (18 years or older) that will provide transportation to and from surgery and stay in the waiting room during your surgery. You may not drive yourself or take public transportation to and from surgery.  ? Arrange for someone to stay with you for 24 hours after you go home. This person must be a responsible adult (18 years or older).  ? Call your surgeon or their nurse if there is any change in your health (cold, flu, infections, hospitalizations).  ? Do not smoke, drink alcohol, or take over-the-counter medicine for 24 hours before and after surgery.  ? If you take prescribed drugs, you may need to stop them until after the surgery.  Discuss what medications to take or not take prior to surgery with your Primary Doctor at your pre-op physical. Avoid over-the-counter blood-thinning medications such as Aspirin, Ibuprofen, vitamin E, or fish oil 7 days prior to surgery (unless otherwise directed by your Primary Doctor). Tylenol is a good alternative for mild pain relief prior to surgery.  ? Eating and drinking guidelines prior to surgery:  - Stop all solid food consumption 8 hours prior to  surgery  - You may drink clear liquids up to 2 hours prior to surgery (water, fruit juices without pulp, jello, tea/coffee without creamer, sports drinks, clear-fat free broth (bouillon or consomme), popsicles (without milk, bits of fruit, or seeds/nuts)  ? Follow instructions given for showering or bathing before surgery.    - Use 8 ounces of antiseptic surgical soap, like:  - Hibiclens, Scrub Care, or Exidine  - You can find it at your local pharmacy, clinic, or retail store. If you have trouble, ask your pharmacist to help you find the right substitute.  - Please wash with one of the above soaps twice before coming to the hospital for your surgery. This will decrease bacteria (germs) on your skin. It will also help reduce your chance of infection after surgery.  - Items you will need for showering:  - 4 newly washed washcloths  - 2 newly washed towels  - 8 ounces of one of the above soaps  - Following these instructions:  - The evening before surgery: Shower or bathe as you normally would, using your regular soap and a clean washcloth. Give special attention to places where your incision (surgical cut) or catheters will be. This includes your groin area. Rinse well. You may wash your hair with your regular shampoo. Next, wash your body with 4 ounces of the antiseptic soap. Use a clean, damp washcloth and gently clean your body (from the chin down). If your surgery involves your head, use the special soap on your head and scalp. Rinse well and dry off using a newly washed towel.  - The morning of surgery: Repeat the same process as the evening shower.  - Other suggestions:    Do not shave within 12 inches of your incision (surgical cut) area for at least 3 days before surgery. Shaving can make small cuts in the skin. This puts you at higher risk of infection.    Wear freshly washed pajamas or clothing after your evening shower.    Wear freshly washed clothes the day of surgery.    Wash and change your bed sheets  the day before surgery to have clean bed sheets after your shower and when you get home from surgery.    If you have trouble washing all areas, make sure someone helps you.    Don't use any deodorant, lotion or powder after your shower.    Women who are menstruating should wear a fresh sanitary pad to the hospital.  ? Do not wear or add deodorant, cologne, lotion, makeup, nail polish or jewelry to surgery. If you wear fake nails, please remove at least one nail before coming to surgery (an oxygen monitor needs to be placed on your finger during surgery).  ? Bring these items to the surgery center/hospital:  - Insurance card  - Money for parking and co-pays, if needed  - A list of all the medicines you take. Include vitamins, minerals, herbs, and over-the-counter drugs.  Note any drug allergies.  - A copy of your advance health care directive, if you have one. This tells us what treatment you would want--and who would make health care decisions--if you could no longer speak for yourself. You may request this form in advance or download it from [email or web address].  - A case for glasses, contact lenses, hearing aids, or dentures.  - Your inhaler or CPAP machine, if you use these at home.  ? Leave extra cash, jewelry, and other valuables at home.  When you arrive  When you get to the surgery center/hospital, you will:  ? Check in. If you are under age 18, you must be with a parent or legal guardian.  ? Sign consent forms, if you haven t already. These forms state that you know the risks and benefits of surgery. When you sign the forms, you give us permission to do the surgery. Do not sign them unless you understand what will happen during and after your surgery. If you have any questions about your surgery, ask to speak with your doctor before you sign the forms. If you don t understand the answers, ask again.  ? Receive a copy of the Patient s Bill of Rights. If you do not receive a copy, please ask for  one.  ? Change into hospital clothes. Your belongings will be placed in a bag. We will return them to you after surgery.  ? Meet with the anesthesia provider. He or she will tell you what kind of anesthesia (medicine) will be used to keep you comfortable during surgery.  Remember: it s okay to remind doctors and nurses to wash their hands before touching you.  In most cases, your surgeon will use a marker to write his or her initials on the surgery site. This ensures that the exact site is operated on.  For safety reasons, we will ask you the same questions many times. For example, we may ask your name and birth date over and over again.  Friends and family can stay with you until it s time for surgery. While you re in surgery, they will be in the waiting area. Please note that cell phones are not allowed in some patient care areas.  If you have questions about what will happen in the operating room, talk to your care team.  After surgery  We will move you to a recovery room, where we will watch you closely. If you have any pain or discomfort, tell your nurse. He or she will try to make you comfortable.  If you are staying overnight, we will move you to your hospital room after you are awake.  If you are going home, we will move you to another room. Friends and family may be able to join you. The length of time you spend in recovery depend on the type of medicine you received, your medical condition, the type of surgery you had, or your response to the anesthesia given during your procedure.  When you are discharged from the recovery room, the nurses will review instructions with you and your caregiver.  ? Please wash your hands every time you touch the wound or change bandages or dressings.  ? Do not submerge the wound in water.  You may not use a bathtub or hot tub until the wound is closed. The wait time frame is generally 2-3 weeks, but any open area can be a source of incoming bacteria, so it is better to be on  the safe side and avoid water submersion until your wound is fully healed.  ? You may take a shower 24 hours after surgery. Double check with your surgeon if it is OK for water to run over the wound, whether it has been sutured, stapled, glued, or is open. You may gently wash the wound using the antiseptic soap provided for your pre-surgery showering (do not use a washcloth). Any mild soap will work as well.  ? Many surgical wounds will have small white strips of tape on them called steri-strips.  Do not remove these. The edges will curl and fall off within 7-10 days with normal showering.  ? If you are going home with sutures (stitches) or staples, you must return to the clinic to have them taken out, usually within 1-2 weeks. Some stitches are dissolvable and do not require removal. Make sure to clarify with your surgeon or surgery nurse reviewing discharge paperwork what kind of sutures you have.  ? Signs and symptoms of infection include:  - Fever, temperature over 101.5   F  - Redness  - Swelling  - Increased pain  - Green or yellow drainage which may or may not have a foul odor  Dealing with pain  A nurse will check your comfort level often during your stay. He or she will work with you to manage your pain.  Remember:  ? All pain is real. There are many ways to control pain. We can help you decide what works best for you.  ? Ask for pain medicine when you need it. Don t try to  tough it out --this can make you feel worse. Always take your medicine as ordered.  ? Medicine doesn t work the same for everyone. If your medicine isn t working, tell your nurse. There may be other medicines or treatments we can try.  Going home  We will let you know when you re ready to leave the surgery center or hospital. Before you leave, we will tell you how to care for yourself at home and prevent infections. If you do not understand something, please say so. We will answer any questions you have. We will then help you get ready  to leave.  Remember, you must have a responsible adult (18 years or older) to stay with you 24 hours after you leave the hospital.  Take it easy when you get home. You will need some time to recover--you may be more tired than you realize at first. Rest and relax for at least the first 24 hours at home. You ll feel better and heal faster if you take good care of yourself.  Follow the discharge instructions that are given to you when you leave the surgery center or hospital  Please call the clinic if you experience any problems during regular clinic hours (Monday-Friday 8:00am-5:00pm).  If you experience problems during non-clinic hours, please call the [facility] on-call line at [phone] and ask the  to page the on-call Provider for your specialty. The on-call Provider will call you back and can triage your symptoms and further advise. If you are having an emergency, always call 911 or seek immediate evaluation at the Emergency Room.  Locations  [facility] ([facility]  [address] [place] [id number]  [phone]   [email or web address]              Follow-ups after your visit        Follow-up notes from your care team     Return for procedure..      Your next 10 appointments already scheduled     Aug 23, 2018 11:00 AM CDT   LAB with LAB FIRST FLOOR [facility] ([facility])    [address] [place] [id number] [phone]           Please do not eat 10-12 hours before your appointment if you are coming in fasting for labs on lipids, cholesterol, or glucose (sugar). This does not apply to pregnant women. Water, hot tea and black coffee (with nothing added) are okay. Do not drink other fluids, diet soda or chew gum.            Aug 23, 2018 11:30 AM CDT   Return Visit with Trice Medeiros MD PhD   [facility] ([facility]  [facility])    [address] [address] [place] [id number] [phone]              Who to contact     If you have questions or need follow up information about today's clinic visit or your schedule please contact [facility] directly at [phone].  Normal or non-critical lab and imaging results will be communicated to you by MyChart, letter or phone within 4 business days after the clinic has received the results. If you do not hear from us within 7 days, please contact the clinic through [employer] or phone. If you have a critical or abnormal lab result, we will notify you by phone as soon as possible.  Submit refill requests through [email or web address] or call your pharmacy and they will forward the refill request to us. Please allow 3 business days for your refill to be completed.          Additional Information About Your Visit        [employer] Information     [email or web address] gives you secure access to your electronic health record. If you see a primary care provider, you can also send messages to your care team and make appointments. If you have questions, please call your primary care clinic.  If you do not have a primary care provider, please call [phone] and they will assist you.      [email or web address] is an electronic gateway that provides easy, online access to your medical records. With [email or web address], you can request a clinic appointment, read your test results, renew a prescription or communicate with your care team.     To access your existing account, please contact your [facility] or call [phone] for assistance.        Care EveryWhere ID     This is your Care EveryWhere ID. This could be used by other organizations to access your [facility] medical records  [id number]        Your Vitals Were     Pulse Last Period                98 11/18/1991 (Approximate)           Blood Pressure from Last 3 Encounters:   06/25/18 123/67   06/22/18 103/65   06/19/18 110/66    Weight  from Last 3 Encounters:   06/22/18 106.3 kg (234 lb 4.8 oz)   06/19/18 106.1 kg (234 lb)   06/14/18 106.1 kg (234 lb)              We Performed the Following     MEASURE POST-VOID RESIDUAL URINE/BLADDER CAPACITY, US NON-IMAGING (10304)        Primary Care Provider Office Phone # Fax #    Trice Medeiros MD PhD [phone] [address]  [facility] [address]        Equal Access to Services     MAIA GARCIA : Hadii aad ku hadasho Soomaali, waaxda luqadaha, qaybta kaalmada adeegyada, waxay idiin hayaan adetyler kharacindy hallman . So [facility] [phone].    ATENCIÓN: Si habla español, tiene a arauz disposición servicios gratuitos de asistencia lingüística. Llame al [phone].    We comply with applicable federal civil rights laws and [place] laws. We do not discriminate on the basis of race, color, national origin, age, disability, sex, sexual orientation, or gender identity.            Thank you!     Thank you for choosing [facility]  for your care. Our goal is always to provide you with excellent care. Hearing back from our patients is one way we can continue to improve our services. Please take a few minutes to complete the written survey that you may receive in the mail after your visit with us. Thank you!             Your Updated Medication List - Protect others around you: Learn how to safely use, store and throw away your medicines at www.disposemymeds.org.          This list is accurate as of 6/25/18 10:04 AM.  Always use your most recent med list.                   Brand Name Dispense Instructions for use Diagnosis    aspirin 81 MG tablet      1 TABLET DAILY        CALCIUM 600+D PO      1 tab daily        clotrimazole 10 MG kelly     70 Kelly    Place 1 Kelly (10 mg) inside cheek 5 times daily    Thrush       DULoxetine 30 MG EC capsule    CYMBALTA    180 capsule    Take 1 capsule (30 mg) by mouth 2 times daily    Depression with anxiety       gabapentin 300 MG capsule     "NEURONTIN    90 capsule    Take 1 capsule (300 mg) by mouth 3 times daily    Chronic pain of both knees       glipiZIDE 10 MG tablet    GLUCOTROL    90 tablet    Take 1 tablet (10 mg) by mouth daily (with dinner)    Type 2 diabetes mellitus with hyperglycemia, with long-term current use of insulin (H)       insulin syringe-needle U-100 31G X 5/16\" 1 ML    RELION INSULIN SYRINGE    200 each    Use 2 syringes daily or as directed.    Type 2 diabetes mellitus without complication (H)       lisinopril 10 MG tablet    PRINIVIL/ZESTRIL    30 tablet    Take 0.5 tablets (5 mg) by mouth daily    Essential hypertension with goal blood pressure less than 140/90       MELATONIN PO      10 mg At Bedtime        metFORMIN 500 MG 24 hr tablet    GLUCOPHAGE-XR    360 tablet    Take 2 tablets (1,000 mg) by mouth 2 times daily (with meals)    Type 2 diabetes mellitus with hyperglycemia, with long-term current use of insulin (H)       minocycline 100 MG capsule    MINOCIN/DYNACIN    180 capsule    Take 1 capsule (100 mg) by mouth every 12 hours    Acne vulgaris       NovoLIN N VIAL 100 UNIT/ML injection   Generic drug:  insulin NPH      Inject 60 units Subcutaneous 2 times daily. (gets this from [facility] at [facility])    Uncontrolled type 2 diabetes mellitus with stage 3 chronic kidney disease, with long-term current use of insulin (H)       omeprazole 40 MG capsule    priLOSEC    90 capsule    TAKE 1 CAPSULE DAILY 30 TO 60 MINUTES BEFORE A MEAL    Gastroesophageal reflux disease without esophagitis       ONE TOUCH LANCETS      None Entered        ONE TOUCH TEST STRIPS VI      None Entered        oxybutynin chloride 15 MG Tb24     90 tablet    Take 1 tablet (15 mg) by mouth daily    Urinary urgency, Urgency incontinence       PREVNAR 13 Susp injection   Generic drug:  pneumococcal           simvastatin 20 MG tablet    ZOCOR    90 tablet    Take 1 tablet (20 mg) by mouth At Bedtime    Hyperlipidemia LDL goal <100       spironolactone 100 " MG tablet    ALDACTONE    90 tablet    TAKE 1 TABLET EVERY MORNING    Essential hypertension with goal blood pressure less than 140/90       traZODone 50 MG tablet    DESYREL    90 tablet    Take 2 tablets (100 mg) by mouth nightly as needed for sleep    Chronic pain of both knees       VISION FORMULA EYE HEALTH PO      Take 1 tablet by mouth daily        vitamin D 1000 units capsule      1 CAPSULE DAILY

## 2018-06-25 NOTE — PROGRESS NOTES
Reason for Visit:  Review interstim trial    Clinical Data:  Ms. Corbett is a 68 y/o female with urinary urgency and urge incontinence.  She was changed from Detrol to oxybutynin and also prescribed Myrbetriq but the latter  Was too expensive for her so she never started it.      Cystoscopy on 3/19/18 was normal except for 2+ trabeculation and a diverticulum at the dome of the bladder.    She underwent an interstim trial on 6/19/18.  She is so pleased.  She was able to sleep for 6-7 hours without having to get up.  She felt like she had so much better control with it in place.  Especially on the right side.  The left side was a little less effective.    UDS on 5/24/18:  -Multiple uninhibited detrusor contractions starting at bladder volumes >100 mL with Pdet pressures ranging from  cm H2O. She never leaks with any of these episodes of DO.  -Otherwise good bladder compliance between UDC's.  -Small/normal bladder capacity (293 mL)  -Normal bladder filling sensations.  -No reproducible FRANCHESKA or DOI.  -Strong detrusor contraction during voiding to 81.5 cm H2O with slightly slow flow (Qmax 14.7 ml/s) and some incomplete bladder emptying ( mL).  -No evidence for DSD.  -No evidence for outlet obstruction.  -Fluoroscopy reveals a mildly trabeculated bladder wall without diverticuli or VUR. The bladder neck was closed during filling, open during episodes of DO with contrast stopping at the level of the pelvic floor vs. external urinary sphincter, and open during voiding.     A/P:  68 y/o female with urinary urgency and urge incontinence.  She has some improvement at night as she is taking oxybutynin at that time but she has many accidents during the day.  She was extremely helped by it.  Over 80%.  She would like to proceed with a right sided implant.     -schedule interstim implant as soon as possible.    Thank you for allowing me to participate in the care of  Ms. Radha Corbett and I will keep you updated on her  progress.    Dada Baltazar MD

## 2018-06-25 NOTE — TELEPHONE ENCOUNTER
Called to schedule surgery with Dr Baltazar on 7/24/18 @ [facility] OR.  Surgery packet mailed on 6/26/18. Interstim Rep notified via email on 6/25/18.

## 2018-06-25 NOTE — TELEPHONE ENCOUNTER
Called to schedule surgery with Dr Baltazar.  Left voice mail with direct phone number [phone] for patient to call back.

## 2018-07-09 ENCOUNTER — TELEPHONE (OUTPATIENT)
Dept: UROLOGY | Facility: CLINIC | Age: 68
End: 2018-07-09

## 2018-07-09 NOTE — TELEPHONE ENCOUNTER
Patient had pre-op physical done 6/14/18   We will update the physical on the day of the surgery  No questions at this time    Noni Mcdaniel RN   Care Coordinator Urology

## 2018-07-09 NOTE — TELEPHONE ENCOUNTER
----- Message from Cristal Tubbs LPN sent at 7/5/2018  8:54 AM CDT -----  Regarding: FW: teaching      ----- Message -----     From: Anne Kinney     Sent: 6/25/2018   3:18 PM       To: Noni Mcdaniel RN  Subject: teaching                                         Dr Giovanny Gage implant (stage I and II, implant the permanent lead and generator)    Surgery scheduled on 7/24/18 @ 8 am @ [facility] OR    Patient informed, packet mailed, please call for teaching    tg rep emailed    Thanks  Anne

## 2018-07-16 ENCOUNTER — TELEPHONE (OUTPATIENT)
Dept: UROLOGY | Facility: CLINIC | Age: 68
End: 2018-07-16

## 2018-07-16 NOTE — TELEPHONE ENCOUNTER
Patient called wanting to know how long before she is able to have back surgery. Cristal Tubbs LPN Staff Nurse

## 2018-07-16 NOTE — TELEPHONE ENCOUNTER
----- Message from Gisel Tubbs LPN sent at 7/16/2018 11:19 AM CDT -----  Patient called about showering after her surgery with dr brothers  Implant  She has broken back and she needs to wash her hair every day and cannot bend over to do that.  Her appointment is 2 weeks she cannot wait that long  gisel

## 2018-07-16 NOTE — TELEPHONE ENCOUNTER
Patient was concerned about her next back surgery with another physician. She wants to know when she can have the next surgery. She also wants to know when she can shower after her procedure on 7/24/18. Patient can shower in 2 days post dressing removal. I asked patient to call her other surgeon to see if her surgery will be delayed.  Patient verbalized understanding    Noni Mcdaniel RN   Care Coordinator Urology

## 2018-07-16 NOTE — TELEPHONE ENCOUNTER
[facility] Call Center    Phone Message    May a detailed message be left on voicemail: yes    Reason for Call: Other: Pt is wondering what the recovery time will be for her procedure on 7/24. Please give her a call back.      Action Taken: Message routed to:  [facility] ([facility] Urology

## 2018-07-19 ENCOUNTER — OFFICE VISIT (OUTPATIENT)
Dept: PEDIATRICS | Facility: CLINIC | Age: 68
End: 2018-07-19
Payer: COMMERCIAL

## 2018-07-19 VITALS
TEMPERATURE: 96.6 F | HEART RATE: 107 BPM | HEIGHT: 67 IN | DIASTOLIC BLOOD PRESSURE: 50 MMHG | SYSTOLIC BLOOD PRESSURE: 105 MMHG | BODY MASS INDEX: 36.65 KG/M2 | WEIGHT: 233.5 LBS | OXYGEN SATURATION: 97 %

## 2018-07-19 DIAGNOSIS — D64.9 ANEMIA, UNSPECIFIED TYPE: ICD-10-CM

## 2018-07-19 DIAGNOSIS — Z01.818 PREOP GENERAL PHYSICAL EXAM: Primary | ICD-10-CM

## 2018-07-19 DIAGNOSIS — M54.9 BACK PAIN, UNSPECIFIED BACK LOCATION, UNSPECIFIED BACK PAIN LATERALITY, UNSPECIFIED CHRONICITY: ICD-10-CM

## 2018-07-19 DIAGNOSIS — B37.0 THRUSH: ICD-10-CM

## 2018-07-19 DIAGNOSIS — N39.41 URGE INCONTINENCE OF URINE: ICD-10-CM

## 2018-07-19 DIAGNOSIS — Z79.4 TYPE 2 DIABETES MELLITUS WITH HYPERGLYCEMIA, WITH LONG-TERM CURRENT USE OF INSULIN (H): ICD-10-CM

## 2018-07-19 DIAGNOSIS — E11.65 TYPE 2 DIABETES MELLITUS WITH HYPERGLYCEMIA, WITH LONG-TERM CURRENT USE OF INSULIN (H): ICD-10-CM

## 2018-07-19 LAB
ANION GAP SERPL CALCULATED.3IONS-SCNC: 9 MMOL/L (ref 3–14)
BUN SERPL-MCNC: 13 MG/DL (ref 7–30)
CALCIUM SERPL-MCNC: 9 MG/DL (ref 8.5–10.1)
CHLORIDE SERPL-SCNC: 104 MMOL/L (ref 94–109)
CO2 SERPL-SCNC: 26 MMOL/L (ref 20–32)
CREAT SERPL-MCNC: 0.53 MG/DL (ref 0.52–1.04)
ERYTHROCYTE [DISTWIDTH] IN BLOOD BY AUTOMATED COUNT: 18.1 % (ref 10–15)
GFR SERPL CREATININE-BSD FRML MDRD: >90 ML/MIN/1.7M2
GLUCOSE SERPL-MCNC: 192 MG/DL (ref 70–99)
HCT VFR BLD AUTO: 33.9 % (ref 35–47)
HGB BLD-MCNC: 9.8 G/DL (ref 11.7–15.7)
MCH RBC QN AUTO: 22.5 PG (ref 26.5–33)
MCHC RBC AUTO-ENTMCNC: 28.9 G/DL (ref 31.5–36.5)
MCV RBC AUTO: 78 FL (ref 78–100)
PLATELET # BLD AUTO: 324 10E9/L (ref 150–450)
POTASSIUM SERPL-SCNC: 4 MMOL/L (ref 3.4–5.3)
RBC # BLD AUTO: 4.36 10E12/L (ref 3.8–5.2)
SODIUM SERPL-SCNC: 139 MMOL/L (ref 133–144)
WBC # BLD AUTO: 10.2 10E9/L (ref 4–11)

## 2018-07-19 PROCEDURE — 80048 BASIC METABOLIC PNL TOTAL CA: CPT | Performed by: NURSE PRACTITIONER

## 2018-07-19 PROCEDURE — 36415 COLL VENOUS BLD VENIPUNCTURE: CPT | Performed by: NURSE PRACTITIONER

## 2018-07-19 PROCEDURE — 85027 COMPLETE CBC AUTOMATED: CPT | Performed by: NURSE PRACTITIONER

## 2018-07-19 PROCEDURE — 93000 ELECTROCARDIOGRAM COMPLETE: CPT | Performed by: NURSE PRACTITIONER

## 2018-07-19 PROCEDURE — 99214 OFFICE O/P EST MOD 30 MIN: CPT | Performed by: NURSE PRACTITIONER

## 2018-07-19 RX ORDER — CLOTRIMAZOLE 10 MG/1
1 LOZENGE ORAL
Qty: 70 TROCHE | Refills: 0 | Status: SHIPPED | OUTPATIENT
Start: 2018-07-19 | End: 2018-08-14

## 2018-07-19 NOTE — PATIENT INSTRUCTIONS
PLAN:   1.   Symptomatic therapy suggested: .  Diabetes Medication Use  -----Hold usual oral and non-insulin diabetic meds (e.g. Metformin, Actos, Glipizide) while NPO.   -----Take 80% of long acting insulin (e.g. Lantus, NPH) while NPO (fasting)    ACE Inhibitor or Angiotensin Receptor Blocker (ARB) Use  Ace inhibitor or Angiotensin Receptor Blocker (ARB) and should HOLD this medication for the 24 hours prior to surgery.    2.  Orders Placed This Encounter   Medications     order for DME     Sig: Equipment being ordered: Walker with seat light weight     Dispense:  1 Device     Refill:  0     clotrimazole 10 MG venkat     Sig: Place 1 Venkat (10 mg) inside cheek 5 times daily     Dispense:  70 Venkat     Refill:  0     Orders Placed This Encounter   Procedures     Basic metabolic panel     CBC with platelets     DIABETES EDUCATOR REFERRAL     EKG 12-lead complete w/read - Clinics       3. Patient needs to follow up in if no improvement,or sooner if worsening of symptoms or other symptoms develop.  Will follow up and/or notify patient of  results via My Chart to determine further need for followup        Before Your Surgery      Call your surgeon if there is any change in your health. This includes signs of a cold or flu (such as a sore throat, runny nose, cough, rash or fever).    Do not smoke, drink alcohol or take over the counter medicine (unless your surgeon or primary care doctor tells you to) for the 24 hours before and after surgery.    If you take prescribed drugs: Follow your doctor s orders about which medicines to take and which to stop until after surgery.    Eating and drinking prior to surgery: follow the instructions from your surgeon    Take a shower or bath the night before surgery. Use the soap your surgeon gave you to gently clean your skin. If you do not have soap from your surgeon, use your regular soap. Do not shave or scrub the surgery site.  Wear clean pajamas and have clean sheets on your  bed.   It was a pleasure seeing you today at the [facility]. Thank you for allowing us to care for you today. We truly hope we provided you with the excellent service you deserve. Please let us know if there is anything else we can do for you so we can be sure you are leaving completley satisfied with your care experience.       General information about your clinic   Clinic Hours Lab Hours (Appointments are required)   Mon-Thurs: 7:30 AM - 7 PM Mon-Thurs: 7:30 AM - 7 PM   Fri: 7:30 AM - 5 PM Fri: 7:30 AM - 5 PM        After Hours Nurse Advise & Appts:  [facility]: [phone]  [facility] On Call: to make appointments anytime: [phone] On Call Physician: call [phone] and answering service will page the on call physician.        For urgent appointments, please call [phone] and ask for the triage nurse or your care team clinic nurse.  How to contact my care team:  [facility]: [email or web address]   Phone: [phone]   Fax: [phone]       [facility]:   Phone: [phone]  Hours: 8:00 AM - 6:00 PM  Medication Refills:  Call your pharmacy and they will forward the refill to us. Please allow 3 business days for your refills to be completed.       Normal or non-critical lab and imaging results will be communicated to you by MyChart, letter or phone within 7 days.  If you do not hear from us within 10 days, please call the clinic. If you have a critical or abnormal lab result, we will notify you by phone as soon as possible.       We now have PWIC (Pediatric Walk in Care)  Monday-Friday from 7:30-4. Simply walk in and be seen for your urgent needs like cough, fever, rash, diarrhea or vomiting, pink eye, UTI. No appointments needed. Ask one of the team for more information      -Your Care Team:    Dr. Trice Medeiros - Internal Medicine/Pediatrics   Dr. Guillaume Pisano - Family Medicine  Dr. Areli Pathak - Pediatrics  Dr. Jacqui Gil - Pediatrics  Perri  DEIDRA Carias - Family Practice Nurse Practitioner

## 2018-07-19 NOTE — NURSING NOTE
"Chief Complaint   Patient presents with     Pre-Op Exam     DOS:7/24/18       Initial /50 (BP Location: Right arm, Patient Position: Sitting, Cuff Size: Adult Large)  Pulse 107  Temp 96.6  F (35.9  C) (Temporal)  Ht 5' 7\" (1.702 m)  Wt 233 lb 8 oz (105.9 kg)  LMP 11/18/1991 (Approximate)  SpO2 97%  Breastfeeding? No  BMI 36.57 kg/m2 Estimated body mass index is 36.57 kg/(m^2) as calculated from the following:    Height as of this encounter: 5' 7\" (1.702 m).    Weight as of this encounter: 233 lb 8 oz (105.9 kg).  Medication Reconciliation: complete      BOBY Lyons      "

## 2018-07-19 NOTE — MR AVS SNAPSHOT
After Visit Summary   7/19/2018    Radha Corbett    MRN: [id number]           Patient Information     Date Of Birth          1950        Visit Information        Provider Department      7/19/2018 1:30 PM Perri Carias APRN CNP [facility]        Today's Diagnoses     Preop general physical exam    -  1    Urge incontinence of urine        Back pain, unspecified back location, unspecified back pain laterality, unspecified chronicity        Thrush        Type 2 diabetes mellitus with hyperglycemia, with long-term current use of insulin (H)          Care Instructions    PLAN:   1.   Symptomatic therapy suggested: .  Diabetes Medication Use  -----Hold usual oral and non-insulin diabetic meds (e.g. Metformin, Actos, Glipizide) while NPO.   -----Take 80% of long acting insulin (e.g. Lantus, NPH) while NPO (fasting)    ACE Inhibitor or Angiotensin Receptor Blocker (ARB) Use  Ace inhibitor or Angiotensin Receptor Blocker (ARB) and should HOLD this medication for the 24 hours prior to surgery.    2.  Orders Placed This Encounter   Medications     order for DME     Sig: Equipment being ordered: Walker with seat light weight     Dispense:  1 Device     Refill:  0     clotrimazole 10 MG kelly     Sig: Place 1 Kelly (10 mg) inside cheek 5 times daily     Dispense:  70 Kelly     Refill:  0     Orders Placed This Encounter   Procedures     Basic metabolic panel     CBC with platelets     DIABETES EDUCATOR REFERRAL     EKG 12-lead complete w/read - Clinics       3. Patient needs to follow up in if no improvement,or sooner if worsening of symptoms or other symptoms develop.  Will follow up and/or notify patient of  results via My Chart to determine further need for followup        Before Your Surgery      Call your surgeon if there is any change in your health. This includes signs of a cold or flu (such as a sore throat, runny nose, cough, rash or fever).    Do not smoke, drink alcohol  or take over the counter medicine (unless your surgeon or primary care doctor tells you to) for the 24 hours before and after surgery.    If you take prescribed drugs: Follow your doctor s orders about which medicines to take and which to stop until after surgery.    Eating and drinking prior to surgery: follow the instructions from your surgeon    Take a shower or bath the night before surgery. Use the soap your surgeon gave you to gently clean your skin. If you do not have soap from your surgeon, use your regular soap. Do not shave or scrub the surgery site.  Wear clean pajamas and have clean sheets on your bed.   It was a pleasure seeing you today at the [facility]. Thank you for allowing us to care for you today. We truly hope we provided you with the excellent service you deserve. Please let us know if there is anything else we can do for you so we can be sure you are leaving completley satisfied with your care experience.       General information about your clinic   Clinic Hours Lab Hours (Appointments are required)   Mon-Thurs: 7:30 AM - 7 PM Mon-Thurs: 7:30 AM - 7 PM   Fri: 7:30 AM - 5 PM Fri: 7:30 AM - 5 PM        After Hours Nurse Advise & Appts:  [facility]: [phone]  [facility] On Call: to make appointments anytime: [phone] On Call Physician: call [phone] and answering service will page the on call physician.        For urgent appointments, please call [phone] and ask for the triage nurse or your care team clinic nurse.  How to contact my care team:  [facility]: [email or web address]   Phone: [phone]   Fax: [phone]       [facility]:   Phone: [phone]  Hours: 8:00 AM - 6:00 PM  Medication Refills:  Call your pharmacy and they will forward the refill to us. Please allow 3 business days for your refills to be completed.       Normal or non-critical lab and imaging results will be communicated to you by MyChart, letter or  phone within 7 days.  If you do not hear from us within 10 days, please call the clinic. If you have a critical or abnormal lab result, we will notify you by phone as soon as possible.       We now have PWIC (Pediatric Walk in Care)  Monday-Friday from 7:30-4. Simply walk in and be seen for your urgent needs like cough, fever, rash, diarrhea or vomiting, pink eye, UTI. No appointments needed. Ask one of the team for more information      -Your Care Team:    Dr. Trice Medeiros - Internal Medicine/Pediatrics   Dr. Guillaume Pisano - Family Medicine  Dr. Areli Pathak - Pediatrics  Dr. Jacqui Gil - Pediatrics  Perri Carias CNP - Family Practice Nurse Practitioner            Follow-ups after your visit        Additional Services     DIABETES EDUCATOR REFERRAL       DIABETES SELF MANAGEMENT TRAINING (DSMT)      Your provider has referred you to Diabetes Education: [facility]: [facility] [phone]   [email or web address]    A  will call you to make your appointment. If it has been more than 3 business days since your referral was placed, please call the above phone number to schedule.    Type of training and number of hours: Previous Diagnosis: Follow-up DSMT - 2 hours.    Diabetes Type: Type 2 - On Insulin   Medicare covers: 10 hours of initial DSMT in 12 month period from the time of first visit, plus 2 hours of follow-up DSMT annually, and additional hours as requested for insulin training.         Diabetes Co-Morbidities: dyslipidemia and hypertension               A1C Goal:  <8.0       A1C is: Lab Results       Component                Value               Date                       A1C                      8.5                 05/15/2018              MEDICAL NUTRITION THERAPY (MNT) for Diabetes    Medical Nutrition Therapy with a Registered  Dietitian can be provided in coordination with Diabetes Self-Management Training to assist in achieving optimal diabetes management.    MNT Type and Hours: Do not initiate MNT at this time.                       Medicare will cover: 3 hours initial MNT in 12 month period after first visit, plus 2 hours of follow-up MNT annually        Diabetes Education Topics: Comprehensive Knowledge Assessment and Instruction and Knowledge: Healthy Eating and Monitoring Blood Sugar    Special Educational Needs Requiring Individual DSMT: None      Please be aware that coverage of these services is subject to the terms and limitations of your health insurance plan.  Call member services at your health plan to determine Diabetes Self-Management Training (Codes  and ) and Medical Nutrition Therapy (Codes 25869 and 17832) benefits and ask which blood glucose monitor brands are covered by your plan.  Please bring the following with you to your appointment:    (1)  List of current medications   (2)  List of Blood Glucose Monitor brands that are covered by your insurance plan  (3)  Blood Glucose Monitor and log book  (4)   Food records for the 3 days prior to your visit    The Certified Diabetes Educator may make diabetes medication adjustments per the CDE Protocol and Collaborative Practice Agreement.                  Your next 10 appointments already scheduled     Jul 24, 2018   Procedure with Dada Baltazar MD   [facility] ([facility])    [address] [place] [address]  [address] [place] [address]   [phone]           Located in the Clinics and Surgery Center at [address] [place] [address].   parking is very convenient and highly recommended.  is a $6 flat rate fee.  Both  and self parkers should enter the main arrival plaza from [facility]; parking attendants will direct you based on your parking preference.            Aug 06, 2018  1:45  PM CDT   Return Visit with Dada Baltazar MD   [facility] ([facility])    [address] [facility] [id number] [phone]            Aug 23, 2018 11:00 AM CDT   LAB with LAB FIRST FLOOR [facility] ([facility])    [address] [facility] [id number] [phone]           Please do not eat 10-12 hours before your appointment if you are coming in fasting for labs on lipids, cholesterol, or glucose (sugar). This does not apply to pregnant women. Water, hot tea and black coffee (with nothing added) are okay. Do not drink other fluids, diet soda or chew gum.            Aug 23, 2018 11:30 AM CDT   Return Visit with Trice Medeiros MD PhD   [facility] ([facility])    [address] [facility] [id number] [phone]              Who to contact     If you have questions or need follow up information about today's clinic visit or your schedule please contact [facility] directly at [phone].  Normal or non-critical lab and imaging results will be communicated to you by MyChart, letter or phone within 4 business days after the clinic has received the results. If you do not hear from us within 7 days, please contact the clinic through [facility] or phone. If you have a critical or abnormal lab result, we will notify you by phone as soon as possible.  Submit refill requests through [employer] or call your pharmacy and they will forward the refill request to us. Please allow 3 business days for your refill to be completed.          Additional Information About Your Visit        [employer] Information     [employer] gives you secure access to your electronic health record. If you see a primary care provider, you can also send messages to your care team and make appointments. If you have questions, please call your primary care clinic.  If you do not have a primary care  "provider, please call [phone] and they will assist you.      [employer] is an electronic gateway that provides easy, online access to your medical records. With [employer], you can request a clinic appointment, read your test results, renew a prescription or communicate with your care team.     To access your existing account, please contact your [facility] or call [phone] for assistance.        Care EveryWhere ID     This is your Care EveryWhere ID. This could be used by other organizations to access your [facility] medical records  [id number]        Your Vitals Were     Pulse Temperature Height Last Period Pulse Oximetry Breastfeeding?    107 96.6  F (35.9  C) (Temporal) 5' 7\" (1.702 m) 11/18/1991 (Approximate) 97% No    BMI (Body Mass Index)                   36.57 kg/m2            Blood Pressure from Last 3 Encounters:   07/19/18 105/50   06/25/18 123/67   06/22/18 103/65    Weight from Last 3 Encounters:   07/19/18 233 lb 8 oz (105.9 kg)   06/22/18 234 lb 4.8 oz (106.3 kg)   06/19/18 234 lb (106.1 kg)              We Performed the Following     Basic metabolic panel     CBC with platelets     DIABETES EDUCATOR REFERRAL     EKG 12-lead complete w/read - Clinics          Today's Medication Changes          These changes are accurate as of 7/19/18  2:17 PM.  If you have any questions, ask your nurse or doctor.               Start taking these medicines.        Dose/Directions    order for DME   Used for:  Back pain, unspecified back location, unspecified back pain laterality, unspecified chronicity   Started by:  Perri Carias APRN CNP        Equipment being ordered: Walker with seat light weight   Quantity:  1 Device   Refills:  0            Where to get your medicines      These medications were sent to [facility] - [place], [place] - [address], [address]  [address], [address], [facility] [address]     Phone:  [phone]     " clotrimazole 10 MG kelly         Some of these will need a paper prescription and others can be bought over the counter.  Ask your nurse if you have questions.     Bring a paper prescription for each of these medications     order for DME                Primary Care Provider Office Phone # Fax #    Trice Medeiros MD PhD [phone] [address]  [facility] [address]        Equal Access to Services     MAIA GARCIA : Hadii aad ku hadasho Soomaali, waaxda luqadaha, qaybta kaalmada adeegyada, waxay idiin hayaan adeeg kharash la'aan ah. So [facility] [phone].    ATENCIÓN: Si habla miryam, tiene a arauz disposición servicios gratuitos de asistencia lingüística. Llame al [phone].    We comply with applicable federal civil rights laws and [place] laws. We do not discriminate on the basis of race, color, national origin, age, disability, sex, sexual orientation, or gender identity.            Thank you!     Thank you for choosing [facility]  for your care. Our goal is always to provide you with excellent care. Hearing back from our patients is one way we can continue to improve our services. Please take a few minutes to complete the written survey that you may receive in the mail after your visit with us. Thank you!             Your Updated Medication List - Protect others around you: Learn how to safely use, store and throw away your medicines at www.disposemymeds.org.          This list is accurate as of 7/19/18  2:17 PM.  Always use your most recent med list.                   Brand Name Dispense Instructions for use Diagnosis    clotrimazole 10 MG kelly     70 Kelly    Place 1 Kelly (10 mg) inside cheek 5 times daily    Thrush       DULoxetine 30 MG EC capsule    CYMBALTA    180 capsule    Take 1 capsule (30 mg) by mouth 2 times daily    Depression with anxiety       gabapentin 300 MG capsule    NEURONTIN    90 capsule    Take 1 capsule (300 mg) by mouth 3 times daily     "Chronic pain of both knees       glipiZIDE 10 MG tablet    GLUCOTROL    90 tablet    Take 1 tablet (10 mg) by mouth daily (with dinner)    Type 2 diabetes mellitus with hyperglycemia, with long-term current use of insulin (H)       insulin syringe-needle U-100 31G X 5/16\" 1 ML    RELION INSULIN SYRINGE    200 each    Use 2 syringes daily or as directed.    Type 2 diabetes mellitus without complication (H)       lisinopril 10 MG tablet    PRINIVIL/ZESTRIL    30 tablet    Take 0.5 tablets (5 mg) by mouth daily    Essential hypertension with goal blood pressure less than 140/90       MELATONIN PO      10 mg At Bedtime        metFORMIN 500 MG 24 hr tablet    GLUCOPHAGE-XR    360 tablet    Take 2 tablets (1,000 mg) by mouth 2 times daily (with meals)    Type 2 diabetes mellitus with hyperglycemia, with long-term current use of insulin (H)       minocycline 100 MG capsule    MINOCIN/DYNACIN    180 capsule    Take 1 capsule (100 mg) by mouth every 12 hours    Acne vulgaris       NovoLIN N VIAL 100 UNIT/ML injection   Generic drug:  insulin NPH      Inject 60 units Subcutaneous 2 times daily. (gets this from [facility] NPH at [facility])    Uncontrolled type 2 diabetes mellitus with stage 3 chronic kidney disease, with long-term current use of insulin (H)       omeprazole 40 MG capsule    priLOSEC    90 capsule    TAKE 1 CAPSULE DAILY 30 TO 60 MINUTES BEFORE A MEAL    Gastroesophageal reflux disease without esophagitis       ONE TOUCH LANCETS      None Entered        ONE TOUCH TEST STRIPS VI      None Entered        order for DME     1 Device    Equipment being ordered: Walker with seat light weight    Back pain, unspecified back location, unspecified back pain laterality, unspecified chronicity       oxybutynin chloride 15 MG Tb24     90 tablet    Take 1 tablet (15 mg) by mouth daily    Urinary urgency, Urgency incontinence       PREVNAR 13 Susp injection   Generic drug:  pneumococcal           simvastatin 20 MG tablet    ZOCOR    " 90 tablet    Take 1 tablet (20 mg) by mouth At Bedtime    Hyperlipidemia LDL goal <100       spironolactone 100 MG tablet    ALDACTONE    90 tablet    TAKE 1 TABLET EVERY MORNING    Essential hypertension with goal blood pressure less than 140/90       traZODone 50 MG tablet    DESYREL    90 tablet    Take 2 tablets (100 mg) by mouth nightly as needed for sleep    Chronic pain of both knees       vitamin D 1000 units capsule      1 CAPSULE DAILY

## 2018-07-19 NOTE — PROGRESS NOTES
[address] [address]  [phone]  Dept: [phone]    PRE-OP EVALUATION:  Today's date: 2018    Radha Corbett (: 1950) presents for pre-operative evaluation assessment as requested by Dr. Baltazar.  She requires evaluation and anesthesia risk assessment prior to undergoing surgery/procedure for treatment of urinary incontinence .    Proposed Surgery/ Procedure:IMPLANT STIMULATOR AND LEADS SACRAL NERVE (STAGE ONE AND TWO)   Date of Surgery/ Procedure: 18  Time of Surgery/ Procedure: 10:35AM  Hospital/Surgical Facility: [facility]   Fax number for surgical facility:   Primary Physician: Trice Medeiros  Type of Anesthesia Anticipated: to be determined    Patient has a Health Care Directive or Living Will:  NO    1. NO - Do you have a history of heart attack, stroke, stent, bypass or surgery on an artery in the head, neck, heart or legs?  2. NO - Do you ever have any pain or discomfort in your chest?  3. NO - Do you have a history of  Heart Failure?  4. YES - ARE YOUR TROUBLED BY SHORTNESS OF BREATH WHEN WALKING ON THE LEVEL, UP A SLIGHT HILL OR AT NIGHT? SOB when walking  5. NO - Do you currently have a cold, bronchitis or other respiratory infection?  6. NO - Do you have a cough, shortness of breath or wheezing?  7. NO - Do you sometimes get pains in the calves of your legs when you walk?  8. NO - Do you or anyone in your family have previous history of blood clots?  9. NO - Do you or does anyone in your family have a serious bleeding problem such as prolonged bleeding following surgeries or cuts?  10. NO - Have you ever had problems with anemia or been told to take iron pills?  11. NO - Have you had any abnormal blood loss such as black, tarry or bloody stools, or abnormal vaginal bleeding?  12. NO - Have you ever had a blood transfusion?  13. NO - Have you or any of your relatives ever had problems with anesthesia?  14. YES - DO YOU  HAVE SLEEP APNEA, EXCESSIVE SNORING OR DAYTIME DROWSINESS? Snoring   15. NO - Do you have any prosthetic heart valves?  16. NO - Do you have prosthetic joints?  17. NO - Is there any chance that you may be pregnant?      HPI:     HPI related to upcoming procedure: treatment of urinary incontinence .    Proposed Surgery/ Procedure:IMPLANT STIMULATOR AND LEADS SACRAL NERVE (STAGE ONE AND TWO)   Date of Surgery/ Procedure: 7/24/18      DEPRESSION - Patient has a long history of Depression of moderate severity requiring medication for control with recent symptoms being stable..Current symptoms of depression include none.                                                                                                                                                                                    .  DIABETES - Patient has a longstanding history of DiabetesType Type II . Patient is being treated with oral agents and insulin injections and denies significant side effects. Control has been fair. Complicating factors include but are not limited to: hypertension and hyperlipidemia.                                                                                                                            .  HYPERLIPIDEMIA - Patient has a long history of significant Hyperlipidemia requiring medication for treatment with recent good control. Patient reports no problems or side effects with the medication.                                                                                                                                                       .  HYPERTENSION - Patient has longstanding history of HTN , currently denies any symptoms referable to elevated blood pressure. Specifically denies chest pain, palpitations, dyspnea, orthopnea, PND or peripheral edema. Blood pressure readings have been in normal range. Current medication regimen is as listed below. Patient denies any side effects of medication.                                                                                                                                                                                           .    MEDICAL HISTORY:     Patient Active Problem List    Diagnosis Date Noted     Type 2 diabetes, HbA1C goal < 8% (H) 10/31/2010     Priority: High     Morbid obesity (H) 06/22/2018     Priority: Medium     Urge incontinence 06/11/2018     Priority: Medium     Acute lower GI bleeding 07/31/2017     Priority: Medium     Back pain, unspecified back location, unspecified back pain laterality, unspecified chronicity 02/23/2017     Priority: Medium     Dislocation of shoulder region 07/23/2016     Priority: Medium     Unexplained endometrial cells on cervical cytology 12/08/2015     Priority: Medium     12/2/15 pap NIL/neg HPV with endometrial cells.   12/4/15 pelvic ultrasound completed.  12/17/15 EMB--benign. Plan: ECC  01/19/16 ECC--benign. Plan: co-test 5 years from previous, due 12/02/20.          Rectocele 12/02/2015     Priority: Medium     Elevated liver function tests 06/23/2015     Priority: Medium     Alcohol related.        Retinal detachment 03/16/2015     Priority: Medium     Depression with anxiety 10/04/2014     Priority: Medium     Abnormal cervical Pap smear with positive HPV DNA test 10/01/2014     Priority: Medium     Dx 2001. Normal since.        Pseudophakia 08/22/2013     Priority: Medium     Rosacea 03/05/2013     Priority: Medium     Alcohol ingestion, more than 4 drinks/day on alcohol screening 07/21/2012     Priority: Medium     4 vodka at night before dinner --> recommend changing to 1-2 wine instead.       Umbilical hernia 07/21/2012     Priority: Medium     Incontinence of urine 07/21/2012     Priority: Medium     Stool incontinence 07/21/2012     Priority: Medium     Tubular adenoma of colon 12/31/2010     Priority: Medium     Overview:   Overview:   Last colonoscopy 2008, due in 2011  Tubular adenoma  Next colonoscopy due in  "2015  Last colonoscopy 2008, due in 2011  Tubular adenoma  Next colonoscopy due in 2015         Breast cancer screening-high risk 12/22/2010     Priority: Medium     Mammogram every 6 months   On tamoxifen       Hyperlipidemia LDL goal <100 10/31/2010     Priority: Medium     Atypical ductal hyperplasia of breast 08/24/2010     Priority: Medium     Atypical lobular hyperplasia of breast 08/24/2010     Priority: Medium     Benign Breast Disease (PASH) 08/24/2010     Priority: Medium     Family history of breast cancer in sister 08/24/2010     Priority: Medium     Hypertension goal BP (blood pressure) < 140/90      Priority: Medium     Anxiety 07/29/2013     Priority: Low     Osteoarthritis, knee 03/05/2013     Priority: Low     Osteopenia 12/31/2010     Priority: Low     dexa 2008. Repeat in 2010  (Problem list name updated by automated process. Provider to review and confirm.)       Macular degeneration      Priority: Low     Myopia      Priority: Low     Hiatal hernia      Priority: Low     IBS (irritable bowel syndrome)      Priority: Low     GERD (gastroesophageal reflux disease)      Priority: Low      Past Medical History:   Diagnosis Date     Arthritis      DM (diabetes mellitus) (H)      GERD (gastroesophageal reflux disease)      Hiatal hernia      HPV in female      HTN (hypertension)      IBS (irritable bowel syndrome)      Major depression      Myopia      Other and unspecified hyperlipidemia      Pseudoangiomatous stromal hyperplasia of breast 2010    PASH     Rotator cuff injury 7/2016     Sleep apnea     \"snore\"     Vitamin D deficiency      Past Surgical History:   Procedure Laterality Date     BREAST LUMPECTOMY, RT/LT      x2, left     CATARACT IOL, RT/LT  4/99    left, [id number] 7.0D     COLONOSCOPY       COLONOSCOPY N/A 1/22/2016    Procedure: COMBINED COLONOSCOPY, SINGLE OR MULTIPLE BIOPSY/POLYPECTOMY BY BIOPSY;  Surgeon: Praful Benjamin MD;  Location: [facility] OR     COLONOSCOPY WITH CO2 " "INSUFFLATION N/A 1/22/2016    Procedure: COLONOSCOPY WITH CO2 INSUFFLATION;  Surgeon: Praful Benjamin MD;  Location: [facility] OR     CRYOTHERAPY  2000    cervical     CRYOTHERAPY Left 3/19/2015    Procedure: CRYOTHERAPY;  Surgeon: Keiko Puri MD;  [facility]     DILATION AND CURETTAGE, HYSTEROSCOPY DIAGNOSTIC, COMBINED N/A 1/19/2016    Procedure: COMBINED DILATION AND CURETTAGE, HYSTEROSCOPY DIAGNOSTIC;  Surgeon: Yeni Aparicio DO;  Location: [facility] OR     IMPLANT STIMULATOR SACRAL NERVE PERCUTANEOUS TRIAL N/A 6/19/2018    Procedure: IMPLANT STIMULATOR SACRAL NERVE PERCUTANEOUS TRIAL;  Percutaneous Neural Examination (trial for interstim);  Surgeon: Dada Baltazar MD;  Location: [facility]     LAPAROSCOPIC TUBAL LIGATION  1981     PHACOEMULSIFICATION CLEAR CORNEA WITH STANDARD INTRAOCULAR LENS IMPLANT  8/15/2013    Procedure: PHACOEMULSIFICATION CLEAR CORNEA WITH STANDARD INTRAOCULAR LENS IMPLANT;  RIGHT PHACOEMULSIFICATION CLEAR CORNEA WITH STANDARD INTRAOCULAR LENS IMPLANT ;  Surgeon: Trae Taylor MD;  Location: [facility]     SURGICAL HISTORY OF -       x4, ankle surgery     Current Outpatient Prescriptions   Medication Sig Dispense Refill     clotrimazole 10 MG kelly Place 1 Kelly (10 mg) inside cheek 5 times daily 70 Kelly 0     DULoxetine (CYMBALTA) 30 MG EC capsule Take 1 capsule (30 mg) by mouth 2 times daily 180 capsule 1     gabapentin (NEURONTIN) 300 MG capsule Take 1 capsule (300 mg) by mouth 3 times daily 90 capsule 0     glipiZIDE (GLUCOTROL) 10 MG tablet Take 1 tablet (10 mg) by mouth daily (with dinner) 90 tablet 0     insulin NPH (NOVOLIN N VIAL) 100 UNIT/ML injection Inject 60 units Subcutaneous 2 times daily. (gets this from [facility] NPH at [facility])       insulin syringe-needle U-100 (RELION INSULIN SYRINGE) 31G X 5/16\" 1 ML Use 2 syringes daily or as directed. 200 each 3     lisinopril (PRINIVIL/ZESTRIL) 10 MG tablet Take 0.5 tablets (5 mg) by mouth daily 30 tablet 3 "     MELATONIN PO 10 mg At Bedtime        metFORMIN (GLUCOPHAGE-XR) 500 MG 24 hr tablet Take 2 tablets (1,000 mg) by mouth 2 times daily (with meals) 360 tablet 1     minocycline (MINOCIN/DYNACIN) 100 MG capsule Take 1 capsule (100 mg) by mouth every 12 hours 180 capsule 2     omeprazole (PRILOSEC) 40 MG capsule TAKE 1 CAPSULE DAILY 30 TO 60 MINUTES BEFORE A MEAL 90 capsule 3     ONE TOUCH LANCETS None Entered       ONE TOUCH TEST STRIPS VI None Entered       oxybutynin chloride 15 MG TB24 Take 1 tablet (15 mg) by mouth daily 90 tablet 1     PREVNAR 13 SUSP injection        simvastatin (ZOCOR) 20 MG tablet Take 1 tablet (20 mg) by mouth At Bedtime 90 tablet 3     spironolactone (ALDACTONE) 100 MG tablet TAKE 1 TABLET EVERY MORNING 90 tablet 3     traZODone (DESYREL) 50 MG tablet Take 2 tablets (100 mg) by mouth nightly as needed for sleep 90 tablet 2     VITAMIN D 1000 UNIT OR CAPS 1 CAPSULE DAILY       OTC products: no recent use of OTC ASA, NSAIDS or Steroids and no use of herbal medications or other supplements    Allergies   Allergen Reactions     Codeine      Sulfa Drugs       Latex Allergy: NO    Social History   Substance Use Topics     Smoking status: Current Every Day Smoker     Packs/day: 0.50     Types: Cigarettes     Smokeless tobacco: Never Used     Alcohol use Yes      Comment: social/3-4 per week     History   Drug Use No       REVIEW OF SYSTEMS:   CONSTITUTIONAL: NEGATIVE for fever, chills, change in weight  INTEGUMENTARY/SKIN: NEGATIVE for rash   ENT/MOUTH: NEGATIVE for ear, mouth and throat problems  RESP: NEGATIVE for significant cough or SOB  CV: NEGATIVE for chest pain, palpitations or peripheral edema  GI: NEGATIVE for nausea, abdominal pain, heartburn, or change in bowel habits   female: incontinence-urge and incontinence-stress  MUSCULOSKELETAL: NEGATIVE for significant arthralgias or myalgia  NEURO: NEGATIVE for weakness, dizziness or paresthesias  ENDOCRINE: POSITIVE  for HX diabetes and  "NEGATIVE for polydipsia, polyphagia and polyuria  HEME: NEGATIVE for bleeding problems  PSYCHIATRIC: POSITIVE forHx anxiety and Hx depression and NEGATIVE forthoughts of hurting someone else and thoughts of self harm    EXAM:   /50 (BP Location: Right arm, Patient Position: Sitting, Cuff Size: Adult Large)  Pulse 107  Temp 96.6  F (35.9  C) (Temporal)  Ht 5' 7\" (1.702 m)  Wt 233 lb 8 oz (105.9 kg)  LMP 11/18/1991 (Approximate)  SpO2 97%  Breastfeeding? No  BMI 36.57 kg/m2    GENERAL APPEARANCE: healthy, alert and no distress     EYES: Eyes grossly normal to inspection and conjunctivae and sclerae normal     HENT: ear canals and TM's normal and nose and mouth without ulcers or lesions     NECK: no adenopathy     RESP: lungs clear to auscultation - no rales, rhonchi or wheezes     CV: regular rates and rhythm, no murmur, click or rub and no irregular beats     ABDOMEN: soft, nontender     MS: extremities normal- no gross deformities noted and gait normal     SKIN: no suspicious lesions or rashes     NEURO: Normal strength and tone, sensory exam grossly normal, mentation intact and speech normal     PSYCH: mentation appears normal, affect normal/bright, patient appearance--, anxious and judgment and insight intact     LYMPHATICS: No cervical adenopathy    DIAGNOSTICS:   EKG: appears normal, NSR, sinus tachycardia, normal axis, normal intervals, no acute ST/T changes c/w ischemia, no LVH by voltage criteria, unchanged from previous tracings  Hemoglobin A1C   Date Value Ref Range Status   05/15/2018 8.5 (H) 0 - 5.6 % Final     Comment:     Normal <5.7% Prediabetes 5.7-6.4%  Diabetes 6.5% or higher - adopted from ADA   consensus guidelines.       Results for orders placed or performed in visit on 07/19/18   Basic metabolic panel   Result Value Ref Range    Sodium 139 133 - 144 mmol/L    Potassium 4.0 3.4 - 5.3 mmol/L    Chloride 104 94 - 109 mmol/L    Carbon Dioxide 26 20 - 32 mmol/L    Anion Gap 9 3 - 14 " mmol/L    Glucose 192 (H) 70 - 99 mg/dL    Urea Nitrogen 13 7 - 30 mg/dL    Creatinine 0.53 0.52 - 1.04 mg/dL    GFR Estimate >90 >60 mL/min/1.7m2    GFR Estimate If Black >90 >60 mL/min/1.7m2    Calcium 9.0 8.5 - 10.1 mg/dL   CBC with platelets   Result Value Ref Range    WBC 10.2 4.0 - 11.0 10e9/L    RBC Count 4.36 3.8 - 5.2 10e12/L    Hemoglobin 9.8 (L) 11.7 - 15.7 g/dL    Hematocrit 33.9 (L) 35.0 - 47.0 %    MCV 78 78 - 100 fl    MCH 22.5 (L) 26.5 - 33.0 pg    MCHC 28.9 (L) 31.5 - 36.5 g/dL    RDW 18.1 (H) 10.0 - 15.0 %    Platelet Count 324 150 - 450 10e9/L       IMPRESSION:   Reason for surgery/procedure: treatment of urinary incontinence .    Proposed Surgery/ Procedure:IMPLANT STIMULATOR AND LEADS SACRAL NERVE (STAGE ONE AND TWO)   Date of Surgery/ Procedure: 7/24/18  Diagnosis/reason for consult: preop evaluation     The proposed surgical procedure is considered INTERMEDIATE risk.    REVISED CARDIAC RISK INDEX  The patient has the following serious cardiovascular risks for perioperative complications such as (MI, PE, VFib and 3  AV Block):  Diabetes Mellitus (on Insulin)  INTERPRETATION: 1 risks: Class II (low risk - 0.9% complication rate)    The patient has the following additional risks for perioperative complications:  The 10-year ASCVD risk score (Shavon DC Jr, et al., 2013) is: 17.1%    Values used to calculate the score:      Age: 67 years      Sex: Female      Is Non- : No      Diabetic: Yes      Tobacco smoker: Yes      Systolic Blood Pressure: 105 mmHg      Is BP treated: Yes      HDL Cholesterol: 69 mg/dL      Total Cholesterol: 162 mg/dL    Radha was seen today for pre-op exam.    Diagnoses and all orders for this visit:    Preop general physical exam  -     EKG 12-lead complete w/read - Clinics  -     Basic metabolic panel  -     CBC with platelets    Urge incontinence of urine  -     EKG 12-lead complete w/read - Clinics  -     Basic metabolic panel  -     CBC with  platelets    Back pain, unspecified back location, unspecified back pain laterality, unspecified chronicity  -     order for DME; Equipment being ordered: Walker with seat light weight    Thrush  -     clotrimazole 10 MG venkat; Place 1 Venkat (10 mg) inside cheek 5 times daily    Type 2 diabetes mellitus with hyperglycemia, with long-term current use of insulin (H)  -     DIABETES EDUCATOR REFERRAL    Anemia, unspecified type  -     **CBC with platelets FUTURE anytime; Future  -     Iron and iron binding capacity; Future  -     Ferritin; Future          RECOMMENDATIONS:     --Consult hospital rounder / IM to assist post-op medical management    Anemia  Anemia and does not require treatment prior to surgery.  Monitor Hemoglobin postoperatively.  Will make appointment for further workup after procedure.     --Patient is to take all scheduled medications on the day of surgery EXCEPT for modifications listed below.    Diabetes Medication Use  -----Hold usual oral and non-insulin diabetic meds (e.g. Metformin, Actos, Glipizide) while NPO.   -----Take 80% of long acting insulin (e.g. Lantus, NPH) while NPO (fasting)      ACE Inhibitor or Angiotensin Receptor Blocker (ARB) Use  Ace inhibitor or Angiotensin Receptor Blocker (ARB) and should HOLD this medication for the 24 hours prior to surgery.      APPROVAL GIVEN to proceed with proposed procedure, without further diagnostic evaluation       Signed Electronically by: NIKKY Lutz CNP    Copy of this evaluation report is provided to requesting physician.    [facility] Preop Guidelines    Revised Cardiac Risk Index

## 2018-07-20 ENCOUNTER — TELEPHONE (OUTPATIENT)
Dept: PEDIATRICS | Facility: CLINIC | Age: 68
End: 2018-07-20

## 2018-07-20 DIAGNOSIS — D64.9 ANEMIA, UNSPECIFIED TYPE: ICD-10-CM

## 2018-07-20 LAB
ERYTHROCYTE [DISTWIDTH] IN BLOOD BY AUTOMATED COUNT: 18.1 % (ref 10–15)
FERRITIN SERPL-MCNC: 3 NG/ML (ref 8–252)
HCT VFR BLD AUTO: 34.1 % (ref 35–47)
HGB BLD-MCNC: 9.9 G/DL (ref 11.7–15.7)
IRON SATN MFR SERPL: 3 % (ref 15–46)
IRON SERPL-MCNC: 19 UG/DL (ref 35–180)
MCH RBC QN AUTO: 22.8 PG (ref 26.5–33)
MCHC RBC AUTO-ENTMCNC: 29 G/DL (ref 31.5–36.5)
MCV RBC AUTO: 79 FL (ref 78–100)
PLATELET # BLD AUTO: 300 10E9/L (ref 150–450)
RBC # BLD AUTO: 4.34 10E12/L (ref 3.8–5.2)
TIBC SERPL-MCNC: 550 UG/DL (ref 240–430)
WBC # BLD AUTO: 9.4 10E9/L (ref 4–11)

## 2018-07-20 PROCEDURE — 83550 IRON BINDING TEST: CPT | Performed by: NURSE PRACTITIONER

## 2018-07-20 PROCEDURE — 83540 ASSAY OF IRON: CPT | Performed by: NURSE PRACTITIONER

## 2018-07-20 PROCEDURE — 85027 COMPLETE CBC AUTOMATED: CPT | Performed by: NURSE PRACTITIONER

## 2018-07-20 PROCEDURE — 82728 ASSAY OF FERRITIN: CPT | Performed by: NURSE PRACTITIONER

## 2018-07-20 PROCEDURE — 36415 COLL VENOUS BLD VENIPUNCTURE: CPT | Performed by: NURSE PRACTITIONER

## 2018-07-20 NOTE — PROGRESS NOTES
History of GI bleed in July 2017. Had colonoscopy. Post procedure hemoglobin around 9-10.   No symptom of active bleeding. May proceed with surgery and outpatient work up for GI bleed.     Trice Medeiros MD PhD

## 2018-07-20 NOTE — TELEPHONE ENCOUNTER
Basic metabolic panel   Status:  Final result   Visible to patient:  Yes ([place]) Dx:  Preop general physical exam; [employer] Order: [id number]       Notes Recorded by Perri Carias APRN CNP on 7/19/2018 at 9:45 PM  Please call  Radha Corbett,    Attached are your test results.  -Kidney function is normal (Cr, GFR), Sodium is normal, Potassium is normal, Calcium is normal,   Your hemoglobin has dropped since last   Need to repeat this tomorrow to see if you are dropping    Please contact us if you have any questions.    Perri Carias CNP             Ref Range & Units 1d ago  (7/19/18) 1mo ago  (6/19/18) 1mo ago  (6/14/18) 1mo ago  (6/14/18) 1mo ago  (6/14/18)      Sodium 133 - 144 mmol/L 139          Potassium 3.4 - 5.3 mmol/L 4.0  4.3        Chloride 94 - 109 mmol/L 104          Carbon Dioxide 20 - 32 mmol/L 26          Anion Gap 3 - 14 mmol/L 9          Glucose 70 - 99 mg/dL 192 (H) 147 (H)   130 (H)CM     Comments: Non Fasting      Urea Nitrogen 7 - 30 mg/dL 13          Creatinine 0.52 - 1.04 mg/dL 0.53   0.56       GFR Estimate >60 mL/min/1.7m2 >90   >90CM      Comments: Non  GFR Calc      GFR Estimate If Black >60 mL/min/1.7m2 >90   >90CM      Comments:  GFR Calc      Calcium 8.5 - 10.1 mg/dL 9.0         Resulting Agency  [facility]          Specimen Collected: 07/19/18  2:29 PM Last Resulted: 07/19/18  2:59 PM                CM=Additional comments                     CBC with platelets   Status:  Final result   Visible to patient:  Yes ([place]) Dx:  Preop general physical exam; [employer] Order: [id number]       Notes Recorded by Perri Carias APRN CNP on 7/19/2018 at 9:45 PM  Please call  Radha Corbett,    Attached are your test results.  -Kidney function is normal (Cr, GFR), Sodium is normal, Potassium is normal, Calcium is normal,   Your hemoglobin has dropped since last   Need to repeat this tomorrow to see if you are  dropping    Please contact us if you have any questions.    Perri Carias, CNP             Ref Range & Units 1d ago   5mo ago   7mo ago        WBC 4.0 - 11.0 10e9/L 10.2 11.2 (H) 10.3      RBC Count 3.8 - 5.2 10e12/L 4.36 4.68 4.69      Hemoglobin 11.7 - 15.7 g/dL 9.8 (L) 11.6 (L) 12.0      Hematocrit 35.0 - 47.0 % 33.9 (L) 38.8 39.1      MCV 78 - 100 fl 78 83 83      MCH 26.5 - 33.0 pg 22.5 (L) 24.8 (L) 25.6 (L)      MCHC 31.5 - 36.5 g/dL 28.9 (L) 29.9 (L) 30.7 (L)      RDW 10.0 - 15.0 % 18.1 (H) 17.3 (H) 17.2 (H)      Platelet Count 150 - 450 10e9/L 324 318 302     Resulting Agency  [facility]          Specimen Collected: 07/19/18  2:29 PM Last Resulted: 07/19/18  2:41 PM                      Charity Mcdonough RN

## 2018-07-20 NOTE — TELEPHONE ENCOUNTER
Patient informed of results below and Grace perez's note.  Patient very pleased she can go ahead with the surgery as planned.  She has a follow up appt with Dr. Medeiros next month.    She does not take any OTC pain meds.  No rectal bleeding or stomach issues.  She drinks occasionally, socially but not much.        Component      Latest Ref Rng & Units 7/19/2018 7/20/2018   Hemoglobin      11.7 - 15.7 g/dL 9.8 (L) 9.9 (L)       Rosaura Keating RN,   [facility]

## 2018-07-20 NOTE — TELEPHONE ENCOUNTER
"Patient called clinic to discuss results. Informed of result note written by Perri Carias NP. Patient is coming into lab this morning for the recheck of the hemoglobin as advised.    Patient indicates that she will be traveling today after the repeat labs so will need a phone call (NOT mychart) regarding results and instructions. Patient indicates that there are multiple surgeries scheduled after this one on Tuesday so it will \"ruin my life\" if she can't go through with surgery as planned. Patient indicates ok to leave a detailed message on home/cell number, but she will be waiting with her phone for a call.    Patient also concerned about glucose results and asking for Perri Carias NP's commentary on this portion of her labs. Patient was NOT fasting for labs drawn on 7/19/18.    Rajwinder Rosario CMA    "

## 2018-07-20 NOTE — PROGRESS NOTES
Please call  Radha Corbett,    Attached are your test results.  -Kidney function is normal (Cr, GFR), Sodium is normal, Potassium is normal, Calcium is normal,   Your hemoglobin has dropped since last   Need to repeat this tomorrow to see if you are dropping    Please contact us if you have any questions.    Perri Carias, CNP

## 2018-07-20 NOTE — TELEPHONE ENCOUNTER
Notes Recorded by Perri Carias, APRN CNP on 7/20/2018 at 10:33 AM  Please call   Hemoglobin is low but stable so we can to ahead with the procedure but then needs to make a follow up appointment with Dr Medeiros to work up her anemia   Has she been taking advil, motrin aleve?  Has she been drinking any alcohol  Any symptoms rectal bleeding? Stomach issues?     Perri Carias, BLAS, APRN CNP

## 2018-07-23 ENCOUNTER — ANESTHESIA EVENT (OUTPATIENT)
Dept: SURGERY | Facility: AMBULATORY SURGERY CENTER | Age: 68
End: 2018-07-23

## 2018-07-24 ENCOUNTER — RADIANT APPOINTMENT (OUTPATIENT)
Dept: RADIOLOGY | Facility: AMBULATORY SURGERY CENTER | Age: 68
End: 2018-07-24
Attending: UROLOGY

## 2018-07-24 ENCOUNTER — SURGERY (OUTPATIENT)
Age: 68
End: 2018-07-24

## 2018-07-24 ENCOUNTER — HOSPITAL ENCOUNTER (OUTPATIENT)
Facility: AMBULATORY SURGERY CENTER | Age: 68
End: 2018-07-24
Attending: UROLOGY
Payer: COMMERCIAL

## 2018-07-24 ENCOUNTER — ANESTHESIA (OUTPATIENT)
Dept: SURGERY | Facility: AMBULATORY SURGERY CENTER | Age: 68
End: 2018-07-24

## 2018-07-24 VITALS
BODY MASS INDEX: 36.57 KG/M2 | OXYGEN SATURATION: 94 % | DIASTOLIC BLOOD PRESSURE: 65 MMHG | RESPIRATION RATE: 16 BRPM | HEIGHT: 67 IN | SYSTOLIC BLOOD PRESSURE: 110 MMHG | HEART RATE: 108 BPM | WEIGHT: 233 LBS | TEMPERATURE: 97.6 F

## 2018-07-24 DIAGNOSIS — N39.41 URGE INCONTINENCE: ICD-10-CM

## 2018-07-24 DIAGNOSIS — N39.41 URGE INCONTINENCE: Primary | ICD-10-CM

## 2018-07-24 LAB — GLUCOSE BLDC GLUCOMTR-MCNC: 146 MG/DL (ref 70–99)

## 2018-07-24 DEVICE — LEAD INTERSTIM KIT 3889-28
Type: IMPLANTABLE DEVICE | Status: NON-FUNCTIONAL
Removed: 2019-08-06

## 2018-07-24 DEVICE — STIMULATOR NEURO INTER-STIM II 3058
Type: IMPLANTABLE DEVICE | Site: BUTTOCKS | Status: NON-FUNCTIONAL
Removed: 2019-08-06

## 2018-07-24 RX ORDER — PROPOFOL 10 MG/ML
INJECTION, EMULSION INTRAVENOUS CONTINUOUS PRN
Status: DISCONTINUED | OUTPATIENT
Start: 2018-07-24 | End: 2018-07-24

## 2018-07-24 RX ORDER — NICOTINE POLACRILEX 4 MG
15-30 LOZENGE BUCCAL
Status: DISCONTINUED | OUTPATIENT
Start: 2018-07-24 | End: 2018-07-25 | Stop reason: HOSPADM

## 2018-07-24 RX ORDER — NALOXONE HYDROCHLORIDE 0.4 MG/ML
.1-.4 INJECTION, SOLUTION INTRAMUSCULAR; INTRAVENOUS; SUBCUTANEOUS
Status: DISCONTINUED | OUTPATIENT
Start: 2018-07-24 | End: 2018-07-25 | Stop reason: HOSPADM

## 2018-07-24 RX ORDER — ONDANSETRON 4 MG/1
4 TABLET, ORALLY DISINTEGRATING ORAL EVERY 30 MIN PRN
Status: DISCONTINUED | OUTPATIENT
Start: 2018-07-24 | End: 2018-07-25 | Stop reason: HOSPADM

## 2018-07-24 RX ORDER — LIDOCAINE HYDROCHLORIDE 20 MG/ML
INJECTION, SOLUTION INFILTRATION; PERINEURAL PRN
Status: DISCONTINUED | OUTPATIENT
Start: 2018-07-24 | End: 2018-07-24

## 2018-07-24 RX ORDER — SODIUM CHLORIDE, SODIUM LACTATE, POTASSIUM CHLORIDE, CALCIUM CHLORIDE 600; 310; 30; 20 MG/100ML; MG/100ML; MG/100ML; MG/100ML
INJECTION, SOLUTION INTRAVENOUS CONTINUOUS
Status: DISCONTINUED | OUTPATIENT
Start: 2018-07-24 | End: 2018-07-25 | Stop reason: HOSPADM

## 2018-07-24 RX ORDER — LIDOCAINE 40 MG/G
CREAM TOPICAL
Status: DISCONTINUED | OUTPATIENT
Start: 2018-07-24 | End: 2018-07-24 | Stop reason: HOSPADM

## 2018-07-24 RX ORDER — FENTANYL CITRATE 50 UG/ML
25-50 INJECTION, SOLUTION INTRAMUSCULAR; INTRAVENOUS EVERY 5 MIN PRN
Status: DISCONTINUED | OUTPATIENT
Start: 2018-07-24 | End: 2018-07-25 | Stop reason: HOSPADM

## 2018-07-24 RX ORDER — IBUPROFEN 200 MG
600 TABLET ORAL ONCE
Status: DISCONTINUED | OUTPATIENT
Start: 2018-07-24 | End: 2018-07-25 | Stop reason: HOSPADM

## 2018-07-24 RX ORDER — FENTANYL CITRATE 50 UG/ML
INJECTION, SOLUTION INTRAMUSCULAR; INTRAVENOUS PRN
Status: DISCONTINUED | OUTPATIENT
Start: 2018-07-24 | End: 2018-07-24

## 2018-07-24 RX ORDER — BUPIVACAINE HYDROCHLORIDE 2.5 MG/ML
INJECTION, SOLUTION INFILTRATION; PERINEURAL PRN
Status: DISCONTINUED | OUTPATIENT
Start: 2018-07-24 | End: 2018-07-24 | Stop reason: HOSPADM

## 2018-07-24 RX ORDER — DEXTROSE MONOHYDRATE 25 G/50ML
25-50 INJECTION, SOLUTION INTRAVENOUS
Status: DISCONTINUED | OUTPATIENT
Start: 2018-07-24 | End: 2018-07-25 | Stop reason: HOSPADM

## 2018-07-24 RX ORDER — SODIUM CHLORIDE, SODIUM LACTATE, POTASSIUM CHLORIDE, CALCIUM CHLORIDE 600; 310; 30; 20 MG/100ML; MG/100ML; MG/100ML; MG/100ML
INJECTION, SOLUTION INTRAVENOUS CONTINUOUS
Status: DISCONTINUED | OUTPATIENT
Start: 2018-07-24 | End: 2018-07-24 | Stop reason: HOSPADM

## 2018-07-24 RX ORDER — ONDANSETRON 2 MG/ML
INJECTION INTRAMUSCULAR; INTRAVENOUS PRN
Status: DISCONTINUED | OUTPATIENT
Start: 2018-07-24 | End: 2018-07-24

## 2018-07-24 RX ORDER — DOCUSATE SODIUM 100 MG/1
100 CAPSULE, LIQUID FILLED ORAL 2 TIMES DAILY
Qty: 100 CAPSULE | Refills: 0 | Status: SHIPPED | OUTPATIENT
Start: 2018-07-24 | End: 2018-08-14

## 2018-07-24 RX ORDER — OXYCODONE HYDROCHLORIDE 5 MG/1
5-10 TABLET ORAL EVERY 6 HOURS PRN
Qty: 10 TABLET | Refills: 0 | Status: SHIPPED | OUTPATIENT
Start: 2018-07-24 | End: 2018-08-14

## 2018-07-24 RX ORDER — OXYCODONE HYDROCHLORIDE 5 MG/1
5 TABLET ORAL ONCE
Status: DISCONTINUED | OUTPATIENT
Start: 2018-07-24 | End: 2018-07-25 | Stop reason: HOSPADM

## 2018-07-24 RX ORDER — IBUPROFEN 600 MG/1
600 TABLET, FILM COATED ORAL EVERY 6 HOURS PRN
Qty: 30 TABLET | Refills: 0 | Status: SHIPPED | OUTPATIENT
Start: 2018-07-24 | End: 2018-08-14

## 2018-07-24 RX ORDER — ACETAMINOPHEN 325 MG/1
650 TABLET ORAL EVERY 4 HOURS PRN
Qty: 100 TABLET | Refills: 0 | Status: SHIPPED | OUTPATIENT
Start: 2018-07-24 | End: 2018-08-14

## 2018-07-24 RX ORDER — ONDANSETRON 2 MG/ML
4 INJECTION INTRAMUSCULAR; INTRAVENOUS EVERY 30 MIN PRN
Status: DISCONTINUED | OUTPATIENT
Start: 2018-07-24 | End: 2018-07-25 | Stop reason: HOSPADM

## 2018-07-24 RX ORDER — ACETAMINOPHEN 325 MG/1
650 TABLET ORAL ONCE
Status: DISCONTINUED | OUTPATIENT
Start: 2018-07-24 | End: 2018-07-25 | Stop reason: HOSPADM

## 2018-07-24 RX ADMIN — SODIUM CHLORIDE, SODIUM LACTATE, POTASSIUM CHLORIDE, CALCIUM CHLORIDE: 600; 310; 30; 20 INJECTION, SOLUTION INTRAVENOUS at 09:47

## 2018-07-24 RX ADMIN — ONDANSETRON 4 MG: 2 INJECTION INTRAMUSCULAR; INTRAVENOUS at 09:55

## 2018-07-24 RX ADMIN — FENTANYL CITRATE 25 MCG: 50 INJECTION, SOLUTION INTRAMUSCULAR; INTRAVENOUS at 10:46

## 2018-07-24 RX ADMIN — FENTANYL CITRATE 25 MCG: 50 INJECTION, SOLUTION INTRAMUSCULAR; INTRAVENOUS at 10:43

## 2018-07-24 RX ADMIN — PROPOFOL 200 MCG/KG/MIN: 10 INJECTION, EMULSION INTRAVENOUS at 09:55

## 2018-07-24 RX ADMIN — LIDOCAINE HYDROCHLORIDE 80 MG: 20 INJECTION, SOLUTION INFILTRATION; PERINEURAL at 09:55

## 2018-07-24 RX ADMIN — BUPIVACAINE HYDROCHLORIDE 14 ML: 2.5 INJECTION, SOLUTION INFILTRATION; PERINEURAL at 10:57

## 2018-07-24 NOTE — ANESTHESIA POSTPROCEDURE EVALUATION
Patient: Radha Corbett    Procedure(s):  Interstim Implant Stage One and Two (Implant Permanent Lead and Generator) - Wound Class: I-Clean    Diagnosis:Urge Incontinence and Urgency  Diagnosis Additional Information: No value filed.    Anesthesia Type:  MAC    Note:  Anesthesia Post Evaluation    Patient location during evaluation: Phase 2  Patient participation: Able to fully participate in evaluation  Level of consciousness: awake and alert  Pain management: adequate  Airway patency: patent  Cardiovascular status: acceptable and stable  Respiratory status: room air and acceptable  Hydration status: acceptable  PONV: none     Anesthetic complications: None    Comments: Glucose 146 postoperatively. Discussed taking nighttime meds as usual provided she is eating and drinking normally. She voiced understanding.         Last vitals:  Vitals:    07/24/18 1140 07/24/18 1141 07/24/18 1200   BP: 105/59  110/65   Pulse:      Resp: 16  16   Temp:   36.4  C (97.6  F)   SpO2: (!) 86% 93% 92%         Electronically Signed By: Ruby Joseph MD  July 24, 2018  12:46 PM

## 2018-07-24 NOTE — DISCHARGE INSTRUCTIONS
"[facility]  Home Care Following Anesthesia  For 24 hours after surgery:  1. Get plenty of rest.  A responsible adult must stay with you for at least 24 hours after you leave the surgery center.  2. Do not drive or use heavy equipment.  If you have weakness or tingling, don't drive or use heavy equipment until this feeling goes away.   3. Do not drink alcohol.   4. Avoid strenuous or risky activities.  Ask for help when climbing stairs.  5. You may feel lightheaded.  IF so, sit for a few minutes before standing.  Have someone help you get up.   6. If you have nausea (feel sick to your stomach): Drink only clear liquids such as apple juice, ginger ale, broth or 7-Up.  Rest may also help.  Be sure to drink enough fluids.  Move to a regular diet as you feel able.   7. You may have a slight fever.  Call the doctor if your fever is over 100 F (37.7 C) (taken under the tongue) or lasts longer than 24 hours.  8. You may have a dry mouth, a sore throat, muscle aches or trouble sleeping. These should go away after 24 hours.  9. Do not make important or legal decisions.        Today you received a Marcaine or bupivacaine block to numb the nerves near your surgery site.  This is a block using local anesthetic or \"numbing\" medication injected around the nerves to anesthetize or \"numb\" the area supplied by those nerves.  This block is injected into the muscle layer near your surgical site.  The medication may numb the location where you had surgery for 6-18 hours, but may last up to 24 hours.  If your surgical site is an arm or leg you should be careful with your affected limb, since it is possible to injure your limb without being aware of it due to the numbing.  Until full feeling returns, you should guard against bumping or hitting your limb, and avoid extreme hot or cold temperatures on the skin.  As the block wears off, the feeling will return as a tingling or prickly sensation near your " surgical site.  You will experience more discomfort from your incision as the feeling returns.  You may want to take a pain pill (a narcotic or Tylenol if this was prescribed by your surgeon) when you start to experience mild pain before the pain beccomes more severe.  If your pain medications do not control your pain you should notifiy your surgeon.    Tips for taking pain medications  To get the best pain relief possible, remember these points:    Take pain medications as directed, before pain becomes severe.    Pain medication can upset your stomach: taking it with food may help.    Constipation is a common side effect of pain medication. Drink plenty of  fluids.    Eat foods high in fiber. Take a stool softener if recommended by your doctor or pharmacist.    Do not drink alcohol, drive or operate machinery while taking pain medications.    Ask about other ways to control pain, such as with heat, ice or relaxation.    Tylenol/Acetaminophen Consumption  To help encourage the safe use of acetaminophen, the makers of TYLENOL  have lowered the maximum daily dose for single-ingredient Extra Strength TYLENOL  (acetaminophen) products sold in the [place] from 8 pills per day (4,000 mg) to 6 pills per day (3,000 mg). The dosing interval has also changed from 2 pills every 4-6 hours to 2 pills every 6 hours.    If you feel your pain relief is insufficient, you may take Tylenol/Acetaminophen in addition to your narcotic pain medication.     Be careful not to exceed 3,000 mg of Tylenol/Acetaminophen in a 24 hour period from all sources.    If you are taking extra strength Tylenol/acetaminophen (500 mg), the maximum dose is 6 tablets in 24 hours.    If you are taking regular strength acetaminophen (325 mg), the maximum dose is 9 tablets in 24 hours.    Call a doctor for any of the followin. Signs of infection (fever, growing tenderness at the surgery site, a large amount of drainage or bleeding, severe pain, foul-smelling  drainage, redness, swelling).  2. It has been over 8 to 10 hours since surgery and you are still not able to urinate (pass water).  3. Headache for over 24 hours.    Your doctor is:       Dr. Dada Baltazar, Prostate and Urology: [phone]               Or dial [phone] and ask for the resident on call for:  Prostate Urology  For emergency care, call the:  [facility]:  [phone] (TTY for hearing impaired: [phone])

## 2018-07-24 NOTE — IP AVS SNAPSHOT
MRN:[id number]                      After Visit Summary   7/24/2018    Radha Corbett    MRN: [id number]           Thank you!     Thank you for choosing [facility] for your care. Our goal is always to provide you with excellent care. Hearing back from our patients is one way we can continue to improve our services. Please take a few minutes to complete the written survey that you may receive in the mail after you visit with us. Thank you!        Patient Information     Date Of Birth          1950        About your hospital stay     You were admitted on:  July 24, 2018 [facility]    You were discharged on:  July 24, 2018       Who to Call     For medical emergencies, please call 911.  For non-urgent questions about your medical care, please call your primary care provider or clinic, [phone]  For questions related to your surgery, please call your surgery clinic        Attending Provider     Provider Specialty    Dada Baltazar MD Urology       Primary Care Provider Office Phone # Fax #    Trice Medeiros MD PhD [phone]      After Care Instructions     Diet Instructions       Resume pre procedure diet            Discharge Instructions       Patient to arrange for follow up appointment as previously scheduled            Dressing       Keep dressing clean and dry, change as instructed by Surgeon or RN            Encourage fluids       Encourage fluids at home to keep urine clear to light pink            Ice to affected area       Ice to operative site.  PRN as tolerated                  Your next 10 appointments already scheduled     Aug 06, 2018  1:45 PM CDT   [facility]    [address] [place] [id number] [phone]            Aug 23, 2018 11:00 AM CDT   LAB with LAB FIRST FLOOR [facility] (  "[facility])    [address] [place] [id number]   [phone]           Please do not eat 10-12 hours before your appointment if you are coming in fasting for labs on lipids, cholesterol, or glucose (sugar). This does not apply to pregnant women. Water, hot tea and black coffee (with nothing added) are okay. Do not drink other fluids, diet soda or chew gum.            Aug 23, 2018 11:30 AM CDT   Return Visit with Trice Medeiros MD PhD   [facility] ([facility])    [address] [place] [id number]   [phone]              Further instructions from your care team       [facility]  Home Care Following Anesthesia  For 24 hours after surgery:  1. Get plenty of rest.  A responsible adult must stay with you for at least 24 hours after you leave the surgery center.  2. Do not drive or use heavy equipment.  If you have weakness or tingling, don't drive or use heavy equipment until this feeling goes away.   3. Do not drink alcohol.   4. Avoid strenuous or risky activities.  Ask for help when climbing stairs.  5. You may feel lightheaded.  IF so, sit for a few minutes before standing.  Have someone help you get up.   6. If you have nausea (feel sick to your stomach): Drink only clear liquids such as apple juice, ginger ale, broth or 7-Up.  Rest may also help.  Be sure to drink enough fluids.  Move to a regular diet as you feel able.   7. You may have a slight fever.  Call the doctor if your fever is over 100 F (37.7 C) (taken under the tongue) or lasts longer than 24 hours.  8. You may have a dry mouth, a sore throat, muscle aches or trouble sleeping. These should go away after 24 hours.  9. Do not make important or legal decisions.        Today you received a Marcaine or bupivacaine block to numb the nerves near your surgery site.  This is a block using local anesthetic or \"numbing\" medication injected around " "the nerves to anesthetize or \"numb\" the area supplied by those nerves.  This block is injected into the muscle layer near your surgical site.  The medication may numb the location where you had surgery for 6-18 hours, but may last up to 24 hours.  If your surgical site is an arm or leg you should be careful with your affected limb, since it is possible to injure your limb without being aware of it due to the numbing.  Until full feeling returns, you should guard against bumping or hitting your limb, and avoid extreme hot or cold temperatures on the skin.  As the block wears off, the feeling will return as a tingling or prickly sensation near your surgical site.  You will experience more discomfort from your incision as the feeling returns.  You may want to take a pain pill (a narcotic or Tylenol if this was prescribed by your surgeon) when you start to experience mild pain before the pain beccomes more severe.  If your pain medications do not control your pain you should notifiy your surgeon.    Tips for taking pain medications  To get the best pain relief possible, remember these points:    Take pain medications as directed, before pain becomes severe.    Pain medication can upset your stomach: taking it with food may help.    Constipation is a common side effect of pain medication. Drink plenty of  fluids.    Eat foods high in fiber. Take a stool softener if recommended by your doctor or pharmacist.    Do not drink alcohol, drive or operate machinery while taking pain medications.    Ask about other ways to control pain, such as with heat, ice or relaxation.    Tylenol/Acetaminophen Consumption  To help encourage the safe use of acetaminophen, the makers of TYLENOL  have lowered the maximum daily dose for single-ingredient Extra Strength TYLENOL  (acetaminophen) products sold in the [place] from 8 pills per day (4,000 mg) to 6 pills per day (3,000 mg). The dosing interval has also changed from 2 pills every 4-6 hours " "to 2 pills every 6 hours.    If you feel your pain relief is insufficient, you may take Tylenol/Acetaminophen in addition to your narcotic pain medication.     Be careful not to exceed 3,000 mg of Tylenol/Acetaminophen in a 24 hour period from all sources.    If you are taking extra strength Tylenol/acetaminophen (500 mg), the maximum dose is 6 tablets in 24 hours.    If you are taking regular strength acetaminophen (325 mg), the maximum dose is 9 tablets in 24 hours.    Call a doctor for any of the followin. Signs of infection (fever, growing tenderness at the surgery site, a large amount of drainage or bleeding, severe pain, foul-smelling drainage, redness, swelling).  2. It has been over 8 to 10 hours since surgery and you are still not able to urinate (pass water).  3. Headache for over 24 hours.    Your doctor is:       Dr. Dada Baltazar, Prostate and Urology: [phone]               Or dial [phone] and ask for the resident on call for:  Prostate Urology  For emergency care, call the:  [facility]:  [phone] (TTY for hearing impaired: [phone])                Pending Results     Date and Time Order Name Status Description    2018 0915 XR SURGERY KALLIE FLUORO LESS THAN 5 MIN W STILLS In process             Admission Information     Date & Time Provider Department Dept. Phone    2018 Dada Baltazar MD [facility] [phone]      Your Vitals Were     Blood Pressure Pulse Temperature Respirations Height Weight    111/68 108 98.4  F (36.9  C) (Oral) 16 1.702 m (5' 7\") 105.7 kg (233 lb)    Last Period Pulse Oximetry BMI (Body Mass Index)             1991 (Approximate) 93% 36.49 kg/m2         [employer] Information     [employer] gives you secure access to your electronic health record. If you see a primary care provider, you can also send messages to your care team and make appointments. If you have questions, please call your primary " [facility] clinic.  If you do not have a primary care provider, please call [phone] and they will assist you.      [employer] is an electronic gateway that provides easy, online access to your medical records. With [employer], you can request a clinic appointment, read your test results, renew a prescription or communicate with your care team.     To access your existing account, please contact your [facility] or call [phone] for assistance.        Care EveryWhere ID     This is your Care EveryWhere ID. This could be used by other organizations to access your [facility] medical records  [id number]        Equal Access to Services     [facility] Hospital[facility] : Claudine Siu, evelyne tanner, william hagan, michael hallman . So [facility] [phone].    ATENCIÓN: Si habla español, tiene a arauz disposición servicios gratuitos de asistencia lingüística. Llame al [phone].    We comply with applicable federal civil rights laws and [place] laws. We do not discriminate on the basis of race, color, national origin, age, disability, sex, sexual orientation, or gender identity.               Review of your medicines      START taking        Dose / Directions    acetaminophen 325 MG tablet   Commonly known as:  TYLENOL   Used for:  Urge incontinence        Dose:  650 mg   Take 2 tablets (650 mg) by mouth every 4 hours as needed for other (mild pain)   Quantity:  100 tablet   Refills:  0       docusate sodium 100 MG capsule   Commonly known as:  COLACE   Used for:  Urge incontinence        Dose:  100 mg   Take 1 capsule (100 mg) by mouth 2 times daily   Quantity:  100 capsule   Refills:  0       ibuprofen 600 MG tablet   Commonly known as:  ADVIL/MOTRIN   Used for:  Urge incontinence        Dose:  600 mg   Take 1 tablet (600 mg) by mouth every 6 hours as needed for other (For mild pain and temperature greater than 102F)   Quantity:  30 tablet   Refills:  0     "   oxyCODONE IR 5 MG tablet   Commonly known as:  ROXICODONE   Used for:  Urge incontinence        Dose:  5-10 mg   Take 1-2 tablets (5-10 mg) by mouth every 6 hours as needed for other (Moderate to Severe Pain)   Quantity:  10 tablet   Refills:  0         CONTINUE these medicines which have NOT CHANGED        Dose / Directions    clotrimazole 10 MG venkat   Used for:  Thrush        Dose:  1 Venkat   Place 1 Venkat (10 mg) inside cheek 5 times daily   Quantity:  70 Venkat   Refills:  0       DULoxetine 30 MG EC capsule   Commonly known as:  CYMBALTA   Used for:  Depression with anxiety        Dose:  30 mg   Take 1 capsule (30 mg) by mouth 2 times daily   Quantity:  180 capsule   Refills:  1       gabapentin 300 MG capsule   Commonly known as:  NEURONTIN   Used for:  Chronic pain of both knees        Dose:  300 mg   Take 1 capsule (300 mg) by mouth 3 times daily   Quantity:  90 capsule   Refills:  0       glipiZIDE 10 MG tablet   Commonly known as:  GLUCOTROL   Used for:  Type 2 diabetes mellitus with hyperglycemia, with long-term current use of insulin (H)        Dose:  10 mg   Take 1 tablet (10 mg) by mouth daily (with dinner)   Quantity:  90 tablet   Refills:  0       insulin syringe-needle U-100 31G X 5/16\" 1 ML   Commonly known as:  RELION INSULIN SYRINGE   Used for:  Type 2 diabetes mellitus without complication (H)        Use 2 syringes daily or as directed.   Quantity:  200 each   Refills:  3       lisinopril 10 MG tablet   Commonly known as:  PRINIVIL/ZESTRIL   Used for:  Essential hypertension with goal blood pressure less than 140/90        Dose:  5 mg   Take 0.5 tablets (5 mg) by mouth daily   Quantity:  30 tablet   Refills:  3       MELATONIN PO        Dose:  10 mg   10 mg At Bedtime   Refills:  0       metFORMIN 500 MG 24 hr tablet   Commonly known as:  GLUCOPHAGE-XR   Used for:  Type 2 diabetes mellitus with hyperglycemia, with long-term current use of insulin (H)        Dose:  1000 mg   Take 2 tablets " (1,000 mg) by mouth 2 times daily (with meals)   Quantity:  360 tablet   Refills:  1       minocycline 100 MG capsule   Commonly known as:  MINOCIN/DYNACIN   Used for:  Acne vulgaris        Dose:  100 mg   Take 1 capsule (100 mg) by mouth every 12 hours   Quantity:  180 capsule   Refills:  2       NovoLIN N VIAL 100 UNIT/ML injection   Used for:  Uncontrolled type 2 diabetes mellitus with stage 3 chronic kidney disease, with long-term current use of insulin (H)   Generic drug:  insulin NPH        Inject 60 units Subcutaneous 2 times daily. (gets this from [facility] NPH at [facility])   Refills:  0       omeprazole 40 MG capsule   Commonly known as:  priLOSEC   Used for:  Gastroesophageal reflux disease without esophagitis        TAKE 1 CAPSULE DAILY 30 TO 60 MINUTES BEFORE A MEAL   Quantity:  90 capsule   Refills:  3       ONE TOUCH LANCETS        None Entered   Refills:  0       ONE TOUCH TEST STRIPS VI        None Entered   Refills:  0       order for DME   Used for:  Back pain, unspecified back location, unspecified back pain laterality, unspecified chronicity        Equipment being ordered: Walker with seat light weight   Quantity:  1 Device   Refills:  0       oxybutynin chloride 15 MG Tb24   Used for:  Urinary urgency, Urgency incontinence        Dose:  1 tablet   Take 1 tablet (15 mg) by mouth daily   Quantity:  90 tablet   Refills:  1       PREVNAR 13 Susp injection   Generic drug:  pneumococcal        Refills:  0       simvastatin 20 MG tablet   Commonly known as:  ZOCOR   Used for:  Hyperlipidemia LDL goal <100        Dose:  20 mg   Take 1 tablet (20 mg) by mouth At Bedtime   Quantity:  90 tablet   Refills:  3       spironolactone 100 MG tablet   Commonly known as:  ALDACTONE   Used for:  Essential hypertension with goal blood pressure less than 140/90        TAKE 1 TABLET EVERY MORNING   Quantity:  90 tablet   Refills:  3       traZODone 50 MG tablet   Commonly known as:  DESYREL   Used for:  Chronic pain of both  knees        Dose:  100 mg   Take 2 tablets (100 mg) by mouth nightly as needed for sleep   Quantity:  90 tablet   Refills:  2       vitamin D 1000 units capsule        1 CAPSULE DAILY   Refills:  0            Where to get your medicines      These medications were sent to [place], [place] - [address]  [address], [facility] [address]    Hours:  TRANSPLANT PHONE NUMBER [phone] Phone:  [phone]     acetaminophen 325 MG tablet    docusate sodium 100 MG capsule    ibuprofen 600 MG tablet         Some of these will need a paper prescription and others can be bought over the counter. Ask your nurse if you have questions.     Bring a paper prescription for each of these medications     oxyCODONE IR 5 MG tablet                Protect others around you: Learn how to safely use, store and throw away your medicines at www.disposemymeds.org.        Information about OPIOIDS     PRESCRIPTION OPIOIDS: WHAT YOU NEED TO KNOW   We gave you an opioid (narcotic) pain medicine. It is important to manage your pain, but opioids are not always the best choice. You should first try all the other options your care team gave you. Take this medicine for as short a time (and as few doses) as possible.     These medicines have risks:    DO NOT drive when on new or higher doses of pain medicine. These medicines can affect your alertness and reaction times, and you could be arrested for driving under the influence (DUI). If you need to use opioids long-term, talk to your care team about driving.    DO NOT operate heave machinery    DO NOT do any other dangerous activities while taking these medicines.     DO NOT drink any alcohol while taking these medicines.      If the opioid prescribed includes acetaminophen, DO NOT take with any other medicines that contain acetaminophen. Read all labels carefully. Look for the word  acetaminophen  or  Tylenol.  Ask your  pharmacist if you have questions or are unsure.    You can get addicted to pain medicines, especially if you have a history of addiction (chemical, alcohol or substance dependence). Talk to your care team about ways to reduce this risk.    Store your pills in a secure place, locked if possible. We will not replace any lost or stolen medicine. If you don t finish your medicine, please throw away (dispose) as directed by your pharmacist. The [place] Pollution [employer] Agency has more information about safe disposal: https://www.pca.[facility].[place].us/living-green/managing-unwanted-medications.     All opioids tend to cause constipation. Drink plenty of water and eat foods that have a lot of fiber, such as fruits, vegetables, prune juice, apple juice and high-fiber cereal. Take a laxative (Miralax, milk of magnesia, Colace, Senna) if you don t move your bowels at least every other day.              Medication List: This is a list of all your medications and when to take them. Check marks below indicate your daily home schedule. Keep this list as a reference.      Medications           Morning Afternoon Evening Bedtime As Needed    acetaminophen 325 MG tablet   Commonly known as:  TYLENOL   Take 2 tablets (650 mg) by mouth every 4 hours as needed for other (mild pain)                                clotrimazole 10 MG venkat   Place 1 Venkat (10 mg) inside cheek 5 times daily                                docusate sodium 100 MG capsule   Commonly known as:  COLACE   Take 1 capsule (100 mg) by mouth 2 times daily                                DULoxetine 30 MG EC capsule   Commonly known as:  CYMBALTA   Take 1 capsule (30 mg) by mouth 2 times daily                                gabapentin 300 MG capsule   Commonly known as:  NEURONTIN   Take 1 capsule (300 mg) by mouth 3 times daily                                glipiZIDE 10 MG tablet   Commonly known as:  GLUCOTROL   Take 1 tablet (10 mg) by mouth daily (with dinner)         "                        ibuprofen 600 MG tablet   Commonly known as:  ADVIL/MOTRIN   Take 1 tablet (600 mg) by mouth every 6 hours as needed for other (For mild pain and temperature greater than 102F)                                insulin syringe-needle U-100 31G X 5/16\" 1 ML   Commonly known as:  RELION INSULIN SYRINGE   Use 2 syringes daily or as directed.                                lisinopril 10 MG tablet   Commonly known as:  PRINIVIL/ZESTRIL   Take 0.5 tablets (5 mg) by mouth daily                                MELATONIN PO   10 mg At Bedtime                                metFORMIN 500 MG 24 hr tablet   Commonly known as:  GLUCOPHAGE-XR   Take 2 tablets (1,000 mg) by mouth 2 times daily (with meals)                                minocycline 100 MG capsule   Commonly known as:  MINOCIN/DYNACIN   Take 1 capsule (100 mg) by mouth every 12 hours                                NovoLIN N VIAL 100 UNIT/ML injection   Inject 60 units Subcutaneous 2 times daily. (gets this from OTC NPH at [facility])   Generic drug:  insulin NPH                                omeprazole 40 MG capsule   Commonly known as:  priLOSEC   TAKE 1 CAPSULE DAILY 30 TO 60 MINUTES BEFORE A MEAL                                ONE TOUCH LANCETS   None Entered                                ONE TOUCH TEST STRIPS VI   None Entered                                order for DME   Equipment being ordered: Walker with seat light weight                                oxybutynin chloride 15 MG Tb24   Take 1 tablet (15 mg) by mouth daily                                oxyCODONE IR 5 MG tablet   Commonly known as:  ROXICODONE   Take 1-2 tablets (5-10 mg) by mouth every 6 hours as needed for other (Moderate to Severe Pain)                                PREVNAR 13 Susp injection   Generic drug:  pneumococcal                                simvastatin 20 MG tablet   Commonly known as:  ZOCOR   Take 1 tablet (20 mg) by mouth At Bedtime                      "           spironolactone 100 MG tablet   Commonly known as:  ALDACTONE   TAKE 1 TABLET EVERY MORNING                                traZODone 50 MG tablet   Commonly known as:  DESYREL   Take 2 tablets (100 mg) by mouth nightly as needed for sleep                                vitamin D 1000 units capsule   1 CAPSULE DAILY

## 2018-07-24 NOTE — ANESTHESIA CARE TRANSFER NOTE
Patient: Radha Corbett    Procedure(s):  Interstim Implant Stage One and Two (Implant Permanent Lead and Generator) - Wound Class: I-Clean    Diagnosis: Urge Incontinence and Urgency  Diagnosis Additional Information: No value filed.    Anesthesia Type:   MAC     Note:  Airway :Nasal Cannula  Patient transferred to:Phase II  Comments: Uneventful transport to Phase II: VSS; IV patent; pt comfortable; Report given to RN; pt. Responds appropriately to commandsHandoff Report: Identifed the Patient, Identified the Reponsible Provider, Reviewed the pertinent medical history, Discussed the surgical course, Reviewed Intra-OP anesthesia mangement and issues during anesthesia, Set expectations for post-procedure period and Allowed opportunity for questions and acknowledgement of understanding      Vitals: (Last set prior to Anesthesia Care Transfer)    CRNA VITALS  7/24/2018 1038 - 7/24/2018 1112      7/24/2018             Pulse: 132    SpO2: 95 %    Resp Rate (set): 10                Electronically Signed By: NIKKY Lovelace CRNA  July 24, 2018  11:12 AM

## 2018-07-24 NOTE — OP NOTE
Urology Operative Summary    Date of Procedure: July 24, 2018    Pre-operative diagnosis: Urinary urgency and frequency and urge incontinence   Post-operative diagnosis: Same   Procedure: Stage I and II Interstim placement  Interpretation of fluoroscopic imaging     Surgeon: Dada Baltazar MD   Assistant(s): Susi Baker MD      Anesthesia: Local anesthesia with MAC sedation     Estimated blood loss: Less than 10 ml   Complications: None   Condition: Patient taken to recovery in stable condition.     Findings: Placed tined lead in the right S3 foramen with biplanar flouroscopic guidance. The interstim generator was placed in the right buttocks.     Indications: Radha Corbett is a 67 year old woman with urinary urgency and frequency and urge incontinence refractory to anti-cholinergics. She elected to proceed with a PNE which proved to be beneficial and she has therefore elected to proceed with Interstim placement understanding the risks for infection, pain, bleeding, and the need for future procedures and risks of anesthesia. She received IV antibiotics prior to the procedure initiation.    Procedure: The patient was identified correctly, consented and placed in the prone position. The patient's sacral area was prepped and draped in the usual sterile fashion. Using the fluoroscope in the AP position the medial aspects of the sacral foramena were identified and marked. The fluoroscope was placed in the lateral position and the S3 foramen was identified and marked. The films from the PNE were called up so as to evaluate for identical placement of the permanent electrode.    Marcaine with bicarb was injected at the insertion site to anesthetize the skin. Once this was achieved a 3 1/2 inch needle was inserted on the right side until it was passed through the S3 foramen as seen on fluoroscopy to match the previous placement. Next the needle was tested and the patient had a sensory and motor responses at about  0.2. At this point the stilette was removed from the insertion needle and passer was placed. Her incision was widened to allow the dilator through which our permanent electrode was passed until the appropriate position on fluoroscopy. The electrode was tested and found to have good motor and sensory response at low amplitude. The dilator was then removed under fluoroscopy ensuring our electrode was not displaced.   At this point a second incision approximately 4 cm in length was created at the right buttocks. We used marcaine with bicarb at the insertion site were injected to anesthetize the skin. Electrocautery was used to dissect down to the gluteal fascia.  Herein we created a small pouch that would accommodate our Interstim  generator. We then tunneled and connected the permanent electrode to the generator.  We then closed eduardo's with interrupted vicryl stitches, closed the deep dermis with a interrupted vicryl stitch and then reapproximated the skin with dermabond at both the generator and electrode incisions. A small island dressing was then placed over these sites.   The patient was awoken from anesthesia and returned to the supine position. All instrument and counts were correct at the end of the procedure.    Plan: Radha Corbett is a 67 year old woman who underwent stage I and II interstim placement today after having a successful PNE.  She will follow up for a wound check in clinic in two weeks.    Susi Baker MD    Patient was seen, evaluated and plan was formulated in conjunction with me and I agree with the above.  I was present for the entire procedure.  Dada Baltazar MD

## 2018-07-24 NOTE — ANESTHESIA PREPROCEDURE EVALUATION
Anesthesia Evaluation     . Pt has had prior anesthetic.            ROS/MED HX    ENT/Pulmonary:     (+)sleep apnea, , . .    Neurologic:       Cardiovascular:     (+) Dyslipidemia, hypertension----. : . . . :. .       METS/Exercise Tolerance:     Hematologic:         Musculoskeletal:         GI/Hepatic:     (+) GERD Asymptomatic on medication,       Renal/Genitourinary:         Endo: Comment: BMI 36.57    Does not check glucose at home. Does not do sliding scale insulin throughout the day. Took 80% long acting last night. None this morning. Glucose 208 this am. Novolog ordered.    (+) type II DM Using insulin Normal glucose range: 190 last lab check in clinic Obesity, .      Psychiatric:     (+) psychiatric history anxiety and depression      Infectious Disease:         Malignancy:         Other:                     Physical Exam  Normal systems: dental    Airway   Mallampati: II  TM distance: >3 FB  Neck ROM: full    Dental     Cardiovascular       Pulmonary                     Anesthesia Plan      History & Physical Review  History and physical reviewed and following examination; no interval change.    ASA Status:  3 .    NPO Status:  > 2 hours and > 8 hours    Plan for MAC Reason for MAC:  Deep or markedly invasive procedure (G8)         Postoperative Care      Consents  Anesthetic plan, risks, benefits and alternatives discussed with:  Patient..                          .

## 2018-07-24 NOTE — IP AVS SNAPSHOT
[facility]    [address] [place] [id number]    Phone:  [phone]    Fax:  [phone]                                       After Visit Summary   7/24/2018    Radha Corbett    MRN: [id number]           After Visit Summary Signature Page     I have received my discharge instructions, and my questions have been answered. I have discussed any challenges I see with this plan with the nurse or doctor.    ..........................................................................................................................................  Patient/Patient Representative Signature      ..........................................................................................................................................  Patient Representative Print Name and Relationship to Patient    ..................................................               ................................................  Date                                            Time    ..........................................................................................................................................  Reviewed by Signature/Title    ...................................................              ..............................................  Date                                                            Time

## 2018-07-25 LAB — GLUCOSE BLDC GLUCOMTR-MCNC: 204 MG/DL (ref 70–99)

## 2018-07-27 ENCOUNTER — CARE COORDINATION (OUTPATIENT)
Dept: UROLOGY | Facility: CLINIC | Age: 68
End: 2018-07-27

## 2018-07-30 ENCOUNTER — TELEPHONE (OUTPATIENT)
Dept: UROLOGY | Facility: CLINIC | Age: 68
End: 2018-07-30

## 2018-07-30 NOTE — TELEPHONE ENCOUNTER
[facility] Call Center    Phone Message    May a detailed message be left on voicemail: yes    Reason for Call: Other: Patient is concerned about post surgery outcome of procedure. Please call patient as soon as possible.      Action Taken: Message routed to:  [facility] ([facility] Uro

## 2018-07-30 NOTE — TELEPHONE ENCOUNTER
Patient called and interstim is not working she cannot feel it.  She states it worked great Tuesday thru Friday and then stopped she does not feel it at all and her symptoms are back.  I had her call the rep and spoke to kavya. Cristal Tubbs, VALERIE Staff Nurse

## 2018-08-06 ENCOUNTER — OFFICE VISIT (OUTPATIENT)
Dept: UROLOGY | Facility: CLINIC | Age: 68
End: 2018-08-06
Payer: COMMERCIAL

## 2018-08-06 VITALS — HEART RATE: 112 BPM | OXYGEN SATURATION: 94 % | SYSTOLIC BLOOD PRESSURE: 131 MMHG | DIASTOLIC BLOOD PRESSURE: 76 MMHG

## 2018-08-06 DIAGNOSIS — R39.15 URINARY URGENCY: Primary | ICD-10-CM

## 2018-08-06 DIAGNOSIS — N39.41 URGE INCONTINENCE OF URINE: ICD-10-CM

## 2018-08-06 PROCEDURE — 99024 POSTOP FOLLOW-UP VISIT: CPT | Performed by: UROLOGY

## 2018-08-06 ASSESSMENT — PAIN SCALES - GENERAL: PAINLEVEL: NO PAIN (0)

## 2018-08-06 NOTE — MR AVS SNAPSHOT
After Visit Summary   8/6/2018    Radha Corbett    MRN: [id number]           Patient Information     Date Of Birth          1950        Visit Information        Provider Department      8/6/2018 1:45 PM Dada Baltazar MD [facility] [place]        Today's Diagnoses     Urinary urgency    -  1    Urge incontinence of urine           Follow-ups after your visit        Follow-up notes from your care team     Return in about 2 weeks (around 8/20/2018) for telephone visit.      Your next 10 appointments already scheduled     Aug 20, 2018  3:15 PM CDT   Telephone Visit with Dada Baltazar MD   [facility])    [address] [place] [address] [phone]           Note: this is not an onsite visit; there is no need to come to the facility.            Aug 23, 2018 11:00 AM CDT   LAB with LAB FIRST FLOOR [facility])    [address] [place] [address] [phone]           Please do not eat 10-12 hours before your appointment if you are coming in fasting for labs on lipids, cholesterol, or glucose (sugar). This does not apply to pregnant women. Water, hot tea and black coffee (with nothing added) are okay. Do not drink other fluids, diet soda or chew gum.            Aug 23, 2018 11:30 AM CDT   Return Visit with Trice Medeiros MD PhD   [facility])    [address] [address] [address] [phone]              Who to contact     If you have questions or need follow up information about today's clinic visit or your schedule please contact [facility] directly at [phone].  Normal or non-critical lab and imaging results will be communicated to you by MyChart, letter or phone within 4 business days after the clinic has received the results. If you do not hear from us within 7 days,  please contact the clinic through [facility] or phone. If you have a critical or abnormal lab result, we will notify you by phone as soon as possible.  Submit refill requests through [facility] or call your pharmacy and they will forward the refill request to us. Please allow 3 business days for your refill to be completed.          Additional Information About Your Visit        [employer] to Phone Information     [facility] gives you secure access to your electronic health record. If you see a primary care provider, you can also send messages to your care team and make appointments. If you have questions, please call your primary care clinic.  If you do not have a primary care provider, please call [phone] and they will assist you.      [facility] is an electronic gateway that provides easy, online access to your medical records. With [facility], you can request a clinic appointment, read your test results, renew a prescription or communicate with your care team.     To access your existing account, please contact your [facility] or call [phone] for assistance.        Care EveryWhere ID     This is your Care EveryWhere ID. This could be used by other organizations to access your [facility] medical records  [id number]        Your Vitals Were     Pulse Last Period Pulse Oximetry             112 11/18/1991 (Approximate) 94%          Blood Pressure from Last 3 Encounters:   08/06/18 131/76   07/24/18 110/65   07/19/18 105/50    Weight from Last 3 Encounters:   07/24/18 105.7 kg (233 lb)   07/19/18 105.9 kg (233 lb 8 oz)   06/22/18 106.3 kg (234 lb 4.8 oz)              We Performed the Following     MEASURE POST-VOID RESIDUAL URINE/BLADDER CAPACITY, US NON-IMAGING ([id number])        Primary Care Provider Office Phone # Fax #    Trice Medeiros MD PhD [phone] [address]  [facility] [address]        Equal Access to Services     MAIA GARCIA : evelyne Gomez  flores william mikemichael shine ah. So [facility] [phone].    ATENCIÓN: Si barb witt, tiene a arauz disposición servicios gratuitos de asistencia lingüística. Nadja al [phone].    We comply with applicable federal civil rights laws and [place] laws. We do not discriminate on the basis of race, color, national origin, age, disability, sex, sexual orientation, or gender identity.            Thank you!     Thank you for choosing [facility]  for your care. Our goal is always to provide you with excellent care. Hearing back from our patients is one way we can continue to improve our services. Please take a few minutes to complete the written survey that you may receive in the mail after your visit with us. Thank you!             Your Updated Medication List - Protect others around you: Learn how to safely use, store and throw away your medicines at www.disposemymeds.org.          This list is accurate as of 8/6/18  2:36 PM.  Always use your most recent med list.                   Brand Name Dispense Instructions for use Diagnosis    acetaminophen 325 MG tablet    TYLENOL    100 tablet    Take 2 tablets (650 mg) by mouth every 4 hours as needed for other (mild pain)    Urge incontinence       clotrimazole 10 MG kelly     70 Kelly    Place 1 Kelly (10 mg) inside cheek 5 times daily    Thrush       docusate sodium 100 MG capsule    COLACE    100 capsule    Take 1 capsule (100 mg) by mouth 2 times daily    Urge incontinence       DULoxetine 30 MG EC capsule    CYMBALTA    180 capsule    Take 1 capsule (30 mg) by mouth 2 times daily    Depression with anxiety       gabapentin 300 MG capsule    NEURONTIN    90 capsule    Take 1 capsule (300 mg) by mouth 3 times daily    Chronic pain of both knees       glipiZIDE 10 MG tablet    GLUCOTROL    90 tablet    Take 1 tablet (10 mg) by mouth daily (with dinner)    Type 2 diabetes mellitus with hyperglycemia, with  "long-term current use of insulin (H)       ibuprofen 600 MG tablet    ADVIL/MOTRIN    30 tablet    Take 1 tablet (600 mg) by mouth every 6 hours as needed for other (For mild pain and temperature greater than 102F)    Urge incontinence       insulin syringe-needle U-100 31G X 5/16\" 1 ML    RELION INSULIN SYRINGE    200 each    Use 2 syringes daily or as directed.    Type 2 diabetes mellitus without complication (H)       lisinopril 10 MG tablet    PRINIVIL/ZESTRIL    30 tablet    Take 0.5 tablets (5 mg) by mouth daily    Essential hypertension with goal blood pressure less than 140/90       MELATONIN PO      10 mg At Bedtime        metFORMIN 500 MG 24 hr tablet    GLUCOPHAGE-XR    360 tablet    Take 2 tablets (1,000 mg) by mouth 2 times daily (with meals)    Type 2 diabetes mellitus with hyperglycemia, with long-term current use of insulin (H)       minocycline 100 MG capsule    MINOCIN/DYNACIN    180 capsule    Take 1 capsule (100 mg) by mouth every 12 hours    Acne vulgaris       NovoLIN N VIAL 100 UNIT/ML injection   Generic drug:  insulin NPH      Inject 60 units Subcutaneous 2 times daily. (gets this from [facility] at [facility])    Uncontrolled type 2 diabetes mellitus with stage 3 chronic kidney disease, with long-term current use of insulin (H)       omeprazole 40 MG capsule    priLOSEC    90 capsule    TAKE 1 CAPSULE DAILY 30 TO 60 MINUTES BEFORE A MEAL    Gastroesophageal reflux disease without esophagitis       ONE TOUCH LANCETS      None Entered        ONE TOUCH TEST STRIPS VI      None Entered        order for DME     1 Device    Equipment being ordered: Walker with seat light weight    Back pain, unspecified back location, unspecified back pain laterality, unspecified chronicity       oxybutynin chloride 15 MG Tb24     90 tablet    Take 1 tablet (15 mg) by mouth daily    Urinary urgency, Urgency incontinence       oxyCODONE IR 5 MG tablet    ROXICODONE    10 tablet    Take 1-2 tablets (5-10 mg) by mouth " every 6 hours as needed for other (Moderate to Severe Pain)    Urge incontinence       PREVNAR 13 Susp injection   Generic drug:  pneumococcal           simvastatin 20 MG tablet    ZOCOR    90 tablet    Take 1 tablet (20 mg) by mouth At Bedtime    Hyperlipidemia LDL goal <100       spironolactone 100 MG tablet    ALDACTONE    90 tablet    TAKE 1 TABLET EVERY MORNING    Essential hypertension with goal blood pressure less than 140/90       traZODone 50 MG tablet    DESYREL    90 tablet    Take 2 tablets (100 mg) by mouth nightly as needed for sleep    Chronic pain of both knees       vitamin D 1000 units capsule      1 CAPSULE DAILY

## 2018-08-06 NOTE — PROGRESS NOTES
Reason for Visit:  F/u on interstim implant.    Clinical Data:  Ms. Corbett is a 68 y/o female with urinary urgency and urge incontinence.  She was changed from Detrol to oxybutynin and also prescribed Myrbetriq but the latter  Was too expensive for her so she never started it.      She underwent an interstim trial on 6/19/18.  She was so pleased.  She was able to sleep for 6-7 hours without having to get up.  She felt like she had so much better control with it in place.  Especially on the right side.  The left side was a little less effective.  She therefore underwent permanent implant and returns for post op visit.  She has seen some improvement but not as good as the trial.  She feels it vaginally on program 1 at 1 but it gets a little uncomfortable.      It was switched to program 3 at 0.9     Incision c/d/i    Cystoscopy on 3/19/18 was normal except for 2+ trabeculation and a diverticulum at the dome of the bladder.    UDS on 5/24/18:  -Multiple uninhibited detrusor contractions starting at bladder volumes >100 mL with Pdet pressures ranging from  cm H2O. She never leaks with any of these episodes of DO.  -Otherwise good bladder compliance between UDC's.  -Small/normal bladder capacity (293 mL)  -Normal bladder filling sensations.  -No reproducible FRANCHESKA or DOI.  -Strong detrusor contraction during voiding to 81.5 cm H2O with slightly slow flow (Qmax 14.7 ml/s) and some incomplete bladder emptying ( mL).  -No evidence for DSD.  -No evidence for outlet obstruction.  -Fluoroscopy reveals a mildly trabeculated bladder wall without diverticuli or VUR. The bladder neck was closed during filling, open during episodes of DO with contrast stopping at the level of the pelvic floor vs. external urinary sphincter, and open during voiding.     A/P:  68 y/o female with urinary urgency and urge incontinence doing well after interstim implant but could use more improvement in her symptoms.  -continue with implant at  new settings  -f/u in 2 weeks for reassess.    Thank you for allowing me to participate in the care of  Ms. Radha DENIS Corbett and I will keep you updated on her progress.    Dada Baltazar MD

## 2018-08-14 ENCOUNTER — OFFICE VISIT (OUTPATIENT)
Dept: FAMILY MEDICINE | Facility: CLINIC | Age: 68
End: 2018-08-14
Payer: COMMERCIAL

## 2018-08-14 VITALS
HEART RATE: 107 BPM | RESPIRATION RATE: 20 BRPM | BODY MASS INDEX: 35.63 KG/M2 | SYSTOLIC BLOOD PRESSURE: 113 MMHG | OXYGEN SATURATION: 96 % | DIASTOLIC BLOOD PRESSURE: 71 MMHG | HEIGHT: 67 IN | WEIGHT: 227 LBS | TEMPERATURE: 98.7 F

## 2018-08-14 DIAGNOSIS — E11.65 TYPE 2 DIABETES MELLITUS WITH HYPERGLYCEMIA, WITH LONG-TERM CURRENT USE OF INSULIN (H): ICD-10-CM

## 2018-08-14 DIAGNOSIS — Z01.818 PREOP GENERAL PHYSICAL EXAM: Primary | ICD-10-CM

## 2018-08-14 DIAGNOSIS — Z79.4 TYPE 2 DIABETES MELLITUS WITH HYPERGLYCEMIA, WITH LONG-TERM CURRENT USE OF INSULIN (H): ICD-10-CM

## 2018-08-14 DIAGNOSIS — F33.1 MODERATE EPISODE OF RECURRENT MAJOR DEPRESSIVE DISORDER (H): ICD-10-CM

## 2018-08-14 DIAGNOSIS — B37.0 THRUSH: ICD-10-CM

## 2018-08-14 DIAGNOSIS — M54.9 BACK PAIN, UNSPECIFIED BACK LOCATION, UNSPECIFIED BACK PAIN LATERALITY, UNSPECIFIED CHRONICITY: ICD-10-CM

## 2018-08-14 DIAGNOSIS — E66.01 MORBID OBESITY (H): ICD-10-CM

## 2018-08-14 LAB
ALBUMIN SERPL-MCNC: 3.7 G/DL (ref 3.4–5)
ALP SERPL-CCNC: 101 U/L (ref 40–150)
ALT SERPL W P-5'-P-CCNC: 22 U/L (ref 0–50)
ANION GAP SERPL CALCULATED.3IONS-SCNC: 9 MMOL/L (ref 3–14)
AST SERPL W P-5'-P-CCNC: 15 U/L (ref 0–45)
BASOPHILS # BLD AUTO: 0 10E9/L (ref 0–0.2)
BASOPHILS NFR BLD AUTO: 0.3 %
BILIRUB SERPL-MCNC: 0.3 MG/DL (ref 0.2–1.3)
BUN SERPL-MCNC: 12 MG/DL (ref 7–30)
CALCIUM SERPL-MCNC: 9.3 MG/DL (ref 8.5–10.1)
CHLORIDE SERPL-SCNC: 102 MMOL/L (ref 94–109)
CO2 SERPL-SCNC: 29 MMOL/L (ref 20–32)
CREAT SERPL-MCNC: 0.56 MG/DL (ref 0.52–1.04)
DIFFERENTIAL METHOD BLD: ABNORMAL
EOSINOPHIL # BLD AUTO: 0.2 10E9/L (ref 0–0.7)
EOSINOPHIL NFR BLD AUTO: 1.6 %
ERYTHROCYTE [DISTWIDTH] IN BLOOD BY AUTOMATED COUNT: 19 % (ref 10–15)
GFR SERPL CREATININE-BSD FRML MDRD: >90 ML/MIN/1.7M2
GLUCOSE SERPL-MCNC: 128 MG/DL (ref 70–99)
HBA1C MFR BLD: 8.1 % (ref 0–5.6)
HCT VFR BLD AUTO: 33.6 % (ref 35–47)
HGB BLD-MCNC: 10.1 G/DL (ref 11.7–15.7)
LYMPHOCYTES # BLD AUTO: 2.3 10E9/L (ref 0.8–5.3)
LYMPHOCYTES NFR BLD AUTO: 20.1 %
MCH RBC QN AUTO: 22.7 PG (ref 26.5–33)
MCHC RBC AUTO-ENTMCNC: 30.1 G/DL (ref 31.5–36.5)
MCV RBC AUTO: 76 FL (ref 78–100)
MONOCYTES # BLD AUTO: 0.8 10E9/L (ref 0–1.3)
MONOCYTES NFR BLD AUTO: 7.5 %
NEUTROPHILS # BLD AUTO: 7.9 10E9/L (ref 1.6–8.3)
NEUTROPHILS NFR BLD AUTO: 70.5 %
PLATELET # BLD AUTO: 347 10E9/L (ref 150–450)
POTASSIUM SERPL-SCNC: 4.2 MMOL/L (ref 3.4–5.3)
PROT SERPL-MCNC: 7.8 G/DL (ref 6.8–8.8)
RBC # BLD AUTO: 4.45 10E12/L (ref 3.8–5.2)
SODIUM SERPL-SCNC: 140 MMOL/L (ref 133–144)
WBC # BLD AUTO: 11.2 10E9/L (ref 4–11)

## 2018-08-14 PROCEDURE — 36415 COLL VENOUS BLD VENIPUNCTURE: CPT | Performed by: PHYSICIAN ASSISTANT

## 2018-08-14 PROCEDURE — 85025 COMPLETE CBC W/AUTO DIFF WBC: CPT | Performed by: PHYSICIAN ASSISTANT

## 2018-08-14 PROCEDURE — 80053 COMPREHEN METABOLIC PANEL: CPT | Performed by: PHYSICIAN ASSISTANT

## 2018-08-14 PROCEDURE — 83036 HEMOGLOBIN GLYCOSYLATED A1C: CPT | Performed by: PHYSICIAN ASSISTANT

## 2018-08-14 PROCEDURE — 93000 ELECTROCARDIOGRAM COMPLETE: CPT | Performed by: PHYSICIAN ASSISTANT

## 2018-08-14 PROCEDURE — 99215 OFFICE O/P EST HI 40 MIN: CPT | Performed by: PHYSICIAN ASSISTANT

## 2018-08-14 RX ORDER — CLOTRIMAZOLE 10 MG/1
1 LOZENGE ORAL
Qty: 70 TROCHE | Refills: 0 | Status: SHIPPED | OUTPATIENT
Start: 2018-08-14 | End: 2018-11-16

## 2018-08-14 ASSESSMENT — PAIN SCALES - GENERAL: PAINLEVEL: NO PAIN (0)

## 2018-08-14 NOTE — MR AVS SNAPSHOT
After Visit Summary   8/14/2018    Radha Corbett    MRN: [id number]           Patient Information     Date Of Birth          1950        Visit Information        Provider Department      8/14/2018 11:40 AM Manasa Lopez PA-C [facility]        Today's Diagnoses     Preop general physical exam    -  1    Type 2 diabetes mellitus with hyperglycemia, with long-term current use of insulin (H)        Thrush          Care Instructions    Do not take lisinopril 24 hours before your surgery.   Take 80% insulin night before surgery.  Hold your oral diabetes medications while NPO (nothing by mouth) .   Keep appointment as scheduled with Dr. Medeiros     At [facility], we strive to deliver an exceptional experience to you, every time we see you.  If you receive a survey in the mail, please send us back your thoughts. We really do value your feedback.    Suggested websites for health information:  Www."[employer] : Up to date and easily searchable information on multiple topics.  Www.[employer].gov : medication info, interactive tutorials, watch real surgeries online  Www.familydoctor.org : good info from the Academy of Family Physicians  Www.cdc.gov : public health info, travel advisories, epidemics (H1N1)  Www.aap.org : children's health info, normal development, vaccinations  Www.health.[facility].[place].us : [place] dept of health, public health issues in [place], N1N1    Your care team:     Family Medicine   SERAFIN Mckeon MD Emily Bunt, APRN CNP   S. MD Daisha Patricia MD Angela Wermerskirchen, MD         Clinic hours: Monday - Wednesday 7 am-7 pm   Thursdays and Fridays 7 am-5 pm.     [facility]: Monday - Friday 11 am-9 pm,   Saturday and Sunday 9 am-5 pm.    [facility]: Monday -Thursday 8 am-8 pm; Friday 8 am-6 pm; Saturday and Sunday 9 am-5 pm.     [facility]: Monday - Thursday 8 am - 7 pm;  Friday 8 am - 6 pm    Clinic: [phone]   [facility]: [phone]   [facility]: [phone]            Before Your Surgery      Call your surgeon if there is any change in your health. This includes signs of a cold or flu (such as a sore throat, runny nose, cough, rash or fever).    Do not smoke, drink alcohol or take over the counter medicine (unless your surgeon or primary care doctor tells you to) for the 24 hours before and after surgery.    If you take prescribed drugs: Follow your doctor s orders about which medicines to take and which to stop until after surgery.    Eating and drinking prior to surgery: follow the instructions from your surgeon    Take a shower or bath the night before surgery. Use the soap your surgeon gave you to gently clean your skin. If you do not have soap from your surgeon, use your regular soap. Do not shave or scrub the surgery site.  Wear clean pajamas and have clean sheets on your bed.           Follow-ups after your visit        Your next 10 appointments already scheduled     Aug 20, 2018  3:15 PM CDT   Telephone Visit with Dada Baltazar MD   [facility])    [address] [place] [id number] [phone]           Note: this is not an onsite visit; there is no need to come to the facility.            Aug 23, 2018 11:00 AM CDT   LAB with LAB FIRST FLOOR [facility])    [address] [place] [id number] [phone]           Please do not eat 10-12 hours before your appointment if you are coming in fasting for labs on lipids, cholesterol, or glucose (sugar). This does not apply to pregnant women. Water, hot tea and black coffee (with nothing added) are okay. Do not drink other fluids, diet soda or chew gum.            Aug 23, 2018 11:30 AM CDT   Return Visit with Trice Medeiros MD PhD   [facility]  "[facility])    [address] [place] [address] [phone]              Who to contact     If you have questions or need follow up information about today's clinic visit or your schedule please contact [facility] directly at [phone].  Normal or non-critical lab and imaging results will be communicated to you by MyChart, letter or phone within 4 business days after the clinic has received the results. If you do not hear from us within 7 days, please contact the clinic through [employer] or phone. If you have a critical or abnormal lab result, we will notify you by phone as soon as possible.  Submit refill requests through [employer] or call your pharmacy and they will forward the refill request to us. Please allow 3 business days for your refill to be completed.          Additional Information About Your Visit        MyChart Information     [employer] gives you secure access to your electronic health record. If you see a primary care provider, you can also send messages to your care team and make appointments. If you have questions, please call your primary care clinic.  If you do not have a primary care provider, please call [phone] and they will assist you.        Care EveryWhere ID     This is your Care EveryWhere ID. This could be used by other organizations to access your [facility] medical records  [id number]        Your Vitals Were     Pulse Temperature Respirations Height Last Period Pulse Oximetry    107 98.7  F (37.1  C) (Oral) 20 1.702 m (5' 7\") 11/18/1991 (Exact Date) 96%    Breastfeeding? BMI (Body Mass Index)                No 35.55 kg/m2           Blood Pressure from Last 3 Encounters:   08/14/18 113/71   08/06/18 131/76   07/24/18 110/65    Weight from Last 3 Encounters:   08/14/18 103 kg (227 lb)   07/24/18 105.7 kg (233 lb)   07/19/18 105.9 kg (233 lb 8 oz)              We Performed the Following     CBC with platelets differential  "    Comprehensive metabolic panel     EKG 12-lead complete w/read - Clinics     Hemoglobin A1c     JUST IN CASE          Where to get your medicines      These medications were sent to [facility] - [place], [place] - [address] [place] [address]  [address] [place] [address]     Phone:  [phone]     clotrimazole 10 MG kelly          Primary Care Provider Office Phone # Fax #    Trice Medeiros MD PhD [phone] [address]  [facility] [address]        Equal Access to Services     [facility]: Hadii aad ku hadasho Soomaali, waaxda luqadaha, qaybta kaalmada adeegyada, waxay sheridanin haywiln shekhar hallman . So [facility] [phone].    ATENCIÓN: Si habla español, tiene a arauz disposición servicios gratuitos de asistencia lingüística. [facility] [phone].    We comply with applicable federal civil rights laws and [place] laws. We do not discriminate on the basis of race, color, national origin, age, disability, sex, sexual orientation, or gender identity.            Thank you!     Thank you for choosing [facility]  for your care. Our goal is always to provide you with excellent care. Hearing back from our patients is one way we can continue to improve our services. Please take a few minutes to complete the written survey that you may receive in the mail after your visit with us. Thank you!             Your Updated Medication List - Protect others around you: Learn how to safely use, store and throw away your medicines at www.disposemymeds.org.          This list is accurate as of 8/14/18 12:35 PM.  Always use your most recent med list.                   Brand Name Dispense Instructions for use Diagnosis    clotrimazole 10 MG kelly     70 Kelly    Place 1 Kelly (10 mg) inside cheek 5 times daily    Thrush       DULoxetine 30 MG EC capsule    CYMBALTA    180 capsule    Take 1 capsule (30 mg) by mouth 2 times daily    Depression with anxiety        "gabapentin 300 MG capsule    NEURONTIN    90 capsule    Take 1 capsule (300 mg) by mouth 3 times daily    Chronic pain of both knees       glipiZIDE 10 MG tablet    GLUCOTROL    90 tablet    Take 1 tablet (10 mg) by mouth daily (with dinner)    Type 2 diabetes mellitus with hyperglycemia, with long-term current use of insulin (H)       insulin syringe-needle U-100 31G X 5/16\" 1 ML    RELION INSULIN SYRINGE    200 each    Use 2 syringes daily or as directed.    Type 2 diabetes mellitus without complication (H)       lisinopril 10 MG tablet    PRINIVIL/ZESTRIL    30 tablet    Take 0.5 tablets (5 mg) by mouth daily    Essential hypertension with goal blood pressure less than 140/90       MELATONIN PO      10 mg At Bedtime        metFORMIN 500 MG 24 hr tablet    GLUCOPHAGE-XR    360 tablet    Take 2 tablets (1,000 mg) by mouth 2 times daily (with meals)    Type 2 diabetes mellitus with hyperglycemia, with long-term current use of insulin (H)       minocycline 100 MG capsule    MINOCIN/DYNACIN    180 capsule    Take 1 capsule (100 mg) by mouth every 12 hours    Acne vulgaris       NovoLIN N VIAL 100 UNIT/ML injection   Generic drug:  insulin NPH      Inject 60 units Subcutaneous 2 times daily. (gets this from [facility] NPH at [facility])    Uncontrolled type 2 diabetes mellitus with stage 3 chronic kidney disease, with long-term current use of insulin (H)       omeprazole 40 MG capsule    priLOSEC    90 capsule    TAKE 1 CAPSULE DAILY 30 TO 60 MINUTES BEFORE A MEAL    Gastroesophageal reflux disease without esophagitis       ONE TOUCH LANCETS      None Entered        ONE TOUCH TEST STRIPS VI      None Entered        order for DME     1 Device    Equipment being ordered: Walker with seat light weight    Back pain, unspecified back location, unspecified back pain laterality, unspecified chronicity       oxybutynin chloride 15 MG Tb24     90 tablet    Take 1 tablet (15 mg) by mouth daily    Urinary urgency, Urgency incontinence    "    simvastatin 20 MG tablet    ZOCOR    90 tablet    Take 1 tablet (20 mg) by mouth At Bedtime    Hyperlipidemia LDL goal <100       spironolactone 100 MG tablet    ALDACTONE    90 tablet    TAKE 1 TABLET EVERY MORNING    Essential hypertension with goal blood pressure less than 140/90       traZODone 50 MG tablet    DESYREL    90 tablet    Take 2 tablets (100 mg) by mouth nightly as needed for sleep    Chronic pain of both knees       vitamin D 1000 units capsule      1 CAPSULE DAILY

## 2018-08-14 NOTE — PATIENT INSTRUCTIONS
Do not take lisinopril 24 hours before your surgery.   Take 80% insulin night before surgery.  Hold your oral diabetes medications while NPO (nothing by mouth) .   Keep appointment as scheduled with Dr. Medeiros     At [facility], we strive to deliver an exceptional experience to you, every time we see you.  If you receive a survey in the mail, please send us back your thoughts. We really do value your feedback.    Suggested websites for health information:  Www.[employer].org : Up to date and easily searchable information on multiple topics.  Www.medlineplus.gov : medication info, interactive tutorials, watch real surgeries online  Www.familydoctor.org : good info from the Academy of Family Physicians  Www.cdc.gov : public health info, travel advisories, epidemics (H1N1)  Www.aap.org : children's health info, normal development, vaccinations  Www.health.state.[place].us : [place] dept of health, public health issues in [place], N1N1    Your care team:     Family Medicine   SERAFIN Mckeon MD Emily Bunt, APRN [facility]   S. MD Daisha Patricia MD Angela Wermerskirchen, MD         Clinic hours: Monday - Wednesday 7 am-7 pm   Thursdays and Fridays 7 am-5 pm.     [facility]: Monday - Friday 11 am-9 pm,   Saturday and Sunday 9 am-5 pm.    [facility]: Monday -Thursday 8 am-8 pm; Friday 8 am-6 pm; Saturday and Sunday 9 am-5 pm.     [facility]: Monday - Thursday 8 am - 7 pm; Friday 8 am - 6 pm    Clinic: [phone]   [facility]: [phone]   [facility]: [phone]            Before Your Surgery      Call your surgeon if there is any change in your health. This includes signs of a cold or flu (such as a sore throat, runny nose, cough, rash or fever).    Do not smoke, drink alcohol or take over the counter medicine (unless your surgeon or primary care doctor tells you to) for the 24 hours before  and after surgery.    If you take prescribed drugs: Follow your doctor s orders about which medicines to take and which to stop until after surgery.    Eating and drinking prior to surgery: follow the instructions from your surgeon    Take a shower or bath the night before surgery. Use the soap your surgeon gave you to gently clean your skin. If you do not have soap from your surgeon, use your regular soap. Do not shave or scrub the surgery site.  Wear clean pajamas and have clean sheets on your bed.

## 2018-08-14 NOTE — PROGRESS NOTES
[address] [address]  [phone]  Dept: [phone]    PRE-OP EVALUATION:  Today's date: 2018    Radha Corbett (: 1950) presents for pre-operative evaluation assessment as requested by Dr. Ansari.  She requires evaluation and anesthesia risk assessment prior to undergoing surgery/procedure for treatment of back .    Proposed Surgery/ Procedure: Endoscopic Nerve Resection  Date of Surgery/ Procedure: 18  Time of Surgery/ Procedure: 10:00am  Hospital/Surgical Facility: [facility]  Fax number for surgical facility: [phone] and [phone]  Primary Physician: Trice Medeiros  Type of Anesthesia Anticipated: General    Patient has a Health Care Directive or Living Will:  NO    1. NO - Do you have a history of heart attack, stroke, stent, bypass or surgery on an artery in the head, neck, heart or legs?  2. NO - Do you ever have any pain or discomfort in your chest?  3. NO - Do you have a history of  Heart Failure?  4. YES - Are you troubled by shortness of breath when: walking on the level, up a slight hill or at night?  Shortness of breath with strenous activity    5. NO - Do you currently have a cold, bronchitis or other respiratory infection?  6. YES - Do you have a cough, shortness of breath or wheezing? Cough when go to bed and reviewed with PCP and diagnosed with acid reflux   7. NO - Do you sometimes get pains in the calves of your legs when you walk?  8. NO - Do you or anyone in your family have previous history of blood clots?  9. NO - Do you or does anyone in your family have a serious bleeding problem such as prolonged bleeding following surgeries or cuts?  10. NO - Have you ever had problems with anemia or been told to take iron pills?  11. YES - Have you had any abnormal blood loss such as black, tarry or bloody stools, or abnormal vaginal bleeding?  Had once last year and ended up in hospital with diverticulitis.   Had colonoscopy and fine  12. NO - Have you ever had a blood transfusion?  13. NO - Have you or any of your relatives ever had problems with anesthesia?  14. YES - Do you have sleep apnea, excessive snoring or daytime drowsiness?  Reports may have sleep apnea - has referral to sleep center but has not done yet  15. NO - Do you have any prosthetic heart valves?  16. NO - Do you have prosthetic joints?  17. NO - Is there any chance that you may be pregnant?      HPI:     HPI related to upcoming procedure: back pain for several years. No radicular symptoms.   Feels like two hot knives right above buttocks   Fractured back 2 yrs ago- unsure of levels involved  Will be having endoscopic nerve resection    Drink 3-4 times week.      History of GI bleed July 2017.  Had colonoscopy and post procedure hemoglobin 9-10.  No symptoms of active bleeding.  May proceed with surgery and outpatient workup for GI bleed.     See problem list for active medical problems.  Problems all longstanding and stable, except as noted/documented.  See ROS for pertinent symptoms related to these conditions.                                                                                                                                                          .  DEPRESSION - Patient has a long history of Depression of moderate severity requiring medication for control with recent symptoms being stable..Current symptoms of depression include none.                                                                                                                                                                                    .  DIABETES - Patient has a longstanding history of DiabetesType Type II . Patient is being treated with oral agents and insulin injections and denies significant side effects. Control has been fair. Complicating factors include but are not limited to: hypertension and hyperlipidemia.                                                                                                                             .  HYPERLIPIDEMIA - Patient has a long history of significant Hyperlipidemia requiring medication for treatment with recent good control. Patient reports no problems or side effects with the medication.                                                                                                                                                       .  HYPERTENSION - Patient has longstanding history of HTN , currently denies any symptoms referable to elevated blood pressure. Specifically denies chest pain, palpitations, dyspnea, orthopnea, PND or peripheral edema. Blood pressure readings have been in normal range. Current medication regimen is as listed below. Patient denies any side effects of medication.                                                                                                                                                                                          .    MEDICAL HISTORY:     Patient Active Problem List    Diagnosis Date Noted     Type 2 diabetes, HbA1C goal < 8% (H) 10/31/2010     Priority: High     Morbid obesity (H) 06/22/2018     Priority: Medium     Urge incontinence 06/11/2018     Priority: Medium     Acute lower GI bleeding 07/31/2017     Priority: Medium     Back pain, unspecified back location, unspecified back pain laterality, unspecified chronicity 02/23/2017     Priority: Medium     Dislocation of shoulder region 07/23/2016     Priority: Medium     Unexplained endometrial cells on cervical cytology 12/08/2015     Priority: Medium     12/2/15 pap NIL/neg HPV with endometrial cells.   12/4/15 pelvic ultrasound completed.  12/17/15 EMB--benign. Plan: ECC  01/19/16 ECC--benign. Plan: co-test 5 years from previous, due 12/02/20.          Rectocele 12/02/2015     Priority: Medium     Elevated liver function tests 06/23/2015     Priority: Medium     Alcohol related.        Retinal detachment  03/16/2015     Priority: Medium     Depression with anxiety 10/04/2014     Priority: Medium     Abnormal cervical Pap smear with positive HPV DNA test 10/01/2014     Priority: Medium     Dx 2001. Normal since.        Pseudophakia 08/22/2013     Priority: Medium     Rosacea 03/05/2013     Priority: Medium     Alcohol ingestion, more than 4 drinks/day on alcohol screening 07/21/2012     Priority: Medium     4 vodka at night before dinner --> recommend changing to 1-2 wine instead.       Umbilical hernia 07/21/2012     Priority: Medium     Incontinence of urine 07/21/2012     Priority: Medium     Stool incontinence 07/21/2012     Priority: Medium     Tubular adenoma of colon 12/31/2010     Priority: Medium     Overview:   Overview:   Last colonoscopy 2008, due in 2011  Tubular adenoma  Next colonoscopy due in 2015  Last colonoscopy 2008, due in 2011  Tubular adenoma  Next colonoscopy due in 2015         Breast cancer screening-high risk 12/22/2010     Priority: Medium     Mammogram every 6 months   On tamoxifen       Hyperlipidemia LDL goal <100 10/31/2010     Priority: Medium     Atypical ductal hyperplasia of breast 08/24/2010     Priority: Medium     Atypical lobular hyperplasia of breast 08/24/2010     Priority: Medium     Benign Breast Disease (PASH) 08/24/2010     Priority: Medium     Family history of breast cancer in sister 08/24/2010     Priority: Medium     Hypertension goal BP (blood pressure) < 140/90      Priority: Medium     Anxiety 07/29/2013     Priority: Low     Osteoarthritis, knee 03/05/2013     Priority: Low     Osteopenia 12/31/2010     Priority: Low     dexa 2008. Repeat in 2010  (Problem list name updated by automated process. Provider to review and confirm.)       Macular degeneration      Priority: Low     Myopia      Priority: Low     Hiatal hernia      Priority: Low     IBS (irritable bowel syndrome)      Priority: Low     GERD (gastroesophageal reflux disease)      Priority: Low      Past  "Medical History:   Diagnosis Date     Arthritis      DM (diabetes mellitus) (H)      GERD (gastroesophageal reflux disease)      Hiatal hernia      HPV in female      HTN (hypertension)      IBS (irritable bowel syndrome)      Major depression      Myopia      Other and unspecified hyperlipidemia      Pseudoangiomatous stromal hyperplasia of breast 2010    PASH     Rotator cuff injury 7/2016     Sleep apnea     \"snore\"     Vitamin D deficiency      Past Surgical History:   Procedure Laterality Date     BREAST LUMPECTOMY, RT/LT      x2, left     CATARACT IOL, RT/LT  4/99    left, [id number] 7.0D     COLONOSCOPY       COLONOSCOPY N/A 1/22/2016    Procedure: COMBINED COLONOSCOPY, SINGLE OR MULTIPLE BIOPSY/POLYPECTOMY BY BIOPSY;  Surgeon: Praful Benjamin MD;  Location: [facility] OR     COLONOSCOPY WITH CO2 INSUFFLATION N/A 1/22/2016    Procedure: COLONOSCOPY WITH CO2 INSUFFLATION;  Surgeon: Praful Benjamin MD;  Location: [facility] OR     CRYOTHERAPY  2000    cervical     CRYOTHERAPY Left 3/19/2015    Procedure: CRYOTHERAPY;  Surgeon: Keiko Puri MD;  Location:  [facility]     DILATION AND CURETTAGE, HYSTEROSCOPY DIAGNOSTIC, COMBINED N/A 1/19/2016    Procedure: COMBINED DILATION AND CURETTAGE, HYSTEROSCOPY DIAGNOSTIC;  Surgeon: Yeni Aparicio DO;  Location: [facility] OR     IMPLANT STIMULATOR AND LEADS SACRAL NERVE (STAGE ONE AND TWO) N/A 7/24/2018    Procedure: IMPLANT STIMULATOR AND LEADS SACRAL NERVE (STAGE ONE AND TWO);  Interstim Implant Stage One and Two (Implant Permanent Lead and Generator);  Surgeon: Dada Baltazar MD;  Location: [facility]     IMPLANT STIMULATOR SACRAL NERVE PERCUTANEOUS TRIAL N/A 6/19/2018    Procedure: IMPLANT STIMULATOR SACRAL NERVE PERCUTANEOUS TRIAL;  Percutaneous Neural Examination (trial for interstim);  Surgeon: Dada Baltazar MD;  Location: [facility] OR     LAPAROSCOPIC TUBAL LIGATION  1981     PHACOEMULSIFICATION CLEAR CORNEA WITH STANDARD INTRAOCULAR LENS IMPLANT  8/15/2013    " "Procedure: PHACOEMULSIFICATION CLEAR CORNEA WITH STANDARD INTRAOCULAR LENS IMPLANT;  RIGHT PHACOEMULSIFICATION CLEAR CORNEA WITH STANDARD INTRAOCULAR LENS IMPLANT ;  Surgeon: Trae Taylor MD;  Location: [facility]     SURGICAL HISTORY OF -       x4, ankle surgery     Current Outpatient Prescriptions   Medication Sig Dispense Refill     clotrimazole 10 MG venkat Place 1 Venkat (10 mg) inside cheek 5 times daily 70 Venkat 0     DULoxetine (CYMBALTA) 30 MG [facility] capsule Take 1 capsule (30 mg) by mouth 2 times daily 180 capsule 1     gabapentin (NEURONTIN) 300 MG capsule Take 1 capsule (300 mg) by mouth 3 times daily 90 capsule 0     glipiZIDE (GLUCOTROL) 10 MG tablet Take 1 tablet (10 mg) by mouth daily (with dinner) 90 tablet 0     insulin NPH (NOVOLIN N VIAL) 100 UNIT/ML injection Inject 60 units Subcutaneous 2 times daily. (gets this from [facility] NPH at [facility])       insulin syringe-needle U-100 (RELION INSULIN SYRINGE) 31G X 5/16\" 1 ML Use 2 syringes daily or as directed. 200 each 3     lisinopril (PRINIVIL/ZESTRIL) 10 MG tablet Take 0.5 tablets (5 mg) by mouth daily 30 tablet 3     MELATONIN PO 10 mg At Bedtime        metFORMIN (GLUCOPHAGE-XR) 500 MG 24 hr tablet Take 2 tablets (1,000 mg) by mouth 2 times daily (with meals) 360 tablet 1     minocycline (MINOCIN/DYNACIN) 100 MG capsule Take 1 capsule (100 mg) by mouth every 12 hours 180 capsule 2     omeprazole (PRILOSEC) 40 MG capsule TAKE 1 CAPSULE DAILY 30 TO 60 MINUTES BEFORE A MEAL 90 capsule 3     ONE TOUCH LANCETS None Entered       ONE TOUCH TEST STRIPS VI None Entered       order for DME Equipment being ordered: Walker with seat light weight 1 Device 0     oxybutynin chloride 15 MG TB24 Take 1 tablet (15 mg) by mouth daily 90 tablet 1     simvastatin (ZOCOR) 20 MG tablet Take 1 tablet (20 mg) by mouth At Bedtime 90 tablet 3     spironolactone (ALDACTONE) 100 MG tablet TAKE 1 TABLET EVERY MORNING 90 tablet 3     traZODone (DESYREL) 50 MG tablet Take 2 " "tablets (100 mg) by mouth nightly as needed for sleep 90 tablet 2     VITAMIN D 1000 UNIT OR CAPS 1 CAPSULE DAILY       OTC products: baby aspriin but won't take until after surgery  Out of calcium and vision formula- will not restart until after surgery    Allergies   Allergen Reactions     Codeine      Sulfa Drugs       Latex Allergy: NO    Social History   Substance Use Topics     Smoking status: Current Every Day Smoker     Packs/day: 0.50     Types: Cigarettes     Smokeless tobacco: Never Used     Alcohol use Yes      Comment: social/3-4 per week     History   Drug Use No       REVIEW OF SYSTEMS:   CONSTITUTIONAL: NEGATIVE for fever, chills, change in weight  INTEGUMENTARY/SKIN: NEGATIVE for rash   EYES: NEGATIVE for vision changes or irritation  ENT/MOUTH: NEGATIVE for ear, mouth and throat problems  RESP: NEGATIVE for significant cough or SOB  CV: NEGATIVE for chest pain, palpitations or peripheral edema  GI: NEGATIVE for nausea, abdominal pain, heartburn, or change in bowel habits   female: female incontinence both urge and stress- recently had nerve stimulator implanted and working on setting - still has issues  MUSCULOSKELETAL:weakness in legs, back pain  NEURO: NEGATIVE for weakness, dizziness or paresthesias  ENDOCRINE: positive for history of diabetes- no symptoms of polydipsia or polyuria  HEME: NEGATIVE for bleeding problems  PSYCHIATRIC: positive for history of anxiety and depression.  No thoughts of harming self     EXAM:   /71 (BP Location: Right arm, Patient Position: Sitting, Cuff Size: Adult Large)  Pulse 107  Temp 98.7  F (37.1  C) (Oral)  Resp 20  Ht 1.702 m (5' 7\")  Wt 103 kg (227 lb)  LMP 11/18/1991 (Exact Date)  SpO2 96%  Breastfeeding? No  BMI 35.55 kg/m2    GENERAL APPEARANCE: healthy, alert and no distress     EYES: EOMI, PERRL     HENT: ear canals and TM's normal and nose and mouth without ulcers or lesions     NECK: no adenopathy, no asymmetry, masses, or scars and " thyroid normal to palpation     RESP: lungs clear to auscultation - no rales, rhonchi or wheezes     CV: regular rates and rhythm, normal S1 S2, no S3 or S4 and no murmur, click or rub     ABDOMEN:  soft, nontender, no HSM or masses and bowel sounds normal     MS: ambulates with walker.  No gross deformities     SKIN: no suspicious lesions or rashes     NEURO: Normal strength and tone, sensory exam grossly normal, mentation intact and speech normal     PSYCH: mentation appears normal, affect normal/bright, anxious and judgment and insight intact     LYMPHATICS: No cervical adenopathy    DIAGNOSTICS:     EKG: sinus tachycardia, normal axis, normal intervals, no acute ST/T changes c/w ischemia, no LVH by voltage criteria, unchanged from previous tracings  Labs Resulted Today:   Results for orders placed or performed in visit on 08/14/18   CBC with platelets differential   Result Value Ref Range    WBC 11.2 (H) 4.0 - 11.0 10e9/L    RBC Count 4.45 3.8 - 5.2 10e12/L    Hemoglobin 10.1 (L) 11.7 - 15.7 g/dL    Hematocrit 33.6 (L) 35.0 - 47.0 %    MCV 76 (L) 78 - 100 fl    MCH 22.7 (L) 26.5 - 33.0 pg    MCHC 30.1 (L) 31.5 - 36.5 g/dL    RDW 19.0 (H) 10.0 - 15.0 %    Platelet Count 347 150 - 450 10e9/L    Diff Method Automated Method     % Neutrophils 70.5 %    % Lymphocytes 20.1 %    % Monocytes 7.5 %    % Eosinophils 1.6 %    % Basophils 0.3 %    Absolute Neutrophil 7.9 1.6 - 8.3 10e9/L    Absolute Lymphocytes 2.3 0.8 - 5.3 10e9/L    Absolute Monocytes 0.8 0.0 - 1.3 10e9/L    Absolute Eosinophils 0.2 0.0 - 0.7 10e9/L    Absolute Basophils 0.0 0.0 - 0.2 10e9/L   Hemoglobin A1c   Result Value Ref Range    Hemoglobin A1C 8.1 (H) 0 - 5.6 %   Comprehensive metabolic panel   Result Value Ref Range    Sodium 140 133 - 144 mmol/L    Potassium 4.2 3.4 - 5.3 mmol/L    Chloride 102 94 - 109 mmol/L    Carbon Dioxide 29 20 - 32 mmol/L    Anion Gap 9 3 - 14 mmol/L    Glucose 128 (H) 70 - 99 mg/dL    Urea Nitrogen 12 7 - 30 mg/dL     Creatinine 0.56 0.52 - 1.04 mg/dL    GFR Estimate >90 >60 mL/min/1.7m2    GFR Estimate If Black >90 >60 mL/min/1.7m2    Calcium 9.3 8.5 - 10.1 mg/dL    Bilirubin Total 0.3 0.2 - 1.3 mg/dL    Albumin 3.7 3.4 - 5.0 g/dL    Protein Total 7.8 6.8 - 8.8 g/dL    Alkaline Phosphatase 101 40 - 150 U/L    ALT 22 0 - 50 U/L    AST 15 0 - 45 U/L       Recent Labs   Lab Test  07/20/18   0945  07/19/18   1429  06/14/18   1400  05/15/18   1318  02/08/18   1431   HGB  9.9*  9.8*   --    --   11.6*   PLT  300  324   --    --   318   NA   --   139   --    --   137   POTASSIUM   --   4.0  4.3   --   3.8   CR   --   0.53  0.56   --   0.50*   A1C   --    --    --   8.5*  9.2*        IMPRESSION:   Reason for surgery/procedure: back pain  Diagnosis/reason for consult: anesthesia clearance    The proposed surgical procedure is considered INTERMEDIATE risk.    REVISED CARDIAC RISK INDEX  The patient has the following serious cardiovascular risks for perioperative complications such as (MI, PE, VFib and 3  AV Block):  Diabetes Mellitus (on Insulin)  INTERPRETATION: 1 risks: Class II (low risk - 0.9% complication rate)    The patient has the following additional risks for perioperative complications:  The 10-year ASCVD risk score (Shavon KISHORE Jr, et al., 2013) is: 19.5%    Values used to calculate the score:      Age: 67 years      Sex: Female      Is Non- : No      Diabetic: Yes      Tobacco smoker: Yes      Systolic Blood Pressure: 113 mmHg      Is BP treated: Yes      HDL Cholesterol: 69 mg/dL      Total Cholesterol: 162 mg/dL      ICD-10-CM    1. Preop general physical exam Z01.818 EKG 12-lead complete w/read - Clinics     CBC with platelets differential     Hemoglobin A1c     Comprehensive metabolic panel     JUST IN CASE   2. Back pain, unspecified back location, unspecified back pain laterality, unspecified chronicity M54.9    3. Type 2 diabetes mellitus with hyperglycemia, with long-term current use of insulin (H)  E11.65 Hemoglobin A1c    Z79.4 Comprehensive metabolic panel     JUST IN CASE   4. Thrush B37.0 clotrimazole 10 MG kelly   5. Morbid obesity (H) E66.01    6. Moderate episode of recurrent major depressive disorder (H) F33.1    A1C near goal.  Was 8.1 today- has follow up scheduled with her PCP next week to discuss  Depression is controlled.  Working on diet - unable to exercise   Requests refill of thrush med - I don't see much on exam today      RECOMMENDATIONS:     --Consult hospital rounder / IM to assist post-op medical management    Cardiovascular Risk  Performs 4 METs exercise without symptoms (Light housework (dusting, washing dishes), Climb a flight of stairs and Walk on level ground at 15 minutes per mile (4 miles/hour)) .       Anemia  Anemia and does not require treatment prior to surgery.  Monitor Hemoglobin postoperatively.      --Patient is to take all scheduled medications on the day of surgery EXCEPT for modifications listed below.    Diabetes Medication Use      -----Hold usual oral and non-insulin diabetic meds (e.g. Metformin, Actos, Glipizide) while NPO.   -----Take 80% of long acting insulin (e.g. Lantus, NPH) while NPO (fasting)      Anticoagulant or Antiplatelet Medication Use  ASPIRIN: Discontinue ASA 7-10 days prior to procedure to reduce bleeding risk.  It should be resumed post-operatively.        ACE Inhibitor or Angiotensin Receptor Blocker (ARB) Use  Ace inhibitor or Angiotensin Receptor Blocker (ARB) and should HOLD this medication for the 24 hours prior to surgery.      APPROVAL GIVEN to proceed with proposed procedure, without further diagnostic evaluation       Signed Electronically by: Manasa Lopez PA-C    Copy of this evaluation report is provided to requesting physician.    [place] Preop Guidelines    Revised Cardiac Risk Indexmri

## 2018-08-15 PROBLEM — F33.1 MODERATE EPISODE OF RECURRENT MAJOR DEPRESSIVE DISORDER (H): Status: ACTIVE | Noted: 2018-08-15

## 2018-08-15 NOTE — PROGRESS NOTES
Medina Garcia  Your electrolytes, kidney function and liver function were normal.   Your blood sugar was 128.   Your hemoglobin A1C was 8.1.  Your hemoglobin was comparable to previous.  Please follow up regarding the anemia after your surgery.   Please call or MyChart my office with any questions or concerns.    Manasa Lopez, PAC

## 2018-08-16 ENCOUNTER — TELEPHONE (OUTPATIENT)
Dept: FAMILY MEDICINE | Facility: CLINIC | Age: 68
End: 2018-08-16

## 2018-08-16 NOTE — TELEPHONE ENCOUNTER
Please fax preop and copy of EKG   Thank you   Bhavesh Lopez, PAC     Preop and EKG faxed  [id number], MEDICAL ASSISTANT

## 2018-08-20 ENCOUNTER — TRANSFERRED RECORDS (OUTPATIENT)
Dept: HEALTH INFORMATION MANAGEMENT | Facility: CLINIC | Age: 68
End: 2018-08-20

## 2018-08-22 ENCOUNTER — TELEPHONE (OUTPATIENT)
Dept: UROLOGY | Facility: CLINIC | Age: 68
End: 2018-08-22

## 2018-08-22 ENCOUNTER — PATIENT OUTREACH (OUTPATIENT)
Dept: CARE COORDINATION | Facility: CLINIC | Age: 68
End: 2018-08-22

## 2018-08-22 NOTE — TELEPHONE ENCOUNTER
8.22.18  Pt is waiting for a phone call from Dr Baltazar.  She was waiting for a phone at 11:30am today.  SHe never called.   Bladder implant problems.  SHe really needs a call back.   [phone] for a call back.

## 2018-08-23 ENCOUNTER — OFFICE VISIT (OUTPATIENT)
Dept: PEDIATRICS | Facility: CLINIC | Age: 68
End: 2018-08-23
Payer: COMMERCIAL

## 2018-08-23 VITALS
HEART RATE: 112 BPM | WEIGHT: 229 LBS | TEMPERATURE: 96.7 F | BODY MASS INDEX: 35.87 KG/M2 | OXYGEN SATURATION: 97 % | DIASTOLIC BLOOD PRESSURE: 74 MMHG | SYSTOLIC BLOOD PRESSURE: 126 MMHG

## 2018-08-23 DIAGNOSIS — Z79.4 TYPE 2 DIABETES MELLITUS WITH HYPERGLYCEMIA, WITH LONG-TERM CURRENT USE OF INSULIN (H): ICD-10-CM

## 2018-08-23 DIAGNOSIS — I10 ESSENTIAL HYPERTENSION WITH GOAL BLOOD PRESSURE LESS THAN 140/90: ICD-10-CM

## 2018-08-23 DIAGNOSIS — E11.65 TYPE 2 DIABETES MELLITUS WITH HYPERGLYCEMIA, WITH LONG-TERM CURRENT USE OF INSULIN (H): ICD-10-CM

## 2018-08-23 DIAGNOSIS — F51.04 CHRONIC INSOMNIA: Primary | ICD-10-CM

## 2018-08-23 DIAGNOSIS — E78.5 HYPERLIPIDEMIA LDL GOAL <100: ICD-10-CM

## 2018-08-23 LAB
CHOLEST SERPL-MCNC: 166 MG/DL
HDLC SERPL-MCNC: 45 MG/DL
LDLC SERPL CALC-MCNC: 90 MG/DL
NONHDLC SERPL-MCNC: 121 MG/DL
TRIGL SERPL-MCNC: 154 MG/DL

## 2018-08-23 PROCEDURE — 80061 LIPID PANEL: CPT | Performed by: INTERNAL MEDICINE

## 2018-08-23 PROCEDURE — 99214 OFFICE O/P EST MOD 30 MIN: CPT | Performed by: INTERNAL MEDICINE

## 2018-08-23 PROCEDURE — 36415 COLL VENOUS BLD VENIPUNCTURE: CPT | Performed by: INTERNAL MEDICINE

## 2018-08-23 RX ORDER — FLASH GLUCOSE SENSOR
1 KIT MISCELLANEOUS 3 TIMES DAILY PRN
Qty: 1 DEVICE | Refills: 0 | Status: SHIPPED | OUTPATIENT
Start: 2018-08-23 | End: 2021-06-16

## 2018-08-23 RX ORDER — TRAZODONE HYDROCHLORIDE 100 MG/1
100 TABLET ORAL
Qty: 90 TABLET | Refills: 3 | Status: SHIPPED | OUTPATIENT
Start: 2018-08-23 | End: 2019-08-23

## 2018-08-23 RX ORDER — FLASH GLUCOSE SENSOR
1 KIT MISCELLANEOUS 3 TIMES DAILY PRN
Qty: 1 DEVICE | Refills: 0 | Status: SHIPPED | OUTPATIENT
Start: 2018-08-23 | End: 2018-08-23

## 2018-08-23 RX ORDER — LISINOPRIL 5 MG/1
5 TABLET ORAL DAILY
Qty: 90 TABLET | Refills: 3 | Status: SHIPPED | OUTPATIENT
Start: 2018-08-23 | End: 2019-10-16

## 2018-08-23 NOTE — PROGRESS NOTES
Dear Radha,   Here are your recent results that we reviewed at your recent clinic visit. Please continue care plan during the visit and follow up as planned.     Please call or Mychart if you have further questions.     Trice Medeiros MD-PhD

## 2018-08-23 NOTE — PROGRESS NOTES
SUBJECTIVE:   Radha Corbett is a 67 year old female who presents to clinic today for the following health issues:      Diabetes Follow-up, Med questions, wants 5mg lisinopril unable to cut in half, Trazodone wants 90 day supply. Questions about Cool Sculpting procedure.       Patient is checking blood sugars: 130 Avg    Diabetic concerns: None     Symptoms of hypoglycemia (low blood sugar): none     Paresthesias (numbness or burning in feet) or sores: Yes Tingling     Date of last diabetic eye exam: DUE    BP Readings from Last 2 Encounters:   08/23/18 126/74   08/14/18 113/71     Hemoglobin A1C (%)   Date Value   08/14/2018 8.1 (H)   05/15/2018 8.5 (H)     LDL Cholesterol Calculated (mg/dL)   Date Value   08/23/2018 90   06/22/2017 72     LDL Cholesterol Direct (mg/dL)   Date Value   06/14/2018 138 (H)       Diabetes Management Resources    Amount of exercise or physical activity: None    Problems taking medications regularly: No    Medication side effects: none    Diet: regular (no restrictions)     Patient reports doing well.  She had InterStim bladder device placed recently.  Not sure if it is working yet.  She has follow-up appointment with urology.    On Monday, she had a revision back surgery.  She is recovering quite well.  She is walking with a walker.    She wants to get the new continuous glucose sensor Danni sensor.  She is not sure about insurance coverage but will like to try that.    She needs diabetes 's license form completed.    She will like to lose some central fat.  She read about cool sculpting and is wondering if there is any contract occasion for her to do that.    ROS:  Constitutional, HEENT, cardiovascular, pulmonary, gi and gu systems are negative, except as otherwise noted.         Current Outpatient Prescriptions on File Prior to Visit:  DULoxetine (CYMBALTA) 30 MG EC capsule Take 1 capsule (30 mg) by mouth 2 times daily   gabapentin (NEURONTIN) 300 MG capsule Take 1 capsule (300  "mg) by mouth 3 times daily   glipiZIDE (GLUCOTROL) 10 MG tablet Take 1 tablet (10 mg) by mouth daily (with dinner)   MELATONIN PO 10 mg At Bedtime    metFORMIN (GLUCOPHAGE-XR) 500 MG 24 hr tablet Take 2 tablets (1,000 mg) by mouth 2 times daily (with meals)   minocycline (MINOCIN/DYNACIN) 100 MG capsule Take 1 capsule (100 mg) by mouth every 12 hours   omeprazole (PRILOSEC) 40 MG capsule TAKE 1 CAPSULE DAILY 30 TO 60 MINUTES BEFORE A MEAL   oxybutynin chloride 15 MG TB24 Take 1 tablet (15 mg) by mouth daily   simvastatin (ZOCOR) 20 MG tablet Take 1 tablet (20 mg) by mouth At Bedtime   spironolactone (ALDACTONE) 100 MG tablet TAKE 1 TABLET EVERY MORNING   VITAMIN D 1000 UNIT OR CAPS 1 CAPSULE DAILY   clotrimazole 10 MG venkat Place 1 Venkat (10 mg) inside cheek 5 times daily   insulin syringe-needle U-100 (RELION INSULIN SYRINGE) 31G X 5/16\" 1 ML Use 2 syringes daily or as directed.   ONE TOUCH LANCETS None Entered   ONE TOUCH TEST STRIPS VI None Entered   order for DME Equipment being ordered: Walker with seat light weight     Current Facility-Administered Medications on File Prior to Visit:  gadobutrol (GADAVIST) injection 10 mL          Patient Active Problem List   Diagnosis     Macular degeneration     Myopia     Hiatal hernia     IBS (irritable bowel syndrome)     Hypertension goal BP (blood pressure) < 140/90     GERD (gastroesophageal reflux disease)     Atypical ductal hyperplasia of breast     Atypical lobular hyperplasia of breast     Benign Breast Disease (PASH)     Family history of breast cancer in sister     Type 2 diabetes, HbA1C goal < 8% (H)     Hyperlipidemia LDL goal <100     Breast cancer screening-high risk     Tubular adenoma of colon     Osteopenia     Alcohol ingestion, more than 4 drinks/day on alcohol screening     Umbilical hernia     Incontinence of urine     Stool incontinence     Osteoarthritis, knee     Rosacea     Anxiety     Pseudophakia     Abnormal cervical Pap smear with positive " HPV DNA test     Depression with anxiety     Retinal detachment     Elevated liver function tests     Rectocele     Unexplained endometrial cells on cervical cytology     Back pain, unspecified back location, unspecified back pain laterality, unspecified chronicity     Urge incontinence     Dislocation of shoulder region     Acute lower GI bleeding     Morbid obesity (H)     Moderate episode of recurrent major depressive disorder (H)     Past Surgical History:   Procedure Laterality Date     BREAST LUMPECTOMY, RT/LT      x2, left     CATARACT IOL, RT/LT  4/99    left, [id number] 7.0D     COLONOSCOPY       COLONOSCOPY N/A 1/22/2016    Procedure: COMBINED COLONOSCOPY, SINGLE OR MULTIPLE BIOPSY/POLYPECTOMY BY BIOPSY;  Surgeon: Praful Benjamin MD;  Location: [facility] OR     COLONOSCOPY WITH CO2 INSUFFLATION N/A 1/22/2016    Procedure: COLONOSCOPY WITH CO2 INSUFFLATION;  Surgeon: Praful Benjamin MD;  Location: [facility] OR     CRYOTHERAPY  2000    cervical     CRYOTHERAPY Left 3/19/2015    Procedure: CRYOTHERAPY;  Surgeon: Keiko Puri MD;  [facility]     DILATION AND CURETTAGE, HYSTEROSCOPY DIAGNOSTIC, COMBINED N/A 1/19/2016    Procedure: COMBINED DILATION AND CURETTAGE, HYSTEROSCOPY DIAGNOSTIC;  Surgeon: Yeni Aparicio DO;  Location: [facility] OR     IMPLANT STIMULATOR AND LEADS SACRAL NERVE (STAGE ONE AND TWO) N/A 7/24/2018    Procedure: IMPLANT STIMULATOR AND LEADS SACRAL NERVE (STAGE ONE AND TWO);  Interstim Implant Stage One and Two (Implant Permanent Lead and Generator);  Surgeon: Dada Baltazar MD;  Location:  OR     IMPLANT STIMULATOR SACRAL NERVE PERCUTANEOUS TRIAL N/A 6/19/2018    Procedure: IMPLANT STIMULATOR SACRAL NERVE PERCUTANEOUS TRIAL;  Percutaneous Neural Examination (trial for interstim);  Surgeon: Dada Baltazar MD;  [facility]: [facility]     LAPAROSCOPIC TUBAL LIGATION  1981     PHACOEMULSIFICATION CLEAR CORNEA WITH STANDARD INTRAOCULAR LENS IMPLANT  8/15/2013    Procedure:  PHACOEMULSIFICATION CLEAR CORNEA WITH STANDARD INTRAOCULAR LENS IMPLANT;  RIGHT PHACOEMULSIFICATION CLEAR CORNEA WITH STANDARD INTRAOCULAR LENS IMPLANT ;  Surgeon: Trae Taylor MD;  Location: [facility]     SURGICAL HISTORY OF -       x4, ankle surgery       Social History   Substance Use Topics     Smoking status: Current Every Day Smoker     Packs/day: 0.50     Types: Cigarettes     Smokeless tobacco: Never Used     Alcohol use Yes      Comment: social/3-4 per week     Family History   Problem Relation Age of Onset     Hypertension Mother      Cerebrovascular Disease Mother      Arthritis Mother      Cardiovascular Mother      Circulatory Mother      Cerebrovascular Disease Father      Prostate Cancer Father 65      at 69     Asthma Brother      Prostate Cancer Brother 48     bone metastasis     Diabetes Sister      Hypertension Sister      Osteoperosis Sister      Depression Sister      Lipids Sister      Cancer Maternal Grandmother 95     spine     Breast Cancer Sister 39     also contralateral @53, mets to lungs     Cancer Sister 20     second uterine cancer in 30s also     Diabetes Sister      Hypertension Sister      Depression Sister      Osteoperosis Sister      Breast Cancer Sister 57     unilateral     Cancer Paternal Aunt 40     unknown type     Cancer Paternal Uncle      stomach;  in his 80s     Prostate Cancer Brother      Glaucoma No family hx of              Problem list, Medication list, Allergies, and Medical/Social/Surgical histories reviewed in [facility] and updated as appropriate.    OBJECTIVE:                                                    /74  Pulse 112  Temp 96.7  F (35.9  C) (Temporal)  Wt 229 lb (103.9 kg)  LMP 1991 (Exact Date)  SpO2 97%  BMI 35.87 kg/m2    GENERAL: healthy, alert and no distress  Back: There are 5 1 cm incisions, covered by Steri-Strips.  There is healing well.  Dry.  No erythema surrounding it.      Diagnostic test results:  Component       Latest Ref Rng & Units 8/14/2018 8/23/2018   WBC      4.0 - 11.0 10e9/L 11.2 (H)    RBC Count      3.8 - 5.2 10e12/L 4.45    Hemoglobin      11.7 - 15.7 g/dL 10.1 (L)    Hematocrit      35.0 - 47.0 % 33.6 (L)    MCV      78 - 100 fl 76 (L)    MCH      26.5 - 33.0 pg 22.7 (L)    MCHC      31.5 - 36.5 g/dL 30.1 (L)    RDW      10.0 - 15.0 % 19.0 (H)    Platelet Count      150 - 450 10e9/L 347    Diff Method       Automated Method    % Neutrophils      % 70.5    % Lymphocytes      % 20.1    % Monocytes      % 7.5    % Eosinophils      % 1.6    % Basophils      % 0.3    Absolute Neutrophil      1.6 - 8.3 10e9/L 7.9    Absolute Lymphocytes      0.8 - 5.3 10e9/L 2.3    Absolute Monocytes      0.0 - 1.3 10e9/L 0.8    Absolute Eosinophils      0.0 - 0.7 10e9/L 0.2    Absolute Basophils      0.0 - 0.2 10e9/L 0.0    Sodium      133 - 144 mmol/L 140    Potassium      3.4 - 5.3 mmol/L 4.2    Chloride      94 - 109 mmol/L 102    Carbon Dioxide      20 - 32 mmol/L 29    Anion Gap      3 - 14 mmol/L 9    Glucose      70 - 99 mg/dL 128 (H)    Urea Nitrogen      7 - 30 mg/dL 12    Creatinine      0.52 - 1.04 mg/dL 0.56    GFR Estimate      >60 mL/min/1.7m2 >90    GFR Estimate If Black      >60 mL/min/1.7m2 >90    Calcium      8.5 - 10.1 mg/dL 9.3    Bilirubin Total      0.2 - 1.3 mg/dL 0.3    Albumin      3.4 - 5.0 g/dL 3.7    Protein Total      6.8 - 8.8 g/dL 7.8    Alkaline Phosphatase      40 - 150 U/L 101    ALT      0 - 50 U/L 22    AST      0 - 45 U/L 15    Cholesterol      <200 mg/dL  166   Triglycerides      <150 mg/dL  154 (H)   HDL Cholesterol      >49 mg/dL  45 (L)   LDL Cholesterol Calculated      <100 mg/dL  90   Non HDL Cholesterol      <130 mg/dL  121   Hemoglobin A1C      0 - 5.6 % 8.1 (H)        Results for orders placed or performed in visit on 08/23/18   Lipid panel reflex to direct LDL Fasting   Result Value Ref Range    Cholesterol 166 <200 mg/dL    Triglycerides 154 (H) <150 mg/dL    HDL Cholesterol 45 (L) >49  mg/dL    LDL Cholesterol Calculated 90 <100 mg/dL    Non HDL Cholesterol 121 <130 mg/dL         ASSESSMENT/PLAN:                                                      67 year old female with the following diagnoses and treatment plan:      ICD-10-CM    1. Chronic insomnia F51.04    2. Uncontrolled type 2 diabetes mellitus with stage 3 chronic kidney disease, with long-term current use of insulin (H) E11.22 insulin NPH (NOVOLIN N VIAL) 100 UNIT/ML injection    E11.65 Continuous Blood Gluc  (FREESTYLE MELINA READER) AYAN    N18.3 DISCONTINUED: Continuous Blood Gluc  (FREESTYLE MELINA READER) AYAN    Z79.4    3. Essential hypertension with goal blood pressure less than 140/90 I10 lisinopril (PRINIVIL/ZESTRIL) 5 MG tablet       --Diabetes: Improved but not yet controlled.  Increase the NPH to 64 units 2 times a day.  She gets this from [employer] over-the-counter.  I sent her prescription for the Melina sensor.  --Hypertension: Blood pressure is well controlled.  Refill lisinopril 5 mg tablets.  Refill trazodone per request  --Completed 's license form for patient with diabetes.  Return for diabetes follow-up in 3 months--       Will call or return to clinic if worsening or symptoms not improving as discussed.  See Patient Instructions.  A total of 25 minutes was spent face to face with this patient. More than 50% of the time was spent on education for the above problems and management plans.      Trice Medeiros MD-PhD  [facility]    (Note: Chart documentation was done in part with Dragon Voice Recognition software. Although reviewed after completion, some word and grammatical errors may remain.)

## 2018-08-23 NOTE — MR AVS SNAPSHOT
After Visit Summary   8/23/2018    Radha Corbett    MRN: [id number]           Patient Information     Date Of Birth          1950        Visit Information        Provider Department      8/23/2018 11:30 AM Trice Medeiros MD PhD [facility]        Today's Diagnoses     Uncontrolled type 2 diabetes mellitus with stage 3 chronic kidney disease, with long-term current use of insulin (H)        Essential hypertension with goal blood pressure less than 140/90        Chronic pain of both knees          Care Instructions    Make appointment(s) for:   -- diabetes follow up in 3 months with non fasting lab.         Increase     Medication(s) prescribed today:    Orders Placed This Encounter   Medications     insulin NPH (NOVOLIN N VIAL) 100 UNIT/ML injection     Sig: Inject 64 units Subcutaneous 2 times daily. (gets this from OTC NPH at [facility])     Dose increased                   Follow-ups after your visit        Who to contact     If you have questions or need follow up information about today's clinic visit or your schedule please contact [facility] directly at [phone].  Normal or non-critical lab and imaging results will be communicated to you by [employer], letter or phone within 4 business days after the clinic has received the results. If you do not hear from us within 7 days, please contact the clinic through [employer] or phone. If you have a critical or abnormal lab result, we will notify you by phone as soon as possible.  Submit refill requests through [employer] or call your pharmacy and they will forward the refill request to us. Please allow 3 business days for your refill to be completed.          Additional Information About Your Visit        [employer] Information     [employer] gives you secure access to your electronic health record. If you see a primary care provider, you can also send messages to your care team and make appointments. If you have questions, please  call your primary care clinic.  If you do not have a primary care provider, please call [phone] and they will assist you.      [employer] is an electronic gateway that provides easy, online access to your medical records. With [employer]ia, you can request a clinic appointment, read your test results, renew a prescription or communicate with your care team.     To access your existing account, please contact your [facility] or call [phone] for assistance.        Care EveryWhere ID     This is your Care EveryWhere ID. This could be used by other organizations to access your [place] medical records  [id number]        Your Vitals Were     Pulse Temperature Last Period Pulse Oximetry BMI (Body Mass Index)       112 96.7  F (35.9  C) (Temporal) 11/18/1991 (Exact Date) 97% 35.87 kg/m2        Blood Pressure from Last 3 Encounters:   08/23/18 126/74   08/14/18 113/71   08/06/18 131/76    Weight from Last 3 Encounters:   08/23/18 229 lb (103.9 kg)   08/14/18 227 lb (103 kg)   07/24/18 233 lb (105.7 kg)              Today, you had the following     No orders found for display         Today's Medication Changes          These changes are accurate as of 8/23/18 11:53 AM.  If you have any questions, ask your nurse or doctor.               Start taking these medicines.        Dose/Directions    M Squared FilmsSTYLE MELINA READER Britney   Used for:  Uncontrolled type 2 diabetes mellitus with stage 3 chronic kidney disease, with long-term current use of insulin (H)   Started by:  Trice Medeiros MD PhD        Dose:  1 Units   1 Units 3 times daily as needed   Quantity:  1 Device   Refills:  0         These medicines have changed or have updated prescriptions.        Dose/Directions    lisinopril 5 MG tablet   Commonly known as:  PRINIVIL/ZESTRIL   This may have changed:  medication strength   Used for:  Essential hypertension with goal blood pressure less than 140/90   Changed by:  Trice Medeiros MD PhD        Dose:  5  mg   Take 1 tablet (5 mg) by mouth daily   Quantity:  90 tablet   Refills:  3       NovoLIN N VIAL 100 UNIT/ML injection   This may have changed:  additional instructions   Used for:  Uncontrolled type 2 diabetes mellitus with stage 3 chronic kidney disease, with long-term current use of insulin (H)   Generic drug:  insulin NPH   Changed by:  Trice Medeiros MD PhD        Inject 64 units Subcutaneous 2 times daily. (gets this from OTC NPH at [facility])   Refills:  0       traZODone 100 MG tablet   Commonly known as:  DESYREL   This may have changed:  medication strength   Used for:  Chronic pain of both knees   Changed by:  Trice Medeiros MD PhD        Dose:  100 mg   Take 1 tablet (100 mg) by mouth nightly as needed for sleep   Quantity:  90 tablet   Refills:  3            Where to get your medicines      These medications were sent to [facility] [address] [address]     Phone:  [phone]     [employer] READER Britney    lisinopril 5 MG tablet    traZODone 100 MG tablet                Primary Care Provider Office Phone # Fax #    Trice Medeiros MD PhD [phone]       [address] [facility] [address]        Equal Access to Services     MAIA GARCIA AH: Hadii kyree phillipso Soomaali, waaxda luqadaha, qaybta kaalmada adeegyada, michael tineo. So [facility] [place] [phone].    ATENCIÓN: Si habla español, tiene a arauz disposición servicios gratuitos de asistencia lingüística. Llame al [phone].    We comply with applicable federal civil rights laws and [place] laws. We do not discriminate on the basis of race, color, national origin, age, disability, sex, sexual orientation, or gender identity.            Thank you!     Thank you for choosing [facility]  for your care. Our goal is always to provide you with excellent care. Hearing back from our patients is one way we can continue to  "improve our services. Please take a few minutes to complete the written survey that you may receive in the mail after your visit with us. Thank you!             Your Updated Medication List - Protect others around you: Learn how to safely use, store and throw away your medicines at www.disposemymeds.org.          This list is accurate as of 8/23/18 11:53 AM.  Always use your most recent med list.                   Brand Name Dispense Instructions for use Diagnosis    clotrimazole 10 MG venkat     70 Venkat    Place 1 Venkat (10 mg) inside cheek 5 times daily    Thrush       DULoxetine 30 MG EC capsule    CYMBALTA    180 capsule    Take 1 capsule (30 mg) by mouth 2 times daily    Depression with anxiety       FREESTYLE MELINA READER Britney     1 Device    1 Units 3 times daily as needed    Uncontrolled type 2 diabetes mellitus with stage 3 chronic kidney disease, with long-term current use of insulin (H)       gabapentin 300 MG capsule    NEURONTIN    90 capsule    Take 1 capsule (300 mg) by mouth 3 times daily    Chronic pain of both knees       glipiZIDE 10 MG tablet    GLUCOTROL    90 tablet    Take 1 tablet (10 mg) by mouth daily (with dinner)    Type 2 diabetes mellitus with hyperglycemia, with long-term current use of insulin (H)       insulin syringe-needle U-100 31G X 5/16\" 1 ML    RELION INSULIN SYRINGE    200 each    Use 2 syringes daily or as directed.    Type 2 diabetes mellitus without complication (H)       lisinopril 5 MG tablet    PRINIVIL/ZESTRIL    90 tablet    Take 1 tablet (5 mg) by mouth daily    Essential hypertension with goal blood pressure less than 140/90       MELATONIN PO      10 mg At Bedtime        metFORMIN 500 MG 24 hr tablet    GLUCOPHAGE-XR    360 tablet    Take 2 tablets (1,000 mg) by mouth 2 times daily (with meals)    Type 2 diabetes mellitus with hyperglycemia, with long-term current use of insulin (H)       minocycline 100 MG capsule    MINOCIN/DYNACIN    180 capsule    Take 1 " capsule (100 mg) by mouth every 12 hours    Acne vulgaris       NovoLIN N VIAL 100 UNIT/ML injection   Generic drug:  insulin NPH      Inject 64 units Subcutaneous 2 times daily. (gets this from OTC NPH at [facility])    Uncontrolled type 2 diabetes mellitus with stage 3 chronic kidney disease, with long-term current use of insulin (H)       omeprazole 40 MG capsule    priLOSEC    90 capsule    TAKE 1 CAPSULE DAILY 30 TO 60 MINUTES BEFORE A MEAL    Gastroesophageal reflux disease without esophagitis       ONE TOUCH LANCETS      None Entered        ONE TOUCH TEST STRIPS VI      None Entered        order for DME     1 Device    Equipment being ordered: Walker with seat light weight    Back pain, unspecified back location, unspecified back pain laterality, unspecified chronicity       oxybutynin chloride 15 MG Tb24     90 tablet    Take 1 tablet (15 mg) by mouth daily    Urinary urgency, Urgency incontinence       simvastatin 20 MG tablet    ZOCOR    90 tablet    Take 1 tablet (20 mg) by mouth At Bedtime    Hyperlipidemia LDL goal <100       spironolactone 100 MG tablet    ALDACTONE    90 tablet    TAKE 1 TABLET EVERY MORNING    Essential hypertension with goal blood pressure less than 140/90       traZODone 100 MG tablet    DESYREL    90 tablet    Take 1 tablet (100 mg) by mouth nightly as needed for sleep    Chronic pain of both knees       vitamin D 1000 units capsule      1 CAPSULE DAILY

## 2018-08-23 NOTE — TELEPHONE ENCOUNTER
Patient updated that she did not get a call from the [facility] to do her tele-visit. She is having issues with the interstim and said she went through all the programs and it isnt working. She is leaving next Thurs to go to [place] by herself and would like to get this figured out prior to leaving. Nurse recommended she contact the [employer] rep to see if they can assist with the program settings over the phone, otherwise she can come in to clinic to meet with them.    She will try calling them and call clinic back if she needs an appt.    Therese Polo RN, BSN, PHN  [facility]  GI/Gen Surg/Hepatology Care Coordinator

## 2018-08-23 NOTE — PATIENT INSTRUCTIONS
Make appointment(s) for:   -- diabetes follow up in 3 months with non fasting lab.         Increase     Medication(s) prescribed today:    Orders Placed This Encounter   Medications     insulin NPH (NOVOLIN N VIAL) 100 UNIT/ML injection     Sig: Inject 64 units Subcutaneous 2 times daily. (gets this from [facility] NPH at [facility])     Dose increased

## 2018-08-24 NOTE — TELEPHONE ENCOUNTER
Pt returned call to clinic. Pt was told she needs device reprogrammed. Pt available to come into clinic anytime on Monday, 8/27/18. Writer called metronic rep to see if available Monday. Pt and [employer] rep scheduled 8/27/18 at 2:30pm.    Yesenia Connell LPN

## 2018-08-27 ENCOUNTER — ALLIED HEALTH/NURSE VISIT (OUTPATIENT)
Dept: NURSING | Facility: CLINIC | Age: 68
End: 2018-08-27
Payer: COMMERCIAL

## 2018-08-27 DIAGNOSIS — N39.41 URGE INCONTINENCE: Primary | ICD-10-CM

## 2018-08-27 PROCEDURE — 99207 ZZC NO CHARGE NURSE ONLY: CPT

## 2018-08-27 NOTE — MR AVS SNAPSHOT
After Visit Summary   8/27/2018    Radha Corbett    MRN: [id number]           Patient Information     Date Of Birth          1950        Visit Information        Provider Department      8/27/2018 2:30 PM NURSE ONLY SURGERY [facility]        Today's Diagnoses     Urge incontinence    -  1       Follow-ups after your visit        Who to contact     If you have questions or need follow up information about today's clinic visit or your schedule please contact [facility] directly at [phone].  Normal or non-critical lab and imaging results will be communicated to you by Urvewhart, letter or phone within 4 business days after the clinic has received the results. If you do not hear from us within 7 days, please contact the clinic through Urvewhart or phone. If you have a critical or abnormal lab result, we will notify you by phone as soon as possible.  Submit refill requests through [employer] or call your pharmacy and they will forward the refill request to us. Please allow 3 business days for your refill to be completed.          Additional Information About Your Visit        MyChart Information     [employer] gives you secure access to your electronic health record. If you see a primary care provider, you can also send messages to your care team and make appointments. If you have questions, please call your primary care clinic.  If you do not have a primary care provider, please call [phone] and they will assist you.      [employer] is an electronic gateway that provides easy, online access to your medical records. With [employer], you can request a clinic appointment, read your test results, renew a prescription or communicate with your care team.     To access your existing account, please contact your [facility] or call [phone] for assistance.        Care EveryWhere ID     This is your Care EveryWhere ID. This could be used by other  organizations to access your [place] medical records  [id number]        Your Vitals Were     Last Period                   11/18/1991 (Exact Date)            Blood Pressure from Last 3 Encounters:   08/23/18 126/74   08/14/18 113/71   08/06/18 131/76    Weight from Last 3 Encounters:   08/23/18 103.9 kg (229 lb)   08/14/18 103 kg (227 lb)   07/24/18 105.7 kg (233 lb)              Today, you had the following     No orders found for display       Primary Care Provider Office Phone # Fax #    Trice Medeiros MD PhD [phone]       [address]  [facility] [address]        Equal Access to Services     CAM GARCIA : Hadii kyree corrigan Sokaren, wapratikda radhaadaha, qaybta kaalmada adetyleryada, michael hallman . So [facility] [phone].    ATENCIÓN: Si habla español, tiene a arauz disposición servicios gratuitos de asistencia lingüística. Llame al [phone].    We comply with applicable federal civil rights laws and [place] laws. We do not discriminate on the basis of race, color, national origin, age, disability, sex, sexual orientation, or gender identity.            Thank you!     Thank you for choosing [facility]  for your care. Our goal is always to provide you with excellent care. Hearing back from our patients is one way we can continue to improve our services. Please take a few minutes to complete the written survey that you may receive in the mail after your visit with us. Thank you!             Your Updated Medication List - Protect others around you: Learn how to safely use, store and throw away your medicines at www.disposemymeds.org.          This list is accurate as of 8/27/18  2:45 PM.  Always use your most recent med list.                   Brand Name Dispense Instructions for use Diagnosis    clotrimazole 10 MG kelly     70 Kelly    Place 1 Kelly (10 mg) inside cheek 5 times daily    Thrush       DULoxetine 30 MG EC capsule    CYMBALTA    180  "capsule    Take 1 capsule (30 mg) by mouth 2 times daily    Depression with anxiety       FREESTYLE MELINA READER Britney     1 Device    1 Units 3 times daily as needed    Uncontrolled type 2 diabetes mellitus with stage 3 chronic kidney disease, with long-term current use of insulin (H)       gabapentin 300 MG capsule    NEURONTIN    90 capsule    Take 1 capsule (300 mg) by mouth 3 times daily    Chronic pain of both knees       glipiZIDE 10 MG tablet    GLUCOTROL    90 tablet    Take 1 tablet (10 mg) by mouth daily (with dinner)    Type 2 diabetes mellitus with hyperglycemia, with long-term current use of insulin (H)       insulin syringe-needle U-100 31G X 5/16\" 1 ML    RELION INSULIN SYRINGE    200 each    Use 2 syringes daily or as directed.    Type 2 diabetes mellitus without complication (H)       lisinopril 5 MG tablet    PRINIVIL/ZESTRIL    90 tablet    Take 1 tablet (5 mg) by mouth daily    Essential hypertension with goal blood pressure less than 140/90       MELATONIN PO      10 mg At Bedtime        metFORMIN 500 MG 24 hr tablet    GLUCOPHAGE-XR    360 tablet    Take 2 tablets (1,000 mg) by mouth 2 times daily (with meals)    Type 2 diabetes mellitus with hyperglycemia, with long-term current use of insulin (H)       minocycline 100 MG capsule    MINOCIN/DYNACIN    180 capsule    Take 1 capsule (100 mg) by mouth every 12 hours    Acne vulgaris       NovoLIN N VIAL 100 UNIT/ML injection   Generic drug:  insulin NPH      Inject 64 units Subcutaneous 2 times daily. (gets this from [facility] NPH at [facility])    Uncontrolled type 2 diabetes mellitus with stage 3 chronic kidney disease, with long-term current use of insulin (H)       omeprazole 40 MG capsule    priLOSEC    90 capsule    TAKE 1 CAPSULE DAILY 30 TO 60 MINUTES BEFORE A MEAL    Gastroesophageal reflux disease without esophagitis       ONE TOUCH LANCETS      None Entered        ONE TOUCH TEST STRIPS VI      None Entered        order for DME     1 Device    " Equipment being ordered: Walker with seat light weight    Back pain, unspecified back location, unspecified back pain laterality, unspecified chronicity       oxybutynin chloride 15 MG Tb24     90 tablet    Take 1 tablet (15 mg) by mouth daily    Urinary urgency, Urgency incontinence       simvastatin 20 MG tablet    ZOCOR    90 tablet    Take 1 tablet (20 mg) by mouth At Bedtime    Hyperlipidemia LDL goal <100       spironolactone 100 MG tablet    ALDACTONE    90 tablet    TAKE 1 TABLET EVERY MORNING    Essential hypertension with goal blood pressure less than 140/90       traZODone 100 MG tablet    DESYREL    90 tablet    Take 1 tablet (100 mg) by mouth nightly as needed for sleep        vitamin D 1000 units capsule      1 CAPSULE DAILY

## 2018-08-28 ENCOUNTER — TELEPHONE (OUTPATIENT)
Dept: PHARMACY | Facility: CLINIC | Age: 68
End: 2018-08-28

## 2018-08-28 NOTE — TELEPHONE ENCOUNTER
Called patient to schedule a f/u MTM appt. Patient is not interested at this time. MTM will no longer be following. Patient will reach out in the future if needed.  Falguni Chavis, Pharm.D, BCACP  Medication Therapy Management Pharmacist

## 2018-09-13 ENCOUNTER — TELEPHONE (OUTPATIENT)
Dept: PEDIATRICS | Facility: CLINIC | Age: 68
End: 2018-09-13

## 2018-09-13 DIAGNOSIS — Z79.4 TYPE 2 DIABETES MELLITUS WITH HYPERGLYCEMIA, WITH LONG-TERM CURRENT USE OF INSULIN (H): Primary | ICD-10-CM

## 2018-09-13 DIAGNOSIS — E11.65 TYPE 2 DIABETES MELLITUS WITH HYPERGLYCEMIA, WITH LONG-TERM CURRENT USE OF INSULIN (H): Primary | ICD-10-CM

## 2018-09-14 RX ORDER — FLASH GLUCOSE SENSOR
KIT MISCELLANEOUS
Qty: 3 EACH | Refills: 5 | Status: SHIPPED | OUTPATIENT
Start: 2018-09-14 | End: 2023-12-22

## 2018-09-14 RX ORDER — FLASH GLUCOSE SENSOR
1 KIT MISCELLANEOUS DAILY
Qty: 1 DEVICE | Refills: 0 | Status: SHIPPED | OUTPATIENT
Start: 2018-09-14 | End: 2021-06-16

## 2018-09-14 NOTE — TELEPHONE ENCOUNTER
Routing to Dr. Medeiros to review medication Rx as not on medication list.    Falguni Hammer RN, [facility]

## 2018-11-08 ENCOUNTER — TELEPHONE (OUTPATIENT)
Dept: PEDIATRICS | Facility: CLINIC | Age: 68
End: 2018-11-08

## 2018-11-08 ENCOUNTER — DOCUMENTATION ONLY (OUTPATIENT)
Dept: LAB | Facility: CLINIC | Age: 68
End: 2018-11-08

## 2018-11-08 ENCOUNTER — TELEPHONE (OUTPATIENT)
Dept: UROLOGY | Facility: CLINIC | Age: 68
End: 2018-11-08

## 2018-11-08 DIAGNOSIS — K21.9 GASTROESOPHAGEAL REFLUX DISEASE, ESOPHAGITIS PRESENCE NOT SPECIFIED: ICD-10-CM

## 2018-11-08 DIAGNOSIS — E11.65 TYPE 2 DIABETES MELLITUS WITH HYPERGLYCEMIA, WITH LONG-TERM CURRENT USE OF INSULIN (H): Primary | ICD-10-CM

## 2018-11-08 DIAGNOSIS — Z79.4 TYPE 2 DIABETES MELLITUS WITH HYPERGLYCEMIA, WITH LONG-TERM CURRENT USE OF INSULIN (H): Primary | ICD-10-CM

## 2018-11-08 DIAGNOSIS — I10 HYPERTENSION GOAL BP (BLOOD PRESSURE) < 140/90: Chronic | ICD-10-CM

## 2018-11-08 NOTE — TELEPHONE ENCOUNTER
[facility] Call Center    Phone Message    May a detailed message be left on voicemail: yes    Reason for Call: Other: Pt states she is due for A1C lab, pt is seeing Grace Carias on 11/12/18 and would like the lab done prior to appt. Pt needs order placed for lab. Please call pt with any questions.      Action Taken: Message routed to:  Primary Care p [id number]

## 2018-11-08 NOTE — TELEPHONE ENCOUNTER
Labs pended. Routed to PCP to review and order.    microalbumin and A1c niurka'd up.    Lab Results   Component Value Date    A1C 8.1 08/14/2018    A1C 8.5 05/15/2018    A1C 9.2 02/08/2018    A1C 7.1 09/18/2017    A1C 5.9 06/22/2017         Rosaura Keating RN,   [facility]

## 2018-11-08 NOTE — TELEPHONE ENCOUNTER
Attempted to reach patient by phone, but no answer. Left message with request for patient to return call to clinic.    Sophia Seo RN, BSN    
Received message from patient on clinic voicemail. Returned call to patient and questions answered. Number of [phone] for [employer] was given to patient to call if she has additional questions. Informed patient to call with any other questions or concerns.    Sophia Seo RN, BSN    
[facility] Call Center    Phone Message    May a detailed message be left on voicemail: yes    Reason for Call: Other: Pt states she is having troubles with her bladder implant. States she normally speaks with Amanda and has misplaced her number. Please call [phone] to discuss.      Action Taken: Message routed to:  Adult Clinics: Urology p [id number]  
normal...

## 2018-11-08 NOTE — PROGRESS NOTES
Dupicate request. Please see other telephone encounter 11/8/2018     Rosaura Keating RN,   [facility], [facility]

## 2018-11-09 NOTE — TELEPHONE ENCOUNTER
Next 5 appointments (look out 90 days)     Nov 12, 2018  1:30 PM CST   Return Visit with NIKKY Lutz CNP   [facility]    [address] [place] [id number]   [phone]

## 2018-11-12 ENCOUNTER — OFFICE VISIT (OUTPATIENT)
Dept: PEDIATRICS | Facility: CLINIC | Age: 68
End: 2018-11-12
Payer: COMMERCIAL

## 2018-11-12 ENCOUNTER — RADIANT APPOINTMENT (OUTPATIENT)
Dept: GENERAL RADIOLOGY | Facility: CLINIC | Age: 68
End: 2018-11-12
Attending: NURSE PRACTITIONER
Payer: COMMERCIAL

## 2018-11-12 ENCOUNTER — RADIANT APPOINTMENT (OUTPATIENT)
Dept: CT IMAGING | Facility: CLINIC | Age: 68
End: 2018-11-12
Attending: NURSE PRACTITIONER
Payer: COMMERCIAL

## 2018-11-12 VITALS
DIASTOLIC BLOOD PRESSURE: 60 MMHG | SYSTOLIC BLOOD PRESSURE: 111 MMHG | OXYGEN SATURATION: 96 % | BODY MASS INDEX: 34.41 KG/M2 | HEART RATE: 115 BPM | WEIGHT: 219.7 LBS | TEMPERATURE: 97.2 F

## 2018-11-12 DIAGNOSIS — M25.551 BILATERAL HIP PAIN: ICD-10-CM

## 2018-11-12 DIAGNOSIS — M16.0 BILATERAL HIP JOINT ARTHRITIS: Primary | ICD-10-CM

## 2018-11-12 DIAGNOSIS — R41.3 MEMORY CHANGES: ICD-10-CM

## 2018-11-12 DIAGNOSIS — E04.1 THYROID NODULE: ICD-10-CM

## 2018-11-12 DIAGNOSIS — I10 HYPERTENSION GOAL BP (BLOOD PRESSURE) < 140/90: Chronic | ICD-10-CM

## 2018-11-12 DIAGNOSIS — K21.9 GASTROESOPHAGEAL REFLUX DISEASE, ESOPHAGITIS PRESENCE NOT SPECIFIED: ICD-10-CM

## 2018-11-12 DIAGNOSIS — M25.552 BILATERAL HIP PAIN: ICD-10-CM

## 2018-11-12 DIAGNOSIS — R63.4 LOSS OF WEIGHT: ICD-10-CM

## 2018-11-12 DIAGNOSIS — K14.3 COATED TONGUE: Primary | ICD-10-CM

## 2018-11-12 DIAGNOSIS — D64.9 ANEMIA, UNSPECIFIED TYPE: ICD-10-CM

## 2018-11-12 DIAGNOSIS — R91.8 PULMONARY NODULES: Primary | ICD-10-CM

## 2018-11-12 DIAGNOSIS — E11.65 TYPE 2 DIABETES MELLITUS WITH HYPERGLYCEMIA, WITH LONG-TERM CURRENT USE OF INSULIN (H): ICD-10-CM

## 2018-11-12 DIAGNOSIS — Z79.4 TYPE 2 DIABETES MELLITUS WITH HYPERGLYCEMIA, WITH LONG-TERM CURRENT USE OF INSULIN (H): ICD-10-CM

## 2018-11-12 DIAGNOSIS — D12.6 TUBULAR ADENOMA OF COLON: ICD-10-CM

## 2018-11-12 LAB
ALBUMIN SERPL-MCNC: 3.9 G/DL (ref 3.4–5)
ALP SERPL-CCNC: 93 U/L (ref 40–150)
ALT SERPL W P-5'-P-CCNC: 24 U/L (ref 0–50)
ANION GAP SERPL CALCULATED.3IONS-SCNC: 9 MMOL/L (ref 3–14)
AST SERPL W P-5'-P-CCNC: 13 U/L (ref 0–45)
BILIRUB SERPL-MCNC: 0.4 MG/DL (ref 0.2–1.3)
BUN SERPL-MCNC: 11 MG/DL (ref 7–30)
CALCIUM SERPL-MCNC: 9.4 MG/DL (ref 8.5–10.1)
CHLORIDE SERPL-SCNC: 100 MMOL/L (ref 94–109)
CO2 SERPL-SCNC: 28 MMOL/L (ref 20–32)
CREAT SERPL-MCNC: 0.52 MG/DL (ref 0.52–1.04)
CREAT UR-MCNC: 127 MG/DL
ERYTHROCYTE [DISTWIDTH] IN BLOOD BY AUTOMATED COUNT: 18 % (ref 10–15)
GFR SERPL CREATININE-BSD FRML MDRD: >90 ML/MIN/1.7M2
GLUCOSE SERPL-MCNC: 283 MG/DL (ref 70–99)
HBA1C MFR BLD: 7.5 % (ref 0–5.6)
HCT VFR BLD AUTO: 36.5 % (ref 35–47)
HGB BLD-MCNC: 10.6 G/DL (ref 11.7–15.7)
MCH RBC QN AUTO: 22 PG (ref 26.5–33)
MCHC RBC AUTO-ENTMCNC: 29 G/DL (ref 31.5–36.5)
MCV RBC AUTO: 76 FL (ref 78–100)
MICROALBUMIN UR-MCNC: 35 MG/L
MICROALBUMIN/CREAT UR: 27.64 MG/G CR (ref 0–25)
PLATELET # BLD AUTO: 349 10E9/L (ref 150–450)
POTASSIUM SERPL-SCNC: 4.1 MMOL/L (ref 3.4–5.3)
PROT SERPL-MCNC: 8 G/DL (ref 6.8–8.8)
RBC # BLD AUTO: 4.82 10E12/L (ref 3.8–5.2)
SODIUM SERPL-SCNC: 137 MMOL/L (ref 133–144)
WBC # BLD AUTO: 9.7 10E9/L (ref 4–11)

## 2018-11-12 PROCEDURE — 99214 OFFICE O/P EST MOD 30 MIN: CPT | Performed by: NURSE PRACTITIONER

## 2018-11-12 PROCEDURE — 73523 X-RAY EXAM HIPS BI 5/> VIEWS: CPT | Performed by: RADIOLOGY

## 2018-11-12 PROCEDURE — 85027 COMPLETE CBC AUTOMATED: CPT | Performed by: NURSE PRACTITIONER

## 2018-11-12 PROCEDURE — 36415 COLL VENOUS BLD VENIPUNCTURE: CPT | Performed by: NURSE PRACTITIONER

## 2018-11-12 PROCEDURE — 82043 UR ALBUMIN QUANTITATIVE: CPT | Performed by: NURSE PRACTITIONER

## 2018-11-12 PROCEDURE — 80053 COMPREHEN METABOLIC PANEL: CPT | Performed by: NURSE PRACTITIONER

## 2018-11-12 PROCEDURE — 71250 CT THORAX DX C-: CPT

## 2018-11-12 PROCEDURE — 82607 VITAMIN B-12: CPT | Performed by: NURSE PRACTITIONER

## 2018-11-12 PROCEDURE — 83036 HEMOGLOBIN GLYCOSYLATED A1C: CPT | Performed by: NURSE PRACTITIONER

## 2018-11-12 PROCEDURE — 83540 ASSAY OF IRON: CPT | Performed by: NURSE PRACTITIONER

## 2018-11-12 PROCEDURE — 83550 IRON BINDING TEST: CPT | Performed by: NURSE PRACTITIONER

## 2018-11-12 PROCEDURE — 82728 ASSAY OF FERRITIN: CPT | Performed by: NURSE PRACTITIONER

## 2018-11-12 NOTE — MR AVS SNAPSHOT
After Visit Summary   11/12/2018    Radha Corbett    MRN: [id number]           Patient Information     Date Of Birth          1950        Visit Information        Provider Department      11/12/2018 1:30 PM Perri Carias APRN CNP [facility]        Today's Diagnoses     Coated tongue    -  1    Memory changes        Bilateral hip pain        Hypertension goal BP (blood pressure) < 140/90        Type 2 diabetes mellitus with hyperglycemia, with long-term current use of insulin (H)        Tubular adenoma of colon        Gastroesophageal reflux disease, esophagitis presence not specified        Loss of weight        Anemia, unspecified type          Care Instructions    PLAN:   1.   Symptomatic therapy suggested: Continue current medications.  2.  Orders Placed This Encounter   Procedures     XR Pelvis and Hip Bilateral 2 Views     CT Chest w/o Contrast     OTOLARYNGOLOGY REFERRAL     NEUROPSYCHOLOGY REFERRAL     GASTROENTEROLOGY ADULT REF PROCEDURE ONLY [facility] [phone]; No Preference - GI Provider Only       3. Patient needs to follow up in if no improvement,or sooner if worsening of symptoms or other symptoms develop.  FURTHER TESTING:       - pelvic xrays and chest CT scan   CONSULTATION/REFERRAL to Otolaryngology  Refer neuropsych   Endoscopy to be scheduled   Will follow up and/or notify patient of  results via My Chart to determine further need for followup    It was a pleasure seeing you today at the [facility]. Thank you for allowing us to care for you today. We truly hope we provided you with the excellent service you deserve. Please let us know if there is anything else we can do for you so we can be sure you are leaving completley satisfied with your care experience.       General information about your clinic   Clinic Hours Lab Hours (Appointments are required)   Mon-Thurs: 7:30 AM - 7 PM Mon-Thurs: 7:30 AM - 7 PM    Fri: 7:30 AM - 5 PM Fri: 7:30 AM - 5 PM        After Hours Nurse Advise & Appts:  [facility] Nurse Advisors: [phone]  Rachel On Call: to make appointments anytime: [phone] On Call Physician: call [phone] and answering service will page the on call physician.        For urgent appointments, please call [phone] and ask for the triage nurse or your care team clinic nurse.  How to contact my care team:  MyChart: [email or web address]   Phone: [phone]   Fax: [phone]       [facility]:   Phone: [phone]  Hours: 8:00 AM - 6:00 PM  Medication Refills:  Call your pharmacy and they will forward the refill to us. Please allow 3 business days for your refills to be completed.       Normal or non-critical lab and imaging results will be communicated to you by MyChart, letter or phone within 7 days.  If you do not hear from us within 10 days, please call the clinic. If you have a critical or abnormal lab result, we will notify you by phone as soon as possible.       We now have PWIC (Pediatric Walk in Care)  Monday-Friday from 7:30-4. Simply walk in and be seen for your urgent needs like cough, fever, rash, diarrhea or vomiting, pink eye, UTI. No appointments needed. Ask one of the team for more information      -Your Care Team:    Dr. Trice Medeiros - Internal Medicine/Pediatrics   Dr. Guillaume Pisano - Family Medicine  Dr. Areli Pathak - Pediatrics  Dr. Jacqui Gil - Pediatrics  Perri Carias CNP - Family Practice Nurse Practitioner                           Follow-ups after your visit        Additional Services     GASTROENTEROLOGY ADULT REF PROCEDURE ONLY [facility] [phone]; No Preference - GI Provider Only           NEUROPSYCHOLOGY REFERRAL       Your provider has referred you to:    [facility]: Adult Neuropsychology Clinic - [place] [phone] Preferred Provider: No preferred provider   [email or web address]    All scheduling is subject  to the client's specific insurance plan & benefits, provider/location availability, and provider clinical specialities.  Please arrive 15 minutes early for your first appointment and bring your completed paperwork.    Please be aware that coverage of these services is subject to the terms and limitations of your health insurance plan.  Call member services at your health plan with any benefit or coverage questions.    Please bring the following to your appointment:  >>   Any x-rays, CTs or MRIs which have been performed.  Contact the facility where they were done to arrange for  prior to your scheduled appointment.  Any new CT, MRI or other procedures ordered by your specialist must be performed at a [facility] or coordinated by your clinic's referral office.    >>   List of current medications   >>   This referral request   >>   Any documents/labs given to you for this referral            OTOLARYNGOLOGY REFERRAL       Your provider has referred you to: [facility]: [facility] [phone]   [email or web address]    Please be aware that coverage of these services is subject to the terms and limitations of your health insurance plan.  Call member services at your health plan with any benefit or coverage questions.      Please bring the following with you to your appointment:    (1) Any X-Rays, CTs or MRIs which have been performed.  Contact the facility where they were done to arrange for  prior to your scheduled appointment.   (2) List of current medications  (3) This referral request   (4) Any documents/labs given to you for this referral                  Future tests that were ordered for you today     Open Future Orders        Priority Expected Expires Ordered    CT Chest w/o Contrast Routine  11/12/2019 11/12/2018    XR Pelvis and Hip Bilateral 2 Views Routine 11/12/2018 11/12/2019 11/12/2018            Who to contact      If you have questions or need follow up information about today's clinic visit or your schedule please contact [facility] directly at [phone].  Normal or non-critical lab and imaging results will be communicated to you by [employer], letter or phone within 4 business days after the clinic has received the results. If you do not hear from us within 7 days, please contact the clinic through [employer] or phone. If you have a critical or abnormal lab result, we will notify you by phone as soon as possible.  Submit refill requests through [employer] or call your pharmacy and they will forward the refill request to us. Please allow 3 business days for your refill to be completed.          Additional Information About Your Visit        [employer] Information     [employer] gives you secure access to your electronic health record. If you see a primary care provider, you can also send messages to your care team and make appointments. If you have questions, please call your primary care clinic.  If you do not have a primary care provider, please call [phone] and they will assist you.      [employer] is an electronic gateway that provides easy, online access to your medical records. With [employer], you can request a clinic appointment, read your test results, renew a prescription or communicate with your care team.     To access your existing account, please contact your [facility] or call [phone] for assistance.        Care EveryWhere ID     This is your Care EveryWhere ID. This could be used by other organizations to access your [place] medical records  [id number]        Your Vitals Were     Pulse Temperature Last Period Pulse Oximetry Breastfeeding? BMI (Body Mass Index)    115 97.2  F (36.2  C) (Temporal) 11/18/1991 (Exact Date) 96% No 34.41 kg/m2       Blood Pressure from Last 3 Encounters:   11/12/18 111/60   08/23/18 126/74   08/14/18 113/71    Weight from Last 3  Encounters:   11/12/18 219 lb 11.2 oz (99.7 kg)   08/23/18 229 lb (103.9 kg)   08/14/18 227 lb (103 kg)              We Performed the Following     GASTROENTEROLOGY ADULT REF PROCEDURE ONLY [facility] [phone]; No Preference - GI Provider Only     NEUROPSYCHOLOGY REFERRAL     OTOLARYNGOLOGY REFERRAL        Primary Care Provider Office Phone # Fax #    Trice Medeiros MD PhD [phone]       [address]  [facility] [address]        Equal Access to Services     [facility]: Hadii aad ku hadasho Soomaali, waaxda luqadaha, qaybta kaalmada adeegyada, waxay idiin hayaan adetyler hallman . So [facility] [phone].    ATENCIÓN: Si habla español, tiene a arauz disposición servicios gratuitos de asistencia lingüística. [facility] [phone].    We comply with applicable federal civil rights laws and [place] laws. We do not discriminate on the basis of race, color, national origin, age, disability, sex, sexual orientation, or gender identity.            Thank you!     Thank you for choosing [facility]  for your care. Our goal is always to provide you with excellent care. Hearing back from our patients is one way we can continue to improve our services. Please take a few minutes to complete the written survey that you may receive in the mail after your visit with us. Thank you!             Your Updated Medication List - Protect others around you: Learn how to safely use, store and throw away your medicines at www.disposemymeds.org.          This list is accurate as of 11/12/18  2:01 PM.  Always use your most recent med list.                   Brand Name Dispense Instructions for use Diagnosis    clotrimazole 10 MG klely     70 Kelly    Place 1 Kelly (10 mg) inside cheek 5 times daily    Thrush       continuous blood glucose monitoring sensor     3 each    For use with Freestyle Danni Flash  for continuous monitioring of blood glucose levels. Replace sensor every 10 days.  "   Type 2 diabetes mellitus with hyperglycemia, with long-term current use of insulin (H)       DULoxetine 30 MG EC capsule    CYMBALTA    180 capsule    Take 1 capsule (30 mg) by mouth 2 times daily    Depression with anxiety       * FREESTYLE MELINA READER Britney     1 Device    1 Units 3 times daily as needed    Uncontrolled type 2 diabetes mellitus with stage 3 chronic kidney disease, with long-term current use of insulin (H)       * FREESTYLE MELINA READER Britney     1 Device    1 Units daily    Type 2 diabetes mellitus with hyperglycemia, with long-term current use of insulin (H)       * gabapentin 300 MG capsule    NEURONTIN    90 capsule    Take 1 capsule (300 mg) by mouth 3 times daily    Chronic pain of both knees       * gabapentin 600 MG tablet    NEURONTIN    270 tablet    TAKE 1 TABLET THREE TIMES A DAY    Diabetic polyneuropathy associated with type 2 diabetes mellitus (H)       glipiZIDE 10 MG tablet    GLUCOTROL    90 tablet    Take 1 tablet (10 mg) by mouth daily (with dinner)    Type 2 diabetes mellitus with hyperglycemia, with long-term current use of insulin (H)       insulin syringe-needle U-100 31G X 5/16\" 1 ML    RELION INSULIN SYRINGE    200 each    Use 2 syringes daily or as directed.    Type 2 diabetes mellitus without complication (H)       lisinopril 5 MG tablet    PRINIVIL/ZESTRIL    90 tablet    Take 1 tablet (5 mg) by mouth daily    Essential hypertension with goal blood pressure less than 140/90       MELATONIN PO      10 mg At Bedtime        metFORMIN 500 MG 24 hr tablet    GLUCOPHAGE-XR    360 tablet    Take 2 tablets (1,000 mg) by mouth 2 times daily (with meals)    Type 2 diabetes mellitus with hyperglycemia, with long-term current use of insulin (H)       minocycline 100 MG capsule    MINOCIN/DYNACIN    180 capsule    Take 1 capsule (100 mg) by mouth every 12 hours    Acne vulgaris       NovoLIN N VIAL 100 UNIT/ML injection   Generic drug:  insulin NPH      Inject 64 units " Subcutaneous 2 times daily. (gets this from OTC NPH at [facility])    Uncontrolled type 2 diabetes mellitus with stage 3 chronic kidney disease, with long-term current use of insulin (H)       omeprazole 40 MG capsule    priLOSEC    90 capsule    TAKE 1 CAPSULE DAILY 30 TO 60 MINUTES BEFORE A MEAL    Gastroesophageal reflux disease without esophagitis       ONE TOUCH LANCETS      None Entered        ONE TOUCH TEST STRIPS VI      None Entered        order for DME     1 Device    Equipment being ordered: Walker with seat light weight    Back pain, unspecified back location, unspecified back pain laterality, unspecified chronicity       oxybutynin chloride 15 MG Tb24     90 tablet    Take 1 tablet (15 mg) by mouth daily    Urinary urgency, Urgency incontinence       simvastatin 20 MG tablet    ZOCOR    90 tablet    Take 1 tablet (20 mg) by mouth At Bedtime    Hyperlipidemia LDL goal <100       spironolactone 100 MG tablet    ALDACTONE    90 tablet    TAKE 1 TABLET EVERY MORNING    Essential hypertension with goal blood pressure less than 140/90       traZODone 100 MG tablet    DESYREL    90 tablet    Take 1 tablet (100 mg) by mouth nightly as needed for sleep        vitamin D 1000 units capsule      1 CAPSULE DAILY        * Notice:  This list has 4 medication(s) that are the same as other medications prescribed for you. Read the directions carefully, and ask your doctor or other care provider to review them with you.

## 2018-11-12 NOTE — PROGRESS NOTES
SUBJECTIVE:   Radha Corbett is a 67 year old female who presents to clinic today for the following health issues:    1. Oral thrush x 6 months, patient notes not getting better-would like a new script for medication.  Has been treated a couple time and coating on tongue does not go away     2. Has memory concerns  Complains that forgot her grandchildren names   States she is searching for words in the last year  Mostly the word searching and has not gotten lost driving and has not forgotten to shut off appliances  Will watch the [employer] and will forget people on the show that she should know     Bilateral Hip  Pain    Onset: 3-4 months    Description:   Location: left hip and right hip  Character: Sharp and Dull ache    Intensity: severe    Progression of Symptoms: same    Accompanying Signs & Symptoms:  Other symptoms: none    History:   Previous similar pain: no       Precipitating factors:   Trauma or overuse: YES- fall    Alleviating factors:  Improved by: nothing    Therapies Tried and outcome: none  About 3 or 4 months fell and continuing to have intermittent significant pain which last seconds in the groin in both sides   Knees went out and fell down on knees and feet were caught under her   Since then has had bilateral hip pain     Problem list and histories reviewed & adjusted, as indicated.  Additional history: as documented    Patient Active Problem List   Diagnosis     Macular degeneration     Myopia     Hiatal hernia     IBS (irritable bowel syndrome)     Hypertension goal BP (blood pressure) < 140/90     GERD (gastroesophageal reflux disease)     Atypical ductal hyperplasia of breast     Atypical lobular hyperplasia of breast     Benign Breast Disease (PASH)     Family history of breast cancer in sister     Type 2 diabetes, HbA1C goal < 8% (H)     Hyperlipidemia LDL goal <100     Breast cancer screening-high risk     Tubular adenoma of colon     Osteopenia     Alcohol ingestion, more than 4  drinks/day on alcohol screening     Umbilical hernia     Incontinence of urine     Stool incontinence     Osteoarthritis, knee     Rosacea     Anxiety     Pseudophakia     Abnormal cervical Pap smear with positive HPV DNA test     Depression with anxiety     Retinal detachment     Elevated liver function tests     Rectocele     Unexplained endometrial cells on cervical cytology     Back pain, unspecified back location, unspecified back pain laterality, unspecified chronicity     Urge incontinence     Dislocation of shoulder region     Acute lower GI bleeding     Morbid obesity (H)     Moderate episode of recurrent major depressive disorder (H)     Past Surgical History:   Procedure Laterality Date     BREAST LUMPECTOMY, RT/LT      x2, left     CATARACT IOL, RT/LT  4/99    left, MZ60CD 7.0D     COLONOSCOPY       COLONOSCOPY N/A 1/22/2016    Procedure: COMBINED COLONOSCOPY, SINGLE OR MULTIPLE BIOPSY/POLYPECTOMY BY BIOPSY;  Surgeon: Praful Benjamin MD;  Location: [facility]     COLONOSCOPY WITH CO2 INSUFFLATION N/A 1/22/2016    Procedure: COLONOSCOPY WITH CO2 INSUFFLATION;  Surgeon: Praful Benjamin MD;  Location: [facility]     CRYOTHERAPY  2000    cervical     CRYOTHERAPY Left 3/19/2015    Procedure: CRYOTHERAPY;  Surgeon: Keiko Puri MD;  Location:  [facility]     DILATION AND CURETTAGE, HYSTEROSCOPY DIAGNOSTIC, COMBINED N/A 1/19/2016    Procedure: COMBINED DILATION AND CURETTAGE, HYSTEROSCOPY DIAGNOSTIC;  Surgeon: Yeni Aparicio DO;  Location: [facility] OR     IMPLANT STIMULATOR AND LEADS SACRAL NERVE (STAGE ONE AND TWO) N/A 7/24/2018    Procedure: IMPLANT STIMULATOR AND LEADS SACRAL NERVE (STAGE ONE AND TWO);  Interstim Implant Stage One and Two (Implant Permanent Lead and Generator);  Surgeon: Dada Baltazar MD;  [facility]     IMPLANT STIMULATOR SACRAL NERVE PERCUTANEOUS TRIAL N/A 6/19/2018    Procedure: IMPLANT STIMULATOR SACRAL NERVE PERCUTANEOUS TRIAL;  Percutaneous Neural  Examination (trial for interstim);  Surgeon: Dada Baltazar MD;  Location: [facility]     LAPAROSCOPIC TUBAL LIGATION       PHACOEMULSIFICATION CLEAR CORNEA WITH STANDARD INTRAOCULAR LENS IMPLANT  8/15/2013    Procedure: PHACOEMULSIFICATION CLEAR CORNEA WITH STANDARD INTRAOCULAR LENS IMPLANT;  RIGHT PHACOEMULSIFICATION CLEAR CORNEA WITH STANDARD INTRAOCULAR LENS IMPLANT ;  Surgeon: Trae Taylor MD;  Location: [facility]     SURGICAL HISTORY OF -       x4, ankle surgery       Social History   Substance Use Topics     Smoking status: Current Every Day Smoker     Packs/day: 0.50     Types: Cigarettes     Smokeless tobacco: Never Used     Alcohol use Yes      Comment: social/3-4 per week     Family History   Problem Relation Age of Onset     Hypertension Mother      Cerebrovascular Disease Mother      Arthritis Mother      Cardiovascular Mother      Circulatory Mother      Cerebrovascular Disease Father      Prostate Cancer Father 65      at 69     Asthma Brother      Prostate Cancer Brother 48     bone metastasis     Diabetes Sister      Hypertension Sister      Osteoporosis Sister      Depression Sister      Lipids Sister      Cancer Maternal Grandmother 95     spine     Breast Cancer Sister 39     also contralateral @53, mets to lungs     Cancer Sister 20     second uterine cancer in 30s also     Diabetes Sister      Hypertension Sister      Depression Sister      Osteoporosis Sister      Breast Cancer Sister 57     unilateral     Cancer Paternal Aunt 40     unknown type     Cancer Paternal Uncle      stomach;  in his 80s     Prostate Cancer Brother      Glaucoma No family hx of          Current Outpatient Prescriptions   Medication Sig Dispense Refill     clotrimazole 10 MG kelly Place 1 Kelly (10 mg) inside cheek 5 times daily 70 Kelly 0     Continuous Blood Gluc  (FREESTYLE MELINA READER) AYAN 1 Units daily 1 Device 0     Continuous Blood Gluc  (FREESTYLE MELINA READER) AYAN 1  "Units 3 times daily as needed 1 Device 0     continuous blood glucose monitoring (FREESTYLE MELINA) sensor For use with Freestyle Melina Flash  for continuous monitioring of blood glucose levels. Replace sensor every 10 days. 3 each 5     DULoxetine (CYMBALTA) 30 MG [facility] capsule Take 1 capsule (30 mg) by mouth 2 times daily 180 capsule 1     glipiZIDE (GLUCOTROL) 10 MG tablet Take 1 tablet (10 mg) by mouth daily (with dinner) 90 tablet 0     insulin NPH (NOVOLIN N VIAL) 100 UNIT/ML injection Inject 64 units Subcutaneous 2 times daily. (gets this from [facility] at [facility])       insulin syringe-needle U-100 (RELION INSULIN SYRINGE) 31G X 5/16\" 1 ML Use 2 syringes daily or as directed. 200 each 3     lisinopril (PRINIVIL/ZESTRIL) 5 MG tablet Take 1 tablet (5 mg) by mouth daily 90 tablet 3     MELATONIN PO 10 mg At Bedtime        metFORMIN (GLUCOPHAGE-XR) 500 MG 24 hr tablet Take 2 tablets (1,000 mg) by mouth 2 times daily (with meals) 360 tablet 1     minocycline (MINOCIN/DYNACIN) 100 MG capsule Take 1 capsule (100 mg) by mouth every 12 hours 180 capsule 2     omeprazole (PRILOSEC) 40 MG capsule TAKE 1 CAPSULE DAILY 30 TO 60 MINUTES BEFORE A MEAL 90 capsule 3     ONE TOUCH LANCETS None Entered       ONE TOUCH TEST STRIPS VI None Entered       order for DME Equipment being ordered: Walker with seat light weight 1 Device 0     oxybutynin chloride 15 MG TB24 Take 1 tablet (15 mg) by mouth daily 90 tablet 1     simvastatin (ZOCOR) 20 MG tablet Take 1 tablet (20 mg) by mouth At Bedtime 90 tablet 3     spironolactone (ALDACTONE) 100 MG tablet TAKE 1 TABLET EVERY MORNING 90 tablet 3     traZODone (DESYREL) 100 MG tablet Take 1 tablet (100 mg) by mouth nightly as needed for sleep 90 tablet 3     VITAMIN D 1000 UNIT OR CAPS 1 CAPSULE DAILY       gabapentin (NEURONTIN) 300 MG capsule Take 1 capsule (300 mg) by mouth 3 times daily 90 capsule 0     gabapentin (NEURONTIN) 600 MG tablet TAKE 1 TABLET THREE TIMES A DAY (Patient " not taking: Reported on 11/12/2018) 270 tablet 3     Allergies   Allergen Reactions     Codeine      Sulfa Drugs      Recent Labs   Lab Test  11/12/18   1311  08/23/18   1056  08/14/18   1219  07/19/18   1429  06/14/18   1401   05/15/18   1318   09/18/17   1450  06/22/17   0858  06/22/17   0857   12/05/16   1045  08/01/16   1344   05/04/16   1116   A1C  7.5*   --   8.1*   --    --    --   8.5*   < >  7.1*   --   5.9   --   6.5*  7.8*   < >  10.7*   LDL   --   90   --    --   138*   --    --    --    --    --   72   --    --    --    --   88   HDL   --   45*   --    --    --    --    --    --    --    --   69   --    --    --    --   51   TRIG   --   154*   --    --    --    --    --    --    --    --   106   --    --    --    --   101   ALT   --    --   22   --    --    --    --    --    --   28   --    --    --   37   --   49   CR   --    --   0.56  0.53   --    < >   --    < >   --    --    --    --    --   0.69   --   0.43*   GFRESTIMATED   --    --   >90  >90   --    < >   --    < >   --    --    --    < >   --   85   --   >90  Non  GFR Calc     GFRESTBLACK   --    --   >90  >90   --    < >   --    < >   --    --    --    < >   --   >90   GFR Calc     --   >90   GFR Calc     POTASSIUM   --    --   4.2  4.0   --    < >   --    < >   --    --    --    --    --   4.0   --   4.1   TSH   --    --    --    --    --    --    --    --   0.63   --    --    --   0.79   --    --    --     < > = values in this interval not displayed.      BP Readings from Last 3 Encounters:   11/12/18 111/60   08/23/18 126/74   08/14/18 113/71    Wt Readings from Last 3 Encounters:   11/12/18 219 lb 11.2 oz (99.7 kg)   08/23/18 229 lb (103.9 kg)   08/14/18 227 lb (103 kg)                  Labs reviewed in EPIC    Reviewed and updated as needed this visit by clinical staff  Tobacco  Allergies  Meds  Med Hx  Surg Hx  Fam Hx  Soc Hx      Reviewed and updated as needed this visit by  Provider         ROS:  CONSTITUTIONAL:NEGATIVE for fever, chills, change in weight  INTEGUMENTARY/SKIN: NEGATIVE for rash   ENT/MOUTH: POSITIVE for persistent appearance of thrush   RESP:NEGATIVE for significant cough or SOB  CV: NEGATIVE for chest pain/chest pressure and palpitations  GI: POSITIVE for Hx GERD and NEGATIVE for nausea and vomiting  MUSCULOSKELETAL: POSITIVE  for joint pain bilateral hips  and NEGATIVE for joint swelling  and joint warmth   NEURO: POSITIVE for memory problems and NEGATIVE for HX CVA, HX seizure D/O, involuntary movements, gait disturbance and loss of consciousness  ENDOCRINE: POSITIVE  for HX diabetes and NEGATIVE for polydipsia, polyphagia and polyuria  PSYCHIATRIC: NEGATIVE for changes in mood or affect and POSITIVE forHx anxiety    OBJECTIVE:     /60 (BP Location: Right arm, Patient Position: Sitting, Cuff Size: Adult Large)  Pulse 115  Temp 97.2  F (36.2  C) (Temporal)  Wt 219 lb 11.2 oz (99.7 kg)  LMP 11/18/1991 (Exact Date)  SpO2 96%  Breastfeeding? No  BMI 34.41 kg/m2  Body mass index is 34.41 kg/(m^2).   Wt Readings from Last 4 Encounters:   11/12/18 219 lb 11.2 oz (99.7 kg)   08/23/18 229 lb (103.9 kg)   08/14/18 227 lb (103 kg)   07/24/18 233 lb (105.7 kg)       GENERAL APPEARANCE: alert, active and no distress  HENT: ear canals and TM's normal, oral mucous membranes moist with some appearance of persistent coated toungue, normal cephalic/atraumatic  RESP: lungs clear to auscultation - no rales, rhonchi or wheezes  CV: regular rates and rhythm and no murmur, click or rub  ABDOMEN: soft, non-tender  MS: extremities normal- no gross deformities noted  ORTHO: EXTREMITIES: No palpable pain. ROM limited in effected hip with external rotation. No lateral/gluteal pain. Good pulses. No edema.   SKIN: no suspicious lesions or rashes  NEURO: Normal strength and tone, mentation intact and speech normal  PSYCH: mentation appears normal, patient appearance-- and  anxious  MENTAL STATUS EXAM:  Appearance/Behavior: No apparent distress, Casually groomed and Dressed appropriately for weather  Speech: Normal  Mood/Affect: anxiety  Insight: Adequate    Diagnostic Test Results:  Recent Results (from the past 672 hour(s))   Hemoglobin A1c    Collection Time: 11/12/18  1:11 PM   Result Value Ref Range    Hemoglobin A1C 7.5 (H) 0 - 5.6 %   **Comprehensive metabolic panel FUTURE anytime    Collection Time: 11/12/18  1:11 PM   Result Value Ref Range    Sodium 137 133 - 144 mmol/L    Potassium 4.1 3.4 - 5.3 mmol/L    Chloride 100 94 - 109 mmol/L    Carbon Dioxide 28 20 - 32 mmol/L    Anion Gap 9 3 - 14 mmol/L    Glucose 283 (H) 70 - 99 mg/dL    Urea Nitrogen 11 7 - 30 mg/dL    Creatinine 0.52 0.52 - 1.04 mg/dL    GFR Estimate >90 >60 mL/min/1.7m2    GFR Estimate If Black >90 >60 mL/min/1.7m2    Calcium 9.4 8.5 - 10.1 mg/dL    Bilirubin Total 0.4 0.2 - 1.3 mg/dL    Albumin 3.9 3.4 - 5.0 g/dL    Protein Total 8.0 6.8 - 8.8 g/dL    Alkaline Phosphatase 93 40 - 150 U/L    ALT 24 0 - 50 U/L    AST 13 0 - 45 U/L   **CBC with platelets FUTURE anytime    Collection Time: 11/12/18  1:11 PM   Result Value Ref Range    WBC 9.7 4.0 - 11.0 10e9/L    RBC Count 4.82 3.8 - 5.2 10e12/L    Hemoglobin 10.6 (L) 11.7 - 15.7 g/dL    Hematocrit 36.5 35.0 - 47.0 %    MCV 76 (L) 78 - 100 fl    MCH 22.0 (L) 26.5 - 33.0 pg    MCHC 29.0 (L) 31.5 - 36.5 g/dL    RDW 18.0 (H) 10.0 - 15.0 %    Platelet Count 349 150 - 450 10e9/L   Ferritin    Collection Time: 11/12/18  1:11 PM   Result Value Ref Range    Ferritin 3 (L) 8 - 252 ng/mL   Iron and iron binding capacity    Collection Time: 11/12/18  1:11 PM   Result Value Ref Range    Iron 24 (L) 35 - 180 ug/dL    Iron Binding Cap 424 240 - 430 ug/dL    Iron Saturation Index 6 (L) 15 - 46 %   Folate    Collection Time: 11/12/18  1:11 PM   Result Value Ref Range    Folate Canceled, Test credited >5.4 ng/mL   Vitamin B12    Collection Time: 11/12/18  1:11 PM   Result Value  Ref Range    Vitamin B12 338 193 - 986 pg/mL   Albumin Random Urine Quantitative with Creat Ratio    Collection Time: 11/12/18  2:05 PM   Result Value Ref Range    Creatinine Urine 127 mg/dL    Albumin Urine mg/L 35 mg/L    Albumin Urine mg/g Cr 27.64 (H) 0 - 25 mg/g Cr     Recent Results (from the past 744 hour(s))   XR Pelvis and Hip Bilateral 2 Views    Narrative    Exam: Single frontal view of both hips and AP and frog-leg lateral  view of each hip dated 11/12/2018.    COMPARISON: Radiographs dated 8/13/2018.    CLINICAL HISTORY: Hip pain.    FINDINGS: Single frontal view of both hips and AP and frog-leg lateral  view of each hip was obtained. Marked osteoarthrosis in the bilateral  hip joints with joint space narrowing as well as osteophytic spurring.  No femoral head collapse. No displaced fractures. Stimulator type  device overlying the right iliac bone.      Impression    IMPRESSION: Marked osteoarthrosis in the bilateral hip joints.    DEREK LOMAS MD   CT Chest w/o Contrast    Narrative    EXAMINATION: Chest CT  11/12/2018 4:31 PM    CLINICAL HISTORY: ; Loss of weight    COMPARISON: Chest radiograph 5/25/2018.    TECHNIQUE: CT imaging obtained through the chest without contrast.  Axial, coronal, and sagittal reconstructions and axial MIP reformatted  images are reviewed.     FINDINGS:  Lungs: No focal airspace opacities. No pleural effusion or  pneumothorax. There are a few scattered sub-5 mm pulmonary nodules,  including a 3 mm right upper lobe nodule, a 3 mm right lower lobe  pulmonary nodule, and a 2 mm left upper lobe nodule (series 6 images  97, 226, and 52, respectively). Dependent atelectasis in the lung  bases.    Mediastinum: Hypodense focus in the right thyroid measuring 1.6 x 1.1  cm. The central tracheobronchial tree is patent. Heart size is normal.  No pericardial effusion. Normal caliber of the aorta and main  pulmonary artery. Normal branching pattern of the great vessels. No  thoracic  lymphadenopathy.     Bones and soft tissues: Degenerative changes of the right shoulder and  acromioclavicular joint. Chronic wedge deformity of T12. No acute bony  abnormality. No suspicious bone findings.     Upper Abdomen: Limited evaluation of the upper abdomen demonstrates a  hypodense focus in the left lobe of the liver measuring 0.8 x 1.1 cm,  favored to represent simple cyst. Another hypodense focus in the right  lobe of the liver is too small to adequately characterize. Remainder  of the upper abdomen is unremarkable.      Impression    IMPRESSION:   1. Scattered bilateral pulmonary nodules measuring up to 3 mm.  Consider follow-up CT in 12 months patient is considered high risk.  2. Hypodense right thyroid nodule measuring up to 1.6 cm. This could  be further characterized with ultrasound.  3. A few hypodensities in the liver measuring up to 1.1 cm, favored to  represent hepatic cysts.    I have personally reviewed the examination and initial interpretation  and I agree with the findings.    LISET DUMONT MD   US Thyroid    Narrative    EXAMINATION: US THYROID, 11/16/2018 2:24 PM     COMPARISON: None.    HISTORY: Thyroid nodule visualized on CT dated 11/12/2018    Technique: Grayscale and color ultrasound imaging of the thyroid was  performed.    Findings:    Thyroid parenchyma: heterogenous    The right lobe of the thyroid measures: 5.6 x 2.1 x 2.0 cm     The thyroid isthmus measures: 0.4 cm     The left lobe of the thyroid measures: 4.5 x 1.5 x 1.7 cm     Right lobe:    Nodule 1:  Nodule measurement: 1.5 x 1.5 x 1.1 cm, mid lobe  Echogenicity: Heterogeneous  Consistency: mixed  Calcifications: no  Hypervascular: no  Interval growth (>20%): Not applicable    Nodule 2:  Nodule measurement: 1.0 x 1.1 x 0.7 cm, inferior lobe  Echogenicity: Heterogeneous  Consistency: mixed  Calcifications: no  Hypervascular: no  Interval growth (>20%): Applicable    Nodule 3:  Nodule measurement: 1.2 x 1.2 x 1.2 cm,  inferior lobe  Echogenicity: Heterogeneous, predominantly isoechoic  Consistency: mixed  Calcifications: no  Hypervascular: no  Interval growth (>20%): Not applicable    Isthmus: No nodule    Left Lobe:   Nodule 4:  Nodule measurement: 0.7 x 0.6 x 0.4 cm, mid lobe  Echogenicity: Iso to hyperechoic  Consistency: solid  Calcifications: no  Hypervascular: no  Interval growth (>20%): Not applicable    Incidental tiny cystic nodule in the mid left lobe of the thyroid  measuring up to 4 mm.      Impression    Impression:  Bilateral thyroid nodules as above.  Thyroid nodule #2  and possibly a component of adjacent right lobe nodules appear to  correlate with CT 11/12/2018.  Consider endocrinology consultation for  further management.    PILY KHAN MD       ASSESSMENT/PLAN:     Radha was seen today for fall and mouth/lip problem.    Diagnoses and all orders for this visit:    Coated tongue  -     OTOLARYNGOLOGY REFERRAL    Memory changes  -     NEUROPSYCHOLOGY REFERRAL    Bilateral hip pain  -     XR Pelvis and Hip Bilateral 2 Views; Future    Hypertension goal BP (blood pressure) < 140/90  -     Albumin Random Urine Quantitative with Creat Ratio    Type 2 diabetes mellitus with hyperglycemia, with long-term current use of insulin (H)    Tubular adenoma of colon    Gastroesophageal reflux disease, esophagitis presence not specified  -     GASTROENTEROLOGY ADULT REF PROCEDURE ONLY [facility] [phone]; No Preference - GI Provider Only    Loss of weight  -     GASTROENTEROLOGY ADULT REF PROCEDURE ONLY [facility] [phone]; No Preference - GI Provider Only  -     CT Chest w/o Contrast; Future    Anemia, unspecified type  -     GASTROENTEROLOGY ADULT REF PROCEDURE ONLY [facility] [phone]; No Preference - GI Provider Only  -     Ferritin  -     Iron and iron binding capacity  -     Folate  -     Vitamin B12    PLAN:    Patient needs to follow up in if no improvement,or sooner if worsening of  symptoms or other symptoms develop.  FURTHER TESTING:       - pelvic xrays and chest CT scan   CONSULTATION/REFERRAL to Otolaryngology  Refer neuropsych   Endoscopy to be scheduled   Will follow up and/or notify patient of  results via My Chart to determine further need for followup      See Patient Instructions    NIKKY Lutz CNP  [facility]

## 2018-11-12 NOTE — PATIENT INSTRUCTIONS
PLAN:   1.   Symptomatic therapy suggested: Continue current medications.  2.  Orders Placed This Encounter   Procedures     XR Pelvis and Hip Bilateral 2 Views     CT Chest w/o Contrast     OTOLARYNGOLOGY REFERRAL     NEUROPSYCHOLOGY REFERRAL     GASTROENTEROLOGY ADULT REF PROCEDURE ONLY [facility] [phone]; No Preference - GI Provider Only       3. Patient needs to follow up in if no improvement,or sooner if worsening of symptoms or other symptoms develop.  FURTHER TESTING:       - pelvic xrays and chest CT scan   CONSULTATION/REFERRAL to Otolaryngology  Refer neuropsych   Endoscopy to be scheduled   Will follow up and/or notify patient of  results via My Chart to determine further need for followup    It was a pleasure seeing you today at the [facility]. Thank you for allowing us to care for you today. We truly hope we provided you with the excellent service you deserve. Please let us know if there is anything else we can do for you so we can be sure you are leaving completley satisfied with your care experience.       General information about your clinic   Clinic Hours Lab Hours (Appointments are required)   Mon-Thurs: 7:30 AM - 7 PM Mon-Thurs: 7:30 AM - 7 PM   Fri: 7:30 AM - 5 PM Fri: 7:30 AM - 5 PM        After Hours Nurse Advise & Appts:  [facility]: [phone]  [facility] On Call: to make appointments anytime: [phone] On Call Physician: call [phone] and answering service will page the on call physician.        For urgent appointments, please call [phone] and ask for the triage nurse or your care team clinic nurse.  How to contact my care team:  MyChart: [email or web address]   Phone: [phone]   Fax: [phone]       [facility]:   Phone: [phone]  Hours: 8:00 AM - 6:00 PM  Medication Refills:  Call your pharmacy and they will forward the refill to us. Please allow 3 business days for your refills to be completed.        Normal or non-critical lab and imaging results will be communicated to you by MyChart, letter or phone within 7 days.  If you do not hear from us within 10 days, please call the clinic. If you have a critical or abnormal lab result, we will notify you by phone as soon as possible.       We now have PWIC (Pediatric Walk in Care)  Monday-Friday from 7:30-4. Simply walk in and be seen for your urgent needs like cough, fever, rash, diarrhea or vomiting, pink eye, UTI. No appointments needed. Ask one of the team for more information      -Your Care Team:    Dr. Trice Medeiros - Internal Medicine/Pediatrics   Dr. Guillaume Pisano - Family Medicine  Dr. Areli Pathak - Pediatrics  Dr. Jacqui Gil - Pediatrics  Perri Carias CNP - Family Practice Nurse Practitioner

## 2018-11-12 NOTE — NURSING NOTE
"Chief Complaint   Patient presents with     Fall     fell 3x4 months lower pelvic pain     Mouth/Lip Problem     oral thrush x 6 months, patient notes not getting better-would like a new script for medication       Initial /60 (BP Location: Right arm, Patient Position: Sitting, Cuff Size: Adult Large)  Pulse 115  Temp 97.2  F (36.2  C) (Temporal)  Wt 219 lb 11.2 oz (99.7 kg)  LMP 11/18/1991 (Exact Date)  SpO2 96%  Breastfeeding? No  BMI 34.41 kg/m2 Estimated body mass index is 34.41 kg/(m^2) as calculated from the following:    Height as of 8/14/18: 5' 7\" (1.702 m).    Weight as of this encounter: 219 lb 11.2 oz (99.7 kg).  Medication Reconciliation: complete      BOBY Lyons      "

## 2018-11-12 NOTE — PROGRESS NOTES
Medina Corbett,    Attached are your test results.  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    Perri Carias, CNP

## 2018-11-13 ENCOUNTER — TELEPHONE (OUTPATIENT)
Dept: PEDIATRICS | Facility: CLINIC | Age: 68
End: 2018-11-13

## 2018-11-13 ENCOUNTER — PRE VISIT (OUTPATIENT)
Dept: OTOLARYNGOLOGY | Facility: CLINIC | Age: 68
End: 2018-11-13

## 2018-11-13 NOTE — TELEPHONE ENCOUNTER
Patient was looking for results.  Gave her these results, [facility]. She verbalized understanding.    Notes Recorded by Perri Carias APRN CNP on 11/12/2018 at 9:25 PM  Medina Corbett,    Attached are your test results.  Your CAT scan showed several nodules we can follow up in 1 year  Nodule seen in your thyroid. Need to do an ultrasound to assess closer  I will place order. Please call [phone] to schedule.     Please contact us if you have any questions.    Perri Carias CNP    Notes Recorded by Perri Carias APRN CNP on 11/12/2018 at 4:39 PM  Medina Corbett,    Attached are your test results.  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    DEIDRA Villa RN, [facility]

## 2018-11-13 NOTE — TELEPHONE ENCOUNTER
Patient states that she has a coated tongue and has been treated multiple times but it doesn't get better- all records in Epic.  Patsy Juarez CMA ([facility])

## 2018-11-13 NOTE — PROGRESS NOTES
Medina Corbett,    Attached are your test results.  Your CAT scan showed several nodules we can follow up in 1 year  Nodule seen in your thyroid. Need to do an ultrasound to assess closer  I will place order. Please call [phone] to schedule.     Please contact us if you have any questions.    Perri Carias, CNP

## 2018-11-13 NOTE — PROGRESS NOTES
Medina Corbett,    Attached are your test results.  -Microalbumin (urine protein) level is elevated. This is suggestive of early damage to your kidneys from high blood pressure.  ADVISE: avoiding anti-inflamatory agents such as ibuprofen (Advil, Motrin) or naproxen (Aleve) as much as possible, keeping your blood pressure in a normal range, and continuing your medication (lisinopril) that helps protect your kidneys.  Also, this should be rechecked in 1 year.    Please contact us if you have any questions.    Perri Carias, CNP

## 2018-11-13 NOTE — PROGRESS NOTES
Medina Corbett,    Attached are your test results.  -Liver and gallbladder tests are normal (ALT,AST, Alk phos, bilirubin), kidney function is normal (Cr, GFR), sodium is normal, potassium is normal, calcium is normal, glucose is normal.  -Hemoglobin is decreased indicating anemia.  Anemia can cause fatigue and, occasionally, light-headedness.  ADVISE: Will have you schedule scope as planned   -White blood cell and platelet counts are normal.  -A1C (test of diabetes control the last 2-3 months) is closer to  your goal. Please recheck your A1C test in 3 months.    Please contact us if you have any questions.    Perri Carias, CNP

## 2018-11-16 ENCOUNTER — RADIANT APPOINTMENT (OUTPATIENT)
Dept: ULTRASOUND IMAGING | Facility: CLINIC | Age: 68
End: 2018-11-16
Attending: NURSE PRACTITIONER
Payer: COMMERCIAL

## 2018-11-16 ENCOUNTER — TELEPHONE (OUTPATIENT)
Dept: PEDIATRICS | Facility: CLINIC | Age: 68
End: 2018-11-16

## 2018-11-16 ENCOUNTER — OFFICE VISIT (OUTPATIENT)
Dept: OTOLARYNGOLOGY | Facility: CLINIC | Age: 68
End: 2018-11-16
Attending: NURSE PRACTITIONER
Payer: COMMERCIAL

## 2018-11-16 VITALS
WEIGHT: 219 LBS | HEART RATE: 118 BPM | DIASTOLIC BLOOD PRESSURE: 65 MMHG | SYSTOLIC BLOOD PRESSURE: 111 MMHG | BODY MASS INDEX: 35.2 KG/M2 | HEIGHT: 66 IN

## 2018-11-16 DIAGNOSIS — B37.0 THRUSH, ORAL: Primary | ICD-10-CM

## 2018-11-16 DIAGNOSIS — D64.9 ANEMIA, UNSPECIFIED TYPE: Primary | ICD-10-CM

## 2018-11-16 DIAGNOSIS — E04.2 MULTIPLE THYROID NODULES: Primary | ICD-10-CM

## 2018-11-16 LAB
FERRITIN SERPL-MCNC: 3 NG/ML (ref 8–252)
FOLATE SERPL-MCNC: NORMAL NG/ML
IRON SATN MFR SERPL: 6 % (ref 15–46)
IRON SERPL-MCNC: 24 UG/DL (ref 35–180)
TIBC SERPL-MCNC: 424 UG/DL (ref 240–430)
VIT B12 SERPL-MCNC: 338 PG/ML (ref 193–986)

## 2018-11-16 PROCEDURE — 99202 OFFICE O/P NEW SF 15 MIN: CPT | Performed by: OTOLARYNGOLOGY

## 2018-11-16 PROCEDURE — 76536 US EXAM OF HEAD AND NECK: CPT | Performed by: STUDENT IN AN ORGANIZED HEALTH CARE EDUCATION/TRAINING PROGRAM

## 2018-11-16 RX ORDER — FERROUS SULFATE 325(65) MG
325 TABLET ORAL
Qty: 60 TABLET | Refills: 1 | Status: SHIPPED | OUTPATIENT
Start: 2018-11-16 | End: 2019-01-10

## 2018-11-16 RX ORDER — FLUCONAZOLE 150 MG/1
TABLET ORAL
Qty: 14 TABLET | Refills: 0 | Status: SHIPPED | OUTPATIENT
Start: 2018-11-16 | End: 2019-01-10

## 2018-11-16 ASSESSMENT — ENCOUNTER SYMPTOMS
SHORTNESS OF BREATH: 0
CONSTITUTIONAL NEGATIVE: 1
HEARTBURN: 0
NAUSEA: 0
DOUBLE VISION: 0
PHOTOPHOBIA: 0
DIZZINESS: 0
SPUTUM PRODUCTION: 0
TREMORS: 0
HEMOPTYSIS: 0
STRIDOR: 0
BRUISES/BLEEDS EASILY: 0
VOMITING: 0
SORE THROAT: 0
TINGLING: 0
HEADACHES: 0
BLURRED VISION: 0
SINUS PAIN: 0
COUGH: 0

## 2018-11-16 NOTE — Clinical Note
Spencer Medeiros, This patient is having oral tongue thrush likely due to minocycline intake and her co-morbidities, diabetes and anemia. I was wondering if you can stop her antibiotic if possible. Thank you,

## 2018-11-16 NOTE — NURSING NOTE
"Radha Corbett's goals for this visit include:   Chief Complaint   Patient presents with     Consult     Thrush - treated 3 times       She requests these members of her care team be copied on today's visit information: yes      PCP: Trice Medeiros    Referring Provider:  Perri Carias, APRN CNP  [address]  [place] [address]    /65  Pulse 118  Ht 1.676 m (5' 6\")  Wt 99.3 kg (219 lb)  LMP 11/18/1991 (Exact Date)  BMI 35.35 kg/m2    Patsy Juarez CMA ([facility])    "

## 2018-11-16 NOTE — MR AVS SNAPSHOT
After Visit Summary   11/16/2018    Radha Corbett    MRN: [id number]           Patient Information     Date Of Birth          1950        Visit Information        Provider Department      11/16/2018 1:00 PM Daniel West MD [facility]        Today's Diagnoses     Thrush, oral    -  1       Follow-ups after your visit        Your next 10 appointments already scheduled     Nov 16, 2018  2:00 PM CST   US THYROID with MGUS1, MG US TECH   [facility]    [address] [place] [address] [phone]           How do I prepare for my exam? (Food and drink instructions) No Food and Drink Restrictions.  How do I prepare for my exam? (Other instructions) You do not need to do anything special to prepare for your exam.  What should I wear: Wear comfortable clothes.  How long does the exam take: Most ultrasounds take 30 to 60 minutes.  What should I bring: Bring a list of your medicines, including vitamins, minerals and over-the-counter drugs. It is safest to leave personal items at home.  Do I need a :  No  is needed.  What do I need to tell my doctor: Tell your doctor about any allergies you may have.  What should I do after the exam: No restrictions, You may resume normal activities.  What is this test: An ultrasound uses sound waves to make pictures of the body. Sound waves do not cause pain. The only discomfort may be the pressure of the wand against your skin or full bladder.  Who should I call with questions: If you have any questions, please call the Imaging Department where you will have your exam. Directions, parking instructions, and other information is available on our website, [email or web address].            Nov 19, 2018  8:00 AM CST   (Arrive by 7:45 AM)   Neuropsych Eval with Misael Berger, PhD [facility] ([facility])    [address] [place] [address]  Floor  [facility] [address] [phone]            Nov 27, 2018   Procedure with William Charles Duane, MD   [facility] (--)    [address] [facility] [id number] [phone]            Nov 29, 2018  1:20 PM CST   New Visit with Juan Jose Carias DO   [facility]    [address]  [facility] [id number] [phone]              Who to contact     If you have questions or need follow up information about today's clinic visit or your schedule please contact [facility] directly at [phone].  Normal or non-critical lab and imaging results will be communicated to you by "[employer]hart, letter or phone within 4 business days after the clinic has received the results. If you do not hear from us within 7 days, please contact the clinic through "[employer]hart or phone. If you have a critical or abnormal lab result, we will notify you by phone as soon as possible.  Submit refill requests through [employer] or call your pharmacy and they will forward the refill request to us. Please allow 3 business days for your refill to be completed.          Additional Information About Your Visit        "[employer]harTriptease Information     [employer] gives you secure access to your electronic health record. If you see a primary care provider, you can also send messages to your care team and make appointments. If you have questions, please call your primary care clinic.  If you do not have a primary care provider, please call [phone] and they will assist you.      [employer] is an electronic gateway that provides easy, online access to your medical records. With [employer], you can request a clinic appointment, read your test results, renew a prescription or communicate with your care team.     To access your existing account, please contact your [facility] or call [phone] for assistance.        Care EveryWhere ID      "This is your Care EveryWhere ID. This could be used by other organizations to access your [place] medical records  [id number]        Your Vitals Were     Pulse Height Last Period BMI (Body Mass Index)          118 1.676 m (5' 6\") 11/18/1991 (Exact Date) 35.35 kg/m2         Blood Pressure from Last 3 Encounters:   11/16/18 111/65   11/12/18 111/60   08/23/18 126/74    Weight from Last 3 Encounters:   11/16/18 99.3 kg (219 lb)   11/12/18 99.7 kg (219 lb 11.2 oz)   08/23/18 103.9 kg (229 lb)              Today, you had the following     No orders found for display         Today's Medication Changes          These changes are accurate as of 11/16/18  1:59 PM.  If you have any questions, ask your nurse or doctor.               Start taking these medicines.        Dose/Directions    fluconazole 150 MG tablet   Commonly known as:  DIFLUCAN   Used for:  Thrush, oral   Started by:  Daniel West MD        Once a day for two weeks   Quantity:  14 tablet   Refills:  0            Where to get your medicines      These medications were sent to [facility] - [place], [place] - [address], [address]  [address], [facility] [address]     Phone:  [phone]     fluconazole 150 MG tablet                Primary Care Provider Office Phone # Fax #    Trice Medeiros MD [facility] [phone]       [address]  [facility] [address]        Equal Access to Services     [facility] : Hadii kyree phillipso Sokaren, waaxda luqadaha, qaybta kaalmada adesocorro, michael tineo. So [facility] [phone].    ATENCIÓN: Si habla español, tiene a arauz disposición servicios gratuitos de asistencia lingüística. Llame al [phone].    We comply with applicable federal civil rights laws and [place] laws. We do not discriminate on the basis of race, color, national origin, age, disability, sex, sexual orientation, or gender identity.            Thank you!     Thank " "you for choosing [facility]  for your care. Our goal is always to provide you with excellent care. Hearing back from our patients is one way we can continue to improve our services. Please take a few minutes to complete the written survey that you may receive in the mail after your visit with us. Thank you!             Your Updated Medication List - Protect others around you: Learn how to safely use, store and throw away your medicines at www.disposemymeds.org.          This list is accurate as of 11/16/18  1:59 PM.  Always use your most recent med list.                   Brand Name Dispense Instructions for use Diagnosis    clotrimazole 10 MG kelly     70 Kelly    Place 1 Kelly (10 mg) inside cheek 5 times daily    Thrush       continuous blood glucose monitoring sensor     3 each    For use with Freestyle Danni Flash  for continuous monitioring of blood glucose levels. Replace sensor every 10 days.    Type 2 diabetes mellitus with hyperglycemia, with long-term current use of insulin (H)       DULoxetine 30 MG EC capsule    CYMBALTA    180 capsule    Take 1 capsule (30 mg) by mouth 2 times daily    Depression with anxiety       fluconazole 150 MG tablet    DIFLUCAN    14 tablet    Once a day for two weeks    Thrush, oral       * FREESTYLE DANNI READER Britney     1 Device    1 Units 3 times daily as needed    Uncontrolled type 2 diabetes mellitus with stage 3 chronic kidney disease, with long-term current use of insulin (H)       * FREESTYLE DANNI READER Britney     1 Device    1 Units daily    Type 2 diabetes mellitus with hyperglycemia, with long-term current use of insulin (H)       glipiZIDE 10 MG tablet    GLUCOTROL    90 tablet    Take 1 tablet (10 mg) by mouth daily (with dinner)    Type 2 diabetes mellitus with hyperglycemia, with long-term current use of insulin (H)       insulin syringe-needle U-100 31G X 5/16\" 1 ML miscellaneous    RELION INSULIN SYRINGE    200 each    Use 2 " syringes daily or as directed.    Type 2 diabetes mellitus without complication (H)       lisinopril 5 MG tablet    PRINIVIL/ZESTRIL    90 tablet    Take 1 tablet (5 mg) by mouth daily    Essential hypertension with goal blood pressure less than 140/90       MELATONIN PO      10 mg At Bedtime        metFORMIN 500 MG 24 hr tablet    GLUCOPHAGE-XR    360 tablet    Take 2 tablets (1,000 mg) by mouth 2 times daily (with meals)    Type 2 diabetes mellitus with hyperglycemia, with long-term current use of insulin (H)       minocycline 100 MG capsule    MINOCIN/DYNACIN    180 capsule    Take 1 capsule (100 mg) by mouth every 12 hours    Acne vulgaris       NovoLIN N VIAL 100 UNIT/ML injection   Generic drug:  insulin NPH      Inject 64 units Subcutaneous 2 times daily. (gets this from [facility] at [facility])    Uncontrolled type 2 diabetes mellitus with stage 3 chronic kidney disease, with long-term current use of insulin (H)       omeprazole 40 MG capsule    priLOSEC    90 capsule    TAKE 1 CAPSULE DAILY 30 TO 60 MINUTES BEFORE A MEAL    Gastroesophageal reflux disease without esophagitis       ONE TOUCH LANCETS      None Entered        ONE TOUCH TEST STRIPS VI      None Entered        order for DME     1 Device    Equipment being ordered: Walker with seat light weight    Back pain, unspecified back location, unspecified back pain laterality, unspecified chronicity       oxybutynin chloride 15 MG 24 HR ER tablet    DITROPAN XL    90 tablet    Take 1 tablet (15 mg) by mouth daily    Urinary urgency, Urgency incontinence       simvastatin 20 MG tablet    ZOCOR    90 tablet    Take 1 tablet (20 mg) by mouth At Bedtime    Hyperlipidemia LDL goal <100       spironolactone 100 MG tablet    ALDACTONE    90 tablet    TAKE 1 TABLET EVERY MORNING    Essential hypertension with goal blood pressure less than 140/90       traZODone 100 MG tablet    DESYREL    90 tablet    Take 1 tablet (100 mg) by mouth nightly as needed for sleep         vitamin D 1000 units capsule      1 CAPSULE DAILY        * Notice:  This list has 2 medication(s) that are the same as other medications prescribed for you. Read the directions carefully, and ask your doctor or other care provider to review them with you.

## 2018-11-16 NOTE — TELEPHONE ENCOUNTER
Sent script for Iron to pharmacy   Looks like has scope already scheduled   Make follow up with Dr Medeiros after scope is completed

## 2018-11-16 NOTE — PROGRESS NOTES
HPI    This is a 67 year old patient who has been having gray-white coated tongue for a couple of months. States bad taste, smell and dryness in her mouth and unintentional weight loss. Denies any difficulty swallowing, voice changes. She tried nystatin and clotrimazole 10 mg kelly. She has a hx of diabetes, obesity, anemia, IBS, GERD, hypertension, alcohol ingestion and smoking.    Review of Systems   Constitutional: Negative.    HENT: Negative for congestion, ear discharge, ear pain, hearing loss, nosebleeds, sinus pain, sore throat and tinnitus.    Eyes: Negative for blurred vision, double vision and photophobia.   Respiratory: Negative for cough, hemoptysis, sputum production, shortness of breath and stridor.    Gastrointestinal: Negative for heartburn, nausea and vomiting.   Skin: Negative.    Neurological: Negative for dizziness, tingling, tremors and headaches.   Endo/Heme/Allergies: Negative for environmental allergies. Does not bruise/bleed easily.         Physical Exam   Constitutional: She is well-developed, well-nourished, and in no distress.   HENT:   Head: Normocephalic and atraumatic.   Right Ear: Tympanic membrane, external ear and ear canal normal. No drainage, swelling or tenderness. No middle ear effusion. No decreased hearing is noted.   Left Ear: Tympanic membrane, external ear and ear canal normal. No drainage, swelling or tenderness.  No middle ear effusion. No decreased hearing is noted.   Nose: Nose normal. No mucosal edema, rhinorrhea or septal deviation.   Mouth/Throat: Uvula is midline, oropharynx is clear and moist and mucous membranes are normal. No oropharyngeal exudate.   Eyes: Pupils are equal, round, and reactive to light.   Neck: Neck supple. No tracheal deviation present. No thyromegaly present.   Lymphadenopathy:     She has no cervical adenopathy.     Dorsal tongue is covered with gray-white membrane with fissures. No mass.    A/P  This patient is having oral candidal infection  likely due to her minocycline intake, diabetes and anemia. I will start antifungal medication, Fluconazole 150 mg once a day for 14 days and recommend mechanical brushing as well as a pause to her antibiotic if possible. In the meantime, managing her diabetes and anemia will prevent that opportunistic infection in the future. Her questions were answered.

## 2018-11-16 NOTE — TELEPHONE ENCOUNTER
Patient was seen in ENT today by Dr. Hernandez, he recommend that she be put on iron, and would like Grace Carisa to follow up. Radha would like a phone call from Grace to discuss further.

## 2018-11-16 NOTE — LETTER
11/16/2018         RE: Radha Corbett  [address] [facility] [address]        Dear Colleague,    Thank you for referring your patient, Radha Corbett, to the [facility]. Please see a copy of my visit note below.    HPI    This is a 67 year old patient who has been having gray-white coated tongue for a couple of months. States bad taste, smell and dryness in her mouth and unintentional weight loss. Denies any difficulty swallowing, voice changes. She tried nystatin and clotrimazole 10 mg kelly. She has a hx of diabetes, obesity, anemia, IBS, GERD, hypertension, alcohol ingestion and smoking.    Review of Systems   Constitutional: Negative.    HENT: Negative for congestion, ear discharge, ear pain, hearing loss, nosebleeds, sinus pain, sore throat and tinnitus.    Eyes: Negative for blurred vision, double vision and photophobia.   Respiratory: Negative for cough, hemoptysis, sputum production, shortness of breath and stridor.    Gastrointestinal: Negative for heartburn, nausea and vomiting.   Skin: Negative.    Neurological: Negative for dizziness, tingling, tremors and headaches.   Endo/Heme/Allergies: Negative for environmental allergies. Does not bruise/bleed easily.         Physical Exam   Constitutional: She is well-developed, well-nourished, and in no distress.   HENT:   Head: Normocephalic and atraumatic.   Right Ear: Tympanic membrane, external ear and ear canal normal. No drainage, swelling or tenderness. No middle ear effusion. No decreased hearing is noted.   Left Ear: Tympanic membrane, external ear and ear canal normal. No drainage, swelling or tenderness.  No middle ear effusion. No decreased hearing is noted.   Nose: Nose normal. No mucosal edema, rhinorrhea or septal deviation.   Mouth/Throat: Uvula is midline, oropharynx is clear and moist and mucous membranes are normal. No oropharyngeal exudate.   Eyes: Pupils are equal, round, and reactive to light.   Neck: Neck  supple. No tracheal deviation present. No thyromegaly present.   Lymphadenopathy:     She has no cervical adenopathy.     Dorsal tongue is covered with gray-white membrane with fissures. No mass.    A/P  This patient is having oral candidal infection likely due to her minocycline intake, diabetes and anemia. I will start antifungal medication, Fluconazole 150 mg once a day for 14 days and recommend mechanical brushing as well as a pause to her antibiotic if possible. In the meantime, managing her diabetes and anemia will prevent that opportunistic infection in the future. Her questions were answered.      Again, thank you for allowing me to participate in the care of your patient.        Sincerely,        Daniel West MD

## 2018-11-19 ENCOUNTER — OFFICE VISIT (OUTPATIENT)
Dept: NEUROPSYCHOLOGY | Facility: CLINIC | Age: 68
End: 2018-11-19
Payer: COMMERCIAL

## 2018-11-19 DIAGNOSIS — F41.9 ANXIETY: ICD-10-CM

## 2018-11-19 DIAGNOSIS — F33.1 MAJOR DEPRESSIVE DISORDER, RECURRENT EPISODE, MODERATE (H): ICD-10-CM

## 2018-11-19 DIAGNOSIS — R41.844 FRONTAL LOBE AND EXECUTIVE FUNCTION DEFICIT: Primary | ICD-10-CM

## 2018-11-19 DIAGNOSIS — F09 ORGANIC MENTAL DISORDER: ICD-10-CM

## 2018-11-19 NOTE — NURSING NOTE
The patient was seen for neuropsychological evaluation at the request of NIKKY Lutz for the purpose of diagnostic clarification and treatment planning.  3 hours of face to face testing were provided by this writer.  Please see Dr. Misael Berger's report for a full interpretation of the findings.

## 2018-11-19 NOTE — PROGRESS NOTES
Medina Corbett,    Attached are your test results.  You have several nodules in you thyroid   Will refer you to endocrinology as recommended   Please call [phone] to make appointment  if you do not hear from referrals in the next few days.       Please call [phone] to make appointment  if you do not hear from referrals in the next few days.      Please contact us if you have any questions.    Perri Carias, CNP

## 2018-11-19 NOTE — MR AVS SNAPSHOT
After Visit Summary   11/19/2018    Radha Corbett    MRN: [id number]           Patient Information     Date Of Birth          1950        Visit Information        Provider Department      11/19/2018 8:00 AM Misael Berger, PhD [facility]        Today's Diagnoses     Frontal lobe and executive function deficit    -  1    Organic mental disorder        Major depressive disorder, recurrent episode, moderate (H)        Anxiety           Follow-ups after your visit        Your next 10 appointments already scheduled     Nov 27, 2018   Procedure with William Charles Duane, MD   [facility] [place])    [id number] [place] [id number]   [phone]            Nov 29, 2018  1:20 PM CST   New Visit with Juan Jose Carias DO   [facility]    [address] [place] [id number]   [phone]            Dec 06, 2018  1:50 PM CST   Return Visit with Trice Medeiros MD PhD   [facility])    [address] [place] [address]   [phone]            Jan 29, 2019  8:30 AM CST   New Visit with Alina Hanley MD   [facility]    [address] [place] [address]   [phone]              Who to contact     Please call your clinic at [phone] to:    Ask questions about your health    Make or cancel appointments    Discuss your medicines    Learn about your test results    Speak to your doctor            Additional Information About Your Visit        [employer] Information     [employer] gives you secure access to your electronic health record. If you see a primary care provider, you can also send messages to your care team and make appointments. If you have questions, please call your primary care clinic.  If you do not have a primary care provider, please call [phone] and they will assist  you.      [employer] is an electronic gateway that provides easy, online access to your medical records. With [employer], you can request a clinic appointment, read your test results, renew a prescription or communicate with your care team.     To access your existing account, please contact your [facility] or call [phone] for assistance.        Care EveryWhere ID     This is your Care EveryWhere ID. This could be used by other organizations to access your [facility] medical records  [id number]        Your Vitals Were     Last Period                   11/18/1991 (Exact Date)            Blood Pressure from Last 3 Encounters:   11/16/18 111/65   11/12/18 111/60   08/23/18 126/74    Weight from Last 3 Encounters:   11/16/18 99.3 kg (219 lb)   11/12/18 99.7 kg (219 lb 11.2 oz)   08/23/18 103.9 kg (229 lb)              We Performed the Following     [id number] TESTING, PER HR/PSYCHOLOGIST     NEUROPSYCH TESTING BY [facility]        Primary Care Provider Office Phone # Fax #    Trice Medeiros MD PhD [phone]       [address]  [place] [id number]        Equal Access to Services     [facility]: Hadii aad ku hadasho Sokaren, waaxda luqadaha, qaybta kaalmada adeegyada, michael hallman . So [facility] [phone].    ATENCIÓN: Si habla español, tiene a arauz disposición servicios gratuitos de asistencia lingüística. Llame al [phone].    We comply with applicable federal civil rights laws and [place] laws. We do not discriminate on the basis of race, color, national origin, age, disability, sex, sexual orientation, or gender identity.            Thank you!     Thank you for choosing [facility]  for your care. Our goal is always to provide you with excellent care. Hearing back from our patients is one way we can continue to improve our services. Please take a few minutes to complete the written survey that you may receive in the mail after  "your visit with us. Thank you!             Your Updated Medication List - Protect others around you: Learn how to safely use, store and throw away your medicines at www.disposemymeds.org.          This list is accurate as of 11/19/18 11:59 PM.  Always use your most recent med list.                   Brand Name Dispense Instructions for use Diagnosis    continuous blood glucose monitoring sensor     3 each    For use with Freestyle Danni Flash  for continuous monitioring of blood glucose levels. Replace sensor every 10 days.    Type 2 diabetes mellitus with hyperglycemia, with long-term current use of insulin (H)       DULoxetine 30 MG EC capsule    CYMBALTA    180 capsule    Take 1 capsule (30 mg) by mouth 2 times daily    Depression with anxiety       ferrous sulfate 325 (65 Fe) MG tablet    IRON    60 tablet    Take 1 tablet (325 mg) by mouth daily (with breakfast)    Anemia, unspecified type       fluconazole 150 MG tablet    DIFLUCAN    14 tablet    Once a day for two weeks    Thrush, oral       * FREESTYLE DANNI READER Britney     1 Device    1 Units 3 times daily as needed    Uncontrolled type 2 diabetes mellitus with stage 3 chronic kidney disease, with long-term current use of insulin (H)       * FREESTYLE DANNI READER Britney     1 Device    1 Units daily    Type 2 diabetes mellitus with hyperglycemia, with long-term current use of insulin (H)       glipiZIDE 10 MG tablet    GLUCOTROL    90 tablet    Take 1 tablet (10 mg) by mouth daily (with dinner)    Type 2 diabetes mellitus with hyperglycemia, with long-term current use of insulin (H)       insulin syringe-needle U-100 31G X 5/16\" 1 ML miscellaneous    RELION INSULIN SYRINGE    200 each    Use 2 syringes daily or as directed.    Type 2 diabetes mellitus without complication (H)       lisinopril 5 MG tablet    PRINIVIL/ZESTRIL    90 tablet    Take 1 tablet (5 mg) by mouth daily    Essential hypertension with goal blood pressure less than 140/90       " MELATONIN PO      10 mg At Bedtime        metFORMIN 500 MG 24 hr tablet    GLUCOPHAGE-XR    360 tablet    Take 2 tablets (1,000 mg) by mouth 2 times daily (with meals)    Type 2 diabetes mellitus with hyperglycemia, with long-term current use of insulin (H)       minocycline 100 MG capsule    MINOCIN/DYNACIN    180 capsule    Take 1 capsule (100 mg) by mouth every 12 hours    Acne vulgaris       NovoLIN N VIAL 100 UNIT/ML injection   Generic drug:  insulin NPH      Inject 64 units Subcutaneous 2 times daily. (gets this from [facility] NPH at [facility])    Uncontrolled type 2 diabetes mellitus with stage 3 chronic kidney disease, with long-term current use of insulin (H)       omeprazole 40 MG capsule    priLOSEC    90 capsule    TAKE 1 CAPSULE DAILY 30 TO 60 MINUTES BEFORE A MEAL    Gastroesophageal reflux disease without esophagitis       ONE TOUCH LANCETS      None Entered        ONE TOUCH TEST STRIPS VI      None Entered        order for DME     1 Device    Equipment being ordered: Walker with seat light weight    Back pain, unspecified back location, unspecified back pain laterality, unspecified chronicity       oxybutynin chloride 15 MG 24 HR ER tablet    DITROPAN XL    90 tablet    Take 1 tablet (15 mg) by mouth daily    Urinary urgency, Urgency incontinence       simvastatin 20 MG tablet    ZOCOR    90 tablet    Take 1 tablet (20 mg) by mouth At Bedtime    Hyperlipidemia LDL goal <100       spironolactone 100 MG tablet    ALDACTONE    90 tablet    TAKE 1 TABLET EVERY MORNING    Essential hypertension with goal blood pressure less than 140/90       traZODone 100 MG tablet    DESYREL    90 tablet    Take 1 tablet (100 mg) by mouth nightly as needed for sleep        vitamin D 1000 units capsule      1 CAPSULE DAILY        * Notice:  This list has 2 medication(s) that are the same as other medications prescribed for you. Read the directions carefully, and ask your doctor or other care provider to review them with you.

## 2018-11-19 NOTE — TELEPHONE ENCOUNTER
Called patient and gave message per Grace Carias NP.  Patient verbalized understanding and agreement to plan.    Patient wanted to let Grace Carias NP know that she had a umbilical hernia pop out while she was urinating and straining during the night.  She pushed it back in, she is not having any discomfort.  Let her know, if it does come out again and is painful, she should go to ED.   She agrees to plan.  Ok to leave detailed message on her phone if needed.       Falguni Hammer RN, [facility]

## 2018-11-19 NOTE — PROGRESS NOTES
Medina Corbett,    Attached are your test results.  -Low iron store levels (ferritin).  ADVISE: increasing iron in your diet and consider taking iron supplement for  (ferrous gluconate 325 mg  daily - to avoid constipation from the supplement you should increase fluid intake and fiber in your diet)  Also, recheck your labs in 1 months. Plan is endoscopy as well to evaluate further    Please contact us if you have any questions.    Perri Carias, CNP

## 2018-11-20 NOTE — PROGRESS NOTES
__WAIS-IV   FSIQ____VCI_____PRI_____ WMI__114__PSI__81__     Raw  Age SS  __Similarities  __26__               __11___  __Block Design  __38___ __11___  __Digit Span  __ 36___ ___16___  __Arithmetic  __ 13___            ___9___  __Symbol Search __17_____ __6_____  __Coding  __37___ __7___    __AVLT  Trial   1         2          3             4           5           B          6            _9_     _11_    _13_    _14_    _15_     _7_      _13_      Raw    %ile  Learning   _62__         Short Delay   __13__   83-91  30 min. delayed recall  _13__   89-91  Recognition hits   _15___     Recognition false positives __0___             __Clarence-Simba/Lottie Complex Figure Test    Raw  T-score   %ile  Copy   __32__         -                  >16  Imm. Recall __9.5_  ___39___ __14__  30  Delay __13__  ___46___ __34__  Recognition __22__   ___60___ __84__  Time               _182 __                                    __BVRT  (form C,D,E)  Copy E-10 rating ____/4     Raw         z  Correct  __5____ __-0.96____  Incorrect ___7____ __-0.79___    __WRAT-4   Reading SS = 117     %ile = 87    GE = >12.9    __COWAT   Raw__32___ Tscore__41___   __ Semantic Fluency/Animals  Raw=14   T-score=38  __BNT   Raw = 59/60 %ile=  __Complex Ideational Material   Raw = 12/12 Tscore = 56    __Trail Making Test   A =   53         Errors = 1    %ile = 6-7    B =   157         Errors = 1    %ile = 4-7  __Stroop   Word = 82 %ile = 6-9   Color = 42 %ile = <2   C/W = 26 %ile = 7-11  __ JoLO  Raw=28     %ile=77-90    __ Grooved Pegboard Test  RH   Raw=104    T=33    Drops=0  LH   Raw=160   T=30   Drops=0    __BDI-II   Raw=23   Int=moderate  __ SILVINO   Raw=29    Int=severe  __ MMPI-2RF    __DCT      E-Score=10

## 2018-11-20 NOTE — PROGRESS NOTES
Name: Radha Corbett   MR#: [id number]  YOB: 1950  Date of Exam: 11/19/2018    Neuropsychology Laboratory  [facility]  [address], [facility] [address]  [place]  [address] [phone]  NEUROPSYCHOLOGICAL EVALUATION    IDENTIFYING INFORMATION  Radha Corbett is a 67 year old, right handed, retired , with 15 years of formal education. She was accompanied to the evaluation by her , Gianluca.     BACKGROUND INFORMATION / INTERVIEW FINDINGS    Records indicate that Ms. Corbett s medical history includes depression, anxiety, type II diabetes, obesity, back pain, retinal detachment, pseudophakia, rosacea, tubular adenoma of the colon, hyperlipidemia, hypertension, macular degeneration, hiatal hernia, irritable bowel syndrome, and gastroesophageal reflux disease, among other diagnoses. MRI of her brain on 01/30/2017 documented no acute intracranial pathology, no evidence of ventriculomegaly, and mild changes of chronic small vessel ischemic disease. In a recent clinic visit, she expressed concerns about changes in her cognition, and memory/name retrieval in particular. The current evaluation was requested by NIKKY Roth, in this context.    On interview, Ms. Corbett reported that she began noticing changes in her thinking about one year ago. She indicated that she has had a number of stressors in the last several years, including medical illnesses, and family stresses. She denied that there was a specific identifiable event or circumstances that led to changes in her thinking one year ago. She described difficulties with word finding and retrieving names. She reported her belief that these language issues have been worsening over the last year. She also noted that her memory is worse.  Specifically, she stated that she forgot the context of a conversation that she was having with her . She noted that her biggest concern is that there have been occasions when she forgot the  names of her grandchildren. She reported that her grandchildren are very important to her, and this difficulty remembering their names was upsetting. She also noted that she watches the [employer] on TV often, and has had instances where she had difficulty retrieving the names of the celebrity . The patient's  indicated that he began noticing changes in her thinking more than a year ago. He stated that there were instances when she would have trouble finding the right words using sentences when speaking. He also noted that it has always been the case that her thought processes jump around. He noted that she thinks quickly. He noted that when the issues first came up with her thinking, he did not pay much attention to it because it seemed age-appropriate.    Regarding mental health, Ms. Corbett reported that her mood is not the best. She stated that she is depressed. She reported that she has had troubles with depression and anxiety for approximately 20 years. She reported that her mood has been worse in the last three years. She stated that she is prescribed Cymbalta, but noted that she is still depressed. She indicated that she has never seen a therapist and does not want to. She denied prior psychiatric hospitalization. She denied suicidal ideation or past suicide attempts. Both the patient and her  noted that the patient's daughter has been experiencing a number of stressors in the recent past, and they noted that the patient becomes quite stressed if her daughter is experiencing stressful circumstances.    Regarding other medical background, Ms. Corbett denied prior TBI, stroke, or seizure. She reported that her sleep is terrible. She reported that she takes trazodone. She noted that she wakes up every 2 to 3 hours on average at night, and typically sleeps five or six hours. She reported that she had a normal sleep the night before the exam. She reported that she generally has pain diffusely, but  indicated that she was only sore at the time of the interview. She denied acute pain. She indicated that if she were to stand up, she would have pain in her knees, and hips. Per records, her current medications include duloxetine, ferrous sulfate, fluconazole, gadobutrol, glipizide, insulin, lisinopril, melatonin, metformin, minocycline, omeprazole, oxybutynin, simvastatin, spironolactone, trazodone, and vitamin D. She stated that she will rarely consume an alcoholic drink, and smokes half a pack of cigarettes per day. She stated that she intends on quitting smoking in the near future. She denied illicit drug use. She denied past substance abuse.    Ms. Corbett lives at home with her . She manages her own basic and instrumental daily activities. She and her  share management of the finances and meal preparation. She drives. By way of background, the patient and her  have been  for 18 years. This is her second marriage. She has a biological daughter, and two grandchildren. Her  has five biological daughters. She is a native of [place], and moved to the  19 years ago. Regarding educational background, she graduated from high school with pretty good grades. She completed an accelerated program in elementary school, and graduated from high school at age 16. She reported that she nearly completed her bachelor's degree from [employer]. Professionally, she worked as an  and  when she lived in [place]. 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    BEHAVIORAL OBSERVATIONS  Ms. Corbett was polite and cooperative with the exam. She was in a wheelchair,  although she was able to ambulate for short distances. Her speech was normal. Comprehension was normal. Her thought processes were notable for impulsivity and hastiness on some tests. Her mood was anxious with congruent affect.  Her effort was good. The current results are felt to be a generally accurate depiction of her cognitive functioning.    RESULTS OF EXAM  Her performances on measures of neuropsychological functioning were as follows:      She was fully oriented to time, place, and various aspects of personal information. She was able to state the names of the six most recent presidents in order. Performance on a measure of single word reading was high average. She obtained passing scores on standalone and embedded metrics of cognitive performance validity. Auditory attention for digits was superior. Mental calculations were average. Learning of words in a list format was normal. Short delayed recall of list words was performed in the high average to superior range. 30 minute delayed recall of list words was performed in the high average to superior range. Delayed recognition of list words was performed without error. Immediate memory for simple geometric figures was low average. Her drawing of a complicated geometric figure was normal, but notable for a mildly hurried approach. Short delayed recall of the figure was low average. 30 minute delayed recall of the figure was average. Delayed recognition of the figure s elements was high average. Visuospatial judgments for variably oriented lines were performed in the high average to superior range. Visual problem-solving with blocks was average. Comprehension of phrases and short stories was average. Verbal associative fluency was low average. Naming to confrontation was superior. Speeded visual sequencing under focused attention was borderline impaired. A similar measure with a divided attention component was borderline impaired. Speeded word reading was performed in the borderline impaired to low average range. Speeded color naming was performed in the impaired range. Speeded inhibition of an overlearned response was performed in the borderline impaired to low average range. Speeded  matching and cancellation was low average. Speeded visual motor coding was low average. Speeded fine motor dexterity was borderline impaired, bilaterally.    She endorsed items consistent with moderate symptoms of depression, and severe symptoms of anxiety and self-report measures. On a longer measure of personality and emotional functioning, she responded in a manner that is consistent with elevated psychological distress, and concerns of cognitive dysfunction. Clinical scale elevations were consistent with a Depression and anxiety syndrome is characterized by somatic concerns and mild persecutory ideation. Other elevations are consistent with low energy, neurologic concerns, and cognitive concerns. Those who respond similarly maybe experience and feelings of helplessness, inefficacy, stress, worry, anxiety, proneness to anger, and fears. Other elevations suggested tendency to avoid social situations. Overall, this profile is consistent with negative emotionality and low positive emotionality.    IMPRESSIONS  Ms. Corbett demonstrated a pattern of weaknesses that are generally felt to be nonspecific in nature, but could be seen as consistent with relative dysfunction of frontal and subcortical brain regions. The etiology  of these weaknesses is likely multifactorial, with potential contributions from her known cerebrovascular changes. I also suspect that depression, anxiety, pain, and chronic poor sleep are also playing a role. Determining the precise contributions from brain related change versus psychological factors, pain, and poor sleep is challenging. In this exam, weaknesses were identified in cognitive speed, psychomotor speed, some executive functioning skills, and some aspects of working memory. Other cognitive abilities, including anterograde memory, language skills (including naming to confrontation), visuospatial skills, basic attention, and orientation were all entirely normal. The current results are  certainly not consistent with Alzheimer's disease. As noted, she is reporting elevated symptoms of depression and anxiety. I do suspect that these factors are playing a role. I would predict a reduction in her subjective concerns about cognitive dysfunction, and reduced cognitive variability following improved management of her mental health.    RECOMMENDATIONS  Preliminary results and recommendations were provided to the patient over the telephone on 11/20/2018, and all questions were answered.     1. In spite of treatment, she remains depressed and anxious. If medically indicated, consideration could be given to modified treatment of her mental health.    2. Along similar lines, referral for psychotherapy services is recommended. One possible referral option would be [facility], with locations throughout the [facility]. They can be reached by calling [phone].    3. If she continues to have difficulties with memory, routine use of a memory notebook or other assistive device could be of benefit.    4. Follow-up neuropsychological evaluation could be considered in the future she continues to express concerns about cognitive dysfunction. It would be critical that she provide full and consistent effort on a future exam.    Misael Berger, Ph.D., L.P., ABPP-CN   / Licensed Psychologist [id number]  Department of Rehabilitation Medicine  Division of Adult Neuropsychology  [facility]    Time spent:  four hours professional time, including record review, data integration, and report writing (CPT 85566); three hours of testing administered by a practicum student (0 hours billed) and interpreted by a neuropsychologist (CPT 81055). Diagnoses: R41.844, F06.8, F33.1, F41.9.

## 2018-11-27 ENCOUNTER — SURGERY (OUTPATIENT)
Age: 68
End: 2018-11-27

## 2018-11-27 ENCOUNTER — HOSPITAL ENCOUNTER (OUTPATIENT)
Facility: AMBULATORY SURGERY CENTER | Age: 68
Discharge: HOME | End: 2018-11-27
Attending: INTERNAL MEDICINE | Admitting: INTERNAL MEDICINE
Payer: COMMERCIAL

## 2018-11-27 VITALS
SYSTOLIC BLOOD PRESSURE: 111 MMHG | HEIGHT: 66 IN | TEMPERATURE: 97.4 F | RESPIRATION RATE: 18 BRPM | DIASTOLIC BLOOD PRESSURE: 53 MMHG | WEIGHT: 219 LBS | OXYGEN SATURATION: 91 % | BODY MASS INDEX: 35.2 KG/M2

## 2018-11-27 LAB
GLUCOSE BLDC GLUCOMTR-MCNC: 133 MG/DL (ref 70–99)
UPPER GI ENDOSCOPY: NORMAL

## 2018-11-27 PROCEDURE — G8907 PT DOC NO EVENTS ON DISCHARG: HCPCS

## 2018-11-27 PROCEDURE — G8918 PT W/O PREOP ORDER IV AB PRO: HCPCS

## 2018-11-27 PROCEDURE — 43239 EGD BIOPSY SINGLE/MULTIPLE: CPT

## 2018-11-27 PROCEDURE — 88305 TISSUE EXAM BY PATHOLOGIST: CPT | Performed by: INTERNAL MEDICINE

## 2018-11-27 RX ORDER — ONDANSETRON 2 MG/ML
4 INJECTION INTRAMUSCULAR; INTRAVENOUS
Status: DISCONTINUED | OUTPATIENT
Start: 2018-11-27 | End: 2018-11-28 | Stop reason: HOSPADM

## 2018-11-27 RX ORDER — LIDOCAINE 40 MG/G
CREAM TOPICAL
Status: DISCONTINUED | OUTPATIENT
Start: 2018-11-27 | End: 2018-11-28 | Stop reason: HOSPADM

## 2018-11-27 RX ORDER — FENTANYL CITRATE 50 UG/ML
INJECTION, SOLUTION INTRAMUSCULAR; INTRAVENOUS PRN
Status: DISCONTINUED | OUTPATIENT
Start: 2018-11-27 | End: 2018-11-27 | Stop reason: HOSPADM

## 2018-11-27 RX ADMIN — FENTANYL CITRATE 100 MCG: 50 INJECTION, SOLUTION INTRAMUSCULAR; INTRAVENOUS at 11:01

## 2018-11-28 ENCOUNTER — TELEPHONE (OUTPATIENT)
Dept: OTOLARYNGOLOGY | Facility: CLINIC | Age: 68
End: 2018-11-28

## 2018-11-28 NOTE — TELEPHONE ENCOUNTER
Phone call to pt, who states her symptoms continue, as stated below.  Pt also had an upper GI endoscopy and no concerns were noted. Pt started iron supplements today.  Discussed that anemia can cause symptoms as she describes, and that with resolution of anemia, symptoms usually also resolve.  Pt verbalized understanding, will call back if symptoms fail to improve over the next few weeks.  Catherine Yen RN

## 2018-11-28 NOTE — TELEPHONE ENCOUNTER
[facility]    Phone Message    May a detailed message be left on voicemail: yes    Reason for Call: Radha said the fluconazole (DIFLUCAN) 150 MG tablet she is taking is not helping and she only has one tablet/dose left. She said she is having trouble swallowing and feels like there is a ball of phlegm in her throat.  Requesting a call to discuss.  Thank you.      Action Taken: Message routed to:  Adult Clinics: ENT p [id number]

## 2018-11-29 ENCOUNTER — OFFICE VISIT (OUTPATIENT)
Dept: ORTHOPEDICS | Facility: CLINIC | Age: 68
End: 2018-11-29
Attending: NURSE PRACTITIONER
Payer: COMMERCIAL

## 2018-11-29 VITALS
BODY MASS INDEX: 34.49 KG/M2 | WEIGHT: 213.7 LBS | DIASTOLIC BLOOD PRESSURE: 73 MMHG | HEART RATE: 126 BPM | SYSTOLIC BLOOD PRESSURE: 120 MMHG

## 2018-11-29 DIAGNOSIS — M16.0 BILATERAL PRIMARY OSTEOARTHRITIS OF HIP: Primary | ICD-10-CM

## 2018-11-29 LAB — COPATH REPORT: NORMAL

## 2018-11-29 PROCEDURE — 20611 DRAIN/INJ JOINT/BURSA W/US: CPT | Mod: 50 | Performed by: FAMILY MEDICINE

## 2018-11-29 RX ORDER — TRIAMCINOLONE ACETONIDE 40 MG/ML
40 INJECTION, SUSPENSION INTRA-ARTICULAR; INTRAMUSCULAR ONCE
Qty: 1 ML | Refills: 0 | OUTPATIENT
Start: 2018-11-29 | End: 2019-04-04

## 2018-11-29 ASSESSMENT — PAIN SCALES - GENERAL: PAINLEVEL: MODERATE PAIN (5)

## 2018-11-29 NOTE — MR AVS SNAPSHOT
After Visit Summary   11/29/2018    Radha Corbett    MRN: [id number]           Patient Information     Date Of Birth          1950        Visit Information        Provider Department      11/29/2018 1:20 PM Juan Jose Carias,  [facility]        Today's Diagnoses     Bilateral primary osteoarthritis of hip    -  1       Follow-ups after your visit        Your next 10 appointments already scheduled     Dec 06, 2018  1:50 PM CST   [facility] Trice Medeiros MD PhD   [facility]    [id number] [place] [address] [id number] [phone]            Jan 29, 2019  8:30 AM CST   New Visit with [facility]ley MD   [facility]    [address] [place] [address] [phone] [phone]              Future tests that were ordered for you today     Open Future Orders        Priority Expected Expires Ordered    US Guided Needle Placement Routine  11/30/2019 11/29/2018            Who to contact     If you have questions or need follow up information about today's clinic visit or your schedule please contact [facility] directly at [phone].  Normal or non-critical lab and imaging results will be communicated to you by MyChart, letter or phone within 4 business days after the clinic has received the results. If you do not hear from us within 7 days, please contact the clinic through MyChart or phone. If you have a critical or abnormal lab result, we will notify you by phone as soon as possible.  Submit refill requests through zkipster or call your pharmacy and they will forward the refill request to us. Please allow 3 business days for your refill to be completed.          Additional Information About Your Visit        Prowlhart Information     zkipster gives you secure access to your electronic health record. If you see a primary care provider, you can also send  messages to your care team and make appointments. If you have questions, please call your primary care clinic.  If you do not have a primary care provider, please call [phone] and they will assist you.      Quincee is an electronic gateway that provides easy, online access to your medical records. With Quincee, you can request a clinic appointment, read your test results, renew a prescription or communicate with your care team.     To access your existing account, please contact your [facility] or call [phone] for assistance.        Care EveryWhere ID     This is your Care EveryWhere ID. This could be used by other organizations to access your [place] medical records  [id number]        Your Vitals Were     Pulse Last Period BMI (Body Mass Index)             126 11/18/1991 (Exact Date) 34.49 kg/m2          Blood Pressure from Last 3 Encounters:   11/29/18 120/73   11/27/18 111/53   11/16/18 111/65    Weight from Last 3 Encounters:   11/29/18 96.9 kg (213 lb 11.2 oz)   11/26/18 99.3 kg (219 lb)   11/16/18 99.3 kg (219 lb)              We Performed the Following     HC ARTHROCENTESIS ASPIR&/INJ MAJOR JT/BURSA W/US          Today's Medication Changes          These changes are accurate as of 11/29/18  2:34 PM.  If you have any questions, ask your nurse or doctor.               Start taking these medicines.        Dose/Directions    * triamcinolone 40 MG/ML injection   Commonly known as:  KENALOG   Used for:  Bilateral primary osteoarthritis of hip   Started by:  Juan Jose Carias DO        Dose:  40 mg   1 mL (40 mg) by INTRA-ARTICULAR route once for 1 dose   Quantity:  1 mL   Refills:  0       * triamcinolone 40 MG/ML injection   Commonly known as:  KENALOG   Used for:  Bilateral primary osteoarthritis of hip   Started by:  Juan Jose Carias DO        Dose:  40 mg   1 mL (40 mg) by INTRA-ARTICULAR route once for 1 dose   Quantity:  1 mL   Refills:  0       * Notice:  This  list has 2 medication(s) that are the same as other medications prescribed for you. Read the directions carefully, and ask your doctor or other care provider to review them with you.         Where to get your medicines      Some of these will need a paper prescription and others can be bought over the counter.  Ask your nurse if you have questions.     You don't need a prescription for these medications     triamcinolone 40 MG/ML injection    triamcinolone 40 MG/ML injection                Primary Care Provider Office Phone # Fax #    Trice Medeiros MD PhD [phone] [address]  [facility] [address]        Equal Access to Services     [facility]: Hadii aad ku hadasho Soomaali, waaxda luqadaha, qaybta kaalmada adetyleryada, michael hallman . So [facility] [phone].    ATENCIÓN: Si habla español, tiene a arauz disposición servicios gratuitos de asistencia lingüística. Llame al [phone].    We comply with applicable federal civil rights laws and [place] laws. We do not discriminate on the basis of race, color, national origin, age, disability, sex, sexual orientation, or gender identity.            Thank you!     Thank you for choosing [facility]  for your care. Our goal is always to provide you with excellent care. Hearing back from our patients is one way we can continue to improve our services. Please take a few minutes to complete the written survey that you may receive in the mail after your visit with us. Thank you!             Your Updated Medication List - Protect others around you: Learn how to safely use, store and throw away your medicines at www.disposemymeds.org.          This list is accurate as of 11/29/18  2:34 PM.  Always use your most recent med list.                   Brand Name Dispense Instructions for use Diagnosis    continuous blood glucose monitoring sensor     3 each    For use with Freestyle Danni Flash  for continuous  "monitioring of blood glucose levels. Replace sensor every 10 days.    Type 2 diabetes mellitus with hyperglycemia, with long-term current use of insulin (H)       DULoxetine 30 MG capsule    CYMBALTA    180 capsule    Take 1 capsule (30 mg) by mouth 2 times daily    Depression with anxiety       ferrous sulfate 325 (65 Fe) MG tablet    FEROSUL    60 tablet    Take 1 tablet (325 mg) by mouth daily (with breakfast)    Anemia, unspecified type       fluconazole 150 MG tablet    DIFLUCAN    14 tablet    Once a day for two weeks    Thrush, oral       * FREESTYLE MELINA READER Britney     1 Device    1 Units 3 times daily as needed    Uncontrolled type 2 diabetes mellitus with stage 3 chronic kidney disease, with long-term current use of insulin (H)       * FREESTYLE MELINA READER Britney     1 Device    1 Units daily    Type 2 diabetes mellitus with hyperglycemia, with long-term current use of insulin (H)       glipiZIDE 10 MG tablet    GLUCOTROL    90 tablet    Take 1 tablet (10 mg) by mouth daily (with dinner)    Type 2 diabetes mellitus with hyperglycemia, with long-term current use of insulin (H)       insulin syringe-needle U-100 31G X 5/16\" 1 ML miscellaneous    RELION INSULIN SYRINGE    200 each    Use 2 syringes daily or as directed.    Type 2 diabetes mellitus without complication (H)       lisinopril 5 MG tablet    PRINIVIL/ZESTRIL    90 tablet    Take 1 tablet (5 mg) by mouth daily    Essential hypertension with goal blood pressure less than 140/90       MELATONIN PO      10 mg At Bedtime        metFORMIN 500 MG 24 hr tablet    GLUCOPHAGE-XR    360 tablet    Take 2 tablets (1,000 mg) by mouth 2 times daily (with meals)    Type 2 diabetes mellitus with hyperglycemia, with long-term current use of insulin (H)       minocycline 100 MG capsule    MINOCIN/DYNACIN    180 capsule    Take 1 capsule (100 mg) by mouth every 12 hours    Acne vulgaris       NovoLIN N VIAL 100 UNIT/ML vial   Generic drug:  insulin NPH      Inject " 64 units Subcutaneous 2 times daily. (gets this from OTC NPH at [facility])    Uncontrolled type 2 diabetes mellitus with stage 3 chronic kidney disease, with long-term current use of insulin (H)       omeprazole 40 MG DR capsule    priLOSEC    90 capsule    TAKE 1 CAPSULE DAILY 30 TO 60 MINUTES BEFORE A MEAL    Gastroesophageal reflux disease without esophagitis       ONE TOUCH LANCETS      None Entered        ONE TOUCH TEST STRIPS VI      None Entered        order for DME     1 Device    Equipment being ordered: Walker with seat light weight    Back pain, unspecified back location, unspecified back pain laterality, unspecified chronicity       oxybutynin ER 15 MG 24 hr tablet    DITROPAN XL    90 tablet    Take 1 tablet (15 mg) by mouth daily    Urinary urgency, Urgency incontinence       simvastatin 20 MG tablet    ZOCOR    90 tablet    Take 1 tablet (20 mg) by mouth At Bedtime    Hyperlipidemia LDL goal <100       spironolactone 100 MG tablet    ALDACTONE    90 tablet    TAKE 1 TABLET EVERY MORNING    Essential hypertension with goal blood pressure less than 140/90       traZODone 100 MG tablet    DESYREL    90 tablet    Take 1 tablet (100 mg) by mouth nightly as needed for sleep        * triamcinolone 40 MG/ML injection    KENALOG    1 mL    1 mL (40 mg) by INTRA-ARTICULAR route once for 1 dose    Bilateral primary osteoarthritis of hip       * triamcinolone 40 MG/ML injection    KENALOG    1 mL    1 mL (40 mg) by INTRA-ARTICULAR route once for 1 dose    Bilateral primary osteoarthritis of hip       vitamin D 1000 units capsule      1 CAPSULE DAILY        * Notice:  This list has 4 medication(s) that are the same as other medications prescribed for you. Read the directions carefully, and ask your doctor or other care provider to review them with you.

## 2018-11-29 NOTE — LETTER
11/29/2018         RE: Radha Corbett  [address] [facility] [id number]        Dear Colleague,    Thank you for referring your patient, Radha Corbett, to the [facility]. Please see a copy of my visit note below.    CHIEF COMPLAINT:  Consult (pelvis and hip pain)       HISTORY OF PRESENT ILLNESS  Ms. Corbett is a pleasant 67 year old year old female who presents to clinic today with bilateral groin pain.  Radha is seen at the request of Grace Carias.     Radha has had bilateral hip and groin pain for years, worse as of the past couple of months after falling in her garage.  Pain is deep inside her groin, both sides hurt equally.  She does feel as if the pain extends deep into her groin, near her abductor muscles.  She denies any numbness or tingling, although she does have a history of a back fracture and has experienced this in the past.      Additional history: as documented    MEDICAL HISTORY  Patient Active Problem List   Diagnosis     Macular degeneration     Myopia     Hiatal hernia     IBS (irritable bowel syndrome)     Hypertension goal BP (blood pressure) < 140/90     GERD (gastroesophageal reflux disease)     Atypical ductal hyperplasia of breast     Atypical lobular hyperplasia of breast     Benign Breast Disease (PASH)     Family history of breast cancer in sister     Type 2 diabetes, HbA1C goal < 8% (H)     Hyperlipidemia LDL goal <100     Breast cancer screening-high risk     Tubular adenoma of colon     Osteopenia     Alcohol ingestion, more than 4 drinks/day on alcohol screening     Umbilical hernia     Incontinence of urine     Stool incontinence     Osteoarthritis, knee     Rosacea     Anxiety     Pseudophakia     Abnormal cervical Pap smear with positive HPV DNA test     Depression with anxiety     Retinal detachment     Elevated liver function tests     Rectocele     Unexplained endometrial cells on cervical cytology     Back pain, unspecified back location, unspecified back  "pain laterality, unspecified chronicity     Urge incontinence     Dislocation of shoulder region     Acute lower GI bleeding     Morbid obesity (H)     Moderate episode of recurrent major depressive disorder (H)       Current Outpatient Prescriptions   Medication Sig Dispense Refill     Continuous Blood Gluc  (FREESTYLE MELINA READER) AYAN 1 Units daily 1 Device 0     Continuous Blood Gluc  (FREESTYLE MELINA READER) AYAN 1 Units 3 times daily as needed 1 Device 0     continuous blood glucose monitoring (FREESTYLE MELINA) sensor For use with Freestyle Melina Flash  for continuous monitioring of blood glucose levels. Replace sensor every 10 days. 3 each 5     DULoxetine (CYMBALTA) 30 MG EC capsule Take 1 capsule (30 mg) by mouth 2 times daily 180 capsule 1     ferrous sulfate (IRON) 325 (65 Fe) MG tablet Take 1 tablet (325 mg) by mouth daily (with breakfast) 60 tablet 1     fluconazole (DIFLUCAN) 150 MG tablet Once a day for two weeks 14 tablet 0     glipiZIDE (GLUCOTROL) 10 MG tablet Take 1 tablet (10 mg) by mouth daily (with dinner) 90 tablet 0     insulin NPH (NOVOLIN N VIAL) 100 UNIT/ML injection Inject 64 units Subcutaneous 2 times daily. (gets this from [facility] NPH at [facility])       insulin syringe-needle U-100 (RELION INSULIN SYRINGE) 31G X 5/16\" 1 ML Use 2 syringes daily or as directed. 200 each 3     lisinopril (PRINIVIL/ZESTRIL) 5 MG tablet Take 1 tablet (5 mg) by mouth daily 90 tablet 3     MELATONIN PO 10 mg At Bedtime        metFORMIN (GLUCOPHAGE-XR) 500 MG 24 hr tablet Take 2 tablets (1,000 mg) by mouth 2 times daily (with meals) 360 tablet 1     minocycline (MINOCIN/DYNACIN) 100 MG capsule Take 1 capsule (100 mg) by mouth every 12 hours 180 capsule 2     omeprazole (PRILOSEC) 40 MG capsule TAKE 1 CAPSULE DAILY 30 TO 60 MINUTES BEFORE A MEAL 90 capsule 3     ONE TOUCH LANCETS None Entered       ONE TOUCH TEST STRIPS VI None Entered       order for DME Equipment being ordered: Walker with " seat light weight 1 Device 0     oxybutynin chloride 15 MG TB24 Take 1 tablet (15 mg) by mouth daily 90 tablet 1     simvastatin (ZOCOR) 20 MG tablet Take 1 tablet (20 mg) by mouth At Bedtime 90 tablet 3     spironolactone (ALDACTONE) 100 MG tablet TAKE 1 TABLET EVERY MORNING 90 tablet 3     traZODone (DESYREL) 100 MG tablet Take 1 tablet (100 mg) by mouth nightly as needed for sleep 90 tablet 3     VITAMIN D 1000 UNIT OR CAPS 1 CAPSULE DAILY         Allergies   Allergen Reactions     Codeine      Sulfa Drugs        Family History   Problem Relation Age of Onset     Hypertension Mother      Cerebrovascular Disease Mother      Arthritis Mother      Cardiovascular Mother      Circulatory Mother      Cerebrovascular Disease Father      Prostate Cancer Father 65      at 69     Asthma Brother      Prostate Cancer Brother 48     bone metastasis     Diabetes Sister      Hypertension Sister      Osteoporosis Sister      Depression Sister      Lipids Sister      Cancer Maternal Grandmother 95     spine     Breast Cancer Sister 39     also contralateral @53, mets to lungs     Cancer Sister 20     second uterine cancer in 30s also     Diabetes Sister      Hypertension Sister      Depression Sister      Osteoporosis Sister      Breast Cancer Sister 57     unilateral     Cancer Paternal Aunt 40     unknown type     Cancer Paternal Uncle      stomach;  in his 80s     Prostate Cancer Brother      Glaucoma No family hx of        Additional medical/Social/Surgical histories reviewed in [facility] and updated as appropriate.     REVIEW OF SYSTEMS (2018)  CONSTITUTIONAL: Denies fever and weight loss  EYES: Denies acute vision changes  ENT: Denies hearing changes or difficulty swallowing  CARDIAC: Denies chest pain or edema  RESPIRATORY: Denies dyspnea, cough or wheeze  GASTROINTESTINAL: Denies abdominal pain, nausea, vomiting  MUSCULOSKELETAL: See HPI  SKIN: Denies any recent rash or lesion  NEUROLOGICAL: Denies numbness or  focal weakness  PSYCHIATRIC: No history of psychiatric symptoms or problems  ENDOCRINE:    Lab Results   Component Value Date    A1C 7.5 11/12/2018    A1C 8.1 08/14/2018    A1C 8.5 05/15/2018    A1C 9.2 02/08/2018    A1C 7.1 09/18/2017     HEMATOLOGY: Denies episodes of easy bleeding      PHYSICAL EXAM  Vitals:    11/29/18 1325   BP: 120/73   Pulse: 126   Weight: 96.9 kg (213 lb 11.2 oz)     General  - normal appearance, in no obvious distress  CV  - normal femoral pulse  Pulm  - normal respiratory pattern, non-labored  Musculoskeletal - right hip  - stance: gait assisted by walker  - inspection: right leg atrophy  - palpation: no lateral or anterior hip tenderness  - ROM: limited flexion and internal rotation secondary to pain, pain with passive and active ROM     Musculoskeletal - left hip  - inspection: left hip atrophy  - palpation: no lateral or anterior hip tenderness  - ROM: limited flexion and internal rotation secondary to pain, pain with passive and active ROM     Neuro  - no sensory or motor deficit, grossly normal coordination, normal muscle tone  Skin  - no ecchymosis, erythema, warmth, or induration, no obvious rash  Psych  - interactive, appropriate, normal mood and affect           ASSESSMENT & PLAN  Ms. Corbett is a 67 year old year old female who presents to clinic today with bilateral hip and groin pain.    I reviewed her xrays in the room with her:  IMPRESSION: Marked osteoarthrosis in the bilateral hip joints.    Radha does have some concern that her symptoms may be secondary to an inguinal hernia.    Her physical exam and x-ray findings do suggest that this may be related to her hip osteoarthritis.  We discussed this for some time.    I did perform what I hope to be diagnostic and therapeutic bilateral hip injections today (see procedure note).    Radha is going to let me know how she is doing in 2 weeks.  If no better I would consider formal ultrasound imaging to check for an inguinal  hernia.    Thank you for allowing me to participate in Radha's care.    Intraarticular Hip Injection - Ultrasound Guided  The patient was informed of the risks and the benefits of the procedure and a written consent was signed.  The patient s right hip was prepped with chlorhexidine in sterile fashion.   40 mg of triamcinolone suspension was drawn up into a 5 mL syringe with 2 mL of 1% lidocaine and 2 mL of 0.5% marcaine.  Injection was performed using sterile technique.  Under ultrasound guidance a 3.5-inch 25-gauge needle was used to enter the right femoracetabular joint.  Anterior approach was used, needle placement was visualized and documented with ultrasound.  Ultrasound visualization was necessary due to decreased joint space in the setting of osteoarthritis.  Injection performed long axis to the probe.  Injection solution visualized within the joint space.  Images were permanently stored for the patient's record.  The patient s left hip was prepped with chlorhexidine in sterile fashion.   40 mg of triamcinolone suspension was drawn up into a 5 mL syringe with 2 mL of 1% lidocaine and 2 mL of 0.5% marcaine.  Injection was performed using sterile technique.  Under ultrasound guidance a 3.5-inch 25-gauge needle was used to enter the left femoracetabular joint.  Anterior approach was used, needle placement was visualized and documented with ultrasound.  Ultrasound visualization was necessary due to decreased joint space in the setting of osteoarthritis.  Injection performed long axis to the probe.  Injection solution visualized within the joint space.  Images were permanently stored for the patient's record.  There were no complications. The patient tolerated the procedure well. There was negligible bleeding.   The patient was instructed to ice the hip upon leaving clinic and refrain from overuse over the next 3 days.   The patient was instructed to call or go to the emergency room with any unusual pain,  swelling, redness, or if otherwise concerned.  Kenalog: NDC [id number]        Juan Jose Carias DO, [facility]  Primary Care Sports Medicine           Again, thank you for allowing me to participate in the care of your patient.        Sincerely,        Juan Jose Carias DO

## 2018-11-29 NOTE — NURSING NOTE
Radha Corbett's chief complaint for this visit includes:  Chief Complaint   Patient presents with     Consult     pelvis and hip pain     PCP: Trice Medeiros    Referring Provider:  Perri Carias, APRN CNP  [address]  [place] [address]    /73  Pulse 126  Wt 96.9 kg (213 lb 11.2 oz)  LMP 11/18/1991 (Exact Date)  BMI 34.49 kg/m2  Moderate Pain (5)     Do you need any medication refills at today's visit? Natacha LANDIS

## 2018-11-29 NOTE — PROGRESS NOTES
CHIEF COMPLAINT:  Consult (pelvis and hip pain)       HISTORY OF PRESENT ILLNESS  Ms. Corbett is a pleasant 67 year old year old female who presents to clinic today with bilateral groin pain.  Radha is seen at the request of Grace Carias.     Radha has had bilateral hip and groin pain for years, worse as of the past couple of months after falling in her garage.  Pain is deep inside her groin, both sides hurt equally.  She does feel as if the pain extends deep into her groin, near her abductor muscles.  She denies any numbness or tingling, although she does have a history of a back fracture and has experienced this in the past.      Additional history: as documented    MEDICAL HISTORY  Patient Active Problem List   Diagnosis     Macular degeneration     Myopia     Hiatal hernia     IBS (irritable bowel syndrome)     Hypertension goal BP (blood pressure) < 140/90     GERD (gastroesophageal reflux disease)     Atypical ductal hyperplasia of breast     Atypical lobular hyperplasia of breast     Benign Breast Disease (PASH)     Family history of breast cancer in sister     Type 2 diabetes, HbA1C goal < 8% (H)     Hyperlipidemia LDL goal <100     Breast cancer screening-high risk     Tubular adenoma of colon     Osteopenia     Alcohol ingestion, more than 4 drinks/day on alcohol screening     Umbilical hernia     Incontinence of urine     Stool incontinence     Osteoarthritis, knee     Rosacea     Anxiety     Pseudophakia     Abnormal cervical Pap smear with positive HPV DNA test     Depression with anxiety     Retinal detachment     Elevated liver function tests     Rectocele     Unexplained endometrial cells on cervical cytology     Back pain, unspecified back location, unspecified back pain laterality, unspecified chronicity     Urge incontinence     Dislocation of shoulder region     Acute lower GI bleeding     Morbid obesity (H)     Moderate episode of recurrent major depressive disorder (H)       Current Outpatient  "Prescriptions   Medication Sig Dispense Refill     Continuous Blood Gluc  (FREESTYLE MELINA READER) AYAN 1 Units daily 1 Device 0     Continuous Blood Gluc  (FREESTYLE MELINA READER) AYAN 1 Units 3 times daily as needed 1 Device 0     continuous blood glucose monitoring (FREESTYLE MELINA) sensor For use with Freestyle Melina Flash  for continuous monitioring of blood glucose levels. Replace sensor every 10 days. 3 each 5     DULoxetine (CYMBALTA) 30 MG EC capsule Take 1 capsule (30 mg) by mouth 2 times daily 180 capsule 1     ferrous sulfate (IRON) 325 (65 Fe) MG tablet Take 1 tablet (325 mg) by mouth daily (with breakfast) 60 tablet 1     fluconazole (DIFLUCAN) 150 MG tablet Once a day for two weeks 14 tablet 0     glipiZIDE (GLUCOTROL) 10 MG tablet Take 1 tablet (10 mg) by mouth daily (with dinner) 90 tablet 0     insulin NPH (NOVOLIN N VIAL) 100 UNIT/ML injection Inject 64 units Subcutaneous 2 times daily. (gets this from [facility] NPH at [facility])       insulin syringe-needle U-100 (RELION INSULIN SYRINGE) 31G X 5/16\" 1 ML Use 2 syringes daily or as directed. 200 each 3     lisinopril (PRINIVIL/ZESTRIL) 5 MG tablet Take 1 tablet (5 mg) by mouth daily 90 tablet 3     MELATONIN PO 10 mg At Bedtime        metFORMIN (GLUCOPHAGE-XR) 500 MG 24 hr tablet Take 2 tablets (1,000 mg) by mouth 2 times daily (with meals) 360 tablet 1     minocycline (MINOCIN/DYNACIN) 100 MG capsule Take 1 capsule (100 mg) by mouth every 12 hours 180 capsule 2     omeprazole (PRILOSEC) 40 MG capsule TAKE 1 CAPSULE DAILY 30 TO 60 MINUTES BEFORE A MEAL 90 capsule 3     ONE TOUCH LANCETS None Entered       ONE TOUCH TEST STRIPS VI None Entered       order for DME Equipment being ordered: Walker with seat light weight 1 Device 0     oxybutynin chloride 15 MG TB24 Take 1 tablet (15 mg) by mouth daily 90 tablet 1     simvastatin (ZOCOR) 20 MG tablet Take 1 tablet (20 mg) by mouth At Bedtime 90 tablet 3     spironolactone (ALDACTONE) " 100 MG tablet TAKE 1 TABLET EVERY MORNING 90 tablet 3     traZODone (DESYREL) 100 MG tablet Take 1 tablet (100 mg) by mouth nightly as needed for sleep 90 tablet 3     VITAMIN D 1000 UNIT OR CAPS 1 CAPSULE DAILY         Allergies   Allergen Reactions     Codeine      Sulfa Drugs        Family History   Problem Relation Age of Onset     Hypertension Mother      Cerebrovascular Disease Mother      Arthritis Mother      Cardiovascular Mother      Circulatory Mother      Cerebrovascular Disease Father      Prostate Cancer Father 65      at 69     Asthma Brother      Prostate Cancer Brother 48     bone metastasis     Diabetes Sister      Hypertension Sister      Osteoporosis Sister      Depression Sister      Lipids Sister      Cancer Maternal Grandmother 95     spine     Breast Cancer Sister 39     also contralateral @53, mets to lungs     Cancer Sister 20     second uterine cancer in 30s also     Diabetes Sister      Hypertension Sister      Depression Sister      Osteoporosis Sister      Breast Cancer Sister 57     unilateral     Cancer Paternal Aunt 40     unknown type     Cancer Paternal Uncle      stomach;  in his 80s     Prostate Cancer Brother      Glaucoma No family hx of        Additional medical/Social/Surgical histories reviewed in EPIC and updated as appropriate.     REVIEW OF SYSTEMS (2018)  CONSTITUTIONAL: Denies fever and weight loss  EYES: Denies acute vision changes  ENT: Denies hearing changes or difficulty swallowing  CARDIAC: Denies chest pain or edema  RESPIRATORY: Denies dyspnea, cough or wheeze  GASTROINTESTINAL: Denies abdominal pain, nausea, vomiting  MUSCULOSKELETAL: See HPI  SKIN: Denies any recent rash or lesion  NEUROLOGICAL: Denies numbness or focal weakness  PSYCHIATRIC: No history of psychiatric symptoms or problems  ENDOCRINE:    Lab Results   Component Value Date    A1C 7.5 2018    A1C 8.1 2018    A1C 8.5 05/15/2018    A1C 9.2 2018    A1C 7.1 2017      HEMATOLOGY: Denies episodes of easy bleeding      PHYSICAL EXAM  Vitals:    11/29/18 1325   BP: 120/73   Pulse: 126   Weight: 96.9 kg (213 lb 11.2 oz)     General  - normal appearance, in no obvious distress  CV  - normal femoral pulse  Pulm  - normal respiratory pattern, non-labored  Musculoskeletal - right hip  - stance: gait assisted by walker  - inspection: right leg atrophy  - palpation: no lateral or anterior hip tenderness  - ROM: limited flexion and internal rotation secondary to pain, pain with passive and active ROM     Musculoskeletal - left hip  - inspection: left hip atrophy  - palpation: no lateral or anterior hip tenderness  - ROM: limited flexion and internal rotation secondary to pain, pain with passive and active ROM     Neuro  - no sensory or motor deficit, grossly normal coordination, normal muscle tone  Skin  - no ecchymosis, erythema, warmth, or induration, no obvious rash  Psych  - interactive, appropriate, normal mood and affect           ASSESSMENT & PLAN  Ms. Corbett is a 67 year old year old female who presents to clinic today with bilateral hip and groin pain.    I reviewed her xrays in the room with her:  IMPRESSION: Marked osteoarthrosis in the bilateral hip joints.    Radha does have some concern that her symptoms may be secondary to an inguinal hernia.    Her physical exam and x-ray findings do suggest that this may be related to her hip osteoarthritis.  We discussed this for some time.    I did perform what I hope to be diagnostic and therapeutic bilateral hip injections today (see procedure note).    Radha is going to let me know how she is doing in 2 weeks.  If no better I would consider formal ultrasound imaging to check for an inguinal hernia.    Thank you for allowing me to participate in Radha's care.    Intraarticular Hip Injection - Ultrasound Guided  The patient was informed of the risks and the benefits of the procedure and a written consent was signed.  The patient s right  hip was prepped with chlorhexidine in sterile fashion.   40 mg of triamcinolone suspension was drawn up into a 5 mL syringe with 2 mL of 1% lidocaine and 2 mL of 0.5% marcaine.  Injection was performed using sterile technique.  Under ultrasound guidance a 3.5-inch 25-gauge needle was used to enter the right femoracetabular joint.  Anterior approach was used, needle placement was visualized and documented with ultrasound.  Ultrasound visualization was necessary due to decreased joint space in the setting of osteoarthritis.  Injection performed long axis to the probe.  Injection solution visualized within the joint space.  Images were permanently stored for the patient's record.  The patient s left hip was prepped with chlorhexidine in sterile fashion.   40 mg of triamcinolone suspension was drawn up into a 5 mL syringe with 2 mL of 1% lidocaine and 2 mL of 0.5% marcaine.  Injection was performed using sterile technique.  Under ultrasound guidance a 3.5-inch 25-gauge needle was used to enter the left femoracetabular joint.  Anterior approach was used, needle placement was visualized and documented with ultrasound.  Ultrasound visualization was necessary due to decreased joint space in the setting of osteoarthritis.  Injection performed long axis to the probe.  Injection solution visualized within the joint space.  Images were permanently stored for the patient's record.  There were no complications. The patient tolerated the procedure well. There was negligible bleeding.   The patient was instructed to ice the hip upon leaving clinic and refrain from overuse over the next 3 days.   The patient was instructed to call or go to the emergency room with any unusual pain, swelling, redness, or if otherwise concerned.  Kenalog: NDC [id number]        Juan Jose Carias DO, [facility]  Primary Care Sports Medicine

## 2018-12-06 ENCOUNTER — OFFICE VISIT (OUTPATIENT)
Dept: PEDIATRICS | Facility: CLINIC | Age: 68
End: 2018-12-06
Payer: COMMERCIAL

## 2018-12-06 VITALS
WEIGHT: 212 LBS | SYSTOLIC BLOOD PRESSURE: 115 MMHG | OXYGEN SATURATION: 96 % | TEMPERATURE: 97.7 F | DIASTOLIC BLOOD PRESSURE: 74 MMHG | BODY MASS INDEX: 34.22 KG/M2 | HEART RATE: 107 BPM

## 2018-12-06 DIAGNOSIS — F41.8 DEPRESSION WITH ANXIETY: ICD-10-CM

## 2018-12-06 DIAGNOSIS — E04.1 THYROID NODULE: Primary | ICD-10-CM

## 2018-12-06 DIAGNOSIS — D50.9 IRON DEFICIENCY ANEMIA, UNSPECIFIED IRON DEFICIENCY ANEMIA TYPE: ICD-10-CM

## 2018-12-06 DIAGNOSIS — D64.9 ANEMIA, UNSPECIFIED TYPE: ICD-10-CM

## 2018-12-06 DIAGNOSIS — Z87.19 HISTORY OF GI DIVERTICULAR BLEED: ICD-10-CM

## 2018-12-06 DIAGNOSIS — R63.4 WEIGHT LOSS: ICD-10-CM

## 2018-12-06 LAB
FOLATE SERPL-MCNC: 20.9 NG/ML
TSH SERPL DL<=0.005 MIU/L-ACNC: 0.7 MU/L (ref 0.4–4)

## 2018-12-06 PROCEDURE — 84443 ASSAY THYROID STIM HORMONE: CPT | Performed by: INTERNAL MEDICINE

## 2018-12-06 PROCEDURE — 99215 OFFICE O/P EST HI 40 MIN: CPT | Performed by: INTERNAL MEDICINE

## 2018-12-06 PROCEDURE — 36415 COLL VENOUS BLD VENIPUNCTURE: CPT | Performed by: INTERNAL MEDICINE

## 2018-12-06 PROCEDURE — 82746 ASSAY OF FOLIC ACID SERUM: CPT | Performed by: NURSE PRACTITIONER

## 2018-12-06 RX ORDER — DULOXETIN HYDROCHLORIDE 60 MG/1
60 CAPSULE, DELAYED RELEASE ORAL DAILY
Qty: 90 CAPSULE | Refills: 1 | Status: SHIPPED | OUTPATIENT
Start: 2018-12-06 | End: 2019-01-10

## 2018-12-06 NOTE — PATIENT INSTRUCTIONS
Make appointment(s) for:   -- clinic follow up in 1 month with non fasting lab (recheck hemoglobin).        Medication(s) prescribed today:    Orders Placed This Encounter   Medications     DULoxetine (CYMBALTA) 60 MG capsule     Sig: Take 1 capsule (60 mg) by mouth daily     Dispense:  90 capsule     Refill:  1     Dose increased. Please put on file. Do not fill until patient calls.         Schedule for Diabetic eye exam        At your visit today, we discussed your risk for falls and preventive options.    Fall Prevention  Falls often occur due to slipping, tripping or losing your balance. Millions of people fall every year and injure themselves. Here are ways to reduce your risk of falling again.    Think about your fall, was there anything that caused your fall that can be fixed, removed, or replaced?    Make your home safe by keeping walkways clear of objects you may trip over, such as electric cords.    Use non-slip pads under rugs. Don't use area rugs or small throw rugs.    Use non-slip mats in bathtubs and showers.    Install handrails and lights on staircases. The handrails should be on both sides of the stairs.    Don't walk in poorly lit areas.    Don't stand on chairs or wobbly ladders.    Use caution when reaching overhead or looking upward. This position can cause a loss of balance.    Be sure your shoes fit properly, have non-slip bottoms and are in good condition.     Wear shoes both inside and out. Don't go barefoot or wear slippers.    Be cautious when going up and down stairs, curbs, and when walking on uneven sidewalks.    If your balance is poor, consider using a cane or walker.    If your fall was related to alcohol use, stop or limit alcohol intake.     If your fall was related to use of sleeping medicines, talk to your healthcare provider about this. You may need to reduce your dosage at bedtime if you awaken during the night to go to the bathroom.      To reduce the need for nighttime  bathroom trips:  ? Don't drink fluids for several hours before going to bed  ? Empty your bladder before going to bed  ? Men can keep a urinal at the bedside    Stay as active as you can. Balance, flexibility, strength, and endurance all come from exercise. They all play a role in preventing falls. Ask your healthcare provider which types of activity are right for you.    Get your vision checked on a regular basis.    If you have pets, know where they are before you stand up or walk so you don't trip over them.    Use night lights.    Go over all your medicines with a pharmacist or other healthcare provider to see if any of them could make you more likely to fall.  Date Last Reviewed: 4/1/2018 2000-2018 [employer] [address] [place] [address], [place] [address]. All rights reserved. This information is not intended as a substitute for professional medical care. Always follow your healthcare professional's instructions.

## 2018-12-06 NOTE — PROGRESS NOTES
SUBJECTIVE:   Radha Corbett is a 67 year old female who presents to clinic today for the following health issues:      Follow up GI, Ortho, anemia, anxiety and sleep meds.     Last month at her routine check up, found to have iron deficiency anemia. Had EGD but didn't find anything. History of diverticular bleed over a year ago but she has not noticed any active bleeding. Wants to know if her hemoglobin is back up.     Has insomnia. Did well on trazodone 100 mg for a while. Now she has to get up at night to urinate, every 2-3 hours. despite bladder implant. She did well in the trial but after the actual implant, it has not had the result she expected. She is planning to follow up with urology.   Recently had a fall on both knees, had groin pain after. Went to see sports medicine. Had cortisone injection in the groin, not helping as much.     She had a chest CT last month, noted a thyroid nodule. Pt is not sure what she should do next. CT also noted multiple pulmonary nodules and recommended CT follow up in 12 months. She quit smoking for sometime and picked it back up again.     Pt also mentioned about unintentional weight loss.     Her depression and anxiety are getting worse with the various health issues. Currently on Duloxetine 30 mg.     PHQ-9 SCORE 5/15/2018 6/22/2018 12/7/2018   PHQ-9 Total Score - - -   PHQ-9 Total Score MyChart - - -   PHQ-9 Total Score 27 7 18     TARAS-7 SCORE 5/15/2018 6/22/2018 12/7/2018   Total Score - - -   Total Score 21 5 18      ROS:  Constitutional, HEENT, cardiovascular, pulmonary, gi and gu systems are negative, except as otherwise noted.         Current Outpatient Medications on File Prior to Visit:  ferrous sulfate (IRON) 325 (65 Fe) MG tablet Take 1 tablet (325 mg) by mouth daily (with breakfast)   glipiZIDE (GLUCOTROL) 10 MG tablet Take 1 tablet (10 mg) by mouth daily (with dinner)   insulin NPH (NOVOLIN N VIAL) 100 UNIT/ML injection Inject 64 units Subcutaneous 2 times daily.  "(gets this from OTC NPH at [facility])   lisinopril (PRINIVIL/ZESTRIL) 5 MG tablet Take 1 tablet (5 mg) by mouth daily   metFORMIN (GLUCOPHAGE-XR) 500 MG 24 hr tablet Take 2 tablets (1,000 mg) by mouth 2 times daily (with meals)   minocycline (MINOCIN/DYNACIN) 100 MG capsule Take 1 capsule (100 mg) by mouth every 12 hours   omeprazole (PRILOSEC) 40 MG capsule TAKE 1 CAPSULE DAILY 30 TO 60 MINUTES BEFORE A MEAL   oxybutynin chloride 15 MG TB24 Take 1 tablet (15 mg) by mouth daily   simvastatin (ZOCOR) 20 MG tablet Take 1 tablet (20 mg) by mouth At Bedtime   spironolactone (ALDACTONE) 100 MG tablet TAKE 1 TABLET EVERY MORNING   traZODone (DESYREL) 100 MG tablet Take 1 tablet (100 mg) by mouth nightly as needed for sleep   VITAMIN D 1000 UNIT OR CAPS 1 CAPSULE DAILY   Continuous Blood Gluc  (FREESTYLE MELINA READER) AYAN 1 Units daily   Continuous Blood Gluc  (FREESTYLE MELINA READER) AYAN 1 Units 3 times daily as needed   continuous blood glucose monitoring (FREESTYLE MELINA) sensor For use with Freestyle Melina Flash  for continuous monitioring of blood glucose levels. Replace sensor every 10 days.   fluconazole (DIFLUCAN) 150 MG tablet Once a day for two weeks (Patient not taking: Reported on 12/6/2018)   insulin syringe-needle U-100 (RELION INSULIN SYRINGE) 31G X 5/16\" 1 ML Use 2 syringes daily or as directed.   MELATONIN PO 10 mg At Bedtime    ONE TOUCH LANCETS None Entered   ONE TOUCH TEST STRIPS VI None Entered   order for DME Equipment being ordered: Walker with seat light weight     Current Facility-Administered Medications on File Prior to Visit:  gadobutrol (GADAVIST) injection 10 mL          Patient Active Problem List   Diagnosis     Macular degeneration     Myopia     Hiatal hernia     IBS (irritable bowel syndrome)     Hypertension goal BP (blood pressure) < 140/90     GERD (gastroesophageal reflux disease)     Atypical ductal hyperplasia of breast     Atypical lobular hyperplasia of " breast     Benign Breast Disease (PASH)     Family history of breast cancer in sister     Type 2 diabetes, HbA1C goal < 8% (H)     Hyperlipidemia LDL goal <100     Breast cancer screening-high risk     Tubular adenoma of colon     Osteopenia     Alcohol ingestion, more than 4 drinks/day on alcohol screening     Umbilical hernia     Incontinence of urine     Stool incontinence     Osteoarthritis, knee     Rosacea     Anxiety     Pseudophakia     Abnormal cervical Pap smear with positive HPV DNA test     Depression with anxiety     Retinal detachment     Elevated liver function tests     Rectocele     Unexplained endometrial cells on cervical cytology     Back pain, unspecified back location, unspecified back pain laterality, unspecified chronicity     Urge incontinence     Dislocation of shoulder region     Acute lower GI bleeding     Morbid obesity (H)     Moderate episode of recurrent major depressive disorder (H)     Past Surgical History:   Procedure Laterality Date     BREAST LUMPECTOMY, RT/LT      x2, left     CATARACT IOL, RT/LT  4/99    left, MZ60CD 7.0D     COLONOSCOPY       COLONOSCOPY N/A 1/22/2016    Procedure: COMBINED COLONOSCOPY, SINGLE OR MULTIPLE BIOPSY/POLYPECTOMY BY BIOPSY;  Surgeon: Praful Benjamin MD;  [facility]: [facility]     COLONOSCOPY WITH CO2 INSUFFLATION N/A 1/22/2016    Procedure: COLONOSCOPY WITH CO2 INSUFFLATION;  Surgeon: Praful Benjamin MD;  Location: [facility]     COMBINED ESOPHAGOSCOPY, GASTROSCOPY, DUODENOSCOPY (EGD) WITH CO2 INSUFFLATION N/A 11/27/2018    Procedure: COMBINED ESOPHAGOSCOPY, GASTROSCOPY, DUODENOSCOPY (EGD) WITH CO2 INSUFFLATION;  Surgeon: Duane, William Charles, MD;  Location: [facility]     CRYOTHERAPY  2000    cervical     CRYOTHERAPY Left 3/19/2015    Procedure: CRYOTHERAPY;  Surgeon: Keiko Puri MD;  Location:  [facility]     DILATION AND CURETTAGE, HYSTEROSCOPY DIAGNOSTIC, COMBINED N/A 1/19/2016    Procedure: COMBINED DILATION AND CURETTAGE,  HYSTEROSCOPY DIAGNOSTIC;  Surgeon: Yeni Aparicio DO;  Location: [facility]     ESOPHAGOSCOPY, GASTROSCOPY, DUODENOSCOPY (EGD), COMBINED N/A 2018    Procedure: COMBINED ESOPHAGOSCOPY, GASTROSCOPY, DUODENOSCOPY (EGD), BIOPSY SINGLE OR MULTIPLE;  Surgeon: Duane, William Charles, MD;  Location: [facility]     IMPLANT STIMULATOR AND LEADS SACRAL NERVE (STAGE ONE AND TWO) N/A 2018    Procedure: IMPLANT STIMULATOR AND LEADS SACRAL NERVE (STAGE ONE AND TWO);  Interstim Implant Stage One and Two (Implant Permanent Lead and Generator);  Surgeon: Dada Baltazar MD;  [facility]     IMPLANT STIMULATOR SACRAL NERVE PERCUTANEOUS TRIAL N/A 2018    Procedure: IMPLANT STIMULATOR SACRAL NERVE PERCUTANEOUS TRIAL;  Percutaneous Neural Examination (trial for interstim);  Surgeon: Dada Baltazar MD;  [facility]     LAPAROSCOPIC TUBAL LIGATION  1981     PHACOEMULSIFICATION CLEAR CORNEA WITH STANDARD INTRAOCULAR LENS IMPLANT  8/15/2013    Procedure: PHACOEMULSIFICATION CLEAR CORNEA WITH STANDARD INTRAOCULAR LENS IMPLANT;  RIGHT PHACOEMULSIFICATION CLEAR CORNEA WITH STANDARD INTRAOCULAR LENS IMPLANT ;  Surgeon: Trae Taylor MD;  Location: [facility]     SURGICAL HISTORY OF -       x4, ankle surgery       Social History     Tobacco Use     Smoking status: Former Smoker     Packs/day: 0.50     Years: 17.00     Pack years: 8.50     Types: Cigarettes     Start date: 2018     Smokeless tobacco: Never Used   Substance Use Topics     Alcohol use: Yes     Comment: social/3-4 per week     Family History   Problem Relation Age of Onset     Hypertension Mother      Cerebrovascular Disease Mother      Arthritis Mother      Cardiovascular Mother      Circulatory Mother      Cerebrovascular Disease Father      Prostate Cancer Father 65         at 69     Asthma Brother      Prostate Cancer Brother 48        bone metastasis     Diabetes Sister      Hypertension Sister      Osteoporosis Sister      Depression  Sister      Lipids Sister      Cancer Maternal Grandmother 95        spine     Breast Cancer Sister 39        also contralateral @53, mets to lungs     Cancer Sister 20        second uterine cancer in 30s also     Diabetes Sister      Hypertension Sister      Depression Sister      Osteoporosis Sister      Breast Cancer Sister 57        unilateral     Cancer Paternal Aunt 40        unknown type     Cancer Paternal Uncle         stomach;  in his 80s     Prostate Cancer Brother      Glaucoma No family hx of              Problem list, Medication list, Allergies, and Medical/Social/Surgical histories reviewed in [facility] and updated as appropriate.    OBJECTIVE:                                                    /74   Pulse 107   Temp 97.7  F (36.5  C) (Temporal)   Wt 96.2 kg (212 lb)   LMP 1991 (Exact Date)   SpO2 96%   BMI 34.22 kg/m      GENERAL: healthy, alert and no distress            ASSESSMENT/PLAN:                                                      68 year old female with the following diagnoses and treatment plan:      ICD-10-CM    1. Thyroid nodule E04.1 TSH with free T4 reflex   2. Weight loss R63.4 TSH with free T4 reflex   3. Iron deficiency anemia, unspecified iron deficiency anemia type D50.9 Occult blood stool   4. History of GI diverticular bleed Z87.19    5. Depression with anxiety F41.8 DULoxetine (CYMBALTA) 60 MG capsule   6. Anemia, unspecified type D64.9 Folate       --    Will call or return to clinic if worsening or symptoms not improving as discussed.  See Patient Instructions.      Trice Medeiros MD-PhD  [facility]    (Note: Chart documentation was done in part with Dragon Voice Recognition software. Although reviewed after completion, some word and grammatical errors may remain.)      ROS:  Constitutional, HEENT, cardiovascular, pulmonary, gi and gu systems are negative, except as otherwise noted.         Current Outpatient Medications on File Prior to  "Visit:  ferrous sulfate (IRON) 325 (65 Fe) MG tablet Take 1 tablet (325 mg) by mouth daily (with breakfast)   glipiZIDE (GLUCOTROL) 10 MG tablet Take 1 tablet (10 mg) by mouth daily (with dinner)   insulin NPH (NOVOLIN N VIAL) 100 UNIT/ML injection Inject 64 units Subcutaneous 2 times daily. (gets this from OTC NPH at [facility])   lisinopril (PRINIVIL/ZESTRIL) 5 MG tablet Take 1 tablet (5 mg) by mouth daily   metFORMIN (GLUCOPHAGE-XR) 500 MG 24 hr tablet Take 2 tablets (1,000 mg) by mouth 2 times daily (with meals)   minocycline (MINOCIN/DYNACIN) 100 MG capsule Take 1 capsule (100 mg) by mouth every 12 hours   omeprazole (PRILOSEC) 40 MG capsule TAKE 1 CAPSULE DAILY 30 TO 60 MINUTES BEFORE A MEAL   oxybutynin chloride 15 MG TB24 Take 1 tablet (15 mg) by mouth daily   simvastatin (ZOCOR) 20 MG tablet Take 1 tablet (20 mg) by mouth At Bedtime   spironolactone (ALDACTONE) 100 MG tablet TAKE 1 TABLET EVERY MORNING   traZODone (DESYREL) 100 MG tablet Take 1 tablet (100 mg) by mouth nightly as needed for sleep   VITAMIN D 1000 UNIT OR CAPS 1 CAPSULE DAILY   Continuous Blood Gluc  (FREESTYLE MELINA READER) AYAN 1 Units daily   Continuous Blood Gluc  (FREESTYLE MELINA READER) AYAN 1 Units 3 times daily as needed   continuous blood glucose monitoring (FREESTYLE MELINA) sensor For use with Freestyle Melina Flash  for continuous monitioring of blood glucose levels. Replace sensor every 10 days.   fluconazole (DIFLUCAN) 150 MG tablet Once a day for two weeks (Patient not taking: Reported on 12/6/2018)   insulin syringe-needle U-100 (RELION INSULIN SYRINGE) 31G X 5/16\" 1 ML Use 2 syringes daily or as directed.   MELATONIN PO 10 mg At Bedtime    ONE TOUCH LANCETS None Entered   ONE TOUCH TEST STRIPS VI None Entered   order for DME Equipment being ordered: Walker with seat light weight     Current Facility-Administered Medications on File Prior to Visit:  gadobutrol (GADAVIST) injection 10 mL          Patient " Active Problem List   Diagnosis     Macular degeneration     Myopia     Hiatal hernia     IBS (irritable bowel syndrome)     Hypertension goal BP (blood pressure) < 140/90     GERD (gastroesophageal reflux disease)     Atypical ductal hyperplasia of breast     Atypical lobular hyperplasia of breast     Benign Breast Disease (PASH)     Family history of breast cancer in sister     Type 2 diabetes, HbA1C goal < 8% (H)     Hyperlipidemia LDL goal <100     Breast cancer screening-high risk     Tubular adenoma of colon     Osteopenia     Alcohol ingestion, more than 4 drinks/day on alcohol screening     Umbilical hernia     Incontinence of urine     Stool incontinence     Osteoarthritis, knee     Rosacea     Anxiety     Pseudophakia     Abnormal cervical Pap smear with positive HPV DNA test     Depression with anxiety     Retinal detachment     Elevated liver function tests     Rectocele     Unexplained endometrial cells on cervical cytology     Back pain, unspecified back location, unspecified back pain laterality, unspecified chronicity     Urge incontinence     Dislocation of shoulder region     Acute lower GI bleeding     Morbid obesity (H)     Moderate episode of recurrent major depressive disorder (H)     Past Surgical History:   Procedure Laterality Date     BREAST LUMPECTOMY, RT/LT      x2, left     CATARACT IOL, RT/LT  4/99    left, MZ60CD 7.0D     COLONOSCOPY       COLONOSCOPY N/A 1/22/2016    Procedure: COMBINED COLONOSCOPY, SINGLE OR MULTIPLE BIOPSY/POLYPECTOMY BY BIOPSY;  Surgeon: Praful Benjamin MD;  [facility]     COLONOSCOPY WITH CO2 INSUFFLATION N/A 1/22/2016    Procedure: COLONOSCOPY WITH CO2 INSUFFLATION;  Surgeon: Praful Benjamin MD;  [facility]: [facility]     COMBINED ESOPHAGOSCOPY, GASTROSCOPY, DUODENOSCOPY (EGD) WITH CO2 INSUFFLATION N/A 11/27/2018    Procedure: COMBINED ESOPHAGOSCOPY, GASTROSCOPY, DUODENOSCOPY (EGD) WITH CO2 INSUFFLATION;  Surgeon: Duane, William Charles, MD;   Location: [facility]     CRYOTHERAPY  2000    cervical     CRYOTHERAPY Left 3/19/2015    Procedure: CRYOTHERAPY;  Surgeon: Keiko Puri MD;  [facility]: [facility]     DILATION AND CURETTAGE, HYSTEROSCOPY DIAGNOSTIC, COMBINED N/A 1/19/2016    Procedure: COMBINED DILATION AND CURETTAGE, HYSTEROSCOPY DIAGNOSTIC;  Surgeon: Yeni Aparicio DO;  Location: [facility]     ESOPHAGOSCOPY, GASTROSCOPY, DUODENOSCOPY (EGD), COMBINED N/A 11/27/2018    Procedure: COMBINED ESOPHAGOSCOPY, GASTROSCOPY, DUODENOSCOPY (EGD), BIOPSY SINGLE OR MULTIPLE;  Surgeon: Duane, William Charles, MD;  Location: [facility]     IMPLANT STIMULATOR AND LEADS SACRAL NERVE (STAGE ONE AND TWO) N/A 7/24/2018    Procedure: IMPLANT STIMULATOR AND LEADS SACRAL NERVE (STAGE ONE AND TWO);  Interstim Implant Stage One and Two (Implant Permanent Lead and Generator);  Surgeon: Dada Baltazar MD;  [facility]     IMPLANT STIMULATOR SACRAL NERVE PERCUTANEOUS TRIAL N/A 6/19/2018    Procedure: IMPLANT STIMULATOR SACRAL NERVE PERCUTANEOUS TRIAL;  Percutaneous Neural Examination (trial for interstim);  Surgeon: Dada Baltazar MD;  [facility]     LAPAROSCOPIC TUBAL LIGATION  1981     PHACOEMULSIFICATION CLEAR CORNEA WITH STANDARD INTRAOCULAR LENS IMPLANT  8/15/2013    Procedure: PHACOEMULSIFICATION CLEAR CORNEA WITH STANDARD INTRAOCULAR LENS IMPLANT;  RIGHT PHACOEMULSIFICATION CLEAR CORNEA WITH STANDARD INTRAOCULAR LENS IMPLANT ;  Surgeon: Trae Taylor MD;  Location: [facility]     SURGICAL HISTORY OF -       x4, ankle surgery       Social History     Tobacco Use     Smoking status: Former Smoker     Packs/day: 0.50     Years: 17.00     Pack years: 8.50     Types: Cigarettes     Start date: 11/21/2018     Smokeless tobacco: Never Used   Substance Use Topics     Alcohol use: Yes     Comment: social/3-4 per week     Family History   Problem Relation Age of Onset     Hypertension Mother      Cerebrovascular Disease Mother      Arthritis Mother       Cardiovascular Mother      Circulatory Mother      Cerebrovascular Disease Father      Prostate Cancer Father 65         at 69     Asthma Brother      Prostate Cancer Brother 48        bone metastasis     Diabetes Sister      Hypertension Sister      Osteoporosis Sister      Depression Sister      Lipids Sister      Cancer Maternal Grandmother 95        spine     Breast Cancer Sister 39        also contralateral @53, mets to lungs     Cancer Sister 20        second uterine cancer in 30s also     Diabetes Sister      Hypertension Sister      Depression Sister      Osteoporosis Sister      Breast Cancer Sister 57        unilateral     Cancer Paternal Aunt 40        unknown type     Cancer Paternal Uncle         stomach;  in his 80s     Prostate Cancer Brother      Glaucoma No family hx of              Problem list, Medication list, Allergies, and Medical/Social/Surgical histories reviewed in [facility] and updated as appropriate.    OBJECTIVE:                                                    /74   Pulse 107   Temp 97.7  F (36.5  C) (Temporal)   Wt 96.2 kg (212 lb)   LMP 1991 (Exact Date)   SpO2 96%   BMI 34.22 kg/m      GENERAL: healthy, alert and no distress  PSYCH: Alert and oriented times 3; speech- coherent , normal rate and volume; able to articulate logical thoughts, able to abstract reason, no tangential thoughts, no hallucinations or delusions, affect- depressed.       Diagnostic test results:        ASSESSMENT/PLAN:                                                      68 year old female with the following diagnoses and treatment plan:      ICD-10-CM    1. Thyroid nodule E04.1 TSH with free T4 reflex   2. Weight loss R63.4 TSH with free T4 reflex   3. Iron deficiency anemia, unspecified iron deficiency anemia type D50.9 Occult blood stool   4. History of GI diverticular bleed Z87.19    5. Depression with anxiety F41.8 DULoxetine (CYMBALTA) 60 MG capsule   6. Anemia, unspecified type D64.9  Folate       -- thyroid nodules: check TSH and ultrasound.   -- weight loss: check TSH  -- iron deficiency anemia: EGD negative. History of diverticular bleed. Check hemoccult. Check Folate level. Recheck hemoglobin in one month.   -- depression/anxiety: duloxetine increased to 60 mg  -- follow up in 1 month.     Will call or return to clinic if worsening or symptoms not improving as discussed.  See Patient Instructions.    A total of 40 minutes was spent face to face with this patient. More than 50% of the time was spent on education for the above problems and management plans.     Trice Medeiros MD-PhD  [facility] [place]    (Note: Chart documentation was done in part with Dragon Voice Recognition software. Although reviewed after completion, some word and grammatical errors may remain.)

## 2018-12-06 NOTE — PROGRESS NOTES
Dear Radha,   Your recent result are within acceptable range or at baseline. Please continue with your current plan of care.       Please call or Mychart to our office if you have further questions.     Trice Medeiros MD-PhD

## 2018-12-06 NOTE — MR AVS SNAPSHOT
After Visit Summary   12/6/2018    Radha Corbett    MRN: [id number]           Patient Information     Date Of Birth          1950        Visit Information        Provider Department      12/6/2018 1:50 PM Trice Medeiros MD PhD [facility]        Today's Diagnoses     Thyroid nodule    -  1    Weight loss        Iron deficiency anemia, unspecified iron deficiency anemia type        History of GI diverticular bleed        Depression with anxiety          Care Instructions    Make appointment(s) for:   -- clinic follow up in 1 month with non fasting lab (recheck hemoglobin).        Medication(s) prescribed today:    Orders Placed This Encounter   Medications     DULoxetine (CYMBALTA) 60 MG capsule     Sig: Take 1 capsule (60 mg) by mouth daily     Dispense:  90 capsule     Refill:  1     Dose increased. Please put on file. Do not fill until patient calls.         Schedule for Diabetic eye exam        At your visit today, we discussed your risk for falls and preventive options.    Fall Prevention  Falls often occur due to slipping, tripping or losing your balance. Millions of people fall every year and injure themselves. Here are ways to reduce your risk of falling again.    Think about your fall, was there anything that caused your fall that can be fixed, removed, or replaced?    Make your home safe by keeping walkways clear of objects you may trip over, such as electric cords.    Use non-slip pads under rugs. Don't use area rugs or small throw rugs.    Use non-slip mats in bathtubs and showers.    Install handrails and lights on staircases. The handrails should be on both sides of the stairs.    Don't walk in poorly lit areas.    Don't stand on chairs or wobbly ladders.    Use caution when reaching overhead or looking upward. This position can cause a loss of balance.    Be sure your shoes fit properly, have non-slip bottoms and are in good condition.     Wear shoes both inside and out.  Don't go barefoot or wear slippers.    Be cautious when going up and down stairs, curbs, and when walking on uneven sidewalks.    If your balance is poor, consider using a cane or walker.    If your fall was related to alcohol use, stop or limit alcohol intake.     If your fall was related to use of sleeping medicines, talk to your healthcare provider about this. You may need to reduce your dosage at bedtime if you awaken during the night to go to the bathroom.      To reduce the need for nighttime bathroom trips:  ? Don't drink fluids for several hours before going to bed  ? Empty your bladder before going to bed  ? Men can keep a urinal at the bedside    Stay as active as you can. Balance, flexibility, strength, and endurance all come from exercise. They all play a role in preventing falls. Ask your healthcare provider which types of activity are right for you.    Get your vision checked on a regular basis.    If you have pets, know where they are before you stand up or walk so you don't trip over them.    Use night lights.    Go over all your medicines with a pharmacist or other healthcare provider to see if any of them could make you more likely to fall.  Date Last Reviewed: 4/1/2018 2000-2018 [employer]. [address] [place] [address], [place] [address]. All rights reserved. This information is not intended as a substitute for professional medical care. Always follow your healthcare professional's instructions.                Follow-ups after your visit        Your next 10 appointments already scheduled     Jan 29, 2019  8:30 AM CST   New Visit with Alina Hanley MD   [facility] ([facility])    [id number] [place] [id number]   [phone]              Future tests that were ordered for you today     Open Future Orders        Priority Expected Expires Ordered    Occult blood stool Routine  12/6/2019 12/6/2018            Who to contact     If  you have questions or need follow up information about today's clinic visit or your schedule please contact [facility] directly at [phone].  Normal or non-critical lab and imaging results will be communicated to you by [employer], letter or phone within 4 business days after the clinic has received the results. If you do not hear from us within 7 days, please contact the clinic through [employer] or phone. If you have a critical or abnormal lab result, we will notify you by phone as soon as possible.  Submit refill requests through [employer] or call your pharmacy and they will forward the refill request to us. Please allow 3 business days for your refill to be completed.          Additional Information About Your Visit        [employer] Information     [employer] gives you secure access to your electronic health record. If you see a primary care provider, you can also send messages to your care team and make appointments. If you have questions, please call your primary care clinic.  If you do not have a primary care provider, please call [phone] and they will assist you.      [employer] is an electronic gateway that provides easy, online access to your medical records. With [employer], you can request a clinic appointment, read your test results, renew a prescription or communicate with your care team.     To access your existing account, please contact your [facility] or call [phone] for assistance.        Care EveryWhere ID     This is your Care EveryWhere ID. This could be used by other organizations to access your [place] medical records  [id number]        Your Vitals Were     Pulse Temperature Last Period Pulse Oximetry BMI (Body Mass Index)       107 97.7  F (36.5  C) (Temporal) 11/18/1991 (Exact Date) 96% 34.22 kg/m2        Blood Pressure from Last 3 Encounters:   12/06/18 115/74   11/29/18 120/73   11/27/18 111/53    Weight from Last 3 Encounters:   12/06/18 212  lb (96.2 kg)   11/29/18 213 lb 11.2 oz (96.9 kg)   11/26/18 219 lb (99.3 kg)              We Performed the Following     TSH with free T4 reflex          Today's Medication Changes          These changes are accurate as of 12/6/18  3:09 PM.  If you have any questions, ask your nurse or doctor.               These medicines have changed or have updated prescriptions.        Dose/Directions    DULoxetine 60 MG capsule   Commonly known as:  CYMBALTA   This may have changed:    - medication strength  - how much to take  - when to take this   Used for:  Depression with anxiety   Changed by:  Trice Medeiros MD PhD        Dose:  60 mg   Take 1 capsule (60 mg) by mouth daily   Quantity:  90 capsule   Refills:  1            Where to get your medicines      These medications were sent to [facility] - [address] [address]     Phone:  [phone]     DULoxetine 60 MG capsule                Primary Care Provider Office Phone # Fax #    Trice Medeiros MD PhD [phone] [address] [facility] [address]        Equal Access to Services     [facility]: Hadii kyree dawson hadasho Soomaali, waaxda luqadaha, qaybta kaalmada adeegyada, michael hallman . So [facility] [phone].    ATENCIÓN: Si habla español, tiene a arauz disposición servicios gratuitos de asistencia lingüística. [place] [facility] [phone].    We comply with applicable federal civil rights laws and [place] laws. We do not discriminate on the basis of race, color, national origin, age, disability, sex, sexual orientation, or gender identity.            Thank you!     Thank you for choosing [facility]  for your care. Our goal is always to provide you with excellent care. Hearing back from our patients is one way we can continue to improve our services. Please take a few minutes to complete the written survey that you may receive in the mail  "after your visit with us. Thank you!             Your Updated Medication List - Protect others around you: Learn how to safely use, store and throw away your medicines at www.disposemymeds.org.          This list is accurate as of 12/6/18  3:09 PM.  Always use your most recent med list.                   Brand Name Dispense Instructions for use Diagnosis    continuous blood glucose monitoring sensor     3 each    For use with Freestyle Danni Flash  for continuous monitioring of blood glucose levels. Replace sensor every 10 days.    Type 2 diabetes mellitus with hyperglycemia, with long-term current use of insulin (H)       DULoxetine 60 MG capsule    CYMBALTA    90 capsule    Take 1 capsule (60 mg) by mouth daily    Depression with anxiety       ferrous sulfate 325 (65 Fe) MG tablet    FEROSUL    60 tablet    Take 1 tablet (325 mg) by mouth daily (with breakfast)    Anemia, unspecified type       fluconazole 150 MG tablet    DIFLUCAN    14 tablet    Once a day for two weeks    Thrush, oral       * FREESTYLE DANNI READER Britney     1 Device    1 Units 3 times daily as needed    Uncontrolled type 2 diabetes mellitus with stage 3 chronic kidney disease, with long-term current use of insulin (H)       * FREESTYLE DANNI READER Britney     1 Device    1 Units daily    Type 2 diabetes mellitus with hyperglycemia, with long-term current use of insulin (H)       glipiZIDE 10 MG tablet    GLUCOTROL    90 tablet    Take 1 tablet (10 mg) by mouth daily (with dinner)    Type 2 diabetes mellitus with hyperglycemia, with long-term current use of insulin (H)       insulin syringe-needle U-100 31G X 5/16\" 1 ML miscellaneous    RELION INSULIN SYRINGE    200 each    Use 2 syringes daily or as directed.    Type 2 diabetes mellitus without complication (H)       lisinopril 5 MG tablet    PRINIVIL/ZESTRIL    90 tablet    Take 1 tablet (5 mg) by mouth daily    Essential hypertension with goal blood pressure less than 140/90       " MELATONIN PO      10 mg At Bedtime        metFORMIN 500 MG 24 hr tablet    GLUCOPHAGE-XR    360 tablet    Take 2 tablets (1,000 mg) by mouth 2 times daily (with meals)    Type 2 diabetes mellitus with hyperglycemia, with long-term current use of insulin (H)       minocycline 100 MG capsule    MINOCIN/DYNACIN    180 capsule    Take 1 capsule (100 mg) by mouth every 12 hours    Acne vulgaris       NovoLIN N VIAL 100 UNIT/ML vial   Generic drug:  insulin NPH      Inject 64 units Subcutaneous 2 times daily. (gets this from [facility] at [facility])    Uncontrolled type 2 diabetes mellitus with stage 3 chronic kidney disease, with long-term current use of insulin (H)       omeprazole 40 MG DR capsule    priLOSEC    90 capsule    TAKE 1 CAPSULE DAILY 30 TO 60 MINUTES BEFORE A MEAL    Gastroesophageal reflux disease without esophagitis       ONE TOUCH LANCETS      None Entered        ONE TOUCH TEST STRIPS VI      None Entered        order for DME     1 Device    Equipment being ordered: Walker with seat light weight    Back pain, unspecified back location, unspecified back pain laterality, unspecified chronicity       oxybutynin ER 15 MG 24 hr tablet    DITROPAN XL    90 tablet    Take 1 tablet (15 mg) by mouth daily    Urinary urgency, Urgency incontinence       simvastatin 20 MG tablet    ZOCOR    90 tablet    Take 1 tablet (20 mg) by mouth At Bedtime    Hyperlipidemia LDL goal <100       spironolactone 100 MG tablet    ALDACTONE    90 tablet    TAKE 1 TABLET EVERY MORNING    Essential hypertension with goal blood pressure less than 140/90       traZODone 100 MG tablet    DESYREL    90 tablet    Take 1 tablet (100 mg) by mouth nightly as needed for sleep        vitamin D 1000 units capsule      1 CAPSULE DAILY        * Notice:  This list has 2 medication(s) that are the same as other medications prescribed for you. Read the directions carefully, and ask your doctor or other care provider to review them with you.

## 2018-12-07 ASSESSMENT — PATIENT HEALTH QUESTIONNAIRE - PHQ9
SUM OF ALL RESPONSES TO PHQ QUESTIONS 1-9: 18
5. POOR APPETITE OR OVEREATING: NEARLY EVERY DAY

## 2018-12-07 ASSESSMENT — ANXIETY QUESTIONNAIRES
1. FEELING NERVOUS, ANXIOUS, OR ON EDGE: MORE THAN HALF THE DAYS
7. FEELING AFRAID AS IF SOMETHING AWFUL MIGHT HAPPEN: NEARLY EVERY DAY
6. BECOMING EASILY ANNOYED OR IRRITABLE: MORE THAN HALF THE DAYS
5. BEING SO RESTLESS THAT IT IS HARD TO SIT STILL: NEARLY EVERY DAY
2. NOT BEING ABLE TO STOP OR CONTROL WORRYING: MORE THAN HALF THE DAYS
3. WORRYING TOO MUCH ABOUT DIFFERENT THINGS: NEARLY EVERY DAY
GAD7 TOTAL SCORE: 18

## 2018-12-07 NOTE — PROGRESS NOTES
Medina Corbett,    Attached are your test results.  Folate is normal    Please contact us if you have any questions.    Perri Carias, CNP

## 2018-12-08 ASSESSMENT — ANXIETY QUESTIONNAIRES: GAD7 TOTAL SCORE: 18

## 2018-12-18 ENCOUNTER — TELEPHONE (OUTPATIENT)
Dept: UROLOGY | Facility: CLINIC | Age: 68
End: 2018-12-18

## 2018-12-18 NOTE — TELEPHONE ENCOUNTER
[facility] Call Center    Phone Message    May a detailed message be left on voicemail: yes    Reason for Call: Symptoms or Concerns     If patient has red-flag symptoms, warm transfer to triage line    Current symptom or concern: Patient is calling regarding her concerns with her permanent bladder implant compared to the trail bladder implant. Patient states that she notices that  the permanent is not as effective as the trail one. She is wearing at least 2-3 depends a day. Patient would like discuss with nurse. Please advise.        Action Taken: Message routed to:  Adult Clinics: Urology p [id number]

## 2018-12-18 NOTE — TELEPHONE ENCOUNTER
Called and spoke to patient. Patient reports that the permanent implant does not work as well as her trial did. Patient reports that she changed the program last week. Encouraged patient to stay on current program for 1 more week and if changes program to stay on each program for 2 weeks. Offered for patient to see nurse and [employer] rep on 1/3/19 at 11:00am and patient would like to do this. Appointment scheduled. Patient will call with any questions in the meantime.    Sophia Seo RN, BSN

## 2019-01-03 ENCOUNTER — ALLIED HEALTH/NURSE VISIT (OUTPATIENT)
Dept: NURSING | Facility: CLINIC | Age: 69
End: 2019-01-03
Payer: COMMERCIAL

## 2019-01-03 DIAGNOSIS — D50.9 IRON DEFICIENCY ANEMIA, UNSPECIFIED IRON DEFICIENCY ANEMIA TYPE: ICD-10-CM

## 2019-01-03 DIAGNOSIS — N39.41 URGE INCONTINENCE OF URINE: Primary | ICD-10-CM

## 2019-01-03 DIAGNOSIS — R39.15 URINARY URGENCY: ICD-10-CM

## 2019-01-03 LAB — HEMOCCULT STL QL: NEGATIVE

## 2019-01-03 PROCEDURE — 82272 OCCULT BLD FECES 1-3 TESTS: CPT | Performed by: INTERNAL MEDICINE

## 2019-01-03 NOTE — NURSING NOTE
Radha Corbett comes into clinic today at the request of Dr. Baltazar Ordering Provider for Pt Teaching meeting with [employer] Rep, interstim device check.    Patient met with [employer] Rep. Per [employer] Rep, the patient has the first set of 4 programs now and started on program 1 today. Per rep, patient reports improvement in symptoms. Patient aware that if no further improvement with these 4 programs, then patient should follow up in clinic with Dr. Baltazar and [employer] rep.    This service provided today was under the supervising provider of the day Dr. Cosby, who was available if needed.    Sophia Seo RN, BSN

## 2019-01-03 NOTE — RESULT ENCOUNTER NOTE
Medina Corbett,    Attached are your test results.  Stool negative for blood    Please contact us if you have any questions.    Perri Carias, CNP

## 2019-01-03 NOTE — NURSING NOTE
Pt met with [employer] rep. Pt will call to schedule f/u with Dr. Baltazar and [employer] rep is to contacted so they can also be at appointment.     Yesenia Connell LPN

## 2019-01-10 ENCOUNTER — OFFICE VISIT (OUTPATIENT)
Dept: PEDIATRICS | Facility: CLINIC | Age: 69
End: 2019-01-10
Payer: COMMERCIAL

## 2019-01-10 ENCOUNTER — TELEPHONE (OUTPATIENT)
Dept: ONCOLOGY | Facility: CLINIC | Age: 69
End: 2019-01-10

## 2019-01-10 VITALS
BODY MASS INDEX: 32.36 KG/M2 | HEIGHT: 67 IN | WEIGHT: 206.2 LBS | SYSTOLIC BLOOD PRESSURE: 120 MMHG | TEMPERATURE: 97.1 F | DIASTOLIC BLOOD PRESSURE: 60 MMHG | OXYGEN SATURATION: 95 % | HEART RATE: 105 BPM

## 2019-01-10 DIAGNOSIS — Z78.0 POST-MENOPAUSAL: ICD-10-CM

## 2019-01-10 DIAGNOSIS — F41.8 DEPRESSION WITH ANXIETY: ICD-10-CM

## 2019-01-10 DIAGNOSIS — D64.9 ANEMIA, UNSPECIFIED TYPE: ICD-10-CM

## 2019-01-10 DIAGNOSIS — N30.00 ACUTE CYSTITIS WITHOUT HEMATURIA: ICD-10-CM

## 2019-01-10 DIAGNOSIS — R06.02 SOB (SHORTNESS OF BREATH): ICD-10-CM

## 2019-01-10 DIAGNOSIS — R39.15 URINARY URGENCY: ICD-10-CM

## 2019-01-10 DIAGNOSIS — E11.65 TYPE 2 DIABETES MELLITUS WITH HYPERGLYCEMIA, WITH LONG-TERM CURRENT USE OF INSULIN (H): ICD-10-CM

## 2019-01-10 DIAGNOSIS — R82.90 ABNORMAL URINE FINDINGS: ICD-10-CM

## 2019-01-10 DIAGNOSIS — Z79.4 TYPE 2 DIABETES MELLITUS WITH HYPERGLYCEMIA, WITH LONG-TERM CURRENT USE OF INSULIN (H): ICD-10-CM

## 2019-01-10 DIAGNOSIS — K13.0 SORE OF LOWER LIP: ICD-10-CM

## 2019-01-10 DIAGNOSIS — R39.9 UTI SYMPTOMS: ICD-10-CM

## 2019-01-10 DIAGNOSIS — N39.41 URGENCY INCONTINENCE: ICD-10-CM

## 2019-01-10 DIAGNOSIS — E04.1 THYROID NODULE: Primary | ICD-10-CM

## 2019-01-10 LAB
ALBUMIN UR-MCNC: 30 MG/DL
APPEARANCE UR: ABNORMAL
BACTERIA #/AREA URNS HPF: ABNORMAL /HPF
BILIRUB UR QL STRIP: NEGATIVE
COLOR UR AUTO: YELLOW
GLUCOSE UR STRIP-MCNC: NEGATIVE MG/DL
HGB UR QL STRIP: NEGATIVE
KETONES UR STRIP-MCNC: NEGATIVE MG/DL
LEUKOCYTE ESTERASE UR QL STRIP: ABNORMAL
NITRATE UR QL: POSITIVE
NON-SQ EPI CELLS #/AREA URNS LPF: ABNORMAL /LPF
PH UR STRIP: 7.5 PH (ref 5–7)
RBC #/AREA URNS AUTO: ABNORMAL /HPF
SOURCE: ABNORMAL
SP GR UR STRIP: 1.02 (ref 1–1.03)
UROBILINOGEN UR STRIP-MCNC: NORMAL MG/DL (ref 0–2)
WBC #/AREA URNS AUTO: ABNORMAL /HPF

## 2019-01-10 PROCEDURE — 99215 OFFICE O/P EST HI 40 MIN: CPT | Performed by: NURSE PRACTITIONER

## 2019-01-10 PROCEDURE — 87186 SC STD MICRODIL/AGAR DIL: CPT | Performed by: NURSE PRACTITIONER

## 2019-01-10 PROCEDURE — 81001 URINALYSIS AUTO W/SCOPE: CPT | Performed by: NURSE PRACTITIONER

## 2019-01-10 PROCEDURE — 87086 URINE CULTURE/COLONY COUNT: CPT | Performed by: NURSE PRACTITIONER

## 2019-01-10 PROCEDURE — 87088 URINE BACTERIA CULTURE: CPT | Performed by: NURSE PRACTITIONER

## 2019-01-10 RX ORDER — OXYBUTYNIN CHLORIDE 15 MG/1
15 TABLET, EXTENDED RELEASE ORAL DAILY
Qty: 90 TABLET | Refills: 1 | Status: SHIPPED | OUTPATIENT
Start: 2019-01-10 | End: 2019-07-30

## 2019-01-10 RX ORDER — OXYBUTYNIN CHLORIDE 15 MG/1
15 TABLET, EXTENDED RELEASE ORAL DAILY
Qty: 90 TABLET | Refills: 1 | Status: SHIPPED | OUTPATIENT
Start: 2019-01-10 | End: 2019-01-10

## 2019-01-10 RX ORDER — DULOXETIN HYDROCHLORIDE 60 MG/1
60 CAPSULE, DELAYED RELEASE ORAL 2 TIMES DAILY
Qty: 180 CAPSULE | Refills: 1 | Status: SHIPPED | OUTPATIENT
Start: 2019-01-10 | End: 2019-08-23

## 2019-01-10 RX ORDER — CEPHALEXIN 500 MG/1
500 CAPSULE ORAL 2 TIMES DAILY
Qty: 14 CAPSULE | Refills: 0 | Status: SHIPPED | OUTPATIENT
Start: 2019-01-10 | End: 2019-01-14

## 2019-01-10 RX ORDER — FERROUS SULFATE 325(65) MG
325 TABLET ORAL
Qty: 90 TABLET | Refills: 1 | Status: SHIPPED | OUTPATIENT
Start: 2019-01-10 | End: 2019-01-10

## 2019-01-10 RX ORDER — FERROUS SULFATE 325(65) MG
325 TABLET ORAL
Qty: 90 TABLET | Refills: 1 | Status: SHIPPED | OUTPATIENT
Start: 2019-01-10 | End: 2019-01-14

## 2019-01-10 ASSESSMENT — ANXIETY QUESTIONNAIRES
5. BEING SO RESTLESS THAT IT IS HARD TO SIT STILL: NEARLY EVERY DAY
3. WORRYING TOO MUCH ABOUT DIFFERENT THINGS: NEARLY EVERY DAY
GAD7 TOTAL SCORE: 20
1. FEELING NERVOUS, ANXIOUS, OR ON EDGE: NEARLY EVERY DAY
2. NOT BEING ABLE TO STOP OR CONTROL WORRYING: NEARLY EVERY DAY
7. FEELING AFRAID AS IF SOMETHING AWFUL MIGHT HAPPEN: NEARLY EVERY DAY
6. BECOMING EASILY ANNOYED OR IRRITABLE: MORE THAN HALF THE DAYS

## 2019-01-10 ASSESSMENT — PATIENT HEALTH QUESTIONNAIRE - PHQ9
5. POOR APPETITE OR OVEREATING: NEARLY EVERY DAY
SUM OF ALL RESPONSES TO PHQ QUESTIONS 1-9: 20

## 2019-01-10 ASSESSMENT — MIFFLIN-ST. JEOR: SCORE: 1501.91

## 2019-01-10 NOTE — PROGRESS NOTES
SUBJECTIVE:   Radha Corbett is a 68 year old female who presents to clinic today for the following health issues:    1.patient has questions oral cancer, stomach cancer-patient notes she has multiple symptoms  She is worried about oral cancer   She is concerned about cancer. Was a smoker for 20 years and quit and started again about a year ago   Has been on the Internet and looking up symptoms and is worried about oral cancer and stomach cancer as feels her symptoms may fit     2. Would like to have clubbed nails checked     3. Possible referral to endocrinologist, noted had a lump in her lung scan.   Has appointment with endocrinologist January 29 th reminded patient this is already scheduled.     Problem list and histories reviewed & adjusted, as indicated.  Additional history: as documented    Patient Active Problem List   Diagnosis     Macular degeneration     Myopia     Hiatal hernia     IBS (irritable bowel syndrome)     Hypertension goal BP (blood pressure) < 140/90     GERD (gastroesophageal reflux disease)     Atypical ductal hyperplasia of breast     Atypical lobular hyperplasia of breast     Benign Breast Disease (PASH)     Family history of breast cancer in sister     Type 2 diabetes, HbA1C goal < 8% (H)     Hyperlipidemia LDL goal <100     Breast cancer screening-high risk     Tubular adenoma of colon     Osteopenia     Alcohol ingestion, more than 4 drinks/day on alcohol screening     Umbilical hernia     Incontinence of urine     Stool incontinence     Osteoarthritis, knee     Rosacea     Anxiety     Pseudophakia     Abnormal cervical Pap smear with positive HPV DNA test     Depression with anxiety     Retinal detachment     Elevated liver function tests     Rectocele     Unexplained endometrial cells on cervical cytology     Back pain, unspecified back location, unspecified back pain laterality, unspecified chronicity     Urge incontinence     Dislocation of shoulder region     Acute lower GI bleeding      Morbid obesity (H)     Moderate episode of recurrent major depressive disorder (H)     Past Surgical History:   Procedure Laterality Date     BREAST LUMPECTOMY, RT/LT      x2, left     CATARACT IOL, RT/LT  4/99    left, [id number] 7.0D     COLONOSCOPY       COLONOSCOPY N/A 1/22/2016    Procedure: COMBINED COLONOSCOPY, SINGLE OR MULTIPLE BIOPSY/POLYPECTOMY BY BIOPSY;  Surgeon: Praful Benjamin MD;  Location: [facility]     COLONOSCOPY WITH CO2 INSUFFLATION N/A 1/22/2016    Procedure: COLONOSCOPY WITH CO2 INSUFFLATION;  Surgeon: Praful Benjamin MD;  Location: [facility]     COMBINED ESOPHAGOSCOPY, GASTROSCOPY, DUODENOSCOPY (EGD) WITH CO2 INSUFFLATION N/A 11/27/2018    Procedure: COMBINED ESOPHAGOSCOPY, GASTROSCOPY, DUODENOSCOPY (EGD) WITH CO2 INSUFFLATION;  Surgeon: Duane, William Charles, MD;  Location: [facility]     CRYOTHERAPY  2000    cervical     CRYOTHERAPY Left 3/19/2015    Procedure: CRYOTHERAPY;  Surgeon: Keiko Puri MD;  Location:  [facility]     DILATION AND CURETTAGE, HYSTEROSCOPY DIAGNOSTIC, COMBINED N/A 1/19/2016    Procedure: COMBINED DILATION AND CURETTAGE, HYSTEROSCOPY DIAGNOSTIC;  Surgeon: Yeni Aparicio DO;  Location: [facility]     ESOPHAGOSCOPY, GASTROSCOPY, DUODENOSCOPY (EGD), COMBINED N/A 11/27/2018    Procedure: COMBINED ESOPHAGOSCOPY, GASTROSCOPY, DUODENOSCOPY (EGD), BIOPSY SINGLE OR MULTIPLE;  Surgeon: Duane, William Charles, MD;  Location: [facility]     IMPLANT STIMULATOR AND LEADS SACRAL NERVE (STAGE ONE AND TWO) N/A 7/24/2018    Procedure: IMPLANT STIMULATOR AND LEADS SACRAL NERVE (STAGE ONE AND TWO);  Interstim Implant Stage One and Two (Implant Permanent Lead and Generator);  Surgeon: Dada Baltazar MD;  [facility]     IMPLANT STIMULATOR SACRAL NERVE PERCUTANEOUS TRIAL N/A 6/19/2018    Procedure: IMPLANT STIMULATOR SACRAL NERVE PERCUTANEOUS TRIAL;  Percutaneous Neural Examination (trial for interstim);  Surgeon: Dada Baltazar MD;  [facility]: [facility]     LAPAROSCOPIC  TUBAL LIGATION       PHACOEMULSIFICATION CLEAR CORNEA WITH STANDARD INTRAOCULAR LENS IMPLANT  8/15/2013    Procedure: PHACOEMULSIFICATION CLEAR CORNEA WITH STANDARD INTRAOCULAR LENS IMPLANT;  RIGHT PHACOEMULSIFICATION CLEAR CORNEA WITH STANDARD INTRAOCULAR LENS IMPLANT ;  Surgeon: Trae Taylor MD;  Location: [facility]     SURGICAL HISTORY OF -       x4, ankle surgery       Social History     Tobacco Use     Smoking status: Former Smoker     Packs/day: 0.50     Years: 17.00     Pack years: 8.50     Types: Cigarettes     Start date: 2018     Smokeless tobacco: Never Used   Substance Use Topics     Alcohol use: Yes     Comment: social/3-4 per week     Family History   Problem Relation Age of Onset     Hypertension Mother      Cerebrovascular Disease Mother      Arthritis Mother      Cardiovascular Mother      Circulatory Mother      Cerebrovascular Disease Father      Prostate Cancer Father 65         at 69     Asthma Brother      Prostate Cancer Brother 48        bone metastasis     Diabetes Sister      Hypertension Sister      Osteoporosis Sister      Depression Sister      Lipids Sister      Cancer Maternal Grandmother 95        spine     Breast Cancer Sister 39        also contralateral @53, mets to lungs     Cancer Sister 20        second uterine cancer in 30s also     Diabetes Sister      Hypertension Sister      Depression Sister      Osteoporosis Sister      Breast Cancer Sister 57        unilateral     Cancer Paternal Aunt 40        unknown type     Cancer Paternal Uncle         stomach;  in his 80s     Prostate Cancer Brother      Glaucoma No family hx of          Current Outpatient Medications   Medication Sig Dispense Refill     Continuous Blood Gluc  (FREESTYLE MELINA READER) AYAN 1 Units daily 1 Device 0     Continuous Blood Gluc  (FREESTYLE MELINA READER) AYAN 1 Units 3 times daily as needed 1 Device 0     continuous blood glucose monitoring (FREESTYLE MELINA)  "sensor For use with Freestyle Danni Flash  for continuous monitioring of blood glucose levels. Replace sensor every 10 days. 3 each 5     DULoxetine (CYMBALTA) 60 MG capsule Take 1 capsule (60 mg) by mouth daily (Patient taking differently: Take 120 mg by mouth daily ) 90 capsule 1     ferrous sulfate (IRON) 325 (65 Fe) MG tablet Take 1 tablet (325 mg) by mouth daily (with breakfast) 60 tablet 1     glipiZIDE (GLUCOTROL) 10 MG tablet Take 1 tablet (10 mg) by mouth daily (with dinner) 90 tablet 0     insulin NPH (NOVOLIN N VIAL) 100 UNIT/ML injection Inject 64 units Subcutaneous 2 times daily. (gets this from [facility] NPH at [facility])       insulin syringe-needle U-100 (RELION INSULIN SYRINGE) 31G X 5/16\" 1 ML Use 2 syringes daily or as directed. 200 each 3     lisinopril (PRINIVIL/ZESTRIL) 5 MG tablet Take 1 tablet (5 mg) by mouth daily 90 tablet 3     MELATONIN PO 10 mg At Bedtime        metFORMIN (GLUCOPHAGE-XR) 500 MG 24 hr tablet Take 2 tablets (1,000 mg) by mouth 2 times daily (with meals) 360 tablet 1     minocycline (MINOCIN/DYNACIN) 100 MG capsule Take 1 capsule (100 mg) by mouth every 12 hours 180 capsule 2     omeprazole (PRILOSEC) 40 MG capsule TAKE 1 CAPSULE DAILY 30 TO 60 MINUTES BEFORE A MEAL 90 capsule 3     ONE TOUCH LANCETS None Entered       ONE TOUCH TEST STRIPS VI None Entered       order for DME Equipment being ordered: Walker with seat light weight 1 Device 0     oxybutynin chloride 15 MG TB24 Take 1 tablet (15 mg) by mouth daily 90 tablet 1     simvastatin (ZOCOR) 20 MG tablet Take 1 tablet (20 mg) by mouth At Bedtime 90 tablet 3     spironolactone (ALDACTONE) 100 MG tablet TAKE 1 TABLET EVERY MORNING 90 tablet 3     traZODone (DESYREL) 100 MG tablet Take 1 tablet (100 mg) by mouth nightly as needed for sleep 90 tablet 3     VITAMIN D 1000 UNIT OR CAPS 1 CAPSULE DAILY       Allergies   Allergen Reactions     Codeine      Sulfa Drugs      BP Readings from Last 3 Encounters:   01/10/19 120/60 " "  12/06/18 115/74   11/29/18 120/73    Wt Readings from Last 3 Encounters:   01/10/19 93.5 kg (206 lb 3.2 oz)   12/06/18 96.2 kg (212 lb)   11/29/18 96.9 kg (213 lb 11.2 oz)                  Labs reviewed in EPIC    Reviewed and updated as needed this visit by clinical staff  Tobacco  Allergies  Meds  Med Hx  Surg Hx  Fam Hx  Soc Hx      Reviewed and updated as needed this visit by Provider         ROS:  CONSTITUTIONAL:POSITIVE  for fatigue and NEGATIVE  for anorexia and weight lossGets a little headache everyday   Complains of eyes weeping   ENT/MOUTH: POSITIVE for  has a sore on her lip.  NEGATIVE for epistaxis, fever, nasal congestion and postnasal drainage  RESP:POSITIVE for cough-non productive and SOB/dyspnea and NEGATIVE for cough-non productive, cough-productive and hemoptysis  CV: NEGATIVE for chest pain, palpitations or peripheral edema  GI: POSITIVE for gas or bloating and very foul smelling stool. Some heartburn   and NEGATIVE for nausea, vomiting and weight loss  : NEGATIVE for frequency, dysuria, or hematuria. However seeing urology due to urinary frequency   NEURO: NEGATIVE for involuntary movements, loss of consciousness, memory problems, syncope and vertigo  ENDOCRINE: NEGATIVE for temperature intolerance, skin/hair changes  PSYCHIATRIC: POSITIVE foranxiety, depressed mood, Hx anxiety and Hx depression and NEGATIVE foralcohol abuse, thoughts of hurting someone else and thoughts of self harm    OBJECTIVE:     /60 (BP Location: Right arm, Patient Position: Sitting, Cuff Size: Adult Regular)   Pulse 105   Temp 97.1  F (36.2  C) (Temporal)   Ht 1.708 m (5' 7.25\")   Wt 93.5 kg (206 lb 3.2 oz)   LMP 11/18/1991 (Exact Date)   SpO2 95%   Breastfeeding? No   BMI 32.06 kg/m    Body mass index is 32.06 kg/m .  GENERAL APPEARANCE: alert, active and no distress  Small mildly scaly area on lip   bilateral TM normal without fluid or infection, throat normal without erythema or exudate and " pharynx erythematous without exudate  RESP: lungs clear to auscultation - no rales, rhonchi or wheezes  CV: regular rates and rhythm, no murmur, click or rub and no irregular beats  ABDOMEN: soft, non-tender  MS: extremities normal- no gross deformities noted  SKIN: Skin color, texture, turgor normal.   NEURO: Normal strength and tone, mentation intact and speech normal  PSYCH:Alert, oriented, thought content appropriate, affect: anxious , thought content exhibits flight of ideas, when questioned about suicide, the patient expresses no suicidal ideation, no homicidal ideation,mentation appears normal., patient appears normal, good hygiene, anxious, oriented X 3  MENTAL STATUS EXAM:  Appearance/Behavior: No apparent distress, Casually groomed and Dressed appropriately for weather  Speech: Rambling  Mood/Affect: anxiety  Insight: Fair    Diagnostic Test Results:  Results for orders placed or performed in visit on 01/10/19   UA with Microscopic reflex to Culture   Result Value Ref Range    Color Urine Yellow     Appearance Urine Slightly Cloudy     Glucose Urine Negative NEG^Negative mg/dL    Bilirubin Urine Negative NEG^Negative    Ketones Urine Negative NEG^Negative mg/dL    Specific Gravity Urine 1.018 1.003 - 1.035    Blood Urine Negative NEG^Negative    pH Urine 7.5 (H) 5.0 - 7.0 pH    Protein Albumin Urine 30 (A) NEG^Negative mg/dL    Urobilinogen mg/dL Normal 0.0 - 2.0 mg/dL    Nitrite Urine Positive (A) NEG^Negative    Leukocyte Esterase Urine Large (A) NEG^Negative    Source Midstream Urine     WBC Urine  (A) OTO5^0 - 5 /HPF    RBC Urine O - 2 OTO2^O - 2 /HPF    Bacteria Urine Many (A) NEG^Negative /HPF    Squamous Epithelial /LPF Urine Moderate (A) FEW^Few /LPF     Hemoglobin A1C   Date Value Ref Range Status   11/12/2018 7.5 (H) 0 - 5.6 % Final     Comment:     Normal <5.7% Prediabetes 5.7-6.4%  Diabetes 6.5% or higher - adopted from ADA   consensus guidelines.     08/14/2018 8.1 (H) 0 - 5.6 % Final      Comment:     Normal <5.7% Prediabetes 5.7-6.4%  Diabetes 6.5% or higher - adopted from ADA   consensus guidelines.     05/15/2018 8.5 (H) 0 - 5.6 % Final     Comment:     Normal <5.7% Prediabetes 5.7-6.4%  Diabetes 6.5% or higher - adopted from ADA   consensus guidelines.         ASSESSMENT/PLAN:     Radha was seen today for patient request, finger and referral.    Diagnoses and all orders for this visit:    Thyroid nodule  FOLLOW UP WITH SPECIALIST :Endocrinology    Depression with anxiety  -     DULoxetine (CYMBALTA) 60 MG capsule; Take 1 capsule (60 mg) by mouth 2 times daily  Reviewed concept of depression as function of biochemical imbalance of neurotransmitters/rationale for treatment.  Risks and benefits of medication(s) reviewed with patient.  Questions answered.  Counseling advised  Patient instructed to call for significant side effects medications or problems  Patient advised immediate presentation to hospital for suicidal thought, etc.    Type 2 diabetes mellitus with hyperglycemia, with long-term current use of insulin (H)  annual eye examinations at Ophthalmology discussed, glycohemoglobin monitoring discussed and long term diabetic complications discussed  No changes in current regimen  Attempt to improve diet  Weight loss advised  Increased exercise advised    Sore of lower lip  -     DERMATOLOGY REFERRAL    SOB (shortness of breath)  -     General PFT Lab (Please always keep checked); Future  -     Pulmonary Function Test; Future    Anemia, unspecified type  -     ONC/HEME ADULT REFERRAL  -      ferrous sulfate (FEROSUL) 325 (65 Fe) MG tablet; Take 1 tablet (325 mg) by mouth daily (with breakfast)    Urinary urgency  -     oxybutynin ER (DITROPAN XL) 15 MG 24 hr tablet; Take 1 tablet (15 mg) by mouth daily    Urgency incontinence  -     oxybutynin ER (DITROPAN XL) 15 MG 24 hr tablet; Take 1 tablet (15 mg) by mouth daily    Post-menopausal  -     vitamin D3 (CHOLECALCIFEROL) 1000 units (25 mcg)  tablet; Take 1 tablet (1,000 Units) by mouth daily  -     calcium carbonate-vitamin D (OSCAL W/D) 500-200 MG-UNIT tablet; Take 1 tablet by mouth 2 times daily (with meals)    UTI symptoms  -     UA with Microscopic reflex to Culture    Abnormal urine findings  -     Urine Culture Aerobic Bacterial    Acute cystitis without hematuria  -      cephALEXin (KEFLEX) 500 MG capsule; Take 1 capsule (500 mg) by mouth 2 times daily for 7 days (Patient not taking: Reported on 1/11/2019)  Patient was instructed to drink plenty of fluids, urinate frequently and to contact the office promptly should she notice fever greater than 102, increase in discomfort, skin rash, lack of improvement after 2 days of treatment, or the appearance of new symptoms.      PLAN:    Patient needs to follow up in if no improvement,or sooner if worsening of symptoms or other symptoms develop.  FURTHER TESTING:       - pulmonary function testing and CT of chest   CONSULTATION/REFERRAL to dermatology, Hematology     45 min spent in direct face to face time with this pt, greater than 50% in counseling and coordination of care.      See Patient Instructions    NIKKY Lutz CNP  [facility]

## 2019-01-10 NOTE — PATIENT INSTRUCTIONS
PLAN:   1.   Symptomatic therapy suggested: Continue current medications.  2.  Orders Placed This Encounter   Medications     oxybutynin ER (DITROPAN XL) 15 MG 24 hr tablet     Sig: Take 1 tablet (15 mg) by mouth daily     Dispense:  90 tablet     Refill:  1     ferrous sulfate (FEROSUL) 325 (65 Fe) MG tablet     Sig: Take 1 tablet (325 mg) by mouth daily (with breakfast)     Dispense:  90 tablet     Refill:  1     vitamin D3 (CHOLECALCIFEROL) 1000 units (25 mcg) tablet     Sig: Take 1 tablet (1,000 Units) by mouth daily     Dispense:  90 tablet     Refill:  3     calcium carbonate-vitamin D (OSCAL W/D) 500-200 MG-UNIT tablet     Sig: Take 2 tablets by mouth 2 times daily (with meals)     Dispense:  360 tablet     Refill:  3     Orders Placed This Encounter   Procedures     DERMATOLOGY REFERRAL     ONC/HEME ADULT REFERRAL     General PFT Lab (Please always keep checked)     Pulmonary Function Test       3. Patient needs to follow up in if no improvement,or sooner if worsening of symptoms or other symptoms develop.  FURTHER TESTING:       - pulmonary function testing and CT of chest   CONSULTATION/REFERRAL to dermatology, Hematology

## 2019-01-10 NOTE — NURSING NOTE
"Chief Complaint   Patient presents with     Patient Request     patient has questions oral cancer, stomach cancer-patient notes she has multiple symptoms     Finger     clubbed nails     Referral     possible referral to endocrinologist       Initial /60 (BP Location: Right arm, Patient Position: Sitting, Cuff Size: Adult Regular)   Pulse 105   Temp 97.1  F (36.2  C) (Temporal)   Ht 1.708 m (5' 7.25\")   Wt 93.5 kg (206 lb 3.2 oz)   LMP 11/18/1991 (Exact Date)   SpO2 95%   Breastfeeding? No   BMI 32.06 kg/m   Estimated body mass index is 32.06 kg/m  as calculated from the following:    Height as of this encounter: 1.708 m (5' 7.25\").    Weight as of this encounter: 93.5 kg (206 lb 3.2 oz).  Medication Reconciliation: complete      BOBY Lyons      "

## 2019-01-10 NOTE — TELEPHONE ENCOUNTER
ONCOLOGY INTAKE: Records Information      APPT INFORMATION: 1/23/19 at 11:30AM  Referring provider:  Perri Carias CNP  Referring provider s clinic:  [facility]  Reason for visit/diagnosis:  Anemia    Were the records received with the referral (via [employer])? In [facility]    Has patient been seen for any external appt for this diagnosis (enter clinic/location)? No - per Lauren at clinic, pt has not been seen for this outside of [facility]

## 2019-01-11 ENCOUNTER — OFFICE VISIT (OUTPATIENT)
Dept: DERMATOLOGY | Facility: CLINIC | Age: 69
End: 2019-01-11
Payer: COMMERCIAL

## 2019-01-11 DIAGNOSIS — L57.0 ACTINIC KERATOSIS: Primary | ICD-10-CM

## 2019-01-11 DIAGNOSIS — L56.8 ACTINIC CHEILITIS: ICD-10-CM

## 2019-01-11 PROCEDURE — 17000 DESTRUCT PREMALG LESION: CPT | Performed by: DERMATOLOGY

## 2019-01-11 PROCEDURE — 99212 OFFICE O/P EST SF 10 MIN: CPT | Mod: 25 | Performed by: DERMATOLOGY

## 2019-01-11 ASSESSMENT — PAIN SCALES - GENERAL: PAINLEVEL: NO PAIN (0)

## 2019-01-11 ASSESSMENT — ANXIETY QUESTIONNAIRES: GAD7 TOTAL SCORE: 20

## 2019-01-11 NOTE — RESULT ENCOUNTER NOTE
Medina Corbett,    Attached are your test results.  Urine looks like an infection   Am going to send you an antibiotic and waiting for culture    Please contact us if you have any questions.    Perri Carias, CNP

## 2019-01-11 NOTE — NURSING NOTE
Radha Corbett's goals for this visit include:   Chief Complaint   Patient presents with     JAMES Garcia is returning to discuss an area on the lip       She requests these members of her care team be copied on today's visit information:     PCP: Trice Medeiros    Referring Provider:  NIKKY Lutz CNP  [address]  [place] [address]    [facility] 11/18/1991 (Exact Date)     Do you need any medication refills at today's visit? No  Nette Coy LPN

## 2019-01-11 NOTE — PATIENT INSTRUCTIONS
Cryotherapy    What is it?    Use of a very cold liquid, such as liquid nitrogen, to freeze and destroy abnormal skin cells that need to be removed    What should I expect?    Tenderness and redness    A small blister that might grow and fill with dark purple blood. There may be crusting.    More than one treatment may be needed if the lesions do not go away.    How do I care for the treated area?    Gently wash the area with your hands when bathing.    Use a thin layer of Vaseline to help with healing. You may use a Band-Aid.     The area should heal within 7-10 days and may leave behind a pink or lighter color.     Do not use an antibiotic or Neosporin ointment.     You may take acetaminophen (Tylenol) for pain.     Call your Doctor if you have:    Severe pain    Signs of infection (warmth, redness, cloudy yellow drainage, and or a bad smell)    Questions or concerns    Who should I call with questions?       [facility]: [phone]       [facility]: [phone]       For urgent needs outside of business hours call the [facility] at [phone]        and ask for the dermatology resident on call

## 2019-01-11 NOTE — LETTER
1/11/2019         RE: Radha Corbett  [address] [facility] [address]        Dear Colleague,    Thank you for referring your patient, Radha Corbett, to the [facility]. Please see a copy of my visit note below.    [facility] Dermatology Note      Dermatology Problem List:  1. Consistent with prurigo nodule, left thigh  -s/p clobetasol ointment initiated 8/29/2016  2. Seborrheic dermatitis, nasolabial folds  -s/p OTC hydrocortisone    Encounter Date: Jan 11, 2019    CC:  Chief Complaint   Patient presents with     RECHECK     Radha is returning to discuss an area on the lip         History of Present Illness:  Ms. Radha Corbett is a 68 year old female who presents for evaluation of a lesion of concern on the lip. Last seen in 2016. In regards to this spot on the lip, the pt reports that it feels like an area of dry skin on the right lower lip. Present for 6-8 months. The spot on the left thigh resolved.     The patient has been losing weight for an unknown reason. She has also been anemic    Past Medical History:   Patient Active Problem List   Diagnosis     Macular degeneration     Myopia     Hiatal hernia     IBS (irritable bowel syndrome)     Hypertension goal BP (blood pressure) < 140/90     GERD (gastroesophageal reflux disease)     Atypical ductal hyperplasia of breast     Atypical lobular hyperplasia of breast     Benign Breast Disease (PASH)     Family history of breast cancer in sister     Type 2 diabetes, HbA1C goal < 8% (H)     Hyperlipidemia LDL goal <100     Breast cancer screening-high risk     Tubular adenoma of colon     Osteopenia     Alcohol ingestion, more than 4 drinks/day on alcohol screening     Umbilical hernia     Incontinence of urine     Stool incontinence     Osteoarthritis, knee     Rosacea     Anxiety     Pseudophakia     Abnormal cervical Pap smear with positive HPV DNA test     Depression with anxiety     Retinal detachment     Elevated  "liver function tests     Rectocele     Unexplained endometrial cells on cervical cytology     Back pain, unspecified back location, unspecified back pain laterality, unspecified chronicity     Urge incontinence     Dislocation of shoulder region     Acute lower GI bleeding     Morbid obesity (H)     Moderate episode of recurrent major depressive disorder (H)     Past Medical History:   Diagnosis Date     Arthritis      DM (diabetes mellitus) (H)      GERD (gastroesophageal reflux disease)      Hiatal hernia      HPV in female      HTN (hypertension)      IBS (irritable bowel syndrome)      Major depression      Myopia      Other and unspecified hyperlipidemia      Pseudoangiomatous stromal hyperplasia of breast 2010    [facility]     Rotator cuff injury 7/2016     Sleep apnea     \"snore\"     Vitamin D deficiency      Past Surgical History:   Procedure Laterality Date     BREAST LUMPECTOMY, RT/LT      x2, left     CATARACT IOL, RT/LT  4/99    left, MZ60CD 7.0D     COLONOSCOPY       COLONOSCOPY N/A 1/22/2016    Procedure: COMBINED COLONOSCOPY, SINGLE OR MULTIPLE BIOPSY/POLYPECTOMY BY BIOPSY;  Surgeon: Praful Benjamin MD;  [facility]     COLONOSCOPY WITH CO2 INSUFFLATION N/A 1/22/2016    Procedure: COLONOSCOPY WITH CO2 INSUFFLATION;  Surgeon: Praful Benjamin MD;  [facility]     COMBINED ESOPHAGOSCOPY, GASTROSCOPY, DUODENOSCOPY (EGD) WITH CO2 INSUFFLATION N/A 11/27/2018    Procedure: COMBINED ESOPHAGOSCOPY, GASTROSCOPY, DUODENOSCOPY (EGD) WITH CO2 INSUFFLATION;  Surgeon: Duane, William Charles, MD;  [facility]: [facility]     CRYOTHERAPY  2000    cervical     CRYOTHERAPY Left 3/19/2015    Procedure: CRYOTHERAPY;  Surgeon: Keiko Puri MD;  [facility]     DILATION AND CURETTAGE, HYSTEROSCOPY DIAGNOSTIC, COMBINED N/A 1/19/2016    Procedure: COMBINED DILATION AND CURETTAGE, HYSTEROSCOPY DIAGNOSTIC;  Surgeon: Yeni Aparicio DO;  [facility]     ESOPHAGOSCOPY, GASTROSCOPY, " DUODENOSCOPY (EGD), COMBINED N/A 2018    Procedure: COMBINED ESOPHAGOSCOPY, GASTROSCOPY, DUODENOSCOPY (EGD), BIOPSY SINGLE OR MULTIPLE;  Surgeon: Duane, William Charles, MD;  [facility]     IMPLANT STIMULATOR AND LEADS SACRAL NERVE (STAGE ONE AND TWO) N/A 2018    Procedure: IMPLANT STIMULATOR AND LEADS SACRAL NERVE (STAGE ONE AND TWO);  Interstim Implant Stage One and Two (Implant Permanent Lead and Generator);  Surgeon: Dada Baltazar MD;  [facility]     IMPLANT STIMULATOR SACRAL NERVE PERCUTANEOUS TRIAL N/A 2018    Procedure: IMPLANT STIMULATOR SACRAL NERVE PERCUTANEOUS TRIAL;  Percutaneous Neural Examination (trial for interstim);  Surgeon: Dada Baltazar MD;  [facility]     LAPAROSCOPIC TUBAL LIGATION       PHACOEMULSIFICATION CLEAR CORNEA WITH STANDARD INTRAOCULAR LENS IMPLANT  8/15/2013    Procedure: PHACOEMULSIFICATION CLEAR CORNEA WITH STANDARD INTRAOCULAR LENS IMPLANT;  RIGHT PHACOEMULSIFICATION CLEAR CORNEA WITH STANDARD INTRAOCULAR LENS IMPLANT ;  Surgeon: Trae Taylor MD;  Location: [facility]     SURGICAL HISTORY OF -       x4, ankle surgery       Social History:  Patient reports that she has quit smoking. Her smoking use included cigarettes. She started smoking about 7 weeks ago. She has a 20.00 pack-year smoking history. she has never used smokeless tobacco. She reports that she drinks alcohol. She reports that she does not use drugs.    Family History:  Family History   Problem Relation Age of Onset     Hypertension Mother      Cerebrovascular Disease Mother      Arthritis Mother      Cardiovascular Mother      Circulatory Mother      Cerebrovascular Disease Father      Prostate Cancer Father 65         at 69     Asthma Brother      Prostate Cancer Brother 48        bone metastasis     Diabetes Sister      Hypertension Sister      Osteoporosis Sister      Depression Sister      Lipids Sister      Cancer Maternal Grandmother 95        spine      "Breast Cancer Sister 39        also contralateral @53, mets to lungs     Cancer Sister 20        second uterine cancer in 30s also     Diabetes Sister      Hypertension Sister      Depression Sister      Osteoporosis Sister      Breast Cancer Sister 57        unilateral     Cancer Paternal Aunt 40        unknown type     Cancer Paternal Uncle         stomach;  in his 80s     Prostate Cancer Brother      Glaucoma No family hx of        Medications:  Current Outpatient Medications   Medication Sig Dispense Refill     calcium carbonate-vitamin D (OSCAL W/D) 500-200 MG-UNIT tablet Take 1 tablet by mouth 2 times daily (with meals) 180 tablet 3     Continuous Blood Gluc  (FREESTYLE MELINA READER) AYAN 1 Units daily 1 Device 0     Continuous Blood Gluc  (FREESTYLE MELINA READER) AYAN 1 Units 3 times daily as needed 1 Device 0     continuous blood glucose monitoring (FREESTYLE MELINA) sensor For use with Freestyle Melina Flash  for continuous monitioring of blood glucose levels. Replace sensor every 10 days. 3 each 5     DULoxetine (CYMBALTA) 60 MG capsule Take 1 capsule (60 mg) by mouth 2 times daily 180 capsule 1     ferrous sulfate (FEROSUL) 325 (65 Fe) MG tablet Take 1 tablet (325 mg) by mouth daily (with breakfast) 90 tablet 1     glipiZIDE (GLUCOTROL) 10 MG tablet Take 1 tablet (10 mg) by mouth daily (with dinner) 90 tablet 0     insulin NPH (NOVOLIN N VIAL) 100 UNIT/ML injection Inject 64 units Subcutaneous 2 times daily. (gets this from [facility] NPH at [facility])       insulin syringe-needle U-100 (RELION INSULIN SYRINGE) 31G X 5/16\" 1 ML Use 2 syringes daily or as directed. 200 each 3     lisinopril (PRINIVIL/ZESTRIL) 5 MG tablet Take 1 tablet (5 mg) by mouth daily 90 tablet 3     MELATONIN PO 10 mg At Bedtime        metFORMIN (GLUCOPHAGE-XR) 500 MG 24 hr tablet Take 2 tablets (1,000 mg) by mouth 2 times daily (with meals) 360 tablet 1     minocycline (MINOCIN/DYNACIN) 100 MG capsule Take 1 capsule " (100 mg) by mouth every 12 hours 180 capsule 2     omeprazole (PRILOSEC) 40 MG capsule TAKE 1 CAPSULE DAILY 30 TO 60 MINUTES BEFORE A MEAL 90 capsule 3     ONE TOUCH LANCETS None Entered       ONE TOUCH TEST STRIPS VI None Entered       order for DME Equipment being ordered: Walker with seat light weight 1 Device 0     oxybutynin ER (DITROPAN XL) 15 MG 24 hr tablet Take 1 tablet (15 mg) by mouth daily 90 tablet 1     simvastatin (ZOCOR) 20 MG tablet Take 1 tablet (20 mg) by mouth At Bedtime 90 tablet 3     spironolactone (ALDACTONE) 100 MG tablet TAKE 1 TABLET EVERY MORNING 90 tablet 3     traZODone (DESYREL) 100 MG tablet Take 1 tablet (100 mg) by mouth nightly as needed for sleep 90 tablet 3     VITAMIN D 1000 UNIT OR CAPS 1 CAPSULE DAILY       vitamin D3 (CHOLECALCIFEROL) 1000 units (25 mcg) tablet Take 1 tablet (1,000 Units) by mouth daily 90 tablet 3     cephALEXin (KEFLEX) 500 MG capsule Take 1 capsule (500 mg) by mouth 2 times daily for 7 days (Patient not taking: Reported on 1/11/2019) 14 capsule 0       Allergies   Allergen Reactions     Codeine      Sulfa Drugs        Review of Systems:  -Constitutional: not feeling well due to back pain     -Skin: As above in HPI. No additional skin concerns.    Physical exam:  Vitals: LMP 11/18/1991 (Exact Date)   GEN: This is a well developed, well-nourished female in no acute distress, in a pleasant mood.    SKIN: Focused examination of the face, left thigh was performed.  -There is an erythematous macule with overyling adherent scale on the right lower lip.  - Left thigh is clear  -tender area, right lower cutaneous lip, no lesion on skin but tender and possibly subcutaneous papule  -No other lesions of concern on areas examined.       Impression/Plan:  1. Actinic chelitis X1, very small, right lower lip  Cryotherapy procedure note: After verbal consent and discussion of risks and benefits including but no limited to dyspigmentation/scar, blister, and pain, 1  was(were) treated with 1-2mm freeze border for 2 cycles with liquid nitrogen. Post cryotherapy instructions were provided.     2. Biopsied lesion on the left thigh has resolved.     3. Tender area right lower cutaneous lip (at completely separate site form actinic cheilitis), tender since this am, consistent with likely evolving acneiform lesion beneath skin(cannot see surface change), we will recheck at 2 week to make sure this bump is gone.       CC Perri Carias, APRN CNP  [address]  [place] [address] on close of this encounter.  Follow-up in 2 weeks to confirm lesion on lower cutaneous lip is resolved, earlier for new or changing lesions.       Staff Involved:  Scribe/Staff    Scribe Disclosure  I, Jeffery Hansen, am serving as a scribe to document services personally performed by Dr. Mary Lira MD, based on data collection and the provider's statements to me.     Provider Disclosure:   The documentation recorded by the scribe accurately reflects the services I personally performed and the decisions made by me.    Mary Lira MD    Department of Dermatology  [facility]: Phone: [phone], Fax:[phone]  [facility]: Phone: [phone], Fax: [phone]            Again, thank you for allowing me to participate in the care of your patient.        Sincerely,        Mary Lira MD

## 2019-01-11 NOTE — PROGRESS NOTES
[facility] Dermatology Note      Dermatology Problem List:  1. Consistent with prurigo nodule, left thigh  -s/p clobetasol ointment initiated 8/29/2016  2. Seborrheic dermatitis, nasolabial folds  -s/p OTC hydrocortisone    Encounter Date: Jan 11, 2019    CC:  Chief Complaint   Patient presents with     RECHECK     Radha is returning to discuss an area on the lip         History of Present Illness:  Ms. Radha Corbett is a 68 year old female who presents for evaluation of a lesion of concern on the lip. Last seen in 2016. In regards to this spot on the lip, the pt reports that it feels like an area of dry skin on the right lower lip. Present for 6-8 months. The spot on the left thigh resolved.     The patient has been losing weight for an unknown reason. She has also been anemic    Past Medical History:   Patient Active Problem List   Diagnosis     Macular degeneration     Myopia     Hiatal hernia     IBS (irritable bowel syndrome)     Hypertension goal BP (blood pressure) < 140/90     GERD (gastroesophageal reflux disease)     Atypical ductal hyperplasia of breast     Atypical lobular hyperplasia of breast     Benign Breast Disease (PASH)     Family history of breast cancer in sister     Type 2 diabetes, HbA1C goal < 8% (H)     Hyperlipidemia LDL goal <100     Breast cancer screening-high risk     Tubular adenoma of colon     Osteopenia     Alcohol ingestion, more than 4 drinks/day on alcohol screening     Umbilical hernia     Incontinence of urine     Stool incontinence     Osteoarthritis, knee     Rosacea     Anxiety     Pseudophakia     Abnormal cervical Pap smear with positive HPV DNA test     Depression with anxiety     Retinal detachment     Elevated liver function tests     Rectocele     Unexplained endometrial cells on cervical cytology     Back pain, unspecified back location, unspecified back pain laterality, unspecified chronicity     Urge incontinence     Dislocation of shoulder region  "    Acute lower GI bleeding     Morbid obesity (H)     Moderate episode of recurrent major depressive disorder (H)     Past Medical History:   Diagnosis Date     Arthritis      DM (diabetes mellitus) (H)      GERD (gastroesophageal reflux disease)      Hiatal hernia      HPV in female      HTN (hypertension)      IBS (irritable bowel syndrome)      Major depression      Myopia      Other and unspecified hyperlipidemia      Pseudoangiomatous stromal hyperplasia of breast 2010    [facility]     Rotator cuff injury 7/2016     Sleep apnea     \"snore\"     Vitamin D deficiency      Past Surgical History:   Procedure Laterality Date     BREAST LUMPECTOMY, RT/LT      x2, left     CATARACT IOL, RT/LT  4/99    left, MZ60CD 7.0D     COLONOSCOPY       COLONOSCOPY N/A 1/22/2016    Procedure: COMBINED COLONOSCOPY, SINGLE OR MULTIPLE BIOPSY/POLYPECTOMY BY BIOPSY;  Surgeon: Praful Benjamin MD;  [facility]     COLONOSCOPY WITH CO2 INSUFFLATION N/A 1/22/2016    Procedure: COLONOSCOPY WITH CO2 INSUFFLATION;  Surgeon: Praful Benjamin MD;  [facility]     COMBINED ESOPHAGOSCOPY, GASTROSCOPY, DUODENOSCOPY (EGD) WITH CO2 INSUFFLATION N/A 11/27/2018    Procedure: COMBINED ESOPHAGOSCOPY, GASTROSCOPY, DUODENOSCOPY (EGD) WITH CO2 INSUFFLATION;  Surgeon: Duane, William Charles, MD;  [facility]: [facility]     CRYOTHERAPY  2000    cervical     CRYOTHERAPY Left 3/19/2015    Procedure: CRYOTHERAPY;  Surgeon: Keiko Puri MD;  [facility]     DILATION AND CURETTAGE, HYSTEROSCOPY DIAGNOSTIC, COMBINED N/A 1/19/2016    Procedure: COMBINED DILATION AND CURETTAGE, HYSTEROSCOPY DIAGNOSTIC;  Surgeon: Yeni Aparicio DO;  [facility]: [facility]     ESOPHAGOSCOPY, GASTROSCOPY, DUODENOSCOPY (EGD), COMBINED N/A 11/27/2018    Procedure: COMBINED ESOPHAGOSCOPY, GASTROSCOPY, DUODENOSCOPY (EGD), BIOPSY SINGLE OR MULTIPLE;  Surgeon: Duane, William Charles, MD;  [facility]: [facility]     IMPLANT STIMULATOR AND LEADS SACRAL NERVE (STAGE " ONE AND TWO) N/A 2018    Procedure: IMPLANT STIMULATOR AND LEADS SACRAL NERVE (STAGE ONE AND TWO);  Interstim Implant Stage One and Two (Implant Permanent Lead and Generator);  Surgeon: Dada Baltazar MD;  Location:  OR     IMPLANT STIMULATOR SACRAL NERVE PERCUTANEOUS TRIAL N/A 2018    Procedure: IMPLANT STIMULATOR SACRAL NERVE PERCUTANEOUS TRIAL;  Percutaneous Neural Examination (trial for interstim);  Surgeon: Dada Baltazar MD;  Location:  OR     LAPAROSCOPIC TUBAL LIGATION       PHACOEMULSIFICATION CLEAR CORNEA WITH STANDARD INTRAOCULAR LENS IMPLANT  8/15/2013    Procedure: PHACOEMULSIFICATION CLEAR CORNEA WITH STANDARD INTRAOCULAR LENS IMPLANT;  RIGHT PHACOEMULSIFICATION CLEAR CORNEA WITH STANDARD INTRAOCULAR LENS IMPLANT ;  Surgeon: Trae Taylor MD;  Location: [facility]     SURGICAL HISTORY OF -       x4, ankle surgery       Social History:  Patient reports that she has quit smoking. Her smoking use included cigarettes. She started smoking about 7 weeks ago. She has a 20.00 pack-year smoking history. she has never used smokeless tobacco. She reports that she drinks alcohol. She reports that she does not use drugs.    Family History:  Family History   Problem Relation Age of Onset     Hypertension Mother      Cerebrovascular Disease Mother      Arthritis Mother      Cardiovascular Mother      Circulatory Mother      Cerebrovascular Disease Father      Prostate Cancer Father 65         at 69     Asthma Brother      Prostate Cancer Brother 48        bone metastasis     Diabetes Sister      Hypertension Sister      Osteoporosis Sister      Depression Sister      Lipids Sister      Cancer Maternal Grandmother 95        spine     Breast Cancer Sister 39        also contralateral @53, mets to lungs     Cancer Sister 20        second uterine cancer in 30s also     Diabetes Sister      Hypertension Sister      Depression Sister      Osteoporosis Sister      Breast Cancer Sister 57  "       unilateral     Cancer Paternal Aunt 40        unknown type     Cancer Paternal Uncle         stomach;  in his 80s     Prostate Cancer Brother      Glaucoma No family hx of        Medications:  Current Outpatient Medications   Medication Sig Dispense Refill     calcium carbonate-vitamin D (OSCAL W/D) 500-200 MG-UNIT tablet Take 1 tablet by mouth 2 times daily (with meals) 180 tablet 3     Continuous Blood Gluc  (FREESTYLE MELINA READER) AYAN 1 Units daily 1 Device 0     Continuous Blood Gluc  (FREESTYLE MELINA READER) AYAN 1 Units 3 times daily as needed 1 Device 0     continuous blood glucose monitoring (FREESTYLE MELINA) sensor For use with Freestyle Melina Flash  for continuous monitioring of blood glucose levels. Replace sensor every 10 days. 3 each 5     DULoxetine (CYMBALTA) 60 MG capsule Take 1 capsule (60 mg) by mouth 2 times daily 180 capsule 1     ferrous sulfate (FEROSUL) 325 (65 Fe) MG tablet Take 1 tablet (325 mg) by mouth daily (with breakfast) 90 tablet 1     glipiZIDE (GLUCOTROL) 10 MG tablet Take 1 tablet (10 mg) by mouth daily (with dinner) 90 tablet 0     insulin NPH (NOVOLIN N VIAL) 100 UNIT/ML injection Inject 64 units Subcutaneous 2 times daily. (gets this from [facility]TC NPH at [facility])       insulin syringe-needle U-100 (RELION INSULIN SYRINGE) 31G X 5/16\" 1 ML Use 2 syringes daily or as directed. 200 each 3     lisinopril (PRINIVIL/ZESTRIL) 5 MG tablet Take 1 tablet (5 mg) by mouth daily 90 tablet 3     MELATONIN PO 10 mg At Bedtime        metFORMIN (GLUCOPHAGE-XR) 500 MG 24 hr tablet Take 2 tablets (1,000 mg) by mouth 2 times daily (with meals) 360 tablet 1     minocycline (MINOCIN/DYNACIN) 100 MG capsule Take 1 capsule (100 mg) by mouth every 12 hours 180 capsule 2     omeprazole (PRILOSEC) 40 MG capsule TAKE 1 CAPSULE DAILY 30 TO 60 MINUTES BEFORE A MEAL 90 capsule 3     ONE TOUCH LANCETS None Entered       ONE TOUCH TEST STRIPS VI None Entered       order for DME " Equipment being ordered: Walker with seat light weight 1 Device 0     oxybutynin ER (DITROPAN XL) 15 MG 24 hr tablet Take 1 tablet (15 mg) by mouth daily 90 tablet 1     simvastatin (ZOCOR) 20 MG tablet Take 1 tablet (20 mg) by mouth At Bedtime 90 tablet 3     spironolactone (ALDACTONE) 100 MG tablet TAKE 1 TABLET EVERY MORNING 90 tablet 3     traZODone (DESYREL) 100 MG tablet Take 1 tablet (100 mg) by mouth nightly as needed for sleep 90 tablet 3     VITAMIN D 1000 UNIT OR CAPS 1 CAPSULE DAILY       vitamin D3 (CHOLECALCIFEROL) 1000 units (25 mcg) tablet Take 1 tablet (1,000 Units) by mouth daily 90 tablet 3     cephALEXin (KEFLEX) 500 MG capsule Take 1 capsule (500 mg) by mouth 2 times daily for 7 days (Patient not taking: Reported on 1/11/2019) 14 capsule 0       Allergies   Allergen Reactions     Codeine      Sulfa Drugs        Review of Systems:  -Constitutional: not feeling well due to back pain     -Skin: As above in HPI. No additional skin concerns.    Physical exam:  Vitals: LMP 11/18/1991 (Exact Date)   GEN: This is a well developed, well-nourished female in no acute distress, in a pleasant mood.    SKIN: Focused examination of the face, left thigh was performed.  -There is an erythematous macule with overyling adherent scale on the right lower lip.  - Left thigh is clear  -tender area, right lower cutaneous lip, no lesion on skin but tender and possibly subcutaneous papule  -No other lesions of concern on areas examined.       Impression/Plan:  1. Actinic chelitis X1, very small, right lower lip  Cryotherapy procedure note: After verbal consent and discussion of risks and benefits including but no limited to dyspigmentation/scar, blister, and pain, 1 was(were) treated with 1-2mm freeze border for 2 cycles with liquid nitrogen. Post cryotherapy instructions were provided.     2. Biopsied lesion on the left thigh has resolved.     3. Tender area right lower cutaneous lip (at completely separate site form  actinic cheilitis), tender since this am, consistent with likely evolving acneiform lesion beneath skin(cannot see surface change), we will recheck at 2 week to make sure this bump is gone.       CC Perri Carias, APRN CNP  [address]  [place] [address] on close of this encounter.  Follow-up in 2 weeks to confirm lesion on lower cutaneous lip is resolved, earlier for new or changing lesions.       Staff Involved:  Scribe/Staff    Scribe Disclosure  I, Jeffery Hansen, am serving as a scribe to document services personally performed by Dr. Mary Lira MD, based on data collection and the provider's statements to me.     Provider Disclosure:   The documentation recorded by the scribe accurately reflects the services I personally performed and the decisions made by me.    Mary Lira MD    Department of Dermatology  [facility]: Phone: [phone], Fax:[phone]  [facility]: Phone: [phone], Fax: [phone]

## 2019-01-12 LAB
BACTERIA SPEC CULT: ABNORMAL
BACTERIA SPEC CULT: ABNORMAL
SPECIMEN SOURCE: ABNORMAL

## 2019-01-13 NOTE — RESULT ENCOUNTER NOTE
Medina Corbett,    Attached are your test results.  -Urine culture is abnormal and grew out bacteria that are sensitive to the antibiotic you have been given.  Complete the medication as prescribed and if you experience new, worsening or persistent symptoms, you should call or return for a recheck.   Please contact us if you have any questions.    Perri Carias, CNP

## 2019-01-14 ENCOUNTER — TELEPHONE (OUTPATIENT)
Dept: PEDIATRICS | Facility: CLINIC | Age: 69
End: 2019-01-14

## 2019-01-14 DIAGNOSIS — N30.00 ACUTE CYSTITIS WITHOUT HEMATURIA: ICD-10-CM

## 2019-01-14 DIAGNOSIS — D64.9 ANEMIA, UNSPECIFIED TYPE: ICD-10-CM

## 2019-01-14 RX ORDER — CEPHALEXIN 500 MG/1
500 CAPSULE ORAL 2 TIMES DAILY
Qty: 14 CAPSULE | Refills: 0 | Status: SHIPPED | OUTPATIENT
Start: 2019-01-14 | End: 2019-01-25

## 2019-01-14 RX ORDER — FERROUS SULFATE 325(65) MG
325 TABLET ORAL
Qty: 90 TABLET | Refills: 1 | Status: SHIPPED | OUTPATIENT
Start: 2019-01-14 | End: 2019-01-23

## 2019-01-14 NOTE — TELEPHONE ENCOUNTER
[facility]    Phone Message  Waqas has questions regarding her results that were sent to [facility] stating she hsa squmas cell on her lip. Also would like to know where her last prescritpion was sent to, she can't find what pharmacy it went to.     May a detailed message be left on voicemail: yes    Reason for Call: Other: Waqas has questions regarding her results that were sent to [facility] stating she hsa squmas cell on her lip. Also would like to know where her last prescritpion was sent to, she can't find what pharmacy it went to.      Action Taken: Message routed to:  Primary Care p [phone]

## 2019-01-14 NOTE — TELEPHONE ENCOUNTER
Called patient and gave information regarding where the antibiotic was sent to.  It went to [facility] on 1/10/19.  She will call them and if not mailed yet, will want it to be canceled and sent to [facility] in [facility].    Discussed Squamous Epithelial cells in the urine.  She thought it was from her lip.  Discussed that it indicates an infection.    Patient saw Dr. Lira for her lip and was worried the term squamous was referring to her lip and cancer.  Reassurance given and will call back regarding the Rx.    Falguni Hammer RN, [facility]

## 2019-01-14 NOTE — TELEPHONE ENCOUNTER
Patient called back.  She states [facility] was holding onto the Rx for cephalexin until patient called in. Now it will be another 7-10 days.    They also did not receive the Rx for Ferrous Sulfate that was sent.    Patient asked for both to be sent to [facility] in [place].    Resent the prescriptions    Falguni Hammer RN, [facility]

## 2019-01-16 ENCOUNTER — OFFICE VISIT (OUTPATIENT)
Dept: NURSING | Facility: CLINIC | Age: 69
End: 2019-01-16
Payer: COMMERCIAL

## 2019-01-16 DIAGNOSIS — R06.02 SOB (SHORTNESS OF BREATH): ICD-10-CM

## 2019-01-16 PROCEDURE — 94726 PLETHYSMOGRAPHY LUNG VOLUMES: CPT | Performed by: INTERNAL MEDICINE

## 2019-01-16 PROCEDURE — 94729 DIFFUSING CAPACITY: CPT | Performed by: INTERNAL MEDICINE

## 2019-01-16 PROCEDURE — 94375 RESPIRATORY FLOW VOLUME LOOP: CPT | Performed by: INTERNAL MEDICINE

## 2019-01-18 LAB
DLCOUNC-%PRED-PRE: 98 %
DLCOUNC-PRE: 21.91 ML/MIN/MMHG
DLCOUNC-PRED: 22.15 ML/MIN/MMHG
ERV-%PRED-PRE: 157 %
ERV-PRE: 0.91 L
ERV-PRED: 0.58 L
EXPTIME-PRE: 6.59 SEC
FEF2575-%PRED-PRE: 80 %
FEF2575-PRE: 1.68 L/SEC
FEF2575-PRED: 2.09 L/SEC
FEFMAX-%PRED-PRE: 97 %
FEFMAX-PRE: 6.17 L/SEC
FEFMAX-PRED: 6.34 L/SEC
FEV1-%PRED-PRE: 93 %
FEV1-PRE: 2.38 L
FEV1FEV6-PRE: 73 %
FEV1FEV6-PRED: 79 %
FEV1FVC-PRE: 73 %
FEV1FVC-PRED: 76 %
FEV1SVC-PRE: 72 %
FEV1SVC-PRED: 71 %
FIFMAX-PRE: 5.28 L/SEC
FRCPLETH-%PRED-PRE: 113 %
FRCPLETH-PRE: 3.29 L
FRCPLETH-PRED: 2.91 L
FVC-%PRED-PRE: 99 %
FVC-PRE: 3.27 L
FVC-PRED: 3.28 L
IC-%PRED-PRE: 80 %
IC-PRE: 2.4 L
IC-PRED: 2.97 L
RVPLETH-%PRED-PRE: 108 %
RVPLETH-PRE: 2.38 L
RVPLETH-PRED: 2.19 L
TLCPLETH-%PRED-PRE: 102 %
TLCPLETH-PRE: 5.69 L
TLCPLETH-PRED: 5.53 L
VA-%PRED-PRE: 85 %
VA-PRE: 4.81 L
VC-%PRED-PRE: 93 %
VC-PRE: 3.31 L
VC-PRED: 3.55 L

## 2019-01-22 NOTE — TELEPHONE ENCOUNTER
RECORDS STATUS - ALL OTHER DIAGNOSIS      RECORDS RECEIVED FROM: [facility] - in [facility] (internal referral)   DATE RECEIVED: 1/22/19   NOTES STATUS DETAILS   OFFICE NOTE from referring provider 1/10/19 In [facility]     OFFICE NOTE from medical oncologist 1/10/19 In [facility]     DISCHARGE SUMMARY from hospital None None     DISCHARGE REPORT from the ER None None     OPERATIVE REPORT 11/27/18 Upper GI Endo In [facility]     MEDICATION LIST 1/14/19 In [facility]     CLINICAL TRIAL TREATMENTS TO DATE In [facility] In [facility]     LABS     PATHOLOGY REPORTS 11/27/18 In [facility]     ANYTHING RELATED TO DIAGNOSIS In Epic In Epic     GENONOMIC TESTING     TYPE: In Epic In Epic   IMAGING (NEED IMAGES & REPORT)     CT SCANS 11/12/18 Chest In [facility]   MRI 6/7/18 Lumbar In Epic   MAMMO 5/22/18 In Epic   ULTRASOUND 11/29/18 Needle  11/16/18 Thyroid In Epic  In Epic   PET None None

## 2019-01-23 ENCOUNTER — ONCOLOGY VISIT (OUTPATIENT)
Dept: ONCOLOGY | Facility: CLINIC | Age: 69
End: 2019-01-23
Payer: COMMERCIAL

## 2019-01-23 ENCOUNTER — PRE VISIT (OUTPATIENT)
Dept: ONCOLOGY | Facility: CLINIC | Age: 69
End: 2019-01-23

## 2019-01-23 ENCOUNTER — CARE COORDINATION (OUTPATIENT)
Dept: ONCOLOGY | Facility: CLINIC | Age: 69
End: 2019-01-23

## 2019-01-23 VITALS
RESPIRATION RATE: 18 BRPM | SYSTOLIC BLOOD PRESSURE: 130 MMHG | HEART RATE: 87 BPM | TEMPERATURE: 98.2 F | HEIGHT: 67 IN | OXYGEN SATURATION: 96 % | DIASTOLIC BLOOD PRESSURE: 75 MMHG | BODY MASS INDEX: 32.7 KG/M2 | WEIGHT: 208.31 LBS

## 2019-01-23 DIAGNOSIS — D64.9 ANEMIA, UNSPECIFIED TYPE: Primary | ICD-10-CM

## 2019-01-23 LAB
BASOPHILS # BLD AUTO: 0.1 10E9/L (ref 0–0.2)
BASOPHILS NFR BLD AUTO: 0.6 %
DIFFERENTIAL METHOD BLD: ABNORMAL
EOSINOPHIL # BLD AUTO: 0.2 10E9/L (ref 0–0.7)
EOSINOPHIL NFR BLD AUTO: 1.6 %
ERYTHROCYTE [DISTWIDTH] IN BLOOD BY AUTOMATED COUNT: 20.4 % (ref 10–15)
FERRITIN SERPL-MCNC: 4 NG/ML (ref 8–252)
HCT VFR BLD AUTO: 38.5 % (ref 35–47)
HGB BLD-MCNC: 11.9 G/DL (ref 11.7–15.7)
IMM GRANULOCYTES # BLD: 0 10E9/L (ref 0–0.4)
IMM GRANULOCYTES NFR BLD: 0.4 %
IRON SATN MFR SERPL: 13 % (ref 15–46)
IRON SERPL-MCNC: 67 UG/DL (ref 35–180)
LYMPHOCYTES # BLD AUTO: 3.2 10E9/L (ref 0.8–5.3)
LYMPHOCYTES NFR BLD AUTO: 31.6 %
MCH RBC QN AUTO: 24.5 PG (ref 26.5–33)
MCHC RBC AUTO-ENTMCNC: 30.9 G/DL (ref 31.5–36.5)
MCV RBC AUTO: 79 FL (ref 78–100)
MONOCYTES # BLD AUTO: 0.7 10E9/L (ref 0–1.3)
MONOCYTES NFR BLD AUTO: 6.9 %
NEUTROPHILS # BLD AUTO: 6 10E9/L (ref 1.6–8.3)
NEUTROPHILS NFR BLD AUTO: 58.9 %
PLATELET # BLD AUTO: 350 10E9/L (ref 150–450)
RBC # BLD AUTO: 4.86 10E12/L (ref 3.8–5.2)
RETICS # AUTO: 62.7 10E9/L (ref 25–95)
RETICS/RBC NFR AUTO: 1.3 % (ref 0.5–2)
TIBC SERPL-MCNC: 518 UG/DL (ref 240–430)
VIT B12 SERPL-MCNC: 333 PG/ML (ref 193–986)
WBC # BLD AUTO: 10.3 10E9/L (ref 4–11)

## 2019-01-23 PROCEDURE — 83550 IRON BINDING TEST: CPT | Performed by: INTERNAL MEDICINE

## 2019-01-23 PROCEDURE — 82607 VITAMIN B-12: CPT | Performed by: INTERNAL MEDICINE

## 2019-01-23 PROCEDURE — 99214 OFFICE O/P EST MOD 30 MIN: CPT | Performed by: INTERNAL MEDICINE

## 2019-01-23 PROCEDURE — 82728 ASSAY OF FERRITIN: CPT | Performed by: INTERNAL MEDICINE

## 2019-01-23 PROCEDURE — 99000 SPECIMEN HANDLING OFFICE-LAB: CPT | Performed by: INTERNAL MEDICINE

## 2019-01-23 PROCEDURE — 85060 BLOOD SMEAR INTERPRETATION: CPT | Performed by: INTERNAL MEDICINE

## 2019-01-23 PROCEDURE — 83540 ASSAY OF IRON: CPT | Performed by: INTERNAL MEDICINE

## 2019-01-23 PROCEDURE — 85025 COMPLETE CBC W/AUTO DIFF WBC: CPT | Performed by: INTERNAL MEDICINE

## 2019-01-23 PROCEDURE — 85045 AUTOMATED RETICULOCYTE COUNT: CPT | Performed by: INTERNAL MEDICINE

## 2019-01-23 PROCEDURE — 83921 ORGANIC ACID SINGLE QUANT: CPT | Mod: 90 | Performed by: INTERNAL MEDICINE

## 2019-01-23 PROCEDURE — 36415 COLL VENOUS BLD VENIPUNCTURE: CPT | Performed by: INTERNAL MEDICINE

## 2019-01-23 RX ORDER — FERROUS SULFATE 325(65) MG
325 TABLET ORAL
Qty: 90 TABLET | Refills: 3 | Status: SHIPPED | OUTPATIENT
Start: 2019-01-23 | End: 2019-06-26

## 2019-01-23 ASSESSMENT — MIFFLIN-ST. JEOR: SCORE: 1511.4

## 2019-01-23 ASSESSMENT — PAIN SCALES - GENERAL: PAINLEVEL: MODERATE PAIN (5)

## 2019-01-23 NOTE — LETTER
1/23/2019         RE: Radha Corbett  [address] [id number]        Dear Colleague,    Thank you for referring your patient, Radha Corbett, to the [facility]. Please see a copy of my visit note below.    Hematology initial visit:  Date on this visit: 1/23/2019    Radha Corbett  is referred by Dr.Loretta Kaylee Carias for a hematology consultation. She requires evaluation for anemia.    Primary Physician: Trice Medeiros     History Of Present Illness:  Ms. Corbett is a 68 year old female who comes in for evaluation for iron deficiency anemia. Her labs in Nov 2018 were c/w iron deficiency anemia.  He had diverticular bleed in August 2017 and a colonoscopy at that time showed multiple diverticuli.  More recently she was again noted to have iron deficiency anemia.    She tells me that for the last several months she has been feeling more tired than usual.  About 1 month ago she started to take oral iron once a day and is tolerating it well.  She denies any bleeding.  She has not donated blood.  She does have chronic issues with some trouble swallowing, gas pains, heartburn and hiccups and occasional diarrhea with constipation. EGD in Nov 2018 was essentially unremarkable and no evidence of celiac disease.   She also tells me that for the last 1-1/2 years she lost about 40 pounds but gained a few pounds back over the last several weeks.  This was unintentional but she tells me that her appetite was down and her taste has been altered.  She was always a meat lover but over the last several months she has noticed that she does not prefer eating meat.  She has chronic restless legs and this has not changed.  She has chronic tingling in her feet which has not changed.  She is a diabetic.  She has had issues with urinary bladder and had a nerve stimulator placed and has noticed that over the last 1 year or so she has had 2-3 episodes of urinary tract infections but other than that denies any  "significant infections.  She was treated for thrush last summer.  She has chronic back pains because she broke her back 2 years ago.  Because of this she has difficulty walking and she has limitations in her activities.  She has also noticed some memory issues for the last 2 years.  She has had issues with insomnia and takes trazodone for sleep.    ROS:  A comprehensive ROS was otherwise neg      Past Medical/Surgical History:  Past Medical History:   Diagnosis Date     Actinic cheilitis      Arthritis      DM (diabetes mellitus) (H)      GERD (gastroesophageal reflux disease)      Hiatal hernia      HPV in female      HTN (hypertension)      IBS (irritable bowel syndrome)      Major depression      Myopia      Other and unspecified hyperlipidemia      Pseudoangiomatous stromal hyperplasia of breast 2010    [facility]     Rotator cuff injury 7/2016     Sleep apnea     \"snore\"     Vitamin D deficiency      Past Surgical History:   Procedure Laterality Date     BREAST LUMPECTOMY, RT/LT      x2, left     CATARACT IOL, RT/LT  4/99    left, MZ60CD 7.0D     COLONOSCOPY       COLONOSCOPY N/A 1/22/2016    Procedure: COMBINED COLONOSCOPY, SINGLE OR MULTIPLE BIOPSY/POLYPECTOMY BY BIOPSY;  Surgeon: Praful Benjamin MD;  Location: [facility] OR     COLONOSCOPY WITH CO2 INSUFFLATION N/A 1/22/2016    Procedure: COLONOSCOPY WITH CO2 INSUFFLATION;  Surgeon: Praful Benjamin MD;  Location: [facility] OR     COMBINED ESOPHAGOSCOPY, GASTROSCOPY, DUODENOSCOPY (EGD) WITH CO2 INSUFFLATION N/A 11/27/2018    Procedure: COMBINED ESOPHAGOSCOPY, GASTROSCOPY, DUODENOSCOPY (EGD) WITH CO2 INSUFFLATION;  Surgeon: Duane, William Charles, MD;  Location: [facility] OR     CRYOTHERAPY  2000    cervical     CRYOTHERAPY Left 3/19/2015    Procedure: CRYOTHERAPY;  Surgeon: Keiko Puri MD;  Location:  [facility]     DILATION AND CURETTAGE, HYSTEROSCOPY DIAGNOSTIC, COMBINED N/A 1/19/2016    Procedure: COMBINED DILATION AND CURETTAGE, HYSTEROSCOPY DIAGNOSTIC;  " Surgeon: Yeni Aparicio DO;  Location: [facility] OR     ESOPHAGOSCOPY, GASTROSCOPY, DUODENOSCOPY (EGD), COMBINED N/A 11/27/2018    Procedure: COMBINED ESOPHAGOSCOPY, GASTROSCOPY, DUODENOSCOPY (EGD), BIOPSY SINGLE OR MULTIPLE;  Surgeon: Duane, William Charles, MD;  Location: [facility] OR     IMPLANT STIMULATOR AND LEADS SACRAL NERVE (STAGE ONE AND TWO) N/A 7/24/2018    Procedure: IMPLANT STIMULATOR AND LEADS SACRAL NERVE (STAGE ONE AND TWO);  Interstim Implant Stage One and Two (Implant Permanent Lead and Generator);  Surgeon: Dada Baltazar MD;  [facility]: [facility]     IMPLANT STIMULATOR SACRAL NERVE PERCUTANEOUS TRIAL N/A 6/19/2018    Procedure: IMPLANT STIMULATOR SACRAL NERVE PERCUTANEOUS TRIAL;  Percutaneous Neural Examination (trial for interstim);  Surgeon: Dada Baltazar MD;  Location: [facility] OR     LAPAROSCOPIC TUBAL LIGATION  1981     PHACOEMULSIFICATION CLEAR CORNEA WITH STANDARD INTRAOCULAR LENS IMPLANT  8/15/2013    Procedure: PHACOEMULSIFICATION CLEAR CORNEA WITH STANDARD INTRAOCULAR LENS IMPLANT;  RIGHT PHACOEMULSIFICATION CLEAR CORNEA WITH STANDARD INTRAOCULAR LENS IMPLANT ;  Surgeon: Trae Taylor MD;  Location: [facility]     SURGICAL HISTORY OF -       x4, ankle surgery     Allergies:  Allergies as of 01/23/2019 - Reviewed 01/23/2019   Allergen Reaction Noted     Codeine  04/21/2010     Sulfa drugs  04/21/2010     Current Medications:  Current Outpatient Medications   Medication Sig Dispense Refill     calcium carbonate-vitamin D (OSCAL W/D) 500-200 MG-UNIT tablet Take 1 tablet by mouth 2 times daily (with meals) 180 tablet 3     cephALEXin (KEFLEX) 500 MG capsule Take 1 capsule (500 mg) by mouth 2 times daily 14 capsule 0     Continuous Blood Gluc  (FREESTYLE MELINA READER) AYAN 1 Units daily 1 Device 0     Continuous Blood Gluc  (FREESTYLE MELINA READER) AYAN 1 Units 3 times daily as needed 1 Device 0     continuous blood glucose monitoring (FREESTYLE MELINA) sensor For use with  "Freestyle Danni Flash  for continuous monitioring of blood glucose levels. Replace sensor every 10 days. 3 each 5     DULoxetine (CYMBALTA) 60 MG capsule Take 1 capsule (60 mg) by mouth 2 times daily 180 capsule 1     ferrous sulfate (FEROSUL) 325 (65 Fe) MG tablet Take 1 tablet (325 mg) by mouth 3 times daily (with meals) 90 tablet 3     glipiZIDE (GLUCOTROL) 10 MG tablet Take 1 tablet (10 mg) by mouth daily (with dinner) 90 tablet 0     insulin NPH (NOVOLIN N VIAL) 100 UNIT/ML injection Inject 64 units Subcutaneous 2 times daily. (gets this from [facility] NPH at [facility])       insulin syringe-needle U-100 (RELION INSULIN SYRINGE) 31G X 5/16\" 1 ML Use 2 syringes daily or as directed. 200 each 3     lisinopril (PRINIVIL/ZESTRIL) 5 MG tablet Take 1 tablet (5 mg) by mouth daily 90 tablet 3     MELATONIN PO 10 mg At Bedtime        metFORMIN (GLUCOPHAGE-XR) 500 MG 24 hr tablet Take 2 tablets (1,000 mg) by mouth 2 times daily (with meals) 360 tablet 1     minocycline (MINOCIN/DYNACIN) 100 MG capsule Take 1 capsule (100 mg) by mouth every 12 hours 180 capsule 2     omeprazole (PRILOSEC) 40 MG capsule TAKE 1 CAPSULE DAILY 30 TO 60 MINUTES BEFORE A MEAL 90 capsule 3     ONE TOUCH LANCETS None Entered       ONE TOUCH TEST STRIPS VI None Entered       order for DME Equipment being ordered: Walker with seat light weight 1 Device 0     oxybutynin ER (DITROPAN XL) 15 MG 24 hr tablet Take 1 tablet (15 mg) by mouth daily 90 tablet 1     simvastatin (ZOCOR) 20 MG tablet Take 1 tablet (20 mg) by mouth At Bedtime 90 tablet 3     spironolactone (ALDACTONE) 100 MG tablet TAKE 1 TABLET EVERY MORNING 90 tablet 3     traZODone (DESYREL) 100 MG tablet Take 1 tablet (100 mg) by mouth nightly as needed for sleep 90 tablet 3     VITAMIN D 1000 UNIT OR CAPS 1 CAPSULE DAILY       vitamin D3 (CHOLECALCIFEROL) 1000 units (25 mcg) tablet Take 1 tablet (1,000 Units) by mouth daily 90 tablet 3      Family History:  Family History   Problem " Relation Age of Onset     Hypertension Mother      Cerebrovascular Disease Mother      Arthritis Mother      Cardiovascular Mother      Circulatory Mother      Cerebrovascular Disease Father      Prostate Cancer Father 65         at 69     Asthma Brother      Prostate Cancer Brother 48        bone metastasis     Diabetes Sister      Hypertension Sister      Osteoporosis Sister      Depression Sister      Lipids Sister      Cancer Maternal Grandmother 95        spine     Breast Cancer Sister 39        also contralateral @53, mets to lungs     Cancer Sister 20        second uterine cancer in 30s also     Diabetes Sister      Hypertension Sister      Depression Sister      Osteoporosis Sister      Breast Cancer Sister 57        unilateral     Cancer Paternal Aunt 40        unknown type     Cancer Paternal Uncle         stomach;  in his 80s     Prostate Cancer Brother      Glaucoma No family hx of      Melanoma No family hx of      Skin Cancer No family hx of      No family history of bleeding or clotting disorder.  Father had prostate cancer in his 60s.  Brother  of prostate cancer at 51.  Sister developed cervical cancer at 25 and she developed breast cancer in her 30s.  Another sister had breast cancer at 63.  Patient has 1 daughter who is healthy.  Patient tells me that she was seen by genetic counselor and was tested negative for BRCA gene mutation.  Social History:  Social History     Socioeconomic History     Marital status:      Spouse name: Not on file     Number of children: 1     Years of education: Not on file     Highest education level: Not on file   Social Needs     Financial resource strain: Not on file     Food insecurity - worry: Not on file     Food insecurity - inability: Not on file     Transportation needs - medical: Not on file     Transportation needs - non-medical: Not on file   Occupational History     Occupation: manager of senior living      Employer: [employer]   Tobacco Use  "    Smoking status: Former Smoker     Packs/day: 1.00     Years: 20.00     Pack years: 20.00     Types: Cigarettes     Start date: 11/21/2018     Smokeless tobacco: Never Used   Substance and Sexual Activity     Alcohol use: Yes     Comment: social/3-4 per week     Drug use: No     Sexual activity: Yes     Partners: Male     Birth control/protection: Surgical   Other Topics Concern     Parent/sibling w/ CABG, MI or angioplasty before 65F 55M? No      Service No     Blood Transfusions No     Caffeine Concern No     Comment: quit drinking caffeine     Occupational Exposure No     Hobby Hazards No     Sleep Concern Yes     Comment: takes benadryl medication at night     Stress Concern Yes     Comment: some stress at work     Weight Concern Not Asked     Special Diet No     Back Care No     Exercise No     Bike Helmet No     Comment: doesn't ride bike     Seat Belt Yes     Self-Exams Not Asked   Social History Narrative     Not on file   She smoked cigarettes from the age 19 until 30 and then she again started smoking at 66 but quit in November 2018.  She drinks alcohol occasionally.    Physical Exam:  /75 (BP Location: Right arm)   Pulse 87   Temp 98.2  F (36.8  C) (Oral)   Resp 18   Ht 1.708 m (5' 7.24\")   Wt 94.5 kg (208 lb 5 oz)   LMP 11/18/1991 (Exact Date)   SpO2 96%   BMI 32.39 kg/m     CONSTITUTIONAL: no acute distress  EYES: PERRLA, mild pallor but no icterus.   ENT/MOUTH: no mouth lesions. Ears normal  CVS: s1s2 no m r g .   RESPIRATORY: clear to auscultation b/l  GI: soft non tender no hepatosplenomegaly  NEURO: AAOX3  Grossly non focal neuro exam  INTEGUMENT: no obvious rashes  LYMPHATIC: no palpable cervical, supraclavicular, axillary or inguinal LAD  MUSCULOSKELETAL: She has tenderness in the left ankle.  She has history of fracture of the ankles.  EXTREMITIES: no edema  PSYCH: Mentation, mood and affect are normal. Decision making capacity is intact    Laboratory/Imaging " Studies    Reviewed    ASSESSMENT/PLAN:    Iron deficiency anemia.  Previously she had diverticular bleeding but has not noticed any bleeding recently.  She has been on oral iron for about a month and I believe we should repeat her labs today to see where she is.  If there is definite improvement then I would either recommend continuing oral iron or increasing it to twice a day and potentially thrice a day as tolerated. We will also check B12/MMA.   If there is no improvement then we will consider intravenous iron infusion with INFeD.  I discussed the rational schedule and potential side effects of INFeD in detail but we will decide about it later.    In order to determine the cause of iron deficiency anemia, she has had recent negative EGD.  Colonoscopy in August 2017 showed diverticuli and she had diverticular bleed at that time.  She denies any more bleeding recently.  It would be worthwhile considering capsule endoscopy to rule out any other occult site of bleeding.    Other chronic symptoms.  She had about 40 pound weight loss over the last 1-1/2 years but gained back some weight recently.  She will follow with the primary care physician regarding this.  She also has a thyroid nodule for which she is going to follow with endocrinology.    We will determine the follow-up after checking labs today.    All questions answered.  She is agreeable and comfortable with the plan.    Chelsey Mishra            Again, thank you for allowing me to participate in the care of your patient.        Sincerely,        Chelsey Mishra MD

## 2019-01-23 NOTE — NURSING NOTE
"Oncology Rooming Note    January 23, 2019 11:22 AM   Radha Corbett is a 68 year old female who presents for:    Chief Complaint   Patient presents with     Oncology Clinic Visit     New Patient     Initial Vitals: /75 (BP Location: Right arm)   Pulse 87   Temp 98.2  F (36.8  C) (Oral)   Resp 18   Ht 1.708 m (5' 7.24\")   Wt 94.5 kg (208 lb 5 oz)   LMP 11/18/1991 (Exact Date)   SpO2 96%   BMI 32.39 kg/m   Estimated body mass index is 32.39 kg/m  as calculated from the following:    Height as of this encounter: 1.708 m (5' 7.24\").    Weight as of this encounter: 94.5 kg (208 lb 5 oz). Body surface area is 2.12 meters squared.  Moderate Pain (5) Comment: Data Unavailable   Patient's last menstrual period was 11/18/1991 (exact date).  Allergies reviewed: Yes  Medications reviewed: Yes    Medications: Medication refills not needed today.  Pharmacy name entered into [facility]:    [facility]  [facility] - [place], [place] - [address]  [facility] [place] [address] DRUG STORE [address] [place] [address] AT [facility]      5 minutes for nursing intake (face to face time)     Soledad Brock LPN              "

## 2019-01-23 NOTE — PROGRESS NOTES
Call placed to patient to communicate the following per Dr. Mishra:          Please let her know that Hg has normalized although iron studies still show iron deficiency.     Not all labs are back     Since this is a clear improvement, I think she should try to increase oral iron as we discussed. No need for IV iron right now.     Would recommend repeat labs in 2 months and see me a few days after the blood work.      Patient communicated understanding.  Malu Naidu RN

## 2019-01-23 NOTE — PROGRESS NOTES
Hematology initial visit:  Date on this visit: 1/23/2019    Radha Corbett  is referred by Dr.Loretta Kaylee Carias for a hematology consultation. She requires evaluation for anemia.    Primary Physician: Trice Medeiros     History Of Present Illness:  Ms. Corbett is a 68 year old female who comes in for evaluation for iron deficiency anemia. Her labs in Nov 2018 were c/w iron deficiency anemia.  He had diverticular bleed in August 2017 and a colonoscopy at that time showed multiple diverticuli.  More recently she was again noted to have iron deficiency anemia.    She tells me that for the last several months she has been feeling more tired than usual.  About 1 month ago she started to take oral iron once a day and is tolerating it well.  She denies any bleeding.  She has not donated blood.  She does have chronic issues with some trouble swallowing, gas pains, heartburn and hiccups and occasional diarrhea with constipation. EGD in Nov 2018 was essentially unremarkable and no evidence of celiac disease.   She also tells me that for the last 1-1/2 years she lost about 40 pounds but gained a few pounds back over the last several weeks.  This was unintentional but she tells me that her appetite was down and her taste has been altered.  She was always a meat lover but over the last several months she has noticed that she does not prefer eating meat.  She has chronic restless legs and this has not changed.  She has chronic tingling in her feet which has not changed.  She is a diabetic.  She has had issues with urinary bladder and had a nerve stimulator placed and has noticed that over the last 1 year or so she has had 2-3 episodes of urinary tract infections but other than that denies any significant infections.  She was treated for thrush last summer.  She has chronic back pains because she broke her back 2 years ago.  Because of this she has difficulty walking and she has limitations in her activities.  She has also noticed some  "memory issues for the last 2 years.  She has had issues with insomnia and takes trazodone for sleep.    ROS:  A comprehensive ROS was otherwise neg      Past Medical/Surgical History:  Past Medical History:   Diagnosis Date     Actinic cheilitis      Arthritis      DM (diabetes mellitus) (H)      GERD (gastroesophageal reflux disease)      Hiatal hernia      HPV in female      HTN (hypertension)      IBS (irritable bowel syndrome)      Major depression      Myopia      Other and unspecified hyperlipidemia      Pseudoangiomatous stromal hyperplasia of breast 2010    PASH     Rotator cuff injury 7/2016     Sleep apnea     \"snore\"     Vitamin D deficiency      Past Surgical History:   Procedure Laterality Date     BREAST LUMPECTOMY, RT/LT      x2, left     CATARACT IOL, RT/LT  4/99    left, MZ60CD 7.0D     COLONOSCOPY       COLONOSCOPY N/A 1/22/2016    Procedure: COMBINED COLONOSCOPY, SINGLE OR MULTIPLE BIOPSY/POLYPECTOMY BY BIOPSY;  Surgeon: Praful Benjamin MD;  Location: [facility]     COLONOSCOPY WITH CO2 INSUFFLATION N/A 1/22/2016    Procedure: COLONOSCOPY WITH CO2 INSUFFLATION;  Surgeon: Praful Benjamin MD;  [facility]: [facility]     COMBINED ESOPHAGOSCOPY, GASTROSCOPY, DUODENOSCOPY (EGD) WITH CO2 INSUFFLATION N/A 11/27/2018    Procedure: COMBINED ESOPHAGOSCOPY, GASTROSCOPY, DUODENOSCOPY (EGD) WITH CO2 INSUFFLATION;  Surgeon: Duane, William Charles, MD;  Location: [facility]     CRYOTHERAPY  2000    cervical     CRYOTHERAPY Left 3/19/2015    Procedure: CRYOTHERAPY;  Surgeon: Keiko Puri MD;  Location: [facility]     DILATION AND CURETTAGE, HYSTEROSCOPY DIAGNOSTIC, COMBINED N/A 1/19/2016    Procedure: COMBINED DILATION AND CURETTAGE, HYSTEROSCOPY DIAGNOSTIC;  Surgeon: Yeni Aparicio DO;  [facility]: [facility]     ESOPHAGOSCOPY, GASTROSCOPY, DUODENOSCOPY (EGD), COMBINED N/A 11/27/2018    Procedure: COMBINED ESOPHAGOSCOPY, GASTROSCOPY, DUODENOSCOPY (EGD), BIOPSY SINGLE OR MULTIPLE;  Surgeon: Duane, " Cosme Rodriguez MD;  Location: [facility]     IMPLANT STIMULATOR AND LEADS SACRAL NERVE (STAGE ONE AND TWO) N/A 7/24/2018    Procedure: IMPLANT STIMULATOR AND LEADS SACRAL NERVE (STAGE ONE AND TWO);  Interstim Implant Stage One and Two (Implant Permanent Lead and Generator);  Surgeon: Dada Baltazar MD;  [facility]     IMPLANT STIMULATOR SACRAL NERVE PERCUTANEOUS TRIAL N/A 6/19/2018    Procedure: IMPLANT STIMULATOR SACRAL NERVE PERCUTANEOUS TRIAL;  Percutaneous Neural Examination (trial for interstim);  Surgeon: Dada Baltazar MD;  [facility]     LAPAROSCOPIC TUBAL LIGATION  1981     PHACOEMULSIFICATION CLEAR CORNEA WITH STANDARD INTRAOCULAR LENS IMPLANT  8/15/2013    Procedure: PHACOEMULSIFICATION CLEAR CORNEA WITH STANDARD INTRAOCULAR LENS IMPLANT;  RIGHT PHACOEMULSIFICATION CLEAR CORNEA WITH STANDARD INTRAOCULAR LENS IMPLANT ;  Surgeon: Trae Taylor MD;  [facility]     SURGICAL HISTORY OF -       x4, ankle surgery     Allergies:  Allergies as of 01/23/2019 - Reviewed 01/23/2019   Allergen Reaction Noted     Codeine  04/21/2010     Sulfa drugs  04/21/2010     Current Medications:  Current Outpatient Medications   Medication Sig Dispense Refill     calcium carbonate-vitamin D (OSCAL W/D) 500-200 MG-UNIT tablet Take 1 tablet by mouth 2 times daily (with meals) 180 tablet 3     cephALEXin (KEFLEX) 500 MG capsule Take 1 capsule (500 mg) by mouth 2 times daily 14 capsule 0     Continuous Blood Gluc  (FREESTYLE DANNI READER) AYAN 1 Units daily 1 Device 0     Continuous Blood Gluc  (FREESTYLE DANNI READER) AYAN 1 Units 3 times daily as needed 1 Device 0     continuous blood glucose monitoring (FREESTYLE DANNI) sensor For use with Freestyle Danni Flash  for continuous monitioring of blood glucose levels. Replace sensor every 10 days. 3 each 5     DULoxetine (CYMBALTA) 60 MG capsule Take 1 capsule (60 mg) by mouth 2 times daily 180 capsule 1     ferrous sulfate  "(FEROSUL) 325 (65 Fe) MG tablet Take 1 tablet (325 mg) by mouth 3 times daily (with meals) 90 tablet 3     glipiZIDE (GLUCOTROL) 10 MG tablet Take 1 tablet (10 mg) by mouth daily (with dinner) 90 tablet 0     insulin NPH (NOVOLIN N VIAL) 100 UNIT/ML injection Inject 64 units Subcutaneous 2 times daily. (gets this from OTC NPH at [facility])       insulin syringe-needle U-100 (RELION INSULIN SYRINGE) 31G X 5/16\" 1 ML Use 2 syringes daily or as directed. 200 each 3     lisinopril (PRINIVIL/ZESTRIL) 5 MG tablet Take 1 tablet (5 mg) by mouth daily 90 tablet 3     MELATONIN PO 10 mg At Bedtime        metFORMIN (GLUCOPHAGE-XR) 500 MG 24 hr tablet Take 2 tablets (1,000 mg) by mouth 2 times daily (with meals) 360 tablet 1     minocycline (MINOCIN/DYNACIN) 100 MG capsule Take 1 capsule (100 mg) by mouth every 12 hours 180 capsule 2     omeprazole (PRILOSEC) 40 MG capsule TAKE 1 CAPSULE DAILY 30 TO 60 MINUTES BEFORE A MEAL 90 capsule 3     ONE TOUCH LANCETS None Entered       ONE TOUCH TEST STRIPS VI None Entered       order for DME Equipment being ordered: Walker with seat light weight 1 Device 0     oxybutynin ER (DITROPAN XL) 15 MG 24 hr tablet Take 1 tablet (15 mg) by mouth daily 90 tablet 1     simvastatin (ZOCOR) 20 MG tablet Take 1 tablet (20 mg) by mouth At Bedtime 90 tablet 3     spironolactone (ALDACTONE) 100 MG tablet TAKE 1 TABLET EVERY MORNING 90 tablet 3     traZODone (DESYREL) 100 MG tablet Take 1 tablet (100 mg) by mouth nightly as needed for sleep 90 tablet 3     VITAMIN D 1000 UNIT OR CAPS 1 CAPSULE DAILY       vitamin D3 (CHOLECALCIFEROL) 1000 units (25 mcg) tablet Take 1 tablet (1,000 Units) by mouth daily 90 tablet 3      Family History:  Family History   Problem Relation Age of Onset     Hypertension Mother      Cerebrovascular Disease Mother      Arthritis Mother      Cardiovascular Mother      Circulatory Mother      Cerebrovascular Disease Father      Prostate Cancer Father 65         at 69     " Asthma Brother      Prostate Cancer Brother 48        bone metastasis     Diabetes Sister      Hypertension Sister      Osteoporosis Sister      Depression Sister      Lipids Sister      Cancer Maternal Grandmother 95        spine     Breast Cancer Sister 39        also contralateral @53, mets to lungs     Cancer Sister 20        second uterine cancer in 30s also     Diabetes Sister      Hypertension Sister      Depression Sister      Osteoporosis Sister      Breast Cancer Sister 57        unilateral     Cancer Paternal Aunt 40        unknown type     Cancer Paternal Uncle         stomach;  in his 80s     Prostate Cancer Brother      Glaucoma No family hx of      Melanoma No family hx of      Skin Cancer No family hx of      No family history of bleeding or clotting disorder.  Father had prostate cancer in his 60s.  Brother  of prostate cancer at 51.  Sister developed cervical cancer at 25 and she developed breast cancer in her 30s.  Another sister had breast cancer at 63.  Patient has 1 daughter who is healthy.  Patient tells me that she was seen by genetic counselor and was tested negative for BRCA gene mutation.  Social History:  Social History     Socioeconomic History     Marital status:      Spouse name: Not on file     Number of children: 1     Years of education: Not on file     Highest education level: Not on file   Social Needs     Financial resource strain: Not on file     Food insecurity - worry: Not on file     Food insecurity - inability: Not on file     Transportation needs - medical: Not on file     Transportation needs - non-medical: Not on file   Occupational History     Occupation: manager of senior living      Employer: [employer]   Tobacco Use     Smoking status: Former Smoker     Packs/day: 1.00     Years: 20.00     Pack years: 20.00     Types: Cigarettes     Start date: 2018     Smokeless tobacco: Never Used   Substance and Sexual Activity     Alcohol use: Yes     Comment:  "social/3-4 per week     Drug use: No     Sexual activity: Yes     Partners: Male     Birth control/protection: Surgical   Other Topics Concern     Parent/sibling w/ CABG, MI or angioplasty before 65F 55M? No      Service No     Blood Transfusions No     Caffeine Concern No     Comment: quit drinking caffeine     Occupational Exposure No     Hobby Hazards No     Sleep Concern Yes     Comment: takes benadryl medication at night     Stress Concern Yes     Comment: some stress at work     Weight Concern Not Asked     Special Diet No     Back Care No     Exercise No     Bike Helmet No     Comment: doesn't ride bike     Seat Belt Yes     Self-Exams Not Asked   Social History Narrative     Not on file   She smoked cigarettes from the age 19 until 30 and then she again started smoking at 66 but quit in November 2018.  She drinks alcohol occasionally.    Physical Exam:  /75 (BP Location: Right arm)   Pulse 87   Temp 98.2  F (36.8  C) (Oral)   Resp 18   Ht 1.708 m (5' 7.24\")   Wt 94.5 kg (208 lb 5 oz)   LMP 11/18/1991 (Exact Date)   SpO2 96%   BMI 32.39 kg/m    CONSTITUTIONAL: no acute distress  EYES: PERRLA, mild pallor but no icterus.   ENT/MOUTH: no mouth lesions. Ears normal  CVS: s1s2 no m r g .   RESPIRATORY: clear to auscultation b/l  GI: soft non tender no hepatosplenomegaly  NEURO: AAOX3  Grossly non focal neuro exam  INTEGUMENT: no obvious rashes  LYMPHATIC: no palpable cervical, supraclavicular, axillary or inguinal LAD  MUSCULOSKELETAL: She has tenderness in the left ankle.  She has history of fracture of the ankles.  EXTREMITIES: no edema  PSYCH: Mentation, mood and affect are normal. Decision making capacity is intact    Laboratory/Imaging Studies    Reviewed    ASSESSMENT/PLAN:    Iron deficiency anemia.  Previously she had diverticular bleeding but has not noticed any bleeding recently.  She has been on oral iron for about a month and I believe we should repeat her labs today to see where " she is.  If there is definite improvement then I would either recommend continuing oral iron or increasing it to twice a day and potentially thrice a day as tolerated. We will also check B12/MMA.   If there is no improvement then we will consider intravenous iron infusion with INFeD.  I discussed the rational schedule and potential side effects of INFeD in detail but we will decide about it later.    In order to determine the cause of iron deficiency anemia, she has had recent negative EGD.  Colonoscopy in August 2017 showed diverticuli and she had diverticular bleed at that time.  She denies any more bleeding recently.  It would be worthwhile considering capsule endoscopy to rule out any other occult site of bleeding.    Other chronic symptoms.  She had about 40 pound weight loss over the last 1-1/2 years but gained back some weight recently.  She will follow with the primary care physician regarding this.  She also has a thyroid nodule for which she is going to follow with endocrinology.    We will determine the follow-up after checking labs today.    All questions answered.  She is agreeable and comfortable with the plan.    Chelsey Mishra

## 2019-01-24 LAB — COPATH REPORT: NORMAL

## 2019-01-25 ENCOUNTER — OFFICE VISIT (OUTPATIENT)
Dept: DERMATOLOGY | Facility: CLINIC | Age: 69
End: 2019-01-25
Payer: COMMERCIAL

## 2019-01-25 ENCOUNTER — OFFICE VISIT (OUTPATIENT)
Dept: PEDIATRICS | Facility: CLINIC | Age: 69
End: 2019-01-25
Payer: COMMERCIAL

## 2019-01-25 VITALS
TEMPERATURE: 97.7 F | HEART RATE: 114 BPM | BODY MASS INDEX: 32.29 KG/M2 | OXYGEN SATURATION: 94 % | DIASTOLIC BLOOD PRESSURE: 80 MMHG | WEIGHT: 207.7 LBS | SYSTOLIC BLOOD PRESSURE: 135 MMHG

## 2019-01-25 DIAGNOSIS — Z79.4 TYPE 2 DIABETES MELLITUS WITH HYPERGLYCEMIA, WITH LONG-TERM CURRENT USE OF INSULIN (H): ICD-10-CM

## 2019-01-25 DIAGNOSIS — R63.4 WEIGHT LOSS: Primary | ICD-10-CM

## 2019-01-25 DIAGNOSIS — Z87.2 HISTORY OF ACTINIC KERATOSIS: Primary | ICD-10-CM

## 2019-01-25 DIAGNOSIS — E61.1 IRON DEFICIENCY: ICD-10-CM

## 2019-01-25 DIAGNOSIS — R23.8 PAPULE: ICD-10-CM

## 2019-01-25 DIAGNOSIS — F33.1 MODERATE EPISODE OF RECURRENT MAJOR DEPRESSIVE DISORDER (H): ICD-10-CM

## 2019-01-25 DIAGNOSIS — E11.65 TYPE 2 DIABETES MELLITUS WITH HYPERGLYCEMIA, WITH LONG-TERM CURRENT USE OF INSULIN (H): ICD-10-CM

## 2019-01-25 LAB — METHYLMALONATE SERPL-SCNC: 0.26 UMOL/L (ref 0–0.4)

## 2019-01-25 PROCEDURE — 99214 OFFICE O/P EST MOD 30 MIN: CPT | Performed by: NURSE PRACTITIONER

## 2019-01-25 PROCEDURE — 99213 OFFICE O/P EST LOW 20 MIN: CPT | Performed by: DERMATOLOGY

## 2019-01-25 ASSESSMENT — PAIN SCALES - GENERAL: PAINLEVEL: NO PAIN (0)

## 2019-01-25 NOTE — NURSING NOTE
"Chief Complaint   Patient presents with     Weight Loss       Initial /80 (BP Location: Right arm, Patient Position: Sitting, Cuff Size: Adult Regular)   Pulse 114   Temp 97.7  F (36.5  C) (Temporal)   Wt 94.2 kg (207 lb 11.2 oz)   LMP 11/18/1991 (Exact Date)   SpO2 94%   Breastfeeding? No   BMI 32.29 kg/m   Estimated body mass index is 32.29 kg/m  as calculated from the following:    Height as of 1/23/19: 1.708 m (5' 7.24\").    Weight as of this encounter: 94.2 kg (207 lb 11.2 oz).  Medication Reconciliation: complete      BOBY Lyons      "

## 2019-01-25 NOTE — PROGRESS NOTES
[facility] Dermatology Note      Dermatology Problem List:  1. Consistent with prurigo nodule, left thigh  -s/p clobetasol ointment initiated 8/29/2016  2. Seborrheic dermatitis, nasolabial folds  -s/p OTC hydrocortisone    Encounter Date: Jan 25, 2019    CC:  Chief Complaint   Patient presents with     RECHECK     Radha is returning for a recheck of the right lower lip r/t Actinic chelitis ; small improvment noted since her last visit.         History of Present Illness:  Ms. Radha Corbett is a 68 year old female who presents for follow for recheck of a tender area on the right lower cutaneous lip. Last seen 1/11/19 when she had one area of actinic chelitis treated with cryo and the other area on the right lower cutaneous lip was identified. Today, the patient reports that the lesion treated with cryo has almost resolved. She is unsure about the other lesion we left alone last time. No other concerns.     Past Medical History:   Patient Active Problem List   Diagnosis     Macular degeneration     Myopia     Hiatal hernia     IBS (irritable bowel syndrome)     Hypertension goal BP (blood pressure) < 140/90     GERD (gastroesophageal reflux disease)     Atypical ductal hyperplasia of breast     Atypical lobular hyperplasia of breast     Benign Breast Disease (PASH)     Family history of breast cancer in sister     Type 2 diabetes, HbA1C goal < 8% (H)     Hyperlipidemia LDL goal <100     Breast cancer screening-high risk     Tubular adenoma of colon     Osteopenia     Alcohol ingestion, more than 4 drinks/day on alcohol screening     Umbilical hernia     Incontinence of urine     Stool incontinence     Osteoarthritis, knee     Rosacea     Anxiety     Pseudophakia     Abnormal cervical Pap smear with positive HPV DNA test     Depression with anxiety     Retinal detachment     Elevated liver function tests     Rectocele     Unexplained endometrial cells on cervical cytology     Back pain, unspecified  "back location, unspecified back pain laterality, unspecified chronicity     Urge incontinence     Dislocation of shoulder region     Acute lower GI bleeding     Morbid obesity (H)     Moderate episode of recurrent major depressive disorder (H)     Past Medical History:   Diagnosis Date     Actinic cheilitis      Arthritis      DM (diabetes mellitus) (H)      GERD (gastroesophageal reflux disease)      Hiatal hernia      HPV in female      HTN (hypertension)      IBS (irritable bowel syndrome)      Major depression      Myopia      Other and unspecified hyperlipidemia      Pseudoangiomatous stromal hyperplasia of breast 2010    PASH     Rotator cuff injury 7/2016     Sleep apnea     \"snore\"     Vitamin D deficiency      Past Surgical History:   Procedure Laterality Date     BREAST LUMPECTOMY, RT/LT      x2, left     CATARACT IOL, RT/LT  4/99    left, MZ60CD 7.0D     COLONOSCOPY       COLONOSCOPY N/A 1/22/2016    Procedure: COMBINED COLONOSCOPY, SINGLE OR MULTIPLE BIOPSY/POLYPECTOMY BY BIOPSY;  Surgeon: Praful Benjamin MD;  [facility]: [facility]     COLONOSCOPY WITH CO2 INSUFFLATION N/A 1/22/2016    Procedure: COLONOSCOPY WITH CO2 INSUFFLATION;  Surgeon: Praful Benjamin MD;  [facility]: [facility]     COMBINED ESOPHAGOSCOPY, GASTROSCOPY, DUODENOSCOPY (EGD) WITH CO2 INSUFFLATION N/A 11/27/2018    Procedure: COMBINED ESOPHAGOSCOPY, GASTROSCOPY, DUODENOSCOPY (EGD) WITH CO2 INSUFFLATION;  Surgeon: Duane, William Charles, MD;  Location: [facility]     CRYOTHERAPY  2000    cervical     CRYOTHERAPY Left 3/19/2015    Procedure: CRYOTHERAPY;  Surgeon: Keiko Puri MD;  Location: [facility]     DILATION AND CURETTAGE, HYSTEROSCOPY DIAGNOSTIC, COMBINED N/A 1/19/2016    Procedure: COMBINED DILATION AND CURETTAGE, HYSTEROSCOPY DIAGNOSTIC;  Surgeon: Yeni Aparicio DO;  Location: [facility]     ESOPHAGOSCOPY, GASTROSCOPY, DUODENOSCOPY (EGD), COMBINED N/A 11/27/2018    Procedure: COMBINED ESOPHAGOSCOPY, GASTROSCOPY, " DUODENOSCOPY (EGD), BIOPSY SINGLE OR MULTIPLE;  Surgeon: Duane, William Charles, MD;  Location: [facility]     IMPLANT STIMULATOR AND LEADS SACRAL NERVE (STAGE ONE AND TWO) N/A 2018    Procedure: IMPLANT STIMULATOR AND LEADS SACRAL NERVE (STAGE ONE AND TWO);  Interstim Implant Stage One and Two (Implant Permanent Lead and Generator);  Surgeon: Dada Baltazar MD;  [facility]     IMPLANT STIMULATOR SACRAL NERVE PERCUTANEOUS TRIAL N/A 2018    Procedure: IMPLANT STIMULATOR SACRAL NERVE PERCUTANEOUS TRIAL;  Percutaneous Neural Examination (trial for interstim);  Surgeon: Dada Baltazar MD;  [facility]     LAPAROSCOPIC TUBAL LIGATION       PHACOEMULSIFICATION CLEAR CORNEA WITH STANDARD INTRAOCULAR LENS IMPLANT  8/15/2013    Procedure: PHACOEMULSIFICATION CLEAR CORNEA WITH STANDARD INTRAOCULAR LENS IMPLANT;  RIGHT PHACOEMULSIFICATION CLEAR CORNEA WITH STANDARD INTRAOCULAR LENS IMPLANT ;  Surgeon: Trae Taylor MD;  Location: [facility]     SURGICAL HISTORY OF -       x4, ankle surgery       Social History:  Patient reports that she has quit smoking. Her smoking use included cigarettes. She started smoking about 2 months ago. She has a 20.00 pack-year smoking history. she has never used smokeless tobacco. She reports that she drinks alcohol. She reports that she does not use drugs.    Family History:  Family History   Problem Relation Age of Onset     Hypertension Mother      Cerebrovascular Disease Mother      Arthritis Mother      Cardiovascular Mother      Circulatory Mother      Cerebrovascular Disease Father      Prostate Cancer Father 65         at 69     Asthma Brother      Prostate Cancer Brother 48        bone metastasis     Diabetes Sister      Hypertension Sister      Osteoporosis Sister      Depression Sister      Lipids Sister      Cancer Maternal Grandmother 95        spine     Breast Cancer Sister 39        also contralateral @53, mets to lungs     Cancer Sister 20         "second uterine cancer in 30s also     Diabetes Sister      Hypertension Sister      Depression Sister      Osteoporosis Sister      Breast Cancer Sister 57        unilateral     Cancer Paternal Aunt 40        unknown type     Cancer Paternal Uncle         stomach;  in his 80s     Prostate Cancer Brother      Glaucoma No family hx of      Melanoma No family hx of      Skin Cancer No family hx of        Medications:  Current Outpatient Medications   Medication Sig Dispense Refill     calcium carbonate-vitamin D (OSCAL W/D) 500-200 MG-UNIT tablet Take 1 tablet by mouth 2 times daily (with meals) 180 tablet 3     Continuous Blood Gluc  (FREESTYLE MELINA READER) AYAN 1 Units daily 1 Device 0     Continuous Blood Gluc  (FREESTYLE MELINA READER) AYAN 1 Units 3 times daily as needed 1 Device 0     continuous blood glucose monitoring (FREESTYLE MELINA) sensor For use with Freestyle Melina Flash  for continuous monitioring of blood glucose levels. Replace sensor every 10 days. 3 each 5     DULoxetine (CYMBALTA) 60 MG capsule Take 1 capsule (60 mg) by mouth 2 times daily 180 capsule 1     ferrous sulfate (FEROSUL) 325 (65 Fe) MG tablet Take 1 tablet (325 mg) by mouth 3 times daily (with meals) 90 tablet 3     glipiZIDE (GLUCOTROL) 10 MG tablet Take 1 tablet (10 mg) by mouth daily (with dinner) 90 tablet 0     insulin NPH (NOVOLIN N VIAL) 100 UNIT/ML injection Inject 64 units Subcutaneous 2 times daily. (gets this from [facility] NPH at [facility])       insulin syringe-needle U-100 (RELION INSULIN SYRINGE) 31G X 5/16\" 1 ML Use 2 syringes daily or as directed. 200 each 3     lisinopril (PRINIVIL/ZESTRIL) 5 MG tablet Take 1 tablet (5 mg) by mouth daily 90 tablet 3     MELATONIN PO 10 mg At Bedtime        metFORMIN (GLUCOPHAGE-XR) 500 MG 24 hr tablet Take 2 tablets (1,000 mg) by mouth 2 times daily (with meals) 360 tablet 1     minocycline (MINOCIN/DYNACIN) 100 MG capsule Take 1 capsule (100 mg) by mouth every 12 " hours 180 capsule 2     omeprazole (PRILOSEC) 40 MG capsule TAKE 1 CAPSULE DAILY 30 TO 60 MINUTES BEFORE A MEAL 90 capsule 3     ONE TOUCH LANCETS None Entered       ONE TOUCH TEST STRIPS VI None Entered       order for DME Equipment being ordered: Walker with seat light weight 1 Device 0     oxybutynin ER (DITROPAN XL) 15 MG 24 hr tablet Take 1 tablet (15 mg) by mouth daily 90 tablet 1     simvastatin (ZOCOR) 20 MG tablet Take 1 tablet (20 mg) by mouth At Bedtime 90 tablet 3     spironolactone (ALDACTONE) 100 MG tablet TAKE 1 TABLET EVERY MORNING 90 tablet 3     traZODone (DESYREL) 100 MG tablet Take 1 tablet (100 mg) by mouth nightly as needed for sleep 90 tablet 3     VITAMIN D 1000 UNIT OR CAPS 1 CAPSULE DAILY       vitamin D3 (CHOLECALCIFEROL) 1000 units (25 mcg) tablet Take 1 tablet (1,000 Units) by mouth daily 90 tablet 3       Allergies   Allergen Reactions     Codeine      Sulfa Drugs        Review of Systems:  -Constitutional: not feeling well due to back pain. WEight loss following with PCP     -Skin: As above in HPI. No additional skin concerns.    Physical exam:  Vitals: LMP 11/18/1991 (Exact Date)   GEN: This is a well developed, well-nourished female in no acute distress, in a pleasant mood.    SKIN: Focused examination of the face, right arm  - Right lower lip has xerosis at site of cryo  - On the right lower cutaneous lip there is a fine 1mm firmness  - On the right upper arm there is a violaceous papule  -No other lesions of concern on areas examined.       Impression/Plan:  1. Actinic chelitis, resolved with xerosis from recent cryo, this was a very small lesion, we will follow xerosis  Pt very anxious this is a SCC.I reviewed that clinically I have no evidence for that but I can biopsy her at anytime. She declines today but will consider    2.Faint 1mm firmness on the right lower cutaneous lip    Nearly resolved.     3. On the right upper arm there is a violaceous papule - consistent with prior  trauma. She has picked this area    We will clinically monitor this spot.       Follow up in 6 weeks, recheck lip, she will consider biopsy and right upper arm.     Staff Involved:  Scribe/Staff    Scribe Disclosure  I, Jeffery Hansen, am serving as a scribe to document services personally performed by Dr. Mary Lira MD, based on data collection and the provider's statements to me.     Provider Disclosure:   The documentation recorded by the scribe accurately reflects the services I personally performed and the decisions made by me.    Mary Lira MD    Department of Dermatology  [facility]: Phone: [phone], Fax:[phone]  [facility]: Phone: [phone], Fax: [phone]

## 2019-01-25 NOTE — LETTER
1/25/2019         RE: Radha Corbett  [address] [address]        Dear Colleague,    Thank you for referring your patient, Radha Corbett, to the [facility]. Please see a copy of my visit note below.    [facility] Dermatology Note      Dermatology Problem List:  1. Consistent with prurigo nodule, left thigh  -s/p clobetasol ointment initiated 8/29/2016  2. Seborrheic dermatitis, nasolabial folds  -s/p OTC hydrocortisone    Encounter Date: Jan 25, 2019    CC:  Chief Complaint   Patient presents with     RECHECK     Radha is returning for a recheck of the right lower lip r/t Actinic chelitis ; small improvment noted since her last visit.         History of Present Illness:  Ms. Radha Corbett is a 68 year old female who presents for follow for recheck of a tender area on the right lower cutaneous lip. Last seen 1/11/19 when she had one area of actinic chelitis treated with cryo and the other area on the right lower cutaneous lip was identified. Today, the patient reports that the lesion treated with cryo has almost resolved. She is unsure about the other lesion we left alone last time. No other concerns.     Past Medical History:   Patient Active Problem List   Diagnosis     Macular degeneration     Myopia     Hiatal hernia     IBS (irritable bowel syndrome)     Hypertension goal BP (blood pressure) < 140/90     GERD (gastroesophageal reflux disease)     Atypical ductal hyperplasia of breast     Atypical lobular hyperplasia of breast     Benign Breast Disease (PASH)     Family history of breast cancer in sister     Type 2 diabetes, HbA1C goal < 8% (H)     Hyperlipidemia LDL goal <100     Breast cancer screening-high risk     Tubular adenoma of colon     Osteopenia     Alcohol ingestion, more than 4 drinks/day on alcohol screening     Umbilical hernia     Incontinence of urine     Stool incontinence     Osteoarthritis, knee     Rosacea     Anxiety     Pseudophakia  "    Abnormal cervical Pap smear with positive HPV DNA test     Depression with anxiety     Retinal detachment     Elevated liver function tests     Rectocele     Unexplained endometrial cells on cervical cytology     Back pain, unspecified back location, unspecified back pain laterality, unspecified chronicity     Urge incontinence     Dislocation of shoulder region     Acute lower GI bleeding     Morbid obesity (H)     Moderate episode of recurrent major depressive disorder (H)     Past Medical History:   Diagnosis Date     Actinic cheilitis      Arthritis      DM (diabetes mellitus) (H)      GERD (gastroesophageal reflux disease)      Hiatal hernia      HPV in female      HTN (hypertension)      IBS (irritable bowel syndrome)      Major depression      Myopia      Other and unspecified hyperlipidemia      Pseudoangiomatous stromal hyperplasia of breast 2010    [facility]     Rotator cuff injury 7/2016     Sleep apnea     \"snore\"     Vitamin D deficiency      Past Surgical History:   Procedure Laterality Date     BREAST LUMPECTOMY, RT/LT      x2, left     CATARACT IOL, RT/LT  4/99    left, [id number] 7.0D     COLONOSCOPY       COLONOSCOPY N/A 1/22/2016    Procedure: COMBINED COLONOSCOPY, SINGLE OR MULTIPLE BIOPSY/POLYPECTOMY BY BIOPSY;  Surgeon: Praful Benjamin MD;  Location: [facility] OR     COLONOSCOPY WITH CO2 INSUFFLATION N/A 1/22/2016    Procedure: COLONOSCOPY WITH CO2 INSUFFLATION;  Surgeon: Praful Benjamin MD;  Location: [facility] OR     COMBINED ESOPHAGOSCOPY, GASTROSCOPY, DUODENOSCOPY (EGD) WITH CO2 INSUFFLATION N/A 11/27/2018    Procedure: COMBINED ESOPHAGOSCOPY, GASTROSCOPY, DUODENOSCOPY (EGD) WITH CO2 INSUFFLATION;  Surgeon: Duane, William Charles, MD;  Location: [facility] OR     CRYOTHERAPY  2000    cervical     CRYOTHERAPY Left 3/19/2015    Procedure: CRYOTHERAPY;  Surgeon: Keiko Puri MD;  Location:  [facility]     DILATION AND CURETTAGE, HYSTEROSCOPY DIAGNOSTIC, COMBINED N/A 1/19/2016    Procedure: " COMBINED DILATION AND CURETTAGE, HYSTEROSCOPY DIAGNOSTIC;  Surgeon: Yeni Aparicio DO;  Location: [facility] OR     ESOPHAGOSCOPY, GASTROSCOPY, DUODENOSCOPY (EGD), COMBINED N/A 2018    Procedure: COMBINED ESOPHAGOSCOPY, GASTROSCOPY, DUODENOSCOPY (EGD), BIOPSY SINGLE OR MULTIPLE;  Surgeon: Duane, William Charles, MD;  Location: [facility] OR     IMPLANT STIMULATOR AND LEADS SACRAL NERVE (STAGE ONE AND TWO) N/A 2018    Procedure: IMPLANT STIMULATOR AND LEADS SACRAL NERVE (STAGE ONE AND TWO);  Interstim Implant Stage One and Two (Implant Permanent Lead and Generator);  Surgeon: Dada Baltazar MD;  [facility]: [facility] OR     IMPLANT STIMULATOR SACRAL NERVE PERCUTANEOUS TRIAL N/A 2018    Procedure: IMPLANT STIMULATOR SACRAL NERVE PERCUTANEOUS TRIAL;  Percutaneous Neural Examination (trial for interstim);  Surgeon: Dada Baltazar MD;  Location: [facility] OR     LAPAROSCOPIC TUBAL LIGATION  1981     PHACOEMULSIFICATION CLEAR CORNEA WITH STANDARD INTRAOCULAR LENS IMPLANT  8/15/2013    Procedure: PHACOEMULSIFICATION CLEAR CORNEA WITH STANDARD INTRAOCULAR LENS IMPLANT;  RIGHT PHACOEMULSIFICATION CLEAR CORNEA WITH STANDARD INTRAOCULAR LENS IMPLANT ;  Surgeon: Trae Taylor MD;  [facility]     SURGICAL HISTORY OF -       x4, ankle surgery       Social History:  Patient reports that she has quit smoking. Her smoking use included cigarettes. She started smoking about 2 months ago. She has a 20.00 pack-year smoking history. she has never used smokeless tobacco. She reports that she drinks alcohol. She reports that she does not use drugs.    Family History:  Family History   Problem Relation Age of Onset     Hypertension Mother      Cerebrovascular Disease Mother      Arthritis Mother      Cardiovascular Mother      Circulatory Mother      Cerebrovascular Disease Father      Prostate Cancer Father 65         at 69     Asthma Brother      Prostate Cancer Brother 48        bone metastasis     Diabetes Sister   "    Hypertension Sister      Osteoporosis Sister      Depression Sister      Lipids Sister      Cancer Maternal Grandmother 95        spine     Breast Cancer Sister 39        also contralateral @53, mets to lungs     Cancer Sister 20        second uterine cancer in 30s also     Diabetes Sister      Hypertension Sister      Depression Sister      Osteoporosis Sister      Breast Cancer Sister 57        unilateral     Cancer Paternal Aunt 40        unknown type     Cancer Paternal Uncle         stomach;  in his 80s     Prostate Cancer Brother      Glaucoma No family hx of      Melanoma No family hx of      Skin Cancer No family hx of        Medications:  Current Outpatient Medications   Medication Sig Dispense Refill     calcium carbonate-vitamin D (OSCAL W/D) 500-200 MG-UNIT tablet Take 1 tablet by mouth 2 times daily (with meals) 180 tablet 3     Continuous Blood Gluc  (FREESTYLE MELINA READER) AYAN 1 Units daily 1 Device 0     Continuous Blood Gluc  (FREESTYLE MELINA READER) AYAN 1 Units 3 times daily as needed 1 Device 0     continuous blood glucose monitoring (FREESTYLE MELINA) sensor For use with Freestyle Melina Flash  for continuous monitioring of blood glucose levels. Replace sensor every 10 days. 3 each 5     DULoxetine (CYMBALTA) 60 MG capsule Take 1 capsule (60 mg) by mouth 2 times daily 180 capsule 1     ferrous sulfate (FEROSUL) 325 (65 Fe) MG tablet Take 1 tablet (325 mg) by mouth 3 times daily (with meals) 90 tablet 3     glipiZIDE (GLUCOTROL) 10 MG tablet Take 1 tablet (10 mg) by mouth daily (with dinner) 90 tablet 0     insulin NPH (NOVOLIN N VIAL) 100 UNIT/ML injection Inject 64 units Subcutaneous 2 times daily. (gets this from [facility] NPH at [facility])       insulin syringe-needle U-100 (RELION INSULIN SYRINGE) 31G X 5/16\" 1 ML Use 2 syringes daily or as directed. 200 each 3     lisinopril (PRINIVIL/ZESTRIL) 5 MG tablet Take 1 tablet (5 mg) by mouth daily 90 tablet 3     MELATONIN " PO 10 mg At Bedtime        metFORMIN (GLUCOPHAGE-XR) 500 MG 24 hr tablet Take 2 tablets (1,000 mg) by mouth 2 times daily (with meals) 360 tablet 1     minocycline (MINOCIN/DYNACIN) 100 MG capsule Take 1 capsule (100 mg) by mouth every 12 hours 180 capsule 2     omeprazole (PRILOSEC) 40 MG capsule TAKE 1 CAPSULE DAILY 30 TO 60 MINUTES BEFORE A MEAL 90 capsule 3     ONE TOUCH LANCETS None Entered       ONE TOUCH TEST STRIPS VI None Entered       order for DME Equipment being ordered: Walker with seat light weight 1 Device 0     oxybutynin ER (DITROPAN XL) 15 MG 24 hr tablet Take 1 tablet (15 mg) by mouth daily 90 tablet 1     simvastatin (ZOCOR) 20 MG tablet Take 1 tablet (20 mg) by mouth At Bedtime 90 tablet 3     spironolactone (ALDACTONE) 100 MG tablet TAKE 1 TABLET EVERY MORNING 90 tablet 3     traZODone (DESYREL) 100 MG tablet Take 1 tablet (100 mg) by mouth nightly as needed for sleep 90 tablet 3     VITAMIN D 1000 UNIT OR CAPS 1 CAPSULE DAILY       vitamin D3 (CHOLECALCIFEROL) 1000 units (25 mcg) tablet Take 1 tablet (1,000 Units) by mouth daily 90 tablet 3       Allergies   Allergen Reactions     Codeine      Sulfa Drugs        Review of Systems:  -Constitutional: not feeling well due to back pain. WEight loss following with PCP     -Skin: As above in HPI. No additional skin concerns.    Physical exam:  Vitals: [facility] 11/18/1991 (Exact Date)   GEN: This is a well developed, well-nourished female in no acute distress, in a pleasant mood.    SKIN: Focused examination of the face, right arm  - Right lower lip has xerosis at site of cryo  - On the right lower cutaneous lip there is a fine 1mm firmness  - On the right upper arm there is a violaceous papule  -No other lesions of concern on areas examined.       Impression/Plan:  1. Actinic chelitis, resolved with xerosis from recent cryo, this was a very small lesion, we will follow xerosis  Pt very anxious this is a SCC.I reviewed that clinically I have no evidence  for that but I can biopsy her at anytime. She declines today but will consider    2.Faint 1mm firmness on the right lower cutaneous lip    Nearly resolved.     3. On the right upper arm there is a violaceous papule - consistent with prior trauma. She has picked this area    We will clinically monitor this spot.       Follow up in 6 weeks, recheck lip, she will consider biopsy and right upper arm.     Staff Involved:  Scribe/Staff    Scribe Disclosure  I, Jeffery Hansen, am serving as a scribe to document services personally performed by Dr. Mary Lira MD, based on data collection and the provider's statements to me.     Provider Disclosure:   The documentation recorded by the scribe accurately reflects the services I personally performed and the decisions made by me.    Mary Lira MD    Department of Dermatology  [facility]: Phone: [phone], Fax:[phone]  [facility]: Phone: [phone], Fax: [phone]            Again, thank you for allowing me to participate in the care of your patient.        Sincerely,        Mary Lira MD

## 2019-01-25 NOTE — PROGRESS NOTES
SUBJECTIVE:   Radha Corbett is a 68 year old female who presents to clinic today for the following health issues:    Discuss weight loss concerns per oncologist. Per patient she lost 42lbs. Patient would like to discuss why she is anemic.   Just saw hematologist and discussed possible Capsule endoscopy   Continues to have gas, foul stools and hiccups  Continues to have some Heartburn and takes Prilosec   States had weight loss in the last year in last year about 42 lbs   Has not abdomen pain but continues to have gas and concern for anemia   Now has worried about renal cell cancer     Depression and Anxiety Follow-Up    Status since last visit: Worsened     Other associated symptoms:worried has some type of cancer     Complicating factors:     Significant life event: No     Current substance abuse: None    PHQ 6/22/2018 12/7/2018 1/10/2019   PHQ-9 Total Score 7 18 20   Q9: Suicide Ideation Not at all Not at all Not at all     TARAS-7 SCORE 6/22/2018 12/7/2018 1/10/2019   Total Score - - -   Total Score 5 18 20     In the past two weeks have you had thoughts of suicide or self-harm?  No.    Do you have concerns about your personal safety or the safety of others?   No  PHQ-9  English  PHQ-9   Any Language  TARAS-7  Suicide Assessment Five-step Evaluation and Treatment (SAFE-T)    Problem list and histories reviewed & adjusted, as indicated.  Additional history: as documented    Patient Active Problem List   Diagnosis     Macular degeneration     Myopia     Hiatal hernia     IBS (irritable bowel syndrome)     Hypertension goal BP (blood pressure) < 140/90     GERD (gastroesophageal reflux disease)     Atypical ductal hyperplasia of breast     Atypical lobular hyperplasia of breast     Benign Breast Disease (PASH)     Family history of breast cancer in sister     Type 2 diabetes, HbA1C goal < 8% (H)     Hyperlipidemia LDL goal <100     Breast cancer screening-high risk     Tubular adenoma of colon     Osteopenia     Alcohol  ingestion, more than 4 drinks/day on alcohol screening     Umbilical hernia     Incontinence of urine     Stool incontinence     Osteoarthritis, knee     Rosacea     Anxiety     Pseudophakia     Abnormal cervical Pap smear with positive HPV DNA test     Depression with anxiety     Retinal detachment     Elevated liver function tests     Rectocele     Unexplained endometrial cells on cervical cytology     Back pain, unspecified back location, unspecified back pain laterality, unspecified chronicity     Urge incontinence     Dislocation of shoulder region     Acute lower GI bleeding     Morbid obesity (H)     Moderate episode of recurrent major depressive disorder (H)     Past Surgical History:   Procedure Laterality Date     BREAST LUMPECTOMY, RT/LT      x2, left     CATARACT IOL, RT/LT  4/99    left, [id number] 7.0D     COLONOSCOPY       COLONOSCOPY N/A 1/22/2016    Procedure: COMBINED COLONOSCOPY, SINGLE OR MULTIPLE BIOPSY/POLYPECTOMY BY BIOPSY;  Surgeon: Praful Benjamin MD;  Location: [facility]     COLONOSCOPY WITH CO2 INSUFFLATION N/A 1/22/2016    Procedure: COLONOSCOPY WITH CO2 INSUFFLATION;  Surgeon: Praful Benjamin MD;  Location: [facility]     COMBINED ESOPHAGOSCOPY, GASTROSCOPY, DUODENOSCOPY (EGD) WITH CO2 INSUFFLATION N/A 11/27/2018    Procedure: COMBINED ESOPHAGOSCOPY, GASTROSCOPY, DUODENOSCOPY (EGD) WITH CO2 INSUFFLATION;  Surgeon: Duane, William Charles, MD;  Location: [facility]     CRYOTHERAPY  2000    cervical     CRYOTHERAPY Left 3/19/2015    Procedure: CRYOTHERAPY;  Surgeon: Keiko Puri MD;  [facility]: [facility]     DILATION AND CURETTAGE, HYSTEROSCOPY DIAGNOSTIC, COMBINED N/A 1/19/2016    Procedure: COMBINED DILATION AND CURETTAGE, HYSTEROSCOPY DIAGNOSTIC;  Surgeon: Yeni Aparicio DO;  Location: [facility]     ESOPHAGOSCOPY, GASTROSCOPY, DUODENOSCOPY (EGD), COMBINED N/A 11/27/2018    Procedure: COMBINED ESOPHAGOSCOPY, GASTROSCOPY, DUODENOSCOPY (EGD), BIOPSY SINGLE OR MULTIPLE;   Surgeon: Duane, William Charles, MD;  Location: [facility]     IMPLANT STIMULATOR AND LEADS SACRAL NERVE (STAGE ONE AND TWO) N/A 2018    Procedure: IMPLANT STIMULATOR AND LEADS SACRAL NERVE (STAGE ONE AND TWO);  Interstim Implant Stage One and Two (Implant Permanent Lead and Generator);  Surgeon: Dada Baltaazr MD;  Location: [facility]     IMPLANT STIMULATOR SACRAL NERVE PERCUTANEOUS TRIAL N/A 2018    Procedure: IMPLANT STIMULATOR SACRAL NERVE PERCUTANEOUS TRIAL;  Percutaneous Neural Examination (trial for interstim);  Surgeon: Dada Baltazar MD;  Location: [facility]     LAPAROSCOPIC TUBAL LIGATION       PHACOEMULSIFICATION CLEAR CORNEA WITH STANDARD INTRAOCULAR LENS IMPLANT  8/15/2013    Procedure: PHACOEMULSIFICATION CLEAR CORNEA WITH STANDARD INTRAOCULAR LENS IMPLANT;  RIGHT PHACOEMULSIFICATION CLEAR CORNEA WITH STANDARD INTRAOCULAR LENS IMPLANT ;  Surgeon: Trae Taylor MD;  Location: [facility]     SURGICAL HISTORY OF -       x4, ankle surgery       Social History     Tobacco Use     Smoking status: Former Smoker     Packs/day: 1.00     Years: 20.00     Pack years: 20.00     Types: Cigarettes     Start date: 2018     Smokeless tobacco: Never Used   Substance Use Topics     Alcohol use: Yes     Comment: social/3-4 per week     Family History   Problem Relation Age of Onset     Hypertension Mother      Cerebrovascular Disease Mother      Arthritis Mother      Cardiovascular Mother      Circulatory Mother      Cerebrovascular Disease Father      Prostate Cancer Father 65         at 69     Asthma Brother      Prostate Cancer Brother 48        bone metastasis     Diabetes Sister      Hypertension Sister      Osteoporosis Sister      Depression Sister      Lipids Sister      Cancer Maternal Grandmother 95        spine     Breast Cancer Sister 39        also contralateral @53, mets to lungs     Cancer Sister 20        second uterine cancer in 30s also     Diabetes Sister      Hypertension  "Sister      Depression Sister      Osteoporosis Sister      Breast Cancer Sister 57        unilateral     Cancer Paternal Aunt 40        unknown type     Cancer Paternal Uncle         stomach;  in his 80s     Prostate Cancer Brother      Glaucoma No family hx of      Melanoma No family hx of      Skin Cancer No family hx of          Current Outpatient Medications   Medication Sig Dispense Refill     calcium carbonate-vitamin D (OSCAL W/D) 500-200 MG-UNIT tablet Take 1 tablet by mouth 2 times daily (with meals) 180 tablet 3     Continuous Blood Gluc  (FREESTYLE MELINA READER) AYAN 1 Units daily 1 Device 0     Continuous Blood Gluc  (FREESTYLE MELINA READER) AYAN 1 Units 3 times daily as needed 1 Device 0     continuous blood glucose monitoring (FREESTYLE MELINA) sensor For use with Freestyle Melina Flash  for continuous monitioring of blood glucose levels. Replace sensor every 10 days. 3 each 5     DULoxetine (CYMBALTA) 60 MG capsule Take 1 capsule (60 mg) by mouth 2 times daily 180 capsule 1     ferrous sulfate (FEROSUL) 325 (65 Fe) MG tablet Take 1 tablet (325 mg) by mouth 3 times daily (with meals) 90 tablet 3     glipiZIDE (GLUCOTROL) 10 MG tablet Take 1 tablet (10 mg) by mouth daily (with dinner) 90 tablet 0     insulin NPH (NOVOLIN N VIAL) 100 UNIT/ML injection Inject 64 units Subcutaneous 2 times daily. (gets this from [facility] at [facility])       insulin syringe-needle U-100 (RELION INSULIN SYRINGE) 31G X 5/16\" 1 ML Use 2 syringes daily or as directed. 200 each 3     lisinopril (PRINIVIL/ZESTRIL) 5 MG tablet Take 1 tablet (5 mg) by mouth daily 90 tablet 3     MELATONIN PO 10 mg At Bedtime        metFORMIN (GLUCOPHAGE-XR) 500 MG 24 hr tablet Take 2 tablets (1,000 mg) by mouth 2 times daily (with meals) 360 tablet 1     minocycline (MINOCIN/DYNACIN) 100 MG capsule Take 1 capsule (100 mg) by mouth every 12 hours 180 capsule 2     omeprazole (PRILOSEC) 40 MG capsule TAKE 1 CAPSULE DAILY 30 TO " 60 MINUTES BEFORE A MEAL 90 capsule 3     ONE TOUCH LANCETS None Entered       ONE TOUCH TEST STRIPS VI None Entered       order for DME Equipment being ordered: Walker with seat light weight 1 Device 0     oxybutynin ER (DITROPAN XL) 15 MG 24 hr tablet Take 1 tablet (15 mg) by mouth daily 90 tablet 1     simvastatin (ZOCOR) 20 MG tablet Take 1 tablet (20 mg) by mouth At Bedtime 90 tablet 3     spironolactone (ALDACTONE) 100 MG tablet TAKE 1 TABLET EVERY MORNING 90 tablet 3     traZODone (DESYREL) 100 MG tablet Take 1 tablet (100 mg) by mouth nightly as needed for sleep 90 tablet 3     VITAMIN D 1000 UNIT OR CAPS 1 CAPSULE DAILY       vitamin D3 (CHOLECALCIFEROL) 1000 units (25 mcg) tablet Take 1 tablet (1,000 Units) by mouth daily 90 tablet 3     Allergies   Allergen Reactions     Codeine      Sulfa Drugs      BP Readings from Last 3 Encounters:   01/25/19 135/80   01/23/19 130/75   01/10/19 120/60    Wt Readings from Last 3 Encounters:   01/25/19 94.2 kg (207 lb 11.2 oz)   01/23/19 94.5 kg (208 lb 5 oz)   01/10/19 93.5 kg (206 lb 3.2 oz)           Labs reviewed in EPIC    Reviewed and updated as needed this visit by clinical staff  Tobacco  Allergies  Meds  Med Hx  Surg Hx  Fam Hx  Soc Hx      Reviewed and updated as needed this visit by Provider         ROS:  CONSTITUTIONAL:NEGATIVE for fever, chills, change in weight  ENT/MOUTH: NEGATIVE for ear, mouth and throat problems  RESP:NEGATIVE for significant cough or SOB  CV: NEGATIVE for chest pain, palpitations or peripheral edema  GI: NEGATIVE for nausea, abdominal pain, heartburn, or change in bowel habits  MUSCULOSKELETAL: NEGATIVE for significant arthralgias or myalgia  NEURO: NEGATIVE for weakness, dizziness or paresthesias  PSYCHIATRIC: POSITIVE forHx anxiety and Hx depression and NEGATIVE forthoughts of hurting someone else and thoughts of self harm    OBJECTIVE:     /80 (BP Location: Right arm, Patient Position: Sitting, Cuff Size: Adult Regular)    Pulse 114   Temp 97.7  F (36.5  C) (Temporal)   Wt 94.2 kg (207 lb 11.2 oz)   LMP 11/18/1991 (Exact Date)   SpO2 94%   Breastfeeding? No   BMI 32.29 kg/m    Body mass index is 32.29 kg/m .   Wt Readings from Last 4 Encounters:   01/25/19 94.2 kg (207 lb 11.2 oz)   01/23/19 94.5 kg (208 lb 5 oz)   01/10/19 93.5 kg (206 lb 3.2 oz)   12/06/18 96.2 kg (212 lb)       GENERAL APPEARANCE: alert, active and no distress  NECK: no adenopathy  RESP: lungs clear to auscultation - no rales, rhonchi or wheezes  CV: regular rates and rhythm and no murmur, click or rub  ABDOMEN: soft, nontender, without hepatosplenomegaly or masses  MS: extremities normal- no gross deformities noted  SKIN: Skin color, texture, turgor normal.   NEURO: Normal strength and tone, mentation intact and speech normal  PSYCH: mentation appears normal, patient appearance--, affect flat, anxious, crying, fatigued and worried  MENTAL STATUS EXAM:  Appearance/Behavior: No apparent distress, Casually groomed and Dressed appropriately for weather  Speech: Normal and Rambling  Mood/Affect: anxiety  Insight: Fair  PHQ-9 SCORE 6/22/2018 12/7/2018 1/10/2019   PHQ-9 Total Score - - -   PHQ-9 Total Score MyChart - - -   PHQ-9 Total Score 7 18 20       TARAS-7 SCORE 6/22/2018 12/7/2018 1/10/2019   Total Score - - -   Total Score 5 18 20     Diagnostic Test Results:  Recent Results (from the past 744 hour(s))   CT Abdomen Pelvis w Contrast    Narrative    EXAMINATION: CT ABDOMEN PELVIS W CONTRAST, 1/28/2019 2:20 PM  TECHNIQUE:  Helical CT images from the lung bases through the  symphysis pubis were obtained  with contrast.   Contrast dose: 127 mL Isovue-370    COMPARISON: CT chest 11/12/2018  HISTORY: Weight loss, unintended, non-localized abd pain.  FINDINGS:    Lower chest:    No consolidation the lung bases or pleural effusion. No suspicious  pulmonary nodule or mass in the lung bases.    Abdomen:  There is mild nonspecific mural thickening of the gastric  antrum and  duodenal bulb.  Liver: There is a small subcentimeter cyst in the left lobe of the  liver. No suspicious hepatic lesion.  Bile ducts: No biliary ductal dilatation.  Gallbladder: Gallbladder is not distended. No pericholecystic fluid.  No calcified gallstone.   Pancreas: No pancreatic ductal dilatation. No suspicious lesion.  Normal appearance of the pancreas.   Spleen: Spleen appears unremarkable and normal size.  Adrenals: Adrenals are unremarkable.   Kidneys: No hydronephrosis. Tiny hypoattenuating presumably simple  cysts in the renal cortex bilaterally. No suspicious lesions in the  kidneys. No radiopaque stone.   Bowel: Normal caliber. Normal appendix. Extensive clonic  diverticulosis without acute diverticulitis.   Mesenteric lymph nodes: No enlarged mesenteric lymph nodes.  Peritoneum: No ascites or free air; no fluid collection.  Retroperitoneum: Unremarkable.  Abdominal wall: Small fat-containing umbilical hernia.  Vessels: Atherosclerotic changes. Major abdominal vasculature are  patent. No abdominal aortic aneurysm.     Pelvis:  Right S2 nerve stimulator noted.    Reproductive organs: No pelvic masses. Post surgical changes of tubal  ligation.  Ureters: Normal.   Bladder: Bladder is partially distended and appears grossly  unremarkable.     Bones: No significant change in approximately 20% anterior compression  fracture of T12 compared to MRI 6/7/2018. There is large ossific spur  fusing the posterior aspect of the T12 and L1 vertebral bodies. No  suspicious osseous lesion.       Impression    IMPRESSION:  1. Mild nonspecific mural thickening of the gastric antrum and  duodenal bulb, thought to reflect mild gastritis. May consider upper  GI endoscopy if clinically indicated.  2. Otherwise no findings to explain weight loss.    I have personally reviewed the examination and initial interpretation  and I agree with the findings.    ALLYN CHAUDHRY MD         ASSESSMENT/PLAN:     Radha was seen  today for weight loss.    Diagnoses and all orders for this visit:    Weight loss  -     CT Abdomen Pelvis w Contrast; Future  -     GASTROENTEROLOGY ADULT REF CONSULT ONLY    Moderate episode of recurrent major depressive disorder (H)  Reviewed concept of depression as function of biochemical imbalance of neurotransmitters/rationale for treatment.  Risks and benefits of medication(s) reviewed with patient.  Questions answered.  Counseling advised  Patient instructed to call for significant side effects medications or problems  Patient advised immediate presentation to hospital for suicidal thought, etc.      Type 2 diabetes mellitus with hyperglycemia, with long-term current use of insulin (H)  annual eye examinations at Ophthalmology discussed, glycohemoglobin monitoring discussed and long term diabetic complications discussed  No changes in current regimen  Attempt to improve diet  Weight loss advised  Increased exercise advised    Iron deficiency  FOLLOW UP WITH SPECIALIST :Hematology      Patient Instructions     PLAN:   1.   Symptomatic therapy suggested: Continue current medications.    2.  Orders Placed This Encounter   Procedures     CT Abdomen Pelvis w Contrast     GASTROENTEROLOGY ADULT REF CONSULT ONLY       3. Patient needs to follow up in if no improvement,or sooner if worsening of symptoms or other symptoms develop.  FURTHER TESTING:       - CT abdomen and pelvis   CONSULTATION/REFERRAL to Gastroenterology    See Patient Instructions    NIKKY Lutz [facility]

## 2019-01-25 NOTE — PATIENT INSTRUCTIONS
PLAN:   1.   Symptomatic therapy suggested: Continue current medications.    2.  Orders Placed This Encounter   Procedures     CT Abdomen Pelvis w Contrast     GASTROENTEROLOGY ADULT REF CONSULT ONLY       3. Patient needs to follow up in if no improvement,or sooner if worsening of symptoms or other symptoms develop.  FURTHER TESTING:       - CT abdomen and pelvis   CONSULTATION/REFERRAL to Gastroenterology

## 2019-01-25 NOTE — NURSING NOTE
Radha Corbett's goals for this visit include:   Chief Complaint   Patient presents with     RECHECK     Radha is returning for a recheck of the right lower lip; small improvment noted since her last visit.       She requests these members of her care team be copied on today's visit information:    PCP: Trice Medeiros    Referring Provider:  NIKKY Lutz CNP  [id number]  [place] [address]    [facility] 11/18/1991 (Exact Date)     Do you need any medication refills at today's visit? No  Nette Coy LPN

## 2019-01-28 ENCOUNTER — ANCILLARY PROCEDURE (OUTPATIENT)
Dept: CT IMAGING | Facility: CLINIC | Age: 69
End: 2019-01-28
Payer: COMMERCIAL

## 2019-01-28 ENCOUNTER — TELEPHONE (OUTPATIENT)
Dept: PEDIATRICS | Facility: CLINIC | Age: 69
End: 2019-01-28

## 2019-01-28 DIAGNOSIS — R63.4 WEIGHT LOSS: ICD-10-CM

## 2019-01-28 LAB
CREAT BLD-MCNC: 0.7 MG/DL (ref 0.52–1.04)
GFR SERPL CREATININE-BSD FRML MDRD: 83 ML/MIN/{1.73_M2}

## 2019-01-28 PROCEDURE — 82565 ASSAY OF CREATININE: CPT | Performed by: RADIOLOGY

## 2019-01-28 PROCEDURE — 74177 CT ABD & PELVIS W/CONTRAST: CPT | Performed by: RADIOLOGY

## 2019-01-28 RX ORDER — IOPAMIDOL 755 MG/ML
127 INJECTION, SOLUTION INTRAVASCULAR ONCE
Status: COMPLETED | OUTPATIENT
Start: 2019-01-28 | End: 2019-01-28

## 2019-01-28 RX ADMIN — IOPAMIDOL 127 ML: 755 INJECTION, SOLUTION INTRAVASCULAR at 14:15

## 2019-01-28 NOTE — TELEPHONE ENCOUNTER
She had a regular endoscopy   We need to have her make appointment with GI so they can discuss that with her

## 2019-01-28 NOTE — TELEPHONE ENCOUNTER
"Last upper GI endoscopy 11/27/18. Unsure what \"capjol endoscopy\" is or where to find this specific info. Routed to Perri Carias, BLAS, APRN CNP for review.  Rajwinder MCPHERSON CMA      "

## 2019-01-28 NOTE — TELEPHONE ENCOUNTER
[facility] Call Center    Phone Message    May a detailed message be left on voicemail: yes    Reason for Call: Other: She wants to know weather she has had a capjol endoscopy, because if she has she does not want another one. Patient would like to know whenever provider has time.      Action Taken: Message routed to:  Primary Care p [phone]

## 2019-01-29 ENCOUNTER — OFFICE VISIT (OUTPATIENT)
Dept: ENDOCRINOLOGY | Facility: CLINIC | Age: 69
End: 2019-01-29
Payer: COMMERCIAL

## 2019-01-29 ENCOUNTER — OFFICE VISIT (OUTPATIENT)
Dept: GASTROENTEROLOGY | Facility: CLINIC | Age: 69
End: 2019-01-29
Payer: COMMERCIAL

## 2019-01-29 VITALS
HEART RATE: 99 BPM | OXYGEN SATURATION: 94 % | DIASTOLIC BLOOD PRESSURE: 80 MMHG | BODY MASS INDEX: 32.5 KG/M2 | WEIGHT: 209 LBS | SYSTOLIC BLOOD PRESSURE: 119 MMHG

## 2019-01-29 DIAGNOSIS — M54.9 BACK PAIN, UNSPECIFIED BACK LOCATION, UNSPECIFIED BACK PAIN LATERALITY, UNSPECIFIED CHRONICITY: ICD-10-CM

## 2019-01-29 DIAGNOSIS — R63.4 WEIGHT LOSS: ICD-10-CM

## 2019-01-29 DIAGNOSIS — K90.9 INTESTINAL MALABSORPTION, UNSPECIFIED TYPE: ICD-10-CM

## 2019-01-29 DIAGNOSIS — R43.8 BAD TASTE IN MOUTH: ICD-10-CM

## 2019-01-29 DIAGNOSIS — S22.080A COMPRESSION FRACTURE OF T12 VERTEBRA (H): ICD-10-CM

## 2019-01-29 DIAGNOSIS — E04.1 THYROID NODULE: Primary | ICD-10-CM

## 2019-01-29 DIAGNOSIS — D50.9 IRON DEFICIENCY ANEMIA, UNSPECIFIED IRON DEFICIENCY ANEMIA TYPE: Primary | ICD-10-CM

## 2019-01-29 PROCEDURE — 84630 ASSAY OF ZINC: CPT | Mod: 90 | Performed by: PHYSICIAN ASSISTANT

## 2019-01-29 PROCEDURE — 99204 OFFICE O/P NEW MOD 45 MIN: CPT | Performed by: INTERNAL MEDICINE

## 2019-01-29 PROCEDURE — 36415 COLL VENOUS BLD VENIPUNCTURE: CPT | Performed by: PHYSICIAN ASSISTANT

## 2019-01-29 PROCEDURE — 99000 SPECIMEN HANDLING OFFICE-LAB: CPT | Performed by: PHYSICIAN ASSISTANT

## 2019-01-29 PROCEDURE — 99205 OFFICE O/P NEW HI 60 MIN: CPT | Performed by: PHYSICIAN ASSISTANT

## 2019-01-29 ASSESSMENT — ENCOUNTER SYMPTOMS
UNEXPECTED WEIGHT CHANGE: 1
CHEST TIGHTNESS: 0
FACIAL ASYMMETRY: 0
ROS GI COMMENTS: SEE HPI
NERVOUS/ANXIOUS: 1
SHORTNESS OF BREATH: 0
SPEECH DIFFICULTY: 0
FATIGUE: 1
COUGH: 1
CHOKING: 0

## 2019-01-29 NOTE — LETTER
1/29/2019         RE: Radha Corbett  [address] [id number]        Dear Colleague,    Thank you for referring your patient, Radha Corbett, to the [facility]. Please see a copy of my visit note below.        [facility]    Chief Complaint: Consult and Thyroid Disease     Information obtained from:Patient    Subjective:         HPI: Radha Corbett is a 68 year old female with history of Thyroid nodule who is seen in consultation at [facility]'s request for the same.     Patient underwent CT scan of the chest in November 2018 which showed right thyroid nodule measuring up to 1.6 cm.  This was followed up with thyroid ultrasound and the ultrasound report is copied below.  I have personally reviewed the thyroid ultrasound as well.  Overall she has full nodules.  3 nodules on the right side measuring the largest 1.5cm.  Nodule #1 in nodule #2 are hypoechoic with some cystic component.  No high risk features including calcifications.     Nodule 1:  Nodule measurement: 1.5 x 1.5 x 1.1 cm, mid lobe  Nodule 2:  Nodule measurement: 1.0 x 1.1 x 0.7 cm, inferior lobe  Nodule 3:  Nodule measurement: 1.2 x 1.2 x 1.2 cm, inferior lobe  Left Lobe:   Nodule 4:  Nodule measurement: 0.7 x 0.6 x 0.4 cm, mid lobe     No family history of thyroid cancer.  No history of radiation treatment as a child.  No problem with swallowing or breathing related to thyroid nodules.  No hypothyroid or hyperthyroid symptoms.  TSH done December 2018 was within the normal limits.     Patient has had about 40 pounds weight loss over the last several months.  She was also found to have iron deficiency anemia.  She has had multiple workups for the aforementioned symptoms including CT of the chest and yesterday.  She had a CT of the abdomen.  She is asking what the results of this CT scan of the abdomen were.  Reports that the taste of the food has changed.  Hematochezia for which she underwent a  colonoscopy and also upper endoscopy.  Gastroenterology appointment coming up after this visit therefore was advised to discuss that with gastroenterology.    Patient reports that 3 years ago she sustained a fall from a standing up position and had multiple fractures involving the right shoulder and her back.  I looked at her recent MRI of the lumbar spine which stated that she had a chronic compression fracture of T12.  She reports that she has had early menopause.  Following that compression fracture of the back about 2-3 years ago; did not undergo a follow-up DEXA scan.  I have advised to schedule a DEXA scan and this was ordered for today.    Patient has diabetes currently being managed by primary care physician.  Patient does not have any question regarding diabetes management at this point in time.    Allergies   Allergen Reactions     Codeine      Sulfa Drugs        Current Outpatient Medications   Medication Sig Dispense Refill     calcium carbonate-vitamin D (OSCAL W/D) 500-200 MG-UNIT tablet Take 1 tablet by mouth 2 times daily (with meals) 180 tablet 3     Continuous Blood Gluc  (FREESTYLE MELINA READER) AYAN 1 Units daily 1 Device 0     Continuous Blood Gluc  (FREESTYLE MELINA READER) AYAN 1 Units 3 times daily as needed 1 Device 0     continuous blood glucose monitoring (FREESTYLE MELINA) sensor For use with Freestyle Melina Flash  for continuous monitioring of blood glucose levels. Replace sensor every 10 days. 3 each 5     DULoxetine (CYMBALTA) 60 MG capsule Take 1 capsule (60 mg) by mouth 2 times daily 180 capsule 1     ferrous sulfate (FEROSUL) 325 (65 Fe) MG tablet Take 1 tablet (325 mg) by mouth 3 times daily (with meals) 90 tablet 3     glipiZIDE (GLUCOTROL) 10 MG tablet Take 1 tablet (10 mg) by mouth daily (with dinner) 90 tablet 0     insulin NPH (NOVOLIN N VIAL) 100 UNIT/ML injection Inject 64 units Subcutaneous 2 times daily. (gets this from [facility] at [facility])        "insulin syringe-needle U-100 (RELION INSULIN SYRINGE) 31G X 5/16\" 1 ML Use 2 syringes daily or as directed. 200 each 3     lisinopril (PRINIVIL/ZESTRIL) 5 MG tablet Take 1 tablet (5 mg) by mouth daily 90 tablet 3     MELATONIN PO 10 mg At Bedtime        metFORMIN (GLUCOPHAGE-XR) 500 MG 24 hr tablet Take 2 tablets (1,000 mg) by mouth 2 times daily (with meals) 360 tablet 1     minocycline (MINOCIN/DYNACIN) 100 MG capsule Take 1 capsule (100 mg) by mouth every 12 hours 180 capsule 2     omeprazole (PRILOSEC) 40 MG capsule TAKE 1 CAPSULE DAILY 30 TO 60 MINUTES BEFORE A MEAL 90 capsule 3     ONE TOUCH LANCETS None Entered       ONE TOUCH TEST STRIPS VI None Entered       order for DME Equipment being ordered: Walker with seat light weight 1 Device 0     oxybutynin ER (DITROPAN XL) 15 MG 24 hr tablet Take 1 tablet (15 mg) by mouth daily 90 tablet 1     simvastatin (ZOCOR) 20 MG tablet Take 1 tablet (20 mg) by mouth At Bedtime 90 tablet 3     spironolactone (ALDACTONE) 100 MG tablet TAKE 1 TABLET EVERY MORNING 90 tablet 3     traZODone (DESYREL) 100 MG tablet Take 1 tablet (100 mg) by mouth nightly as needed for sleep 90 tablet 3     VITAMIN D 1000 UNIT OR CAPS 1 CAPSULE DAILY       vitamin D3 (CHOLECALCIFEROL) 1000 units (25 mcg) tablet Take 1 tablet (1,000 Units) by mouth daily 90 tablet 3       Review of Systems     11 point review system (Constitutional, HENT, Eyes, Respiratory, Cardiovascular, Gastrointestinal, Genitourinary, Musculoskeletal,Neurological, Psychiatric/Behavioural, Endocrine) is negative or is as per HPI above    Past Medical History:   Diagnosis Date     Actinic cheilitis      Arthritis      DM (diabetes mellitus) (H)      GERD (gastroesophageal reflux disease)      Hiatal hernia      HPV in female      HTN (hypertension)      IBS (irritable bowel syndrome)      Major depression      Myopia      Other and unspecified hyperlipidemia      Pseudoangiomatous stromal hyperplasia of breast 2010    PAS     " "Rotator cuff injury 7/2016     Sleep apnea     \"snore\"     Vitamin D deficiency        Past Surgical History:   Procedure Laterality Date     BREAST LUMPECTOMY, RT/LT      x2, left     CATARACT IOL, RT/LT  4/99    left, MZ60CD 7.0D     COLONOSCOPY       COLONOSCOPY N/A 1/22/2016    Procedure: COMBINED COLONOSCOPY, SINGLE OR MULTIPLE BIOPSY/POLYPECTOMY BY BIOPSY;  Surgeon: Praful Benjamin MD;  Location: [facility]     COLONOSCOPY WITH CO2 INSUFFLATION N/A 1/22/2016    Procedure: COLONOSCOPY WITH CO2 INSUFFLATION;  Surgeon: Praful Benjamin MD;  Location: [facility]     COMBINED ESOPHAGOSCOPY, GASTROSCOPY, DUODENOSCOPY (EGD) WITH CO2 INSUFFLATION N/A 11/27/2018    Procedure: COMBINED ESOPHAGOSCOPY, GASTROSCOPY, DUODENOSCOPY (EGD) WITH CO2 INSUFFLATION;  Surgeon: Duane, William Charles, MD;  Location: [facility] OR     CRYOTHERAPY  2000    cervical     CRYOTHERAPY Left 3/19/2015    Procedure: CRYOTHERAPY;  Surgeon: Keiko Puri MD;  Location:  [facility]     DILATION AND CURETTAGE, HYSTEROSCOPY DIAGNOSTIC, COMBINED N/A 1/19/2016    Procedure: COMBINED DILATION AND CURETTAGE, HYSTEROSCOPY DIAGNOSTIC;  Surgeon: Yeni Aparicio DO;  Location: [facility] OR     ESOPHAGOSCOPY, GASTROSCOPY, DUODENOSCOPY (EGD), COMBINED N/A 11/27/2018    Procedure: COMBINED ESOPHAGOSCOPY, GASTROSCOPY, DUODENOSCOPY (EGD), BIOPSY SINGLE OR MULTIPLE;  Surgeon: Duane, William Charles, MD;  Location: [facility] OR     IMPLANT STIMULATOR AND LEADS SACRAL NERVE (STAGE ONE AND TWO) N/A 7/24/2018    Procedure: IMPLANT STIMULATOR AND LEADS SACRAL NERVE (STAGE ONE AND TWO);  Interstim Implant Stage One and Two (Implant Permanent Lead and Generator);  Surgeon: Dada Baltazar MD;  [facility]: [facility]     IMPLANT STIMULATOR SACRAL NERVE PERCUTANEOUS TRIAL N/A 6/19/2018    Procedure: IMPLANT STIMULATOR SACRAL NERVE PERCUTANEOUS TRIAL;  Percutaneous Neural Examination (trial for interstim);  Surgeon: Dada Baltazar MD;  Location: [facility]     LAPAROSCOPIC " TUBAL LIGATION       PHACOEMULSIFICATION CLEAR CORNEA WITH STANDARD INTRAOCULAR LENS IMPLANT  8/15/2013    Procedure: PHACOEMULSIFICATION CLEAR CORNEA WITH STANDARD INTRAOCULAR LENS IMPLANT;  RIGHT PHACOEMULSIFICATION CLEAR CORNEA WITH STANDARD INTRAOCULAR LENS IMPLANT ;  Surgeon: Trae Taylor MD;  Location: [facility]     SURGICAL HISTORY OF -       x4, ankle surgery       Family History   Problem Relation Age of Onset     Hypertension Mother      Cerebrovascular Disease Mother      Arthritis Mother      Cardiovascular Mother      Circulatory Mother      Cerebrovascular Disease Father      Prostate Cancer Father 65         at 69     Asthma Brother      Prostate Cancer Brother 48        bone metastasis     Diabetes Sister      Hypertension Sister      Osteoporosis Sister      Depression Sister      Lipids Sister      Cancer Maternal Grandmother 95        spine     Breast Cancer Sister 39        also contralateral @53, mets to lungs     Cancer Sister 20        second uterine cancer in 30s also     Diabetes Sister      Hypertension Sister      Depression Sister      Osteoporosis Sister      Breast Cancer Sister 57        unilateral     Cancer Paternal Aunt 40        unknown type     Cancer Paternal Uncle         stomach;  in his 80s     Prostate Cancer Brother      Glaucoma No family hx of      Melanoma No family hx of      Skin Cancer No family hx of        Social History     Socioeconomic History     Marital status:      Spouse name: None     Number of children: 1     Years of education: None     Highest education level: None   Social Needs     Financial resource strain: None     Food insecurity - worry: None     Food insecurity - inability: None     Transportation needs - medical: None     Transportation needs - non-medical: None   Occupational History     Occupation: manager of senior living      Employer: [employer]   Tobacco Use     Smoking status: Former Smoker     Packs/day: 1.00      Years: 20.00     Pack years: 20.00     Types: Cigarettes     Start date: 11/21/2018     Smokeless tobacco: Never Used   Substance and Sexual Activity     Alcohol use: Yes     Comment: social/3-4 per week     Drug use: No     Sexual activity: Yes     Partners: Male     Birth control/protection: Surgical   Other Topics Concern     Parent/sibling w/ CABG, MI or angioplasty before 65F 55M? No      Service No     Blood Transfusions No     Caffeine Concern No     Comment: quit drinking caffeine     Occupational Exposure No     Hobby Hazards No     Sleep Concern Yes     Comment: takes benadryl medication at night     Stress Concern Yes     Comment: some stress at work     Weight Concern Not Asked     Special Diet No     Back Care No     Exercise No     Bike Helmet No     Comment: doesn't ride bike     Seat Belt Yes     Self-Exams Not Asked   Social History Narrative     None       Objective:   /80 (BP Location: Left arm, Patient Position: Sitting, Cuff Size: Adult Regular)   Pulse 99   Wt 94.8 kg (209 lb)   LMP 11/18/1991 (Exact Date)   SpO2 94%   BMI 32.50 kg/m     Constitutional: Pleasant no acute cardiopulmonary distress.   EYES: anicteric, normal extra-ocular movements, no lid lag or retraction.   HEENT: Mouth/Throat: Mucous membrane is moist. Right sided palpable nodule; small, mobile and non-tender.   Cardiovascular: RRR, S1, S2 normal.   Pulmonary/Chest: CTAB. No wheezing or rales   Neurological: Alert and oriented.  Extremities: No edema.   Lymphatic: no cervical lymphadenopathy.  Psychological: appropriate mood and affect     In House Labs:   Lab Results   Component Value Date    A1C 7.5 11/12/2018    A1C 8.1 08/14/2018    A1C 8.5 05/15/2018    A1C 9.2 02/08/2018    A1C 7.1 09/18/2017       TSH   Date Value Ref Range Status   12/06/2018 0.70 0.40 - 4.00 mU/L Final   09/18/2017 0.63 0.40 - 4.00 mU/L Final   12/05/2016 0.79 0.40 - 4.00 mU/L Final   10/01/2014 1.00 0.40 - 4.00 mU/L Final      Comment:     Effective 7/30/2014, the reference range for this assay has changed to reflect   new instrumentation/methodology.     06/06/2013 1.13 0.4 - 5.0 mU/L Final       Creatinine   Date Value Ref Range Status   11/12/2018 0.52 0.52 - 1.04 mg/dL Final         Assessment/Treatment Plan:      Multiple thyroid nodules: She has a total of 4 nodules.  3 nodules on the right side; the largest measuring 1.5 cm in diameter.  Nodule #1 and #2 on the right side meets criteria for a biopsy based on hypoechoic and greater than a centimeter.  Discussed with the patient in detail that thyroid nodules are very common and when we biopsy majority of them 60-70% comes back benign.  Discussed that 2 of her nodules meet criteria for a biopsy.  Discussed expected findings of the biopsy including benign, malignant, AUS, and insufficient for diagnosis.  Next steps for each 1 nodes were discussed.  I have personally reviewed the thyroid ultrasound.  TSH was done in December 2018 and it was within the normal.    History of compression fracture: From history possible fragility fracture.  Patient underwent early menopause.  Have ordered a DEXA scan.  Needs follow-up with us or with primary after DEXA scan results.    Patient Instructions   INFORMATION FOR PATIENTS  THYROID NODULES AND   FINE NEEDLE ASPIRATION BIOPSY OF THE THYROID    The finding of a thyroid nodule is almost never an emergency.  Thyroid nodules are common, occurring in up to 50% of patients over the age of 50 years.      Innocent (not important enough to have been detected during life) thyroid cancer may be found in around 5% of people.   Likewise, about 5-15% of patients with demonstrated thyroid nodules will prove to have thyroid cancer if subjected to aggressive diagnostic measures.  As a rule, thyroid cancer has an excellent prognosis.  Therefore, in each patient with thyroid nodules, the decision has to be made about how important it is to identify and treat a  cancer, if present.      When we find nodules on the thyroid we use ultrasound and either palpation or ultrasound guided biopsy to help determine which patients, from the large number with nodules on the thyroid, are in the group containing an important thyroid cancer.      Ultrasound Guided Fine Needle Aspiration Biopsy of the Thyroid uses the ultrasound eye to see the nodule and the needle during the biopsy procedure.  This procedure is performed in the radiology department.  The radiologist uses a small needle to remove cells from the specific area of the thyroid. The cells are then analyzed under the microscope to determine whether or not they might be cancer.      The results of thyroid biopsy come in 4 categories    1. Benign or negative for cancer.  This is most common result, found in approximately 60-70% of biopsy specimens.  There remains a < 6 % chance that this result is wrong.    2. Positive for cancer.  This is found about 5 % of the time. There remains a  < 1% chance that cancer is not present when we make this diagnosis by biopsy. This result leads to a recommendation for surgery to make the final diagnosis of cancer.     3. Indeterminate, or the grey zone.  This is found in approximately 20-30% of patients.  This diagnosis identifies a higher risk group, often resulting in diagnostic surgery to establish the final diagnosis.  If all patients in this group go to surgery, only 10-30%, on average, prove to have cancer.  This group is subdivided into 3 subcategories: atypia of undetermined significance, follicular neoplasm and suspicious, with increasing risk.      4. Insufficient for diagnosis. This is found in 5-10% of biopsies. When this occurs we need to repeat the biopsy.      The greatest risk of thyroid biopsy is that the result is not clear (that it is in the indeterminate grey zone group), resulting in future thyroid surgery for what is likely to be benign thyroid disease.  There is a small  risk of bleeding or infection.              If DEXA scan is abnormal; we will have you come back for a follow up after that.       I will contact the patient with the test results.  Return to clinic in 12 months. Earlier follow up to be scheduled based on DEXA scan findings.     Test and/or medications prescribed today:  Orders Placed This Encounter   Procedures     US guided thyroid FNA     Dexa hip/pelvis/spine         Alina Hanley MD  Staff Endocrinologist    [phone]  Division of Endocrinology and Diabetes        n, thank you for allowing me to participate in the care of your patient.        Sincerely,        Alina Hanley MD

## 2019-01-29 NOTE — NURSING NOTE
Radha Corbett's goals for this visit include:   Chief Complaint   Patient presents with     Consult     Thyroid Disease     She requests these members of her care team be copied on today's visit information: Yes    PCP: Trice Medeiros    Referring Provider:  Trice Medeiros MD PhD  [id number] [place] [address]    /80 (BP Location: Left arm, Patient Position: Sitting, Cuff Size: Adult Regular)   Pulse 99   Wt 94.8 kg (209 lb)   LMP 11/18/1991 (Exact Date)   SpO2 94%   BMI 32.50 kg/m      Do you need any medication refills at today's visit? No

## 2019-01-29 NOTE — PROGRESS NOTES
"      GASTROENTEROLOGY NEW PATIENT CLINIC VISIT    CC/REFERRING MD:    Trice Medeiros    REASON FOR CONSULTATION:   Referred by Perri Carias for Consult (abnormal weight loss)      HISTORY OF PRESENT ILLNESS:    Radha Corbett is 68 year old female who presents for evaluation of abnormal weight loss and iron def anemia.     Patient has lost over 40lbs in the last several months. She has had extensive work up thus far including labs and CT chest and CT abd/pelvis. She was noted to have iron def anemia in Nov 2018 with hgb of 10.6, hematocrit 36.5, MCV 76 and iron level of 24, ferritin 3, TIB of 424. She was started on iron supplements, she was referred for upper endoscopy, performed on 11/27/18 By Dr. Duane was unremarkable. She has previous colonoscopies, most recent in August 2017. At that time due to rectal bleeding, found to have diverticular bleed. Previous colonoscopy in January 2016 by Dr. Benjamin revealed tubular and hyperplastic polyps, advised to have repeat colonoscopy in 5 years at that time.     She does not have any rectal bleeding at this time. She notes dark stools since starting iron. Fecal occult stool test recently was found to be negative. She has abdominal gas/bloating. Some acid reflux that is controlled with omeprazole. She notes bad taste in her mouth. She has smelly stools, hiccups, gas/bloating. Neg stool studies in the past. She is concerned about sores in her mouth and nose.  Also worried about eyes \"weeping\" Previously seen by ENT for these symptoms.     She is a former smoker. She drinks alcohol occ/socially.    Family hx is significant for prostate Cancer in her father and brother, breast cancer in 2 sisters.     She is very concerned that she may have a cancer of some type. Her work up has included CT chest which revealed b/l pulmonary nodules and thyroid nodules. She is being followed by an endocrinologist at this time for her thyroid nodules. Pulmonary nodules " will have a f/u CT scan in 1 year.     She had a CT abd/pelvis with contrast done yesterday 1/28/19 which revealed wall thickening at the gastric antrum. Her recent upper endoscopy was actually normal. She is taking omeprazole at this time.         PREVIOUS ENDOSCOPY:  Upper endoscopy 11/27/18  Impression:      - Normal second portion of the duodenum. Biopsied.                             - Normal esophagus.                             - Normal stomach.                             - Normal examined duodenum.   - William C. Duane, MD     Pathology   FINAL DIAGNOSIS:   DUODENAL BIOPSY:   - Duodenal mucosa with no significant histologic abnormality   - No evidence of celiac sprue or peptic duodenitis     Colonoscopy 08/01/2017   Impressions/Post-Op Diagnosis:       - Diverticulosis in the entire examined colon. No doubt had a        diverticular bleed.       - The examined portion of the ileum was normal.       - No specimens collected.  - Quincy Erazo MD   [facility]       Colonoscopy 1/22/16  Impression:               - Diverticulosis in the sigmoid colon, in the                             descending colon, in the transverse colon and in                             the ascending colon.                             - One 2 mm polyp in the cecum. Resected and                             retrieved.                             - One 7 mm polyp in the ascending colon. Resected                             and retrieved.                             - Two 2 to 3 mm polyps in the rectum. Resected and                             retrieved.                             - The examination was otherwise normal on direct                             and retroflexion views.   - Praful Benjamin MD     SPECIMEN(S):   A: Colon biopsy, cecal and  ascending   B: Rectal polyp     FINAL DIAGNOSIS:   A. Large bowel, cecum and ascending colon, polyps, polypectomy:   -Tubular adenoma(s)     B. Large bowel, rectum, polyps,  polypectomy:   -Hyperplastic polyp(s)       PROBLEM LIST  Patient Active Problem List    Diagnosis Date Noted     Type 2 diabetes, HbA1C goal < 8% (H) 10/31/2010     Priority: High     Moderate episode of recurrent major depressive disorder (H) 08/15/2018     Priority: Medium     Morbid obesity (H) 06/22/2018     Priority: Medium     Urge incontinence 06/11/2018     Priority: Medium     Acute lower GI bleeding 07/31/2017     Priority: Medium     Back pain, unspecified back location, unspecified back pain laterality, unspecified chronicity 02/23/2017     Priority: Medium     Dislocation of shoulder region 07/23/2016     Priority: Medium     Unexplained endometrial cells on cervical cytology 12/08/2015     Priority: Medium     12/2/15 pap NIL/neg HPV with endometrial cells.   12/4/15 pelvic ultrasound completed.  12/17/15 EMB--benign. Plan: ECC  01/19/16 ECC--benign. Plan: co-test 5 years from previous, due 12/02/20.          Rectocele 12/02/2015     Priority: Medium     Elevated liver function tests 06/23/2015     Priority: Medium     Alcohol related.        Retinal detachment 03/16/2015     Priority: Medium     Depression with anxiety 10/04/2014     Priority: Medium     Abnormal cervical Pap smear with positive HPV DNA test 10/01/2014     Priority: Medium     Dx 2001. Normal since.        Pseudophakia 08/22/2013     Priority: Medium     Rosacea 03/05/2013     Priority: Medium     Alcohol ingestion, more than 4 drinks/day on alcohol screening 07/21/2012     Priority: Medium     4 vodka at night before dinner --> recommend changing to 1-2 wine instead.       Umbilical hernia 07/21/2012     Priority: Medium     Incontinence of urine 07/21/2012     Priority: Medium     Stool incontinence 07/21/2012     Priority: Medium     Tubular adenoma of colon 12/31/2010     Priority: Medium     Overview:   Overview:   Last colonoscopy 2008, due in 2011  Tubular adenoma  Next colonoscopy due in 2015  Last colonoscopy 2008, due in  "2011  Tubular adenoma  Next colonoscopy due in 2015         Breast cancer screening-high risk 12/22/2010     Priority: Medium     Mammogram every 6 months   On tamoxifen       Hyperlipidemia LDL goal <100 10/31/2010     Priority: Medium     Atypical ductal hyperplasia of breast 08/24/2010     Priority: Medium     Atypical lobular hyperplasia of breast 08/24/2010     Priority: Medium     Benign Breast Disease (PASH) 08/24/2010     Priority: Medium     Family history of breast cancer in sister 08/24/2010     Priority: Medium     Hypertension goal BP (blood pressure) < 140/90      Priority: Medium     Anxiety 07/29/2013     Priority: Low     Osteoarthritis, knee 03/05/2013     Priority: Low     Osteopenia 12/31/2010     Priority: Low     dexa 2008. Repeat in 2010  (Problem list name updated by automated process. Provider to review and confirm.)       Macular degeneration      Priority: Low     Myopia      Priority: Low     Hiatal hernia      Priority: Low     IBS (irritable bowel syndrome)      Priority: Low     GERD (gastroesophageal reflux disease)      Priority: Low       PERTINENT PAST MEDICAL HISTORY:  (I personally reviewed this history with the patient at today's visit)   Past Medical History:   Diagnosis Date     Actinic cheilitis      Arthritis      DM (diabetes mellitus) (H)      GERD (gastroesophageal reflux disease)      Hiatal hernia      HPV in female      HTN (hypertension)      IBS (irritable bowel syndrome)      Major depression      Myopia      Other and unspecified hyperlipidemia      Pseudoangiomatous stromal hyperplasia of breast 2010    PASH     Rotator cuff injury 7/2016     Sleep apnea     \"snore\"     Vitamin D deficiency          PREVIOUS SURGERIES: (I personally reviewed this history with the patient at today's visit)   Past Surgical History:   Procedure Laterality Date     BREAST LUMPECTOMY, RT/LT      x2, left     CATARACT IOL, RT/LT  4/99    left, MZ60CD 7.0D     COLONOSCOPY       " COLONOSCOPY N/A 1/22/2016    Procedure: COMBINED COLONOSCOPY, SINGLE OR MULTIPLE BIOPSY/POLYPECTOMY BY BIOPSY;  Surgeon: Praful Benjamin MD;  Location: [facility]     COLONOSCOPY WITH CO2 INSUFFLATION N/A 1/22/2016    Procedure: COLONOSCOPY WITH CO2 INSUFFLATION;  Surgeon: Praful Benjamin MD;  Location: [facility]     COMBINED ESOPHAGOSCOPY, GASTROSCOPY, DUODENOSCOPY (EGD) WITH CO2 INSUFFLATION N/A 11/27/2018    Procedure: COMBINED ESOPHAGOSCOPY, GASTROSCOPY, DUODENOSCOPY (EGD) WITH CO2 INSUFFLATION;  Surgeon: Duane, William Charles, MD;  Location: [facility] OR     CRYOTHERAPY  2000    cervical     CRYOTHERAPY Left 3/19/2015    Procedure: CRYOTHERAPY;  Surgeon: Keiko Puri MD;  Location:  [facility]     DILATION AND CURETTAGE, HYSTEROSCOPY DIAGNOSTIC, COMBINED N/A 1/19/2016    Procedure: COMBINED DILATION AND CURETTAGE, HYSTEROSCOPY DIAGNOSTIC;  Surgeon: Yeni Aparicio DO;  Location: [facility]     ESOPHAGOSCOPY, GASTROSCOPY, DUODENOSCOPY (EGD), COMBINED N/A 11/27/2018    Procedure: COMBINED ESOPHAGOSCOPY, GASTROSCOPY, DUODENOSCOPY (EGD), BIOPSY SINGLE OR MULTIPLE;  Surgeon: Duane, William Charles, MD;  Location: [facility] OR     IMPLANT STIMULATOR AND LEADS SACRAL NERVE (STAGE ONE AND TWO) N/A 7/24/2018    Procedure: IMPLANT STIMULATOR AND LEADS SACRAL NERVE (STAGE ONE AND TWO);  Interstim Implant Stage One and Two (Implant Permanent Lead and Generator);  Surgeon: Dada Baltazar MD;  Location: [facility]     IMPLANT STIMULATOR SACRAL NERVE PERCUTANEOUS TRIAL N/A 6/19/2018    Procedure: IMPLANT STIMULATOR SACRAL NERVE PERCUTANEOUS TRIAL;  Percutaneous Neural Examination (trial for interstim);  Surgeon: Ddaa Baltazar MD;  Location: [facility]     LAPAROSCOPIC TUBAL LIGATION  1981     PHACOEMULSIFICATION CLEAR CORNEA WITH STANDARD INTRAOCULAR LENS IMPLANT  8/15/2013    Procedure: PHACOEMULSIFICATION CLEAR CORNEA WITH STANDARD INTRAOCULAR LENS IMPLANT;  RIGHT PHACOEMULSIFICATION CLEAR CORNEA WITH STANDARD  "INTRAOCULAR LENS IMPLANT ;  Surgeon: Trae Taylor MD;  Location: [facility]     SURGICAL HISTORY OF -       x4, ankle surgery         ALLERGIES:     Allergies   Allergen Reactions     Codeine      Sulfa Drugs        PERTINENT MEDICATIONS:    Current Outpatient Medications:      calcium carbonate-vitamin D (OSCAL W/D) 500-200 MG-UNIT tablet, Take 1 tablet by mouth 2 times daily (with meals), Disp: 180 tablet, Rfl: 3     Continuous Blood Gluc  (FREESTYLE MELINA READER) AYAN, 1 Units daily, Disp: 1 Device, Rfl: 0     Continuous Blood Gluc  (FREESTYLE MELINA READER) AYAN, 1 Units 3 times daily as needed, Disp: 1 Device, Rfl: 0     continuous blood glucose monitoring (FREESTYLE MELINA) sensor, For use with Freestyle Melina Flash  for continuous monitioring of blood glucose levels. Replace sensor every 10 days., Disp: 3 each, Rfl: 5     DULoxetine (CYMBALTA) 60 MG capsule, Take 1 capsule (60 mg) by mouth 2 times daily, Disp: 180 capsule, Rfl: 1     ferrous sulfate (FEROSUL) 325 (65 Fe) MG tablet, Take 1 tablet (325 mg) by mouth 3 times daily (with meals), Disp: 90 tablet, Rfl: 3     glipiZIDE (GLUCOTROL) 10 MG tablet, Take 1 tablet (10 mg) by mouth daily (with dinner), Disp: 90 tablet, Rfl: 0     insulin NPH (NOVOLIN N VIAL) 100 UNIT/ML injection, Inject 64 units Subcutaneous 2 times daily. (gets this from [facility] NPH at [facility]), Disp: , Rfl:      insulin syringe-needle U-100 (RELION INSULIN SYRINGE) 31G X 5/16\" 1 ML, Use 2 syringes daily or as directed., Disp: 200 each, Rfl: 3     lisinopril (PRINIVIL/ZESTRIL) 5 MG tablet, Take 1 tablet (5 mg) by mouth daily, Disp: 90 tablet, Rfl: 3     MELATONIN PO, 10 mg At Bedtime , Disp: , Rfl:      metFORMIN (GLUCOPHAGE-XR) 500 MG 24 hr tablet, Take 2 tablets (1,000 mg) by mouth 2 times daily (with meals), Disp: 360 tablet, Rfl: 1     minocycline (MINOCIN/DYNACIN) 100 MG capsule, Take 1 capsule (100 mg) by mouth every 12 hours, Disp: 180 capsule, Rfl: 2     " omeprazole (PRILOSEC) 40 MG capsule, TAKE 1 CAPSULE DAILY 30 TO 60 MINUTES BEFORE A MEAL, Disp: 90 capsule, Rfl: 3     ONE TOUCH LANCETS, None Entered, Disp: , Rfl:      ONE TOUCH TEST STRIPS VI, None Entered, Disp: , Rfl:      order for DME, Equipment being ordered: Walker with seat light weight, Disp: 1 Device, Rfl: 0     oxybutynin ER (DITROPAN XL) 15 MG 24 hr tablet, Take 1 tablet (15 mg) by mouth daily, Disp: 90 tablet, Rfl: 1     simvastatin (ZOCOR) 20 MG tablet, Take 1 tablet (20 mg) by mouth At Bedtime, Disp: 90 tablet, Rfl: 3     spironolactone (ALDACTONE) 100 MG tablet, TAKE 1 TABLET EVERY MORNING, Disp: 90 tablet, Rfl: 3     traZODone (DESYREL) 100 MG tablet, Take 1 tablet (100 mg) by mouth nightly as needed for sleep, Disp: 90 tablet, Rfl: 3     VITAMIN D 1000 UNIT OR CAPS, 1 CAPSULE DAILY, Disp: , Rfl:      vitamin D3 (CHOLECALCIFEROL) 1000 units (25 mcg) tablet, Take 1 tablet (1,000 Units) by mouth daily, Disp: 90 tablet, Rfl: 3  No current facility-administered medications for this visit.     Facility-Administered Medications Ordered in Other Visits:      gadobutrol (GADAVIST) injection 10 mL, 10 mL, Intravenous, Once, Trice Medeiros MD PhD    SOCIAL HISTORY:  Social History     Socioeconomic History     Marital status:      Spouse name: Not on file     Number of children: 1     Years of education: Not on file     Highest education level: Not on file   Social Needs     Financial resource strain: Not on file     Food insecurity - worry: Not on file     Food insecurity - inability: Not on file     Transportation needs - medical: Not on file     Transportation needs - non-medical: Not on file   Occupational History     Occupation: manager of senior living      Employer: [employer]   Tobacco Use     Smoking status: Former Smoker     Packs/day: 1.00     Years: 20.00     Pack years: 20.00     Types: Cigarettes     Start date: 11/21/2018     Smokeless tobacco: Never Used   Substance and Sexual Activity      Alcohol use: Yes     Comment: social/3-4 per week     Drug use: No     Sexual activity: Yes     Partners: Male     Birth control/protection: Surgical   Other Topics Concern     Parent/sibling w/ CABG, MI or angioplasty before 65F 55M? No      Service No     Blood Transfusions No     Caffeine Concern No     Comment: quit drinking caffeine     Occupational Exposure No     Hobby Hazards No     Sleep Concern Yes     Comment: takes benadryl medication at night     Stress Concern Yes     Comment: some stress at work     Weight Concern Not Asked     Special Diet No     Back Care No     Exercise No     Bike Helmet No     Comment: doesn't ride bike     Seat Belt Yes     Self-Exams Not Asked   Social History Narrative     Not on file       FAMILY HISTORY: (I personally reviewed this history with the patient at today's visit)  Family History   Problem Relation Age of Onset     Hypertension Mother      Cerebrovascular Disease Mother      Arthritis Mother      Cardiovascular Mother      Circulatory Mother      Cerebrovascular Disease Father      Prostate Cancer Father 65         at 69     Asthma Brother      Prostate Cancer Brother 48        bone metastasis     Diabetes Sister      Hypertension Sister      Osteoporosis Sister      Depression Sister      Lipids Sister      Cancer Maternal Grandmother 95        spine     Breast Cancer Sister 39        also contralateral @53, mets to lungs     Cancer Sister 20        second uterine cancer in 30s also     Diabetes Sister      Hypertension Sister      Depression Sister      Osteoporosis Sister      Breast Cancer Sister 57        unilateral     Cancer Paternal Aunt 40        unknown type     Cancer Paternal Uncle         stomach;  in his 80s     Prostate Cancer Brother      Glaucoma No family hx of      Melanoma No family hx of      Skin Cancer No family hx of         Review of Systems   Constitutional: Positive for fatigue and unexpected weight change.   HENT:         See HPI   Respiratory: Positive for cough. Negative for choking, chest tightness and shortness of breath.    Cardiovascular: Negative for chest pain and leg swelling.   Gastrointestinal:        See HPI   Skin: Negative for pallor and rash.   Neurological: Negative for facial asymmetry and speech difficulty.   Psychiatric/Behavioral: The patient is nervous/anxious.      PHYSICAL EXAMINATION:  Constitutional: aaox3, cooperative, pleasant, not dyspneic/diaphoretic, no acute distress  Vitals taken from earlier appointment today. (pt did not want to repeat vital signs during our office visit)   /80 (BP Location: Left arm, Patient Position: Sitting, Cuff Size: Adult Regular)   Pulse 99   Wt 94.8 kg (209 lb)   LMP 11/18/1991 (Exact Date)   SpO2 94%   BMI 32.50 kg/m      LMP 11/18/1991 (Exact Date)    Wt:   Wt Readings from Last 2 Encounters:   01/29/19 94.8 kg (209 lb)   01/25/19 94.2 kg (207 lb 11.2 oz)        Physical Exam   Constitutional: She is oriented to person, place, and time. She appears well-nourished. No distress.   HENT:   Head: Normocephalic and atraumatic.   Eyes: No scleral icterus.   Cardiovascular: Normal rate, regular rhythm and normal heart sounds.   Pulmonary/Chest: Effort normal and breath sounds normal. No respiratory distress.   Abdominal: She exhibits no mass. There is no rebound and no guarding.   Neurological: She is alert and oriented to person, place, and time.   Skin: Skin is warm. She is not diaphoretic. No pallor.   Psychiatric: She has a normal mood and affect. Her behavior is normal.   Nursing note and vitals reviewed.          PERTINENT STUDIES: (I personally reviewed these laboratory studies today)  Most recent CBC:   Recent Labs   Lab Test 01/23/19  1214 11/12/18  1311   WBC 10.3 9.7   HGB 11.9 10.6*   HCT 38.5 36.5    349     Most recent hepatic panel:  Recent Labs   Lab Test 11/12/18  1311 08/14/18  1219   ALT 24 22   AST 13 15     Most recent creatinine:  Recent  Labs   Lab Test 11/12/18  1311 08/14/18  1219   CR 0.52 0.56       TSH   Date Value Ref Range Status   12/06/2018 0.70 0.40 - 4.00 mU/L Final         RADIOLOGY:    CT ABD/PELVIS W CONTRAST 01/28/19  IMPRESSION:  1. Mild nonspecific mural thickening of the gastric antrum and  duodenal bulb, thought to reflect mild gastritis. May consider upper  GI endoscopy if clinically indicated.  2. Otherwise no findings to explain weight loss.      ASSESSMENT/PLAN:    Radha Corbett is a 68 year old female who presents for evaluation of weight loss and iron def anemia.     Her work up as of recent has been reviewed and includes CT chest, CT abd/pelvis and upper endoscopy. She has had previous colonoscopy, most recent in August 2017 for diverticular bleed. Prior colonoscopy in January 2016 by Dr. Praful Benjamin revealed several adenomatous and hyperplastic polyps, she was advised to repeat colonoscopy in 5 years at that time.     She was noted to have iron def anemia in November of 2018. Her upper endoscopy soon after was actually unremarkable and negative for celiac. Since then she has had a CT of the abd/pelvis which reveals wall thickening in the gastric antrum thought to reflect gastritis. However this new finding does not explain why she as found to be anemic 2 months ago. We will proceed with a video capsule endoscopy at this time for further work up.     If video capsule endoscopy reveals unremarkable small bowel, we will consider repeating EGD since new CT findings and colonoscopy since last colonoscopy in 2017.       Continue follow ups with heme/onc, endo and PCP.       Iron deficiency anemia, unspecified iron deficiency anemia type  Weight loss  Bad taste in mouth  Intestinal malabsorption, unspecified type       Orders Placed This Encounter   Procedures     Zinc     Standing Status:   Future     Number of Occurrences:   1     Standing Expiration Date:   2/28/2019         RTC 4 weeks    Thank you for this consultation.  It  was a pleasure to participate in the care of this patient; please contact us with any further questions.  A total of 60 minutes, face to face, was spent with this patient, >50% of which was counseling regarding the above delineated issues.    This note was created with voice recognition software, and while reviewed for accuracy, typos may remain.     Ever Woody PA-C  Gastroenterology  [facility]

## 2019-01-29 NOTE — TELEPHONE ENCOUNTER
Called patient on home number to inform. Patient states understanding and agrees to plan.  Rajwinder MCPHERSON, CMA

## 2019-01-29 NOTE — NURSING NOTE
Radha Corbett's goals for this visit include: abnormal weight loss    She requests these members of her care team be copied on today's visit information: No    PCP: Trice Medeiros    Referring Provider:  NIKKY Lutz CNP  [id number]  [place] [address]    [facility] 11/18/1991 (Exact Date)     Do you need any medication refills at today's visit? No    Hiwot Garcia LPN

## 2019-01-29 NOTE — PATIENT INSTRUCTIONS
INFORMATION FOR PATIENTS  THYROID NODULES AND   FINE NEEDLE ASPIRATION BIOPSY OF THE THYROID    The finding of a thyroid nodule is almost never an emergency.  Thyroid nodules are common, occurring in up to 50% of patients over the age of 50 years.      Innocent (not important enough to have been detected during life) thyroid cancer may be found in around 5% of people.   Likewise, about 5-15% of patients with demonstrated thyroid nodules will prove to have thyroid cancer if subjected to aggressive diagnostic measures.  As a rule, thyroid cancer has an excellent prognosis.  Therefore, in each patient with thyroid nodules, the decision has to be made about how important it is to identify and treat a cancer, if present.      When we find nodules on the thyroid we use ultrasound and either palpation or ultrasound guided biopsy to help determine which patients, from the large number with nodules on the thyroid, are in the group containing an important thyroid cancer.      Ultrasound Guided Fine Needle Aspiration Biopsy of the Thyroid uses the ultrasound eye to see the nodule and the needle during the biopsy procedure.  This procedure is performed in the radiology department.  The radiologist uses a small needle to remove cells from the specific area of the thyroid. The cells are then analyzed under the microscope to determine whether or not they might be cancer.      The results of thyroid biopsy come in 4 categories    1. Benign or negative for cancer.  This is most common result, found in approximately 60-70% of biopsy specimens.  There remains a < 6 % chance that this result is wrong.    2. Positive for cancer.  This is found about 5 % of the time. There remains a  < 1% chance that cancer is not present when we make this diagnosis by biopsy. This result leads to a recommendation for surgery to make the final diagnosis of cancer.     3. Indeterminate, or the grey zone.  This is found in approximately 20-30% of patients.   This diagnosis identifies a higher risk group, often resulting in diagnostic surgery to establish the final diagnosis.  If all patients in this group go to surgery, only 10-30%, on average, prove to have cancer.  This group is subdivided into 3 subcategories: atypia of undetermined significance, follicular neoplasm and suspicious, with increasing risk.      4. Insufficient for diagnosis. This is found in 5-10% of biopsies. When this occurs we need to repeat the biopsy.      The greatest risk of thyroid biopsy is that the result is not clear (that it is in the indeterminate grey zone group), resulting in future thyroid surgery for what is likely to be benign thyroid disease.  There is a small risk of bleeding or infection.              If DEXA scan is abnormal; we will have you come back for a follow up after that.

## 2019-01-29 NOTE — PROGRESS NOTES
Endocrinology Clinic Visit    Chief Complaint: Consult and Thyroid Disease     Information obtained from:Patient    Subjective:         HPI: Radha Corbett is a 68 year old female with history of Thyroid nodule who is seen in consultation at [facility]'s request for the same.     Patient underwent CT scan of the chest in November 2018 which showed right thyroid nodule measuring up to 1.6 cm.  This was followed up with thyroid ultrasound and the ultrasound report is copied below.  I have personally reviewed the thyroid ultrasound as well.  Overall she has full nodules.  3 nodules on the right side measuring the largest 1.5cm.  Nodule #1 in nodule #2 are hypoechoic with some cystic component.  No high risk features including calcifications.     Nodule 1:  Nodule measurement: 1.5 x 1.5 x 1.1 cm, mid lobe  Nodule 2:  Nodule measurement: 1.0 x 1.1 x 0.7 cm, inferior lobe  Nodule 3:  Nodule measurement: 1.2 x 1.2 x 1.2 cm, inferior lobe  Left Lobe:   Nodule 4:  Nodule measurement: 0.7 x 0.6 x 0.4 cm, mid lobe     No family history of thyroid cancer.  No history of radiation treatment as a child.  No problem with swallowing or breathing related to thyroid nodules.  No hypothyroid or hyperthyroid symptoms.  TSH done December 2018 was within the normal limits.     Patient has had about 40 pounds weight loss over the last several months.  She was also found to have iron deficiency anemia.  She has had multiple workups for the aforementioned symptoms including CT of the chest and yesterday.  She had a CT of the abdomen.  She is asking what the results of this CT scan of the abdomen were.  Reports that the taste of the food has changed.  Hematochezia for which she underwent a colonoscopy and also upper endoscopy.  Gastroenterology appointment coming up after this visit therefore was advised to discuss that with gastroenterology.    Patient reports that 3 years ago she sustained a fall from a standing up position and had multiple  "fractures involving the right shoulder and her back.  I looked at her recent MRI of the lumbar spine which stated that she had a chronic compression fracture of T12.  She reports that she has had early menopause.  Following that compression fracture of the back about 2-3 years ago; did not undergo a follow-up DEXA scan.  I have advised to schedule a DEXA scan and this was ordered for today.    Patient has diabetes currently being managed by primary care physician.  Patient does not have any question regarding diabetes management at this point in time.    Allergies   Allergen Reactions     Codeine      Sulfa Drugs        Current Outpatient Medications   Medication Sig Dispense Refill     calcium carbonate-vitamin D (OSCAL W/D) 500-200 MG-UNIT tablet Take 1 tablet by mouth 2 times daily (with meals) 180 tablet 3     Continuous Blood Gluc  (FREESTYLE MELINA READER) AYAN 1 Units daily 1 Device 0     Continuous Blood Gluc  (FREESTYLE MELINA READER) AYAN 1 Units 3 times daily as needed 1 Device 0     continuous blood glucose monitoring (FREESTYLE MELINA) sensor For use with Freestyle Melina Flash  for continuous monitioring of blood glucose levels. Replace sensor every 10 days. 3 each 5     DULoxetine (CYMBALTA) 60 MG capsule Take 1 capsule (60 mg) by mouth 2 times daily 180 capsule 1     ferrous sulfate (FEROSUL) 325 (65 Fe) MG tablet Take 1 tablet (325 mg) by mouth 3 times daily (with meals) 90 tablet 3     glipiZIDE (GLUCOTROL) 10 MG tablet Take 1 tablet (10 mg) by mouth daily (with dinner) 90 tablet 0     insulin NPH (NOVOLIN N VIAL) 100 UNIT/ML injection Inject 64 units Subcutaneous 2 times daily. (gets this from [facility] at [facility])       insulin syringe-needle U-100 (RELION INSULIN SYRINGE) 31G X 5/16\" 1 ML Use 2 syringes daily or as directed. 200 each 3     lisinopril (PRINIVIL/ZESTRIL) 5 MG tablet Take 1 tablet (5 mg) by mouth daily 90 tablet 3     MELATONIN PO 10 mg At Bedtime        metFORMIN " "(GLUCOPHAGE-XR) 500 MG 24 hr tablet Take 2 tablets (1,000 mg) by mouth 2 times daily (with meals) 360 tablet 1     minocycline (MINOCIN/DYNACIN) 100 MG capsule Take 1 capsule (100 mg) by mouth every 12 hours 180 capsule 2     omeprazole (PRILOSEC) 40 MG capsule TAKE 1 CAPSULE DAILY 30 TO 60 MINUTES BEFORE A MEAL 90 capsule 3     ONE TOUCH LANCETS None Entered       ONE TOUCH TEST STRIPS VI None Entered       order for DME Equipment being ordered: Walker with seat light weight 1 Device 0     oxybutynin ER (DITROPAN XL) 15 MG 24 hr tablet Take 1 tablet (15 mg) by mouth daily 90 tablet 1     simvastatin (ZOCOR) 20 MG tablet Take 1 tablet (20 mg) by mouth At Bedtime 90 tablet 3     spironolactone (ALDACTONE) 100 MG tablet TAKE 1 TABLET EVERY MORNING 90 tablet 3     traZODone (DESYREL) 100 MG tablet Take 1 tablet (100 mg) by mouth nightly as needed for sleep 90 tablet 3     VITAMIN D 1000 UNIT OR CAPS 1 CAPSULE DAILY       vitamin D3 (CHOLECALCIFEROL) 1000 units (25 mcg) tablet Take 1 tablet (1,000 Units) by mouth daily 90 tablet 3       Review of Systems     11 point review system (Constitutional, HENT, Eyes, Respiratory, Cardiovascular, Gastrointestinal, Genitourinary, Musculoskeletal,Neurological, Psychiatric/Behavioural, Endocrine) is negative or is as per HPI above    Past Medical History:   Diagnosis Date     Actinic cheilitis      Arthritis      DM (diabetes mellitus) (H)      GERD (gastroesophageal reflux disease)      Hiatal hernia      HPV in female      HTN (hypertension)      IBS (irritable bowel syndrome)      Major depression      Myopia      Other and unspecified hyperlipidemia      Pseudoangiomatous stromal hyperplasia of breast 2010    \[facility]\""     Rotator cuff injury 7/2016     Sleep apnea     \"snore\"     Vitamin D deficiency        Past Surgical History:   Procedure Laterality Date     BREAST LUMPECTOMY, RT/LT      x2, left     CATARACT IOL, RT/LT  4/99    left, [id number] 7.0D     COLONOSCOPY       " COLONOSCOPY N/A 1/22/2016    Procedure: COMBINED COLONOSCOPY, SINGLE OR MULTIPLE BIOPSY/POLYPECTOMY BY BIOPSY;  Surgeon: Praful Benjamin MD;  Location: [facility] OR     COLONOSCOPY WITH CO2 INSUFFLATION N/A 1/22/2016    Procedure: COLONOSCOPY WITH CO2 INSUFFLATION;  Surgeon: Praful Benjamin MD;  Location: [facility] OR     COMBINED ESOPHAGOSCOPY, GASTROSCOPY, DUODENOSCOPY (EGD) WITH CO2 INSUFFLATION N/A 11/27/2018    Procedure: COMBINED ESOPHAGOSCOPY, GASTROSCOPY, DUODENOSCOPY (EGD) WITH CO2 INSUFFLATION;  Surgeon: Duane, William Charles, MD;  Location: [facility] OR     CRYOTHERAPY  2000    cervical     CRYOTHERAPY Left 3/19/2015    Procedure: CRYOTHERAPY;  Surgeon: Keiko Puri MD;  Location:  [facility]     DILATION AND CURETTAGE, HYSTEROSCOPY DIAGNOSTIC, COMBINED N/A 1/19/2016    Procedure: COMBINED DILATION AND CURETTAGE, HYSTEROSCOPY DIAGNOSTIC;  Surgeon: Yeni Aparicio DO;  Location: [facility] OR     ESOPHAGOSCOPY, GASTROSCOPY, DUODENOSCOPY (EGD), COMBINED N/A 11/27/2018    Procedure: COMBINED ESOPHAGOSCOPY, GASTROSCOPY, DUODENOSCOPY (EGD), BIOPSY SINGLE OR MULTIPLE;  Surgeon: Duane, William Charles, MD;  Location: [facility] OR     IMPLANT STIMULATOR AND LEADS SACRAL NERVE (STAGE ONE AND TWO) N/A 7/24/2018    Procedure: IMPLANT STIMULATOR AND LEADS SACRAL NERVE (STAGE ONE AND TWO);  Interstim Implant Stage One and Two (Implant Permanent Lead and Generator);  Surgeon: Dada Baltazar MD;  [facility]: [facility] OR     IMPLANT STIMULATOR SACRAL NERVE PERCUTANEOUS TRIAL N/A 6/19/2018    Procedure: IMPLANT STIMULATOR SACRAL NERVE PERCUTANEOUS TRIAL;  Percutaneous Neural Examination (trial for interstim);  Surgeon: Dada Baltazar MD;  Location: [facility] OR     LAPAROSCOPIC TUBAL LIGATION  1981     PHACOEMULSIFICATION CLEAR CORNEA WITH STANDARD INTRAOCULAR LENS IMPLANT  8/15/2013    Procedure: PHACOEMULSIFICATION CLEAR CORNEA WITH STANDARD INTRAOCULAR LENS IMPLANT;  RIGHT PHACOEMULSIFICATION CLEAR CORNEA WITH STANDARD  INTRAOCULAR LENS IMPLANT ;  Surgeon: Trae Taylor MD;  [facility]     SURGICAL HISTORY OF -       x4, ankle surgery       Family History   Problem Relation Age of Onset     Hypertension Mother      Cerebrovascular Disease Mother      Arthritis Mother      Cardiovascular Mother      Circulatory Mother      Cerebrovascular Disease Father      Prostate Cancer Father 65         at 69     Asthma Brother      Prostate Cancer Brother 48        bone metastasis     Diabetes Sister      Hypertension Sister      Osteoporosis Sister      Depression Sister      Lipids Sister      Cancer Maternal Grandmother 95        spine     Breast Cancer Sister 39        also contralateral @53, mets to lungs     Cancer Sister 20        second uterine cancer in 30s also     Diabetes Sister      Hypertension Sister      Depression Sister      Osteoporosis Sister      Breast Cancer Sister 57        unilateral     Cancer Paternal Aunt 40        unknown type     Cancer Paternal Uncle         stomach;  in his 80s     Prostate Cancer Brother      Glaucoma No family hx of      Melanoma No family hx of      Skin Cancer No family hx of        Social History     Socioeconomic History     Marital status:      Spouse name: None     Number of children: 1     Years of education: None     Highest education level: None   Social Needs     Financial resource strain: None     Food insecurity - worry: None     Food insecurity - inability: None     Transportation needs - medical: None     Transportation needs - non-medical: None   Occupational History     Occupation: manager of senior living      Employer: [employer]   Tobacco Use     Smoking status: Former Smoker     Packs/day: 1.00     Years: 20.00     Pack years: 20.00     Types: Cigarettes     Start date: 2018     Smokeless tobacco: Never Used   Substance and Sexual Activity     Alcohol use: Yes     Comment: social/3-4 per week     Drug use: No     Sexual activity: Yes      Partners: Male     Birth control/protection: Surgical   Other Topics Concern     Parent/sibling w/ CABG, MI or angioplasty before 65F 55M? No      Service No     Blood Transfusions No     Caffeine Concern No     Comment: quit drinking caffeine     Occupational Exposure No     Hobby Hazards No     Sleep Concern Yes     Comment: takes benadryl medication at night     Stress Concern Yes     Comment: some stress at work     Weight Concern Not Asked     Special Diet No     Back Care No     Exercise No     Bike Helmet No     Comment: doesn't ride bike     Seat Belt Yes     Self-Exams Not Asked   Social History Narrative     None       Objective:   /80 (BP Location: Left arm, Patient Position: Sitting, Cuff Size: Adult Regular)   Pulse 99   Wt 94.8 kg (209 lb)   LMP 11/18/1991 (Exact Date)   SpO2 94%   BMI 32.50 kg/m    Constitutional: Pleasant no acute cardiopulmonary distress.   EYES: anicteric, normal extra-ocular movements, no lid lag or retraction.   HEENT: Mouth/Throat: Mucous membrane is moist. Right sided palpable nodule; small, mobile and non-tender.   Cardiovascular: RRR, S1, S2 normal.   Pulmonary/Chest: CTAB. No wheezing or rales   Neurological: Alert and oriented.  Extremities: No edema.   Lymphatic: no cervical lymphadenopathy.  Psychological: appropriate mood and affect     In House Labs:   Lab Results   Component Value Date    A1C 7.5 11/12/2018    A1C 8.1 08/14/2018    A1C 8.5 05/15/2018    A1C 9.2 02/08/2018    A1C 7.1 09/18/2017       TSH   Date Value Ref Range Status   12/06/2018 0.70 0.40 - 4.00 mU/L Final   09/18/2017 0.63 0.40 - 4.00 mU/L Final   12/05/2016 0.79 0.40 - 4.00 mU/L Final   10/01/2014 1.00 0.40 - 4.00 mU/L Final     Comment:     Effective 7/30/2014, the reference range for this assay has changed to reflect   new instrumentation/methodology.     06/06/2013 1.13 0.4 - 5.0 mU/L Final       Creatinine   Date Value Ref Range Status   11/12/2018 0.52 0.52 - 1.04 mg/dL Final          Assessment/Treatment Plan:      Multiple thyroid nodules: She has a total of 4 nodules.  3 nodules on the right side; the largest measuring 1.5 cm in diameter.  Nodule #1 and #2 on the right side meets criteria for a biopsy based on hypoechoic and greater than a centimeter.  Discussed with the patient in detail that thyroid nodules are very common and when we biopsy majority of them 60-70% comes back benign.  Discussed that 2 of her nodules meet criteria for a biopsy.  Discussed expected findings of the biopsy including benign, malignant, AUS, and insufficient for diagnosis.  Next steps for each 1 nodes were discussed.  I have personally reviewed the thyroid ultrasound.  TSH was done in December 2018 and it was within the normal.    History of compression fracture: From history possible fragility fracture.  Patient underwent early menopause.  Have ordered a DEXA scan.  Needs follow-up with us or with primary after DEXA scan results.    Patient Instructions   INFORMATION FOR PATIENTS  THYROID NODULES AND   FINE NEEDLE ASPIRATION BIOPSY OF THE THYROID    The finding of a thyroid nodule is almost never an emergency.  Thyroid nodules are common, occurring in up to 50% of patients over the age of 50 years.      Innocent (not important enough to have been detected during life) thyroid cancer may be found in around 5% of people.   Likewise, about 5-15% of patients with demonstrated thyroid nodules will prove to have thyroid cancer if subjected to aggressive diagnostic measures.  As a rule, thyroid cancer has an excellent prognosis.  Therefore, in each patient with thyroid nodules, the decision has to be made about how important it is to identify and treat a cancer, if present.      When we find nodules on the thyroid we use ultrasound and either palpation or ultrasound guided biopsy to help determine which patients, from the large number with nodules on the thyroid, are in the group containing an important thyroid  cancer.      Ultrasound Guided Fine Needle Aspiration Biopsy of the Thyroid uses the ultrasound eye to see the nodule and the needle during the biopsy procedure.  This procedure is performed in the radiology department.  The radiologist uses a small needle to remove cells from the specific area of the thyroid. The cells are then analyzed under the microscope to determine whether or not they might be cancer.      The results of thyroid biopsy come in 4 categories    1. Benign or negative for cancer.  This is most common result, found in approximately 60-70% of biopsy specimens.  There remains a < 6 % chance that this result is wrong.    2. Positive for cancer.  This is found about 5 % of the time. There remains a  < 1% chance that cancer is not present when we make this diagnosis by biopsy. This result leads to a recommendation for surgery to make the final diagnosis of cancer.     3. Indeterminate, or the grey zone.  This is found in approximately 20-30% of patients.  This diagnosis identifies a higher risk group, often resulting in diagnostic surgery to establish the final diagnosis.  If all patients in this group go to surgery, only 10-30%, on average, prove to have cancer.  This group is subdivided into 3 subcategories: atypia of undetermined significance, follicular neoplasm and suspicious, with increasing risk.      4. Insufficient for diagnosis. This is found in 5-10% of biopsies. When this occurs we need to repeat the biopsy.      The greatest risk of thyroid biopsy is that the result is not clear (that it is in the indeterminate grey zone group), resulting in future thyroid surgery for what is likely to be benign thyroid disease.  There is a small risk of bleeding or infection.              If DEXA scan is abnormal; we will have you come back for a follow up after that.       I will contact the patient with the test results.  Return to clinic in 12 months. Earlier follow up to be scheduled based on DEXA scan  findings.     Test and/or medications prescribed today:  Orders Placed This Encounter   Procedures     US guided thyroid FNA     Dexa hip/pelvis/spine         Alina Hanley MD  Staff Endocrinologist    [phone]  Division of Endocrinology and Diabetes

## 2019-01-30 NOTE — RESULT ENCOUNTER NOTE
Medina Corbett,    Attached are your test results.  You do have a fair amount of diverticulosis so A high fiber diet with plenty of fluids (up to 8 glasses of water daily) is suggested to relieve these symptoms.  Metamucil, 1 tablespoon once or twice daily can be used to keep bowels regular if needed.    CT is negative except for some mild thickening noted in your stomach   Please make appointment with Gastroenterology as we discussed    Please contact us if you have any questions.    Perri Carias, CNP

## 2019-01-31 ENCOUNTER — TELEPHONE (OUTPATIENT)
Dept: ENDOCRINOLOGY | Facility: CLINIC | Age: 69
End: 2019-01-31

## 2019-01-31 LAB — ZINC SERPL-MCNC: 81 UG/DL (ref 60–120)

## 2019-01-31 NOTE — TELEPHONE ENCOUNTER
[facility] Call Center    Phone Message    May a detailed message be left on voicemail: yes    Reason for Call: Patient is requesting a call do discuss her medications and her upcoming procedure.  Please advise.  Thank you.     Action Taken: Message routed to:  Adult Clinics: Endocrinology p [phone]

## 2019-01-31 NOTE — TELEPHONE ENCOUNTER
Pt was told to let her provider know for her upcoming thyroid biopsy that she is taking a baby ASA daily and is diabetic.  She is wondering if she should stop the asa 5 days prior to the biopsy and if being DM she should be taking any precautions before the procedure.  Will flag for RN to review and contact pt back with recommendations.  Catherine Gloria, CMA

## 2019-02-01 NOTE — TELEPHONE ENCOUNTER
Reviewed with Dr. Estrada and per Dr. Estrada, not typical to have to stop ASA but sometimes radiologist prefers. Patient can stop her ASA now for thyroid biopsy on Tuesday.     Advised patient no special recommendations with diabetic patient's for FNA instructions. Advised patient to call with any questions or concerns. Also advised patient that writer will send to Dr. Hanley to review and if any further recommendations, clinic will contact her. Patient verbalizes understanding and agrees to plan.       Carla Troncoso RN  Endocrine Care Coordinator  [facility]

## 2019-02-01 NOTE — TELEPHONE ENCOUNTER
Attempted to contact patient to further discuss. Left voicemail for patient to contact our office.       Carla Troncoso RN  Endocrine Care Coordinator  [facility]

## 2019-02-01 NOTE — TELEPHONE ENCOUNTER
Patient advised. Patient verbalizes understanding and agrees to plan.     Carla Troncoso RN  Endocrine Care Coordinator  [facility]

## 2019-02-04 ENCOUNTER — ANCILLARY PROCEDURE (OUTPATIENT)
Dept: BONE DENSITY | Facility: CLINIC | Age: 69
End: 2019-02-04
Payer: COMMERCIAL

## 2019-02-04 DIAGNOSIS — Z78.0 MENOPAUSE: ICD-10-CM

## 2019-02-04 DIAGNOSIS — M54.9 BACK PAIN, UNSPECIFIED BACK LOCATION, UNSPECIFIED BACK PAIN LATERALITY, UNSPECIFIED CHRONICITY: ICD-10-CM

## 2019-02-04 DIAGNOSIS — S22.080A COMPRESSION FRACTURE OF T12 VERTEBRA (H): ICD-10-CM

## 2019-02-04 PROCEDURE — 77080 DXA BONE DENSITY AXIAL: CPT | Mod: 59 | Performed by: RADIOLOGY

## 2019-02-04 PROCEDURE — 77081 DXA BONE DENSITY APPENDICULR: CPT | Performed by: RADIOLOGY

## 2019-02-04 NOTE — RESULT ENCOUNTER NOTE
Radha,   The bone density test shows osteopenia. This is an intermediate category that is in between normal and osteoporosis.      Please schedule follow up visit at your convenience to discuss next steps regarding this finding.     People with osteopenia are recommended to take 1200 mg of calcium and  vitamin D 1000 international unit(s) daily.       Please let me know if you have any questions,     Alina Hanley

## 2019-02-05 ENCOUNTER — ANCILLARY PROCEDURE (OUTPATIENT)
Dept: ULTRASOUND IMAGING | Facility: CLINIC | Age: 69
End: 2019-02-05
Attending: INTERNAL MEDICINE
Payer: COMMERCIAL

## 2019-02-05 DIAGNOSIS — E04.1 THYROID NODULE: ICD-10-CM

## 2019-02-05 PROCEDURE — 00000102 ZZHCL STATISTIC CYTO WRIGHT STAIN TC: Performed by: RADIOLOGY

## 2019-02-05 PROCEDURE — 88173 CYTOPATH EVAL FNA REPORT: CPT | Mod: 59 | Performed by: RADIOLOGY

## 2019-02-05 PROCEDURE — 10004 FNA BX W/O IMG GDN EA ADDL: CPT | Mod: 59 | Performed by: RADIOLOGY

## 2019-02-05 PROCEDURE — 10005 FNA BX W/US GDN 1ST LES: CPT | Performed by: RADIOLOGY

## 2019-02-05 RX ADMIN — Medication 1 MEQ: at 11:40

## 2019-02-05 NOTE — PROGRESS NOTES
Radha was seen in the ultrasound today for a fine needle aspiration of two thyroid nodules on the right lobe. Procedure was explained and consent was obtained. The entire neck was prepped and sterile drape was applied. 9 ml Lidocaine (NDC 7133-7472-66) with 1 ml 8.4% Na Bicarbonate (NDC 5862-9161-69) was used to anesthetize the skin and subcutaneous tissue.  Procedure was performed without complications.  Pain at start of procedure 0/10. Pain at end of procedure 0/10  Patient d/c with home care and follow-up instructions.  Procedure performed by: Dr. Montesinos.  Other staff present: Leigh Beal and Sharyn Haile RN.

## 2019-02-06 LAB — COPATH REPORT: NORMAL

## 2019-02-07 NOTE — RESULT ENCOUNTER NOTE
Radha,     Attached please find the thyroid biopsy results.  The biopsy results have come back benign or negative for malignancy.  When we find benign results the risk of malignancy is less than 3%; therefore recommendation is to repeat another ultrasound in 1 year.    Please let me know if you have any questions.    Alina Hanley

## 2019-02-11 ENCOUNTER — TELEPHONE (OUTPATIENT)
Dept: GASTROENTEROLOGY | Facility: CLINIC | Age: 69
End: 2019-02-11

## 2019-02-11 DIAGNOSIS — Z12.11 SPECIAL SCREENING FOR MALIGNANT NEOPLASMS, COLON: Primary | ICD-10-CM

## 2019-02-11 RX ORDER — POLYETHYLENE GLYCOL 3350 17 G/17G
17 POWDER, FOR SOLUTION ORAL ONCE
Qty: 1 BOTTLE | Refills: 0 | Status: SHIPPED | OUTPATIENT
Start: 2019-02-11 | End: 2019-06-26

## 2019-02-11 NOTE — TELEPHONE ENCOUNTER
Associated Diagnoses     Iron deficiency anemia, unspecified iron deficiency anemia type [D50.9]  - Primary         Patient scheduled for capsule Endoscopy    Referring Provider. Ever Woody    ? no    Arrival time verified? 9 am    Facility location verified? [address]    Instructions given regarding prep and procedure    Prep Type Miralax for Capsule endoscopy    Are you taking any anticoagulants or blood thinners? no    Instructions given? yes    Electronic implanted devices? no    Pre procedure teaching completed? Yes    Transportation from procedure? n/a    H&P / Pre op physical completed? n/a

## 2019-02-18 ENCOUNTER — TELEPHONE (OUTPATIENT)
Dept: GASTROENTEROLOGY | Facility: CLINIC | Age: 69
End: 2019-02-18

## 2019-02-18 NOTE — TELEPHONE ENCOUNTER
Nurse returned call and left message to call back.    Therese Polo RN, BSN, PHN  [facility]  GI/Gen Surg/Hepatology Care Coordinator

## 2019-02-18 NOTE — TELEPHONE ENCOUNTER
Pt was confused regarding pre instructions.  She stated that Agata told her that she would send Rx.     We reviewed instructions for Video Capsule Endoscopy.  All items are available OTC.  Pt will purchase at [facility] this week.     Reviewed time/date/location/ parking for [facility].     Lynda Pearson RN  [facility]/ealth Endoscopy

## 2019-02-18 NOTE — TELEPHONE ENCOUNTER
Pt called back, nurse gave her the number for the endoscopy center. She will call them and call back if she still cannot get the medication.    Therese Polo RN, BSN, PHN  [facility]  GI/Gen Surg/Hepatology Care Coordinator

## 2019-02-18 NOTE — TELEPHONE ENCOUNTER
[facility] Call Center    Phone Message    May a detailed message be left on voicemail: yes    Reason for Call: Other: Pt states she has scheduled a procedure at the  for a capsule endoscopy on 2/25/19. Pt states she had to reschedule due to not being able to get her medications prior to procedure. Pt states she has left multiple voice mails with the  but can't seem to get a call back from anyone. Pt is requesting a call back from [facility]'s clinic because she is stuck on what she should do.  Please advise.     Action Taken: Message routed to:  Adult Clinics: Gastroenterology (GI) p [phone]

## 2019-03-04 ENCOUNTER — HOSPITAL ENCOUNTER (OUTPATIENT)
Facility: CLINIC | Age: 69
Discharge: HOME | End: 2019-03-04
Attending: INTERNAL MEDICINE | Admitting: INTERNAL MEDICINE
Payer: COMMERCIAL

## 2019-03-04 VITALS
RESPIRATION RATE: 16 BRPM | OXYGEN SATURATION: 94 % | DIASTOLIC BLOOD PRESSURE: 72 MMHG | SYSTOLIC BLOOD PRESSURE: 120 MMHG

## 2019-03-04 PROCEDURE — 91110 GI TRC IMG INTRAL ESOPH-ILE: CPT | Performed by: INTERNAL MEDICINE

## 2019-03-04 NOTE — OR NURSING
Pt here for pill cam endoscopy.  Pt stated that she had no strictures or bowel obstructions. Pt drank bowel prep and stated that her stool was clear with a little brown stool mixed in it.   Pt was informed of the risks of procedure and had no further questions.  Pt did ask for a hat for the toilet to catch capsule, pt knows that she does not have to do that and that we do not need to see or want the capsule back.  Pt agreed with consent and continued with capsule ingestion.  Pt was fitted with capsule belt and recorder.  Pt took capsule and swallowed it without any issues with simethicone and water.  Pt picture on monitor was checked and was working with recorder.  Therese Landaverde RN on 3/4/2019 at 10:09 AM

## 2019-03-08 ENCOUNTER — TELEPHONE (OUTPATIENT)
Dept: PEDIATRICS | Facility: CLINIC | Age: 69
End: 2019-03-08

## 2019-03-08 DIAGNOSIS — Z79.4 TYPE 2 DIABETES MELLITUS WITH HYPERGLYCEMIA, WITH LONG-TERM CURRENT USE OF INSULIN (H): ICD-10-CM

## 2019-03-08 DIAGNOSIS — D64.9 ANEMIA, UNSPECIFIED TYPE: Primary | ICD-10-CM

## 2019-03-08 DIAGNOSIS — E11.65 TYPE 2 DIABETES MELLITUS WITH HYPERGLYCEMIA, WITH LONG-TERM CURRENT USE OF INSULIN (H): ICD-10-CM

## 2019-03-08 NOTE — TELEPHONE ENCOUNTER
Patient states she has a bladder implant and has to wear diapers and so she gets frequent UTIs. She can't come in today, she is scheduled on Tuesday and wants orders to go to the lab before. She also wants to check her A1C and anemia, pended.  Tessa Blake RN

## 2019-03-08 NOTE — TELEPHONE ENCOUNTER
Not a good idea to treat without a urine for her as she has had resistant bacteria in her past   Would be best to be seen   Would she be willing to work in today with someone here or go to urgent care

## 2019-03-08 NOTE — TELEPHONE ENCOUNTER
Health Call Center    Phone Message    May a detailed message be left on voicemail: yes    Reason for Call: Symptoms or Concerns     If patient has red-flag symptoms, warm transfer to triage line    Current symptom or concern: Urinary burning, some pain with urination, and frequency.     Symptoms have been present for:  6 day(s)     Has patient previously been seen for this? Yes    By Dr. Medeiros :    Are there any new or worsening symptoms? Yes: burning, frequency, and pain.      Action Taken: Message routed to:  Primary Care p [phone]

## 2019-03-08 NOTE — TELEPHONE ENCOUNTER
Called patient, relayed message & patient verbalized understanding. She will go to UC if she worsens. She again wants orders done so can have results when seen on Tuesday.   Tessa Blake RN

## 2019-03-09 NOTE — TELEPHONE ENCOUNTER
Next 5 appointments (look out 90 days)    Mar 12, 2019  2:00 PM CDT  [facility] with Mary Lira MD  [facility] [address] [place] [address] [phone]   Mar 12, 2019  3:30 PM CDT  Return Visit with NIKKY Lutz CNP  [facility] ([facility]) [address] [place] [address] [phone]

## 2019-03-12 ENCOUNTER — OFFICE VISIT (OUTPATIENT)
Dept: DERMATOLOGY | Facility: CLINIC | Age: 69
End: 2019-03-12
Payer: COMMERCIAL

## 2019-03-12 ENCOUNTER — OFFICE VISIT (OUTPATIENT)
Dept: PEDIATRICS | Facility: CLINIC | Age: 69
End: 2019-03-12
Payer: COMMERCIAL

## 2019-03-12 VITALS
SYSTOLIC BLOOD PRESSURE: 123 MMHG | HEART RATE: 101 BPM | WEIGHT: 207.5 LBS | BODY MASS INDEX: 32.26 KG/M2 | TEMPERATURE: 98.3 F | DIASTOLIC BLOOD PRESSURE: 70 MMHG | OXYGEN SATURATION: 95 %

## 2019-03-12 DIAGNOSIS — Z79.4 TYPE 2 DIABETES MELLITUS WITH HYPERGLYCEMIA, WITH LONG-TERM CURRENT USE OF INSULIN (H): ICD-10-CM

## 2019-03-12 DIAGNOSIS — E11.65 TYPE 2 DIABETES MELLITUS WITH HYPERGLYCEMIA, WITH LONG-TERM CURRENT USE OF INSULIN (H): ICD-10-CM

## 2019-03-12 DIAGNOSIS — D48.5 NEOPLASM OF UNCERTAIN BEHAVIOR OF SKIN: Primary | ICD-10-CM

## 2019-03-12 DIAGNOSIS — F33.1 MODERATE EPISODE OF RECURRENT MAJOR DEPRESSIVE DISORDER (H): ICD-10-CM

## 2019-03-12 DIAGNOSIS — D64.9 ANEMIA, UNSPECIFIED TYPE: ICD-10-CM

## 2019-03-12 DIAGNOSIS — R63.4 WEIGHT LOSS: Primary | ICD-10-CM

## 2019-03-12 DIAGNOSIS — R82.90 NONSPECIFIC FINDING ON EXAMINATION OF URINE: Primary | ICD-10-CM

## 2019-03-12 DIAGNOSIS — N39.41 URGE INCONTINENCE: ICD-10-CM

## 2019-03-12 DIAGNOSIS — N39.0 URINARY TRACT INFECTION WITHOUT HEMATURIA, SITE UNSPECIFIED: ICD-10-CM

## 2019-03-12 LAB
ALBUMIN UR-MCNC: 10 MG/DL
APPEARANCE UR: ABNORMAL
BACTERIA #/AREA URNS HPF: ABNORMAL /HPF
BASOPHILS # BLD AUTO: 0.1 10E9/L (ref 0–0.2)
BASOPHILS NFR BLD AUTO: 0.5 %
BILIRUB UR QL STRIP: NEGATIVE
COLOR UR AUTO: YELLOW
DIFFERENTIAL METHOD BLD: ABNORMAL
EOSINOPHIL # BLD AUTO: 0.1 10E9/L (ref 0–0.7)
EOSINOPHIL NFR BLD AUTO: 1.1 %
ERYTHROCYTE [DISTWIDTH] IN BLOOD BY AUTOMATED COUNT: 16.2 % (ref 10–15)
FERRITIN SERPL-MCNC: 6 NG/ML (ref 8–252)
GLUCOSE UR STRIP-MCNC: NEGATIVE MG/DL
HBA1C MFR BLD: 6.4 % (ref 0–5.6)
HCT VFR BLD AUTO: 40.8 % (ref 35–47)
HGB BLD-MCNC: 12.8 G/DL (ref 11.7–15.7)
HGB UR QL STRIP: NEGATIVE
IMM GRANULOCYTES # BLD: 0 10E9/L (ref 0–0.4)
IMM GRANULOCYTES NFR BLD: 0.3 %
KETONES UR STRIP-MCNC: NEGATIVE MG/DL
LEUKOCYTE ESTERASE UR QL STRIP: ABNORMAL
LYMPHOCYTES # BLD AUTO: 2.4 10E9/L (ref 0.8–5.3)
LYMPHOCYTES NFR BLD AUTO: 22.4 %
MCH RBC QN AUTO: 26.2 PG (ref 26.5–33)
MCHC RBC AUTO-ENTMCNC: 31.4 G/DL (ref 31.5–36.5)
MCV RBC AUTO: 84 FL (ref 78–100)
MONOCYTES # BLD AUTO: 0.6 10E9/L (ref 0–1.3)
MONOCYTES NFR BLD AUTO: 5.5 %
MUCOUS THREADS #/AREA URNS LPF: PRESENT /LPF
NEUTROPHILS # BLD AUTO: 7.4 10E9/L (ref 1.6–8.3)
NEUTROPHILS NFR BLD AUTO: 70.2 %
NITRATE UR QL: POSITIVE
NON-SQ EPI CELLS #/AREA URNS LPF: ABNORMAL /LPF
PH UR STRIP: 5.5 PH (ref 5–7)
PLATELET # BLD AUTO: 295 10E9/L (ref 150–450)
RBC # BLD AUTO: 4.88 10E12/L (ref 3.8–5.2)
RBC #/AREA URNS AUTO: ABNORMAL /HPF
SOURCE: ABNORMAL
SP GR UR STRIP: 1.02 (ref 1–1.03)
UROBILINOGEN UR STRIP-MCNC: NORMAL MG/DL (ref 0–2)
WBC # BLD AUTO: 10.6 10E9/L (ref 4–11)
WBC #/AREA URNS AUTO: ABNORMAL /HPF

## 2019-03-12 PROCEDURE — 36415 COLL VENOUS BLD VENIPUNCTURE: CPT | Performed by: NURSE PRACTITIONER

## 2019-03-12 PROCEDURE — 85025 COMPLETE CBC W/AUTO DIFF WBC: CPT | Performed by: NURSE PRACTITIONER

## 2019-03-12 PROCEDURE — 99214 OFFICE O/P EST MOD 30 MIN: CPT | Performed by: NURSE PRACTITIONER

## 2019-03-12 PROCEDURE — 40490 BIOPSY OF LIP: CPT | Performed by: DERMATOLOGY

## 2019-03-12 PROCEDURE — 87088 URINE BACTERIA CULTURE: CPT | Performed by: UROLOGY

## 2019-03-12 PROCEDURE — 88305 TISSUE EXAM BY PATHOLOGIST: CPT | Mod: TC | Performed by: DERMATOLOGY

## 2019-03-12 PROCEDURE — 87086 URINE CULTURE/COLONY COUNT: CPT | Performed by: UROLOGY

## 2019-03-12 PROCEDURE — 83036 HEMOGLOBIN GLYCOSYLATED A1C: CPT | Performed by: NURSE PRACTITIONER

## 2019-03-12 PROCEDURE — 99213 OFFICE O/P EST LOW 20 MIN: CPT | Mod: 25 | Performed by: DERMATOLOGY

## 2019-03-12 PROCEDURE — 87186 SC STD MICRODIL/AGAR DIL: CPT | Performed by: UROLOGY

## 2019-03-12 PROCEDURE — 81001 URINALYSIS AUTO W/SCOPE: CPT | Performed by: UROLOGY

## 2019-03-12 PROCEDURE — 82728 ASSAY OF FERRITIN: CPT | Performed by: NURSE PRACTITIONER

## 2019-03-12 RX ORDER — CIPROFLOXACIN 500 MG/1
500 TABLET, FILM COATED ORAL 2 TIMES DAILY
Qty: 14 TABLET | Refills: 0 | Status: SHIPPED | OUTPATIENT
Start: 2019-03-12 | End: 2019-04-04

## 2019-03-12 ASSESSMENT — PATIENT HEALTH QUESTIONNAIRE - PHQ9
5. POOR APPETITE OR OVEREATING: MORE THAN HALF THE DAYS
SUM OF ALL RESPONSES TO PHQ QUESTIONS 1-9: 15

## 2019-03-12 ASSESSMENT — ANXIETY QUESTIONNAIRES
1. FEELING NERVOUS, ANXIOUS, OR ON EDGE: NEARLY EVERY DAY
7. FEELING AFRAID AS IF SOMETHING AWFUL MIGHT HAPPEN: NEARLY EVERY DAY
5. BEING SO RESTLESS THAT IT IS HARD TO SIT STILL: MORE THAN HALF THE DAYS
2. NOT BEING ABLE TO STOP OR CONTROL WORRYING: NEARLY EVERY DAY
GAD7 TOTAL SCORE: 17
6. BECOMING EASILY ANNOYED OR IRRITABLE: SEVERAL DAYS
3. WORRYING TOO MUCH ABOUT DIFFERENT THINGS: NEARLY EVERY DAY

## 2019-03-12 ASSESSMENT — PAIN SCALES - GENERAL: PAINLEVEL: NO PAIN (0)

## 2019-03-12 NOTE — PATIENT INSTRUCTIONS
Wound Care After a Biopsy    What is a skin biopsy?  A skin biopsy allows the doctor to examine a very small piece of tissue under the microscope to determine the diagnosis and the best treatment for the skin condition. A local anesthetic (numbing medicine)  is injected with a very small needle into the skin area to be tested. A small piece of skin is taken from the area. Sometimes a suture (stitch) is used.     What are the risks of a skin biopsy?  I will experience scar, bleeding, swelling, pain, crusting and redness. I may experience incomplete removal or recurrence. Risks of this procedure are excessive bleeding, bruising, infection, nerve damage, numbness, thick (hypertrophic or keloidal) scar and non-diagnostic biopsy.    How should I care for my wound for the first 24 hours?    Keep the wound dry and covered for 24 hours    If it bleeds, hold direct pressure on the area for 15 minutes. If bleeding does not stop then go to the emergency room    Avoid strenuous exercise the first 1-2 days or as your doctor instructs you    How should I care for the wound after 24 hours?    After 24 hours, remove the bandage    You may bathe or shower as normal    If you had a scalp biopsy, you can shampoo as usual and can use shower water to clean the biopsy site daily    Clean the wound twice a day with gentle soap and water    Do not scrub, be gentle    Apply white petroleum/Vaseline after cleaning the wound with a cotton swab or a clean finger, and keep the site covered with a Bandaid /bandage. Bandages are not necessary with a scalp biopsy    If you are unable to cover the site with a Bandaid /bandage, re-apply ointment 2-3 times a day to keep the site moist. Moisture will help with healing    Avoid strenuous activity for first 1-2 days    Avoid lakes, rivers, pools, and oceans until the stitches are removed or the site is healed    How do I clean my wound?    Wash hands thoroughly with soap or use hand  before all  wound care    Clean the wound with gentle soap and water    Apply white petroleum/Vaseline  to wound after it is clean    Replace the Bandaid /bandage to keep the wound covered for the first few days or as instructed by your doctor    If you had a scalp biopsy, warm shower water to the area on a daily basis should suffice    What should I use to clean my wound?     Cotton-tipped applicators (Qtips )    White petroleum jelly (Vaseline ). Use a clean new container and use Q-tips to apply.    Bandaids   as needed    Gentle soap     How should I care for my wound long term?    Do not get your wound dirty    Keep up with wound care for one week or until the area is healed.    A small scab will form and fall off by itself when the area is completely healed. The area will be red and will become pink in color as it heals. Sun protection is very important for how your scar will turn out. Sunscreen with an SPF 30 or greater is recommended once the area is healed.    You should have some soreness but it should be mild and slowly go away over several days. Talk to your doctor about using tylenol for pain,    When should I call my doctor?  If you have increased:     Pain or swelling    Pus or drainage (clear or slightly yellow drainage is ok)    Temperature over 100F    Spreading redness or warmth around wound    When will I hear about my results?  The biopsy results can take 2-3 weeks to come back. The clinic will call you with the results, send you a [employer] message, or have you schedule a follow-up clinic or phone time to discuss the results. Contact our clinics if you do not hear from us in 3 weeks.     Who should I call with questions?    [facility] [place]: [phone]     [facility]: [phone]    For urgent needs outside of business hours call the [facility] at [phone] and ask for the dermatology resident on call    Start over-the-counter benzoyl  peroxide 10% wash on the trunk.  If 10% is too irritating you can use the 5%. ([employer] makes this product. It is available here at the pharmacy or at [employer]). This medication can bleach your towels and clothing.     It is found in a purple tube in the acne aisle.

## 2019-03-12 NOTE — NURSING NOTE
"Chief Complaint   Patient presents with     Weight Loss     discuss what the next steps are after all of her recent tests       Initial /70 (BP Location: Right arm, Patient Position: Sitting, Cuff Size: Adult Regular)   Pulse 101   Temp 98.3  F (36.8  C) (Temporal)   Wt 94.1 kg (207 lb 8 oz)   LMP 11/18/1991 (Exact Date)   SpO2 95%   BMI 32.26 kg/m   Estimated body mass index is 32.26 kg/m  as calculated from the following:    Height as of 1/23/19: 1.708 m (5' 7.24\").    Weight as of this encounter: 94.1 kg (207 lb 8 oz).  Medication Reconciliation: complete      BOBY Lyons      "

## 2019-03-12 NOTE — RESULT ENCOUNTER NOTE
Medina Corbett,    Attached are your test results.  -Low iron store levels (ferritin). Is gradually improving. Continue iron supplement.    Please contact us if you have any questions.    Perri Carias, CNP

## 2019-03-12 NOTE — PATIENT INSTRUCTIONS
PLAN:   1.   Symptomatic therapy suggested: increase fluids and call prn if symptoms persist or worsen.  2.  Orders Placed This Encounter   Medications     ciprofloxacin (CIPRO) 500 MG tablet     Sig: Take 1 tablet (500 mg) by mouth 2 times daily for 7 days     Dispense:  14 tablet     Refill:  0     Orders Placed This Encounter   Procedures     Ferritin       3. Patient needs to follow up in if no improvement,or sooner if worsening of symptoms or other symptoms develop.  FOLLOW UP WITH SPECIALIST :Gastroenterology  Will follow up and/or notify patient of  results via My Chart to determine further need for followup

## 2019-03-12 NOTE — PROGRESS NOTES
SUBJECTIVE:   Radha Corbett is a 68 year old female who presents to clinic today for the following health issues:    Patient would like to discuss what the next steps are due to her weight loss. Patient notes she has been seeing multiple specialist and had multiple procedures done.  Here for follow up on weight loss concern   Just finished the capsule endoscopy scan   Reviewed noted from GI noting If video capsule endoscopy reveals unremarkable small bowel, we will consider repeating EGD since new CT findings and colonoscopy since last colonoscopy in 2017.     PROBLEMS TO ADD ON...  Depression and Anxiety Follow-Up    Status since last visit: No change    Other associated symptoms:worry she has cancer     Complicating factors:     Significant life event: No     Current substance abuse: None    In the past two weeks have you had thoughts of suicide or self-harm?  No.    Do you have concerns about your personal safety or the safety of others?   No  PHQ-9  English  PHQ-9   Any Language  TARAS-7  Suicide Assessment Five-step Evaluation and Treatment (SAFE-T)  PHQ-9 SCORE 6/22/2018 12/7/2018 1/10/2019   PHQ-9 Total Score - - -   PHQ-9 Total Score MyChart - - -   PHQ-9 Total Score 7 18 20       TARAS-7 SCORE 6/22/2018 12/7/2018 1/10/2019   Total Score - - -   Total Score 5 18 20       [facility] Follow-up to PHQ 6/22/2018 12/7/2018 1/10/2019   PHQ-9 9. Suicide Ideation past 2 weeks Not at all Not at all Not at all         Problem list and histories reviewed & adjusted, as indicated.  Additional history: as documented    Patient Active Problem List   Diagnosis     Macular degeneration     Myopia     Hiatal hernia     IBS (irritable bowel syndrome)     Hypertension goal BP (blood pressure) < 140/90     GERD (gastroesophageal reflux disease)     Atypical ductal hyperplasia of breast     Atypical lobular hyperplasia of breast     Benign Breast Disease (PASH)     Family history of breast cancer in sister     Type 2 diabetes, HbA1C goal <  8% (H)     Hyperlipidemia LDL goal <100     Breast cancer screening-high risk     Tubular adenoma of colon     Osteopenia     Alcohol ingestion, more than 4 drinks/day on alcohol screening     Umbilical hernia     Incontinence of urine     Stool incontinence     Osteoarthritis, knee     Rosacea     Anxiety     Pseudophakia     Abnormal cervical Pap smear with positive HPV DNA test     Depression with anxiety     Retinal detachment     Elevated liver function tests     Rectocele     Unexplained endometrial cells on cervical cytology     Back pain, unspecified back location, unspecified back pain laterality, unspecified chronicity     Urge incontinence     Dislocation of shoulder region     Acute lower GI bleeding     Morbid obesity (H)     Moderate episode of recurrent major depressive disorder (H)     Past Surgical History:   Procedure Laterality Date     BREAST LUMPECTOMY, RT/LT      x2, left     CAPSULE/PILL CAM ENDOSCOPY N/A 3/4/2019    Procedure: CAPSULE/PILL CAM ENDOSCOPY;  Surgeon: Sinan Valdes MD;  Location: [facility]     CATARACT IOL, RT/LT  4/99    left, MZ60CD 7.0D     COLONOSCOPY       COLONOSCOPY N/A 1/22/2016    Procedure: COMBINED COLONOSCOPY, SINGLE OR MULTIPLE BIOPSY/POLYPECTOMY BY BIOPSY;  Surgeon: Praful Benjamin MD;  Location: [facility]     COLONOSCOPY WITH CO2 INSUFFLATION N/A 1/22/2016    Procedure: COLONOSCOPY WITH CO2 INSUFFLATION;  Surgeon: Praful Benjamin MD;  Location: [facility]     COMBINED ESOPHAGOSCOPY, GASTROSCOPY, DUODENOSCOPY (EGD) WITH CO2 INSUFFLATION N/A 11/27/2018    Procedure: COMBINED ESOPHAGOSCOPY, GASTROSCOPY, DUODENOSCOPY (EGD) WITH CO2 INSUFFLATION;  Surgeon: Duane, William Charles, MD;  Location: [facility]     CRYOTHERAPY  2000    cervical     CRYOTHERAPY Left 3/19/2015    Procedure: CRYOTHERAPY;  Surgeon: Keiko Puri MD;  Location:  [facility]     DILATION AND CURETTAGE, HYSTEROSCOPY DIAGNOSTIC, COMBINED N/A 1/19/2016    Procedure: COMBINED DILATION AND  CURETTAGE, HYSTEROSCOPY DIAGNOSTIC;  Surgeon: Yeni Aparicio DO;  Location: [facility]     ESOPHAGOSCOPY, GASTROSCOPY, DUODENOSCOPY (EGD), COMBINED N/A 2018    Procedure: COMBINED ESOPHAGOSCOPY, GASTROSCOPY, DUODENOSCOPY (EGD), BIOPSY SINGLE OR MULTIPLE;  Surgeon: Duane, William Charles, MD;  Location: [facility]     IMPLANT STIMULATOR AND LEADS SACRAL NERVE (STAGE ONE AND TWO) N/A 2018    Procedure: IMPLANT STIMULATOR AND LEADS SACRAL NERVE (STAGE ONE AND TWO);  Interstim Implant Stage One and Two (Implant Permanent Lead and Generator);  Surgeon: Dada Baltazar MD;  [facility]: [facility]     IMPLANT STIMULATOR SACRAL NERVE PERCUTANEOUS TRIAL N/A 2018    Procedure: IMPLANT STIMULATOR SACRAL NERVE PERCUTANEOUS TRIAL;  Percutaneous Neural Examination (trial for interstim);  Surgeon: Dada Baltazar MD;  Location: [facility]     LAPAROSCOPIC TUBAL LIGATION  1981     PHACOEMULSIFICATION CLEAR CORNEA WITH STANDARD INTRAOCULAR LENS IMPLANT  8/15/2013    Procedure: PHACOEMULSIFICATION CLEAR CORNEA WITH STANDARD INTRAOCULAR LENS IMPLANT;  RIGHT PHACOEMULSIFICATION CLEAR CORNEA WITH STANDARD INTRAOCULAR LENS IMPLANT ;  Surgeon: Trae Taylor MD;  [facility]: [facility]     SURGICAL HISTORY OF -       x4, ankle surgery       Social History     Tobacco Use     Smoking status: Former Smoker     Packs/day: 1.00     Years: 20.00     Pack years: 20.00     Types: Cigarettes     Start date: 2018     Smokeless tobacco: Never Used   Substance Use Topics     Alcohol use: Yes     Comment: social/3-4 per week     Family History   Problem Relation Age of Onset     Hypertension Mother      Cerebrovascular Disease Mother      Arthritis Mother      Cardiovascular Mother      Circulatory Mother      Cerebrovascular Disease Father      Prostate Cancer Father 65         at 69     Asthma Brother      Prostate Cancer Brother 48        bone metastasis     Diabetes Sister      Hypertension Sister      Osteoporosis Sister   "    Depression Sister      Lipids Sister      Cancer Maternal Grandmother 95        spine     Breast Cancer Sister 39        also contralateral @53, mets to lungs     Cancer Sister 20        second uterine cancer in 30s also     Diabetes Sister      Hypertension Sister      Depression Sister      Osteoporosis Sister      Breast Cancer Sister 57        unilateral     Cancer Paternal Aunt 40        unknown type     Cancer Paternal Uncle         stomach;  in his 80s     Prostate Cancer Brother      Glaucoma No family hx of      Melanoma No family hx of      Skin Cancer No family hx of          Current Outpatient Medications   Medication Sig Dispense Refill     calcium carbonate-vitamin D (OSCAL W/D) 500-200 MG-UNIT tablet Take 1 tablet by mouth 2 times daily (with meals) 180 tablet 3     Continuous Blood Gluc  (FREESTYLE MELINA READER) AYAN 1 Units daily 1 Device 0     Continuous Blood Gluc  (FREESTYLE MELINA READER) AYAN 1 Units 3 times daily as needed 1 Device 0     continuous blood glucose monitoring (FREESTYLE MELINA) sensor For use with Freestyle Melina Flash  for continuous monitioring of blood glucose levels. Replace sensor every 10 days. 3 each 5     DULoxetine (CYMBALTA) 60 MG capsule Take 1 capsule (60 mg) by mouth 2 times daily 180 capsule 1     glipiZIDE (GLUCOTROL) 10 MG tablet Take 1 tablet (10 mg) by mouth daily (with dinner) 90 tablet 0     insulin NPH (NOVOLIN N VIAL) 100 UNIT/ML injection Inject 64 units Subcutaneous 2 times daily. (gets this from [facility] at [facility])       insulin syringe-needle U-100 (RELION INSULIN SYRINGE) 31G X 5/16\" 1 ML Use 2 syringes daily or as directed. 200 each 3     lisinopril (PRINIVIL/ZESTRIL) 5 MG tablet Take 1 tablet (5 mg) by mouth daily 90 tablet 3     MELATONIN PO 10 mg At Bedtime        metFORMIN (GLUCOPHAGE-XR) 500 MG 24 hr tablet Take 2 tablets (1,000 mg) by mouth 2 times daily (with meals) 360 tablet 1     minocycline (MINOCIN/DYNACIN) 100 " MG capsule Take 1 capsule (100 mg) by mouth every 12 hours 180 capsule 2     omeprazole (PRILOSEC) 40 MG capsule TAKE 1 CAPSULE DAILY 30 TO 60 MINUTES BEFORE A MEAL 90 capsule 3     ONE TOUCH LANCETS None Entered       ONE TOUCH TEST STRIPS VI None Entered       order for DME Equipment being ordered: Walker with seat light weight 1 Device 0     oxybutynin ER (DITROPAN XL) 15 MG 24 hr tablet Take 1 tablet (15 mg) by mouth daily 90 tablet 1     simvastatin (ZOCOR) 20 MG tablet Take 1 tablet (20 mg) by mouth At Bedtime 90 tablet 3     spironolactone (ALDACTONE) 100 MG tablet TAKE 1 TABLET EVERY MORNING 90 tablet 3     traZODone (DESYREL) 100 MG tablet Take 1 tablet (100 mg) by mouth nightly as needed for sleep 90 tablet 3     VITAMIN D 1000 UNIT OR CAPS 1 CAPSULE DAILY       vitamin D3 (CHOLECALCIFEROL) 1000 units (25 mcg) tablet Take 1 tablet (1,000 Units) by mouth daily 90 tablet 3     Allergies   Allergen Reactions     Codeine      Sulfa Drugs      BP Readings from Last 3 Encounters:   03/12/19 123/70   03/04/19 120/72   01/29/19 119/80    Wt Readings from Last 3 Encounters:   03/12/19 94.1 kg (207 lb 8 oz)   01/29/19 94.8 kg (209 lb)   01/25/19 94.2 kg (207 lb 11.2 oz)                  Labs reviewed in EPIC    Reviewed and updated as needed this visit by clinical staff  Tobacco  Allergies  Meds  Med Hx  Surg Hx  Fam Hx  Soc Hx      Reviewed and updated as needed this visit by Provider         ROS:  CONSTITUTIONAL:NEGATIVE for fever, chills, change in weight  ENT/MOUTH: NEGATIVE for bleeding gums, epistaxis, fever, nasal congestion and postnasal drainageStates has a bad taste in her mouth. Has appointment with dentist. Does mouth breath   RESP:NEGATIVE for significant cough or SOB  CV: NEGATIVE for chest pain, palpitations or peripheral edema  GI: POSITIVE for abdominal pain generalized, dyspepsia and gas or bloating and NEGATIVE for hematemesis, hematochezia, jaundice, melena and vomiting  MUSCULOSKELETAL:  NEGATIVE for significant arthralgias or myalgia  NEURO: NEGATIVE for weakness, dizziness or paresthesias  PSYCHIATRIC: POSITIVE foranxiety, depressed mood, Hx anxiety and Hx depression and NEGATIVE forthoughts of hurting someone else and thoughts of self harm    OBJECTIVE:     /70 (BP Location: Right arm, Patient Position: Sitting, Cuff Size: Adult Regular)   Pulse 101   Temp 98.3  F (36.8  C) (Temporal)   Wt 94.1 kg (207 lb 8 oz)   LMP 11/18/1991 (Exact Date)   SpO2 95%   BMI 32.26 kg/m    Body mass index is 32.26 kg/m .   Wt Readings from Last 4 Encounters:   03/12/19 94.1 kg (207 lb 8 oz)   01/29/19 94.8 kg (209 lb)   01/25/19 94.2 kg (207 lb 11.2 oz)   01/23/19 94.5 kg (208 lb 5 oz)       GENERAL APPEARANCE: alert, active and no distress  NECK: no adenopathy and thyroid normal to palpation  RESP: lungs clear to auscultation - no rales, rhonchi or wheezes  CV: regular rates and rhythm and no murmur, click or rub  MS: extremities normal- no gross deformities noted  SKIN: Skin color, texture, turgor normal.   NEURO: Normal strength and tone, mentation intact and speech normal  PSYCH: mentation appears normal, patient appearance--, anxious, fatigued and worried  MENTAL STATUS EXAM:  Appearance/Behavior: No apparent distress, Casually groomed and Dressed appropriately for weather  Speech: Rambling  Mood/Affect: depressed affect and anxiety  Insight: Fair    Diagnostic Test Results:  Recent Results (from the past 504 hour(s))   Hemoglobin A1c    Collection Time: 03/12/19  1:31 PM   Result Value Ref Range    Hemoglobin A1C 6.4 (H) 0 - 5.6 %   CBC with platelets and differential    Collection Time: 03/12/19  1:31 PM   Result Value Ref Range    WBC 10.6 4.0 - 11.0 10e9/L    RBC Count 4.88 3.8 - 5.2 10e12/L    Hemoglobin 12.8 11.7 - 15.7 g/dL    Hematocrit 40.8 35.0 - 47.0 %    MCV 84 78 - 100 fl    MCH 26.2 (L) 26.5 - 33.0 pg    MCHC 31.4 (L) 31.5 - 36.5 g/dL    RDW 16.2 (H) 10.0 - 15.0 %    Platelet Count  295 150 - 450 10e9/L    Diff Method Automated Method     % Neutrophils 70.2 %    % Lymphocytes 22.4 %    % Monocytes 5.5 %    % Eosinophils 1.1 %    % Basophils 0.5 %    % Immature Granulocytes 0.3 %    Absolute Neutrophil 7.4 1.6 - 8.3 10e9/L    Absolute Lymphocytes 2.4 0.8 - 5.3 10e9/L    Absolute Monocytes 0.6 0.0 - 1.3 10e9/L    Absolute Eosinophils 0.1 0.0 - 0.7 10e9/L    Absolute Basophils 0.1 0.0 - 0.2 10e9/L    Abs Immature Granulocytes 0.0 0 - 0.4 10e9/L   Ferritin    Collection Time: 03/12/19  1:31 PM   Result Value Ref Range    Ferritin 6 (L) 8 - 252 ng/mL   Dermatological path order and indications    Collection Time: 03/12/19  2:40 PM   Result Value Ref Range    Copath Report       Patient Name: HAVEN FORTE  MR#: [id number]  Specimen #: [id number]  Collected: 3/12/2019  Received: 3/13/2019  Reported: 3/18/2019 11:36  Ordering Phy(s): JIMMY ROSADO    For improved result formatting, select 'View Enhanced Report Format' under   Linked Documents section.    SPECIMEN(S):  A: Shave, right lower cutaneous lip lateral  B: Shave, right lower lip medial    FINAL DIAGNOSIS:  A. Shave, right lower cutaneous lip lateral:  - Patchy lichenoid lymphocytic infiltrate - (see comment)    B. Shave, right lower lip medial:  - Mild epidermal hyperplasia with increased basal pigment and papillary   dermal fibrosis - (see comment)    COMMENT:  A. These features are most consistent with an inflamed benign keratosis.   The differential diagnosis also  includes a lichenoid dermatitis such as lichen planus, though this is   viewed as less likely in light of the  clinical information. Clinical correlation is recommended.    B. These features are quite subtle and not entirely diagnostic, but may    represent a traumatized benign  keratosis.  No definite cyst wall is identified on multiple level   sections.  While there is no evidence of  malignancy, little dermis is seen, thus clinical followup is recommended.    I have personally  "reviewed all specimens and/or slides, including the   listed special stains, and used them  with my medical judgement to determine or confirm the final diagnosis.    Electronically signed out by:    Sina Zamudio M.D., [facility], [place]    CLINICAL HISTORY:  The patient is a 68-year-old female.    GROSS:  A:  The specimen is received in formalin with proper patient   identification, labeled \"right lower cutaneous  lip lateral\".  The specimen consists of a 0.7 x 0.3 x 0.1 cm irregular tan   skin shave.  The skin surface is  tan-white and unremarkable.  The resection margin is inked blue, the   specimen is bisected and submitted in its  entirety in cassette A1.    B:  The specimen is received in formalin with proper patient   identification, labeled \"ri ght lower lip medial\".   The specimen consists of a 0.4 x 0.3 x 0.1 cm irregular tan skin shave.    The skin surface is tan-white and  unremarkable.  The resection margin is inked blue, the specimen is intact   and submitted in its entirety in  cassette B1. (Dictated by: Mahesh Gutierrez 3/13/2019 11:06 AM)    MICROSCOPIC:  A. The specimen exhibits mostly compact orthokeratosis, mild epidermal   hyperplasia with hypergranulosis, basal  layer vacuolization, occasional necrotic keratinocytes and a patchy   lichenoid lymphocytic infiltrate.    B. The specimen exhibits mild epidermal hyperplasia with hypergranulosis,   increased basal keratinocyte  pigmentation, focal basal vacuolization, papillary dermal fibrosis with   rare colloid bodies, and a sparse  perivascular lymphocytic infiltrate.    The technical component of this testing was completed at the [facility], with the professional component performed   at the [facility], [address] [place] [phone] ([phone])    CPT Codes:  A: 68850-GH0.P, 39326-LI6.T  B: 67547-BL5.P, " 54982-ON7.T    COLLECTION SITE:  Client: [facility], [place]  Location: [facility] (B)       UA with Microscopic reflex to Culture    Collection Time: 03/12/19  2:59 PM   Result Value Ref Range    Color Urine Yellow     Appearance Urine Slightly Cloudy     Glucose Urine Negative NEG^Negative mg/dL    Bilirubin Urine Negative NEG^Negative    Ketones Urine Negative NEG^Negative mg/dL    Specific Gravity Urine 1.016 1.003 - 1.035    Blood Urine Negative NEG^Negative    pH Urine 5.5 5.0 - 7.0 pH    Protein Albumin Urine 10 (A) NEG^Negative mg/dL    Urobilinogen mg/dL Normal 0.0 - 2.0 mg/dL    Nitrite Urine Positive (A) NEG^Negative    Leukocyte Esterase Urine Large (A) NEG^Negative    Source Midstream Urine     WBC Urine 10-25 (A) OTO5^0 - 5 /HPF    RBC Urine O - 2 OTO2^O - 2 /HPF    Bacteria Urine Many (A) NEG^Negative /HPF    Squamous Epithelial /LPF Urine Few FEW^Few /LPF    Mucous Urine Present (A) NEG^Negative /LPF   Urine Culture Aerobic Bacterial    Collection Time: 03/12/19  2:59 PM   Result Value Ref Range    Specimen Description Midstream Urine     Culture Micro >100,000 colonies/mL  Klebsiella pneumoniae   (A)        Susceptibility    Klebsiella pneumoniae - ALICE     AMPICILLIN* >=32 Resistant ug/mL      * Intrinsically Resistant     CEFAZOLIN* <=4 Sensitive ug/mL      * Cefazolin ALICE breakpoints are for the treatment of uncomplicated urinary tract infections.  For the treatment of systemic infections, please contact the laboratory for additional testing.     CEFOXITIN 16 Intermediate ug/mL     CEFTAZIDIME <=1 Sensitive ug/mL     CEFTRIAXONE <=1 Sensitive ug/mL     CIPROFLOXACIN <=0.25 Sensitive ug/mL     GENTAMICIN <=1 Sensitive ug/mL     LEVOFLOXACIN 1 Intermediate ug/mL     NITROFURANTOIN 64 Intermediate ug/mL     TOBRAMYCIN <=1 Sensitive ug/mL     Trimethoprim/Sulfa <=1/19 Sensitive ug/mL     AMPICILLIN/SULBACTAM 16 Intermediate ug/mL     Piperacillin/Tazo 16 Sensitive ug/mL      CEFEPIME <=1 Sensitive ug/mL             ASSESSMENT/PLAN:     Radha was seen today for weight loss.    Diagnoses and all orders for this visit:    Weight loss  Although patient had some weight loss last fall has been stable the last 3 months in terms of weight   However continues to have persistent complaint of dyspepsia  FOLLOW UP WITH SPECIALIST :Gastroenterology    Type 2 diabetes mellitus with hyperglycemia, with long-term current use of insulin (H)  glycohemoglobin monitoring discussed  No changes in current regimen  Attempt to improve diet      Moderate episode of recurrent major depressive disorder (H)  Reviewed concept of depression as function of biochemical imbalance of neurotransmitters/rationale for treatment.  Counseling advised but patient refuses   Patient instructed to call for significant side effects medications or problems  Patient advised immediate presentation to hospital for suicidal thought, etc.    Anemia, unspecified type  -     Ferritin    Urinary tract infection without hematuria, site unspecified  -     ciprofloxacin (CIPRO) 500 MG tablet; Take 1 tablet (500 mg) by mouth 2 times daily for 7 days  Patient was instructed to drink plenty of fluids, urinate frequently and to contact the office promptly should she notice fever greater than 102, increase in discomfort, skin rash, lack of improvement after 2 days of treatment, or the appearance of new symptoms.      See Patient Instructions  Patient Instructions     PLAN:   1.   Symptomatic therapy suggested: increase fluids and call prn if symptoms persist or worsen.  2.  Orders Placed This Encounter   Medications     ciprofloxacin (CIPRO) 500 MG tablet     Sig: Take 1 tablet (500 mg) by mouth 2 times daily for 7 days     Dispense:  14 tablet     Refill:  0     Orders Placed This Encounter   Procedures     Ferritin       3. Patient needs to follow up in if no improvement,or sooner if worsening of symptoms or other symptoms develop.  FOLLOW UP  WITH SPECIALIST :Gastroenterology  Will follow up and/or notify patient of  results via My Chart to determine further need for followup      NIKKY Lutz CNP  [facility]

## 2019-03-12 NOTE — LETTER
3/12/2019         RE: Radha Corbett  [address] [facility] [id number]        Dear Colleague,    Thank you for referring your patient, Radha Corbett, to the [facility]. Please see a copy of my visit note below.    [facility] Dermatology Note      Dermatology Problem List:  1. Consistent with prurigo nodule, left thigh  -s/p clobetasol ointment initiated 8/29/2016  2. Seborrheic dermatitis, nasolabial folds  -s/p OTC hydrocortisone    Encounter Date: Mar 12, 2019    CC:  Chief Complaint   Patient presents with     RECHECK     Radha is returning to discuss an area on her lip that returned.          History of Present Illness:  Ms. Radha Corbett is a 68 year old female who presents for follow for recheck of a tender area on the right lower cutaneous lip. Last seen 1/25/19 when she declined biopsy of the site. Today, the patient reports that she is still anxious about the site on the lip. Would prefer to biopsy it today. She also reports she stopped picking spot on the arm.     Past Medical History:   Patient Active Problem List   Diagnosis     Macular degeneration     Myopia     Hiatal hernia     IBS (irritable bowel syndrome)     Hypertension goal BP (blood pressure) < 140/90     GERD (gastroesophageal reflux disease)     Atypical ductal hyperplasia of breast     Atypical lobular hyperplasia of breast     Benign Breast Disease (PASH)     Family history of breast cancer in sister     Type 2 diabetes, HbA1C goal < 8% (H)     Hyperlipidemia LDL goal <100     Breast cancer screening-high risk     Tubular adenoma of colon     Osteopenia     Alcohol ingestion, more than 4 drinks/day on alcohol screening     Umbilical hernia     Incontinence of urine     Stool incontinence     Osteoarthritis, knee     Rosacea     Anxiety     Pseudophakia     Abnormal cervical Pap smear with positive HPV DNA test     Depression with anxiety     Retinal detachment     Elevated liver function tests  "    Rectocele     Unexplained endometrial cells on cervical cytology     Back pain, unspecified back location, unspecified back pain laterality, unspecified chronicity     Urge incontinence     Dislocation of shoulder region     Acute lower GI bleeding     Morbid obesity (H)     Moderate episode of recurrent major depressive disorder (H)     Past Medical History:   Diagnosis Date     Actinic cheilitis      Arthritis      DM (diabetes mellitus) (H)      GERD (gastroesophageal reflux disease)      Hiatal hernia      HPV in female      HTN (hypertension)      IBS (irritable bowel syndrome)      Major depression      Myopia      Other and unspecified hyperlipidemia      Pseudoangiomatous stromal hyperplasia of breast 2010    [facility] [place]     Rotator cuff injury 7/2016     Sleep apnea     \"snore\"     Vitamin D deficiency      Past Surgical History:   Procedure Laterality Date     BREAST LUMPECTOMY, RT/LT      x2, left     CAPSULE/PILL CAM ENDOSCOPY N/A 3/4/2019    Procedure: CAPSULE/PILL CAM ENDOSCOPY;  Surgeon: Sinan Valdes MD;  Location: [facility]     CATARACT IOL, RT/LT  4/99    left, MZ60CD 7.0D     COLONOSCOPY       COLONOSCOPY N/A 1/22/2016    Procedure: COMBINED COLONOSCOPY, SINGLE OR MULTIPLE BIOPSY/POLYPECTOMY BY BIOPSY;  Surgeon: Praful Benjamin MD;  Location: [facility]     COLONOSCOPY WITH CO2 INSUFFLATION N/A 1/22/2016    Procedure: COLONOSCOPY WITH CO2 INSUFFLATION;  Surgeon: Praful Benjamin MD;  Location: [facility]     COMBINED ESOPHAGOSCOPY, GASTROSCOPY, DUODENOSCOPY (EGD) WITH CO2 INSUFFLATION N/A 11/27/2018    Procedure: COMBINED ESOPHAGOSCOPY, GASTROSCOPY, DUODENOSCOPY (EGD) WITH CO2 INSUFFLATION;  Surgeon: Duane, William Charles, MD;  Location: [facility]     CRYOTHERAPY  2000    cervical     CRYOTHERAPY Left 3/19/2015    Procedure: CRYOTHERAPY;  Surgeon: Keiko Puri MD;  Location:  [facility]     DILATION AND CURETTAGE, HYSTEROSCOPY DIAGNOSTIC, COMBINED N/A 1/19/2016    Procedure: COMBINED " DILATION AND CURETTAGE, HYSTEROSCOPY DIAGNOSTIC;  Surgeon: Yeni Aparicio DO;  Location: [facility]     ESOPHAGOSCOPY, GASTROSCOPY, DUODENOSCOPY (EGD), COMBINED N/A 2018    Procedure: COMBINED ESOPHAGOSCOPY, GASTROSCOPY, DUODENOSCOPY (EGD), BIOPSY SINGLE OR MULTIPLE;  Surgeon: Duane, William Charles, MD;  Location: [facility]     IMPLANT STIMULATOR AND LEADS SACRAL NERVE (STAGE ONE AND TWO) N/A 2018    Procedure: IMPLANT STIMULATOR AND LEADS SACRAL NERVE (STAGE ONE AND TWO);  Interstim Implant Stage One and Two (Implant Permanent Lead and Generator);  Surgeon: Dada Baltazar MD;  Location: [facility]     IMPLANT STIMULATOR SACRAL NERVE PERCUTANEOUS TRIAL N/A 2018    Procedure: IMPLANT STIMULATOR SACRAL NERVE PERCUTANEOUS TRIAL;  Percutaneous Neural Examination (trial for interstim);  Surgeon: Dada Baltazar MD;  Location: [facility]     LAPAROSCOPIC TUBAL LIGATION  1981     PHACOEMULSIFICATION CLEAR CORNEA WITH STANDARD INTRAOCULAR LENS IMPLANT  8/15/2013    Procedure: PHACOEMULSIFICATION CLEAR CORNEA WITH STANDARD INTRAOCULAR LENS IMPLANT;  RIGHT PHACOEMULSIFICATION CLEAR CORNEA WITH STANDARD INTRAOCULAR LENS IMPLANT ;  Surgeon: Trae Taylor MD;  [facility]: [facility]     SURGICAL HISTORY OF -       x4, ankle surgery       Social History:  Patient reports that she has quit smoking. Her smoking use included cigarettes. She started smoking about 3 months ago. She has a 20.00 pack-year smoking history. she has never used smokeless tobacco. She reports that she drinks alcohol. She reports that she does not use drugs.    Family History:  Family History   Problem Relation Age of Onset     Hypertension Mother      Cerebrovascular Disease Mother      Arthritis Mother      Cardiovascular Mother      Circulatory Mother      Cerebrovascular Disease Father      Prostate Cancer Father 65         at 69     Asthma Brother      Prostate Cancer Brother 48        bone metastasis     Diabetes Sister       "Hypertension Sister      Osteoporosis Sister      Depression Sister      Lipids Sister      Cancer Maternal Grandmother 95        spine     Breast Cancer Sister 39        also contralateral @53, mets to lungs     Cancer Sister 20        second uterine cancer in 30s also     Diabetes Sister      Hypertension Sister      Depression Sister      Osteoporosis Sister      Breast Cancer Sister 57        unilateral     Cancer Paternal Aunt 40        unknown type     Cancer Paternal Uncle         stomach;  in his 80s     Prostate Cancer Brother      Glaucoma No family hx of      Melanoma No family hx of      Skin Cancer No family hx of        Medications:  Current Outpatient Medications   Medication Sig Dispense Refill     calcium carbonate-vitamin D (OSCAL W/D) 500-200 MG-UNIT tablet Take 1 tablet by mouth 2 times daily (with meals) 180 tablet 3     Continuous Blood Gluc  (FREESTYLE MELINA READER) AYAN 1 Units daily 1 Device 0     Continuous Blood Gluc  (FREESTYLE MELINA READER) AYAN 1 Units 3 times daily as needed 1 Device 0     continuous blood glucose monitoring (FREESTYLE MELINA) sensor For use with Freestyle Melina Flash  for continuous monitioring of blood glucose levels. Replace sensor every 10 days. 3 each 5     DULoxetine (CYMBALTA) 60 MG capsule Take 1 capsule (60 mg) by mouth 2 times daily 180 capsule 1     glipiZIDE (GLUCOTROL) 10 MG tablet Take 1 tablet (10 mg) by mouth daily (with dinner) 90 tablet 0     insulin NPH (NOVOLIN N VIAL) 100 UNIT/ML injection Inject 64 units Subcutaneous 2 times daily. (gets this from [facility] N[facility] at [facility])       insulin syringe-needle U-100 (RELION INSULIN SYRINGE) 31G X 5/16\" 1 ML Use 2 syringes daily or as directed. 200 each 3     lisinopril (PRINIVIL/ZESTRIL) 5 MG tablet Take 1 tablet (5 mg) by mouth daily 90 tablet 3     MELATONIN PO 10 mg At Bedtime        metFORMIN (GLUCOPHAGE-XR) 500 MG 24 hr tablet Take 2 tablets (1,000 mg) by mouth 2 times daily (with " meals) 360 tablet 1     minocycline (MINOCIN/DYNACIN) 100 MG capsule Take 1 capsule (100 mg) by mouth every 12 hours 180 capsule 2     omeprazole (PRILOSEC) 40 MG capsule TAKE 1 CAPSULE DAILY 30 TO 60 MINUTES BEFORE A MEAL 90 capsule 3     ONE TOUCH LANCETS None Entered       ONE TOUCH TEST STRIPS VI None Entered       order for DME Equipment being ordered: Walker with seat light weight 1 Device 0     oxybutynin ER (DITROPAN XL) 15 MG 24 hr tablet Take 1 tablet (15 mg) by mouth daily 90 tablet 1     simvastatin (ZOCOR) 20 MG tablet Take 1 tablet (20 mg) by mouth At Bedtime 90 tablet 3     spironolactone (ALDACTONE) 100 MG tablet TAKE 1 TABLET EVERY MORNING 90 tablet 3     traZODone (DESYREL) 100 MG tablet Take 1 tablet (100 mg) by mouth nightly as needed for sleep 90 tablet 3     VITAMIN D 1000 UNIT OR CAPS 1 CAPSULE DAILY       vitamin D3 (CHOLECALCIFEROL) 1000 units (25 mcg) tablet Take 1 tablet (1,000 Units) by mouth daily 90 tablet 3       Allergies   Allergen Reactions     Codeine      Sulfa Drugs        Review of Systems:  -Constitutional: not feeling well due to back pain. WEight loss following with PCP     -Skin: As above in HPI. No additional skin concerns.    Physical exam:  Vitals: LMP 11/18/1991 (Exact Date)   GEN: This is a well developed, well-nourished female in no acute distress, in a pleasant mood.    SKIN: Focused examination of the face, right arm  - On the right lower lip (medial), there is a 1 mm white papule  - On the right lower lip (lateral), there is faint scale on a base of erythema  -acneiform papules on the back with pustules  -scar on te right upper arm  -No other lesions of concern on areas examined.       Impression/Plan:  1. On the right lower lip (lateral), there is faint scale on a base of erythema. NUB. Differential includes actinic chelitis vs other.   Shave biopsy:  After discussion of benefits and risks including but not limited to bleeding/bruising, pain/swelling, infection,  scar, incomplete removal, nerve damage/numbness, recurrence, and non-diagnostic biopsy, written consent, verbal consent and photographs were obtained. Time-out was performed. The area was cleaned with isopropyl alcohol. 0.5mL of 1% lidocaine with epinephrine was injected to obtain adequate anesthesia of the lesion on the right lower lip (lateral). A shave biopsy was performed. Hemostasis was achieved with aluminium chloride. Vaseline and a sterile dressing were applied. The patient tolerated the procedure and no complications were noted. The patient was provided with verbal and written post care instructions.     2. On the right lower lip (medial), there is a 1 mm white papule. Pt very concerned skin cancer. Milium or scar or other.     Shave biopsy:  After discussion of benefits and risks including but not limited to bleeding/bruising, pain/swelling, infection, scar, incomplete removal, nerve damage/numbness, recurrence, and non-diagnostic biopsy, written consent, verbal consent and photographs were obtained. Time-out was performed. The area was cleaned with isopropyl alcohol. 0.5mL of 1% lidocaine with epinephrine was injected to obtain adequate anesthesia of the lesion on the right lower lip (medial). A shave biopsy was performed. Hemostasis was achieved with aluminium chloride. Vaseline and a sterile dressing were applied. The patient tolerated the procedure and no complications were noted. The patient was provided with verbal and written post care instructions.     3. On the right upper arm there is a violaceous papule - consistent with prior trauma. Now a scar seen.     We will clinically monitor this spot. She will notify us if it returns  4. Acne on back  -Start BP wash  Follow up in in 3months for acne    Staff Involved:  Scribe/Staff    Scribe Disclosure  I, Jeffery Hansen, am serving as a scribe to document services personally performed by Dr. Mary Lira MD, based on data collection and the provider's  statements to me.     Provider Disclosure:   The documentation recorded by the scribe accurately reflects the services I personally performed and the decisions made by me.    Mary Liar MD    Department of Dermatology  [facility]: Phone: [phone], Fax:[phone]  [facility]: Phone: [phone], Fax: [phone]          Again, thank you for allowing me to participate in the care of your patient.        Sincerely,        Mary Lira MD

## 2019-03-12 NOTE — NURSING NOTE
Radha Corbett's goals for this visit include:   Chief Complaint   Patient presents with     RECHECK     Radha is returning to discuss an area on her lip that returned.        She requests these members of her care team be copied on today's visit information:     PCP: Trice Medeiros    Referring Provider:  NIKKY Lutz CNP  [address]  [place] [address]    [facility] 11/[birth year]/1991 (Exact Date)     Do you need any medication refills at today's visit? No  Nette Coy LPN

## 2019-03-12 NOTE — PROGRESS NOTES
[facility] Dermatology Note      Dermatology Problem List:  1. Consistent with prurigo nodule, left thigh  -s/p clobetasol ointment initiated 8/29/2016  2. Seborrheic dermatitis, nasolabial folds  -s/p OTC hydrocortisone    Encounter Date: Mar 12, 2019    CC:  Chief Complaint   Patient presents with     RECHECK     Radha is returning to discuss an area on her lip that returned.          History of Present Illness:  Ms. Radha Corbett is a 68 year old female who presents for follow for recheck of a tender area on the right lower cutaneous lip. Last seen 1/25/19 when she declined biopsy of the site. Today, the patient reports that she is still anxious about the site on the lip. Would prefer to biopsy it today. She also reports she stopped picking spot on the arm.     Past Medical History:   Patient Active Problem List   Diagnosis     Macular degeneration     Myopia     Hiatal hernia     IBS (irritable bowel syndrome)     Hypertension goal BP (blood pressure) < 140/90     GERD (gastroesophageal reflux disease)     Atypical ductal hyperplasia of breast     Atypical lobular hyperplasia of breast     Benign Breast Disease (PASH)     Family history of breast cancer in sister     Type 2 diabetes, HbA1C goal < 8% (H)     Hyperlipidemia LDL goal <100     Breast cancer screening-high risk     Tubular adenoma of colon     Osteopenia     Alcohol ingestion, more than 4 drinks/day on alcohol screening     Umbilical hernia     Incontinence of urine     Stool incontinence     Osteoarthritis, knee     Rosacea     Anxiety     Pseudophakia     Abnormal cervical Pap smear with positive HPV DNA test     Depression with anxiety     Retinal detachment     Elevated liver function tests     Rectocele     Unexplained endometrial cells on cervical cytology     Back pain, unspecified back location, unspecified back pain laterality, unspecified chronicity     Urge incontinence     Dislocation of shoulder region     Acute lower GI  "bleeding     Morbid obesity (H)     Moderate episode of recurrent major depressive disorder (H)     Past Medical History:   Diagnosis Date     Actinic cheilitis      Arthritis      DM (diabetes mellitus) (H)      GERD (gastroesophageal reflux disease)      Hiatal hernia      HPV in female      HTN (hypertension)      IBS (irritable bowel syndrome)      Major depression      Myopia      Other and unspecified hyperlipidemia      Pseudoangiomatous stromal hyperplasia of breast 2010    PASH     Rotator cuff injury 7/2016     Sleep apnea     \"snore\"     Vitamin D deficiency      Past Surgical History:   Procedure Laterality Date     BREAST LUMPECTOMY, RT/LT      x2, left     CAPSULE/PILL CAM ENDOSCOPY N/A 3/4/2019    Procedure: CAPSULE/PILL CAM ENDOSCOPY;  Surgeon: Sinan Valdes MD;  Location: [facility]     CATARACT IOL, RT/LT  4/99    left, MZ60CD 7.0D     COLONOSCOPY       COLONOSCOPY N/A 1/22/2016    Procedure: COMBINED COLONOSCOPY, SINGLE OR MULTIPLE BIOPSY/POLYPECTOMY BY BIOPSY;  Surgeon: Praful Benjamin MD;  Location: [facility]     COLONOSCOPY WITH CO2 INSUFFLATION N/A 1/22/2016    Procedure: COLONOSCOPY WITH CO2 INSUFFLATION;  Surgeon: Praful Benjamin MD;  Location: [facility]     COMBINED ESOPHAGOSCOPY, GASTROSCOPY, DUODENOSCOPY (EGD) WITH CO2 INSUFFLATION N/A 11/27/2018    Procedure: COMBINED ESOPHAGOSCOPY, GASTROSCOPY, DUODENOSCOPY (EGD) WITH CO2 INSUFFLATION;  Surgeon: Duane, William Charles, MD;  Location: [facility]     CRYOTHERAPY  2000    cervical     CRYOTHERAPY Left 3/19/2015    Procedure: CRYOTHERAPY;  Surgeon: Keiko Puri MD;  Location:  [facility]     DILATION AND CURETTAGE, HYSTEROSCOPY DIAGNOSTIC, COMBINED N/A 1/19/2016    Procedure: COMBINED DILATION AND CURETTAGE, HYSTEROSCOPY DIAGNOSTIC;  Surgeon: Yeni Aparicio DO;  Location: [facility]     ESOPHAGOSCOPY, GASTROSCOPY, DUODENOSCOPY (EGD), COMBINED N/A 11/27/2018    Procedure: COMBINED ESOPHAGOSCOPY, GASTROSCOPY, DUODENOSCOPY " (EGD), BIOPSY SINGLE OR MULTIPLE;  Surgeon: Duane, William Charles, MD;  Location: [facility]     IMPLANT STIMULATOR AND LEADS SACRAL NERVE (STAGE ONE AND TWO) N/A 2018    Procedure: IMPLANT STIMULATOR AND LEADS SACRAL NERVE (STAGE ONE AND TWO);  Interstim Implant Stage One and Two (Implant Permanent Lead and Generator);  Surgeon: Dada Baltazar MD;  [facility]     IMPLANT STIMULATOR SACRAL NERVE PERCUTANEOUS TRIAL N/A 2018    Procedure: IMPLANT STIMULATOR SACRAL NERVE PERCUTANEOUS TRIAL;  Percutaneous Neural Examination (trial for interstim);  Surgeon: Dada Baltazar MD;  [facility]: [facility]     LAPAROSCOPIC TUBAL LIGATION       PHACOEMULSIFICATION CLEAR CORNEA WITH STANDARD INTRAOCULAR LENS IMPLANT  8/15/2013    Procedure: PHACOEMULSIFICATION CLEAR CORNEA WITH STANDARD INTRAOCULAR LENS IMPLANT;  RIGHT PHACOEMULSIFICATION CLEAR CORNEA WITH STANDARD INTRAOCULAR LENS IMPLANT ;  Surgeon: Trae Taylor MD;  Location: [facility]     SURGICAL HISTORY OF -       x4, ankle surgery       Social History:  Patient reports that she has quit smoking. Her smoking use included cigarettes. She started smoking about 3 months ago. She has a 20.00 pack-year smoking history. she has never used smokeless tobacco. She reports that she drinks alcohol. She reports that she does not use drugs.    Family History:  Family History   Problem Relation Age of Onset     Hypertension Mother      Cerebrovascular Disease Mother      Arthritis Mother      Cardiovascular Mother      Circulatory Mother      Cerebrovascular Disease Father      Prostate Cancer Father 65         at 69     Asthma Brother      Prostate Cancer Brother 48        bone metastasis     Diabetes Sister      Hypertension Sister      Osteoporosis Sister      Depression Sister      Lipids Sister      Cancer Maternal Grandmother 95        spine     Breast Cancer Sister 39        also contralateral @53, mets to lungs     Cancer Sister 20        second  "uterine cancer in 30s also     Diabetes Sister      Hypertension Sister      Depression Sister      Osteoporosis Sister      Breast Cancer Sister 57        unilateral     Cancer Paternal Aunt 40        unknown type     Cancer Paternal Uncle         stomach;  in his 80s     Prostate Cancer Brother      Glaucoma No family hx of      Melanoma No family hx of      Skin Cancer No family hx of        Medications:  Current Outpatient Medications   Medication Sig Dispense Refill     calcium carbonate-vitamin D (OSCAL W/D) 500-200 MG-UNIT tablet Take 1 tablet by mouth 2 times daily (with meals) 180 tablet 3     Continuous Blood Gluc  (FREESTYLE MELINA READER) AYAN 1 Units daily 1 Device 0     Continuous Blood Gluc  (FREESTYLE MELINA READER) AYAN 1 Units 3 times daily as needed 1 Device 0     continuous blood glucose monitoring (FREESTYLE MELINA) sensor For use with Freestyle Melina Flash  for continuous monitioring of blood glucose levels. Replace sensor every 10 days. 3 each 5     DULoxetine (CYMBALTA) 60 MG capsule Take 1 capsule (60 mg) by mouth 2 times daily 180 capsule 1     glipiZIDE (GLUCOTROL) 10 MG tablet Take 1 tablet (10 mg) by mouth daily (with dinner) 90 tablet 0     insulin NPH (NOVOLIN N VIAL) 100 UNIT/ML injection Inject 64 units Subcutaneous 2 times daily. (gets this from [facility] NPH at [facility])       insulin syringe-needle U-100 (RELION INSULIN SYRINGE) 31G X 5/16\" 1 ML Use 2 syringes daily or as directed. 200 each 3     lisinopril (PRINIVIL/ZESTRIL) 5 MG tablet Take 1 tablet (5 mg) by mouth daily 90 tablet 3     MELATONIN PO 10 mg At Bedtime        metFORMIN (GLUCOPHAGE-XR) 500 MG 24 hr tablet Take 2 tablets (1,000 mg) by mouth 2 times daily (with meals) 360 tablet 1     minocycline (MINOCIN/DYNACIN) 100 MG capsule Take 1 capsule (100 mg) by mouth every 12 hours 180 capsule 2     omeprazole (PRILOSEC) 40 MG capsule TAKE 1 CAPSULE DAILY 30 TO 60 MINUTES BEFORE A MEAL 90 capsule 3     ONE " TOUCH LANCETS None Entered       ONE TOUCH TEST STRIPS VI None Entered       order for DME Equipment being ordered: Walker with seat light weight 1 Device 0     oxybutynin ER (DITROPAN XL) 15 MG 24 hr tablet Take 1 tablet (15 mg) by mouth daily 90 tablet 1     simvastatin (ZOCOR) 20 MG tablet Take 1 tablet (20 mg) by mouth At Bedtime 90 tablet 3     spironolactone (ALDACTONE) 100 MG tablet TAKE 1 TABLET EVERY MORNING 90 tablet 3     traZODone (DESYREL) 100 MG tablet Take 1 tablet (100 mg) by mouth nightly as needed for sleep 90 tablet 3     VITAMIN D 1000 UNIT OR CAPS 1 CAPSULE DAILY       vitamin D3 (CHOLECALCIFEROL) 1000 units (25 mcg) tablet Take 1 tablet (1,000 Units) by mouth daily 90 tablet 3       Allergies   Allergen Reactions     Codeine      Sulfa Drugs        Review of Systems:  -Constitutional: not feeling well due to back pain. WEight loss following with PCP     -Skin: As above in HPI. No additional skin concerns.    Physical exam:  Vitals: LMP 11/18/1991 (Exact Date)   GEN: This is a well developed, well-nourished female in no acute distress, in a pleasant mood.    SKIN: Focused examination of the face, right arm  - On the right lower lip (medial), there is a 1 mm white papule  - On the right lower lip (lateral), there is faint scale on a base of erythema  -acneiform papules on the back with pustules  -scar on te right upper arm  -No other lesions of concern on areas examined.       Impression/Plan:  1. On the right lower lip (lateral), there is faint scale on a base of erythema. NUB. Differential includes actinic chelitis vs other.   Shave biopsy:  After discussion of benefits and risks including but not limited to bleeding/bruising, pain/swelling, infection, scar, incomplete removal, nerve damage/numbness, recurrence, and non-diagnostic biopsy, written consent, verbal consent and photographs were obtained. Time-out was performed. The area was cleaned with isopropyl alcohol. 0.5mL of 1% lidocaine with  epinephrine was injected to obtain adequate anesthesia of the lesion on the right lower lip (lateral). A shave biopsy was performed. Hemostasis was achieved with aluminium chloride. Vaseline and a sterile dressing were applied. The patient tolerated the procedure and no complications were noted. The patient was provided with verbal and written post care instructions.     2. On the right lower lip (medial), there is a 1 mm white papule. Pt very concerned skin cancer. Milium or scar or other.     Shave biopsy:  After discussion of benefits and risks including but not limited to bleeding/bruising, pain/swelling, infection, scar, incomplete removal, nerve damage/numbness, recurrence, and non-diagnostic biopsy, written consent, verbal consent and photographs were obtained. Time-out was performed. The area was cleaned with isopropyl alcohol. 0.5mL of 1% lidocaine with epinephrine was injected to obtain adequate anesthesia of the lesion on the right lower lip (medial). A shave biopsy was performed. Hemostasis was achieved with aluminium chloride. Vaseline and a sterile dressing were applied. The patient tolerated the procedure and no complications were noted. The patient was provided with verbal and written post care instructions.     3. On the right upper arm there is a violaceous papule - consistent with prior trauma. Now a scar seen.     We will clinically monitor this spot. She will notify us if it returns  4. Acne on back  -Start BP wash  Follow up in in 3months for acne    Staff Involved:  Scribe/Staff    Scribe Disclosure  I, Jeffery Hansen, am serving as a scribe to document services personally performed by Dr. Mary Lira MD, based on data collection and the provider's statements to me.     Provider Disclosure:   The documentation recorded by the scribe accurately reflects the services I personally performed and the decisions made by me.    Mary Lira MD    Department of Dermatology  [place]  [facility]: Phone: [phone], Fax:[phone]  [facility]: Phone: [phone], Fax: [phone]

## 2019-03-13 DIAGNOSIS — Z79.4 TYPE 2 DIABETES MELLITUS WITH HYPERGLYCEMIA, WITH LONG-TERM CURRENT USE OF INSULIN (H): ICD-10-CM

## 2019-03-13 DIAGNOSIS — E11.65 TYPE 2 DIABETES MELLITUS WITH HYPERGLYCEMIA, WITH LONG-TERM CURRENT USE OF INSULIN (H): ICD-10-CM

## 2019-03-13 RX ORDER — METFORMIN HCL 500 MG
TABLET, EXTENDED RELEASE 24 HR ORAL
Qty: 360 TABLET | Refills: 1 | Status: SHIPPED | OUTPATIENT
Start: 2019-03-13 | End: 2019-09-16

## 2019-03-13 ASSESSMENT — ANXIETY QUESTIONNAIRES: GAD7 TOTAL SCORE: 17

## 2019-03-13 NOTE — TELEPHONE ENCOUNTER
LOV- 3/12/19. Reviewed labs and OV note. Prescription approved per refill protocol.    Tessa Blake RN

## 2019-03-14 LAB
BACTERIA SPEC CULT: ABNORMAL
SPECIMEN SOURCE: ABNORMAL

## 2019-03-18 LAB — COPATH REPORT: NORMAL

## 2019-03-26 LAB — VIDEO CAPSULE ENDOSCOPY: NORMAL

## 2019-03-27 ENCOUNTER — TRANSFERRED RECORDS (OUTPATIENT)
Dept: HEALTH INFORMATION MANAGEMENT | Facility: CLINIC | Age: 69
End: 2019-03-27

## 2019-04-01 ENCOUNTER — TELEPHONE (OUTPATIENT)
Dept: PEDIATRICS | Facility: CLINIC | Age: 69
End: 2019-04-01

## 2019-04-01 DIAGNOSIS — D50.0 IRON DEFICIENCY ANEMIA DUE TO CHRONIC BLOOD LOSS: ICD-10-CM

## 2019-04-01 DIAGNOSIS — E11.65 TYPE 2 DIABETES MELLITUS WITH HYPERGLYCEMIA, WITH LONG-TERM CURRENT USE OF INSULIN (H): ICD-10-CM

## 2019-04-01 DIAGNOSIS — Z79.4 TYPE 2 DIABETES MELLITUS WITH HYPERGLYCEMIA, WITH LONG-TERM CURRENT USE OF INSULIN (H): ICD-10-CM

## 2019-04-01 DIAGNOSIS — K55.20 ANGIODYSPLASIA OF COLON: Primary | ICD-10-CM

## 2019-04-01 DIAGNOSIS — M54.9 BACK PAIN, UNSPECIFIED BACK LOCATION, UNSPECIFIED BACK PAIN LATERALITY, UNSPECIFIED CHRONICITY: ICD-10-CM

## 2019-04-01 NOTE — TELEPHONE ENCOUNTER
[facility]    Phone Message  has an appt on Thursday but would like a call today regarding her Urinary infection and leaving a sample- Would like a rx for a light weight walker with feet - she has a back fx. - Call in to [facility]  [place] - Also, would like a call with her Capsule endoscopy results from 4 weeks ago -       May a detailed message be left on voicemail: yes    Reason for Call: Other: has an appt on Thursday but would like a call today regarding her Urinary infection, also would like a rx for a light weight walker with feet - she has a back fx. - Called in to [facility] [place] - Also would like a call with her Capsule endoscopy results from 4 weeks ago -      Action Taken: Message routed to:  Primary Care p [phone]

## 2019-04-01 NOTE — TELEPHONE ENCOUNTER
Called patient- she wears a pad and so she gets frequent UTIs. She improved after she was seen on 3/12 and then 2 days ago felt her symptoms return. Pended a UA so she can test before her 4/4 appt.   Please review the video capsule study.      MA- DME for a walker was ordered on 7/19 -faxed to [facility] at [phone].     Tessa Blake RN

## 2019-04-02 RX ORDER — GLIPIZIDE 10 MG/1
TABLET ORAL
Qty: 90 TABLET | Refills: 1 | Status: SHIPPED | OUTPATIENT
Start: 2019-04-02 | End: 2020-01-07

## 2019-04-02 NOTE — TELEPHONE ENCOUNTER
Please refer to RN refill guidelines. Refilled per protocol.    Georgiana Bravo RN   [facility]

## 2019-04-03 ENCOUNTER — TELEPHONE (OUTPATIENT)
Dept: PEDIATRICS | Facility: CLINIC | Age: 69
End: 2019-04-03

## 2019-04-03 NOTE — TELEPHONE ENCOUNTER
Please inform patient.  Her capsule endoscopy show that she has abnormal blood vessel collection in the colon.  This is most likely the site of bleeding causing her anemia.  Sometimes these are treatable with cauterization  depending on the location.  I would like her to see a gastroenterologist to see if these are treatable ones.  In the meantime, continue to take iron supplement.

## 2019-04-03 NOTE — TELEPHONE ENCOUNTER
[facility]    Phone Message    May a detailed message be left on voicemail: yes    Reason for Call: Medication Question or concern regarding medication   Prescription Clarification  Name of Medication: Walker  Prescribing Provider: Dr. Medeiros   Pharmacy: [facility] in [place]   What on the order needs clarification? Pharmacy is requesting the chart notes to show patient needs the walker. Please advise.          Action Taken: Message routed to:  Primary Care p [phone]

## 2019-04-03 NOTE — TELEPHONE ENCOUNTER
[facility]    Phone Message    May a detailed message be left on voicemail: yes    Reason for Call: Other: Patient is scheduled for an appointment wioth Dr. Medeiros tomorrow and requesting to following items: UA order for UTI symptoms., requesting a chair order  and order for a walker and discuss endoscopy results. Patient states she was working with Cat regarding requests.  Please advise.    Action Taken: Message routed to:  Primary Care p [phone]

## 2019-04-03 NOTE — TELEPHONE ENCOUNTER
Called patient, relayed message & patient verbalized understanding.   She will discuss the UA tomorrow.  She will call [facility] and ensure they received our fax.   Tessa Blake RN

## 2019-04-04 ENCOUNTER — OFFICE VISIT (OUTPATIENT)
Dept: PEDIATRICS | Facility: CLINIC | Age: 69
End: 2019-04-04
Payer: COMMERCIAL

## 2019-04-04 VITALS
SYSTOLIC BLOOD PRESSURE: 111 MMHG | WEIGHT: 210 LBS | HEART RATE: 117 BPM | OXYGEN SATURATION: 98 % | DIASTOLIC BLOOD PRESSURE: 68 MMHG | TEMPERATURE: 98.6 F | BODY MASS INDEX: 32.65 KG/M2

## 2019-04-04 DIAGNOSIS — K55.20 ANGIODYSPLASIA OF COLON: Primary | ICD-10-CM

## 2019-04-04 DIAGNOSIS — R82.90 ABNORMAL URINE FINDINGS: ICD-10-CM

## 2019-04-04 DIAGNOSIS — G89.29 CHRONIC MIDLINE LOW BACK PAIN WITHOUT SCIATICA: ICD-10-CM

## 2019-04-04 DIAGNOSIS — M48.061 SPINAL STENOSIS OF LUMBAR REGION WITHOUT NEUROGENIC CLAUDICATION: ICD-10-CM

## 2019-04-04 DIAGNOSIS — M54.50 CHRONIC MIDLINE LOW BACK PAIN WITHOUT SCIATICA: ICD-10-CM

## 2019-04-04 DIAGNOSIS — R30.0 DYSURIA: ICD-10-CM

## 2019-04-04 LAB
ALBUMIN UR-MCNC: 10 MG/DL
APPEARANCE UR: CLEAR
BACTERIA #/AREA URNS HPF: ABNORMAL /HPF
BILIRUB UR QL STRIP: NEGATIVE
COLOR UR AUTO: YELLOW
GLUCOSE UR STRIP-MCNC: NEGATIVE MG/DL
HGB UR QL STRIP: NEGATIVE
KETONES UR STRIP-MCNC: NEGATIVE MG/DL
LEUKOCYTE ESTERASE UR QL STRIP: ABNORMAL
NITRATE UR QL: NEGATIVE
NON-SQ EPI CELLS #/AREA URNS LPF: ABNORMAL /LPF
PH UR STRIP: 6 PH (ref 5–7)
RBC #/AREA URNS AUTO: ABNORMAL /HPF
SOURCE: ABNORMAL
SP GR UR STRIP: 1.02 (ref 1–1.03)
UROBILINOGEN UR STRIP-MCNC: NORMAL MG/DL (ref 0–2)
WBC #/AREA URNS AUTO: ABNORMAL /HPF

## 2019-04-04 PROCEDURE — 87086 URINE CULTURE/COLONY COUNT: CPT | Performed by: INTERNAL MEDICINE

## 2019-04-04 PROCEDURE — 87186 SC STD MICRODIL/AGAR DIL: CPT | Performed by: INTERNAL MEDICINE

## 2019-04-04 PROCEDURE — 87088 URINE BACTERIA CULTURE: CPT | Performed by: INTERNAL MEDICINE

## 2019-04-04 PROCEDURE — 81001 URINALYSIS AUTO W/SCOPE: CPT | Performed by: INTERNAL MEDICINE

## 2019-04-04 PROCEDURE — 99214 OFFICE O/P EST MOD 30 MIN: CPT | Performed by: INTERNAL MEDICINE

## 2019-04-04 NOTE — PATIENT INSTRUCTIONS
Make appointment(s) for:   -- gastroenterology  -- wellness visit with fasting lab in September.         Medication(s) prescribed today:    Orders Placed This Encounter   Medications     order for DME     Sig: Equipment being ordered: 4 wheel walker with a seat, light weight.     Dispense:  1 Units     Refill:  0

## 2019-04-04 NOTE — PROGRESS NOTES
SUBJECTIVE:   Radha Corbett is a 68 year old female who presents to clinic today for the following health issues:      URINARY TRACT SYMPTOMS, also Endoscopic capsule result      Duration: 2 weeks    Description  dysuria, frequency, urgency, nocturia x 5 and incontinence    Intensity:  moderate    Accompanying signs and symptoms:  Fever/chills: no   Flank pain no   Nausea and vomiting: no   Vaginal symptoms: none  Abdominal/Pelvic Pain: no     History  History of frequent UTI's: YES  History of kidney stones: no   Sexually Active: no   Possibility of pregnancy: No    Precipitating or alleviating factors: wears diapers and has bladder implant    Therapies tried and outcome: course of antibiotics - Cipro   Outcome: sx came back right after finishing antibiotic      Patient has chronic urinary incontinence, follows by urology.  Has InterStim.      Chronic low back pain has had 6 procedures and 2 major back surgery. The last one 2/19/2018. It didn't help. Since then she has been using a walker for ambulation. She has difficulty lifting the current wheel chair she is using.     Patient also has a history of GI bleed in 2017.  Since then she has had intermittent low hemoglobin.  She had negative stool guaiac testing.  She completed EGD and colonoscopy without significant findings.  She subsequently completed capsule video endoscopy.  He did show several large angiodysplastic lesions in the right colon.    ROS:  Constitutional, HEENT, cardiovascular, pulmonary, gi and gu systems are negative, except as otherwise noted.         Current Outpatient Medications on File Prior to Visit:  calcium carbonate-vitamin D (OSCAL W/D) 500-200 MG-UNIT tablet Take 1 tablet by mouth 2 times daily (with meals)   DULoxetine (CYMBALTA) 60 MG capsule Take 1 capsule (60 mg) by mouth 2 times daily   glipiZIDE (GLUCOTROL) 10 MG tablet TAKE 1 TABLET DAILY WITH DINNER   insulin NPH (NOVOLIN N VIAL) 100 UNIT/ML injection Inject 64 units Subcutaneous  "2 times daily. (gets this from [facility] N[facility] at [facility])   lisinopril (PRINIVIL/ZESTRIL) 5 MG tablet Take 1 tablet (5 mg) by mouth daily   MELATONIN PO 10 mg At Bedtime    metFORMIN (GLUCOPHAGE-XR) 500 MG 24 hr tablet TAKE 2 TABLETS TWICE A DAY WITH MEALS   minocycline (MINOCIN/DYNACIN) 100 MG capsule Take 1 capsule (100 mg) by mouth every 12 hours   omeprazole (PRILOSEC) 40 MG capsule TAKE 1 CAPSULE DAILY 30 TO 60 MINUTES BEFORE A MEAL   oxybutynin ER (DITROPAN XL) 15 MG 24 hr tablet Take 1 tablet (15 mg) by mouth daily   simvastatin (ZOCOR) 20 MG tablet Take 1 tablet (20 mg) by mouth At Bedtime   spironolactone (ALDACTONE) 100 MG tablet TAKE 1 TABLET EVERY MORNING   traZODone (DESYREL) 100 MG tablet Take 1 tablet (100 mg) by mouth nightly as needed for sleep   vitamin D3 (CHOLECALCIFEROL) 1000 units (25 mcg) tablet Take 1 tablet (1,000 Units) by mouth daily   Continuous Blood Gluc  (FREESTYLE MELINA READER) AYAN 1 Units daily   Continuous Blood Gluc  (FREESTYLE MELINA READER) AYAN 1 Units 3 times daily as needed   continuous blood glucose monitoring (FREESTYLE MELINA) sensor For use with Freestyle Melina Flash  for continuous monitioring of blood glucose levels. Replace sensor every 10 days.   [] ferrous sulfate (FEROSUL) 325 (65 Fe) MG tablet Take 1 tablet (325 mg) by mouth 3 times daily (with meals)   insulin syringe-needle U-100 (RELION INSULIN SYRINGE) 31G X 5/16\" 1 ML Use 2 syringes daily or as directed.   ONE TOUCH LANCETS None Entered   ONE TOUCH TEST STRIPS VI None Entered   order for DME Equipment being ordered: Walker with seat light weight   [] polyethylene glycol (MIRALAX/GLYCOLAX) powder Take 17 g by mouth once for 1 dose     Current Facility-Administered Medications on File Prior to Visit:  gadobutrol (GADAVIST) injection 10 mL          Patient Active Problem List   Diagnosis     Macular degeneration     Myopia     Hiatal hernia     IBS (irritable bowel syndrome)     " Hypertension goal BP (blood pressure) < 140/90     GERD (gastroesophageal reflux disease)     Atypical ductal hyperplasia of breast     Atypical lobular hyperplasia of breast     Benign Breast Disease (PASH)     Family history of breast cancer in sister     Type 2 diabetes, HbA1C goal < 8% (H)     Hyperlipidemia LDL goal <100     Breast cancer screening-high risk     Tubular adenoma of colon     Osteopenia     Alcohol ingestion, more than 4 drinks/day on alcohol screening     Umbilical hernia     Incontinence of urine     Stool incontinence     Osteoarthritis, knee     Rosacea     Anxiety     Pseudophakia     Abnormal cervical Pap smear with positive HPV DNA test     Depression with anxiety     Retinal detachment     Elevated liver function tests     Rectocele     Unexplained endometrial cells on cervical cytology     Back pain, unspecified back location, unspecified back pain laterality, unspecified chronicity     Urge incontinence     Dislocation of shoulder region     Acute lower GI bleeding     Morbid obesity (H)     Moderate episode of recurrent major depressive disorder (H)     Angiodysplasia of colon     Past Surgical History:   Procedure Laterality Date     BREAST LUMPECTOMY, RT/LT      x2, left     CAPSULE/PILL CAM ENDOSCOPY N/A 3/4/2019    Procedure: CAPSULE/PILL CAM ENDOSCOPY;  Surgeon: Sinan Valdes MD;  [facility]     CATARACT IOL, RT/LT  4/99    left, MZ60CD 7.0D     COLONOSCOPY       COLONOSCOPY N/A 1/22/2016    Procedure: COMBINED COLONOSCOPY, SINGLE OR MULTIPLE BIOPSY/POLYPECTOMY BY BIOPSY;  Surgeon: Praful Benjamin MD;  [facility]     COLONOSCOPY WITH CO2 INSUFFLATION N/A 1/22/2016    Procedure: COLONOSCOPY WITH CO2 INSUFFLATION;  Surgeon: Praful Benjamin MD;  [facility]     COMBINED ESOPHAGOSCOPY, GASTROSCOPY, DUODENOSCOPY (EGD) WITH CO2 INSUFFLATION N/A 11/27/2018    Procedure: COMBINED ESOPHAGOSCOPY, GASTROSCOPY, DUODENOSCOPY (EGD) WITH CO2 INSUFFLATION;   Surgeon: Duane, William Charles, MD;  Location: [facility]     CRYOTHERAPY  2000    cervical     CRYOTHERAPY Left 3/19/2015    Procedure: CRYOTHERAPY;  Surgeon: Keiko Puri MD;  [facility]     DILATION AND CURETTAGE, HYSTEROSCOPY DIAGNOSTIC, COMBINED N/A 1/19/2016    Procedure: COMBINED DILATION AND CURETTAGE, HYSTEROSCOPY DIAGNOSTIC;  Surgeon: Yeni Aparicio DO;  [facility]: [facility]     ESOPHAGOSCOPY, GASTROSCOPY, DUODENOSCOPY (EGD), COMBINED N/A 11/27/2018    Procedure: COMBINED ESOPHAGOSCOPY, GASTROSCOPY, DUODENOSCOPY (EGD), BIOPSY SINGLE OR MULTIPLE;  Surgeon: Duane, William Charles, MD;  [facility]     IMPLANT STIMULATOR AND LEADS SACRAL NERVE (STAGE ONE AND TWO) N/A 7/24/2018    Procedure: IMPLANT STIMULATOR AND LEADS SACRAL NERVE (STAGE ONE AND TWO);  Interstim Implant Stage One and Two (Implant Permanent Lead and Generator);  Surgeon: Dada Baltazar MD;  [facility]     IMPLANT STIMULATOR SACRAL NERVE PERCUTANEOUS TRIAL N/A 6/19/2018    Procedure: IMPLANT STIMULATOR SACRAL NERVE PERCUTANEOUS TRIAL;  Percutaneous Neural Examination (trial for interstim);  Surgeon: Dada Baltazar MD;  [facility]     LAPAROSCOPIC TUBAL LIGATION  1981     PHACOEMULSIFICATION CLEAR CORNEA WITH STANDARD INTRAOCULAR LENS IMPLANT  8/15/2013    Procedure: PHACOEMULSIFICATION CLEAR CORNEA WITH STANDARD INTRAOCULAR LENS IMPLANT;  RIGHT PHACOEMULSIFICATION CLEAR CORNEA WITH STANDARD INTRAOCULAR LENS IMPLANT ;  Surgeon: Trae Taylor MD;  Location: [facility]     SURGICAL HISTORY OF -       x4, ankle surgery       Social History     Tobacco Use     Smoking status: Former Smoker     Packs/day: 1.00     Years: 20.00     Pack years: 20.00     Types: Cigarettes     Start date: 11/21/2018     Smokeless tobacco: Never Used   Substance Use Topics     Alcohol use: Yes     Comment: social/3-4 per week     Family History   Problem Relation Age of Onset     Hypertension Mother      Cerebrovascular  Disease Mother      Arthritis Mother      Cardiovascular Mother      Circulatory Mother      Cerebrovascular Disease Father      Prostate Cancer Father 65         at 69     Asthma Brother      Prostate Cancer Brother 48        bone metastasis     Diabetes Sister      Hypertension Sister      Osteoporosis Sister      Depression Sister      Lipids Sister      Cancer Maternal Grandmother 95        spine     Breast Cancer Sister 39        also contralateral @53, mets to lungs     Cancer Sister 20        second uterine cancer in 30s also     Diabetes Sister      Hypertension Sister      Depression Sister      Osteoporosis Sister      Breast Cancer Sister 57        unilateral     Cancer Paternal Aunt 40        unknown type     Cancer Paternal Uncle         stomach;  in his 80s     Prostate Cancer Brother      Glaucoma No family hx of      Melanoma No family hx of      Skin Cancer No family hx of              Problem list, Medication list, Allergies, and Medical/Social/Surgical histories reviewed in [facility] and updated as appropriate.    OBJECTIVE:                                                    /68   Pulse 117   Temp 98.6  F (37  C) (Temporal)   Wt 95.3 kg (210 lb)   LMP 1991 (Exact Date)   SpO2 98%   BMI 32.65 kg/m      GENERAL: healthy, alert and no distress; uses a walker        Diagnostic test results:  Results for orders placed or performed in visit on 19   UA with Microscopic reflex to Culture   Result Value Ref Range    Color Urine Yellow     Appearance Urine Clear     Glucose Urine Negative NEG^Negative mg/dL    Bilirubin Urine Negative NEG^Negative    Ketones Urine Negative NEG^Negative mg/dL    Specific Gravity Urine 1.016 1.003 - 1.035    Blood Urine Negative NEG^Negative    pH Urine 6.0 5.0 - 7.0 pH    Protein Albumin Urine 10 (A) NEG^Negative mg/dL    Urobilinogen mg/dL Normal 0.0 - 2.0 mg/dL    Nitrite Urine Negative NEG^Negative    Leukocyte Esterase Urine Moderate (A)  NEG^Negative    Source Midstream Urine     WBC Urine 10-25 (A) OTO5^0 - 5 /HPF    RBC Urine O - 2 OTO2^O - 2 /HPF    Bacteria Urine Few (A) NEG^Negative /HPF    Squamous Epithelial /LPF Urine Few FEW^Few /LPF         ASSESSMENT/PLAN:                                                      68 year old female with the following diagnoses and treatment plan:      ICD-10-CM    1. Angiodysplasia of colon K55.20    2. Dysuria R30.0 UA with Microscopic reflex to Culture   3. Chronic midline low back pain without sciatica M54.5 order for DME    G89.29    4. Abnormal urine findings R82.90 Urine Culture Aerobic Bacterial       --Angiodysplasia of the colon: Patient's hemoglobin has been stable.  She is taking iron supplement.  She may be subject to chronic low-grade bleed.  It may be beneficial for her to see GI for consultation to see if these lesions are amendable to cauterization.  Referral was made through another encounter.  --Chronic low back pain: I think she will benefit from using a four-wheel walker with seat.  This will helping improve her mobility.  Having a light weight wheelchair will be easier for her to lift.  --She has symptoms of dysuria.  Urine test is inconclusive.  We decided to wait for culture results.    Will call or return to clinic if worsening or symptoms not improving as discussed.  See Patient Instructions.      Trice Medeiros MD-PhD  [facility] – [place], [place]    (Note: Chart documentation was done in part with Dragon Voice Recognition software. Although reviewed after completion, some word and grammatical errors may remain.)

## 2019-04-04 NOTE — Clinical Note
Please abstract the following data from this visit with this patient into the appropriate field in Epic:Eye exam with ophthalmology on this date: Completed 3-27-19 at [facility].

## 2019-04-05 ENCOUNTER — TELEPHONE (OUTPATIENT)
Dept: PEDIATRICS | Facility: CLINIC | Age: 69
End: 2019-04-05

## 2019-04-05 ASSESSMENT — ANXIETY QUESTIONNAIRES
GAD7 TOTAL SCORE: 17
1. FEELING NERVOUS, ANXIOUS, OR ON EDGE: MORE THAN HALF THE DAYS
3. WORRYING TOO MUCH ABOUT DIFFERENT THINGS: NEARLY EVERY DAY
5. BEING SO RESTLESS THAT IT IS HARD TO SIT STILL: NEARLY EVERY DAY
7. FEELING AFRAID AS IF SOMETHING AWFUL MIGHT HAPPEN: MORE THAN HALF THE DAYS
2. NOT BEING ABLE TO STOP OR CONTROL WORRYING: NEARLY EVERY DAY
6. BECOMING EASILY ANNOYED OR IRRITABLE: MORE THAN HALF THE DAYS

## 2019-04-05 NOTE — TELEPHONE ENCOUNTER
[facility] Call Center    Phone Message    May a detailed message be left on voicemail: yes    Reason for Call: Grace from [facility] called to request visit notes and medical documentation regarding pt's walker.     Please fax information to [facility] ATTN: Grace  [phone]    Action Taken: Message Routed to Primary Care

## 2019-04-06 LAB
BACTERIA SPEC CULT: ABNORMAL
BACTERIA SPEC CULT: ABNORMAL
SPECIMEN SOURCE: ABNORMAL

## 2019-04-06 ASSESSMENT — ANXIETY QUESTIONNAIRES: GAD7 TOTAL SCORE: 17

## 2019-04-30 ENCOUNTER — OFFICE VISIT (OUTPATIENT)
Dept: GASTROENTEROLOGY | Facility: CLINIC | Age: 69
End: 2019-04-30
Attending: INTERNAL MEDICINE
Payer: COMMERCIAL

## 2019-04-30 ENCOUNTER — OFFICE VISIT (OUTPATIENT)
Dept: PEDIATRICS | Facility: CLINIC | Age: 69
End: 2019-04-30
Payer: COMMERCIAL

## 2019-04-30 VITALS
BODY MASS INDEX: 32.8 KG/M2 | OXYGEN SATURATION: 94 % | DIASTOLIC BLOOD PRESSURE: 79 MMHG | HEIGHT: 67 IN | WEIGHT: 209 LBS | SYSTOLIC BLOOD PRESSURE: 130 MMHG | HEART RATE: 119 BPM

## 2019-04-30 VITALS
OXYGEN SATURATION: 96 % | WEIGHT: 209 LBS | HEART RATE: 114 BPM | DIASTOLIC BLOOD PRESSURE: 79 MMHG | SYSTOLIC BLOOD PRESSURE: 130 MMHG | TEMPERATURE: 98.6 F | BODY MASS INDEX: 32.49 KG/M2

## 2019-04-30 DIAGNOSIS — N39.0 COMPLICATED UTI (URINARY TRACT INFECTION): Primary | ICD-10-CM

## 2019-04-30 DIAGNOSIS — D50.9 IRON DEFICIENCY ANEMIA, UNSPECIFIED IRON DEFICIENCY ANEMIA TYPE: Primary | ICD-10-CM

## 2019-04-30 DIAGNOSIS — R63.4 WEIGHT LOSS: ICD-10-CM

## 2019-04-30 LAB
ALBUMIN UR-MCNC: 10 MG/DL
APPEARANCE UR: ABNORMAL
BACTERIA #/AREA URNS HPF: ABNORMAL /HPF
BILIRUB UR QL STRIP: NEGATIVE
COLOR UR AUTO: YELLOW
GLUCOSE UR STRIP-MCNC: NEGATIVE MG/DL
HGB UR QL STRIP: NEGATIVE
KETONES UR STRIP-MCNC: NEGATIVE MG/DL
LEUKOCYTE ESTERASE UR QL STRIP: ABNORMAL
MUCOUS THREADS #/AREA URNS LPF: PRESENT /LPF
NITRATE UR QL: POSITIVE
NON-SQ EPI CELLS #/AREA URNS LPF: ABNORMAL /LPF
PH UR STRIP: 6.5 PH (ref 5–7)
RBC #/AREA URNS AUTO: ABNORMAL /HPF
SOURCE: ABNORMAL
SP GR UR STRIP: 1.02 (ref 1–1.03)
UROBILINOGEN UR STRIP-MCNC: NORMAL MG/DL (ref 0–2)
WBC #/AREA URNS AUTO: ABNORMAL /HPF
WBC CLUMPS #/AREA URNS HPF: PRESENT /HPF

## 2019-04-30 PROCEDURE — 87186 SC STD MICRODIL/AGAR DIL: CPT | Performed by: FAMILY MEDICINE

## 2019-04-30 PROCEDURE — 87088 URINE BACTERIA CULTURE: CPT | Performed by: FAMILY MEDICINE

## 2019-04-30 PROCEDURE — 81001 URINALYSIS AUTO W/SCOPE: CPT | Performed by: FAMILY MEDICINE

## 2019-04-30 PROCEDURE — 99214 OFFICE O/P EST MOD 30 MIN: CPT | Performed by: INTERNAL MEDICINE

## 2019-04-30 PROCEDURE — 87086 URINE CULTURE/COLONY COUNT: CPT | Performed by: FAMILY MEDICINE

## 2019-04-30 PROCEDURE — 99213 OFFICE O/P EST LOW 20 MIN: CPT | Performed by: FAMILY MEDICINE

## 2019-04-30 RX ORDER — CEFDINIR 300 MG/1
300 CAPSULE ORAL 2 TIMES DAILY
Qty: 20 CAPSULE | Refills: 0 | Status: SHIPPED | OUTPATIENT
Start: 2019-04-30 | End: 2019-06-04

## 2019-04-30 ASSESSMENT — MIFFLIN-ST. JEOR: SCORE: 1514.61

## 2019-04-30 ASSESSMENT — PAIN SCALES - GENERAL: PAINLEVEL: NO PAIN (0)

## 2019-04-30 NOTE — PROGRESS NOTES
GASTROENTEROLOGY FOLLOW UP CLINIC VISIT    CC/REFERRING MD:    Trice Carias    REASON FOR CONSULTATION:   Trice Medeiros for   Chief Complaint   Patient presents with     Consult     agiodyplasia       HISTORY OF PRESENT ILLNESS:    Radha Corbett is 68 year old female who presents for follow up of weight loss and iron deficiency anemia.  She previously was seen in this office January 2019 by Ever VITALE.  It was noted at that time that she had a 40 pound weight loss which was unexplained.  She was also noted to have iron deficiency anemia and was on oral iron supplements.  Previous evaluation included upper endoscopy November 2018 which was normal.  She has had prior colonoscopies August 2017 secondary to GI bleeding, thought to be diverticular in nature.  She also had colonoscopy 2016 for screening purposes which noted 2 adenomas and a 5-year follow-up was recommended.    At the time the last visit, she was recommended to undergo video capsule endoscopy.  She has subsequently completed this test and it did show angiodysplasias of the colon.  She notes that she has not seen any rectal bleeding.  She does frequently have black stools which she attributes to oral iron use.  There is no nausea or vomiting.  She does get reflux and takes acid reduction therapy for this with good relief of symptoms.  There is no abdominal pain.  Weight is stable at this point.  She does note symptoms of urinary frequency and burning.  She reports that she recently underwent treatment for UTI, however she forgot to take 1 of the antibiotic medications.      PERTINENT PAST MEDICAL HISTORY:    Past Medical History:   Diagnosis Date     Actinic cheilitis      Arthritis      DM (diabetes mellitus) (H)      GERD (gastroesophageal reflux disease)      Hiatal hernia      HPV in female      HTN (hypertension)      IBS (irritable bowel syndrome)      Major depression      Myopia      Other and unspecified hyperlipidemia       "Pseudoangiomatous stromal hyperplasia of breast 2010    [facility]     Rotator cuff injury 7/2016     Sleep apnea     \"snore\"     Vitamin D deficiency        PREVIOUS SURGERIES:   Past Surgical History:   Procedure Laterality Date     BREAST LUMPECTOMY, RT/LT      x2, left     CAPSULE/PILL CAM ENDOSCOPY N/A 3/4/2019    Procedure: CAPSULE/PILL CAM ENDOSCOPY;  Surgeon: Sinan Valdes MD;  [facility]     CATARACT IOL, RT/LT  4/99    left, MZ60CD 7.0D     COLONOSCOPY       COLONOSCOPY N/A 1/22/2016    Procedure: COMBINED COLONOSCOPY, SINGLE OR MULTIPLE BIOPSY/POLYPECTOMY BY BIOPSY;  Surgeon: Praful Benjamin MD;  [facility]     COLONOSCOPY WITH CO2 INSUFFLATION N/A 1/22/2016    Procedure: COLONOSCOPY WITH CO2 INSUFFLATION;  Surgeon: Praful Benjamin MD;  [facility]     COMBINED ESOPHAGOSCOPY, GASTROSCOPY, DUODENOSCOPY (EGD) WITH CO2 INSUFFLATION N/A 11/27/2018    Procedure: COMBINED ESOPHAGOSCOPY, GASTROSCOPY, DUODENOSCOPY (EGD) WITH CO2 INSUFFLATION;  Surgeon: Duane, William Charles, MD;  Location: [facility]     CRYOTHERAPY  2000    cervical     CRYOTHERAPY Left 3/19/2015    Procedure: CRYOTHERAPY;  Surgeon: Keiko Puri MD;  [facility]     DILATION AND CURETTAGE, HYSTEROSCOPY DIAGNOSTIC, COMBINED N/A 1/19/2016    Procedure: COMBINED DILATION AND CURETTAGE, HYSTEROSCOPY DIAGNOSTIC;  Surgeon: Yeni Aparicio DO;  [facility]     ESOPHAGOSCOPY, GASTROSCOPY, DUODENOSCOPY (EGD), COMBINED N/A 11/27/2018    Procedure: COMBINED ESOPHAGOSCOPY, GASTROSCOPY, DUODENOSCOPY (EGD), BIOPSY SINGLE OR MULTIPLE;  Surgeon: Duane, William Charles, MD;  [facility]     IMPLANT STIMULATOR AND LEADS SACRAL NERVE (STAGE ONE AND TWO) N/A 7/24/2018    Procedure: IMPLANT STIMULATOR AND LEADS SACRAL NERVE (STAGE ONE AND TWO);  Interstim Implant Stage One and Two (Implant Permanent Lead and Generator);  Surgeon: Dada Baltazar MD;  [facility]     IMPLANT STIMULATOR SACRAL NERVE " "PERCUTANEOUS TRIAL N/A 6/19/2018    Procedure: IMPLANT STIMULATOR SACRAL NERVE PERCUTANEOUS TRIAL;  Percutaneous Neural Examination (trial for interstim);  Surgeon: Dada Baltazar MD;  Location:  OR     LAPAROSCOPIC TUBAL LIGATION  1981     PHACOEMULSIFICATION CLEAR CORNEA WITH STANDARD INTRAOCULAR LENS IMPLANT  8/15/2013    Procedure: PHACOEMULSIFICATION CLEAR CORNEA WITH STANDARD INTRAOCULAR LENS IMPLANT;  RIGHT PHACOEMULSIFICATION CLEAR CORNEA WITH STANDARD INTRAOCULAR LENS IMPLANT ;  Surgeon: Trae Taylor MD;  Location: [facility]     SURGICAL HISTORY OF -       x4, ankle surgery       ALLERGIES:     Allergies   Allergen Reactions     Codeine      Sulfa Drugs        PERTINENT MEDICATIONS:    Current Outpatient Medications:      calcium carbonate-vitamin D (OSCAL W/D) 500-200 MG-UNIT tablet, Take 1 tablet by mouth 2 times daily (with meals), Disp: 180 tablet, Rfl: 3     Continuous Blood Gluc  (FREESTYLE DANNI READER) AYAN, 1 Units daily, Disp: 1 Device, Rfl: 0     Continuous Blood Gluc  (FREESTYLE DANNI READER) AYAN, 1 Units 3 times daily as needed, Disp: 1 Device, Rfl: 0     continuous blood glucose monitoring (FREESTYLE DANNI) sensor, For use with Freestyle Danni Flash  for continuous monitioring of blood glucose levels. Replace sensor every 10 days., Disp: 3 each, Rfl: 5     DULoxetine (CYMBALTA) 60 MG capsule, Take 1 capsule (60 mg) by mouth 2 times daily, Disp: 180 capsule, Rfl: 1     glipiZIDE (GLUCOTROL) 10 MG tablet, TAKE 1 TABLET DAILY WITH DINNER, Disp: 90 tablet, Rfl: 1     insulin NPH (NOVOLIN N VIAL) 100 UNIT/ML injection, Inject 64 units Subcutaneous 2 times daily. (gets this from OTC NPH at [facility]), Disp: , Rfl:      insulin syringe-needle U-100 (RELION INSULIN SYRINGE) 31G X 5/16\" 1 ML, Use 2 syringes daily or as directed., Disp: 200 each, Rfl: 3     lisinopril (PRINIVIL/ZESTRIL) 5 MG tablet, Take 1 tablet (5 mg) by mouth daily, Disp: 90 tablet, Rfl: 3     " MELATONIN PO, 10 mg At Bedtime , Disp: , Rfl:      metFORMIN (GLUCOPHAGE-XR) 500 MG 24 hr tablet, TAKE 2 TABLETS TWICE A DAY WITH MEALS, Disp: 360 tablet, Rfl: 1     minocycline (MINOCIN/DYNACIN) 100 MG capsule, Take 1 capsule (100 mg) by mouth every 12 hours, Disp: 180 capsule, Rfl: 2     omeprazole (PRILOSEC) 40 MG capsule, TAKE 1 CAPSULE DAILY 30 TO 60 MINUTES BEFORE A MEAL, Disp: 90 capsule, Rfl: 3     ONE TOUCH LANCETS, None Entered, Disp: , Rfl:      ONE TOUCH TEST STRIPS VI, None Entered, Disp: , Rfl:      order for DME, Equipment being ordered: 4 wheel walker with a seat, light weight., Disp: 1 Units, Rfl: 0     order for DME, Equipment being ordered: Walker with seat light weight, Disp: 1 Device, Rfl: 0     oxybutynin ER (DITROPAN XL) 15 MG 24 hr tablet, Take 1 tablet (15 mg) by mouth daily, Disp: 90 tablet, Rfl: 1     simvastatin (ZOCOR) 20 MG tablet, Take 1 tablet (20 mg) by mouth At Bedtime, Disp: 90 tablet, Rfl: 3     spironolactone (ALDACTONE) 100 MG tablet, TAKE 1 TABLET EVERY MORNING, Disp: 90 tablet, Rfl: 3     traZODone (DESYREL) 100 MG tablet, Take 1 tablet (100 mg) by mouth nightly as needed for sleep, Disp: 90 tablet, Rfl: 3     vitamin D3 (CHOLECALCIFEROL) 1000 units (25 mcg) tablet, Take 1 tablet (1,000 Units) by mouth daily, Disp: 90 tablet, Rfl: 3  No current facility-administered medications for this visit.     Facility-Administered Medications Ordered in Other Visits:      gadobutrol (GADAVIST) injection 10 mL, 10 mL, Intravenous, Once, Trice Medeiros MD PhD    FAMILY HISTORY:   Family History   Problem Relation Age of Onset     Hypertension Mother      Cerebrovascular Disease Mother      Arthritis Mother      Cardiovascular Mother      Circulatory Mother      Cerebrovascular Disease Father      Prostate Cancer Father 65         at 69     Asthma Brother      Prostate Cancer Brother 48        bone metastasis     Diabetes Sister      Hypertension Sister      Osteoporosis Sister       "Depression Sister      Lipids Sister      Cancer Maternal Grandmother 95        spine     Breast Cancer Sister 39        also contralateral @53, mets to lungs     Cancer Sister 20        second uterine cancer in 30s also     Diabetes Sister      Hypertension Sister      Depression Sister      Osteoporosis Sister      Breast Cancer Sister 57        unilateral     Cancer Paternal Aunt 40        unknown type     Cancer Paternal Uncle         stomach;  in his 80s     Prostate Cancer Brother      Glaucoma No family hx of      Melanoma No family hx of      Skin Cancer No family hx of         ROS:    No fevers or chills  No weight loss  No blurry vision, double vision or change in vision  No sore throat  No lymphadenopathy  No headache, paraesthesias, or weakness in a limb  No shortness of breath or wheezing  No chest pain or pressure  No arthralgias or myalgias  No rashes or skin changes  No odynophagia or dysphagia  No BRBPR, hematochezia, melena  No dysuria, frequency or urgency  No hot/cold intolerance or polyria  No anxiety or depression  PHYSICAL EXAMINATION:  Constitutional: aaox3, cooperative, pleasant, not dyspneic/diaphoretic, no acute distress  Vitals reviewed: /79   Pulse 119   Ht 1.708 m (5' 7.25\")   Wt 94.8 kg (209 lb)   LMP 1991 (Exact Date)   SpO2 94%   BMI 32.49 kg/m    Wt:   Wt Readings from Last 2 Encounters:   19 94.8 kg (209 lb)   19 95.3 kg (210 lb)      Eyes: Sclera anicteric/injected  Ears/nose/mouth/throat: Normal oropharynx without ulcers or exudate, mucus membranes moist, hearing intact  Neck: supple, thyroid normal size  CV: No edema  Respiratory: Unlabored breathing  Lymph: No submandibular, supraclavicular or inguinal lymphadenopathy  Abd: obese, Nondistended, no masses, +bs, no hepatosplenomegaly, nontender, no peritoneal signs  Skin: warm, perfused, no jaundice  Psych: Normal affect  MSK: Normal gait      PERTINENT STUDIES:   Most recent CBC:  Recent Labs "   Lab Test 03/12/19  1331 01/23/19  1214   WBC 10.6 10.3   HGB 12.8 11.9   HCT 40.8 38.5    350     Most recent hepatic panel:  Recent Labs   Lab Test 11/12/18  1311 08/14/18  1219   ALT 24 22   AST 13 15     Most recent creatinine:  Recent Labs   Lab Test 11/12/18  1311 08/14/18  1219   CR 0.52 0.56         ASSESSMENT/PLAN:    Radha Corbett is a 68 year old female who presents for follow up of weight loss and iron deficiency anemia.  Weight is stabilized at this point.  She continues to have iron deficiency with normal blood counts at this point.  Her iron deficiency anemia was most likely secondary to colonic angiodysplasias.  We discussed that we could consider proceeding with colonoscopy with ablation of the colonic angiodysplasias.  Alternatively, given that she has no objective signs of ongoing bleeding at this point, continued monitoring of iron levels and blood counts is reasonable and if iron counts start to decrease or blood counts start to decrease then we can schedule a colonoscopy with ablation at that point.  She would prefer to hold on scheduling a colonoscopy at this time given her overall stable course which again is a very reasonable course of action.  I would not recommend any additional evaluation at this point, but I do think it is important to continue iron supplementation.  I would suggest that she have a CBC and iron studies followed every 3 months and please contact us if these were to worsen.  If her iron levels and blood counts remain stable, then a colonoscopy in 2021 for surveillance of colonic adenomas is still indicated.    She also notes today that she has urinary frequency and burning.  This occurs in the setting of her recent treatment for UTI but she forgot to take 1 of her antibiotics.  I have suggested that she discuss with her primary physician if a repeat urine sample or treatment is indicated.    C PRN    Thank you for this consultation.  It was a pleasure to participate  in the care of this patient; please contact us with any further questions.      This note was created with voice recognition software, and while reviewed for accuracy, typos may remain.     Ahmet Tang MD  Adjunct  of Medicine  Division of Gastroenterology, Hepatology and Nutrition  [facility]  [phone]

## 2019-04-30 NOTE — PATIENT INSTRUCTIONS
"Patient Education     Bladder Infection, Female (Adult)    Urine is normally doesn't have any bacteria in it. But bacteria can get into the urinary tract from the skin around the rectum. Or they can travel in the blood from elsewhere in the body. Once they are in your urinary tract, they can cause infection in the urethra (urethritis), the bladder (cystitis), or the kidneys (pyelonephritis).  The most common place for an infection is in the bladder. This is called a bladder infection. This is one of the most common infections in women. Most bladder infections are easily treated. They are not serious unless the infection spreads to the kidney.  The phrases \"bladder infection,\" \"UTI,\" and \"cystitis\" are often used to describe the same thing. But they are not always the same. Cystitis is an inflammation of the bladder. The most common cause of cystitis is an infection.  Symptoms  The infection causes inflammation in the urethra and bladder. This causes many of the symptoms. The most common symptoms of a bladder infection are:    Pain or burning when urinating    Having to urinate more often than usual    Urgent need to urinate    Only a small amount of urine comes out    Blood in urine    Abdominal discomfort. This is usually in the lower abdomen above the pubic bone.    Cloudy urine    Strong- or bad-smelling urine    Unable to urinate (urinary retention)    Unable to hold urine in (urinary incontinence)    Fever    Loss of appetite    Confusion (in older adults)  Causes  Bladder infections are not contagious. You can't get one from someone else, from a toilet seat, or from sharing a bath.  The most common cause of bladder infections is bacteria from the bowels. The bacteria get onto the skin around the opening of the urethra. From there, they can get into the urine and travel up to the bladder, causing inflammation and infection. This usually happens because of:    Wiping improperly after urinating. Always wipe from " front to back.    Bowel incontinence    Pregnancy    Procedures such as having a catheter inserted    Older age    Not emptying your bladder. This can allow bacteria a chance to grow in your urine.    Dehydration    Constipation    Sex    Use of a diaphragm for birth control   Treatment  Bladder infections are diagnosed by a urine test. They are treated with antibiotics and usually clear up quickly without complications. Treatment helps prevent a more serious kidney infection.  Medicines  Medicines can help in the treatment of a bladder infection:    Take antibiotics until they are used up, even if you feel better. It is important to finish them to make sure the infection has cleared.    You can use acetaminophen or ibuprofen for pain, fever, or discomfort, unless another medicine was prescribed. If you have chronic liver or kidney disease, talk with your healthcare provider before using these medicines. Also talk with your provider if you've ever had a stomach ulcer or gastrointestinal bleeding, or are taking blood-thinner medicines.    If you are given phenazopydridine to reduce burning with urination, it will cause your urine to become a bright orange color. This can stain clothing.  Care and prevention  These self-care steps can help prevent future infections:    Drink plenty of fluids to prevent dehydration and flush out your bladder. Do this unless you must restrict fluids for other health reasons, or your doctor told you not to.    Proper cleaning after going to the bathroom is important. Wipe from front to back after using the toilet to prevent the spread of bacteria.    Urinate more often. Don't try to hold urine in for a long time.    Wear loose-fitting clothes and cotton underwear. Avoid tight-fitting pants.    Improve your diet and prevent constipation. Eat more fresh fruit and vegetables, and fiber, and less junk and fatty foods.    Avoid sex until your symptoms are gone.    Avoid caffeine, alcohol, and  spicy foods. These can irritate your bladder.    Urinate right after intercourse to flush out your bladder.    If you use birth control pills and have frequent bladder infections, discuss it with your doctor.  Follow-up care  Call your healthcare provider if all symptoms are not gone after 3 days of treatment. This is especially important if you have repeat infections.  If a culture was done, you will be told if your treatment needs to be changed. If directed, you can call to find out the results.  If X-rays were done, you will be told if the results will affect your treatment.  Call 911  Call 911 if any of the following occur:    Trouble breathing    Hard to wake up or confusion    Fainting or loss of consciousness    Rapid heart rate  When to seek medical advice  Call your healthcare provider right away if any of these occur:    Fever of 100.4 F (38.0 C) or higher, or as directed by your healthcare provider    Symptoms are not better by the third day of treatment    Back or belly (abdominal) pain that gets worse    Repeated vomiting, or unable to keep medicine down    Weakness or dizziness    Vaginal discharge    Pain, redness, or swelling in the outer vaginal area (labia)  Date Last Reviewed: 10/1/2016    1887-3181 [employer]. [address] [place] [address], [place] [address]. All rights reserved. This information is not intended as a substitute for professional medical care. Always follow your healthcare professional's instructions.

## 2019-04-30 NOTE — NURSING NOTE
Radha Corbett's goals for this visit include: consult  She requests these members of her care team be copied on today's visit information: no    PCP: Trice Medeiros    Referring Provider:  Trice Medeiros MD PhD  [address]  [place] [address]    [facility] 11/18/1991 (Exact Date)     Do you need any medication refills at today's visit? Yes    Hiwot Garcia LPN

## 2019-04-30 NOTE — PROGRESS NOTES
SUBJECTIVE:   Radha Corbett is a 68 year old female who presents to clinic today for the following   health issues:    UTI - Recurrent dysuria and frequency of urination since Thursday.  She has a history of  Of recurrent uti, previous growth with E.coli and Proteus was on cefdinir but missed a couple of doses. She also has a bladder stimulator.      Reviewed  and updated as needed this visit by clinical staff  Tobacco  Allergies  Meds  Problems  Med Hx  Surg Hx  Fam Hx         Reviewed and updated as needed this visit by Provider  Tobacco  Allergies  Meds  Problems  Med Hx  Surg Hx  Fam Hx         Patient Active Problem List   Diagnosis     Macular degeneration     Myopia     Hiatal hernia     IBS (irritable bowel syndrome)     Hypertension goal BP (blood pressure) < 140/90     GERD (gastroesophageal reflux disease)     Atypical ductal hyperplasia of breast     Atypical lobular hyperplasia of breast     Benign Breast Disease (PASH)     Family history of breast cancer in sister     Type 2 diabetes, HbA1C goal < 8% (H)     Hyperlipidemia LDL goal <100     Breast cancer screening-high risk     Tubular adenoma of colon     Osteopenia     Alcohol ingestion, more than 4 drinks/day on alcohol screening     Umbilical hernia     Incontinence of urine     Stool incontinence     Osteoarthritis, knee     Rosacea     Anxiety     Pseudophakia     Abnormal cervical Pap smear with positive HPV DNA test     Depression with anxiety     Retinal detachment     Elevated liver function tests     Rectocele     Unexplained endometrial cells on cervical cytology     Back pain, unspecified back location, unspecified back pain laterality, unspecified chronicity     Urge incontinence     Dislocation of shoulder region     Acute lower GI bleeding     Morbid obesity (H)     Moderate episode of recurrent major depressive disorder (H)     Angiodysplasia of colon     Past Surgical History:   Procedure Laterality Date     BREAST  LUMPECTOMY, RT/LT      x2, left     CAPSULE/PILL CAM ENDOSCOPY N/A 3/4/2019    Procedure: CAPSULE/PILL CAM ENDOSCOPY;  Surgeon: Sinan Valdes MD;  Location: [facility]     CATARACT IOL, RT/LT  4/99    left, [id number] 7.0D     COLONOSCOPY       COLONOSCOPY N/A 1/22/2016    Procedure: COMBINED COLONOSCOPY, SINGLE OR MULTIPLE BIOPSY/POLYPECTOMY BY BIOPSY;  Surgeon: Praful Benjamin MD;  Location: [facility]     COLONOSCOPY WITH CO2 INSUFFLATION N/A 1/22/2016    Procedure: COLONOSCOPY WITH CO2 INSUFFLATION;  Surgeon: Praful Benjamin MD;  Location: [facility]     COMBINED ESOPHAGOSCOPY, GASTROSCOPY, DUODENOSCOPY (EGD) WITH CO2 INSUFFLATION N/A 11/27/2018    Procedure: COMBINED ESOPHAGOSCOPY, GASTROSCOPY, DUODENOSCOPY (EGD) WITH CO2 INSUFFLATION;  Surgeon: Duane, William Charles, MD;  Location: [facility]     CRYOTHERAPY  2000    cervical     CRYOTHERAPY Left 3/19/2015    Procedure: CRYOTHERAPY;  Surgeon: Keiko Puri MD;  Location:  [facility]     DILATION AND CURETTAGE, HYSTEROSCOPY DIAGNOSTIC, COMBINED N/A 1/19/2016    Procedure: COMBINED DILATION AND CURETTAGE, HYSTEROSCOPY DIAGNOSTIC;  Surgeon: Yeni Aparicio DO;  Location: [facility]     ESOPHAGOSCOPY, GASTROSCOPY, DUODENOSCOPY (EGD), COMBINED N/A 11/27/2018    Procedure: COMBINED ESOPHAGOSCOPY, GASTROSCOPY, DUODENOSCOPY (EGD), BIOPSY SINGLE OR MULTIPLE;  Surgeon: Duane, William Charles, MD;  Location: [facility]     IMPLANT STIMULATOR AND LEADS SACRAL NERVE (STAGE ONE AND TWO) N/A 7/24/2018    Procedure: IMPLANT STIMULATOR AND LEADS SACRAL NERVE (STAGE ONE AND TWO);  Interstim Implant Stage One and Two (Implant Permanent Lead and Generator);  Surgeon: Dada Baltazar MD;  [facility]     IMPLANT STIMULATOR SACRAL NERVE PERCUTANEOUS TRIAL N/A 6/19/2018    Procedure: IMPLANT STIMULATOR SACRAL NERVE PERCUTANEOUS TRIAL;  Percutaneous Neural Examination (trial for interstim);  Surgeon: Dada Baltazar MD;  [facility]     LAPAROSCOPIC TUBAL LIGATION        PHACOEMULSIFICATION CLEAR CORNEA WITH STANDARD INTRAOCULAR LENS IMPLANT  8/15/2013    Procedure: PHACOEMULSIFICATION CLEAR CORNEA WITH STANDARD INTRAOCULAR LENS IMPLANT;  RIGHT PHACOEMULSIFICATION CLEAR CORNEA WITH STANDARD INTRAOCULAR LENS IMPLANT ;  Surgeon: Trae Taylor MD;  Location: [facility]     SURGICAL HISTORY OF -       x4, ankle surgery       Social History     Tobacco Use     Smoking status: Former Smoker     Packs/day: 1.00     Years: 20.00     Pack years: 20.00     Types: Cigarettes     Start date: 2018     Smokeless tobacco: Never Used   Substance Use Topics     Alcohol use: Yes     Comment: social/3-4 per week     Family History   Problem Relation Age of Onset     Hypertension Mother      Cerebrovascular Disease Mother      Arthritis Mother      Cardiovascular Mother      Circulatory Mother      Cerebrovascular Disease Father      Prostate Cancer Father 65         at 69     Asthma Brother      Prostate Cancer Brother 48        bone metastasis     Diabetes Sister      Hypertension Sister      Osteoporosis Sister      Depression Sister      Lipids Sister      Cancer Maternal Grandmother 95        spine     Breast Cancer Sister 39        also contralateral @53, mets to lungs     Cancer Sister 20        second uterine cancer in 30s also     Diabetes Sister      Hypertension Sister      Depression Sister      Osteoporosis Sister      Breast Cancer Sister 57        unilateral     Cancer Paternal Aunt 40        unknown type     Cancer Paternal Uncle         stomach;  in his 80s     Prostate Cancer Brother      Glaucoma No family hx of      Melanoma No family hx of      Skin Cancer No family hx of          Current Outpatient Medications   Medication Sig Dispense Refill     calcium carbonate-vitamin D (OSCAL W/D) 500-200 MG-UNIT tablet Take 1 tablet by mouth 2 times daily (with meals) 180 tablet 3     cefdinir (OMNICEF) 300 MG capsule Take 1 capsule (300 mg) by mouth 2 times  "daily for 10 days 20 capsule 0     Continuous Blood Gluc  (FREESTYLE MELINA READER) AYAN 1 Units daily 1 Device 0     Continuous Blood Gluc  (FREESTYLE MELINA READER) AYAN 1 Units 3 times daily as needed 1 Device 0     continuous blood glucose monitoring (FREESTYLE MELINA) sensor For use with Freestyle Melina Flash  for continuous monitioring of blood glucose levels. Replace sensor every 10 days. 3 each 5     DULoxetine (CYMBALTA) 60 MG capsule Take 1 capsule (60 mg) by mouth 2 times daily 180 capsule 1     glipiZIDE (GLUCOTROL) 10 MG tablet TAKE 1 TABLET DAILY WITH DINNER 90 tablet 1     insulin NPH (NOVOLIN N VIAL) 100 UNIT/ML injection Inject 64 units Subcutaneous 2 times daily. (gets this from [facility] at [facility])       insulin syringe-needle U-100 (RELION INSULIN SYRINGE) 31G X 5/16\" 1 ML Use 2 syringes daily or as directed. 200 each 3     lisinopril (PRINIVIL/ZESTRIL) 5 MG tablet Take 1 tablet (5 mg) by mouth daily 90 tablet 3     MELATONIN PO 10 mg At Bedtime        metFORMIN (GLUCOPHAGE-XR) 500 MG 24 hr tablet TAKE 2 TABLETS TWICE A DAY WITH MEALS 360 tablet 1     minocycline (MINOCIN/DYNACIN) 100 MG capsule Take 1 capsule (100 mg) by mouth every 12 hours 180 capsule 2     omeprazole (PRILOSEC) 40 MG capsule TAKE 1 CAPSULE DAILY 30 TO 60 MINUTES BEFORE A MEAL 90 capsule 3     ONE TOUCH LANCETS None Entered       ONE TOUCH TEST STRIPS VI None Entered       order for DME Equipment being ordered: 4 wheel walker with a seat, light weight. 1 Units 0     order for DME Equipment being ordered: Walker with seat light weight 1 Device 0     oxybutynin ER (DITROPAN XL) 15 MG 24 hr tablet Take 1 tablet (15 mg) by mouth daily 90 tablet 1     simvastatin (ZOCOR) 20 MG tablet Take 1 tablet (20 mg) by mouth At Bedtime 90 tablet 3     spironolactone (ALDACTONE) 100 MG tablet TAKE 1 TABLET EVERY MORNING 90 tablet 3     traZODone (DESYREL) 100 MG tablet Take 1 tablet (100 mg) by mouth nightly as needed for " sleep 90 tablet 3     vitamin D3 (CHOLECALCIFEROL) 1000 units (25 mcg) tablet Take 1 tablet (1,000 Units) by mouth daily 90 tablet 3       ROS:  Constitutional, HEENT, cardiovascular, pulmonary, gi and gu systems are negative, except as otherwise noted.    OBJECTIVE:     /79   Pulse 114   Temp 98.6  F (37  C) (Oral)   Wt 94.8 kg (209 lb)   LMP 11/18/1991 (Exact Date)   SpO2 96%   BMI 32.49 kg/m    Body mass index is 32.49 kg/m .   GENERAL: healthy, alert and no distress  EYES: Eyes grossly normal to inspection, PERRL and conjunctivae and sclerae normal  HENT: ear canals and TM's normal, nose and mouth without ulcers or lesions  NECK: no adenopathy, no asymmetry, masses, or scars and thyroid normal to palpation  RESP: lungs clear to auscultation - no rales, rhonchi or wheezes  CV: regular rate and rhythm, normal S1 S2, no S3 or S4, no murmur, click or rub, no peripheral edema and peripheral pulses strong  ABDOMEN: soft, nontender, no hepatosplenomegaly, no masses and bowel sounds normal      ASSESSMENT:   Radha was seen today for urinary problem.    Diagnoses and all orders for this visit:    Complicated UTI (urinary tract infection)  -     cefdinir (OMNICEF) 300 MG capsule; Take 1 capsule (300 mg) by mouth 2 times daily for 10 days    Other orders  -     UA with Microscopic reflex to Culture  -     Urine Culture Aerobic Bacterial        PLAN:    Treatment per orders - also push fluids, may use Pyridium OTC prn. Call or return to clinic prn if these symptoms worsen or fail to improve as anticipated.    See Patient Instructions    Arline Mejia MD  [facility]

## 2019-05-02 LAB
BACTERIA SPEC CULT: ABNORMAL
BACTERIA SPEC CULT: ABNORMAL
SPECIMEN SOURCE: ABNORMAL

## 2019-05-03 ENCOUNTER — TELEPHONE (OUTPATIENT)
Dept: PEDIATRICS | Facility: CLINIC | Age: 69
End: 2019-05-03

## 2019-05-03 NOTE — TELEPHONE ENCOUNTER
[facility] Call Center    Phone Message    May a detailed message be left on voicemail: yes    Reason for Call: Patient stated [facility] faxed a letter to Dr. Medeiros to fill out for approval on a light weight walker.  Patient is requesting a confirmation call to confirm receipt.     Action Taken: [id number]

## 2019-05-07 NOTE — TELEPHONE ENCOUNTER
Fax from [facility] not located.    Attempt #1:  Left message for patient stating no fax received at this time from [facility]. Advised in message to have [facility] refax order to fax #: [phone].    Will monitor for fax.  Priyanka Cerna CMA

## 2019-05-07 NOTE — TELEPHONE ENCOUNTER
"Received faxed notation from [facility] stating that they received \"an rx for a 4ww. The chart notes that we received are non-qualifying, please send chart notes to support the medical necessity of the 4ww. Please include a qualifying dx as insurance will not cover with the dx on the original order\".    Diagnosis of DME order states chronic midline low back pain without sciatica. Unable to locate original paper DME order.    Per review of chart, office notes dated 04/04/19 to faxed to [facility] on 04/05/19.    Routing encounter to PCP for review. Faxed notation placed on PCP's desk for further review.  Priyanka Cerna CMA        "

## 2019-05-10 NOTE — PROGRESS NOTES
Addendum:  Correction on the chart note where reference to wheel chair was not corrected.     She borrowed a walker, it helped but it was too heavy, she almost fell over trying to lift.     Patient had a history of lumbar spine stenosis with neurogenic claudication and T12 compression fracture. Pt had multiple epidural procedures on the low back performed by by Dr. Ansari, spine specialist, is between 2017 2018.    12/1/2017 bilateral L3-L5 medial branch block for lumbar spondylosis and lumbar go without radiculopathy  12/6/2017 spinal stenosis at L4-5, bilateral L5-S1 transforaminal epidural steroid injection.  8/9/2018 bilateral medial branch blocks L3-5  8/13/2018 bilateral medial branch blocks L3-5  8/20/2018 endoscopic nerve transection      I do agree that patient will benefit from a light weight four-wheel walker.  This will allow patient to increase her mobility.

## 2019-05-10 NOTE — TELEPHONE ENCOUNTER
Spoke with patient. We do not have records of her back surgery from 2017. She reported 8 surgeries within one year by Dr. Ansari, spine surgeon. However, he didn't send any records to us. She will have Dr. Ansari's office fax us the records next week to [phone]. I will addend my note after reviewing the records and reorder the 4WW.

## 2019-05-16 NOTE — TELEPHONE ENCOUNTER
Patient had a history of lumbar spine stenosis with neurogenic claudication and T12 compression fracture. Pt had multiple epidural procedures on the low back performed by by Dr. Ansari, spine specialist, is between 2017 2018.    12/1/2017 bilateral L3-L5 medial branch block for lumbar spondylosis and lumbar go without radiculopathy  12/6/2017 spinal stenosis at L4-5, bilateral L5-S1 transforaminal epidural steroid injection.  8/9/2018 bilateral medial branch blocks L3-5  8/13/2018 bilateral medial branch blocks L3-5  8/20/2018 endoscopic nerve transection      I do agree that patient will benefit from a light weight four-wheel walker.  This will allow patient to increase her mobility.    New Rx ordered. Please fax with chart note from 4/4/2019 with addendum.

## 2019-05-24 ENCOUNTER — TELEPHONE (OUTPATIENT)
Dept: PEDIATRICS | Facility: CLINIC | Age: 69
End: 2019-05-24

## 2019-05-24 NOTE — TELEPHONE ENCOUNTER
[facility] Call Center    Phone Message    May a detailed message be left on voicemail: yes    Reason for Call: Other: Patient states that [facility] [email or web address] [place] has sent over forms for Dr. Medeiros to complete- sent to fax number [phone] and the [phone] regarding light weight steated walker. Please call to discuss the status of form with patient.    Patient would like to this be expedited asap if possible.     Action Taken: Message routed to:  Primary Care p [phone]

## 2019-05-28 NOTE — TELEPHONE ENCOUNTER
Called and informed pt, that this was faxed last week and sent to scanning dept. Pt will call [facility] again to see if they received it. Will let me know.   Mary Ann LANDIS

## 2019-05-28 NOTE — TELEPHONE ENCOUNTER
[facility] Call Center    Phone Message    May a detailed message be left on voicemail: yes    Reason for Call: Other: [facility]'s fax is [phone].   They are requesting chart notes, do not mention wheelchair only walker. 4 points they want/need on the chart notes, #1 is why patient can't use a cane  #2 how will it help with her ADLs, #3 how will she safely use her walker #4 how will it help with her mobility deficits.     Please call if you have any questions regarding the answers to the 4 points.    Action Taken: Message routed to:  Primary Care p [phone]

## 2019-05-29 NOTE — TELEPHONE ENCOUNTER
Called [facility], they only need the office notes sent to them. I faxed these today.   Mary Ann LANDIS

## 2019-06-04 ENCOUNTER — OFFICE VISIT (OUTPATIENT)
Dept: PEDIATRICS | Facility: CLINIC | Age: 69
End: 2019-06-04
Payer: COMMERCIAL

## 2019-06-04 VITALS
DIASTOLIC BLOOD PRESSURE: 68 MMHG | WEIGHT: 207.8 LBS | HEIGHT: 67 IN | TEMPERATURE: 98.8 F | BODY MASS INDEX: 32.62 KG/M2 | HEART RATE: 122 BPM | OXYGEN SATURATION: 94 % | SYSTOLIC BLOOD PRESSURE: 120 MMHG

## 2019-06-04 DIAGNOSIS — R30.9 PAINFUL URINATION: Primary | ICD-10-CM

## 2019-06-04 DIAGNOSIS — N39.0 URINARY TRACT INFECTION WITHOUT HEMATURIA, SITE UNSPECIFIED: ICD-10-CM

## 2019-06-04 DIAGNOSIS — R82.90 NONSPECIFIC FINDING ON EXAMINATION OF URINE: ICD-10-CM

## 2019-06-04 LAB
ALBUMIN UR-MCNC: 30 MG/DL
APPEARANCE UR: ABNORMAL
BACTERIA #/AREA URNS HPF: ABNORMAL /HPF
BILIRUB UR QL STRIP: NEGATIVE
COLOR UR AUTO: YELLOW
GLUCOSE UR STRIP-MCNC: NEGATIVE MG/DL
HGB UR QL STRIP: NEGATIVE
KETONES UR STRIP-MCNC: NEGATIVE MG/DL
LEUKOCYTE ESTERASE UR QL STRIP: ABNORMAL
MUCOUS THREADS #/AREA URNS LPF: PRESENT /LPF
NITRATE UR QL: NEGATIVE
NON-SQ EPI CELLS #/AREA URNS LPF: ABNORMAL /LPF
PH UR STRIP: 7 PH (ref 5–7)
RBC #/AREA URNS AUTO: ABNORMAL /HPF
SOURCE: ABNORMAL
SP GR UR STRIP: 1.02 (ref 1–1.03)
UROBILINOGEN UR STRIP-MCNC: 2 MG/DL (ref 0–2)
WBC #/AREA URNS AUTO: ABNORMAL /HPF

## 2019-06-04 PROCEDURE — 81001 URINALYSIS AUTO W/SCOPE: CPT | Performed by: NURSE PRACTITIONER

## 2019-06-04 PROCEDURE — 87186 SC STD MICRODIL/AGAR DIL: CPT | Performed by: NURSE PRACTITIONER

## 2019-06-04 PROCEDURE — 87086 URINE CULTURE/COLONY COUNT: CPT | Performed by: NURSE PRACTITIONER

## 2019-06-04 PROCEDURE — 87088 URINE BACTERIA CULTURE: CPT | Performed by: NURSE PRACTITIONER

## 2019-06-04 PROCEDURE — 99213 OFFICE O/P EST LOW 20 MIN: CPT | Performed by: NURSE PRACTITIONER

## 2019-06-04 RX ORDER — CEFUROXIME AXETIL 500 MG/1
500 TABLET ORAL 2 TIMES DAILY
Qty: 14 TABLET | Refills: 0 | Status: SHIPPED | OUTPATIENT
Start: 2019-06-04 | End: 2019-06-26

## 2019-06-04 ASSESSMENT — MIFFLIN-ST. JEOR: SCORE: 1509.16

## 2019-06-04 NOTE — PATIENT INSTRUCTIONS
PLAN:   1.   Symptomatic therapy suggested: increase fluids and call prn if symptoms persist or worsen.  2.  Orders Placed This Encounter   Medications     cefuroxime (CEFTIN) 500 MG tablet     Sig: Take 1 tablet (500 mg) by mouth 2 times daily for 7 days     Dispense:  14 tablet     Refill:  0     Orders Placed This Encounter   Procedures     UA with Microscopic reflex to Culture (Deirdre Siu; [facility])     3. Patient needs to follow up in if no improvement,or sooner if worsening of symptoms or other symptoms develop.  FOLLOW UP WITH SPECIALIST :Urology

## 2019-06-04 NOTE — PROGRESS NOTES
Subjective     Radha Corbett is a 68 year old female who presents to clinic today for the following health issues:    HPI   URINARY TRACT SYMPTOMS  Onset: 3 days after finishing OMNICEF     Description:   Painful urination (Dysuria): YES  Blood in urine (Hematuria): no   Delay in urine (Hesitency): no     Intensity: moderate, severe    Progression of Symptoms:  worsening and constant    Accompanying Signs & Symptoms:  Fever/chills: no   Flank pain no   Nausea and vomiting: no   Any vaginal symptoms: none  Abdominal/Pelvic Pain: no     History:   History of frequent UTI's: YES  History of kidney stones: no   Sexually Active: no   Possibility of pregnancy: No    Precipitating factors:   Frequent urination  Not sure when finished the antibiotic and has chronic issue with bladder frequency   Wears diaper and has a bladder implant     Concern - Tachycardia   Pt. States she is not experiencing any symptoms at this time but is concerned about it       Pt. States she would also like to have provider look at her umbilical hernia.    Patient Active Problem List   Diagnosis     Macular degeneration     Myopia     Hiatal hernia     IBS (irritable bowel syndrome)     Hypertension goal BP (blood pressure) < 140/90     GERD (gastroesophageal reflux disease)     Atypical ductal hyperplasia of breast     Atypical lobular hyperplasia of breast     Benign Breast Disease (PASH)     Family history of breast cancer in sister     Type 2 diabetes, HbA1C goal < 8% (H)     Hyperlipidemia LDL goal <100     Breast cancer screening-high risk     Tubular adenoma of colon     Osteopenia     Alcohol ingestion, more than 4 drinks/day on alcohol screening     Umbilical hernia     Incontinence of urine     Stool incontinence     Osteoarthritis, knee     Rosacea     Anxiety     Pseudophakia     Abnormal cervical Pap smear with positive HPV DNA test     Depression with anxiety     Retinal detachment     Elevated liver function tests     Rectocele      Unexplained endometrial cells on cervical cytology     Back pain, unspecified back location, unspecified back pain laterality, unspecified chronicity     Urge incontinence     Dislocation of shoulder region     Acute lower GI bleeding     Morbid obesity (H)     Moderate episode of recurrent major depressive disorder (H)     Angiodysplasia of colon     Past Surgical History:   Procedure Laterality Date     BREAST LUMPECTOMY, RT/LT      x2, left     CAPSULE/PILL CAM ENDOSCOPY N/A 3/4/2019    Procedure: CAPSULE/PILL CAM ENDOSCOPY;  Surgeon: Sinan Valdes MD;  Location: [facility]     CATARACT IOL, RT/LT  4/99    left, MZ60CD 7.0D     COLONOSCOPY       COLONOSCOPY N/A 1/22/2016    Procedure: COMBINED COLONOSCOPY, SINGLE OR MULTIPLE BIOPSY/POLYPECTOMY BY BIOPSY;  Surgeon: Praful Benjamin MD;  Location: [facility]     COLONOSCOPY WITH CO2 INSUFFLATION N/A 1/22/2016    Procedure: COLONOSCOPY WITH CO2 INSUFFLATION;  Surgeon: Praful Benjamin MD;  Location: [facility]     COMBINED ESOPHAGOSCOPY, GASTROSCOPY, DUODENOSCOPY (EGD) WITH CO2 INSUFFLATION N/A 11/27/2018    Procedure: COMBINED ESOPHAGOSCOPY, GASTROSCOPY, DUODENOSCOPY (EGD) WITH CO2 INSUFFLATION;  Surgeon: Duane, William Charles, MD;  Location: [facility]     CRYOTHERAPY  2000    cervical     CRYOTHERAPY Left 3/19/2015    Procedure: CRYOTHERAPY;  Surgeon: Keiko Puri MD;  Location:  [facility]     DILATION AND CURETTAGE, HYSTEROSCOPY DIAGNOSTIC, COMBINED N/A 1/19/2016    Procedure: COMBINED DILATION AND CURETTAGE, HYSTEROSCOPY DIAGNOSTIC;  Surgeon: Yeni Aparicio DO;  Location: [facility]     ESOPHAGOSCOPY, GASTROSCOPY, DUODENOSCOPY (EGD), COMBINED N/A 11/27/2018    Procedure: COMBINED ESOPHAGOSCOPY, GASTROSCOPY, DUODENOSCOPY (EGD), BIOPSY SINGLE OR MULTIPLE;  Surgeon: Duane, William Charles, MD;  Location: [facility]     IMPLANT STIMULATOR AND LEADS SACRAL NERVE (STAGE ONE AND TWO) N/A 7/24/2018    Procedure: IMPLANT STIMULATOR AND LEADS SACRAL NERVE  (STAGE ONE AND TWO);  Interstim Implant Stage One and Two (Implant Permanent Lead and Generator);  Surgeon: Dada Baltazar MD;  [facility]: [facility]     IMPLANT STIMULATOR SACRAL NERVE PERCUTANEOUS TRIAL N/A 2018    Procedure: IMPLANT STIMULATOR SACRAL NERVE PERCUTANEOUS TRIAL;  Percutaneous Neural Examination (trial for interstim);  Surgeon: Dada Baltazar MD;  Location: [facility]     LAPAROSCOPIC TUBAL LIGATION       PHACOEMULSIFICATION CLEAR CORNEA WITH STANDARD INTRAOCULAR LENS IMPLANT  8/15/2013    Procedure: PHACOEMULSIFICATION CLEAR CORNEA WITH STANDARD INTRAOCULAR LENS IMPLANT;  RIGHT PHACOEMULSIFICATION CLEAR CORNEA WITH STANDARD INTRAOCULAR LENS IMPLANT ;  Surgeon: Trae Taylor MD;  Location: [facility]     SURGICAL HISTORY OF -       x4, ankle surgery       Social History     Tobacco Use     Smoking status: Former Smoker     Packs/day: 1.00     Years: 20.00     Pack years: 20.00     Types: Cigarettes     Start date: 2018     Smokeless tobacco: Never Used   Substance Use Topics     Alcohol use: Yes     Comment: social/3-4 per week     Family History   Problem Relation Age of Onset     Hypertension Mother      Cerebrovascular Disease Mother      Arthritis Mother      Cardiovascular Mother      Circulatory Mother      Cerebrovascular Disease Father      Prostate Cancer Father 65         at 69     Asthma Brother      Prostate Cancer Brother 48        bone metastasis     Diabetes Sister      Hypertension Sister      Osteoporosis Sister      Depression Sister      Lipids Sister      Cancer Maternal Grandmother 95        spine     Breast Cancer Sister 39        also contralateral @53, mets to lungs     Cancer Sister 20        second uterine cancer in 30s also     Diabetes Sister      Hypertension Sister      Depression Sister      Osteoporosis Sister      Breast Cancer Sister 57        unilateral     Cancer Paternal Aunt 40        unknown type     Cancer Paternal Uncle         stomach;  " in his 80s     Prostate Cancer Brother      Glaucoma No family hx of      Melanoma No family hx of      Skin Cancer No family hx of          Current Outpatient Medications   Medication Sig Dispense Refill     calcium carbonate-vitamin D (OSCAL W/D) 500-200 MG-UNIT tablet Take 1 tablet by mouth 2 times daily (with meals) 180 tablet 3     Continuous Blood Gluc  (FREESTYLE MELINA READER) AYAN 1 Units daily 1 Device 0     Continuous Blood Gluc  (FREESTYLE MELINA READER) AYAN 1 Units 3 times daily as needed 1 Device 0     continuous blood glucose monitoring (FREESTYLE MELINA) sensor For use with Freestyle Melina Flash  for continuous monitioring of blood glucose levels. Replace sensor every 10 days. 3 each 5     DULoxetine (CYMBALTA) 60 MG capsule Take 1 capsule (60 mg) by mouth 2 times daily 180 capsule 1     glipiZIDE (GLUCOTROL) 10 MG tablet TAKE 1 TABLET DAILY WITH DINNER 90 tablet 1     insulin NPH (NOVOLIN N VIAL) 100 UNIT/ML injection Inject 64 units Subcutaneous 2 times daily. (gets this [facility])       insulin syringe-needle U-100 (RELION INSULIN SYRINGE) 31G X 5/16\" 1 ML Use 2 syringes daily or as directed. 200 each 3     lisinopril (PRINIVIL/ZESTRIL) 5 MG tablet Take 1 tablet (5 mg) by mouth daily 90 tablet 3     MELATONIN PO 10 mg At Bedtime        metFORMIN (GLUCOPHAGE-XR) 500 MG 24 hr tablet TAKE 2 TABLETS TWICE A DAY WITH MEALS 360 tablet 1     minocycline (MINOCIN/DYNACIN) 100 MG capsule Take 1 capsule (100 mg) by mouth every 12 hours 180 capsule 2     omeprazole (PRILOSEC) 40 MG capsule TAKE 1 CAPSULE DAILY 30 TO 60 MINUTES BEFORE A MEAL 90 capsule 3     ONE TOUCH LANCETS None Entered       ONE TOUCH TEST STRIPS VI None Entered       order for DME Equipment being ordered: Light with four-wheel walker 1 Units 0     order for DME Equipment being ordered: 4 wheel walker with a seat, light weight. 1 Units 0     order for DME Equipment being ordered: Walker with seat " "light weight 1 Device 0     oxybutynin ER (DITROPAN XL) 15 MG 24 hr tablet Take 1 tablet (15 mg) by mouth daily 90 tablet 1     simvastatin (ZOCOR) 20 MG tablet Take 1 tablet (20 mg) by mouth At Bedtime 90 tablet 3     spironolactone (ALDACTONE) 100 MG tablet TAKE 1 TABLET EVERY MORNING 90 tablet 3     traZODone (DESYREL) 100 MG tablet Take 1 tablet (100 mg) by mouth nightly as needed for sleep 90 tablet 3     vitamin D3 (CHOLECALCIFEROL) 1000 units (25 mcg) tablet Take 1 tablet (1,000 Units) by mouth daily 90 tablet 3     Allergies   Allergen Reactions     Codeine      Sulfa Drugs      BP Readings from Last 3 Encounters:   06/04/19 120/68   04/30/19 130/79   04/30/19 130/79    Wt Readings from Last 3 Encounters:   06/04/19 94.3 kg (207 lb 12.8 oz)   04/30/19 94.8 kg (209 lb)   04/30/19 94.8 kg (209 lb)            Reviewed and updated as needed this visit by Provider         Review of Systems   ROS COMP: CONSTITUTIONAL:NEGATIVE for fever, chills, change in weight  ENT/MOUTH: NEGATIVE for ear, mouth and throat problems  RESP:NEGATIVE for significant cough or SOB  CV: NEGATIVE for chest pain, palpitations or peripheral edema  GI: NEGATIVE for nausea, poor appetite, vomiting and weight loss  : frequency  and incontinence  MUSCULOSKELETAL: POSITIVE  for arthralgias chronically  and NEGATIVE for joint swelling  and joint warmth   NEURO: NEGATIVE for involuntary movements, gait disturbance, loss of consciousness and syncope  HEME/ALLERGY/IMMUNE: NEGATIVE for bleeding problems  PSYCHIATRIC: POSITIVE forHx anxiety and Hx depression and NEGATIVE forthoughts of hurting someone else and thoughts of self harm      Objective    /68 (BP Location: Right arm, Patient Position: Sitting, Cuff Size: Adult Large)   Pulse 122   Temp 98.8  F (37.1  C) (Oral)   Ht 1.708 m (5' 7.25\")   Wt 94.3 kg (207 lb 12.8 oz)   LMP 11/18/1991 (Exact Date)   SpO2 94%   BMI 32.30 kg/m    Body mass index is 32.3 kg/m .   Wt Readings from " Last 4 Encounters:   06/04/19 94.3 kg (207 lb 12.8 oz)   04/30/19 94.8 kg (209 lb)   04/30/19 94.8 kg (209 lb)   04/04/19 95.3 kg (210 lb)       Physical Exam   GENERAL APPEARANCE: alert, active and no distress  NECK: no adenopathy  RESP: lungs clear to auscultation - no rales, rhonchi or wheezes  CV: regular rates and rhythm and no murmur, click or rub  ABDOMEN: soft, non-tender  MS: extremities normal- no gross deformities noted  SKIN: Skin color, texture, turgor normal.   NEURO: Normal strength and tone, mentation intact and speech normal  PSYCH: mentation appears normal, patient appearance--, anxious and worried  MENTAL STATUS EXAM:  Appearance/Behavior: No apparent distress, Casually groomed and Dressed appropriately for weather  Speech: Normal  Mood/Affect: anxiety  Insight: Fair    Diagnostic Test Results:  Results for orders placed or performed in visit on 06/04/19   UA with Microscopic reflex to Culture ([facility])   Result Value Ref Range    Color Urine Yellow     Appearance Urine Slightly Cloudy     Glucose Urine Negative NEG^Negative mg/dL    Bilirubin Urine Negative NEG^Negative    Ketones Urine Negative NEG^Negative mg/dL    Specific Gravity Urine 1.017 1.003 - 1.035    Blood Urine Negative NEG^Negative    pH Urine 7.0 5.0 - 7.0 pH    Protein Albumin Urine 30 (A) NEG^Negative mg/dL    Urobilinogen mg/dL 2.0 0.0 - 2.0 mg/dL    Nitrite Urine Negative NEG^Negative    Leukocyte Esterase Urine Large (A) NEG^Negative    Source Midstream Urine     WBC Urine 10-25 (A) OTO5^0 - 5 /HPF    RBC Urine O - 2 OTO2^O - 2 /HPF    Bacteria Urine Moderate (A) NEG^Negative /HPF    Squamous Epithelial /LPF Urine Few FEW^Few /LPF    Mucous Urine Present (A) NEG^Negative /LPF           Assessment & Plan     Radha was seen today for uti and tachycardia.    Diagnoses and all orders for this visit:    Painful urination  -     UA with Microscopic reflex to Culture ([facility])    Nonspecific  finding on examination of urine  -     Urine Culture Aerobic Bacterial    Urinary tract infection without hematuria, site unspecified  -      cefuroxime (CEFTIN) 500 MG tablet; Take 1 tablet (500 mg) by mouth 2 times daily for 7 days    See Patient Instructions  Patient Instructions     PLAN:   1.   Symptomatic therapy suggested: increase fluids and call prn if symptoms persist or worsen.  2.  Orders Placed This Encounter   Medications     cefuroxime (CEFTIN) 500 MG tablet     Sig: Take 1 tablet (500 mg) by mouth 2 times daily for 7 days     Dispense:  14 tablet     Refill:  0     Orders Placed This Encounter   Procedures     UA with Microscopic reflex to Culture ([facility])     3. Patient needs to follow up in if no improvement,or sooner if worsening of symptoms or other symptoms develop.  FOLLOW UP WITH SPECIALIST :Urology    No follow-ups on file.    NIKKY Lutz CNP  [facility]

## 2019-06-06 LAB
BACTERIA SPEC CULT: ABNORMAL
SPECIMEN SOURCE: ABNORMAL

## 2019-06-06 NOTE — RESULT ENCOUNTER NOTE
Medina Corbett,    Attached are your test results.  -Urine culture is abnormal and grew out bacteria that are sensitive to the antibiotic you have been given.  Complete the medication as prescribed and if you experience new, worsening or persistent symptoms, you should call or return for a recheck.  Please make follow up with urology related to frequent UTIs    Please contact us if you have any questions.    Perri Carias, CNP

## 2019-06-10 ENCOUNTER — TELEPHONE (OUTPATIENT)
Dept: FAMILY MEDICINE | Facility: CLINIC | Age: 69
End: 2019-06-10

## 2019-06-10 DIAGNOSIS — K21.9 GASTROESOPHAGEAL REFLUX DISEASE WITHOUT ESOPHAGITIS: ICD-10-CM

## 2019-06-10 DIAGNOSIS — I10 ESSENTIAL HYPERTENSION WITH GOAL BLOOD PRESSURE LESS THAN 140/90: ICD-10-CM

## 2019-06-10 NOTE — LETTER
Jessica 10, 2019      Radha Corbett  [address] [address]        Dear Radha,     Your provider has reviewed your chart. With your depression and anxiety, it is important that we keep a close eye on at least every 6 months. Please complete the attached assessment and return it to clinic or call to update together over the phone at [phone].       Thanks,   [facility] Team

## 2019-06-10 NOTE — TELEPHONE ENCOUNTER
Panel Management Review   One phone call and send letter if unable to reach them or MyChart message and send letter if not read after 2 weeks (You will get a message to your inbasket)      BP Readings from Last 1 Encounters:   06/04/19 120/68        Health Maintenance Due   Topic Date Due     ZOSTER IMMUNIZATION (2 of 3) 08/05/2014     ADVANCED DIRECTIVE PLANNING  05/23/2016     PAP  12/02/2018     MEDICARE ANNUAL WELLNESS VISIT  01/04/2019     DIABETIC FOOT EXAM  06/14/2019        Fail List measure:     Depression / Dysthymia review    Measure:  Needs PHQ-9 score of 4 or less during index window.  Administer PHQ-9 and if score is 5 or more, send encounter to provider for next steps.      PHQ-9 SCORE 1/10/2019 3/12/2019 4/5/2019   PHQ-9 Total Score - - -   PHQ-9 Total Score MyChart - - -   PHQ-9 Total Score 20 15 15       If PHQ-9 recheck is 5 or more, route to provider for next steps.    Patient is due for:  PHQ9        Patient is due/failing the following:   PHQ9    Action needed:   Patient needs to do PHQ9.    Type of outreach:    Phone, left message for patient to call back. , Sent MyChart message. and Sent letter.    [id number], MEDICAL ASSISTANT

## 2019-06-12 RX ORDER — OMEPRAZOLE 40 MG/1
CAPSULE, DELAYED RELEASE ORAL
Qty: 90 CAPSULE | Refills: 3 | Status: SHIPPED | OUTPATIENT
Start: 2019-06-12 | End: 2020-07-13

## 2019-06-12 RX ORDER — SPIRONOLACTONE 100 MG/1
TABLET, FILM COATED ORAL
Qty: 90 TABLET | Refills: 1 | Status: SHIPPED | OUTPATIENT
Start: 2019-06-12 | End: 2020-05-19

## 2019-06-12 NOTE — TELEPHONE ENCOUNTER
Please refer to RN refill guidelines. Refilled per protocol.    Georgiana Bravo RN   [facility]

## 2019-06-24 ENCOUNTER — TELEPHONE (OUTPATIENT)
Dept: OPHTHALMOLOGY | Facility: CLINIC | Age: 69
End: 2019-06-24

## 2019-06-24 NOTE — TELEPHONE ENCOUNTER
"I spoke to the patient who notes intermittent flashes of light especially at night.  She said this has been going on \"about a week\"  She notes that her vision is stable.  I offered her an appointment in the Resident Doctor clinic tomorrow or in the Retina clinic on Wednesday.  She wants to see Retina MD.  She will call if symptoms change or worsen.    "

## 2019-06-26 ENCOUNTER — OFFICE VISIT (OUTPATIENT)
Dept: OPHTHALMOLOGY | Facility: CLINIC | Age: 69
End: 2019-06-26
Attending: OPHTHALMOLOGY
Payer: COMMERCIAL

## 2019-06-26 DIAGNOSIS — H44.23 MYOPIC DEGENERATION, BILATERAL: ICD-10-CM

## 2019-06-26 DIAGNOSIS — H53.19 VITREOUS FLASHES OF BOTH EYES: Primary | ICD-10-CM

## 2019-06-26 PROCEDURE — G0463 HOSPITAL OUTPT CLINIC VISIT: HCPCS | Mod: ZF

## 2019-06-26 ASSESSMENT — REFRACTION_WEARINGRX
OS_CYLINDER: +1.75
OD_CYLINDER: +1.75
OD_AXIS: 052
OD_SPHERE: -1.00
OS_ADD: +2.50
OS_AXIS: 089
OD_ADD: +2.50
OS_SPHERE: -3.75

## 2019-06-26 ASSESSMENT — SLIT LAMP EXAM - LIDS
COMMENTS: NORMAL
COMMENTS: NORMAL

## 2019-06-26 ASSESSMENT — CONF VISUAL FIELD
OS_SUPERIOR_NASAL_RESTRICTION: 2
OD_SUPERIOR_TEMPORAL_RESTRICTION: 3
OD_INFERIOR_NASAL_RESTRICTION: 3
OS_SUPERIOR_TEMPORAL_RESTRICTION: 1

## 2019-06-26 ASSESSMENT — TONOMETRY
OS_IOP_MMHG: 12
OD_IOP_MMHG: 15
IOP_METHOD: TONOPEN

## 2019-06-26 ASSESSMENT — CUP TO DISC RATIO
OD_RATIO: 0.1
OS_RATIO: 0.1

## 2019-06-26 ASSESSMENT — VISUAL ACUITY
OS_CC: 4' 200 E
OD_CC+: +2
METHOD: SNELLEN - LINEAR
OD_CC: 20/30

## 2019-06-26 ASSESSMENT — EXTERNAL EXAM - LEFT EYE: OS_EXAM: NORMAL

## 2019-06-26 ASSESSMENT — EXTERNAL EXAM - RIGHT EYE: OD_EXAM: NORMAL

## 2019-06-26 NOTE — PROGRESS NOTES
I have confirmed the patient's and reviewed Past Medical History, Past Surgical History, Social History, Family History, Problem List, Medication List and agree with Tech note.    CC: FLASHES OD     HPI: patient is high myope and has flashes right eye for a few weeks.      Assessment/plan:   1. New flashes right eye     Small temporal traction by scleral depressed exam    Patient is still very concerned and would like to see Keiko Puri MD       RTC with Keiko Puri MD this weeks     Fely Guillen MD PhD.  Professor & Chair

## 2019-06-28 ENCOUNTER — OFFICE VISIT (OUTPATIENT)
Dept: OPHTHALMOLOGY | Facility: CLINIC | Age: 69
End: 2019-06-28
Attending: OPHTHALMOLOGY
Payer: COMMERCIAL

## 2019-06-28 DIAGNOSIS — H44.23 DEGENERATIVE MYOPIA OF BOTH EYES: Primary | ICD-10-CM

## 2019-06-28 PROCEDURE — 92250 FUNDUS PHOTOGRAPHY W/I&R: CPT | Mod: ZF | Performed by: OPHTHALMOLOGY

## 2019-06-28 PROCEDURE — 92134 CPTRZ OPH DX IMG PST SGM RTA: CPT | Mod: ZF | Performed by: OPHTHALMOLOGY

## 2019-06-28 PROCEDURE — G0463 HOSPITAL OUTPT CLINIC VISIT: HCPCS | Mod: ZF

## 2019-06-28 ASSESSMENT — CONF VISUAL FIELD
OD_SUPERIOR_TEMPORAL_RESTRICTION: 3
OS_SUPERIOR_NASAL_RESTRICTION: 2
OD_INFERIOR_NASAL_RESTRICTION: 3
OS_SUPERIOR_TEMPORAL_RESTRICTION: 1

## 2019-06-28 ASSESSMENT — REFRACTION_WEARINGRX
OD_ADD: +2.50
OS_SPHERE: -3.75
OS_AXIS: 089
OD_CYLINDER: +1.75
OD_AXIS: 052
OS_CYLINDER: +1.75
OD_SPHERE: -1.00
OS_ADD: +2.50

## 2019-06-28 ASSESSMENT — VISUAL ACUITY
OD_CC+: +2
METHOD: SNELLEN - LINEAR
OD_CC: 20/30
OS_CC: 4' 200 E

## 2019-06-28 ASSESSMENT — EXTERNAL EXAM - LEFT EYE: OS_EXAM: NORMAL

## 2019-06-28 ASSESSMENT — CUP TO DISC RATIO
OD_RATIO: 0.1
OS_RATIO: 0.1

## 2019-06-28 ASSESSMENT — EXTERNAL EXAM - RIGHT EYE: OD_EXAM: NORMAL

## 2019-06-28 ASSESSMENT — TONOMETRY
OS_IOP_MMHG: 12
IOP_METHOD: TONOPEN
OD_IOP_MMHG: 15

## 2019-06-28 ASSESSMENT — SLIT LAMP EXAM - LIDS
COMMENTS: NORMAL
COMMENTS: NORMAL

## 2019-06-28 NOTE — PROGRESS NOTES
CC -   Myopic degeneration and h/o RD    INTERVAL HISTORY -   New flashes OD past week, no new floaters, no trauma.  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 Severe myopic degeneration OS > OD   - suspect worsening OS causing poor VA   - [place]ler        2. H/o RD OS s/p repair   - s/p PPV/SBP 2015   - RTC 1 year      3. PVD OD   - new flashes 6/2019   - advised S/Sx RD 6/2019     - recheck 1-2 months as precaution      4. DM II no DR            return to clinic: 1-2 months, DFE OU    ATTESTATION     Attending Physician Attestation:      Complete documentation of historical and exam elements from today's encounter can be found in the full encounter summary report (not reduplicated in this progress note).  I personally obtained the chief complaint(s) and history of present illness.  I confirmed and edited as necessary the review of systems, past medical/surgical history, family history, social history, and examination findings as documented by others; and I examined the patient myself.  I personally reviewed the relevant tests, images, and reports as documented above.  I formulated and edited as necessary the assessment and plan and discussed the findings and management plan with the patient and family    Keiko Puri MD, PhD  , Vitreoretinal Surgery  Department of Ophthalmology  [facility]

## 2019-07-01 ENCOUNTER — ANCILLARY PROCEDURE (OUTPATIENT)
Dept: GENERAL RADIOLOGY | Facility: CLINIC | Age: 69
End: 2019-07-01
Attending: UROLOGY
Payer: COMMERCIAL

## 2019-07-01 ENCOUNTER — OFFICE VISIT (OUTPATIENT)
Dept: UROLOGY | Facility: CLINIC | Age: 69
End: 2019-07-01
Payer: COMMERCIAL

## 2019-07-01 VITALS
DIASTOLIC BLOOD PRESSURE: 78 MMHG | BODY MASS INDEX: 32.3 KG/M2 | OXYGEN SATURATION: 93 % | SYSTOLIC BLOOD PRESSURE: 127 MMHG | HEIGHT: 67 IN | HEART RATE: 112 BPM

## 2019-07-01 DIAGNOSIS — N39.41 URGE INCONTINENCE OF URINE: ICD-10-CM

## 2019-07-01 DIAGNOSIS — R39.15 URINARY URGENCY: Primary | ICD-10-CM

## 2019-07-01 DIAGNOSIS — R82.90 NONSPECIFIC FINDING ON EXAMINATION OF URINE: ICD-10-CM

## 2019-07-01 DIAGNOSIS — R39.15 URINARY URGENCY: ICD-10-CM

## 2019-07-01 DIAGNOSIS — N39.0 RECURRENT UTI: Primary | ICD-10-CM

## 2019-07-01 LAB
ALBUMIN UR-MCNC: NEGATIVE MG/DL
APPEARANCE UR: CLEAR
BACTERIA #/AREA URNS HPF: ABNORMAL /HPF
BILIRUB UR QL STRIP: NEGATIVE
COLOR UR AUTO: YELLOW
GLUCOSE UR STRIP-MCNC: NEGATIVE MG/DL
HGB UR QL STRIP: NEGATIVE
KETONES UR STRIP-MCNC: NEGATIVE MG/DL
LEUKOCYTE ESTERASE UR QL STRIP: ABNORMAL
NITRATE UR QL: NEGATIVE
NON-SQ EPI CELLS #/AREA URNS LPF: ABNORMAL /LPF
PH UR STRIP: 7 PH (ref 5–7)
RBC #/AREA URNS AUTO: ABNORMAL /HPF
SOURCE: ABNORMAL
SP GR UR STRIP: 1.01 (ref 1–1.03)
UROBILINOGEN UR STRIP-MCNC: NORMAL MG/DL (ref 0–2)
WBC #/AREA URNS AUTO: ABNORMAL /HPF

## 2019-07-01 PROCEDURE — 87088 URINE BACTERIA CULTURE: CPT | Performed by: UROLOGY

## 2019-07-01 PROCEDURE — 87086 URINE CULTURE/COLONY COUNT: CPT | Performed by: UROLOGY

## 2019-07-01 PROCEDURE — 72220 X-RAY EXAM SACRUM TAILBONE: CPT | Performed by: RADIOLOGY

## 2019-07-01 PROCEDURE — 99213 OFFICE O/P EST LOW 20 MIN: CPT | Performed by: UROLOGY

## 2019-07-01 PROCEDURE — 87186 SC STD MICRODIL/AGAR DIL: CPT | Performed by: UROLOGY

## 2019-07-01 PROCEDURE — 81001 URINALYSIS AUTO W/SCOPE: CPT | Performed by: UROLOGY

## 2019-07-01 RX ORDER — TRIMETHOPRIM 100 MG/1
100 TABLET ORAL AT BEDTIME
Qty: 30 TABLET | Refills: 11 | Status: SHIPPED | OUTPATIENT
Start: 2019-07-01 | End: 2020-03-23

## 2019-07-01 NOTE — NURSING NOTE
"Radha Corbett's goals for this visit include:   Chief Complaint   Patient presents with     RECHECK     Frequent UTIs       She requests these members of her care team be copied on today's visit information: yes    PCP: Trice Medeiros    Referring Provider:  No referring provider defined for this encounter.    /78   Pulse 112   Ht 1.708 m (5' 7.25\")   LMP 11/18/1991 (Exact Date)   SpO2 93%   BMI 32.30 kg/m      Do you need any medication refills at today's visit? No    Mulugeta Du, FLACO      "

## 2019-07-01 NOTE — PROGRESS NOTES
Reason for Visit:  F/u on interstim implant.    Clinical Data:  Ms. Corbett is a 69 y/o female with urinary urgency and urge incontinence.  She was changed from Detrol to oxybutynin and also prescribed Myrbetriq but the latter was too expensive for her so she never started it.      She underwent an interstim trial on 6/19/18.  She was so pleased.  She was able to sleep for 6-7 hours without having to get up.  She felt like she had so much better control with it in place.  Especially on the right side.  The left side was a little less effective.  She therefore underwent permanent implant and had seen some improvement but not as good as the trial.  At her visit in August 2018 she felt it vaginally at 1 but it got a little uncomfortable.  She was then switched to program 3 at 0.9.      She has also been having multiple urinary tract infections.  Mostly with Klebsiella but also with E. Coli.    She is frustrated with her symptoms and feels that the PNE worked much better.      CT abd/pelvis 2019 was normal, no stones.    Cystoscopy on 3/19/18 was normal except for 2+ trabeculation and a diverticulum at the dome of the bladder.    UDS on 5/24/18:  -Multiple uninhibited detrusor contractions starting at bladder volumes >100 mL with Pdet pressures ranging from  cm H2O. She never leaks with any of these episodes of DO.  -Otherwise good bladder compliance between UDC's.  -Small/normal bladder capacity (293 mL)  -Normal bladder filling sensations.  -No reproducible FRANCHESKA or DOI.  -Strong detrusor contraction during voiding to 81.5 cm H2O with slightly slow flow (Qmax 14.7 ml/s) and some incomplete bladder emptying ( mL).  -No evidence for DSD.  -No evidence for outlet obstruction.  -Fluoroscopy reveals a mildly trabeculated bladder wall without diverticuli or VUR. The bladder neck was closed during filling, open during episodes of DO with contrast stopping at the level of the pelvic floor vs. external urinary  sphincter, and open during voiding.     A/P:  69 y/o female with urinary urgency and urge incontinence with greater improvement in her symptoms after the trial but not as much with the actual implant.  We discussed reprogramming the lead vs. Revising it.  Will try to reprogram it and if this does not work then will revise right lead.  -obtain sacral xray  -return on a day with [employer] rep. And review xray.    Thank you for allowing me to participate in the care of  Ms. Radha Corbett and I will keep you updated on her progress.    Dada Baltazar MD

## 2019-07-02 ENCOUNTER — TELEPHONE (OUTPATIENT)
Dept: UROLOGY | Facility: CLINIC | Age: 69
End: 2019-07-02

## 2019-07-02 DIAGNOSIS — N39.0 RECURRENT UTI: Primary | ICD-10-CM

## 2019-07-02 NOTE — TELEPHONE ENCOUNTER
Call to patient to advise of positive ua per Dr Baltazar, no answer. Will need to follow up with patient tomorrow regarding these results. Sharyn Jules RN

## 2019-07-03 LAB
BACTERIA SPEC CULT: ABNORMAL
SPECIMEN SOURCE: ABNORMAL

## 2019-07-03 RX ORDER — CEFDINIR 300 MG/1
300 CAPSULE ORAL 2 TIMES DAILY
Qty: 14 CAPSULE | Refills: 0 | Status: SHIPPED | OUTPATIENT
Start: 2019-07-03 | End: 2019-07-30

## 2019-07-03 NOTE — TELEPHONE ENCOUNTER
Spoke with patient and advised that she has UTI per Dr Baltazar and to start Omnicef 300 mg po BID x 7 days per protocol. Patient did not want to take Cipro and had recently had good results from Omnicef. Informed patient to not take trimethoprim while on omnicef, and to resume trimethoprim after treatment with omnicef is completed. Advised to increase fluid intake. Order sent to pharmacy as ordered. Sharyn Jules RN

## 2019-07-04 DIAGNOSIS — N30.00 ACUTE CYSTITIS WITHOUT HEMATURIA: Primary | ICD-10-CM

## 2019-07-04 RX ORDER — CEPHALEXIN 500 MG/1
500 CAPSULE ORAL 4 TIMES DAILY
Qty: 28 CAPSULE | Refills: 0 | Status: SHIPPED | OUTPATIENT
Start: 2019-07-04 | End: 2019-07-30

## 2019-07-10 DIAGNOSIS — N39.41 URGE INCONTINENCE: Primary | ICD-10-CM

## 2019-07-10 RX ORDER — CEFAZOLIN SODIUM 2 G/50ML
2 SOLUTION INTRAVENOUS
Status: CANCELLED | OUTPATIENT
Start: 2019-07-10

## 2019-07-10 RX ORDER — CEFAZOLIN SODIUM 1 G/50ML
1 INJECTION, SOLUTION INTRAVENOUS SEE ADMIN INSTRUCTIONS
Status: CANCELLED | OUTPATIENT
Start: 2019-07-10

## 2019-07-18 ENCOUNTER — TELEPHONE (OUTPATIENT)
Dept: UROLOGY | Facility: CLINIC | Age: 69
End: 2019-07-18

## 2019-07-18 NOTE — TELEPHONE ENCOUNTER
Patient is scheduled for surgery with Dr. Baltazar      Spoke or left message with: Radha    Date of Surgery: 8/6/19    Location: [facility] OR    Informed patient they will need an adult  yes    Pre-op with surgeon (if applicable): n/a    H&P: Scheduled with pcp    Additional imaging/appointments: n/a    Surgery packet: mailed 7/18/19     Additional comments: n/a

## 2019-07-29 ENCOUNTER — TELEPHONE (OUTPATIENT)
Dept: UROLOGY | Facility: CLINIC | Age: 69
End: 2019-07-29

## 2019-07-29 NOTE — TELEPHONE ENCOUNTER
----- Message from Anne Kinney sent at 7/18/2019 12:31 PM CDT -----  Regarding: teaching  Dr Baltazar    Removal and replacement of interstim implant    Surgery scheduled on 8/6/19 @ [facility] OR    Patient informed, packet mailed, please call for teaching    Thanks  Anne

## 2019-07-29 NOTE — TELEPHONE ENCOUNTER
Discussed surgery details with the patient. Patient is scheduled for her H&P tomorrow.   She denies having questions at this point and will call back if she does      Noni Mcdaniel RN   Care Coordinator Urology

## 2019-07-30 ENCOUNTER — OFFICE VISIT (OUTPATIENT)
Dept: PEDIATRICS | Facility: CLINIC | Age: 69
End: 2019-07-30
Payer: COMMERCIAL

## 2019-07-30 VITALS
DIASTOLIC BLOOD PRESSURE: 65 MMHG | SYSTOLIC BLOOD PRESSURE: 120 MMHG | OXYGEN SATURATION: 96 % | TEMPERATURE: 97.1 F | HEART RATE: 113 BPM | HEIGHT: 67 IN | BODY MASS INDEX: 32.75 KG/M2 | WEIGHT: 208.7 LBS

## 2019-07-30 DIAGNOSIS — Z79.4 TYPE 2 DIABETES MELLITUS WITH HYPERGLYCEMIA, WITH LONG-TERM CURRENT USE OF INSULIN (H): ICD-10-CM

## 2019-07-30 DIAGNOSIS — N39.41 URGENCY INCONTINENCE: ICD-10-CM

## 2019-07-30 DIAGNOSIS — Z01.818 PREOP GENERAL PHYSICAL EXAM: Primary | ICD-10-CM

## 2019-07-30 DIAGNOSIS — E11.65 TYPE 2 DIABETES MELLITUS WITH HYPERGLYCEMIA, WITH LONG-TERM CURRENT USE OF INSULIN (H): ICD-10-CM

## 2019-07-30 LAB
ALBUMIN UR-MCNC: NEGATIVE MG/DL
ANION GAP SERPL CALCULATED.3IONS-SCNC: 5 MMOL/L (ref 3–14)
APPEARANCE UR: ABNORMAL
BILIRUB UR QL STRIP: NEGATIVE
BUN SERPL-MCNC: 15 MG/DL (ref 7–30)
CALCIUM SERPL-MCNC: 9.7 MG/DL (ref 8.5–10.1)
CHLORIDE SERPL-SCNC: 102 MMOL/L (ref 94–109)
CO2 SERPL-SCNC: 30 MMOL/L (ref 20–32)
COLOR UR AUTO: YELLOW
CREAT SERPL-MCNC: 0.7 MG/DL (ref 0.52–1.04)
ERYTHROCYTE [DISTWIDTH] IN BLOOD BY AUTOMATED COUNT: 13.1 % (ref 10–15)
GFR SERPL CREATININE-BSD FRML MDRD: 89 ML/MIN/{1.73_M2}
GLUCOSE SERPL-MCNC: 84 MG/DL (ref 70–99)
GLUCOSE UR STRIP-MCNC: 30 MG/DL
HBA1C MFR BLD: 6.7 % (ref 0–5.6)
HCT VFR BLD AUTO: 46.1 % (ref 35–47)
HGB BLD-MCNC: 15.5 G/DL (ref 11.7–15.7)
HGB UR QL STRIP: NEGATIVE
KETONES UR STRIP-MCNC: NEGATIVE MG/DL
LEUKOCYTE ESTERASE UR QL STRIP: ABNORMAL
MCH RBC QN AUTO: 30 PG (ref 26.5–33)
MCHC RBC AUTO-ENTMCNC: 33.6 G/DL (ref 31.5–36.5)
MCV RBC AUTO: 89 FL (ref 78–100)
MUCOUS THREADS #/AREA URNS LPF: PRESENT /LPF
NITRATE UR QL: NEGATIVE
NON-SQ EPI CELLS #/AREA URNS LPF: ABNORMAL /LPF
PH UR STRIP: 6 PH (ref 5–7)
PLATELET # BLD AUTO: 297 10E9/L (ref 150–450)
POTASSIUM SERPL-SCNC: 4.3 MMOL/L (ref 3.4–5.3)
RBC # BLD AUTO: 5.16 10E12/L (ref 3.8–5.2)
RBC #/AREA URNS AUTO: ABNORMAL /HPF
SODIUM SERPL-SCNC: 137 MMOL/L (ref 133–144)
SOURCE: ABNORMAL
SP GR UR STRIP: 1.02 (ref 1–1.03)
UROBILINOGEN UR STRIP-MCNC: NORMAL MG/DL (ref 0–2)
WBC # BLD AUTO: 11.2 10E9/L (ref 4–11)
WBC #/AREA URNS AUTO: ABNORMAL /HPF

## 2019-07-30 PROCEDURE — 93000 ELECTROCARDIOGRAM COMPLETE: CPT | Performed by: NURSE PRACTITIONER

## 2019-07-30 PROCEDURE — 85027 COMPLETE CBC AUTOMATED: CPT | Performed by: NURSE PRACTITIONER

## 2019-07-30 PROCEDURE — 99215 OFFICE O/P EST HI 40 MIN: CPT | Performed by: NURSE PRACTITIONER

## 2019-07-30 PROCEDURE — 80048 BASIC METABOLIC PNL TOTAL CA: CPT | Performed by: NURSE PRACTITIONER

## 2019-07-30 PROCEDURE — 81001 URINALYSIS AUTO W/SCOPE: CPT | Performed by: NURSE PRACTITIONER

## 2019-07-30 PROCEDURE — 87086 URINE CULTURE/COLONY COUNT: CPT | Performed by: NURSE PRACTITIONER

## 2019-07-30 PROCEDURE — 83036 HEMOGLOBIN GLYCOSYLATED A1C: CPT | Performed by: NURSE PRACTITIONER

## 2019-07-30 PROCEDURE — 36415 COLL VENOUS BLD VENIPUNCTURE: CPT | Performed by: NURSE PRACTITIONER

## 2019-07-30 ASSESSMENT — ANXIETY QUESTIONNAIRES
GAD7 TOTAL SCORE: 9
6. BECOMING EASILY ANNOYED OR IRRITABLE: MORE THAN HALF THE DAYS
3. WORRYING TOO MUCH ABOUT DIFFERENT THINGS: SEVERAL DAYS
2. NOT BEING ABLE TO STOP OR CONTROL WORRYING: SEVERAL DAYS
7. FEELING AFRAID AS IF SOMETHING AWFUL MIGHT HAPPEN: SEVERAL DAYS
5. BEING SO RESTLESS THAT IT IS HARD TO SIT STILL: MORE THAN HALF THE DAYS
1. FEELING NERVOUS, ANXIOUS, OR ON EDGE: SEVERAL DAYS

## 2019-07-30 ASSESSMENT — PATIENT HEALTH QUESTIONNAIRE - PHQ9
5. POOR APPETITE OR OVEREATING: SEVERAL DAYS
SUM OF ALL RESPONSES TO PHQ QUESTIONS 1-9: 11

## 2019-07-30 ASSESSMENT — MIFFLIN-ST. JEOR: SCORE: 1513.25

## 2019-07-30 NOTE — PROGRESS NOTES
[address] [address]  [phone]  Dept: [phone]    PRE-OP EVALUATION:  Today's date: 2019    Radha Corbett (: 1950) presents for pre-operative evaluation assessment as requested by Dr. Baltazar.  She requires evaluation and anesthesia risk assessment prior to undergoing surgery/procedure for treatment of urinary urgency .    Proposed Surgery/ Procedure: Replacement of Interstim Implant, Removal of Interstim Implant  Date of Surgery/ Procedure: 19  Time of Surgery/ Procedure: 10:15am  Hospital/Surgical Facility: [facility]  Fax number for surgical facility:   Primary Physician: Trice Medeiros  Type of Anesthesia Anticipated: to be determined    Patient has a Health Care Directive or Living Will:  NO    1. NO - Do you have a history of heart attack, stroke, stent, bypass or surgery on an artery in the head, neck, heart or legs?  2. NO - Do you ever have any pain or discomfort in your chest?  3. NO - Do you have a history of  Heart Failure?  4. NO - Are you troubled by shortness of breath when: walking on the level, up a slight hill or at night?  5. NO - Do you currently have a cold, bronchitis or other respiratory infection?  6. NO - Do you have a cough, shortness of breath or wheezing?  7. YES - Do you sometimes get pains in the calves of your legs when you walk? Only due to arthritis   8. NO - Do you or anyone in your family have previous history of blood clots?  9. NO - Do you or does anyone in your family have a serious bleeding problem such as prolonged bleeding following surgeries or cuts?  10. YES - Have you ever had problems with anemia or been told to take iron pills? Anemia and iron supplemetns  11. NO - Have you had any abnormal blood loss such as black, tarry or bloody stools, or abnormal vaginal bleeding?  12. NO - Have you ever had a blood transfusion?  13. NO - Have you or any of your relatives ever had problems with  anesthesia?  14. YES - Do you have sleep apnea, excessive snoring or daytime drowsiness?  15. NO - Do you have any prosthetic heart valves?  16. NO - Do you have prosthetic joints?  17. NO - Is there any chance that you may be pregnant?      HPI:     HPI related to upcoming procedure: undergoing surgery/procedure for treatment of urinary urgency .    Proposed Surgery/ Procedure: Replacement of Interstim Implant, Removal of Interstim Implant  Date of Surgery/ Procedure: 8/6/19      See problem list for active medical problems.  Problems all longstanding and stable, except as noted/documented.  See ROS for pertinent symptoms related to these conditions.    DEPRESSION - Patient has a long history of Depression of moderate severity requiring medication for control with recent symptoms being waxing and waning..Current symptoms of depression include excessive worry , fatigue.     DIABETES - Patient has a longstanding history of DiabetesType Type II . Patient is being treated with diet, oral agents and insulin injections and denies significant side effects. Control has been good. Complicating factors include but are not limited to: hypertension and hyperlipidemia.     HYPERLIPIDEMIA - Patient has a long history of significant Hyperlipidemia requiring medication for treatment with recent good control. Patient reports no problems or side effects with the medication.     HYPERTENSION - Patient has longstanding history of HTN , currently denies any symptoms referable to elevated blood pressure. Specifically denies chest pain, palpitations, dyspnea, orthopnea, PND or peripheral edema. Blood pressure readings have been in normal range. Current medication regimen is as listed below. Patient denies any side effects of medication.       MEDICAL HISTORY:     Patient Active Problem List    Diagnosis Date Noted     Type 2 diabetes, HbA1C goal < 8% (H) 10/31/2010     Priority: High     Angiodysplasia of colon 04/04/2019     Priority:  Medium     Video capsule study showed several large angiodysplastic lesions in the right colon.        Moderate episode of recurrent major depressive disorder (H) 08/15/2018     Priority: Medium     Morbid obesity (H) 06/22/2018     Priority: Medium     Urge incontinence 06/11/2018     Priority: Medium     Acute lower GI bleeding 07/31/2017     Priority: Medium     Back pain, unspecified back location, unspecified back pain laterality, unspecified chronicity 02/23/2017     Priority: Medium     Dislocation of shoulder region 07/23/2016     Priority: Medium     Unexplained endometrial cells on cervical cytology 12/08/2015     Priority: Medium     12/2/15 pap NIL/neg HPV with endometrial cells.   12/4/15 pelvic ultrasound completed.  12/17/15 EMB--benign. Plan: ECC  01/19/16 ECC--benign. Plan: co-test 5 years from previous, due 12/02/20.          Rectocele 12/02/2015     Priority: Medium     Elevated liver function tests 06/23/2015     Priority: Medium     Alcohol related.        Retinal detachment 03/16/2015     Priority: Medium     Depression with anxiety 10/04/2014     Priority: Medium     Abnormal cervical Pap smear with positive HPV DNA test 10/01/2014     Priority: Medium     Dx 2001. Normal since.        Pseudophakia 08/22/2013     Priority: Medium     Rosacea 03/05/2013     Priority: Medium     Alcohol ingestion, more than 4 drinks/day on alcohol screening 07/21/2012     Priority: Medium     4 vodka at night before dinner --> recommend changing to 1-2 wine instead.       Umbilical hernia 07/21/2012     Priority: Medium     Incontinence of urine 07/21/2012     Priority: Medium     Stool incontinence 07/21/2012     Priority: Medium     Tubular adenoma of colon 12/31/2010     Priority: Medium     Overview:   Overview:   Last colonoscopy 2008, due in 2011  Tubular adenoma  Next colonoscopy due in 2015  Last colonoscopy 2008, due in 2011  Tubular adenoma  Next colonoscopy due in 2015         Breast cancer  "screening-high risk 12/22/2010     Priority: Medium     Mammogram every 6 months   On tamoxifen       Hyperlipidemia LDL goal <100 10/31/2010     Priority: Medium     Atypical ductal hyperplasia of breast 08/24/2010     Priority: Medium     Atypical lobular hyperplasia of breast 08/24/2010     Priority: Medium     Benign Breast Disease (PASH) 08/24/2010     Priority: Medium     Family history of breast cancer in sister 08/24/2010     Priority: Medium     Hypertension goal BP (blood pressure) < 140/90      Priority: Medium     Anxiety 07/29/2013     Priority: Low     Osteoarthritis, knee 03/05/2013     Priority: Low     Osteopenia 12/31/2010     Priority: Low     dexa 2008. Repeat in 2010  (Problem list name updated by automated process. Provider to review and confirm.)       Macular degeneration      Priority: Low     Myopia      Priority: Low     Hiatal hernia      Priority: Low     IBS (irritable bowel syndrome)      Priority: Low     GERD (gastroesophageal reflux disease)      Priority: Low      Past Medical History:   Diagnosis Date     Actinic cheilitis      Arthritis      DM (diabetes mellitus) (H)      GERD (gastroesophageal reflux disease)      Hiatal hernia      HPV in female      HTN (hypertension)      IBS (irritable bowel syndrome)      Major depression      Myopia      Other and unspecified hyperlipidemia      Pseudoangiomatous stromal hyperplasia of breast 2010    PASH     Rotator cuff injury 7/2016     Sleep apnea     \"snore\"     Vitamin D deficiency      Past Surgical History:   Procedure Laterality Date     BREAST LUMPECTOMY, RT/LT      x2, left     CAPSULE/PILL CAM ENDOSCOPY N/A 3/4/2019    Procedure: CAPSULE/PILL CAM ENDOSCOPY;  Surgeon: Sinan Valdes MD;  Location:  GI     CATARACT IOL, RT/LT  4/99    left, [id number] 7.0D     COLONOSCOPY       COLONOSCOPY N/A 1/22/2016    Procedure: COMBINED COLONOSCOPY, SINGLE OR MULTIPLE BIOPSY/POLYPECTOMY BY BIOPSY;  Surgeon: Praful Benjamin MD; "  Location: [facility] OR     COLONOSCOPY WITH CO2 INSUFFLATION N/A 1/22/2016    Procedure: COLONOSCOPY WITH CO2 INSUFFLATION;  Surgeon: Praful Benjamin MD;  Location: [facility] OR     COMBINED ESOPHAGOSCOPY, GASTROSCOPY, DUODENOSCOPY (EGD) WITH CO2 INSUFFLATION N/A 11/27/2018    Procedure: COMBINED ESOPHAGOSCOPY, GASTROSCOPY, DUODENOSCOPY (EGD) WITH CO2 INSUFFLATION;  Surgeon: Duane, William Charles, MD;  Location: [facility] OR     CRYOTHERAPY  2000    cervical     CRYOTHERAPY Left 3/19/2015    Procedure: CRYOTHERAPY;  Surgeon: Keiko Puri MD;  Location: [facility]     DILATION AND CURETTAGE, HYSTEROSCOPY DIAGNOSTIC, COMBINED N/A 1/19/2016    Procedure: COMBINED DILATION AND CURETTAGE, HYSTEROSCOPY DIAGNOSTIC;  Surgeon: Yeni Aparicio DO;  Location: [facility] OR     ESOPHAGOSCOPY, GASTROSCOPY, DUODENOSCOPY (EGD), COMBINED N/A 11/27/2018    Procedure: COMBINED ESOPHAGOSCOPY, GASTROSCOPY, DUODENOSCOPY (EGD), BIOPSY SINGLE OR MULTIPLE;  Surgeon: Duane, William Charles, MD;  Location: [facility] OR     IMPLANT STIMULATOR AND LEADS SACRAL NERVE (STAGE ONE AND TWO) N/A 7/24/2018    Procedure: IMPLANT STIMULATOR AND LEADS SACRAL NERVE (STAGE ONE AND TWO);  Interstim Implant Stage One and Two (Implant Permanent Lead and Generator);  Surgeon: Dada Baltazar MD;  Location: [facility]     IMPLANT STIMULATOR SACRAL NERVE PERCUTANEOUS TRIAL N/A 6/19/2018    Procedure: IMPLANT STIMULATOR SACRAL NERVE PERCUTANEOUS TRIAL;  Percutaneous Neural Examination (trial for interstim);  Surgeon: Dada Baltazar MD;  Location: [facility] OR     LAPAROSCOPIC TUBAL LIGATION  1981     PHACOEMULSIFICATION CLEAR CORNEA WITH STANDARD INTRAOCULAR LENS IMPLANT  8/15/2013    Procedure: PHACOEMULSIFICATION CLEAR CORNEA WITH STANDARD INTRAOCULAR LENS IMPLANT;  RIGHT PHACOEMULSIFICATION CLEAR CORNEA WITH STANDARD INTRAOCULAR LENS IMPLANT ;  Surgeon: Trae Taylor MD;  Location: [facility]     SURGICAL HISTORY OF -       x4, ankle surgery     Current Outpatient  "Medications   Medication Sig Dispense Refill     calcium carbonate-vitamin D (OSCAL W/D) 500-200 MG-UNIT tablet Take 1 tablet by mouth 2 times daily (with meals) 180 tablet 3     Continuous Blood Gluc  (FREESTYLE MELINA READER) AYAN 1 Units daily 1 Device 0     Continuous Blood Gluc  (FREESTYLE MELINA READER) AYAN 1 Units 3 times daily as needed 1 Device 0     continuous blood glucose monitoring (FREESTYLE MELINA) sensor For use with Freestyle Melina Flash  for continuous monitioring of blood glucose levels. Replace sensor every 10 days. 3 each 5     DULoxetine (CYMBALTA) 60 MG capsule Take 1 capsule (60 mg) by mouth 2 times daily 180 capsule 1     glipiZIDE (GLUCOTROL) 10 MG tablet TAKE 1 TABLET DAILY WITH DINNER 90 tablet 1     insulin NPH (NOVOLIN N VIAL) 100 UNIT/ML injection Inject 64 units Subcutaneous 2 times daily. (gets this from O[facility] NPH at [facility])       insulin syringe-needle U-100 (RELION INSULIN SYRINGE) 31G X 5/16\" 1 ML Use 2 syringes daily or as directed. 200 each 3     lisinopril (PRINIVIL/ZESTRIL) 5 MG tablet Take 1 tablet (5 mg) by mouth daily 90 tablet 3     MELATONIN PO 10 mg At Bedtime        metFORMIN (GLUCOPHAGE-XR) 500 MG 24 hr tablet TAKE 2 TABLETS TWICE A DAY WITH MEALS 360 tablet 1     minocycline (MINOCIN/DYNACIN) 100 MG capsule Take 1 capsule (100 mg) by mouth every 12 hours 180 capsule 2     omeprazole (PRILOSEC) 40 MG DR capsule TAKE 1 CAPSULE DAILY 30 TO 60 MINUTES BEFORE A MEAL 90 capsule 3     ONE TOUCH LANCETS None Entered       ONE TOUCH TEST STRIPS VI None Entered       order for DME Equipment being ordered: Light with four-wheel walker 1 Units 0     order for DME Equipment being ordered: 4 wheel walker with a seat, light weight. 1 Units 0     order for DME Equipment being ordered: Walker with seat light weight 1 Device 0     simvastatin (ZOCOR) 20 MG tablet Take 1 tablet (20 mg) by mouth At Bedtime 90 tablet 3     spironolactone (ALDACTONE) 100 MG tablet TAKE " 1 TABLET EVERY MORNING 90 tablet 1     traZODone (DESYREL) 100 MG tablet Take 1 tablet (100 mg) by mouth nightly as needed for sleep 90 tablet 3     trimethoprim (TRIMPEX) 100 MG tablet Take 1 tablet (100 mg) by mouth At Bedtime 30 tablet 11     vitamin D3 (CHOLECALCIFEROL) 1000 units (25 mcg) tablet Take 1 tablet (1,000 Units) by mouth daily 90 tablet 3     oxybutynin ER (DITROPAN XL) 15 MG 24 hr tablet Take 1 tablet (15 mg) by mouth daily (Patient not taking: Reported on 7/1/2019) 90 tablet 1     OTC products: no recent use of OTC ASA, NSAIDS or Steroids and no use of herbal medications or other supplements    Allergies   Allergen Reactions     Codeine      Sulfa Drugs       Latex Allergy: NO    Social History     Tobacco Use     Smoking status: Former Smoker     Packs/day: 1.00     Years: 20.00     Pack years: 20.00     Types: Cigarettes     Start date: 11/21/2018     Smokeless tobacco: Never Used   Substance Use Topics     Alcohol use: Yes     Comment: social/3-4 per week     History   Drug Use No       REVIEW OF SYSTEMS:   CONSTITUTIONAL: NEGATIVE for fever, chills, change in weight  INTEGUMENTARY/SKIN: NEGATIVE for rash   EYES: NEGATIVE for vision changes or irritation  ENT/MOUTH: NEGATIVE for ear, mouth and throat problems  RESP: NEGATIVE for significant cough or SOB  CV: NEGATIVE for chest pain, palpitations or peripheral edema  GI: NEGATIVE for nausea, abdominal pain, heartburn, or change in bowel habits   female: incontinence-urge and incontinence-stress. Needs new interstim placed   MUSCULOSKELETAL:POSITIVE  for arthralgias  and myalgia and NEGATIVE for joint swelling  and joint warmth   NEURO: NEGATIVE for weakness, dizziness or paresthesias  ENDOCRINE: NEGATIVE for temperature intolerance, skin/hair changes  HEME: NEGATIVE for bleeding problems  PSYCHIATRIC: POSITIVE foranxiety, depressed mood, Hx anxiety, Hx depression and Hx panic attacks and NEGATIVE foralcohol abuse, hallucinations, thoughts of  "hurting someone else and thoughts of self harm  is depressed more lately with disagreement with daughter      EXAM:   /65 (BP Location: Right arm, Patient Position: Sitting, Cuff Size: Adult Regular)   Pulse 113   Temp 97.1  F (36.2  C) (Temporal)   Ht 1.708 m (5' 7.25\")   Wt 94.7 kg (208 lb 11.2 oz)   LMP 11/18/1991 (Exact Date)   SpO2 96%   Breastfeeding? No   BMI 32.44 kg/m      GENERAL APPEARANCE: alert, active and no distress     EYES: Eyes grossly normal to inspection and conjunctivae and sclerae normal     HENT: ear canals and TM's normal and nose and mouth without ulcers or lesions     NECK: no adenopathy     RESP: lungs clear to auscultation - no rales, rhonchi or wheezes     CV: regular rates and rhythm, no murmur, click or rub and no irregular beats     ABDOMEN: soft, nontender, without hepatosplenomegaly or masses     MS: extremities normal- no gross deformities noted     SKIN: no suspicious lesions or rashes     NEURO: Normal strength and tone, sensory exam grossly normal, mentation intact and speech normal     PSYCH: mentation appears normal, affect normal/bright, anxious, fatigued, judgment and insight intact and worried     LYMPHATICS: No cervical adenopathy    DIAGNOSTICS:   EKG: appears normal, NSR, sinus tachycardia, normal axis, normal intervals, no acute ST/T changes c/w ischemia, no LVH by voltage criteria  Results for orders placed or performed in visit on 07/30/19   Basic metabolic panel   Result Value Ref Range    Sodium 137 133 - 144 mmol/L    Potassium 4.3 3.4 - 5.3 mmol/L    Chloride 102 94 - 109 mmol/L    Carbon Dioxide 30 20 - 32 mmol/L    Anion Gap 5 3 - 14 mmol/L    Glucose 84 70 - 99 mg/dL    Urea Nitrogen 15 7 - 30 mg/dL    Creatinine 0.70 0.52 - 1.04 mg/dL    GFR Estimate 89 >60 mL/min/[1.73_m2]    GFR Estimate If Black >90 >60 mL/min/[1.73_m2]    Calcium 9.7 8.5 - 10.1 mg/dL   CBC with platelets   Result Value Ref Range    WBC 11.2 (H) 4.0 - 11.0 10e9/L    RBC Count " 5.16 3.8 - 5.2 10e12/L    Hemoglobin 15.5 11.7 - 15.7 g/dL    Hematocrit 46.1 35.0 - 47.0 %    MCV 89 78 - 100 fl    MCH 30.0 26.5 - 33.0 pg    MCHC 33.6 31.5 - 36.5 g/dL    RDW 13.1 10.0 - 15.0 %    Platelet Count 297 150 - 450 10e9/L   Hemoglobin A1c   Result Value Ref Range    Hemoglobin A1C 6.7 (H) 0 - 5.6 %   UA with Microscopic reflex to Culture   Result Value Ref Range    Color Urine Yellow     Appearance Urine Slightly Cloudy     Glucose Urine 30 (A) NEG^Negative mg/dL    Bilirubin Urine Negative NEG^Negative    Ketones Urine Negative NEG^Negative mg/dL    Specific Gravity Urine 1.020 1.003 - 1.035    Blood Urine Negative NEG^Negative    pH Urine 6.0 5.0 - 7.0 pH    Protein Albumin Urine Negative NEG^Negative mg/dL    Urobilinogen mg/dL Normal 0.0 - 2.0 mg/dL    Nitrite Urine Negative NEG^Negative    Leukocyte Esterase Urine Small (A) NEG^Negative    Source Midstream Urine     WBC Urine 5-10 (A) OTO5^0 - 5 /HPF    RBC Urine O - 2 OTO2^O - 2 /HPF    Squamous Epithelial /LPF Urine Moderate (A) FEW^Few /LPF    Mucous Urine Present (A) NEG^Negative /LPF   Urine Culture Aerobic Bacterial   Result Value Ref Range    Specimen Description Midstream Urine     Culture Micro       10,000 to 50,000 colonies/mL  mixed urogenital symone           IMPRESSION:   Reason for surgery/procedure: undergoing surgery/procedure for treatment of urinary urgency .    Proposed Surgery/ Procedure: Replacement of Interstim Implant, Removal of Interstim Implant  Date of Surgery/ Procedure: 8/6/19    Diagnosis/reason for consult: preop evaluation     The proposed surgical procedure is considered INTERMEDIATE risk.    REVISED CARDIAC RISK INDEX  The patient has the following serious cardiovascular risks for perioperative complications such as (MI, PE, VFib and 3  AV Block):  Diabetes Mellitus (on Insulin)  INTERPRETATION: 2 risks: Class III (moderate risk - 6.6% complication rate)    The patient has the following additional risks for  perioperative complications:  The 10-year ASCVD risk score ([place] NOVAK Jr., et al., 2013) is: 26.8%    Values used to calculate the score:      Age: 68 years      Sex: Female      Is Non- : No      Diabetic: Yes      Tobacco smoker: Yes      Systolic Blood Pressure: 120 mmHg      Is BP treated: Yes      HDL Cholesterol: 45 mg/dL      Total Cholesterol: 166 mg/dL    Radha was seen today for pre-op exam.    Diagnoses and all orders for this visit:    Preop general physical exam  -     EKG 12-lead complete w/read - Clinics  -     Basic metabolic panel  -     CBC with platelets  -     UA with Microscopic reflex to Culture    Urgency incontinence  -     Basic metabolic panel  -     CBC with platelets  -     UA with Microscopic reflex to Culture  -     Urine Culture Aerobic Bacterial    Type 2 diabetes mellitus with hyperglycemia, with long-term current use of insulin (H)  -     Basic metabolic panel  -     Hemoglobin A1c      RECOMMENDATIONS:       Obstructive Sleep Apnea (or suspected sleep apnea)  Hospital staff are advised to monitor for sleep related oxygen desaturations due to suspicion of NIKITA        Diabetes Medication Use  -----Hold usual oral and non-insulin diabetic meds (e.g. Metformin, Actos, Glipizide) while NPO.   -----Take 80% of long acting insulin (e.g. Lantus, NPH) while NPO (fasting)  -----Hold mixed insulins (e.g. Insulin 70/30) while NPO (fasting)      Anticoagulant or Antiplatelet Medication Use  ASPIRIN: Discontinue ASA 7-10 days prior to procedure to reduce bleeding risk.  It should be resumed post-operatively.        ACE Inhibitor or Angiotensin Receptor Blocker (ARB) Use(Lisinopril)  Ace inhibitor or Angiotensin Receptor Blocker (ARB) and should HOLD this medication for the 24 hours prior to surgery.      APPROVAL GIVEN to proceed with proposed procedure, without further diagnostic evaluation       Signed Electronically by: NIKKY Lutz CNP    Copy of this  evaluation report is provided to requesting physician.    [facility] Preop Guidelines    Revised Cardiac Risk Index

## 2019-07-30 NOTE — PATIENT INSTRUCTIONS
Diabetes Medication Use  -----Hold usual oral and non-insulin diabetic meds (e.g. Metformin, Actos, Glipizide) while NPO.   -----Take 80% of long acting insulin (e.g. Lantus, NPH) while NPO (fasting)  -----Hold mixed insulins (e.g. Insulin 70/30) while NPO (fasting)      Anticoagulant or Antiplatelet Medication Use  ASPIRIN: Discontinue ASA 7-10 days prior to procedure to reduce bleeding risk.  It should be resumed post-operatively.        ACE Inhibitor or Angiotensin Receptor Blocker (ARB) Use(Lisinopril)  Ace inhibitor or Angiotensin Receptor Blocker (ARB) and should HOLD this medication for the 24 hours prior to surgery.      Before Your Surgery      Call your surgeon if there is any change in your health. This includes signs of a cold or flu (such as a sore throat, runny nose, cough, rash or fever).    Do not smoke, drink alcohol or take over the counter medicine (unless your surgeon or primary care doctor tells you to) for the 24 hours before and after surgery.    If you take prescribed drugs: Follow your doctor s orders about which medicines to take and which to stop until after surgery.    Eating and drinking prior to surgery: follow the instructions from your surgeon    Take a shower or bath the night before surgery. Use the soap your surgeon gave you to gently clean your skin. If you do not have soap from your surgeon, use your regular soap. Do not shave or scrub the surgery site.  Wear clean pajamas and have clean sheets on your bed.

## 2019-07-30 NOTE — RESULT ENCOUNTER NOTE
Medina Corbett,    Attached are your test results.  -Normal red blood cell (hgb) levels, very minimal elevated white blood cell count and normal platelet levels.   Waiting on urine culture   -Kidney function is normal (Cr, GFR), Sodium is normal, Potassium is normal, Calcium is normal, Glucose is normal.   -A1C (test of diabetes control the last 2-3 months) is at your goal. Please continue with your current plan. Also, you should make an appointment to see me and recheck your A1C test in 6 months.    Please contact us if you have any questions.    Perri Carias, CNP

## 2019-07-30 NOTE — NURSING NOTE
"Chief Complaint   Patient presents with     Pre-Op Exam     DOS:8/6/19       Initial /65 (BP Location: Right arm, Patient Position: Sitting, Cuff Size: Adult Regular)   Pulse 113   Temp 97.1  F (36.2  C) (Temporal)   Ht 1.708 m (5' 7.25\")   Wt 94.7 kg (208 lb 11.2 oz)   LMP 11/18/1991 (Exact Date)   SpO2 96%   Breastfeeding? No   BMI 32.44 kg/m   Estimated body mass index is 32.44 kg/m  as calculated from the following:    Height as of this encounter: 1.708 m (5' 7.25\").    Weight as of this encounter: 94.7 kg (208 lb 11.2 oz).  Medication Reconciliation: complete      BOBY Lyons      "

## 2019-07-31 ENCOUNTER — TELEPHONE (OUTPATIENT)
Dept: PEDIATRICS | Facility: CLINIC | Age: 69
End: 2019-07-31

## 2019-07-31 LAB
BACTERIA SPEC CULT: NORMAL
SPECIMEN SOURCE: NORMAL

## 2019-07-31 ASSESSMENT — ANXIETY QUESTIONNAIRES: GAD7 TOTAL SCORE: 9

## 2019-07-31 NOTE — RESULT ENCOUNTER NOTE
Medina Corbett,    Attached are your test results.  -Urine culture is abnormal but it looks like a contaminated, non clean-catch sample.  There is no need for antibiotics at this point.      Please contact us if you have any questions.    Perri Carias, CNP

## 2019-07-31 NOTE — TELEPHONE ENCOUNTER
Health Call Center    Phone Message    May a detailed message be left on voicemail: yes    Reason for Call: Pt states she has a UTI. (Possible) BUT has appt for surgery for bladder on 8/6/19. Pharmacy: [facility] [address] [place] [address]   She would like a phone call back to see if she can take the medication on the day of surgery.     Please Advise.  Action Taken: Message routed to:  Primary Care p [phone]

## 2019-07-31 NOTE — TELEPHONE ENCOUNTER
Results for orders placed or performed in visit on 07/30/19   Basic metabolic panel   Result Value Ref Range    Sodium 137 133 - 144 mmol/L    Potassium 4.3 3.4 - 5.3 mmol/L    Chloride 102 94 - 109 mmol/L    Carbon Dioxide 30 20 - 32 mmol/L    Anion Gap 5 3 - 14 mmol/L    Glucose 84 70 - 99 mg/dL    Urea Nitrogen 15 7 - 30 mg/dL    Creatinine 0.70 0.52 - 1.04 mg/dL    GFR Estimate 89 >60 mL/min/[1.73_m2]    GFR Estimate If Black >90 >60 mL/min/[1.73_m2]    Calcium 9.7 8.5 - 10.1 mg/dL   CBC with platelets   Result Value Ref Range    WBC 11.2 (H) 4.0 - 11.0 10e9/L    RBC Count 5.16 3.8 - 5.2 10e12/L    Hemoglobin 15.5 11.7 - 15.7 g/dL    Hematocrit 46.1 35.0 - 47.0 %    MCV 89 78 - 100 fl    MCH 30.0 26.5 - 33.0 pg    MCHC 33.6 31.5 - 36.5 g/dL    RDW 13.1 10.0 - 15.0 %    Platelet Count 297 150 - 450 10e9/L   Hemoglobin A1c   Result Value Ref Range    Hemoglobin A1C 6.7 (H) 0 - 5.6 %   UA with Microscopic reflex to Culture   Result Value Ref Range    Color Urine Yellow     Appearance Urine Slightly Cloudy     Glucose Urine 30 (A) NEG^Negative mg/dL    Bilirubin Urine Negative NEG^Negative    Ketones Urine Negative NEG^Negative mg/dL    Specific Gravity Urine 1.020 1.003 - 1.035    Blood Urine Negative NEG^Negative    pH Urine 6.0 5.0 - 7.0 pH    Protein Albumin Urine Negative NEG^Negative mg/dL    Urobilinogen mg/dL Normal 0.0 - 2.0 mg/dL    Nitrite Urine Negative NEG^Negative    Leukocyte Esterase Urine Small (A) NEG^Negative    Source Midstream Urine     WBC Urine 5-10 (A) OTO5^0 - 5 /HPF    RBC Urine O - 2 OTO2^O - 2 /HPF    Squamous Epithelial /LPF Urine Moderate (A) FEW^Few /LPF    Mucous Urine Present (A) NEG^Negative /LPF   Urine Culture Aerobic Bacterial   Result Value Ref Range    Specimen Description Midstream Urine     Culture Micro       10,000 to 50,000 colonies/mL  mixed urogenital symone       Not sure if she is reading her My chart  -Normal red blood cell (hgb) levels, normal white blood cell count  and normal platelet levels.  -Kidney function (GFR) is normal.  -Sodium is normal.  -Potassium is normal.  -Calcium is normal.  -Glucose is elevated due to your diabetes.  -Urine culture is abnormal but it looks like a contaminated, non clean-catch sample.  There is no need for antibiotics at this point.    A1c is great   Make follow up with Dr Medeiros in the next 3 months

## 2019-08-02 ENCOUNTER — TELEPHONE (OUTPATIENT)
Dept: PEDIATRICS | Facility: CLINIC | Age: 69
End: 2019-08-02

## 2019-08-02 NOTE — TELEPHONE ENCOUNTER
Routing encounter to Perri Carias CNP to complete pre-op exam note dated 07/30/19. Patient's procedure is scheduled for 08/06.  Priyanka Cerna CMA

## 2019-08-02 NOTE — TELEPHONE ENCOUNTER
[facility] Call Center    Phone Message    May a detailed message be left on voicemail: no    Reason for Call: Other: Eufemia with the [facility] called and said that pt was seen in our office by Perri Carias on 07/30 for a pre-op for a surgery that is scheduled on 8/6.  She stated that the preop is not signed by provider and needs to get signed in EPIC.     Action Taken: Message routed to:  Primary Care p [phone]

## 2019-08-05 ENCOUNTER — TELEPHONE (OUTPATIENT)
Dept: UROLOGY | Facility: CLINIC | Age: 69
End: 2019-08-05

## 2019-08-05 ENCOUNTER — ANESTHESIA EVENT (OUTPATIENT)
Dept: SURGERY | Facility: AMBULATORY SURGERY CENTER | Age: 69
End: 2019-08-05

## 2019-08-05 NOTE — TELEPHONE ENCOUNTER
Patient requesting instead of being placed on Cipro, that she has a previous script from last month that was never started for cephalexin 500 mg po QID and would like to start this instead. Consulted with Dr Baltazar who stated verbally okay for the cephalexin as indicated above for three days, advised patient to stop taking prophylactic trimethoprim and to resume the trimethoprim after the course of cephalexin is completed. Dr Baltazar aware that implant is scheduled for replacement tomorrow and patient states she will start her medication now. Sharyn Jules RN

## 2019-08-05 NOTE — TELEPHONE ENCOUNTER
----- Message from Dada Baltazar MD sent at 8/5/2019 10:47 AM CDT -----  Let's start her on some antibiotics mainly b/c of the implant.  You can give her cipro for 3 days.

## 2019-08-06 ENCOUNTER — HOSPITAL ENCOUNTER (OUTPATIENT)
Facility: AMBULATORY SURGERY CENTER | Age: 69
End: 2019-08-06
Attending: UROLOGY
Payer: COMMERCIAL

## 2019-08-06 ENCOUNTER — ANCILLARY PROCEDURE (OUTPATIENT)
Dept: RADIOLOGY | Facility: AMBULATORY SURGERY CENTER | Age: 69
End: 2019-08-06
Attending: UROLOGY
Payer: COMMERCIAL

## 2019-08-06 ENCOUNTER — ANESTHESIA (OUTPATIENT)
Dept: SURGERY | Facility: AMBULATORY SURGERY CENTER | Age: 69
End: 2019-08-06

## 2019-08-06 VITALS
HEIGHT: 67 IN | TEMPERATURE: 97.7 F | DIASTOLIC BLOOD PRESSURE: 78 MMHG | BODY MASS INDEX: 32.75 KG/M2 | SYSTOLIC BLOOD PRESSURE: 128 MMHG | OXYGEN SATURATION: 92 % | RESPIRATION RATE: 16 BRPM | WEIGHT: 208.7 LBS

## 2019-08-06 DIAGNOSIS — R52 PAIN: ICD-10-CM

## 2019-08-06 DIAGNOSIS — N39.41 URGE INCONTINENCE: Primary | ICD-10-CM

## 2019-08-06 LAB
GLUCOSE BLDC GLUCOMTR-MCNC: 121 MG/DL (ref 70–99)
GLUCOSE BLDC GLUCOMTR-MCNC: 144 MG/DL (ref 70–99)

## 2019-08-06 DEVICE — LEAD INTERSTIM KIT 3889-28: Type: IMPLANTABLE DEVICE | Site: BACK | Status: FUNCTIONAL

## 2019-08-06 DEVICE — STIMULATOR NEURO INTER-STIM II 3058: Type: IMPLANTABLE DEVICE | Site: BACK | Status: FUNCTIONAL

## 2019-08-06 RX ORDER — ONDANSETRON 2 MG/ML
4 INJECTION INTRAMUSCULAR; INTRAVENOUS EVERY 30 MIN PRN
Status: DISCONTINUED | OUTPATIENT
Start: 2019-08-06 | End: 2019-08-07 | Stop reason: HOSPADM

## 2019-08-06 RX ORDER — NALOXONE HYDROCHLORIDE 0.4 MG/ML
.1-.4 INJECTION, SOLUTION INTRAMUSCULAR; INTRAVENOUS; SUBCUTANEOUS
Status: DISCONTINUED | OUTPATIENT
Start: 2019-08-06 | End: 2019-08-07 | Stop reason: HOSPADM

## 2019-08-06 RX ORDER — CEFAZOLIN SODIUM 1 G/50ML
1 SOLUTION INTRAVENOUS SEE ADMIN INSTRUCTIONS
Status: DISCONTINUED | OUTPATIENT
Start: 2019-08-06 | End: 2019-08-06 | Stop reason: HOSPADM

## 2019-08-06 RX ORDER — LIDOCAINE 40 MG/G
CREAM TOPICAL
Status: DISCONTINUED | OUTPATIENT
Start: 2019-08-06 | End: 2019-08-06 | Stop reason: HOSPADM

## 2019-08-06 RX ORDER — ONDANSETRON 4 MG/1
4 TABLET, ORALLY DISINTEGRATING ORAL EVERY 30 MIN PRN
Status: DISCONTINUED | OUTPATIENT
Start: 2019-08-06 | End: 2019-08-07 | Stop reason: HOSPADM

## 2019-08-06 RX ORDER — CEFAZOLIN SODIUM 2 G/50ML
2 SOLUTION INTRAVENOUS
Status: COMPLETED | OUTPATIENT
Start: 2019-08-06 | End: 2019-08-06

## 2019-08-06 RX ORDER — ONDANSETRON 2 MG/ML
INJECTION INTRAMUSCULAR; INTRAVENOUS PRN
Status: DISCONTINUED | OUTPATIENT
Start: 2019-08-06 | End: 2019-08-06

## 2019-08-06 RX ORDER — KETAMINE HYDROCHLORIDE 10 MG/ML
INJECTION, SOLUTION INTRAMUSCULAR; INTRAVENOUS PRN
Status: DISCONTINUED | OUTPATIENT
Start: 2019-08-06 | End: 2019-08-06

## 2019-08-06 RX ORDER — OXYCODONE HYDROCHLORIDE 5 MG/1
5 TABLET ORAL EVERY 6 HOURS PRN
Qty: 12 TABLET | Refills: 0 | Status: SHIPPED | OUTPATIENT
Start: 2019-08-06 | End: 2019-08-09

## 2019-08-06 RX ORDER — FENTANYL CITRATE 50 UG/ML
INJECTION, SOLUTION INTRAMUSCULAR; INTRAVENOUS PRN
Status: DISCONTINUED | OUTPATIENT
Start: 2019-08-06 | End: 2019-08-06

## 2019-08-06 RX ORDER — MEPERIDINE HYDROCHLORIDE 25 MG/ML
12.5 INJECTION INTRAMUSCULAR; INTRAVENOUS; SUBCUTANEOUS
Status: DISCONTINUED | OUTPATIENT
Start: 2019-08-06 | End: 2019-08-07 | Stop reason: HOSPADM

## 2019-08-06 RX ORDER — SODIUM CHLORIDE, SODIUM LACTATE, POTASSIUM CHLORIDE, CALCIUM CHLORIDE 600; 310; 30; 20 MG/100ML; MG/100ML; MG/100ML; MG/100ML
INJECTION, SOLUTION INTRAVENOUS CONTINUOUS
Status: DISCONTINUED | OUTPATIENT
Start: 2019-08-06 | End: 2019-08-07 | Stop reason: HOSPADM

## 2019-08-06 RX ORDER — SODIUM CHLORIDE, SODIUM LACTATE, POTASSIUM CHLORIDE, CALCIUM CHLORIDE 600; 310; 30; 20 MG/100ML; MG/100ML; MG/100ML; MG/100ML
INJECTION, SOLUTION INTRAVENOUS CONTINUOUS
Status: DISCONTINUED | OUTPATIENT
Start: 2019-08-06 | End: 2019-08-06 | Stop reason: HOSPADM

## 2019-08-06 RX ORDER — ACETAMINOPHEN 325 MG/1
975 TABLET ORAL ONCE
Status: COMPLETED | OUTPATIENT
Start: 2019-08-06 | End: 2019-08-06

## 2019-08-06 RX ORDER — BUPIVACAINE HYDROCHLORIDE 2.5 MG/ML
INJECTION, SOLUTION EPIDURAL; INFILTRATION; INTRACAUDAL PRN
Status: DISCONTINUED | OUTPATIENT
Start: 2019-08-06 | End: 2019-08-06 | Stop reason: HOSPADM

## 2019-08-06 RX ORDER — PROPOFOL 10 MG/ML
INJECTION, EMULSION INTRAVENOUS CONTINUOUS PRN
Status: DISCONTINUED | OUTPATIENT
Start: 2019-08-06 | End: 2019-08-06

## 2019-08-06 RX ORDER — PROPOFOL 10 MG/ML
INJECTION, EMULSION INTRAVENOUS PRN
Status: DISCONTINUED | OUTPATIENT
Start: 2019-08-06 | End: 2019-08-06

## 2019-08-06 RX ADMIN — PROPOFOL 100 MCG/KG/MIN: 10 INJECTION, EMULSION INTRAVENOUS at 10:19

## 2019-08-06 RX ADMIN — SODIUM CHLORIDE, SODIUM LACTATE, POTASSIUM CHLORIDE, CALCIUM CHLORIDE: 600; 310; 30; 20 INJECTION, SOLUTION INTRAVENOUS at 09:17

## 2019-08-06 RX ADMIN — FENTANYL CITRATE 75 MCG: 50 INJECTION, SOLUTION INTRAMUSCULAR; INTRAVENOUS at 10:13

## 2019-08-06 RX ADMIN — ACETAMINOPHEN 975 MG: 325 TABLET ORAL at 09:15

## 2019-08-06 RX ADMIN — CEFAZOLIN SODIUM 2 G: 2 SOLUTION INTRAVENOUS at 10:26

## 2019-08-06 RX ADMIN — KETAMINE HYDROCHLORIDE 10 MG: 10 INJECTION, SOLUTION INTRAMUSCULAR; INTRAVENOUS at 10:47

## 2019-08-06 RX ADMIN — ONDANSETRON 4 MG: 2 INJECTION INTRAMUSCULAR; INTRAVENOUS at 11:11

## 2019-08-06 RX ADMIN — PROPOFOL 40 MG: 10 INJECTION, EMULSION INTRAVENOUS at 10:19

## 2019-08-06 RX ADMIN — FENTANYL CITRATE 25 MCG: 50 INJECTION, SOLUTION INTRAMUSCULAR; INTRAVENOUS at 10:27

## 2019-08-06 RX ADMIN — KETAMINE HYDROCHLORIDE 10 MG: 10 INJECTION, SOLUTION INTRAMUSCULAR; INTRAVENOUS at 10:36

## 2019-08-06 ASSESSMENT — MIFFLIN-ST. JEOR: SCORE: 1513.16

## 2019-08-06 NOTE — DISCHARGE INSTRUCTIONS
[facility]  Home Care Following Anesthesia  For 24 hours after surgery:  1. Get plenty of rest.  A responsible adult must stay with you for at least 24 hours after you leave the surgery center.  2. Do not drive or use heavy equipment.  If you have weakness or tingling, don't drive or use heavy equipment until this feeling goes away.   3. Do not drink alcohol.   4. Avoid strenuous or risky activities.  Ask for help when climbing stairs.  5. You may feel lightheaded.  IF so, sit for a few minutes before standing.  Have someone help you get up.   6. If you have nausea (feel sick to your stomach): Drink only clear liquids such as apple juice, ginger ale, broth or 7-Up.  Rest may also help.  Be sure to drink enough fluids.  Move to a regular diet as you feel able.   7. You may have a slight fever.  Call the doctor if your fever is over 100 F (37.7 C) (taken under the tongue) or lasts longer than 24 hours.  8. You may have a dry mouth, a sore throat, muscle aches or trouble sleeping. These should go away after 24 hours.  9. Do not make important or legal decisions.               Tips for taking pain medications  To get the best pain relief possible, remember these points:    Take pain medications as directed, before pain becomes severe.    Pain medication can upset your stomach: taking it with food may help.    Constipation is a common side effect of pain medication. Drink plenty of  fluids.    Eat foods high in fiber. Take a stool softener if recommended by your doctor or pharmacist.    Do not drink alcohol, drive or operate machinery while taking pain medications.    Ask about other ways to control pain, such as with heat, ice or relaxation.    Tylenol/Acetaminophen Consumption  To help encourage the safe use of acetaminophen, the makers of TYLENOL  have lowered the maximum daily dose for single-ingredient Extra Strength TYLENOL  (acetaminophen) products sold in the [place] from 8  pills per day (4,000 mg) to 6 pills per day (3,000 mg). The dosing interval has also changed from 2 pills every 4-6 hours to 2 pills every 6 hours.    If you feel your pain relief is insufficient, you may take Tylenol/Acetaminophen in addition to your narcotic pain medication.     Be careful not to exceed 3,000 mg of Tylenol/Acetaminophen in a 24 hour period from all sources.    If you are taking extra strength Tylenol/acetaminophen (500 mg), the maximum dose is 6 tablets in 24 hours.    If you are taking regular strength acetaminophen (325 mg), the maximum dose is 9 tablets in 24 hours.    Call a doctor for any of the followin. Signs of infection (fever, growing tenderness at the surgery site, a large amount of drainage or bleeding, severe pain, foul-smelling drainage, redness, swelling).  2. It has been over 8 to 10 hours since surgery and you are still not able to urinate (pass water).  3. Headache for over 24 hours.  Your doctor is:       Dr. Dada Baltazar, Prostate and Urology: [phone]               Or dial [phone] and ask for the resident on call for:  Urology  For emergency care, call the:  [facility]:  [phone]

## 2019-08-06 NOTE — OP NOTE
Urology Operative Summary    Pre-operative diagnosis: Urinary urgency and frequency and urge incontinence   Post-operative diagnosis: Same   Procedure: Removal of previous interstim and replacement with new lead and generator.  Interpretation of fluoroscopic imaging     Surgeon: Dada Baltazar MD   Assistant(s): Atul Gambino MD      Anesthesia: Local anesthesia with MAC sedation       Estimated blood loss: Less than 10 ml     Complications: None   Condition: Patient taken to recovery in stable condition.     Findings: Placed tined lead in the right S3 foramen with biplanar flouroscopic guidance. The interstim II generator was placed in theright buttocks.  Removal of all components of previous implant.     Indications: Radha Corbett is a 68 year old year old man with urinary urgency and frequency and urge incontinence refractory to anti-cholinergics. Patient previously had an interstim device and it was no longer working. The patient elected to proceed with Interstim placement understanding the risks for infection, pain, bleeding, and the need for future procedures and risks of anesthesia. Patient received IV antibiotics prior to the procedure initiation.    Procedure: The patient was identified correctly, consented and placed in the prone position. The patient's sacral area was prepped and draped in the usual sterile fashion. Using the fluoroscope in the AP position the medial aspects of the sacral foramena were identified and marked. The fluoroscope was placed in the lateral position and the S3 foramen was identified and marked. The films from the PNE were called up so as to evaluate for identical placement of the permanent electrode.      Marcaine with bicarb was injected at the insertion site and previous scars to anesthetize the skin. An incision was then made over the generator and the generator was removed from its pocket.  Next a small incision was made over the previous lead and it was pulled out using a  right angle grasper.  The lead was disconnected from its battery and pulled out from the insertion site.  All components were removed.  Once this was achieved a 3 1/2 inch needle was inserted on the right side until it was passed through the S3 foramen as seen on fluoroscopy to match the previous placement. Next the needle was tested and the patient had a woody motor response at medium amplitude. A second needle was placed in a lower position and both a woody and toe response were observed at low amplitude of 1.0 mA.  At this point the stilette was removed from the insertion needle and passer were placed. Our permanent electrode was passed until the appropriate position on fluoroscopy. The electrode was tested and found to have good motor again at low amplitude. The dilator was then removed under fluoroscopy ensuring our electrode was not displaced.  An impedance tested was conducted and was found to be normal.     At this point we tunneled to the previous generator pocket and connected the permanent electrode to the generator.  We then irrigated with 1 L of NS and ancef.  We then closed the electrode incsions and the generator site (this was done by approximating eduardo's, deep dermis and skin with interrupted vicryl stitches. Dermabond was applied at both the generator and electrode incisions. A small island dressing was then placed over these sites.     The patient was awoken from anesthesia and returned to the supine position. All instrument and counts were correct at the end of the procedure.    Plan: Radha Corbett is a 68 year old4 year old man who underwent interstim replacement.  Patient will follow up for a wound check in clinic in two weeks.    Patient was seen, evaluated and plan was formulated in conjunction with me and I agree with the above.  I was present for the entire procedure.  Dada Baltazar MD

## 2019-08-06 NOTE — ANESTHESIA POSTPROCEDURE EVALUATION
Anesthesia POST Procedure Evaluation    Patient: Radha Corbett   MRN:     [id number] Gender:   female   Age:    68 year old :      1950        Preoperative Diagnosis: Urinary Urgency and Urge Incontinence   Procedure(s):  Removal of Interstim Implant  Replacement of Interstim Implant   Postop Comments: No value filed.       Anesthesia Type:  Not documented  No value filed.    Reportable Event: NO     PAIN: Uncomplicated   Sign Out status: Comfortable, Well controlled pain     PONV: No PONV   Sign Out status:  No Nausea or Vomiting     Neuro/Psych: Uneventful perioperative course   Sign Out Status: Preoperative baseline; Age appropriate mentation     Airway/Resp.: Uneventful perioperative course   Sign Out Status: Non labored breathing, age appropriate RR; Resp. Status within EXPECTED Parameters     CV: Uneventful perioperative course   Sign Out status: Appropriate BP and perfusion indices; Appropriate HR/Rhythm     Disposition:   Sign Out in:  PACU  Disposition:  Phase II; Home  Recovery Course: Uneventful  Follow-Up: Not required     Comments/Narrative:  Patient doing well post-operatively.  No significant issues.  Hemodynamically stable, pain well controlled, nausea well controlled.  Stable for discharge from the PACU             Last Anesthesia Record Vitals:  CRNA VITALS  2019 1058 - 2019 1158      2019             Resp Rate (set):  10          Last PACU Vitals:  Vitals Value Taken Time   /78 2019 11:30 AM   Temp 36.5  C (97.7  F) 2019 11:30 AM   Pulse     Resp 16 2019 11:30 AM   SpO2 93 % 2019 11:30 AM   Temp src     NIBP 154/88 2019 11:21 AM   Pulse 126 2019 11:25 AM   SpO2 93 % 2019 11:25 AM   Resp     Temp     Ht Rate 130 2019 11:23 AM   Temp 2           Electronically Signed By: Juan Jose Jernigan MD, 2019, 2:28 PM

## 2019-08-06 NOTE — ANESTHESIA PREPROCEDURE EVALUATION
"Anesthesia Pre-Procedure Evaluation    Patient: Radha Corbett   MRN:     [id number] Gender:   female   Age:    68 year old :      1950        Preoperative Diagnosis: Urinary Urgency and Urge Incontinence   Procedure(s):  Removal of Interstim Implant  Replacement of Interstim Implant     Past Medical History:   Diagnosis Date     Actinic cheilitis      Arthritis      DM (diabetes mellitus) (H)      GERD (gastroesophageal reflux disease)      Hiatal hernia      HPV in female      HTN (hypertension)      IBS (irritable bowel syndrome)      Major depression      Myopia      Other and unspecified hyperlipidemia      Pseudoangiomatous stromal hyperplasia of breast     PASH     Rotator cuff injury 2016     Sleep apnea     \"snore\"     Vitamin D deficiency       Past Surgical History:   Procedure Laterality Date     BREAST LUMPECTOMY, RT/LT      x2, left     CAPSULE/PILL CAM ENDOSCOPY N/A 3/4/2019    Procedure: CAPSULE/PILL CAM ENDOSCOPY;  Surgeon: Sinan Valdes MD;  Location: [facility]     CATARACT IOL, RT/LT      left, MZ60CD 7.0D     COLONOSCOPY       COLONOSCOPY N/A 2016    Procedure: COMBINED COLONOSCOPY, SINGLE OR MULTIPLE BIOPSY/POLYPECTOMY BY BIOPSY;  Surgeon: Praful Benjamin MD;  Location: [facility]     COLONOSCOPY WITH CO2 INSUFFLATION N/A 2016    Procedure: COLONOSCOPY WITH CO2 INSUFFLATION;  Surgeon: Praful Benjamin MD;  Location: [facility]     COMBINED ESOPHAGOSCOPY, GASTROSCOPY, DUODENOSCOPY (EGD) WITH CO2 INSUFFLATION N/A 2018    Procedure: COMBINED ESOPHAGOSCOPY, GASTROSCOPY, DUODENOSCOPY (EGD) WITH CO2 INSUFFLATION;  Surgeon: Duane, William Charles, MD;  Location: [facility]     CRYOTHERAPY  2000    cervical     CRYOTHERAPY Left 3/19/2015    Procedure: CRYOTHERAPY;  Surgeon: Keiko Puri MD;  Location:  [facility]     DILATION AND CURETTAGE, HYSTEROSCOPY DIAGNOSTIC, COMBINED N/A 2016    Procedure: COMBINED DILATION AND CURETTAGE, HYSTEROSCOPY DIAGNOSTIC;  " Surgeon: Yeni Aparicio DO;  Location: [facility]     ESOPHAGOSCOPY, GASTROSCOPY, DUODENOSCOPY (EGD), COMBINED N/A 11/27/2018    Procedure: COMBINED ESOPHAGOSCOPY, GASTROSCOPY, DUODENOSCOPY (EGD), BIOPSY SINGLE OR MULTIPLE;  Surgeon: Duane, William Charles, MD;  Location: [facility]     IMPLANT STIMULATOR AND LEADS SACRAL NERVE (STAGE ONE AND TWO) N/A 7/24/2018    Procedure: IMPLANT STIMULATOR AND LEADS SACRAL NERVE (STAGE ONE AND TWO);  Interstim Implant Stage One and Two (Implant Permanent Lead and Generator);  Surgeon: Dada Baltazar MD;  Location: [facility]     IMPLANT STIMULATOR SACRAL NERVE PERCUTANEOUS TRIAL N/A 6/19/2018    Procedure: IMPLANT STIMULATOR SACRAL NERVE PERCUTANEOUS TRIAL;  Percutaneous Neural Examination (trial for interstim);  Surgeon: Dada Baltazar MD;  Location:  OR     LAPAROSCOPIC TUBAL LIGATION  1981     PHACOEMULSIFICATION CLEAR CORNEA WITH STANDARD INTRAOCULAR LENS IMPLANT  8/15/2013    Procedure: PHACOEMULSIFICATION CLEAR CORNEA WITH STANDARD INTRAOCULAR LENS IMPLANT;  RIGHT PHACOEMULSIFICATION CLEAR CORNEA WITH STANDARD INTRAOCULAR LENS IMPLANT ;  Surgeon: Trae Taylor MD;  Location: [facility]     SURGICAL HISTORY OF -       x4, ankle surgery          Anesthesia Evaluation     . Pt has had prior anesthetic.            ROS/MED HX    ENT/Pulmonary:     (+)sleep apnea, , . .    Neurologic:       Cardiovascular:     (+) Dyslipidemia, hypertension----. : . . . :. .       METS/Exercise Tolerance:     Hematologic:         Musculoskeletal:         GI/Hepatic:     (+) GERD Asymptomatic on medication,       Renal/Genitourinary:         Endo: Comment: BMI 36.57    Does not check glucose at home. Does not do sliding scale insulin throughout the day. Took 80% long acting last night. None this morning. Glucose 208 this am. Novolog ordered.    (+) type II DM Using insulin Normal glucose range: 190 last lab check in clinic Obesity, .      Psychiatric:     (+) psychiatric history anxiety  and depression      Infectious Disease:         Malignancy:         Other:                         PHYSICAL EXAM:   Mental Status/Neuro: A/A/O   Airway: Facies: Feasible  Mallampati: I  Mouth/Opening: Full  TM distance: > 6 cm  Neck ROM: Full   Respiratory: Auscultation: CTAB     Resp. Rate: Normal     Resp. Effort: Normal      CV: Rhythm: Regular  Rate: Age appropriate  Heart: Normal Sounds  Edema: None   Comments:      Dental: Normal Dentition                LABS:  CBC:   Lab Results   Component Value Date    WBC 11.2 (H) 07/30/2019    WBC 10.6 03/12/2019    HGB 15.5 07/30/2019    HGB 12.8 03/12/2019    HCT 46.1 07/30/2019    HCT 40.8 03/12/2019     07/30/2019     03/12/2019     BMP:   Lab Results   Component Value Date     07/30/2019     11/12/2018    POTASSIUM 4.3 07/30/2019    POTASSIUM 4.1 11/12/2018    CHLORIDE 102 07/30/2019    CHLORIDE 100 11/12/2018    CO2 30 07/30/2019    CO2 28 11/12/2018    BUN 15 07/30/2019    BUN 11 11/12/2018    CR 0.70 07/30/2019    CR 0.52 11/12/2018    GLC 84 07/30/2019     (H) 11/12/2018     COAGS: No results found for: PTT, INR, FIBR  POC:   Lab Results   Component Value Date     (H) 11/27/2018     OTHER:   Lab Results   Component Value Date    A1C 6.7 (H) 07/30/2019    MANAS 9.7 07/30/2019    PHOS 2.8 06/29/2010    ALBUMIN 3.9 11/12/2018    PROTTOTAL 8.0 11/12/2018    ALT 24 11/12/2018    AST 13 11/12/2018    ALKPHOS 93 11/12/2018    BILITOTAL 0.4 11/12/2018    TSH 0.70 12/06/2018    CRP 5.4 09/18/2017    SED 24 09/18/2017        Preop Vitals    BP Readings from Last 3 Encounters:   07/30/19 120/65   07/01/19 127/78   06/04/19 120/68    Pulse Readings from Last 3 Encounters:   07/30/19 113   07/01/19 112   06/04/19 122      Resp Readings from Last 3 Encounters:   03/04/19 16   01/23/19 18   11/27/18 18    SpO2 Readings from Last 3 Encounters:   07/30/19 96%   07/01/19 93%   06/04/19 94%      Temp Readings from Last 1 Encounters:   07/30/19  "36.2  C (97.1  F) (Temporal)    Ht Readings from Last 1 Encounters:   07/30/19 1.708 m (5' 7.25\")      Wt Readings from Last 1 Encounters:   07/30/19 94.7 kg (208 lb 11.2 oz)    Estimated body mass index is 32.44 kg/m  as calculated from the following:    Height as of 7/30/19: 1.708 m (5' 7.25\").    Weight as of 7/30/19: 94.7 kg (208 lb 11.2 oz).     LDA:  Peripheral IV 01/30/17 Right (Active)   Number of days: 918        Assessment:   ASA SCORE: 3    H&P: History and physical reviewed and following examination; no interval change.    NPO Status: NPO Appropriate     Plan:   Anes. Type:  MAC   Pre-Medication: None   Induction:  IV (Standard)   Airway: Native Airway   Access/Monitoring: PIV   Maintenance: Propofol Sedation     Postop Plan:   Postop Pain: None  Postop Sedation/Airway: Not planned     PONV Management: Adult Risk Factors: Female   Prevention: Ondansetron, Dexamethasone, Propofol     CONSENT: Direct conversation   Plan and risks discussed with: Patient   Blood Products: Consent Deferred (Minimal Blood Loss)                   Juan Jose Jernigan MD  "

## 2019-08-06 NOTE — ANESTHESIA CARE TRANSFER NOTE
Patient: Radha Corbett    Procedure(s):  Removal of Interstim Implant  Replacement of Interstim Implant    Diagnosis: Urinary Urgency and Urge Incontinence  Diagnosis Additional Information: No value filed.    Anesthesia Type:   No value filed.     Note:  Airway :Room Air  Patient transferred to:Phase II  Comments: VSS/WNL. Responds well.Handoff Report: Identifed the Patient, Identified the Reponsible Provider, Reviewed the pertinent medical history, Discussed the surgical course, Reviewed Intra-OP anesthesia mangement and issues during anesthesia, Set expectations for post-procedure period and Allowed opportunity for questions and acknowledgement of understanding      Vitals: (Last set prior to Anesthesia Care Transfer)    CRNA VITALS  8/6/2019 1058 - 8/6/2019 1131      8/6/2019             Resp Rate (set):  10                Electronically Signed By: NIKKY Aldridge CRNA  August 6, 2019  11:31 AM

## 2019-08-08 ENCOUNTER — PATIENT OUTREACH (OUTPATIENT)
Dept: UROLOGY | Facility: CLINIC | Age: 69
End: 2019-08-08

## 2019-08-12 ENCOUNTER — OFFICE VISIT (OUTPATIENT)
Dept: OPHTHALMOLOGY | Facility: CLINIC | Age: 69
End: 2019-08-12
Attending: OPHTHALMOLOGY
Payer: COMMERCIAL

## 2019-08-12 DIAGNOSIS — H43.811 VITREORETINAL DEGENERATION OF RIGHT EYE: Primary | ICD-10-CM

## 2019-08-12 DIAGNOSIS — H44.23 MYOPIC DEGENERATION, BILATERAL: ICD-10-CM

## 2019-08-12 PROCEDURE — G0463 HOSPITAL OUTPT CLINIC VISIT: HCPCS | Mod: ZF

## 2019-08-12 ASSESSMENT — TONOMETRY
OS_IOP_MMHG: 13
OD_IOP_MMHG: 16
IOP_METHOD: TONOPEN

## 2019-08-12 ASSESSMENT — CONF VISUAL FIELD
OS_INFERIOR_TEMPORAL_RESTRICTION: 3
METHOD: COUNTING FINGERS
OD_NORMAL: 1

## 2019-08-12 ASSESSMENT — VISUAL ACUITY
METHOD: SNELLEN - LINEAR
OD_CC: 20/25
OD_CC+: -2
OS_CC: 6'/200 E
CORRECTION_TYPE: GLASSES

## 2019-08-12 ASSESSMENT — REFRACTION_WEARINGRX
OS_AXIS: 089
OD_CYLINDER: +1.75
OS_CYLINDER: +1.75
OD_ADD: +2.50
OS_SPHERE: -3.75
OD_AXIS: 052
OS_ADD: +2.50
OD_SPHERE: -1.00

## 2019-08-12 ASSESSMENT — EXTERNAL EXAM - LEFT EYE: OS_EXAM: NORMAL

## 2019-08-12 ASSESSMENT — CUP TO DISC RATIO
OS_RATIO: 0.1
OD_RATIO: 0.1

## 2019-08-12 ASSESSMENT — EXTERNAL EXAM - RIGHT EYE: OD_EXAM: NORMAL

## 2019-08-12 ASSESSMENT — SLIT LAMP EXAM - LIDS
COMMENTS: NORMAL
COMMENTS: NORMAL

## 2019-08-12 NOTE — NURSING NOTE
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  A couple days ago she saw dark flashing in her left eye.  Patient denies having any eye discomfort.    Perlita Pololck COT 9:39 AM August 12, 2019

## 2019-08-12 NOTE — PROGRESS NOTES
CC -   Myopic degeneration and h/o RD    INTERVAL HISTORY -   Flashes OD stable, ?new OS, no new floaters    HPI -     Radha Corbett is a  68 year old year-old patient  S/p Retinal detachment  Repair left eye  3/19/15  Last A1c ~ 6.4 on 3/2019      PAST OCULAR HISTORY  PPV/SBP/FGx C3F8 OS  CE/IOL OU (MZ60CD OS & MA60MA OD)      RETINAL IMAGING  OCT: 6-28-19  Right eye - staphyloma, atrophic changes and some schisis  Left eye - staphyloma and severe outer atrophy center & inferior    OVF - DMV  11-1-16  Right eye horizontal 195 degrees  Left eye horizontal 80 degrees        ASSESSMENT & PLAN    1. 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 H/o RD OS s/p repair   - s/p PPV/SBP 2015 mac-off RD   - RTC 1 year      3. PVD OD   - new flashes since ~ 5/2019   - advised S/Sx RD 8/2019     - recheck 3-4  months as precaution      4. DM II no DR            return to clinic: 3-4  months, DFE OU    ATTESTATION     Attending Physician Attestation:      Complete documentation of historical and exam elements from today's encounter can be found in the full encounter summary report (not reduplicated in this progress note).  I personally obtained the chief complaint(s) and history of present illness.  I confirmed and edited as necessary the review of systems, past medical/surgical history, family history, social history, and examination findings as documented by others; and I examined the patient myself.  I personally reviewed the relevant tests, images, and reports as documented above.  I formulated and edited as necessary the assessment and plan and discussed the findings and management plan with the patient and family    Keiko Puri MD, PhD  , Vitreoretinal Surgery  Department of Ophthalmology  [facility]

## 2019-08-15 ENCOUNTER — TELEPHONE (OUTPATIENT)
Dept: UROLOGY | Facility: CLINIC | Age: 69
End: 2019-08-15

## 2019-08-15 NOTE — TELEPHONE ENCOUNTER
Spoke with patient and advised that per Dr Baltazar, that [employer] rep is not needed for her Monday appointment. Updated provider on patient's condition and Dr Baltazar will assess patient on Monday. Sharyn Jules RN

## 2019-08-15 NOTE — TELEPHONE ENCOUNTER
[facility] Call Center    Phone Message    May a detailed message be left on voicemail: yes    Reason for Call: Other: Pt is wondering if someone from [facility] would be able to attend her appt on 8/19 as well. Please advise.      Action Taken: Message routed to:  Adult Clinics: Urology p [phone]

## 2019-08-15 NOTE — TELEPHONE ENCOUNTER
Spoke with patient who is requesting that [employer] rep be present for her wound recheck in 4 days. 9 days s/p interstim replacement. At present states that she is having increased incontinence, increased leaking, and urinary frequency. Denies the following: fever, chills, or nausea. States that she has a bump that is next to device that is not red or hot, and that she has increased fatigue. Advised would send note to Dr Baltazar to update and get recommendations. Note sent to Dr Baltazar. Awaiting response. Sharyn Jules RN

## 2019-08-19 ENCOUNTER — OFFICE VISIT (OUTPATIENT)
Dept: UROLOGY | Facility: CLINIC | Age: 69
End: 2019-08-19
Payer: COMMERCIAL

## 2019-08-19 VITALS — SYSTOLIC BLOOD PRESSURE: 153 MMHG | HEART RATE: 124 BPM | DIASTOLIC BLOOD PRESSURE: 76 MMHG | OXYGEN SATURATION: 95 %

## 2019-08-19 DIAGNOSIS — N39.41 URGE INCONTINENCE OF URINE: ICD-10-CM

## 2019-08-19 DIAGNOSIS — R39.15 URINARY URGENCY: Primary | ICD-10-CM

## 2019-08-19 PROCEDURE — 99024 POSTOP FOLLOW-UP VISIT: CPT | Performed by: UROLOGY

## 2019-08-19 ASSESSMENT — PAIN SCALES - GENERAL: PAINLEVEL: NO PAIN (0)

## 2019-08-19 NOTE — NURSING NOTE
Radha Corbett's goals for this visit include:   Chief Complaint   Patient presents with     Surgical Followup     2 week post op       She requests these members of her care team be copied on today's visit information: Yes    PCP: Trice Medeiros    Referring Provider:  Trice Medeiros MD PhD  [id number] [place] [address]    BP (!) 153/76 (BP Location: Left arm, Patient Position: Sitting, Cuff Size: Adult Regular)   Pulse 124   LMP 11/18/1991 (Exact Date)   SpO2 95%     Do you need any medication refills at today's visit? No    Roselyn Blanchard LPN

## 2019-08-19 NOTE — PROGRESS NOTES
Reason for Visit:  F/u on replacement of interstim implant.    Clinical Data:  Ms. Corbett is a 69 y/o female with urinary urgency and urge incontinence.  She was changed from Detrol to oxybutynin and also prescribed Myrbetriq but the latter was too expensive for her so she never started it.      She underwent an interstim trial on 6/19/18.  She was so pleased.  She was able to sleep for 6-7 hours without having to get up.  She felt like she had so much better control with it in place.  Especially on the right side.  The left side was a little less effective.  She therefore underwent permanent implant and had seen some improvement but not as good as the trial.  At her visit in August 2018 she felt it vaginally at 1 but it got a little uncomfortable.  She was then switched to program 3 at 0.9.      She has also been having multiple urinary tract infections.  Mostly with Klebsiella but also with E. Coli.    She is frustrated with her symptoms and feels that the PNE worked much better.  An xray was obtained and it was determined that perhaps the lead had migrated so she underwent repeat interstim implant on 8/6/19.  She reports very little improvement and was feeling it rectally on program 4 setting of 0.6.  We manipulated the settings a little today and 1-5 were mostly rectal with 3 being even in the butt cheek.  However we changed her to program 6 and setting of 1.1 and she feels it more in the vagina.    CT abd/pelvis 2019 was normal, no stones.    Cystoscopy on 3/19/18 was normal except for 2+ trabeculation and a diverticulum at the dome of the bladder.    UDS on 5/24/18:  -Multiple uninhibited detrusor contractions starting at bladder volumes >100 mL with Pdet pressures ranging from  cm H2O. She never leaks with any of these episodes of DO.  -Otherwise good bladder compliance between UDC's.  -Small/normal bladder capacity (293 mL)  -Normal bladder filling sensations.  -No reproducible FRANCHESKA or DOI.  -Strong  detrusor contraction during voiding to 81.5 cm H2O with slightly slow flow (Qmax 14.7 ml/s) and some incomplete bladder emptying ( mL).  -No evidence for DSD.  -No evidence for outlet obstruction.  -Fluoroscopy reveals a mildly trabeculated bladder wall without diverticuli or VUR. The bladder neck was closed during filling, open during episodes of DO with contrast stopping at the level of the pelvic floor vs. external urinary sphincter, and open during voiding.     A/P:  67 y/o female with urinary urgency and urge incontinence s/p revision of her interstim implant with some improvement but not significant.  She was feeling it rectally on program 4.  It was changed to program 6 where she feels it more vaginally.  -continue with the program to see how it works  -if need be can try changing as well.  -f/u prn.    Thank you for allowing me to participate in the care of  Ms. Radha Corbett and I will keep you updated on her progress.    Dada Baltazar MD

## 2019-08-23 DIAGNOSIS — F41.8 DEPRESSION WITH ANXIETY: Primary | ICD-10-CM

## 2019-08-23 DIAGNOSIS — F41.8 DEPRESSION WITH ANXIETY: ICD-10-CM

## 2019-08-23 DIAGNOSIS — E78.5 HYPERLIPIDEMIA LDL GOAL <100: ICD-10-CM

## 2019-08-23 RX ORDER — DULOXETIN HYDROCHLORIDE 60 MG/1
CAPSULE, DELAYED RELEASE ORAL
Qty: 180 CAPSULE | Refills: 1 | Status: SHIPPED | OUTPATIENT
Start: 2019-08-23 | End: 2020-01-07

## 2019-08-23 RX ORDER — SIMVASTATIN 20 MG
TABLET ORAL
Qty: 90 TABLET | Refills: 0 | Status: SHIPPED | OUTPATIENT
Start: 2019-08-23 | End: 2019-10-19

## 2019-08-23 RX ORDER — TRAZODONE HYDROCHLORIDE 100 MG/1
TABLET ORAL
Qty: 90 TABLET | Refills: 1 | Status: SHIPPED | OUTPATIENT
Start: 2019-08-23 | End: 2020-02-24

## 2019-08-23 NOTE — TELEPHONE ENCOUNTER
Refill authorized per [facility] protocol.  Letter sent to schedule lab appt for lipids.  Lab order pended for provider review/signature.    Shauna Palencia RN

## 2019-08-23 NOTE — LETTER
August 23, 2019        Radha Corbett  [address] [facility] [id number]        Dear Radha Corbett:    According to our records you are due for a fasting lipid lab test.  This lab requires you to fast.  Please do not eat 10-12 hours before your appointment.  Water, tea and black coffee (with nothing added) is okay. Do not drink other fluids, diet soda or gum.    Please contact our office at [phone] to schedule your lab appointment.         Sincerely,     [facility]  Primary Care

## 2019-08-26 ENCOUNTER — TELEPHONE (OUTPATIENT)
Dept: UROLOGY | Facility: CLINIC | Age: 69
End: 2019-08-26

## 2019-08-26 NOTE — TELEPHONE ENCOUNTER
[facility] Call Center    Phone Message    May a detailed message be left on voicemail: yes    Reason for Call: Other: Radha calling as requested by Dr. Baltazar to discuss her new bladder implant.  She states at night, there have been some improvements.  She gets about 2-3 hours, with a couple of 4 hours, between peeing.  During the day, she is having difficulty.  She states that she is going a lot more, does not get 2-3 hours between peeing and has a lot of accidents.  Please call her back to advise     Action Taken: Message routed to:  Adult Clinics: Urology p [id number]

## 2019-08-26 NOTE — TELEPHONE ENCOUNTER
Call to patient and advised that message sent to Dr Baltazar with update. Patient denies the following symptoms: fever, chills, nausea, and burning with urination. These are ongoing concerns/symptoms that she states has been addressed by  Dr Baltazar and she states that Dr Baltazar told her to call today with update. message sent to Dr Baltazar for advisement and update. Sharyn Jules RN

## 2019-09-16 DIAGNOSIS — E11.65 TYPE 2 DIABETES MELLITUS WITH HYPERGLYCEMIA, WITH LONG-TERM CURRENT USE OF INSULIN (H): ICD-10-CM

## 2019-09-16 DIAGNOSIS — Z79.4 TYPE 2 DIABETES MELLITUS WITH HYPERGLYCEMIA, WITH LONG-TERM CURRENT USE OF INSULIN (H): ICD-10-CM

## 2019-09-16 RX ORDER — METFORMIN HCL 500 MG
TABLET, EXTENDED RELEASE 24 HR ORAL
Qty: 120 TABLET | Refills: 0 | Status: SHIPPED | OUTPATIENT
Start: 2019-09-16 | End: 2019-11-09

## 2019-09-27 ENCOUNTER — OFFICE VISIT (OUTPATIENT)
Dept: PEDIATRICS | Facility: CLINIC | Age: 69
End: 2019-09-27
Payer: COMMERCIAL

## 2019-09-27 VITALS
BODY MASS INDEX: 32.81 KG/M2 | DIASTOLIC BLOOD PRESSURE: 50 MMHG | HEART RATE: 109 BPM | WEIGHT: 211 LBS | OXYGEN SATURATION: 95 % | TEMPERATURE: 97.8 F | SYSTOLIC BLOOD PRESSURE: 115 MMHG

## 2019-09-27 DIAGNOSIS — R39.9 UTI SYMPTOMS: ICD-10-CM

## 2019-09-27 DIAGNOSIS — E11.65 TYPE 2 DIABETES MELLITUS WITH HYPERGLYCEMIA, WITH LONG-TERM CURRENT USE OF INSULIN (H): Primary | ICD-10-CM

## 2019-09-27 DIAGNOSIS — I10 HYPERTENSION GOAL BP (BLOOD PRESSURE) < 140/90: ICD-10-CM

## 2019-09-27 DIAGNOSIS — E78.5 HYPERLIPIDEMIA LDL GOAL <100: ICD-10-CM

## 2019-09-27 DIAGNOSIS — R10.30 CHRONIC GROIN PAIN, UNSPECIFIED LATERALITY: ICD-10-CM

## 2019-09-27 DIAGNOSIS — G89.29 CHRONIC GROIN PAIN, UNSPECIFIED LATERALITY: ICD-10-CM

## 2019-09-27 DIAGNOSIS — M16.0 PRIMARY OSTEOARTHRITIS OF BOTH HIPS: ICD-10-CM

## 2019-09-27 DIAGNOSIS — M17.0 PRIMARY OSTEOARTHRITIS OF BOTH KNEES: ICD-10-CM

## 2019-09-27 DIAGNOSIS — Z79.4 TYPE 2 DIABETES MELLITUS WITH HYPERGLYCEMIA, WITH LONG-TERM CURRENT USE OF INSULIN (H): Primary | ICD-10-CM

## 2019-09-27 DIAGNOSIS — N39.0 RECURRENT UTI: ICD-10-CM

## 2019-09-27 DIAGNOSIS — M25.50 MULTIPLE JOINT PAIN: ICD-10-CM

## 2019-09-27 LAB
ALBUMIN SERPL-MCNC: 4 G/DL (ref 3.4–5)
ALBUMIN UR-MCNC: 10 MG/DL
ALP SERPL-CCNC: 81 U/L (ref 40–150)
ALT SERPL W P-5'-P-CCNC: 21 U/L (ref 0–50)
ANION GAP SERPL CALCULATED.3IONS-SCNC: 6 MMOL/L (ref 3–14)
APPEARANCE UR: ABNORMAL
AST SERPL W P-5'-P-CCNC: 11 U/L (ref 0–45)
BACTERIA #/AREA URNS HPF: ABNORMAL /HPF
BILIRUB SERPL-MCNC: 0.3 MG/DL (ref 0.2–1.3)
BILIRUB UR QL STRIP: NEGATIVE
BUN SERPL-MCNC: 16 MG/DL (ref 7–30)
CALCIUM SERPL-MCNC: 9.2 MG/DL (ref 8.5–10.1)
CHLORIDE SERPL-SCNC: 105 MMOL/L (ref 94–109)
CHOLEST SERPL-MCNC: 185 MG/DL
CK SERPL-CCNC: 28 U/L (ref 30–225)
CO2 SERPL-SCNC: 30 MMOL/L (ref 20–32)
COLOR UR AUTO: YELLOW
CREAT SERPL-MCNC: 0.56 MG/DL (ref 0.52–1.04)
CREAT UR-MCNC: 105 MG/DL
CRP SERPL-MCNC: 3.7 MG/L (ref 0–8)
ERYTHROCYTE [SEDIMENTATION RATE] IN BLOOD BY WESTERGREN METHOD: 18 MM/H (ref 0–30)
GFR SERPL CREATININE-BSD FRML MDRD: >90 ML/MIN/{1.73_M2}
GLUCOSE SERPL-MCNC: 146 MG/DL (ref 70–99)
GLUCOSE UR STRIP-MCNC: NEGATIVE MG/DL
HBA1C MFR BLD: 7.5 % (ref 0–5.6)
HDLC SERPL-MCNC: 54 MG/DL
HGB UR QL STRIP: NEGATIVE
KETONES UR STRIP-MCNC: NEGATIVE MG/DL
LDLC SERPL CALC-MCNC: 106 MG/DL
LEUKOCYTE ESTERASE UR QL STRIP: ABNORMAL
MICROALBUMIN UR-MCNC: 52 MG/L
MICROALBUMIN/CREAT UR: 49.62 MG/G CR (ref 0–25)
NITRATE UR QL: NEGATIVE
NON-SQ EPI CELLS #/AREA URNS LPF: ABNORMAL /LPF
NONHDLC SERPL-MCNC: 131 MG/DL
PH UR STRIP: 5.5 PH (ref 5–7)
POTASSIUM SERPL-SCNC: 4.2 MMOL/L (ref 3.4–5.3)
PROT SERPL-MCNC: 7.7 G/DL (ref 6.8–8.8)
RBC #/AREA URNS AUTO: ABNORMAL /HPF
SODIUM SERPL-SCNC: 141 MMOL/L (ref 133–144)
SOURCE: ABNORMAL
SP GR UR STRIP: 1.02 (ref 1–1.03)
TRIGL SERPL-MCNC: 123 MG/DL
UROBILINOGEN UR STRIP-MCNC: NORMAL MG/DL (ref 0–2)
WBC #/AREA URNS AUTO: ABNORMAL /HPF
WBC CLUMPS #/AREA URNS HPF: PRESENT /HPF

## 2019-09-27 PROCEDURE — 82043 UR ALBUMIN QUANTITATIVE: CPT | Performed by: NURSE PRACTITIONER

## 2019-09-27 PROCEDURE — 36415 COLL VENOUS BLD VENIPUNCTURE: CPT | Performed by: NURSE PRACTITIONER

## 2019-09-27 PROCEDURE — 87086 URINE CULTURE/COLONY COUNT: CPT | Performed by: NURSE PRACTITIONER

## 2019-09-27 PROCEDURE — 82550 ASSAY OF CK (CPK): CPT | Performed by: NURSE PRACTITIONER

## 2019-09-27 PROCEDURE — 85652 RBC SED RATE AUTOMATED: CPT | Performed by: NURSE PRACTITIONER

## 2019-09-27 PROCEDURE — 87088 URINE BACTERIA CULTURE: CPT | Performed by: NURSE PRACTITIONER

## 2019-09-27 PROCEDURE — 83036 HEMOGLOBIN GLYCOSYLATED A1C: CPT | Performed by: NURSE PRACTITIONER

## 2019-09-27 PROCEDURE — 86140 C-REACTIVE PROTEIN: CPT | Performed by: NURSE PRACTITIONER

## 2019-09-27 PROCEDURE — 80061 LIPID PANEL: CPT | Performed by: INTERNAL MEDICINE

## 2019-09-27 PROCEDURE — 81001 URINALYSIS AUTO W/SCOPE: CPT | Performed by: NURSE PRACTITIONER

## 2019-09-27 PROCEDURE — 87186 SC STD MICRODIL/AGAR DIL: CPT | Performed by: NURSE PRACTITIONER

## 2019-09-27 PROCEDURE — 86431 RHEUMATOID FACTOR QUANT: CPT | Performed by: NURSE PRACTITIONER

## 2019-09-27 PROCEDURE — 80053 COMPREHEN METABOLIC PANEL: CPT | Performed by: NURSE PRACTITIONER

## 2019-09-27 PROCEDURE — 99214 OFFICE O/P EST MOD 30 MIN: CPT | Performed by: NURSE PRACTITIONER

## 2019-09-27 PROCEDURE — 86038 ANTINUCLEAR ANTIBODIES: CPT | Performed by: NURSE PRACTITIONER

## 2019-09-27 RX ORDER — CEFDINIR 300 MG/1
300 CAPSULE ORAL 2 TIMES DAILY
Qty: 14 CAPSULE | Refills: 0 | Status: SHIPPED | OUTPATIENT
Start: 2019-09-27 | End: 2020-01-07

## 2019-09-27 ASSESSMENT — ANXIETY QUESTIONNAIRES
IF YOU CHECKED OFF ANY PROBLEMS ON THIS QUESTIONNAIRE, HOW DIFFICULT HAVE THESE PROBLEMS MADE IT FOR YOU TO DO YOUR WORK, TAKE CARE OF THINGS AT HOME, OR GET ALONG WITH OTHER PEOPLE: EXTREMELY DIFFICULT
7. FEELING AFRAID AS IF SOMETHING AWFUL MIGHT HAPPEN: SEVERAL DAYS
2. NOT BEING ABLE TO STOP OR CONTROL WORRYING: MORE THAN HALF THE DAYS
3. WORRYING TOO MUCH ABOUT DIFFERENT THINGS: MORE THAN HALF THE DAYS
5. BEING SO RESTLESS THAT IT IS HARD TO SIT STILL: SEVERAL DAYS
1. FEELING NERVOUS, ANXIOUS, OR ON EDGE: MORE THAN HALF THE DAYS
6. BECOMING EASILY ANNOYED OR IRRITABLE: SEVERAL DAYS
GAD7 TOTAL SCORE: 10

## 2019-09-27 ASSESSMENT — PATIENT HEALTH QUESTIONNAIRE - PHQ9
5. POOR APPETITE OR OVEREATING: SEVERAL DAYS
SUM OF ALL RESPONSES TO PHQ QUESTIONS 1-9: 10

## 2019-09-27 NOTE — NURSING NOTE
"Chief Complaint   Patient presents with     Musculoskeletal Problem     groin pain x 1 year, fell 1 year ago     Diabetes       Initial /50 (BP Location: Right arm, Patient Position: Sitting, Cuff Size: Adult Regular)   Pulse 109   Temp 97.8  F (36.6  C) (Temporal)   Wt 95.7 kg (211 lb)   LMP 11/18/1991 (Exact Date)   SpO2 95%   Breastfeeding? No   BMI 32.81 kg/m   Estimated body mass index is 32.81 kg/m  as calculated from the following:    Height as of 8/6/19: 1.708 m (5' 7.24\").    Weight as of this encounter: 95.7 kg (211 lb).  Medication Reconciliation: complete      BOBY Lyons      "

## 2019-09-27 NOTE — PATIENT INSTRUCTIONS
PLAN:   1.   Symptomatic therapy suggested: Continue current medication regimen unchanged.  2.   Orders Placed This Encounter   Medications     cefdinir (OMNICEF) 300 MG capsule     Sig: Take 1 capsule (300 mg) by mouth 2 times daily     Dispense:  14 capsule     Refill:  0       Orders Placed This Encounter   Procedures     JUST IN CASE     Comprehensive metabolic panel     Albumin Random Urine Quantitative with Creat Ratio     Hemoglobin A1c     UA with Microscopic reflex to Culture     Rheumatoid factor     Anti Nuclear Rosi IgG by IFA with Reflex     CK total     CRP inflammation     Erythrocyte sedimentation rate auto     ORTHOPEDICS ADULT REFERRAL     3. Patient needs to follow up in if no improvement,or sooner if worsening of symptoms or other symptoms develop.  CONSULTATION/REFERRAL to orthopedics   Will follow up and/or notify patient of  results via My Chart to determine further need for followup  Dr Waldemar palacio in 3 months

## 2019-09-27 NOTE — PROGRESS NOTES
Subjective     Radha Corbett is a 68 year old female who presents to clinic today for the following health issues:    HPI   Diabetes Follow-up      How often are you checking your blood sugar? Not at all    What time of day are you checking your blood sugars (select all that apply)?  n/a    Have you had any blood sugars above 200?  Na    Have you had any blood sugars below 70?  N/a    What symptoms do you notice when your blood sugar is low?  None    What concerns do you have today about your diabetes? None     Do you have any of these symptoms? (Select all that apply)  No numbness or tingling in feet.  No redness, sores or blisters on feet.  No complaints of excessive thirst.  No reports of blurry vision.  No significant changes to weight.     Have you had a diabetic eye exam in the last 12 months? Yes- Date of last eye exam: 6/28/19    BP Readings from Last 2 Encounters:   09/27/19 115/50   08/19/19 (!) 153/76     Hemoglobin A1C (%)   Date Value   09/27/2019 7.5 (H)   07/30/2019 6.7 (H)     LDL Cholesterol Calculated (mg/dL)   Date Value   09/27/2019 106 (H)   08/23/2018 90       Diabetes Management Resources      How many servings of fruits and vegetables do you eat daily?  2-3    On average, how many sweetened beverages do you drink each day (soda, juice, sweet tea, etc)?   0  How many days per week do you miss taking your medication? 1-2    What makes it hard for you to take your medications?  remembering to take    Joint Pain    Onset: 1 year ago-has questions about CBD oil for arthritis. Patient thinks she might have arthritis     Description:   Location: bilateral groin pain  Character: Sharp and Dull ache, her hip will give out and she will fall    Intensity: moderate, severe    Progression of Symptoms: worse    Accompanying Signs & Symptoms:  Other symptoms: none    History:   Previous similar pain: YES- ongoing for years      Precipitating factors:   Trauma or overuse: YES- had a fall 1-2 years  ago    Alleviating factors:  Improved by: rest/inactivity    Therapies Tried and outcome: none    A couple years ago fell on her knees and has hurt her groin as well   Feels like gives out in her groin bilaterally and thinks it is worse since the fall two years ago   Pain shoots through in the groin   Has had her knees injected before and her bilateral hips as well     Patient Active Problem List   Diagnosis     Macular degeneration     Myopia     Hiatal hernia     IBS (irritable bowel syndrome)     Hypertension goal BP (blood pressure) < 140/90     GERD (gastroesophageal reflux disease)     Atypical ductal hyperplasia of breast     Atypical lobular hyperplasia of breast     Benign Breast Disease (PASH)     Family history of breast cancer in sister     Type 2 diabetes, HbA1C goal < 8% (H)     Hyperlipidemia LDL goal <100     Breast cancer screening-high risk     Tubular adenoma of colon     Osteopenia     Alcohol ingestion, more than 4 drinks/day on alcohol screening     Umbilical hernia     Incontinence of urine     Stool incontinence     Osteoarthritis, knee     Rosacea     Anxiety     Pseudophakia     Abnormal cervical Pap smear with positive HPV DNA test     Depression with anxiety     Retinal detachment     Elevated liver function tests     Rectocele     Unexplained endometrial cells on cervical cytology     Back pain, unspecified back location, unspecified back pain laterality, unspecified chronicity     Urge incontinence     Dislocation of shoulder region     Acute lower GI bleeding     Morbid obesity (H)     Moderate episode of recurrent major depressive disorder (H)     Angiodysplasia of colon     Past Surgical History:   Procedure Laterality Date     BREAST LUMPECTOMY, RT/LT      x2, left     CAPSULE/PILL CAM ENDOSCOPY N/A 3/4/2019    Procedure: CAPSULE/PILL CAM ENDOSCOPY;  Surgeon: Sinan Valdes MD;  Location: [facility]     CATARACT IOL, RT/LT  4/99    left, [id number] 7.0D     COLONOSCOPY       COLONOSCOPY  N/A 1/22/2016    Procedure: COMBINED COLONOSCOPY, SINGLE OR MULTIPLE BIOPSY/POLYPECTOMY BY BIOPSY;  Surgeon: Praful Benjamin MD;  Location: [facility]     COLONOSCOPY WITH CO2 INSUFFLATION N/A 1/22/2016    Procedure: COLONOSCOPY WITH CO2 INSUFFLATION;  Surgeon: Praful Benjamin MD;  Location: [facility]     COMBINED ESOPHAGOSCOPY, GASTROSCOPY, DUODENOSCOPY (EGD) WITH CO2 INSUFFLATION N/A 11/27/2018    Procedure: COMBINED ESOPHAGOSCOPY, GASTROSCOPY, DUODENOSCOPY (EGD) WITH CO2 INSUFFLATION;  Surgeon: Duane, William Charles, MD;  Location: [facility]     CRYOTHERAPY  2000    cervical     CRYOTHERAPY Left 3/19/2015    Procedure: CRYOTHERAPY;  Surgeon: Keiko Puri MD;  Location:  [facility]     DILATION AND CURETTAGE, HYSTEROSCOPY DIAGNOSTIC, COMBINED N/A 1/19/2016    Procedure: COMBINED DILATION AND CURETTAGE, HYSTEROSCOPY DIAGNOSTIC;  Surgeon: Yeni Aparicio DO;  Location: [facility]     ESOPHAGOSCOPY, GASTROSCOPY, DUODENOSCOPY (EGD), COMBINED N/A 11/27/2018    Procedure: COMBINED ESOPHAGOSCOPY, GASTROSCOPY, DUODENOSCOPY (EGD), BIOPSY SINGLE OR MULTIPLE;  Surgeon: Duane, William Charles, MD;  Location: [facility]     IMPLANT STIMULATOR AND LEADS SACRAL NERVE (STAGE ONE AND TWO) N/A 7/24/2018    Procedure: IMPLANT STIMULATOR AND LEADS SACRAL NERVE (STAGE ONE AND TWO);  Interstim Implant Stage One and Two (Implant Permanent Lead and Generator);  Surgeon: Dada Baltazar MD;  [facility]: [facility]     IMPLANT STIMULATOR AND LEADS SACRAL NERVE (STAGE ONE AND TWO) Right 8/6/2019    Procedure: Replacement of Interstim Implant;  Surgeon: Ddaa Baltazar MD;  [facility]     IMPLANT STIMULATOR SACRAL NERVE PERCUTANEOUS TRIAL N/A 6/19/2018    Procedure: IMPLANT STIMULATOR SACRAL NERVE PERCUTANEOUS TRIAL;  Percutaneous Neural Examination (trial for interstim);  Surgeon: Dada Baltazar MD;  [facility]     LAPAROSCOPIC TUBAL LIGATION  1981     PHACOEMULSIFICATION CLEAR CORNEA WITH STANDARD INTRAOCULAR LENS  IMPLANT  8/15/2013    Procedure: PHACOEMULSIFICATION CLEAR CORNEA WITH STANDARD INTRAOCULAR LENS IMPLANT;  RIGHT PHACOEMULSIFICATION CLEAR CORNEA WITH STANDARD INTRAOCULAR LENS IMPLANT ;  Surgeon: Trae Taylor MD;  Location:  [facility]     REMOVE STIMULATOR SACRAL NERVE Right 2019    Procedure: Removal of Interstim Implant;  Surgeon: Dada Baltazar MD;  Location: [facility]     SURGICAL HISTORY OF -       x4, ankle surgery       Social History     Tobacco Use     Smoking status: Current Every Day Smoker     Packs/day: 1.00     Years: 20.00     Pack years: 20.00     Types: Cigarettes     Start date: 2018     Smokeless tobacco: Never Used   Substance Use Topics     Alcohol use: Yes     Comment: social/3-4 per week     Family History   Problem Relation Age of Onset     Hypertension Mother      Cerebrovascular Disease Mother      Arthritis Mother      Cardiovascular Mother      Circulatory Mother      Cerebrovascular Disease Father      Prostate Cancer Father 65         at 69     Asthma Brother      Prostate Cancer Brother 48        bone metastasis     Diabetes Sister      Hypertension Sister      Osteoporosis Sister      Depression Sister      Lipids Sister      Cancer Maternal Grandmother 95        spine     Breast Cancer Sister 39        also contralateral @53, mets to lungs     Cancer Sister 20        second uterine cancer in 30s also     Diabetes Sister      Hypertension Sister      Depression Sister      Osteoporosis Sister      Breast Cancer Sister 57        unilateral     Cancer Paternal Aunt 40        unknown type     Cancer Paternal Uncle         stomach;  in his 80s     Prostate Cancer Brother      Glaucoma No family hx of      Melanoma No family hx of      Skin Cancer No family hx of          Current Outpatient Medications   Medication Sig Dispense Refill     Continuous Blood Gluc  (FREESTYLE MELINA READER) AYAN 1 Units daily 1 Device 0     Continuous Blood Gluc   "(FREESTYLE MELINA READER) AYAN 1 Units 3 times daily as needed 1 Device 0     continuous blood glucose monitoring (FREESTYLE MELINA) sensor For use with Freestyle Melina Flash  for continuous monitioring of blood glucose levels. Replace sensor every 10 days. 3 each 5     DULoxetine (CYMBALTA) 60 MG capsule TAKE 1 CAPSULE TWICE A  capsule 1     glipiZIDE (GLUCOTROL) 10 MG tablet TAKE 1 TABLET DAILY WITH DINNER 90 tablet 1     insulin NPH (NOVOLIN N VIAL) 100 UNIT/ML injection Inject 64 units Subcutaneous 2 times daily. (gets this from [facility] at [facility])       insulin syringe-needle U-100 (RELION INSULIN SYRINGE) 31G X 5/16\" 1 ML Use 2 syringes daily or as directed. 200 each 3     lisinopril (PRINIVIL/ZESTRIL) 5 MG tablet Take 1 tablet (5 mg) by mouth daily 90 tablet 3     MELATONIN PO 10 mg At Bedtime        metFORMIN (GLUCOPHAGE-XR) 500 MG 24 hr tablet TAKE 2 TABLETS TWICE A DAY WITH MEALS 120 tablet 0     minocycline (MINOCIN/DYNACIN) 100 MG capsule Take 1 capsule (100 mg) by mouth every 12 hours 180 capsule 2     omeprazole (PRILOSEC) 40 MG DR capsule TAKE 1 CAPSULE DAILY 30 TO 60 MINUTES BEFORE A MEAL 90 capsule 3     ONE TOUCH LANCETS None Entered       ONE TOUCH TEST STRIPS VI None Entered       order for DME Equipment being ordered: Light with four-wheel walker 1 Units 0     order for DME Equipment being ordered: 4 wheel walker with a seat, light weight. 1 Units 0     order for DME Equipment being ordered: Walker with seat light weight 1 Device 0     simvastatin (ZOCOR) 20 MG tablet TAKE 1 TABLET AT BEDTIME 90 tablet 0     spironolactone (ALDACTONE) 100 MG tablet TAKE 1 TABLET EVERY MORNING 90 tablet 1     traZODone (DESYREL) 100 MG tablet TAKE 1 TABLET NIGHTLY AS NEEDED FOR SLEEP 90 tablet 1     trimethoprim (TRIMPEX) 100 MG tablet Take 1 tablet (100 mg) by mouth At Bedtime 30 tablet 11     vitamin D3 (CHOLECALCIFEROL) 1000 units (25 mcg) tablet Take 1 tablet (1,000 Units) by mouth daily 90 " tablet 3     cephALEXin (KEFLEX) 250 MG capsule Take 500 mg by mouth 4 times daily       Allergies   Allergen Reactions     Codeine      Sulfa Drugs      Morphine Rash     Does OK with oxycodone.     Recent Labs   Lab Test 09/27/19  1031 07/30/19  1421 03/12/19  1331  12/06/18  1514 11/12/18  1311 08/23/18  1056 08/14/18  1219  06/14/18  1401  09/18/17  1450  06/22/17  0857   A1C 7.5* 6.7* 6.4*  --   --  7.5*  --  8.1*  --   --    < > 7.1*  --  5.9   *  --   --   --   --   --  90  --   --  138*  --   --   --  72   HDL 54  --   --   --   --   --  45*  --   --   --   --   --   --  69   TRIG 123  --   --   --   --   --  154*  --   --   --   --   --   --  106   ALT PENDING  --   --   --   --  24  --  22  --   --   --   --    < >  --    CR PENDING 0.70  --   --   --  0.52  --  0.56   < >  --    < >  --   --   --    GFRESTIMATED PENDING 89  --    < >  --  >90  --  >90   < >  --    < >  --   --   --    GFRESTBLACK PENDING >90  --    < >  --  >90  --  >90   < >  --    < >  --   --   --    POTASSIUM 4.2 4.3  --   --   --  4.1  --  4.2   < >  --    < >  --   --   --    TSH  --   --   --   --  0.70  --   --   --   --   --   --  0.63  --   --     < > = values in this interval not displayed.      BP Readings from Last 3 Encounters:   09/27/19 115/50   08/19/19 (!) 153/76   08/06/19 128/78    Wt Readings from Last 3 Encounters:   09/27/19 95.7 kg (211 lb)   08/06/19 94.7 kg (208 lb 11.2 oz)   07/30/19 94.7 kg (208 lb 11.2 oz)            Reviewed and updated as needed this visit by Provider         Review of Systems   ROS COMP: CONSTITUTIONAL:NEGATIVE for fever, chills, change in weight  ENT/MOUTH: NEGATIVE for ear, mouth and throat problems  RESP:NEGATIVE for significant cough or SOB  CV: NEGATIVE for chest pain, palpitations or peripheral edema  GI: NEGATIVE for abdominal pain   : POSITIVE for incontinence,urge and incontinence,stress and NEGATIVE for decreased urinary stream, dysuria and hematuria  MUSCULOSKELETAL:  POSITIVE  for arthralgias  and NEGATIVE for joint swelling  and joint warmth   NEURO:NEGATIVE for HX seizure D/O, involuntary movements, gait disturbance, loss of consciousness, syncope and vertigo  ENDOCRINE: POSITIVE  for polydipsia and NEGATIVE for cold intolerance, heat intolerance, polydipsia, polyphagia, polyuria, weight gain and weight loss  PSYCHIATRIC: POSITIVE foranxiety, Hx anxiety and Hx depression and NEGATIVE forthoughts of hurting someone else and thoughts of self harm      Objective    /50 (BP Location: Right arm, Patient Position: Sitting, Cuff Size: Adult Regular)   Pulse 109   Temp 97.8  F (36.6  C) (Temporal)   Wt 95.7 kg (211 lb)   LMP 11/18/1991 (Exact Date)   SpO2 95%   Breastfeeding? No   BMI 32.81 kg/m    Body mass index is 32.81 kg/m .  Physical Exam   GENERAL: Patient is well nourished, well developed,in no apparent distress, non-toxic, in no respiratory distress and acyanotic, alert, cooperative and well hydrated  EYES: Eyes grossly normal to inspection and conjunctivae and sclerae normal  HENT:ear canals and TM's normal and nose and mouth without ulcers or lesions   NECK:normal, supple and no adenopathy  CARDIAC:regular rates and rhythm and no murmur, click or rub  NORMAL - regular rate and rhythm without murmur. and without LE edema bilaterally  RESP: normal respiratory rate and rhythm, lungs clear to auscultation  unlabored respirations, no intercostal retractions or accessory muscle use  ABD:soft, nontender  SKIN: Skin color, texture, turgor normal. No rashes or lesions.  MS: extremities normal- no gross deformities noted, gait normal and normal muscle tone  EXTREMITIES: No palpable pain. ROM limited in effected hip with external rotation. No lateral/gluteal pain. Good pulses. No edema. Normal reflexes.  Ambulates with walker   NEURO: mentation intact and speech normal  PSYCH: Alert, oriented, thought content appropriate, affect: increased in intensity, thought content  exhibits flight of ideas, when questioned about suicide, the patient expresses no suicidal ideation, no homicidal ideation,mentation appears normal., patient appears normal, good hygiene, anxious, oriented X 3      Diagnostic Test Results:  Results for orders placed or performed in visit on 09/27/19   Comprehensive metabolic panel   Result Value Ref Range    Sodium 141 133 - 144 mmol/L    Potassium 4.2 3.4 - 5.3 mmol/L    Chloride 105 94 - 109 mmol/L    Carbon Dioxide 30 20 - 32 mmol/L    Anion Gap 6 3 - 14 mmol/L    Glucose 146 (H) 70 - 99 mg/dL    Urea Nitrogen 16 7 - 30 mg/dL    Creatinine 0.56 0.52 - 1.04 mg/dL    GFR Estimate >90 >60 mL/min/[1.73_m2]    GFR Estimate If Black >90 >60 mL/min/[1.73_m2]    Calcium 9.2 8.5 - 10.1 mg/dL    Bilirubin Total 0.3 0.2 - 1.3 mg/dL    Albumin 4.0 3.4 - 5.0 g/dL    Protein Total 7.7 6.8 - 8.8 g/dL    Alkaline Phosphatase 81 40 - 150 U/L    ALT 21 0 - 50 U/L    AST 11 0 - 45 U/L   Albumin Random Urine Quantitative with Creat Ratio   Result Value Ref Range    Creatinine Urine 105 mg/dL    Albumin Urine mg/L 52 mg/L    Albumin Urine mg/g Cr 49.62 (H) 0 - 25 mg/g Cr   Hemoglobin A1c   Result Value Ref Range    Hemoglobin A1C 7.5 (H) 0 - 5.6 %   UA with Microscopic reflex to Culture   Result Value Ref Range    Color Urine Yellow     Appearance Urine Slightly Cloudy     Glucose Urine Negative NEG^Negative mg/dL    Bilirubin Urine Negative NEG^Negative    Ketones Urine Negative NEG^Negative mg/dL    Specific Gravity Urine 1.019 1.003 - 1.035    Blood Urine Negative NEG^Negative    pH Urine 5.5 5.0 - 7.0 pH    Protein Albumin Urine 10 (A) NEG^Negative mg/dL    Urobilinogen mg/dL Normal 0.0 - 2.0 mg/dL    Nitrite Urine Negative NEG^Negative    Leukocyte Esterase Urine Large (A) NEG^Negative    Source Midstream Urine     WBC Urine 25-50 (A) OTO5^0 - 5 /HPF    RBC Urine O - 2 OTO2^O - 2 /HPF    WBC Clumps Present (A) NEG^Negative /HPF    Bacteria Urine Many (A) NEG^Negative /HPF     Squamous Epithelial /LPF Urine Many (A) FEW^Few /LPF   Rheumatoid factor   Result Value Ref Range    Rheumatoid Factor <20 <20 IU/mL   Anti Nuclear Rosi IgG by IFA with Reflex   Result Value Ref Range    SHAKEEL interpretation Negative NEG^Negative   CK total   Result Value Ref Range    CK Total 28 (L) 30 - 225 U/L   CRP inflammation   Result Value Ref Range    CRP Inflammation 3.7 0.0 - 8.0 mg/L   Erythrocyte sedimentation rate auto   Result Value Ref Range    Sed Rate 18 0 - 30 mm/h   Urine Culture Aerobic Bacterial   Result Value Ref Range    Specimen Description Midstream Urine     Culture Micro >100,000 colonies/mL  Escherichia coli   (A)        Susceptibility    Escherichia coli - ALICE     AMPICILLIN >=32 Resistant ug/mL     CEFAZOLIN* 8 Sensitive ug/mL      * Cefazolin ALICE breakpoints are for the treatment of uncomplicated urinary tract infections.  For the treatment of systemic infections, please contact the laboratory for additional testing.     CEFOXITIN 8 Sensitive ug/mL     CEFTAZIDIME <=1 Sensitive ug/mL     CEFTRIAXONE <=1 Sensitive ug/mL     CIPROFLOXACIN >=4 Resistant ug/mL     GENTAMICIN >=16 Resistant ug/mL     LEVOFLOXACIN >=8 Resistant ug/mL     NITROFURANTOIN <=16 Sensitive ug/mL     TOBRAMYCIN 4 Sensitive ug/mL     Trimethoprim/Sulfa 2/38 Sensitive ug/mL     AMPICILLIN/SULBACTAM >=32 Resistant ug/mL     Piperacillin/Tazo 16 Sensitive ug/mL     CEFEPIME <=1 Sensitive ug/mL           Assessment & Plan     Radha was seen today for musculoskeletal problem and diabetes.    Diagnoses and all orders for this visit:    Type 2 diabetes mellitus with hyperglycemia, with long-term current use of insulin (H)  -     Comprehensive metabolic panel  -     Albumin Random Urine Quantitative with Creat Ratio  -     Hemoglobin A1c  foot care discussed and Podiatry visits discussed, annual eye examinations at Ophthalmology discussed and glycohemoglobin monitoring discussed  No changes in current regimen  Attempt to  improve diet  Weight loss advised  Increased exercise advised   Recheck in 6 month, sooner should new symptoms or   problems arise.    Hypertension goal BP (blood pressure) < 140/90  -     Comprehensive metabolic panel  -     Albumin Random Urine Quantitative with Creat Ratio  HTN Plan:  1)  Medication: continue current medication regimen unchanged  2)  Dietary sodium restriction  3)  Regular aerobic exercise  4)  Recheck in 6 months, sooner should new symptoms or   problems arise.  5) See todays orders.    Patient Education: Reviewed risks of hypertension and principles of   treatment.    Hyperlipidemia LDL goal <100  -     Cancel: Lipid panel reflex to direct LDL Fasting  Hyperlipidemia Plan:  Regular exercise and a low fat diet are encouraged.  A fasting lipid profile is obtained today.  Side effects of cholesterol medications discussed.  Patient is to follow-up in   for a fasting lipid profile.    UTI symptoms  -     UA with Microscopic reflex to Culture  -     Urine Culture Aerobic Bacterial    Chronic groin pain, unspecified laterality    Primary osteoarthritis of both knees  -     ORTHOPEDICS ADULT REFERRAL    Primary osteoarthritis of both hips  -     ORTHOPEDICS ADULT REFERRAL    Multiple joint pain  -     Rheumatoid factor  -     Anti Nuclear Rosi IgG by IFA with Reflex  -     CK total  -     CRP inflammation  -     Erythrocyte sedimentation rate auto  Will follow up and/or notify patient of  results via My Chart to determine further need for followup      Recurrent UTI  -     cefdinir (OMNICEF) 300 MG capsule; Take 1 capsule (300 mg) by mouth 2 times daily  Patient was instructed to drink plenty of fluids, urinate frequently and to contact the office promptly should she notice fever greater than 102, increase in discomfort, skin rash, lack of improvement after 2 days of treatment, or the appearance of new symptoms.    See Patient Instructions  Patient Instructions     PLAN:   1.   Symptomatic therapy  "suggested: Continue current medication regimen unchanged.  2.   Orders Placed This Encounter   Medications     cefdinir (OMNICEF) 300 MG capsule     Sig: Take 1 capsule (300 mg) by mouth 2 times daily     Dispense:  14 capsule     Refill:  0       Orders Placed This Encounter   Procedures     JUST IN CASE     Comprehensive metabolic panel     Albumin Random Urine Quantitative with Creat Ratio     Hemoglobin A1c     UA with Microscopic reflex to Culture     Rheumatoid factor     Anti Nuclear Rosi IgG by IFA with Reflex     CK total     CRP inflammation     Erythrocyte sedimentation rate auto     ORTHOPEDICS ADULT REFERRAL     3. Patient needs to follow up in if no improvement,or sooner if worsening of symptoms or other symptoms develop.  CONSULTATION/REFERRAL to orthopedics   Will follow up and/or notify patient of  results via My Chart to determine further need for followup  Dr Waldemar palacio in 3 months       Tobacco Cessation:   reports that she has been smoking cigarettes. She started smoking about 10 months ago. She has a 20.00 pack-year smoking history. She has never used smokeless tobacco.  Tobacco Cessation Action Plan: Information offered: Patient not interested at this time      BMI:   Estimated body mass index is 32.81 kg/m  as calculated from the following:    Height as of 8/6/19: 1.708 m (5' 7.24\").    Weight as of this encounter: 95.7 kg (211 lb).   Weight management plan: Discussed healthy diet and exercise guidelines    No follow-ups on file.    NIKKY Lutz CNP  [facility]      "

## 2019-09-28 ASSESSMENT — ANXIETY QUESTIONNAIRES: GAD7 TOTAL SCORE: 10

## 2019-09-29 LAB
BACTERIA SPEC CULT: ABNORMAL
SPECIMEN SOURCE: ABNORMAL

## 2019-09-30 LAB
ANA SER QL IF: NEGATIVE
RHEUMATOID FACT SER NEPH-ACNC: <20 IU/ML (ref 0–20)

## 2019-09-30 NOTE — RESULT ENCOUNTER NOTE
Medina Corbett,    Attached are your test results.  Inflammatory markers are normal   -Liver and gallbladder tests (ALT,AST, Alk phos,bilirubin) are normal.  -Kidney function (GFR) is normal.  -Sodium is normal.  -Potassium is normal.  -Calcium is normal.  -Glucose is elevated due to your diabetes.  -Microalbumin (urine protein) level is elevated. This is suggestive of early damage to your kidneys from high blood pressure and diabetes.  ADVISE: avoiding anti-inflamatory agents such as ibuprofen (Advil, Motrin) or naproxen (Aleve) as much as possible, keeping your blood pressure in a normal range, and continuing your medication (lisinopril) that helps protect your kidneys.  Also, this should be rechecked in 1 year.    Please contact us if you have any questions.    Perri Carias, CNP

## 2019-09-30 NOTE — RESULT ENCOUNTER NOTE
Medina Corbett,    Attached are your test results.  rheumatoid marker is negative    Please contact us if you have any questions.    Perri Carias, CNP

## 2019-09-30 NOTE — RESULT ENCOUNTER NOTE
Medina Corbett,    Attached are your test results.  Lupus is negative    Please contact us if you have any questions.    Perri Carias, CNP

## 2019-10-07 ENCOUNTER — TELEPHONE (OUTPATIENT)
Dept: ORTHOPEDICS | Facility: CLINIC | Age: 69
End: 2019-10-07

## 2019-10-07 ENCOUNTER — OFFICE VISIT (OUTPATIENT)
Dept: ORTHOPEDICS | Facility: CLINIC | Age: 69
End: 2019-10-07
Payer: COMMERCIAL

## 2019-10-07 VITALS — HEIGHT: 67 IN | WEIGHT: 211 LBS | BODY MASS INDEX: 33.12 KG/M2

## 2019-10-07 DIAGNOSIS — M16.0 BILATERAL PRIMARY OSTEOARTHRITIS OF HIP: Primary | ICD-10-CM

## 2019-10-07 PROCEDURE — 20611 DRAIN/INJ JOINT/BURSA W/US: CPT | Mod: 50 | Performed by: FAMILY MEDICINE

## 2019-10-07 PROCEDURE — 99212 OFFICE O/P EST SF 10 MIN: CPT | Mod: 25 | Performed by: FAMILY MEDICINE

## 2019-10-07 RX ORDER — TRIAMCINOLONE ACETONIDE 40 MG/ML
40 INJECTION, SUSPENSION INTRA-ARTICULAR; INTRAMUSCULAR
Status: SHIPPED | OUTPATIENT
Start: 2019-10-07

## 2019-10-07 RX ADMIN — TRIAMCINOLONE ACETONIDE 40 MG: 40 INJECTION, SUSPENSION INTRA-ARTICULAR; INTRAMUSCULAR at 16:15

## 2019-10-07 ASSESSMENT — PAIN SCALES - GENERAL: PAINLEVEL: NO PAIN (0)

## 2019-10-07 ASSESSMENT — MIFFLIN-ST. JEOR: SCORE: 1523.68

## 2019-10-07 NOTE — PROGRESS NOTES
"HISTORY OF PRESENT ILLNESS  Ms. Corbett is a pleasant 68 year old year old female following up with bilateral hip osteoarthritis.  Radha last saw me about a year ago for bilateral hip and groin pain that is chronic in nature, and a history of bilateral severe hip OA.  At that visit I injected her hips, these injections helped for quite some time.  Unfortunately as of the past month or so she has started to experience bilateral groin pain that is similar nature.  She is interested in repeat injections.  She also has bilateral knee osteoarthritis.  She was first seen and treated for this with hyaluronic acid in 2011 by Dr. Santiago Ro.  This helped her pain for about 7 or 8 years.  She had repeat hyaluronic acid injections with Dr. Narvaez last year.  She is interested in these as well.     PHYSICAL EXAM  Vitals:    10/07/19 1526   Weight: 95.7 kg (211 lb)   Height: 1.708 m (5' 7.25\")     General  - normal appearance, in no obvious distress  CV  - normal femoral pulse  Pulm  - normal respiratory pattern, non-labored  Musculoskeletal - right and left hip  - stance: markedly antalgic gait  - inspection: no swelling or effusion,  normal bone and joint alignment, no obvious deformity  - palpation: no lateral or anterior hip tenderness  - ROM: limited flexion and internal rotation secondary to pain, pain with passive and active ROM   Neuro  - no sensory or motor deficit, grossly normal coordination, normal muscle tone  Skin  - no ecchymosis, erythema, warmth, or induration, no obvious rash  Psych  - interactive, appropriate, normal mood and affect          ASSESSMENT & PLAN  Ms. Corbett is a 68 year old year old female following up with bilateral hip and bilateral knee osteoarthritis.    I again reviewed her hip x-rays in the room with her from last year, these reveal bilateral severe osteoarthritis of the hips.    I did inject her hips today (see procedure note).  I also had a discussion centering around surgical management, " she may be interested in this in the future.  I did give her Dr. Home Black's card.    With regards to her knees I do think it would be reasonable to repeat hyaluronic acid treatment.  We can start the preapproval process.    It was a pleasure seeing Radha.    Intraarticular Hip Injection - Ultrasound Guided  The patient was informed of the risks and the benefits of the procedure and a written consent was signed.  The patient s left hip was prepped with chlorhexidine in sterile fashion.   40 mg of triamcinolone suspension was drawn up into a 5 mL syringe with 2 mL of 1% lidocaine and 2 mL of 0.5% marcaine.  Injection was performed using sterile technique.  Under ultrasound guidance a 3.5-inch 25-gauge needle was used to enter the femoracetabular joint.  Anterior approach was used, needle placement was visualized and documented with ultrasound.  Ultrasound visualization was necessary due to decreased joint space in the setting of osteoarthritis.  Injection performed long axis to the probe.  Injection solution visualized within the joint space.  Images were permanently stored for the patient's record.  The patient s right hip was prepped with chlorhexidine in sterile fashion.   40 mg of triamcinolone suspension was drawn up into a 5 mL syringe with 2 mL of 1% lidocaine and 2 mL of 0.5% marcaine.  Injection was performed using sterile technique.  Under ultrasound guidance a 3.5-inch 25-gauge needle was used to enter the femoracetabular joint.  Anterior approach was used, needle placement was visualized and documented with ultrasound.  Ultrasound visualization was necessary due to decreased joint space in the setting of osteoarthritis.  Injection performed long axis to the probe.  Injection solution visualized within the joint space.  Images were permanently stored for the patient's record.  There were no complications. The patient tolerated the procedure well. There was negligible bleeding.   The patient was  instructed to ice the hip upon leaving clinic and refrain from overuse over the next 3 days.   The patient was instructed to call or go to the emergency room with any unusual pain, swelling, redness, or if otherwise concerned.      Juan Jose Carias DO, SUNIL      This note was constructed using Dragon dictation software, please excuse any minor errors in spelling, grammar, or syntax.

## 2019-10-07 NOTE — PROGRESS NOTES
"      [facility] FOLLOW-UP VISIT 10/7/2019    Radha Corbett's chief complaint for this visit includes:  Chief Complaint   Patient presents with     RECHECK     bilateral hip pain, most of her pain is in the groin, bilateral knee pain,      PCP: Trice Medeiros    Referring Provider:  Perri Carias, APRN CNP  [address]  [place] [address]    Ht 1.708 m (5' 7.25\")   Wt 95.7 kg (211 lb)   LMP 11/18/1991 (Exact Date)   BMI 32.80 kg/m    No Pain (0)       Interval History:     Follow up reason: bilateral knee and hips       Medical History:    Any recent changes to your medical history? No    Any new medication prescribed since last visit? No    Review of Systems:    Do you have fever, chills, weight loss? No    Do you have any vision problems? No    Do you have any chest pain or edema? No    Do you have any shortness of breath or wheezing?  No    Do you have stomach problems? No    Do you have any numbness or focal weakness? No    Do you have diabetes? Yes,     Do you have problems with bleeding or clotting? No    Do you have an rashes or other skin lesions? No  Large Joint Injection/Arthocentesis: bilateral hip joint  Date/Time: 10/7/2019 4:15 PM  Performed by: Juan Jose Carias DO  Authorized by: Juan Jose Carias DO     Indications:  Pain  Needle Size:  25 G  Guidance: ultrasound    Location:  Hip  Laterality:  Bilateral      Site:  Bilateral hip joint  Medications (Right):  40 mg triamcinolone 40 MG/ML  Medications (Left):  40 mg triamcinolone 40 MG/ML  Outcome:  Tolerated well, no immediate complications  Procedure discussed: discussed risks, benefits, and alternatives    Consent Given by:  Patient  Timeout: timeout called immediately prior to procedure    Prep: patient was prepped and draped in usual sterile fashion          "

## 2019-10-07 NOTE — TELEPHONE ENCOUNTER
Financial Counselor Review for Hylan (gel) injection:    Procedure to be performed (include CPT code):Yes DRAIN/INJECT LARGE JOINT/BURSA - CPT: 71620    Diagnosis code (include ICD-10 code): M17    Have they tried and failed any gel injections already? no    Medication order (include J code):Yes     Synvisc One (Hylan G-F 20) -   Euflexxa -   Supartz -   Gel One -   Monovisc -  ([employer] 2019 - no PA needed)  Orthovisc -  ([employer] 2019 - no PA needed)    Coverage and patient financial responsibility information:YES    Does patient need to be contacted by Financial Counselor:YES, only if Pt is required to pay anything    Note: Do not use abbreviations and route encounter to [facility] [place] [[id number]]    --------------    FYI: If Patient has [employer] insurance, for 2019 year patient must try and fail Monovisc  or Orthovisc  before a PA can be done on Synvisc One, Euflexxa, Supartz, Gel One injections. No auth or pre-d is needed on Mono or Ortho injections and are a covered benefit under this patients plan.

## 2019-10-08 NOTE — TELEPHONE ENCOUNTER
Per the [facility] Medicare portal. No PA Required for any codes listed below. Coverage based on medical necessity. [facility] follows Medicare guidelines. Medicare covers all injections as long as it is medically necessary..

## 2019-10-16 DIAGNOSIS — I10 ESSENTIAL HYPERTENSION WITH GOAL BLOOD PRESSURE LESS THAN 140/90: ICD-10-CM

## 2019-10-18 RX ORDER — LISINOPRIL 5 MG/1
TABLET ORAL
Qty: 90 TABLET | Refills: 1 | Status: SHIPPED | OUTPATIENT
Start: 2019-10-18 | End: 2020-10-19

## 2019-10-19 DIAGNOSIS — E78.5 HYPERLIPIDEMIA LDL GOAL <100: ICD-10-CM

## 2019-10-19 RX ORDER — SIMVASTATIN 20 MG
20 TABLET ORAL AT BEDTIME
Qty: 90 TABLET | Refills: 3 | Status: SHIPPED | OUTPATIENT
Start: 2019-10-19 | End: 2019-11-09

## 2019-10-19 NOTE — RESULT ENCOUNTER NOTE
Dear Radha,   Your recent lab results showed the following:  -- triglyceride and good cholesterol HDL improved. Bad cholesterol LDL is up slightly. Continue with simvastatin 20 mg and low fat low cholesterol diet. We can recheck in 1 year.     Please call or Mychart to our office if you have further questions.     Trice Medeiros MD-PhD

## 2019-10-21 ENCOUNTER — OFFICE VISIT (OUTPATIENT)
Dept: UROLOGY | Facility: CLINIC | Age: 69
End: 2019-10-21
Payer: COMMERCIAL

## 2019-10-21 VITALS — OXYGEN SATURATION: 94 % | SYSTOLIC BLOOD PRESSURE: 122 MMHG | HEART RATE: 71 BPM | DIASTOLIC BLOOD PRESSURE: 68 MMHG

## 2019-10-21 DIAGNOSIS — N30.01 ACUTE CYSTITIS WITH HEMATURIA: ICD-10-CM

## 2019-10-21 DIAGNOSIS — R82.90 NONSPECIFIC FINDING ON EXAMINATION OF URINE: ICD-10-CM

## 2019-10-21 DIAGNOSIS — N39.0 RECURRENT UTI: ICD-10-CM

## 2019-10-21 DIAGNOSIS — N32.81 OVERACTIVE BLADDER: Primary | ICD-10-CM

## 2019-10-21 DIAGNOSIS — N95.2 ATROPHIC VAGINITIS: ICD-10-CM

## 2019-10-21 LAB
ALBUMIN UR-MCNC: 10 MG/DL
APPEARANCE UR: CLEAR
BACTERIA #/AREA URNS HPF: ABNORMAL /HPF
BILIRUB UR QL STRIP: NEGATIVE
COLOR UR AUTO: YELLOW
GLUCOSE UR STRIP-MCNC: NEGATIVE MG/DL
HGB UR QL STRIP: NEGATIVE
KETONES UR STRIP-MCNC: NEGATIVE MG/DL
LEUKOCYTE ESTERASE UR QL STRIP: ABNORMAL
NITRATE UR QL: NEGATIVE
NON-SQ EPI CELLS #/AREA URNS LPF: ABNORMAL /LPF
PH UR STRIP: 6.5 PH (ref 5–7)
RBC #/AREA URNS AUTO: ABNORMAL /HPF
SOURCE: ABNORMAL
SP GR UR STRIP: 1.02 (ref 1–1.03)
UROBILINOGEN UR STRIP-MCNC: 2 MG/DL (ref 0–2)
WBC #/AREA URNS AUTO: ABNORMAL /HPF

## 2019-10-21 PROCEDURE — 81001 URINALYSIS AUTO W/SCOPE: CPT | Performed by: UROLOGY

## 2019-10-21 PROCEDURE — 99214 OFFICE O/P EST MOD 30 MIN: CPT | Performed by: UROLOGY

## 2019-10-21 PROCEDURE — 87086 URINE CULTURE/COLONY COUNT: CPT | Performed by: UROLOGY

## 2019-10-21 PROCEDURE — 87088 URINE BACTERIA CULTURE: CPT | Performed by: UROLOGY

## 2019-10-21 PROCEDURE — 87186 SC STD MICRODIL/AGAR DIL: CPT | Performed by: UROLOGY

## 2019-10-21 RX ORDER — CEFDINIR 300 MG/1
300 CAPSULE ORAL 2 TIMES DAILY
Qty: 14 CAPSULE | Refills: 0 | Status: SHIPPED | OUTPATIENT
Start: 2019-10-21 | End: 2020-01-07

## 2019-10-21 RX ORDER — TROSPIUM CHLORIDE ER 60 MG/1
60 CAPSULE ORAL EVERY MORNING
Qty: 30 CAPSULE | Refills: 11 | Status: SHIPPED | OUTPATIENT
Start: 2019-10-21 | End: 2020-03-23

## 2019-10-21 NOTE — NURSING NOTE
Radha Corbett's goals for this visit include:   Chief Complaint   Patient presents with     Follow Up     with [employer] rep, follow up checking interstim urinary frequency and nocturia       She requests these members of her care team be copied on today's visit information: Trice Medeiros    PCP: Trice Medeiros    Referring Provider:  No referring provider defined for this encounter.    /68 (BP Location: Left arm, Patient Position: Sitting, Cuff Size: Adult Large)   Pulse 71   LMP 11/18/1991 (Exact Date)   SpO2 94%     Do you need any medication refills at today's visit? Yes, hoping for Cefidinr if she has another UTI.    Huma Valdes LPN

## 2019-10-21 NOTE — PROGRESS NOTES
Reason for Visit:  F/u on replacement of interstim implant.    Clinical Data:  Ms. Corbett is a 67 y/o female with urinary urgency and urge incontinence.  She was changed from Detrol to oxybutynin and also prescribed Myrbetriq but the latter was too expensive for her so she never started it.      She underwent an interstim trial on 6/19/18.  She was so pleased.  She was able to sleep for 6-7 hours without having to get up.  She felt like she had so much better control with it in place.  Especially on the right side.  The left side was a little less effective.  She therefore underwent permanent implant and had seen some improvement but not as good as the trial.  At her visit in August 2018 she felt it vaginally at 1 but it got a little uncomfortable.  She was then switched to program 3 at 0.9.      She has also been having multiple urinary tract infections.  Mostly with Klebsiella but also with E. Coli.  She is on tribmethoprim but still got an infection once. This was started in July    She is frustrated with her symptoms and feels that the PNE worked much better.  An xray was obtained and it was determined that perhaps the lead had migrated so she underwent repeat interstim implant on 8/6/19.  She reported very little improvement and was feeling it rectally on program 4 setting of 0.6.  We manipulated the settings a little last time 1-5 were mostly rectal with 3 being even in the butt cheek.  However we changed her to program 6 and setting of 1.1 and she feels it more in the vagina.    Yesterday she switched it to program 3 b/c now she felt in the vagina and she got a good night's sleep.  She is wearing one to 2 pads per day.  She had a check today by the [employer] rep and there was no problem with impedances and again she felt vaginally.    CT abd/pelvis 2019 was normal, no stones.    Cystoscopy on 3/19/18 was normal except for 2+ trabeculation and a diverticulum at the dome of the bladder.    /68 (BP Location:  Left arm, Patient Position: Sitting, Cuff Size: Adult Large)   Pulse 71   LMP 11/18/1991 (Exact Date)   SpO2 94%   A/o x 3  NAD  No increased resp effort  No LE edema  Moving all extremities    UDS on 5/24/18:  -Multiple uninhibited detrusor contractions starting at bladder volumes >100 mL with Pdet pressures ranging from  cm H2O. She never leaks with any of these episodes of DO.  -Otherwise good bladder compliance between UDC's.  -Small/normal bladder capacity (293 mL)  -Normal bladder filling sensations.  -No reproducible FRANCHESKA or DOI.  -Strong detrusor contraction during voiding to 81.5 cm H2O with slightly slow flow (Qmax 14.7 ml/s) and some incomplete bladder emptying ( mL).  -No evidence for DSD.  -No evidence for outlet obstruction.  -Fluoroscopy reveals a mildly trabeculated bladder wall without diverticuli or VUR. The bladder neck was closed during filling, open during episodes of DO with contrast stopping at the level of the pelvic floor vs. external urinary sphincter, and open during voiding.     A/P:  67 y/o female with urinary urgency and urge incontinence s/p revision of her interstim implant with some improvement.  We discussed -continue with the program to see how it works.  We also discussed adding an anticholinergic or even giving her some botox as an adjuvent to the interstim.  She would like to try another medication for now.    Regarding the infections we will continue with the trimethoprim for now since only one infection.  My consider changing to methenamine and vit C.  But one thing that will help is increasing fluid intake.  -Trospium 60 mg  -Pt. Will talk to her physician regarding possibly starting estrogen vaginal cream.  She does have a hx of POSH  -UA today, will treat with cefdinir  -increase fluids to about 60 oz per day.  -return in 6-8 weeks with a voiding diary.    Thank you for allowing me to participate in the care of  Ms. Radha Corbett and I will keep you updated on  her progress.    Dada Baltazar MD    Over 25 min spent with patient,  >50% in discussion and coordination of care.

## 2019-10-23 ENCOUNTER — TELEPHONE (OUTPATIENT)
Dept: UROLOGY | Facility: CLINIC | Age: 69
End: 2019-10-23

## 2019-10-23 LAB
BACTERIA SPEC CULT: ABNORMAL
BACTERIA SPEC CULT: ABNORMAL
SPECIMEN SOURCE: ABNORMAL

## 2019-10-23 NOTE — TELEPHONE ENCOUNTER
[facility] Call Center    Phone Message    May a detailed message be left on voicemail: yes    Reason for Call: Medication Question or concern regarding medication   Prescription Clarification  Name of Medication: trospium (SANCTURA XR) 60 MG CP24 24 hr capsule [12069] (Order [id number])   Prescribing Provider: Dr. Baltazar   Pharmacy: [facility] [place]   What on the order needs clarification? Pt states [facility] received the Cefdinir, but did not receive Trospium. Please send Rx over asap as pt needs to take this. Please follow up with pt.    Action Taken: Message routed to:  Adult Clinics: Urology p [phone]

## 2019-10-23 NOTE — TELEPHONE ENCOUNTER
Chart reviewed and Sanctura XR medication was ordered as local print. Called and spoke to [facility] [place] and Sanctura XR prescription was called into pharmacist per 10/21/19 order from Dr. Baltazar.    Called and spoke to patient who is aware of the above information. Also informed patient of the 10/21/19 urine culture results and result note from Dr. Baltazar. Patient plans to  both the cefdinir and trospium today and will call with any questions.    Sophia Seo RN, BSN

## 2019-11-09 DIAGNOSIS — E11.65 TYPE 2 DIABETES MELLITUS WITH HYPERGLYCEMIA, WITH LONG-TERM CURRENT USE OF INSULIN (H): ICD-10-CM

## 2019-11-09 DIAGNOSIS — E78.5 HYPERLIPIDEMIA LDL GOAL <100: ICD-10-CM

## 2019-11-09 DIAGNOSIS — Z79.4 TYPE 2 DIABETES MELLITUS WITH HYPERGLYCEMIA, WITH LONG-TERM CURRENT USE OF INSULIN (H): ICD-10-CM

## 2019-11-11 RX ORDER — METFORMIN HCL 500 MG
TABLET, EXTENDED RELEASE 24 HR ORAL
Qty: 120 TABLET | Refills: 5 | Status: SHIPPED | OUTPATIENT
Start: 2019-11-11 | End: 2020-01-07

## 2019-11-11 RX ORDER — SIMVASTATIN 20 MG
TABLET ORAL
Qty: 90 TABLET | Refills: 4 | Status: SHIPPED | OUTPATIENT
Start: 2019-11-11 | End: 2020-12-21

## 2019-11-11 NOTE — TELEPHONE ENCOUNTER
Please refer to RN refill guidelines. Refilled per protocol.    Georgiana Bravo RN   [facility], [facility]

## 2019-12-02 ENCOUNTER — OFFICE VISIT (OUTPATIENT)
Dept: OPHTHALMOLOGY | Facility: CLINIC | Age: 69
End: 2019-12-02
Attending: OPHTHALMOLOGY
Payer: COMMERCIAL

## 2019-12-02 DIAGNOSIS — H44.2E3 BOTH EYES AFFECTED BY DEGENERATIVE MYOPIA WITH OTHER MACULOPATHY: ICD-10-CM

## 2019-12-02 DIAGNOSIS — H43.811 VITREORETINAL DEGENERATION OF RIGHT EYE: ICD-10-CM

## 2019-12-02 PROCEDURE — 92134 CPTRZ OPH DX IMG PST SGM RTA: CPT | Mod: ZF | Performed by: OPHTHALMOLOGY

## 2019-12-02 PROCEDURE — G0463 HOSPITAL OUTPT CLINIC VISIT: HCPCS | Mod: ZF

## 2019-12-02 ASSESSMENT — EXTERNAL EXAM - LEFT EYE: OS_EXAM: NORMAL

## 2019-12-02 ASSESSMENT — REFRACTION_WEARINGRX
OS_SPHERE: -3.75
OS_CYLINDER: +1.75
SPECS_TYPE: PAL
OD_AXIS: 052
OD_CYLINDER: +1.75
OS_ADD: +2.50
OD_ADD: +2.50
OS_AXIS: 089
OD_SPHERE: -1.00

## 2019-12-02 ASSESSMENT — CONF VISUAL FIELD
OS_INFERIOR_TEMPORAL_RESTRICTION: 3
METHOD: COUNTING FINGERS

## 2019-12-02 ASSESSMENT — TONOMETRY
OD_IOP_MMHG: 16
IOP_METHOD: ICARE
OS_IOP_MMHG: 14

## 2019-12-02 ASSESSMENT — VISUAL ACUITY
METHOD: SNELLEN - LINEAR
OD_CC+: -2
CORRECTION_TYPE: GLASSES
OD_CC: 20/25
OS_CC: 20/500

## 2019-12-02 ASSESSMENT — CUP TO DISC RATIO
OD_RATIO: 0.1
OS_RATIO: 0.1

## 2019-12-02 ASSESSMENT — EXTERNAL EXAM - RIGHT EYE: OD_EXAM: NORMAL

## 2019-12-02 ASSESSMENT — SLIT LAMP EXAM - LIDS
COMMENTS: NORMAL
COMMENTS: NORMAL

## 2019-12-02 NOTE — NURSING NOTE
Chief Complaints and History of Present Illnesses   Patient presents with     Follow Up     Vitreoretinal degeneration of right eye      Chief Complaint(s) and History of Present Illness(es)     Follow Up     Associated symptoms: floaters (No change.) and flashes (No change.).  Negative for eye pain, dryness and tearing    Comments: Vitreoretinal degeneration of right eye               Comments     Pt states vision is stable since last visit.  Pt has no pain or other concerns at this time.    DM 2.  Pt does not know what last BS was.  Lab Results       Component                Value               Date                       A1C                      7.5                 09/27/2019                 A1C                      6.7                 07/30/2019                 A1C                      6.4                 03/12/2019                 A1C                      7.5                 11/12/2018                 A1C                      8.1                 08/14/2018              CLARA Otoole December 2, 2019 9:55 AM

## 2019-12-02 NOTE — PROGRESS NOTES
CC -   Myopic degeneration and h/o RD    INTERVAL HISTORY -   No new F/F    HPI -     Radha Corbett is a  68 year old year-old patient  S/p Retinal detachment  Repair left eye  3/19/15  Last A1c ~ 6.4 on 3/2019      PAST OCULAR HISTORY  PPV/SBP/FGx C3F8 OS  CE/IOL OU (MZ60CD OS & MA60MA OD)      RETINAL IMAGING  OCT: 6-28-19  Right eye - staphyloma, atrophic changes and some schisis  Left eye - staphyloma and severe outer atrophy center & inferior    OVF - DMV  11-1-16  Right eye horizontal 195 degrees  Left eye horizontal 80 degrees        ASSESSMENT & PLAN    1. 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 H/o RD OS s/p repair   - s/p PPV/SBP 2015 mac-off RD   - RTC 1 year      3. PVD OD   - new flashes since ~ 5/2019   - advised S/Sx RD 12/2019     - recheck 6  months as precaution      4. DM II no DR            return to clinic: 6 months, DFE OU    ATTESTATION     Attending Physician Attestation:      Complete documentation of historical and exam elements from today's encounter can be found in the full encounter summary report (not reduplicated in this progress note).  I personally obtained the chief complaint(s) and history of present illness.  I confirmed and edited as necessary the review of systems, past medical/surgical history, family history, social history, and examination findings as documented by others; and I examined the patient myself.  I personally reviewed the relevant tests, images, and reports as documented above.  I formulated and edited as necessary the assessment and plan and discussed the findings and management plan with the patient and family    Keiko Puri MD, PhD  , Vitreoretinal Surgery  Department of Ophthalmology  [facility]

## 2019-12-09 DIAGNOSIS — L70.0 ACNE VULGARIS: ICD-10-CM

## 2019-12-09 RX ORDER — MINOCYCLINE HYDROCHLORIDE 100 MG/1
100 CAPSULE ORAL EVERY 12 HOURS
Qty: 180 CAPSULE | Refills: 0 | Status: SHIPPED | OUTPATIENT
Start: 2019-12-09 | End: 2020-06-01

## 2019-12-09 NOTE — TELEPHONE ENCOUNTER
minocycline (MINOCIN/DYNACIN) 100 MG capsule      Last Written Prescription Date:  5/15/18  Last Fill Quantity: 180,   # refills: 2  Last Office Visit: 9/27/19 with Grace  Future Office visit:    Next 5 appointments (look out 90 days)    Jan 07, 2020 11:30 AM CST  PHYSICAL with Trice Medeiros MD PhD  [facility] [address] [place] [id number] [phone]           Routing refill request to provider for review/approval because:  Drug not on the [facility], [facility] or [facility] refill protocol or controlled substance    BOBY Lyons

## 2019-12-15 NOTE — NURSING NOTE
Radha Corbett's goals for this visit include: post op  She requests these members of her care team be copied on today's visit information: Yes    PCP: Trice Medeiros    Referring Provider:  Trice Medeiros MD PhD  [address]  [place] [address]    /76  Pulse 112  LMP 11/18/1991 (Approximate)  SpO2 94%    Do you need any medication refills at today's visit? No    PVR- 0 ml    Ahmet Godfrey [facility]    
Patent

## 2020-01-02 ENCOUNTER — TELEPHONE (OUTPATIENT)
Dept: PEDIATRICS | Facility: CLINIC | Age: 70
End: 2020-01-02

## 2020-01-02 NOTE — TELEPHONE ENCOUNTER
Overdue results review encounter    Orders: CT Chest w/o Contrast [[id number]]   Date ordered: 11/12/18    From: Perri Carias APRN CNP  Sent: 1/1/2020   4:53 PM CST  To: [facility] [place]  Subject: ct scan                                          Please send letter or message is due for follow up CT of chest  Thanks   Perri Carias, BLAS, APRN CNP      Future Office Visit:   Next 5 appointments (look out 90 days)    Jan 07, 2020 11:30 AM CST  PHYSICAL with Trice Medeiros MD PhD  [facility] ([facility]) [address] [place] [id number] [phone]         Noted patient is scheduled with Dr. Trice Medeiros in near future for appointment. Routing to procedure  to contact for scheduling on same day if able.  Rajwinder MCPHERSON, CMA

## 2020-01-06 ENCOUNTER — DOCUMENTATION ONLY (OUTPATIENT)
Dept: LAB | Facility: CLINIC | Age: 70
End: 2020-01-06

## 2020-01-06 DIAGNOSIS — E11.65 TYPE 2 DIABETES MELLITUS WITH HYPERGLYCEMIA, WITH LONG-TERM CURRENT USE OF INSULIN (H): Primary | ICD-10-CM

## 2020-01-06 DIAGNOSIS — E78.5 HYPERLIPIDEMIA LDL GOAL <100: ICD-10-CM

## 2020-01-06 DIAGNOSIS — Z79.4 TYPE 2 DIABETES MELLITUS WITH HYPERGLYCEMIA, WITH LONG-TERM CURRENT USE OF INSULIN (H): Primary | ICD-10-CM

## 2020-01-06 NOTE — PROGRESS NOTES
Hi,  I see that patient has a lab appointment 01/07/2019 with no orders. Can you please place orders.    Thanks Lyla HIGGINS

## 2020-01-06 NOTE — PROGRESS NOTES
"  SUBJECTIVE:   Radha Corbett is a 69 year old female who presents for Preventive Visit.    HPI:  1. Wanted to get UA done. 2 weeks of urinary burning. She would prefer ceftinir if she needs antibiotic. This one had worked for her in the past. On Trimethoprim for UTI prophylaxis by urology. She has an appointment to see Dr. Giovanny meza.   2. Last night had several White Russians with her girlfriend. A rare occasion. She felt a little hung over this morning.  3. History of iron deficiency anemia, negative work up with EGD/colonoscoy and capsule endoscopy. 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 Pt wanted to know if her anemia resolved.   4. For one month, food doesn't taste good. Had this a while ago, lasted for 1 week, but resolved on its own. Now it has come back.   5. Stopped iron and vitamin D for a few weeks. Wanted to see her levels.     Are you in the first 12 months of your Medicare Part B coverage?  No    Physical Health:    In general, how would you rate your overall physical health? poor    Outside of work, how many days during the week do you exercise? none    Outside of work, approximately how many minutes a day do you exercise?not applicable    If you drink alcohol do you typically have >3 drinks per day or >7 drinks per week? No    Do you usually eat at least 4 servings of fruit and vegetables a day, include whole grains & fiber and avoid regularly eating high fat or \"junk\" foods? Yes    Do you have any problems taking medications regularly?  No    Do you have any side effects from medications? none    Needs assistance for the following daily activities: housework and laundry    Which of the following safety concerns are present in your home?  none identified     Hearing impairment: No    In the past 6 months, have you been bothered by leaking of urine? yes    Mental Health:    In general, how would you rate your overall mental or emotional health? poor  PHQ-2 Score:      Do you feel safe in your environment? " Yes    Have you ever done Advance Care Planning? (For example, a Health Directive, POLST, or a discussion with a medical provider or your loved ones about your wishes): No, advance care planning information given to patient to review.  Patient plans to discuss their wishes with loved ones or provider.      Additional concerns to address?  YES 1,  Concerns about PV? Itchy skin, 2, skin tag in eye lid, 3, UTI sx, lab results, 4,  horrible taste in mouth.     Fall risk:  Fallen 2 or more times in the past year?: Yes  Any fall with injury in the past year?: No  Timed Up and Go Test (>13.5 is fall risk; contact physician) : 15    Cognitive Screenin) Repeat 3 items (Leader, Season, Table)    2) Clock draw: NORMAL  3) 3 item recall: Recalls 3 objects  Results: 3 items recalled: COGNITIVE IMPAIRMENT LESS LIKELY    Mini-CogTM Copyright S Eben. Licensed by the author for use in [facility]; reprinted with permission (sogita@[facility]). All rights reserved.      Do you have sleep apnea, excessive snoring or daytime drowsiness?: no        PROBLEMS TO ADD ON... as above    Reviewed and updated as needed this visit by clinical staff  Tobacco  Allergies  Meds         Reviewed and updated as needed this visit by Provider        Social History     Tobacco Use     Smoking status: Current Every Day Smoker     Packs/day: 1.00     Years: 20.00     Pack years: 20.00     Types: Cigarettes     Start date: 2018     Smokeless tobacco: Never Used   Substance Use Topics     Alcohol use: Yes     Comment: social/3-4 per week                           Current providers sharing in care for this patient include:   Patient Care Team:  Trice Medeiros MD PhD as PCP - General (Internal Medicine)  Dada Baltazar MD as MD (Urology)  Noni Mcdaniel, RN as Registered Nurse (Urology)  Trice Medeiros MD PhD as Referring Physician (Internal Medicine)  Manasa Lopez PA-C as Assigned PCP    The following health maintenance items  "are reviewed in Epic and correct as of today:  Health Maintenance   Topic Date Due     ZOSTER IMMUNIZATION (2 of 3) 08/05/2014     MEDICARE ANNUAL WELLNESS VISIT  01/04/2019     DIABETIC FOOT EXAM  06/14/2019     A1C  07/07/2020     PHQ-9  07/07/2020     BMP  09/27/2020     MICROALBUMIN  09/27/2020     EYE EXAM  12/02/2020     TSH W/FREE T4 REFLEX  12/06/2020     LIPID  01/07/2021     FALL RISK ASSESSMENT  01/07/2021     MAMMO SCREENING  01/07/2022     COLONOSCOPY  08/01/2022     DTAP/TDAP/TD IMMUNIZATION (4 - Td) 03/05/2023     ADVANCE CARE PLANNING  01/07/2025     DEXA  Completed     HEPATITIS C SCREENING  Completed     DEPRESSION ACTION PLAN  Completed     INFLUENZA VACCINE  Completed     PNEUMOCOCCAL IMMUNIZATION 65+ LOW/MEDIUM RISK  Completed     IPV IMMUNIZATION  Aged Out     MENINGITIS IMMUNIZATION  Aged Out     Lab work is in process  Pneumonia Vaccine:Adults age 65+ who have not received previous Pneumovax (PPSV23) or PCV13 as an adult: Should first be given PCV13 AND then should be given PPSV23 6-12 months after PCV13    ROS:  Constitutional, HEENT, cardiovascular, pulmonary, gi and gu systems are negative, except as otherwise noted.    OBJECTIVE:   BP (!) 144/85   Pulse 112   Temp 98.1  F (36.7  C) (Temporal)   Ht 1.708 m (5' 7.25\")   Wt 96.2 kg (212 lb)   LMP 11/18/1991 (Exact Date)   SpO2 94%   BMI 32.96 kg/m   Estimated body mass index is 32.96 kg/m  as calculated from the following:    Height as of this encounter: 1.708 m (5' 7.25\").    Weight as of this encounter: 96.2 kg (212 lb).  EXAM:   GENERAL: healthy, alert and no distress  RESP: lungs clear to auscultation - no rales, rhonchi or wheezes  CV: regular rate and rhythm, normal S1 S2, no S3 or S4, no murmur, click or rub, no peripheral edema and peripheral pulses strong  MS: no gross musculoskeletal defects noted, no edema  NEURO: Normal strength and tone, mentation intact and speech normal  PSYCH: mentation appears normal, affect " normal/bright    Diagnostic Test Results:  Component      Latest Ref Rng & Units 1/7/2020   Color Urine       Yellow   Appearance Urine       Cloudy   Glucose Urine      NEG:Negative mg/dL Negative   Bilirubin Urine      NEG:Negative Negative   Ketones Urine      NEG:Negative mg/dL Negative   Specific Gravity Urine      1.003 - 1.035 1.024   Blood Urine      NEG:Negative Negative   pH Urine      5.0 - 7.0 pH 5.5   Protein Albumin Urine      NEG:Negative mg/dL 10 (A)   Urobilinogen mg/dL      0.0 - 2.0 mg/dL Normal   Nitrite Urine      NEG:Negative Negative   Leukocyte Esterase Urine      NEG:Negative Small (A)   Source       Midstream Urine   WBC Urine      OTO5:0 - 5 /HPF 0 - 5   RBC Urine      OTO2:O - 2 /HPF O - 2   Bacteria Urine      NEG:Negative /HPF Few (A)   Squamous Epithelial /LPF Urine      FEW:Few /LPF Many (A)   Calcium Oxalate      NEG:Negative /HPF Many (A)   Amorphous Crystals      NEG:Negative /HPF Many (A)   Cholesterol      <200 mg/dL 167   Triglycerides      <150 mg/dL 118   HDL Cholesterol      >49 mg/dL 65   LDL Cholesterol Calculated      <100 mg/dL 78   Non HDL Cholesterol      <130 mg/dL 102   Iron      35 - 180 ug/dL 90   Iron Binding Cap      240 - 430 ug/dL 459 (H)   Iron Saturation Index      15 - 46 % 20   Hemoglobin A1C      0 - 5.6 % 7.3 (H)   Vitamin D Deficiency screening      20 - 75 ug/L 46   Ferritin      8 - 252 ng/mL 35       ASSESSMENT / PLAN:       ICD-10-CM    1. Encounter for Medicare annual wellness exam Z00.00    2. Dysuria R30.0 UA with Microscopic reflex to Culture (Deirdre Siu; [facility])     CANCELED: UA with Microscopic reflex to Culture   3. Other iron deficiency anemia D50.8 Ferritin     Iron and iron binding capacity   4. Vitamin D deficiency E55.9 Vitamin D Deficiency   5. Type 2 diabetes mellitus with hyperglycemia, with long-term current use of insulin (H) E11.65 glipiZIDE (GLUCOTROL) 10 MG tablet    Z79.4 metFORMIN (GLUCOPHAGE-XR) 500 MG 24 hr tablet     " Hemoglobin A1c   6. Depression with anxiety F41.8 DULoxetine (CYMBALTA) 60 MG capsule   7. Hyperlipidemia LDL goal <100 E78.5 Hepatic panel   8. Encounter for tobacco use cessation counseling Z71.6      -- stable chronic health issues. Medications refilled.    -- depression/anxiety: continue with duloxetine  -- dysuria: UA didn't show infection. Pt will follow up with urology  -- history of iron deficiency anemia: resolved   -- history of vitamin D deficiency: currently level adequate.   -- tobacco use; not ready to quit.   -- concern about her drinking. Pt denied concerns.   -- BP elevated: pt believed this was from being hung over from last night. Will have her return for RN BP check in 2 weeks  -- diabetes follow up in 6 months with A1c    Preventive screening/immunizations:   Mammogram: up to date  Colonoscopy: up to date  Immunizations: up to date   -- Shingrix (RZV) advised. Pt will check insurance coverage.      COUNSELING:  Reviewed preventive health counseling, as reflected in patient instructions    Estimated body mass index is 32.96 kg/m  as calculated from the following:    Height as of this encounter: 1.708 m (5' 7.25\").    Weight as of this encounter: 96.2 kg (212 lb).    Weight management plan: weight is stable     reports that she has been smoking cigarettes. She started smoking about 14 months ago. She has a 20.00 pack-year smoking history. She has never used smokeless tobacco.  Tobacco Cessation Action Plan: Information offered: Patient not interested at this time    Appropriate preventive services were discussed with this patient, including applicable screening as appropriate for cardiovascular disease, diabetes, osteopenia/osteoporosis, and glaucoma.  As appropriate for age/gender, discussed screening for colorectal cancer, prostate cancer, breast cancer, and cervical cancer. Checklist reviewing preventive services available has been given to the patient.    Reviewed patients plan of care and " provided an AVS. The Intermediate Care Plan ( asthma action plan, low back pain action plan, and migraine action plan) for Radha meets the Care Plan requirement. This Care Plan has been established and reviewed with the Patient.    Counseling Resources:  ATP IV Guidelines  Pooled Cohorts Equation Calculator  Breast Cancer Risk Calculator  FRAX Risk Assessment  ICSI Preventive Guidelines  Dietary Guidelines for Americans, 2010  USDA's MyPlate  ASA Prophylaxis  Lung CA Screening    Trice Medeiros MD PhD  [facility]

## 2020-01-06 NOTE — PATIENT INSTRUCTIONS
Make appointment(s) for:   -- diabetes follow up in 6 months with non fasting lab.  -- BP recheck with RN in 2 weeks.       Check with your insurance regarding coverage for Shingrix (RZV) - the new shingles vaccine.       Medication(s) prescribed today:    Orders Placed This Encounter   Medications     glipiZIDE (GLUCOTROL) 10 MG tablet     Sig: TAKE 1 TABLET DAILY WITH DINNER     Dispense:  90 tablet     Refill:  1     metFORMIN (GLUCOPHAGE-XR) 500 MG 24 hr tablet     Sig: Take 2 tablets (1,000 mg) by mouth 2 times daily (with meals)     Dispense:  360 tablet     Refill:  1     DULoxetine (CYMBALTA) 60 MG capsule     Sig: Take 1 capsule (60 mg) by mouth 2 times daily     Dispense:  180 capsule     Refill:  1             Patient Education   Personalized Prevention Plan  You are due for the preventive services outlined below.  Your care team is available to assist you in scheduling these services.  If you have already completed any of these items, please share that information with your care team to update in your medical record.  Health Maintenance Due   Topic Date Due     Zoster (Shingles) Vaccine (2 of 3) 08/05/2014     Discuss Advance Care Planning  05/23/2016     Pneumococcal Vaccine (2 of 2 - PPSV23) 11/03/2018     Annual Wellness Visit  01/04/2019     Diabetic Foot Exam  06/14/2019        At your visit today, we discussed your risk for falls and preventive options.    Fall Prevention  Falls often occur due to slipping, tripping or losing your balance. Millions of people fall every year and injure themselves. Here are ways to reduce your risk of falling again.    Think about your fall, was there anything that caused your fall that can be fixed, removed, or replaced?    Make your home safe by keeping walkways clear of objects you may trip over, such as electric cords.    Use non-slip pads under rugs. Don't use area rugs or small throw rugs.    Use non-slip mats in bathtubs and showers.    Install handrails and  lights on staircases. The handrails should be on both sides of the stairs.    Don't walk in poorly lit areas.    Don't stand on chairs or wobbly ladders.    Use caution when reaching overhead or looking upward. This position can cause a loss of balance.    Be sure your shoes fit properly, have non-slip bottoms and are in good condition.     Wear shoes both inside and out. Don't go barefoot or wear slippers.    Be cautious when going up and down stairs, curbs, and when walking on uneven sidewalks.    If your balance is poor, consider using a cane or walker.    If your fall was related to alcohol use, stop or limit alcohol intake.     If your fall was related to use of sleeping medicines, talk to your healthcare provider about this. You may need to reduce your dosage at bedtime if you awaken during the night to go to the bathroom.      To reduce the need for nighttime bathroom trips:  ? Don't drink fluids for several hours before going to bed  ? Empty your bladder before going to bed  ? Men can keep a urinal at the bedside    Stay as active as you can. Balance, flexibility, strength, and endurance all come from exercise. They all play a role in preventing falls. Ask your healthcare provider which types of activity are right for you.    Get your vision checked on a regular basis.    If you have pets, know where they are before you stand up or walk so you don't trip over them.    Use night lights.    Go over all your medicines with a pharmacist or other healthcare provider to see if any of them could make you more likely to fall.  Date Last Reviewed: 4/1/2018 2000-2019 [employer]. [address] [place] [address], [place] [address]. All rights reserved. This information is not intended as a substitute for professional medical care. Always follow your healthcare professional's instructions.    At your visit today, we discussed your risk for falls and preventive options.    Fall Prevention  Falls often occur due  to slipping, tripping or losing your balance. Millions of people fall every year and injure themselves. Here are ways to reduce your risk of falling again.    Think about your fall, was there anything that caused your fall that can be fixed, removed, or replaced?    Make your home safe by keeping walkways clear of objects you may trip over, such as electric cords.    Use non-slip pads under rugs. Don't use area rugs or small throw rugs.    Use non-slip mats in bathtubs and showers.    Install handrails and lights on staircases. The handrails should be on both sides of the stairs.    Don't walk in poorly lit areas.    Don't stand on chairs or wobbly ladders.    Use caution when reaching overhead or looking upward. This position can cause a loss of balance.    Be sure your shoes fit properly, have non-slip bottoms and are in good condition.     Wear shoes both inside and out. Don't go barefoot or wear slippers.    Be cautious when going up and down stairs, curbs, and when walking on uneven sidewalks.    If your balance is poor, consider using a cane or walker.    If your fall was related to alcohol use, stop or limit alcohol intake.     If your fall was related to use of sleeping medicines, talk to your healthcare provider about this. You may need to reduce your dosage at bedtime if you awaken during the night to go to the bathroom.      To reduce the need for nighttime bathroom trips:  ? Don't drink fluids for several hours before going to bed  ? Empty your bladder before going to bed  ? Men can keep a urinal at the bedside    Stay as active as you can. Balance, flexibility, strength, and endurance all come from exercise. They all play a role in preventing falls. Ask your healthcare provider which types of activity are right for you.    Get your vision checked on a regular basis.    If you have pets, know where they are before you stand up or walk so you don't trip over them.    Use night lights.    Go over all your  medicines with a pharmacist or other healthcare provider to see if any of them could make you more likely to fall.  Date Last Reviewed: 4/1/2018 2000-2019 [employer]. [address] [place] [address], [place] [address]. All rights reserved. This information is not intended as a substitute for professional medical care. Always follow your healthcare professional's instructions.

## 2020-01-07 ENCOUNTER — ANCILLARY PROCEDURE (OUTPATIENT)
Dept: CT IMAGING | Facility: CLINIC | Age: 70
End: 2020-01-07
Attending: NURSE PRACTITIONER
Payer: COMMERCIAL

## 2020-01-07 ENCOUNTER — OFFICE VISIT (OUTPATIENT)
Dept: PEDIATRICS | Facility: CLINIC | Age: 70
End: 2020-01-07
Payer: COMMERCIAL

## 2020-01-07 ENCOUNTER — ANCILLARY PROCEDURE (OUTPATIENT)
Dept: MAMMOGRAPHY | Facility: CLINIC | Age: 70
End: 2020-01-07
Attending: INTERNAL MEDICINE
Payer: COMMERCIAL

## 2020-01-07 VITALS
WEIGHT: 212 LBS | SYSTOLIC BLOOD PRESSURE: 144 MMHG | BODY MASS INDEX: 33.27 KG/M2 | TEMPERATURE: 98.1 F | DIASTOLIC BLOOD PRESSURE: 85 MMHG | HEART RATE: 112 BPM | OXYGEN SATURATION: 94 % | HEIGHT: 67 IN

## 2020-01-07 DIAGNOSIS — Z71.6 ENCOUNTER FOR TOBACCO USE CESSATION COUNSELING: ICD-10-CM

## 2020-01-07 DIAGNOSIS — Z12.31 VISIT FOR SCREENING MAMMOGRAM: ICD-10-CM

## 2020-01-07 DIAGNOSIS — E55.9 VITAMIN D DEFICIENCY: ICD-10-CM

## 2020-01-07 DIAGNOSIS — E78.5 HYPERLIPIDEMIA LDL GOAL <100: ICD-10-CM

## 2020-01-07 DIAGNOSIS — Z00.00 ENCOUNTER FOR MEDICARE ANNUAL WELLNESS EXAM: Primary | ICD-10-CM

## 2020-01-07 DIAGNOSIS — F41.8 DEPRESSION WITH ANXIETY: ICD-10-CM

## 2020-01-07 DIAGNOSIS — Z79.4 TYPE 2 DIABETES MELLITUS WITH HYPERGLYCEMIA, WITH LONG-TERM CURRENT USE OF INSULIN (H): ICD-10-CM

## 2020-01-07 DIAGNOSIS — R91.8 PULMONARY NODULES: ICD-10-CM

## 2020-01-07 DIAGNOSIS — E11.65 TYPE 2 DIABETES MELLITUS WITH HYPERGLYCEMIA, WITH LONG-TERM CURRENT USE OF INSULIN (H): ICD-10-CM

## 2020-01-07 DIAGNOSIS — D50.8 OTHER IRON DEFICIENCY ANEMIA: ICD-10-CM

## 2020-01-07 DIAGNOSIS — R30.0 DYSURIA: ICD-10-CM

## 2020-01-07 LAB
ALBUMIN SERPL-MCNC: 4 G/DL (ref 3.4–5)
ALBUMIN UR-MCNC: 10 MG/DL
ALP SERPL-CCNC: 86 U/L (ref 40–150)
ALT SERPL W P-5'-P-CCNC: 18 U/L (ref 0–50)
AMORPH CRY #/AREA URNS HPF: ABNORMAL /HPF
APPEARANCE UR: ABNORMAL
AST SERPL W P-5'-P-CCNC: 9 U/L (ref 0–45)
BACTERIA #/AREA URNS HPF: ABNORMAL /HPF
BILIRUB DIRECT SERPL-MCNC: 0.1 MG/DL (ref 0–0.2)
BILIRUB SERPL-MCNC: 0.4 MG/DL (ref 0.2–1.3)
BILIRUB UR QL STRIP: NEGATIVE
CAOX CRY #/AREA URNS HPF: ABNORMAL /HPF
CHOLEST SERPL-MCNC: 167 MG/DL
COLOR UR AUTO: YELLOW
FERRITIN SERPL-MCNC: 35 NG/ML (ref 8–252)
GLUCOSE UR STRIP-MCNC: NEGATIVE MG/DL
HBA1C MFR BLD: 7.3 % (ref 0–5.6)
HDLC SERPL-MCNC: 65 MG/DL
HGB UR QL STRIP: NEGATIVE
IRON SATN MFR SERPL: 20 % (ref 15–46)
IRON SERPL-MCNC: 90 UG/DL (ref 35–180)
KETONES UR STRIP-MCNC: NEGATIVE MG/DL
LDLC SERPL CALC-MCNC: 78 MG/DL
LEUKOCYTE ESTERASE UR QL STRIP: ABNORMAL
NITRATE UR QL: NEGATIVE
NON-SQ EPI CELLS #/AREA URNS LPF: ABNORMAL /LPF
NONHDLC SERPL-MCNC: 102 MG/DL
PH UR STRIP: 5.5 PH (ref 5–7)
PROT SERPL-MCNC: 7.6 G/DL (ref 6.8–8.8)
RBC #/AREA URNS AUTO: ABNORMAL /HPF
SOURCE: ABNORMAL
SP GR UR STRIP: 1.02 (ref 1–1.03)
TIBC SERPL-MCNC: 459 UG/DL (ref 240–430)
TRIGL SERPL-MCNC: 118 MG/DL
UROBILINOGEN UR STRIP-MCNC: NORMAL MG/DL (ref 0–2)
WBC #/AREA URNS AUTO: ABNORMAL /HPF

## 2020-01-07 PROCEDURE — 90471 IMMUNIZATION ADMIN: CPT | Performed by: INTERNAL MEDICINE

## 2020-01-07 PROCEDURE — 36415 COLL VENOUS BLD VENIPUNCTURE: CPT | Performed by: INTERNAL MEDICINE

## 2020-01-07 PROCEDURE — 82728 ASSAY OF FERRITIN: CPT | Performed by: INTERNAL MEDICINE

## 2020-01-07 PROCEDURE — 83036 HEMOGLOBIN GLYCOSYLATED A1C: CPT | Performed by: INTERNAL MEDICINE

## 2020-01-07 PROCEDURE — 83540 ASSAY OF IRON: CPT | Performed by: INTERNAL MEDICINE

## 2020-01-07 PROCEDURE — 99213 OFFICE O/P EST LOW 20 MIN: CPT | Mod: 25 | Performed by: INTERNAL MEDICINE

## 2020-01-07 PROCEDURE — 80076 HEPATIC FUNCTION PANEL: CPT | Performed by: INTERNAL MEDICINE

## 2020-01-07 PROCEDURE — 82306 VITAMIN D 25 HYDROXY: CPT | Performed by: INTERNAL MEDICINE

## 2020-01-07 PROCEDURE — 77067 SCR MAMMO BI INCL CAD: CPT

## 2020-01-07 PROCEDURE — 77063 BREAST TOMOSYNTHESIS BI: CPT

## 2020-01-07 PROCEDURE — 90732 PPSV23 VACC 2 YRS+ SUBQ/IM: CPT | Performed by: INTERNAL MEDICINE

## 2020-01-07 PROCEDURE — 71250 CT THORAX DX C-: CPT | Performed by: RADIOLOGY

## 2020-01-07 PROCEDURE — 81001 URINALYSIS AUTO W/SCOPE: CPT | Performed by: INTERNAL MEDICINE

## 2020-01-07 PROCEDURE — 80061 LIPID PANEL: CPT | Performed by: INTERNAL MEDICINE

## 2020-01-07 PROCEDURE — 83550 IRON BINDING TEST: CPT | Performed by: INTERNAL MEDICINE

## 2020-01-07 PROCEDURE — G0439 PPPS, SUBSEQ VISIT: HCPCS | Performed by: INTERNAL MEDICINE

## 2020-01-07 RX ORDER — GLIPIZIDE 10 MG/1
TABLET ORAL
Qty: 90 TABLET | Refills: 1 | Status: SHIPPED | OUTPATIENT
Start: 2020-01-07 | End: 2020-10-19

## 2020-01-07 RX ORDER — METFORMIN HCL 500 MG
1000 TABLET, EXTENDED RELEASE 24 HR ORAL 2 TIMES DAILY WITH MEALS
Qty: 360 TABLET | Refills: 1 | Status: SHIPPED | OUTPATIENT
Start: 2020-01-07 | End: 2020-07-14

## 2020-01-07 RX ORDER — DULOXETIN HYDROCHLORIDE 60 MG/1
60 CAPSULE, DELAYED RELEASE ORAL 2 TIMES DAILY
Qty: 180 CAPSULE | Refills: 1 | Status: SHIPPED | OUTPATIENT
Start: 2020-01-07 | End: 2020-12-17

## 2020-01-07 ASSESSMENT — MIFFLIN-ST. JEOR: SCORE: 1523.22

## 2020-01-07 ASSESSMENT — PATIENT HEALTH QUESTIONNAIRE - PHQ9
SUM OF ALL RESPONSES TO PHQ QUESTIONS 1-9: 13
5. POOR APPETITE OR OVEREATING: MORE THAN HALF THE DAYS

## 2020-01-07 ASSESSMENT — ANXIETY QUESTIONNAIRES
GAD7 TOTAL SCORE: 16
3. WORRYING TOO MUCH ABOUT DIFFERENT THINGS: NEARLY EVERY DAY
5. BEING SO RESTLESS THAT IT IS HARD TO SIT STILL: MORE THAN HALF THE DAYS
6. BECOMING EASILY ANNOYED OR IRRITABLE: SEVERAL DAYS
1. FEELING NERVOUS, ANXIOUS, OR ON EDGE: MORE THAN HALF THE DAYS
2. NOT BEING ABLE TO STOP OR CONTROL WORRYING: NEARLY EVERY DAY
7. FEELING AFRAID AS IF SOMETHING AWFUL MIGHT HAPPEN: NEARLY EVERY DAY

## 2020-01-07 NOTE — RESULT ENCOUNTER NOTE
Dear Radha,   Your recent lab results showed the following:  --iron and liver function are normal.  -- urine test not suggestive of UTI. We sent a urine culture to see if any bacterium grows. Will update you when the result is available.   -- you can go without iron supplement for the next 6 months. We'll recheck in July, sooner if you are more tired than usual.     Please call or Mychart to our office if you have further questions.     Trice Medeiors MD-PhD

## 2020-01-08 LAB — DEPRECATED CALCIDIOL+CALCIFEROL SERPL-MC: 46 UG/L (ref 20–75)

## 2020-01-08 ASSESSMENT — ANXIETY QUESTIONNAIRES: GAD7 TOTAL SCORE: 16

## 2020-01-08 NOTE — RESULT ENCOUNTER NOTE
Medina Corbett,    Attached are your test results.  Looks like your nodules in your chest and thyroid are stable   Please contact us if you have any questions.    Perri Carias, CNP

## 2020-01-08 NOTE — RESULT ENCOUNTER NOTE
Dear Radha,   Your recent lab results showed the following:  -- vitamin D level is normal.     Please call or Mychart to our office if you have further questions.     Trice Medeiros MD-PhD

## 2020-01-23 NOTE — RESULT ENCOUNTER NOTE
Results discussed directly with patient while patient was present. Any further details documented in the note.   Trice Medeiros MD PhD

## 2020-02-22 DIAGNOSIS — F41.8 DEPRESSION WITH ANXIETY: ICD-10-CM

## 2020-02-24 RX ORDER — TRAZODONE HYDROCHLORIDE 100 MG/1
TABLET ORAL
Qty: 90 TABLET | Refills: 1 | Status: SHIPPED | OUTPATIENT
Start: 2020-02-24 | End: 2020-05-19

## 2020-03-23 DIAGNOSIS — N39.0 RECURRENT UTI: ICD-10-CM

## 2020-03-23 DIAGNOSIS — N32.81 OVERACTIVE BLADDER: ICD-10-CM

## 2020-03-23 RX ORDER — TROSPIUM CHLORIDE ER 60 MG/1
60 CAPSULE ORAL EVERY MORNING
Qty: 90 CAPSULE | Refills: 3 | Status: SHIPPED | OUTPATIENT
Start: 2020-03-23 | End: 2021-04-29

## 2020-03-23 RX ORDER — TRIMETHOPRIM 100 MG/1
100 TABLET ORAL AT BEDTIME
Qty: 90 TABLET | Refills: 3 | Status: SHIPPED | OUTPATIENT
Start: 2020-03-23 | End: 2021-04-29

## 2020-03-23 NOTE — TELEPHONE ENCOUNTER
LPN spoke with patient to clarify which medications she would like refilled. Patient is requesting the trimethoprim 100 mg at bedtime and Trospium 60 mg every morning. Patient is requesting a 90 day supply of both due to the COVID pandemic and she would like to limit her outings.     Rimma Lama LPN

## 2020-03-23 NOTE — TELEPHONE ENCOUNTER
[facility] Call Center    Phone Message    May a detailed message be left on voicemail: yes     Reason for Call: Medication Refill Request    Has the patient contacted the pharmacy for the refill? Yes   Name of medication being requested: trimethoprim and trimethoprim both for 90 day supple  Provider who prescrbed the medication: Dr Baltazar  Pharmacy: [facility]  Date medication is needed: ASAP     Action Taken: Message routed to:  Adult Clinics: Urology p [id number]    Travel Screening: Not Applicable

## 2020-05-19 DIAGNOSIS — F41.8 DEPRESSION WITH ANXIETY: ICD-10-CM

## 2020-05-19 RX ORDER — TRAZODONE HYDROCHLORIDE 100 MG/1
TABLET ORAL
Qty: 90 TABLET | Refills: 1 | Status: SHIPPED | OUTPATIENT
Start: 2020-05-19 | End: 2020-10-19

## 2020-05-19 NOTE — TELEPHONE ENCOUNTER
trazodone     Last Written Prescription Date:  2/24/2020  Last Fill Quantity: 90,   # refills: 1  Last Office Visit:1/7/2020   Future Office visit:       Routing refill request to provider for review/approval because:  RX Non-Protocol  Elena MONTELONGO CMA

## 2020-05-19 NOTE — TELEPHONE ENCOUNTER
[facility] Call Center    Phone Message    May a detailed message be left on voicemail: yes     Reason for Call: Medication Refill Request    Has the patient contacted the pharmacy for the refill? Yes   Name of medication being requested: traZODone (DESYREL) 100 MG tablet  Provider who prescribed the medication: Dr. Medeiros  Pharmacy: [facility]        Action Taken: Message routed to:  Primary Care p [id number]    Travel Screening: Not Applicable

## 2020-05-31 ENCOUNTER — NURSE TRIAGE (OUTPATIENT)
Dept: NURSING | Facility: CLINIC | Age: 70
End: 2020-05-31

## 2020-05-31 NOTE — TELEPHONE ENCOUNTER
"Minocycline refill needed. Please call the patient so she knows the status. She requested a refill awhile ago and hasn't heard from anyone.   Ayah Parker RN  [facility]    Reason for Disposition    Caller requesting a NON-URGENT new prescription or refill and triager unable to refill per unit policy    Additional Information    Negative: Drug overdose and nurse unable to answer question    Negative: Caller requesting information not related to medicine    Negative: Caller requesting a prescription for Strep throat and has a positive culture result    Negative: Rash while taking a medication or within 3 days of stopping it    Negative: Immunization reaction suspected    Negative: [1] Asthma AND [2] having symptoms of asthma (cough, wheezing, etc)    Negative: MORE THAN A DOUBLE DOSE of a prescription or over-the-counter (OTC) drug    Negative: [1] DOUBLE DOSE (an extra dose or lesser amount) of over-the-counter (OTC) drug AND [2] any symptoms (e.g., dizziness, nausea, pain, sleepiness)    Negative: [1] DOUBLE DOSE (an extra dose or lesser amount) of prescription drug AND [2] any symptoms (e.g., dizziness, nausea, pain, sleepiness)    Negative: Took another person's prescription drug    Negative: [1] DOUBLE DOSE (an extra dose or lesser amount) of prescription drug AND [2] NO symptoms (Exception: a double dose of antibiotics)    Negative: Diabetes drug error or overdose (e.g., insulin or extra dose)    Negative: [1] Request for URGENT new prescription or refill of \"essential\" medication (i.e., likelihood of harm to patient if not taken) AND [2] triager unable to fill per unit policy    Negative: [1] Prescription not at pharmacy AND [2] was prescribed today by PCP    Negative: Pharmacy calling with prescription questions and triager unable to answer question    Negative: Caller has urgent medication question about med that PCP prescribed and triager unable to answer question    Negative: Caller has " NON-URGENT medication question about med that PCP prescribed and triager unable to answer question    Protocols used: MEDICATION QUESTION CALL-A-AH

## 2020-07-09 ENCOUNTER — TELEPHONE (OUTPATIENT)
Dept: PEDIATRICS | Facility: CLINIC | Age: 70
End: 2020-07-09

## 2020-07-09 DIAGNOSIS — E11.65 TYPE 2 DIABETES MELLITUS WITH HYPERGLYCEMIA, WITH LONG-TERM CURRENT USE OF INSULIN (H): ICD-10-CM

## 2020-07-09 DIAGNOSIS — K21.9 GASTROESOPHAGEAL REFLUX DISEASE WITHOUT ESOPHAGITIS: ICD-10-CM

## 2020-07-09 DIAGNOSIS — Z79.4 TYPE 2 DIABETES MELLITUS WITH HYPERGLYCEMIA, WITH LONG-TERM CURRENT USE OF INSULIN (H): ICD-10-CM

## 2020-07-13 RX ORDER — OMEPRAZOLE 40 MG/1
40 CAPSULE, DELAYED RELEASE ORAL DAILY
Qty: 90 CAPSULE | Refills: 1 | Status: SHIPPED | OUTPATIENT
Start: 2020-07-13 | End: 2020-10-19

## 2020-07-14 RX ORDER — METFORMIN HCL 500 MG
1000 TABLET, EXTENDED RELEASE 24 HR ORAL 2 TIMES DAILY WITH MEALS
Qty: 360 TABLET | Refills: 0 | Status: SHIPPED | OUTPATIENT
Start: 2020-07-14 | End: 2020-10-19

## 2020-07-14 NOTE — TELEPHONE ENCOUNTER
Called pt gave her message due for follow up pt states she will call back to schedule an appointment.Elena MONTELONGO CMA

## 2020-07-14 NOTE — TELEPHONE ENCOUNTER
Last Clinic Visit: 1/7/20 with recommended 6 month follow up, no visits scheduled.  Overdue for A1c.  90 day refill provided, routed to clinic for follow up

## 2020-10-16 ENCOUNTER — DOCUMENTATION ONLY (OUTPATIENT)
Dept: LAB | Facility: CLINIC | Age: 70
End: 2020-10-16

## 2020-10-16 DIAGNOSIS — E11.65 TYPE 2 DIABETES MELLITUS WITH HYPERGLYCEMIA, WITH LONG-TERM CURRENT USE OF INSULIN (H): ICD-10-CM

## 2020-10-16 DIAGNOSIS — Z79.4 TYPE 2 DIABETES MELLITUS WITH HYPERGLYCEMIA, WITH LONG-TERM CURRENT USE OF INSULIN (H): ICD-10-CM

## 2020-10-16 DIAGNOSIS — I10 ESSENTIAL HYPERTENSION WITH GOAL BLOOD PRESSURE LESS THAN 140/90: ICD-10-CM

## 2020-10-16 NOTE — PROGRESS NOTES
Pt is requesting a UA to be done for 10-19 please place future urine order in chart  Thank you Huma HIGGINS

## 2020-10-19 ENCOUNTER — TELEPHONE (OUTPATIENT)
Dept: ORTHOPEDICS | Facility: CLINIC | Age: 70
End: 2020-10-19

## 2020-10-19 ENCOUNTER — OFFICE VISIT (OUTPATIENT)
Dept: PEDIATRICS | Facility: CLINIC | Age: 70
End: 2020-10-19
Payer: COMMERCIAL

## 2020-10-19 VITALS
DIASTOLIC BLOOD PRESSURE: 72 MMHG | HEART RATE: 112 BPM | TEMPERATURE: 98.3 F | OXYGEN SATURATION: 97 % | BODY MASS INDEX: 30.59 KG/M2 | WEIGHT: 194.9 LBS | SYSTOLIC BLOOD PRESSURE: 115 MMHG | HEIGHT: 67 IN

## 2020-10-19 DIAGNOSIS — M17.0 PRIMARY OSTEOARTHRITIS OF BOTH KNEES: ICD-10-CM

## 2020-10-19 DIAGNOSIS — N30.00 ACUTE CYSTITIS WITHOUT HEMATURIA: ICD-10-CM

## 2020-10-19 DIAGNOSIS — Z79.4 TYPE 2 DIABETES MELLITUS WITH HYPERGLYCEMIA, WITH LONG-TERM CURRENT USE OF INSULIN (H): Primary | ICD-10-CM

## 2020-10-19 DIAGNOSIS — I10 ESSENTIAL HYPERTENSION WITH GOAL BLOOD PRESSURE LESS THAN 140/90: ICD-10-CM

## 2020-10-19 DIAGNOSIS — E11.65 TYPE 2 DIABETES MELLITUS WITH HYPERGLYCEMIA, WITH LONG-TERM CURRENT USE OF INSULIN (H): Primary | ICD-10-CM

## 2020-10-19 DIAGNOSIS — R39.9 UTI SYMPTOMS: ICD-10-CM

## 2020-10-19 DIAGNOSIS — F41.8 DEPRESSION WITH ANXIETY: ICD-10-CM

## 2020-10-19 DIAGNOSIS — R82.90 NONSPECIFIC FINDING ON EXAMINATION OF URINE: ICD-10-CM

## 2020-10-19 DIAGNOSIS — Z23 NEED FOR PROPHYLACTIC VACCINATION AND INOCULATION AGAINST INFLUENZA: ICD-10-CM

## 2020-10-19 DIAGNOSIS — M16.0 PRIMARY OSTEOARTHRITIS OF BOTH HIPS: ICD-10-CM

## 2020-10-19 DIAGNOSIS — Z79.4 TYPE 2 DIABETES MELLITUS WITH HYPERGLYCEMIA, WITH LONG-TERM CURRENT USE OF INSULIN (H): ICD-10-CM

## 2020-10-19 DIAGNOSIS — K21.9 GASTROESOPHAGEAL REFLUX DISEASE WITHOUT ESOPHAGITIS: ICD-10-CM

## 2020-10-19 DIAGNOSIS — E11.65 TYPE 2 DIABETES MELLITUS WITH HYPERGLYCEMIA, WITH LONG-TERM CURRENT USE OF INSULIN (H): ICD-10-CM

## 2020-10-19 LAB
ALBUMIN SERPL-MCNC: 4.1 G/DL (ref 3.4–5)
ALBUMIN UR-MCNC: NEGATIVE MG/DL
ALP SERPL-CCNC: 99 U/L (ref 40–150)
ALT SERPL W P-5'-P-CCNC: 23 U/L (ref 0–50)
ANION GAP SERPL CALCULATED.3IONS-SCNC: 7 MMOL/L (ref 3–14)
APPEARANCE UR: CLEAR
AST SERPL W P-5'-P-CCNC: 8 U/L (ref 0–45)
BACTERIA #/AREA URNS HPF: ABNORMAL /HPF
BILIRUB SERPL-MCNC: 0.4 MG/DL (ref 0.2–1.3)
BILIRUB UR QL STRIP: NEGATIVE
BUN SERPL-MCNC: 9 MG/DL (ref 7–30)
CALCIUM SERPL-MCNC: 9.2 MG/DL (ref 8.5–10.1)
CHLORIDE SERPL-SCNC: 101 MMOL/L (ref 94–109)
CO2 SERPL-SCNC: 27 MMOL/L (ref 20–32)
COLOR UR AUTO: YELLOW
CREAT SERPL-MCNC: 0.53 MG/DL (ref 0.52–1.04)
GFR SERPL CREATININE-BSD FRML MDRD: >90 ML/MIN/{1.73_M2}
GLUCOSE SERPL-MCNC: 291 MG/DL (ref 70–99)
GLUCOSE UR STRIP-MCNC: >1000 MG/DL
HBA1C MFR BLD: 8.7 % (ref 0–5.6)
HGB UR QL STRIP: NEGATIVE
KETONES UR STRIP-MCNC: 10 MG/DL
LEUKOCYTE ESTERASE UR QL STRIP: ABNORMAL
MUCOUS THREADS #/AREA URNS LPF: PRESENT /LPF
NITRATE UR QL: POSITIVE
NON-SQ EPI CELLS #/AREA URNS LPF: ABNORMAL /LPF
PH UR STRIP: 5.5 PH (ref 5–7)
POTASSIUM SERPL-SCNC: 4.4 MMOL/L (ref 3.4–5.3)
PROT SERPL-MCNC: 7.8 G/DL (ref 6.8–8.8)
RBC #/AREA URNS AUTO: ABNORMAL /HPF
SODIUM SERPL-SCNC: 135 MMOL/L (ref 133–144)
SOURCE: ABNORMAL
SP GR UR STRIP: 1.02 (ref 1–1.03)
UROBILINOGEN UR STRIP-MCNC: NORMAL MG/DL (ref 0–2)
WBC #/AREA URNS AUTO: ABNORMAL /HPF

## 2020-10-19 PROCEDURE — G0008 ADMIN INFLUENZA VIRUS VAC: HCPCS | Performed by: NURSE PRACTITIONER

## 2020-10-19 PROCEDURE — 81001 URINALYSIS AUTO W/SCOPE: CPT | Performed by: NURSE PRACTITIONER

## 2020-10-19 PROCEDURE — 90662 IIV NO PRSV INCREASED AG IM: CPT | Performed by: NURSE PRACTITIONER

## 2020-10-19 PROCEDURE — 80053 COMPREHEN METABOLIC PANEL: CPT | Performed by: NURSE PRACTITIONER

## 2020-10-19 PROCEDURE — 36415 COLL VENOUS BLD VENIPUNCTURE: CPT | Performed by: NURSE PRACTITIONER

## 2020-10-19 PROCEDURE — 99214 OFFICE O/P EST MOD 30 MIN: CPT | Mod: 25 | Performed by: NURSE PRACTITIONER

## 2020-10-19 PROCEDURE — 87186 SC STD MICRODIL/AGAR DIL: CPT | Performed by: NURSE PRACTITIONER

## 2020-10-19 PROCEDURE — 87086 URINE CULTURE/COLONY COUNT: CPT | Performed by: NURSE PRACTITIONER

## 2020-10-19 PROCEDURE — 83036 HEMOGLOBIN GLYCOSYLATED A1C: CPT | Performed by: NURSE PRACTITIONER

## 2020-10-19 PROCEDURE — 87077 CULTURE AEROBIC IDENTIFY: CPT | Performed by: NURSE PRACTITIONER

## 2020-10-19 RX ORDER — LISINOPRIL 5 MG/1
5 TABLET ORAL DAILY
Qty: 90 TABLET | Refills: 1 | Status: SHIPPED | OUTPATIENT
Start: 2020-10-19 | End: 2020-10-23

## 2020-10-19 RX ORDER — OMEPRAZOLE 40 MG/1
40 CAPSULE, DELAYED RELEASE ORAL DAILY
Qty: 90 CAPSULE | Refills: 1 | Status: SHIPPED | OUTPATIENT
Start: 2020-10-19 | End: 2020-10-23

## 2020-10-19 RX ORDER — GLIPIZIDE 10 MG/1
TABLET ORAL
Qty: 90 TABLET | Refills: 1 | Status: SHIPPED | OUTPATIENT
Start: 2020-10-19 | End: 2020-10-23

## 2020-10-19 RX ORDER — CEFDINIR 300 MG/1
300 CAPSULE ORAL 2 TIMES DAILY
Qty: 14 CAPSULE | Refills: 0 | Status: SHIPPED | OUTPATIENT
Start: 2020-10-19 | End: 2020-10-26

## 2020-10-19 RX ORDER — METFORMIN HCL 500 MG
1000 TABLET, EXTENDED RELEASE 24 HR ORAL 2 TIMES DAILY WITH MEALS
Qty: 360 TABLET | Refills: 0 | Status: SHIPPED | OUTPATIENT
Start: 2020-10-19 | End: 2020-10-23

## 2020-10-19 RX ORDER — TRAZODONE HYDROCHLORIDE 100 MG/1
TABLET ORAL
Qty: 90 TABLET | Refills: 1 | Status: SHIPPED | OUTPATIENT
Start: 2020-10-19 | End: 2020-10-23

## 2020-10-19 RX ORDER — SPIRONOLACTONE 100 MG/1
100 TABLET, FILM COATED ORAL DAILY
Qty: 90 TABLET | Refills: 1 | Status: SHIPPED | OUTPATIENT
Start: 2020-10-19 | End: 2020-10-23

## 2020-10-19 ASSESSMENT — PATIENT HEALTH QUESTIONNAIRE - PHQ9
5. POOR APPETITE OR OVEREATING: MORE THAN HALF THE DAYS
SUM OF ALL RESPONSES TO PHQ QUESTIONS 1-9: 12

## 2020-10-19 ASSESSMENT — ANXIETY QUESTIONNAIRES
6. BECOMING EASILY ANNOYED OR IRRITABLE: NOT AT ALL
5. BEING SO RESTLESS THAT IT IS HARD TO SIT STILL: SEVERAL DAYS
GAD7 TOTAL SCORE: 11
3. WORRYING TOO MUCH ABOUT DIFFERENT THINGS: MORE THAN HALF THE DAYS
1. FEELING NERVOUS, ANXIOUS, OR ON EDGE: MORE THAN HALF THE DAYS
2. NOT BEING ABLE TO STOP OR CONTROL WORRYING: MORE THAN HALF THE DAYS
7. FEELING AFRAID AS IF SOMETHING AWFUL MIGHT HAPPEN: MORE THAN HALF THE DAYS

## 2020-10-19 ASSESSMENT — MIFFLIN-ST. JEOR: SCORE: 1445.65

## 2020-10-19 NOTE — PROGRESS NOTES
Subjective     Radha Corbett is a 69 year old female who presents to clinic today for the following health issues:    HPI         Diabetes Follow-up      How often are you checking your blood sugar? Not at all    What concerns do you have today about your diabetes? None     Do you have any of these symptoms? (Select all that apply)  Numbness in feet  Has been metformin and Glipizide and stopped the insulin for the last 6 months     BP Readings from Last 2 Encounters:   10/19/20 115/72   01/07/20 (!) 144/85     Hemoglobin A1C (%)   Date Value   10/19/2020 8.7 (H)   01/07/2020 7.3 (H)     LDL Cholesterol Calculated (mg/dL)   Date Value   01/07/2020 78   09/27/2019 106 (H)         Pt would like to discuss MS   Is concerned as having pain in her hips and knees to the point will go out     Genitourinary - Female  Onset/Duration: 2 weeks  Description:   Painful urination (Dysuria): YES           Frequency: YES  Blood in urine (Hematuria): no  Delay in urine (Hesitency): no  Intensity: moderate  Progression of Symptoms:  worsening  Accompanying Signs & Symptoms:  Fever/chills: no  Flank pain: no  Nausea and vomiting: no  Vaginal symptoms: none  Abdominal/Pelvic Pain: no  History:   History of frequent UTI s: YES  History of kidney stones: no  Sexually Active: no  Possibility of pregnancy: No  Precipitating or alleviating factors: None  Therapies tried and outcome:  NONE     Review of Systems   CONSTITUTIONAL:POSITIVE  for weight loss and NEGATIVE  for malaise and myalgias  ENT/MOUTH: NEGATIVE for ear, mouth and throat problems  RESP:NEGATIVE for significant cough or SOB  CV: NEGATIVE for chest pain, palpitations or peripheral edema  GI: NEGATIVE for nausea, abdominal pain, heartburn, or change in bowel habits  MUSCULOSKELETAL: NEGATIVE for joint swelling  and joint warmth    Feels like everything gives out like her legs go out from under   NEURO: NEGATIVE for weakness, dizziness or paresthesias  ENDOCRINE: POSITIVE  for HX  "diabetes and NEGATIVE for polydipsia, polyphagia and polyuria  PSYCHIATRIC: NEGATIVE for changes in mood or affect      Objective    /72 (BP Location: Right arm, Patient Position: Sitting, Cuff Size: Adult Regular)   Pulse 112   Temp 98.3  F (36.8  C) (Temporal)   Ht 1.708 m (5' 7.25\")   Wt 88.4 kg (194 lb 14.4 oz)   LMP 11/18/1991 (Exact Date)   SpO2 97%   BMI 30.30 kg/m    Body mass index is 30.3 kg/m .   Wt Readings from Last 4 Encounters:   10/19/20 88.4 kg (194 lb 14.4 oz)   01/07/20 96.2 kg (212 lb)   10/07/19 95.7 kg (211 lb)   09/27/19 95.7 kg (211 lb)       Physical Exam   GENERAL APPEARANCE: healthy, alert and no distress  NECK: no adenopathy  RESP: lungs clear to auscultation - no rales, rhonchi or wheezes  CV: regular rates and rhythm and no murmur, click or rub  ABDOMEN: soft, nontender, without hepatosplenomegaly or masses and bowel sounds normal  MS: extremities normal- no gross deformities noted  SKIN: no suspicious lesions or rashes  NEURO: Normal strength and tone, mentation intact and speech normal  PSYCH: mentation appears normal and affect normal/bright  MENTAL STATUS EXAM:  Appearance/Behavior: No apparent distress, Casually groomed and Dressed appropriately for weather  Speech: Normal  Mood/Affect: normal affect  Insight: Adequate    Results for orders placed or performed in visit on 10/19/20   UA with Microscopic reflex to Culture     Status: Abnormal    Specimen: Midstream Urine   Result Value Ref Range    Color Urine Yellow     Appearance Urine Clear     Glucose Urine >1000 (A) NEG^Negative mg/dL    Bilirubin Urine Negative NEG^Negative    Ketones Urine 10 (A) NEG^Negative mg/dL    Specific Gravity Urine 1.023 1.003 - 1.035    Blood Urine Negative NEG^Negative    pH Urine 5.5 5.0 - 7.0 pH    Protein Albumin Urine Negative NEG^Negative mg/dL    Urobilinogen mg/dL Normal 0.0 - 2.0 mg/dL    Nitrite Urine Positive (A) NEG^Negative    Leukocyte Esterase Urine Moderate (A) " NEG^Negative    Source Midstream Urine     WBC Urine 10-25 (A) OTO5^0 - 5 /HPF    RBC Urine O - 2 OTO2^O - 2 /HPF    Bacteria Urine Moderate (A) NEG^Negative /HPF    Squamous Epithelial /LPF Urine Few FEW^Few /LPF    Mucous Urine Present (A) NEG^Negative /LPF   Hemoglobin A1c     Status: Abnormal   Result Value Ref Range    Hemoglobin A1C 8.7 (H) 0 - 5.6 %   Comprehensive metabolic panel     Status: Abnormal   Result Value Ref Range    Sodium 135 133 - 144 mmol/L    Potassium 4.4 3.4 - 5.3 mmol/L    Chloride 101 94 - 109 mmol/L    Carbon Dioxide 27 20 - 32 mmol/L    Anion Gap 7 3 - 14 mmol/L    Glucose 291 (H) 70 - 99 mg/dL    Urea Nitrogen 9 7 - 30 mg/dL    Creatinine 0.53 0.52 - 1.04 mg/dL    GFR Estimate >90 >60 mL/min/[1.73_m2]    GFR Estimate If Black >90 >60 mL/min/[1.73_m2]    Calcium 9.2 8.5 - 10.1 mg/dL    Bilirubin Total 0.4 0.2 - 1.3 mg/dL    Albumin 4.1 3.4 - 5.0 g/dL    Protein Total 7.8 6.8 - 8.8 g/dL    Alkaline Phosphatase 99 40 - 150 U/L    ALT 23 0 - 50 U/L    AST 8 0 - 45 U/L           Assessment & Plan     Radha was seen today for uti, recheck and imm/inj.    Diagnoses and all orders for this visit:    Type 2 diabetes mellitus with hyperglycemia, with long-term current use of insulin (H)  -     Comprehensive metabolic panel; Future  -     Hemoglobin A1c; Future  -     : glipiZIDE (GLUCOTROL) 10 MG tablet; TAKE 1 TABLET DAILY WITH DINNER  -     : metFORMIN (GLUCOPHAGE-XR) 500 MG 24 hr tablet; Take 2 tablets (1,000 mg) by mouth 2 times daily (with meals) For additional refills, please schedule a follow-up appointment with Dr Medeiros at [phone]  home glucose monitoring taught, technique demonstrated, foot care discussed and Podiatry visits discussed, annual eye examinations at Ophthalmology discussed, glycohemoglobin monitoring discussed and long term diabetic complications discussed  No changes in current regimen  Attempt to improve diet  Weight loss advised  Increased exercise advised   Recheck in  3 months, sooner should new symptoms or   problems arise.    Essential hypertension with goal blood pressure less than 140/90  -     Comprehensive metabolic panel; Future  -     : lisinopril (ZESTRIL) 5 MG tablet; Take 1 tablet (5 mg) by mouth daily  -     : spironolactone (ALDACTONE) 100 MG tablet; Take 1 tablet (100 mg) by mouth daily  HTN Plan:  1)  Medication: continue current medication regimen unchanged  2)  Dietary sodium restriction  3)  Regular aerobic exercise  4)  Recheck in 6 months, sooner should new symptoms or   problems arise.  5) See todays orders.    Patient Education: Reviewed risks of hypertension and principles of   treatment.    UTI symptoms  -     UA with Microscopic reflex to Culture; Future  -     Cancel: UA with Microscopic reflex to Culture (Deirdre Siu; [facility])    Nonspecific finding on examination of urine  -     Urine Culture Aerobic Bacterial    Acute cystitis without hematuria  -     cefdinir (OMNICEF) 300 MG capsule; Take 1 capsule (300 mg) by mouth 2 times daily for 7 days  Patient was instructed to drink plenty of fluids, urinate frequently and to contact the office promptly should she notice fever greater than 102, increase in discomfort, skin rash, lack of improvement after 2 days of treatment, or the appearance of new symptoms.    Gastroesophageal reflux disease without esophagitis  -      omeprazole (PRILOSEC) 40 MG DR capsule; Take 1 capsule (40 mg) by mouth daily 30 to 60 minutes before a meal.    Depression with anxiety  -    traZODone (DESYREL) 100 MG tablet; TAKE 1 TABLET NIGHTLY AS NEEDED FOR SLEEP  No components found for: URINE    Need for prophylactic vaccination and inoculation against influenza  -     FLUZONE HIGH DOSE 65+  [04985]  -     ADMIN INFLUENZA (For MEDICARE Patients ONLY) []    Primary osteoarthritis of both knees  FOLLOW UP WITH SPECIALIST :Orthopedics    Primary osteoarthritis of both hips  FOLLOW UP WITH SPECIALIST :Orthopedics        "  BMI:   Estimated body mass index is 30.3 kg/m  as calculated from the following:    Height as of this encounter: 1.708 m (5' 7.25\").    Weight as of this encounter: 88.4 kg (194 lb 14.4 oz).   Weight management plan: Discussed healthy diet and exercise guidelines         See Patient Instructions  Patient Instructions     PLAN:   1.   Symptomatic therapy suggested: increase fluids and call prn if symptoms persist or worsen.  Restart insulin   2.  Orders Placed This Encounter   Medications     cefdinir (OMNICEF) 300 MG capsule     Sig: Take 1 capsule (300 mg) by mouth 2 times daily for 7 days     Dispense:  14 capsule     Refill:  0     glipiZIDE (GLUCOTROL) 10 MG tablet     Sig: TAKE 1 TABLET DAILY WITH DINNER     Dispense:  90 tablet     Refill:  1     lisinopril (ZESTRIL) 5 MG tablet     Sig: Take 1 tablet (5 mg) by mouth daily     Dispense:  90 tablet     Refill:  1     metFORMIN (GLUCOPHAGE-XR) 500 MG 24 hr tablet     Sig: Take 2 tablets (1,000 mg) by mouth 2 times daily (with meals) For additional refills, please schedule a follow-up appointment with Dr Medeiros at [phone]     Dispense:  360 tablet     Refill:  0     omeprazole (PRILOSEC) 40 MG DR capsule     Sig: Take 1 capsule (40 mg) by mouth daily 30 to 60 minutes before a meal.     Dispense:  90 capsule     Refill:  1     spironolactone (ALDACTONE) 100 MG tablet     Sig: Take 1 tablet (100 mg) by mouth daily     Dispense:  90 tablet     Refill:  1     traZODone (DESYREL) 100 MG tablet     Sig: TAKE 1 TABLET NIGHTLY AS NEEDED FOR SLEEP     Dispense:  90 tablet     Refill:  1     Orders Placed This Encounter   Procedures     FLUZONE HIGH DOSE 65+  [94215]     ADMIN INFLUENZA (For MEDICARE Patients ONLY) []     Comprehensive metabolic panel     Hemoglobin A1c     UA with Microscopic reflex to Culture     3. Patient needs to follow up in if no improvement,or sooner if worsening of symptoms or other symptoms develop.  Will follow up and/or notify patient " of  results via My Chart to determine further need for followup  Follow up in 3 months.  Schedule physical exam with Dr Medeiros   Appointment with Dr Juan Jose Carias          No follow-ups on file.    NIKKY Lutz [facility]

## 2020-10-19 NOTE — TELEPHONE ENCOUNTER
Patient was seen in primary care today and wondering about getting set up for this injection and whether it is covered this year.      Routing to Sports Med and Financial Counselors    Lauren Segura  Primary Care   [facility] [place]

## 2020-10-19 NOTE — PATIENT INSTRUCTIONS
PLAN:   1.   Symptomatic therapy suggested: increase fluids and call prn if symptoms persist or worsen.  Restart insulin   2.  Orders Placed This Encounter   Medications     cefdinir (OMNICEF) 300 MG capsule     Sig: Take 1 capsule (300 mg) by mouth 2 times daily for 7 days     Dispense:  14 capsule     Refill:  0     glipiZIDE (GLUCOTROL) 10 MG tablet     Sig: TAKE 1 TABLET DAILY WITH DINNER     Dispense:  90 tablet     Refill:  1     lisinopril (ZESTRIL) 5 MG tablet     Sig: Take 1 tablet (5 mg) by mouth daily     Dispense:  90 tablet     Refill:  1     metFORMIN (GLUCOPHAGE-XR) 500 MG 24 hr tablet     Sig: Take 2 tablets (1,000 mg) by mouth 2 times daily (with meals) For additional refills, please schedule a follow-up appointment with Dr Medeiros at [phone]     Dispense:  360 tablet     Refill:  0     omeprazole (PRILOSEC) 40 MG DR capsule     Sig: Take 1 capsule (40 mg) by mouth daily 30 to 60 minutes before a meal.     Dispense:  90 capsule     Refill:  1     spironolactone (ALDACTONE) 100 MG tablet     Sig: Take 1 tablet (100 mg) by mouth daily     Dispense:  90 tablet     Refill:  1     traZODone (DESYREL) 100 MG tablet     Sig: TAKE 1 TABLET NIGHTLY AS NEEDED FOR SLEEP     Dispense:  90 tablet     Refill:  1     Orders Placed This Encounter   Procedures     FLUZONE HIGH DOSE 65+  [[id number]]     ADMIN INFLUENZA (For MEDICARE Patients ONLY) []     Comprehensive metabolic panel     Hemoglobin A1c     UA with Microscopic reflex to Culture     3. Patient needs to follow up in if no improvement,or sooner if worsening of symptoms or other symptoms develop.  Will follow up and/or notify patient of  results via My Chart to determine further need for followup  Follow up in 3 months.  Schedule physical exam with Dr Medeiros   Appointment with Dr Juan Jose Carias

## 2020-10-19 NOTE — TELEPHONE ENCOUNTER
Financial Counselor Review for Hylan (gel) injection:    Procedure to be performed (include CPT code):Yes DRAIN/INJECT LARGE JOINT/BURSA - CPT: 79722    Diagnosis code (include ICD-10 code): M17    Have they tried and failed any gel injections already? no    Medication order (include J code):Yes     Synvisc One (Hylan G-F 20) -   Euflexxa -   Supartz -   Gel One -   Monovisc -  ([employer] 2019 - no PA needed)  Orthovisc -  ([employer] 2019 - no PA needed)    Coverage and patient financial responsibility information:YES    Does patient need to be contacted by Financial Counselor:YES, only if Pt is required to pay anything    Note: Do not use abbreviations and route encounter to [facility] [place] [[id number]]    --------------    FYI: If Patient has [employer] insurance, for 2019 year patient must try and fail Monovisc  or Orthovisc  before a PA can be done on Synvisc One, Euflexxa, Supartz, Gel One injections. No auth or pre-d is needed on Mono or Ortho injections and are a covered benefit under this patients plan.

## 2020-10-20 RX ORDER — METFORMIN HCL 500 MG
TABLET, EXTENDED RELEASE 24 HR ORAL
Qty: 360 TABLET | Refills: 3 | OUTPATIENT
Start: 2020-10-20

## 2020-10-20 RX ORDER — LISINOPRIL 5 MG/1
TABLET ORAL
Qty: 90 TABLET | Refills: 3 | OUTPATIENT
Start: 2020-10-20

## 2020-10-20 ASSESSMENT — ANXIETY QUESTIONNAIRES: GAD7 TOTAL SCORE: 11

## 2020-10-20 NOTE — TELEPHONE ENCOUNTER
Per the online [employer] PA portal: No PA required for CPT codes:    DRAIN/INJECT LARGE JOINT/BURSA - CPT: 40926  Synvisc One (Hylan G-F 20) -   Euflexxa -   Supartz -   Gel One -   Monovisc -    Orthovisc -      Coverage is based on medical necessity and plan benefits at the time services are rendered. Please have patient reach out to their insurance for benefits.

## 2020-10-20 NOTE — TELEPHONE ENCOUNTER
10/20 Called and left voicemail. Provided phone number [phone] to schedule knee injections with Dr. Carias.     Twila Rai   Procedure    Ortho/Sports Med/Pod/Ent/Eye  [facility] [place]   [phone]

## 2020-10-21 LAB
BACTERIA SPEC CULT: ABNORMAL
BACTERIA SPEC CULT: ABNORMAL
SPECIMEN SOURCE: ABNORMAL

## 2020-10-22 ENCOUNTER — TELEPHONE (OUTPATIENT)
Dept: PEDIATRICS | Facility: CLINIC | Age: 70
End: 2020-10-22

## 2020-10-22 DIAGNOSIS — K21.9 GASTROESOPHAGEAL REFLUX DISEASE WITHOUT ESOPHAGITIS: ICD-10-CM

## 2020-10-22 DIAGNOSIS — Z79.4 TYPE 2 DIABETES MELLITUS WITH HYPERGLYCEMIA, WITH LONG-TERM CURRENT USE OF INSULIN (H): ICD-10-CM

## 2020-10-22 DIAGNOSIS — E11.65 TYPE 2 DIABETES MELLITUS WITH HYPERGLYCEMIA, WITH LONG-TERM CURRENT USE OF INSULIN (H): ICD-10-CM

## 2020-10-22 DIAGNOSIS — F41.8 DEPRESSION WITH ANXIETY: ICD-10-CM

## 2020-10-22 DIAGNOSIS — I10 ESSENTIAL HYPERTENSION WITH GOAL BLOOD PRESSURE LESS THAN 140/90: ICD-10-CM

## 2020-10-22 NOTE — TELEPHONE ENCOUNTER
[facility] Call Center    Phone Message    May a detailed message be left on voicemail: yes     Reason for Call: Patient called stating that she wanted all of her medications sent to [facility] except for the Cefdinir. She already picked up the Cefdinir from the [facility]. Please advise. Thank you.    Action Taken: Message routed to:  Primary Care p [id number]    Travel Screening: Not Applicable

## 2020-10-22 NOTE — TELEPHONE ENCOUNTER
[facility] Call Center    Phone Message    May a detailed message be left on voicemail: yes     Reason for Call: Patient called wanting to know if she is able to get injections for both hips and knees at the same time. Patient said it is hard for her to come to the clinic. Please advise. Thank you.    Action Taken: Message routed to:  Adult Clinics: Sports Medicine p [id number]    Travel Screening: Not Applicable

## 2020-10-23 RX ORDER — METFORMIN HCL 500 MG
1000 TABLET, EXTENDED RELEASE 24 HR ORAL 2 TIMES DAILY WITH MEALS
Qty: 360 TABLET | Refills: 0 | Status: SHIPPED | OUTPATIENT
Start: 2020-10-23 | End: 2020-12-23

## 2020-10-23 RX ORDER — OMEPRAZOLE 40 MG/1
40 CAPSULE, DELAYED RELEASE ORAL DAILY
Qty: 90 CAPSULE | Refills: 1 | Status: SHIPPED | OUTPATIENT
Start: 2020-10-23 | End: 2021-06-11

## 2020-10-23 RX ORDER — TRAZODONE HYDROCHLORIDE 100 MG/1
TABLET ORAL
Qty: 90 TABLET | Refills: 1 | Status: SHIPPED | OUTPATIENT
Start: 2020-10-23 | End: 2021-06-11

## 2020-10-23 RX ORDER — SPIRONOLACTONE 100 MG/1
100 TABLET, FILM COATED ORAL DAILY
Qty: 90 TABLET | Refills: 1 | Status: SHIPPED | OUTPATIENT
Start: 2020-10-23 | End: 2021-06-11

## 2020-10-23 RX ORDER — GLIPIZIDE 10 MG/1
TABLET ORAL
Qty: 90 TABLET | Refills: 1 | Status: SHIPPED | OUTPATIENT
Start: 2020-10-23 | End: 2021-06-16 | Stop reason: DRUGHIGH

## 2020-10-23 RX ORDER — LISINOPRIL 5 MG/1
5 TABLET ORAL DAILY
Qty: 90 TABLET | Refills: 1 | Status: SHIPPED | OUTPATIENT
Start: 2020-10-23 | End: 2021-06-11

## 2020-10-23 NOTE — TELEPHONE ENCOUNTER
Spoke with patient, confirmed medications that she needs sent to [facility].  Refilled per protocol.      Shauna Palencia RN

## 2020-10-23 NOTE — TELEPHONE ENCOUNTER
10/23 Called and spoke to patient. She is currently scheduled for as 4 injections on one day.     Twila Rai   Procedure    Ortho/Sports Med/Pod/Ent/Eye  [facility] [place]   [phone]

## 2020-11-03 ENCOUNTER — TELEPHONE (OUTPATIENT)
Dept: PEDIATRICS | Facility: CLINIC | Age: 70
End: 2020-11-03

## 2020-11-03 DIAGNOSIS — R39.9 UTI SYMPTOMS: Primary | ICD-10-CM

## 2020-11-03 NOTE — TELEPHONE ENCOUNTER
Patient states symptoms of UTI has returned. Frequent urination and burning when urinating. Patient request a refill of cefdinir. Writer found no recent prescription. Please advise        [facility] Call Center    Phone Message    May a detailed message be left on voicemail: yes     Reason for Call: Medication Question or concern regarding medication   Prescription Clarification  Name of Medication: cefdinir  Prescribing Provider: Grace Carias    Pharmacy: [facility]            Action Taken: Message routed to:  Primary Care p [phone]    Travel Screening: Not Applicable

## 2020-11-03 NOTE — TELEPHONE ENCOUNTER
Called patient and left detailed message with Gracelorena Carias's response.  Asked on message if she could make it work to have someone  a medicine cup and bring back a urine sample, to do so. She had mentioned that someone could come to the clinic earlier to  antibiotics.    Left phone number to call with further questions.    Falguni Hammer RN, [facility]

## 2020-11-03 NOTE — TELEPHONE ENCOUNTER
Called patient, gave her the message from Grace Carias,   She states she is not able to come into clinic to do a urine sample until Thursday.   Asked if someone could  a specimen cup and bring to patient, she said no, there is no one that can do that.    She states she felt the Cefdinir was working, she missed 1 dose but otherwise, took the medication as prescribed.  She noted that the symptoms came back after she missed the dose.    Patient is asking if she can get more of the same antibiotic or similar without giving a urine sample.    Routing to provider to please advise.    Falguni Hammer RN, [facility]

## 2020-11-05 ENCOUNTER — ANCILLARY PROCEDURE (OUTPATIENT)
Dept: GENERAL RADIOLOGY | Facility: CLINIC | Age: 70
End: 2020-11-05
Attending: FAMILY MEDICINE
Payer: COMMERCIAL

## 2020-11-05 ENCOUNTER — OFFICE VISIT (OUTPATIENT)
Dept: ORTHOPEDICS | Facility: CLINIC | Age: 70
End: 2020-11-05
Payer: COMMERCIAL

## 2020-11-05 VITALS — BODY MASS INDEX: 30.45 KG/M2 | HEIGHT: 67 IN | WEIGHT: 194 LBS

## 2020-11-05 DIAGNOSIS — M17.0 BILATERAL PRIMARY OSTEOARTHRITIS OF KNEE: ICD-10-CM

## 2020-11-05 DIAGNOSIS — M16.0 BILATERAL PRIMARY OSTEOARTHRITIS OF HIP: Primary | ICD-10-CM

## 2020-11-05 DIAGNOSIS — R39.9 UTI SYMPTOMS: ICD-10-CM

## 2020-11-05 DIAGNOSIS — M16.0 BILATERAL PRIMARY OSTEOARTHRITIS OF HIP: ICD-10-CM

## 2020-11-05 PROCEDURE — 20611 DRAIN/INJ JOINT/BURSA W/US: CPT | Mod: 50 | Performed by: FAMILY MEDICINE

## 2020-11-05 PROCEDURE — 99213 OFFICE O/P EST LOW 20 MIN: CPT | Mod: 25 | Performed by: FAMILY MEDICINE

## 2020-11-05 PROCEDURE — 73522 X-RAY EXAM HIPS BI 3-4 VIEWS: CPT | Performed by: RADIOLOGY

## 2020-11-05 PROCEDURE — 20611 DRAIN/INJ JOINT/BURSA W/US: CPT | Mod: 59 | Performed by: FAMILY MEDICINE

## 2020-11-05 RX ADMIN — TRIAMCINOLONE ACETONIDE 40 MG: 40 INJECTION, SUSPENSION INTRA-ARTICULAR; INTRAMUSCULAR at 13:19

## 2020-11-05 ASSESSMENT — MIFFLIN-ST. JEOR: SCORE: 1441.57

## 2020-11-05 NOTE — TELEPHONE ENCOUNTER
See Pt is scheduled for a lab appt at 12:50 am today.   She has arrived for this appt.     Falguni Hammer RN, [facility]

## 2020-11-05 NOTE — PROGRESS NOTES
CHIEF COMPLAINT:  RECHECK (bilateral hip joint joint cortisone inejction, bilateral knee gel injection )       HISTORY OF PRESENT ILLNESS  Ms. Corbett is a pleasant 69 year old female who presents to clinic today with bilateral hip and bilateral knee osteoarthritis.  Radha is here specifically for injections of her hips and knees.    Radha also has questions regarding a walker, she borrowed a walker from her friend many years ago.  It is getting outdated and is falling into disrepair.  She also has a lump on the lateral side of her left hip.  This is tender to the touch, worse in certain ranges of motion, worse as the day goes on.    Additional history: as documented    MEDICAL HISTORY  Patient Active Problem List   Diagnosis     Macular degeneration     Myopia     Hiatal hernia     IBS (irritable bowel syndrome)     Hypertension goal BP (blood pressure) < 140/90     GERD (gastroesophageal reflux disease)     Atypical ductal hyperplasia of breast     Atypical lobular hyperplasia of breast     Benign Breast Disease (PASH)     Family history of breast cancer in sister     Type 2 diabetes, HbA1C goal < 8% (H)     Hyperlipidemia LDL goal <100     Breast cancer screening-high risk     Tubular adenoma of colon     Osteopenia     Alcohol ingestion, more than 4 drinks/day on alcohol screening     Umbilical hernia     Incontinence of urine     Stool incontinence     Osteoarthritis, knee     Rosacea     Anxiety     Pseudophakia     Abnormal cervical Pap smear with positive HPV DNA test     Depression with anxiety     Retinal detachment     Elevated liver function tests     Rectocele     Unexplained endometrial cells on cervical cytology     Back pain, unspecified back location, unspecified back pain laterality, unspecified chronicity     Urge incontinence     Dislocation of shoulder region     Acute lower GI bleeding     Morbid obesity (H)     Moderate episode of recurrent major depressive disorder (H)     Angiodysplasia of colon  "      Current Outpatient Medications   Medication Sig Dispense Refill     Continuous Blood Gluc  (FREESTYLE MELINA READER) AYAN 1 Units daily 1 Device 0     Continuous Blood Gluc  (FREESTYLE MELINA READER) AYAN 1 Units 3 times daily as needed 1 Device 0     continuous blood glucose monitoring (FREESTYLE MELINA) sensor For use with Freestyle Melina Flash  for continuous monitioring of blood glucose levels. Replace sensor every 10 days. 3 each 5     DULoxetine (CYMBALTA) 60 MG capsule Take 1 capsule (60 mg) by mouth 2 times daily 180 capsule 1     glipiZIDE (GLUCOTROL) 10 MG tablet TAKE 1 TABLET DAILY WITH DINNER 90 tablet 1     insulin NPH (NOVOLIN N VIAL) 100 UNIT/ML injection Inject 64 units Subcutaneous 2 times daily. (gets this from [facility] at [facility])       insulin syringe-needle U-100 (RELION INSULIN SYRINGE) 31G X 5/16\" 1 ML Use 2 syringes daily or as directed. 200 each 3     lisinopril (ZESTRIL) 5 MG tablet Take 1 tablet (5 mg) by mouth daily 90 tablet 1     MELATONIN PO 10 mg At Bedtime        metFORMIN (GLUCOPHAGE-XR) 500 MG 24 hr tablet Take 2 tablets (1,000 mg) by mouth 2 times daily (with meals) For additional refills, please schedule a follow-up appointment with Dr Medeiros at [phone] 360 tablet 0     minocycline (MINOCIN) 100 MG capsule Take 1 capsule (100 mg) by mouth every 12 hours 180 capsule 0     omeprazole (PRILOSEC) 40 MG DR capsule Take 1 capsule (40 mg) by mouth daily 30 to 60 minutes before a meal. 90 capsule 1     ONE TOUCH LANCETS None Entered       ONE TOUCH TEST STRIPS VI None Entered       order for DME Equipment being ordered: Light with four-wheel walker 1 Units 0     order for DME Equipment being ordered: 4 wheel walker with a seat, light weight. 1 Units 0     order for DME Equipment being ordered: Walker with seat light weight 1 Device 0     simvastatin (ZOCOR) 20 MG tablet TAKE 1 TABLET AT BEDTIME 90 tablet 4     spironolactone (ALDACTONE) 100 MG tablet Take 1 " "tablet (100 mg) by mouth daily 90 tablet 1     traZODone (DESYREL) 100 MG tablet TAKE 1 TABLET NIGHTLY AS NEEDED FOR SLEEP 90 tablet 1     trimethoprim (TRIMPEX) 100 MG tablet Take 1 tablet (100 mg) by mouth At Bedtime 90 tablet 3     trospium (SANCTURA XR) 60 MG CP24 24 hr capsule Take 1 capsule (60 mg) by mouth every morning 90 capsule 3     vitamin D3 (CHOLECALCIFEROL) 1000 units (25 mcg) tablet Take 1 tablet (1,000 Units) by mouth daily 90 tablet 3       Allergies   Allergen Reactions     Codeine      Sulfa Drugs      Morphine Rash     Does OK with oxycodone.       Family History   Problem Relation Age of Onset     Hypertension Mother      Cerebrovascular Disease Mother      Arthritis Mother      Cardiovascular Mother      Circulatory Mother      Cerebrovascular Disease Father      Prostate Cancer Father 65         at 69     Asthma Brother      Prostate Cancer Brother 48        bone metastasis     Diabetes Sister      Hypertension Sister      Osteoporosis Sister      Depression Sister      Lipids Sister      Cancer Maternal Grandmother 95        spine     Breast Cancer Sister 39        also contralateral @53, mets to lungs     Cancer Sister 20        second uterine cancer in 30s also     Diabetes Sister      Hypertension Sister      Depression Sister      Osteoporosis Sister      Breast Cancer Sister 57        unilateral     Cancer Paternal Aunt 40        unknown type     Cancer Paternal Uncle         stomach;  in his 80s     Prostate Cancer Brother      Glaucoma No family hx of      Melanoma No family hx of      Skin Cancer No family hx of      Macular Degeneration No family hx of        Additional medical/Social/Surgical histories reviewed in [facility] and updated as appropriate.        PHYSICAL EXAM  Vitals:    20 1317   Weight: 88 kg (194 lb)   Height: 1.708 m (5' 7.25\")     General  - normal appearance, in no obvious distress  HEENT  - conjunctivae not injected, moist mucous membranes  CV  - normal " femoral pulse  Pulm  - normal respiratory pattern, non-labored  Musculoskeletal - left hip  - stance: markedly antalgic gait, requires assistance to ambulate   - inspection: generalized atrophy of leg muscles  - palpation: tender over the greater trochanter  Neuro  - no sensory or motor deficit, grossly normal coordination, normal muscle tone  Skin  - no ecchymosis, erythema, warmth, or induration, no obvious rash  Psych  - interactive, appropriate, normal mood and affect         ASSESSMENT & PLAN  Ms. Corbett is a 69 year old female who presents to clinic today with bilateral hip and bilateral knee osteoarthritis.    I did perform injection therapy for both of her hips and both of her knees (see procedure note).    With regards to her hips I did perform bilateral corticosteroid injections.  We did discuss that with her history of diabetes her blood sugars will go high.  She is knowledgeable and understanding of this.    I did inject her knees with hyaluronic acid.  This is in part due to the multiple injections that we did today, as she has great difficulty getting to our clinic, and the fact that I would not want to do more than 2 corticosteroid injections due to her diabetes and medical history.    I did write an order for a new walker.  She is having extreme difficulty ambulating, toileting, and performing other activities of daily living secondary to her severe osteoarthritis.  It is my professional opinion that she is able to safely use the walker, and that the walker would significantly increase her functional mobility.  She will need frequent rest while doing her ADLs in the home, necessitating a seat on her walker.  She also will have difficulty using a cane or crutches due to balance issues.    I am also obtaining x-rays of her hips to investigate the lump on the outside of her left hip.    It was a pleasure seeing Radha today.    Juan Jose Carias DO, [facility]  Primary Care Sports Medicine      This note was  "constructed using Dragon dictation software, please excuse any minor errors in spelling, grammar, or syntax.      Scribed by Becky Pennington ATC for Dr. Carias on 11/5/20 at 1:20 PM, based on the providers statements to me.       [facility] FOLLOW-UP VISIT 11/5/2020    Radha Corbett's chief complaint for this visit includes:  Chief Complaint   Patient presents with     RECHECK     bilateral hip joint joint cortisone inejction, bilateral knee gel injection      PCP: Trice Medeiros    Referring Provider:  No referring provider defined for this encounter.    Ht 1.708 m (5' 7.25\")   Wt 88 kg (194 lb)   LMP 11/18/1991 (Exact Date)   BMI 30.16 kg/m    Data Unavailable       Interval History:     Follow up reason: bilateral hip and knee injections     Medical History:    reviewed    Review of Systems:    Do you have fever, chills, weight loss? No    Do you have any vision problems? No    Do you have any chest pain or edema? No    Do you have any shortness of breath or wheezing?  No    Do you have stomach problems? No    Do you have any urinary track issues? Being managed    Do you have any numbness or focal weakness? No    Do you have problems with bleeding or clotting? No    Do you have an rashes or other skin lesions? No    Large Joint Injection/Arthocentesis: bilateral hip joint    Date/Time: 11/5/2020 1:19 PM  Performed by: Juan Jose Carias DO  Authorized by: Juan Jose Carias DO     Indications:  Pain  Needle Size:  22 G  Guidance: ultrasound    Location:  Hip  Laterality:  Bilateral      Site:  Bilateral hip joint  Medications (Right):  40 mg triamcinolone 40 MG/ML  Medications (Left):  40 mg triamcinolone 40 MG/ML  Outcome:  Tolerated well, no immediate complications  Procedure discussed: discussed risks, benefits, and alternatives    Consent Given by:  Patient  Timeout: timeout called immediately prior to procedure    Prep: patient was prepped and draped in usual sterile fashion    Large Joint " Injection/Arthocentesis: bilateral knee    Date/Time: 11/5/2020 1:19 PM  Performed by: Juan Jose Carias DO  Authorized by: Juan Jose Carias DO     Indications:  Pain  Needle Size:  22 G  Approach:  Lateral  Location:  Knee  Laterality:  Bilateral      Medications (Right):  30 mg cross-Linked Hyaluronate 30 MG/3ML  Medications (Left):  30 mg cross-Linked Hyaluronate 30 MG/3ML  Outcome:  Tolerated well, no immediate complications  Procedure discussed: discussed risks, benefits, and alternatives    Consent Given by:  Patient  Timeout: timeout called immediately prior to procedure    Prep: patient was prepped and draped in usual sterile fashion       Intraarticular Hip Injections - Ultrasound Guided    The patient was informed of the risks and the benefits of the procedure and a written consent was signed.    The patient's right hip was prepped with chlorhexidine in sterile fashion.   40 mg of triamcinolone suspension was drawn up into a 5 mL syringe with 4 mL of 1% lidocaine.  Injection was performed using sterile technique.  Under ultrasound guidance a 3.5-inch 22-gauge needle was used to enter the femoracetabular joint.  Anterior approach was used, needle placement was visualized and documented with ultrasound.  Ultrasound visualization was necessary due to decreased joint space in the setting of osteoarthritis.  Injection performed long axis to the probe.  Injection solution visualized within the joint space.  Images were permanently stored for the patient's record.  There were no complications. The patient tolerated the procedure well. There was negligible bleeding.     The patient's left hip was prepped with chlorhexidine in sterile fashion.   40 mg of triamcinolone suspension was drawn up into a 5 mL syringe with 4 mL of 1% lidocaine.  Injection was performed using sterile technique.  Under ultrasound guidance a 3.5-inch 22-gauge needle was used to enter the femoracetabular joint.  Anterior approach was used,  needle placement was visualized and documented with ultrasound.  Ultrasound visualization was necessary due to decreased joint space in the setting of osteoarthritis.  Injection performed long axis to the probe.  Injection solution visualized within the joint space.  Images were permanently stored for the patient's record.  There were no complications. The patient tolerated the procedure well. There was negligible bleeding.     Knee Injections with HA - Ultrasound Guided    The patient's right knee was prepped with chlorhexidine in sterile fashion.   GelOne solution removed from sterile packaging and inspected for integrity.  Injection was performed using sterile technique.  Under ultrasound guidance a 1.5-inch 22-gauge needle was used to enter the superolateral aspect of the knee.  Needle placement was visualized and documented with ultrasound.  Ultrasound visualization was necessary due to decreased joint space in the setting of osteoarthritis.  Images were permanently stored for the patient's record.    The patient's left knee was prepped with chlorhexidine in sterile fashion.   GelOne solution removed from sterile packaging and inspected for integrity.  Injection was performed using sterile technique.  Under ultrasound guidance a 1.5-inch 22-gauge needle was used to enter the superolateral aspect of the knee.  Needle placement was visualized and documented with ultrasound.  Ultrasound visualization was necessary due to decreased joint space in the setting of osteoarthritis.  Images were permanently stored for the patient's record.    There were no complications. The patient tolerated the procedure well. There was negligible bleeding.   The patient was instructed to ice the knee upon leaving clinic and refrain from overuse over the next 3 days.   The patient was instructed to call or go to the emergency room with any unusual pain, swelling, redness, or if otherwise concerned.

## 2020-11-05 NOTE — LETTER
11/5/2020         RE: Radha Corbett  [address] [id number]        Dear Colleague,    Thank you for referring your patient, Radha Corbett, to the [facility] [place]. Please see a copy of my visit note below.    CHIEF COMPLAINT:  RECHECK (bilateral hip joint joint cortisone inejction, bilateral knee gel injection )       HISTORY OF PRESENT ILLNESS  Ms. Corbett is a pleasant 69 year old female who presents to clinic today with bilateral hip and bilateral knee osteoarthritis.  Radha is here specifically for injections of her hips and knees.    Radha also has questions regarding a walker, she borrowed a walker from her friend many years ago.  It is getting outdated and is falling into disrepair.  She also has a lump on the lateral side of her left hip.  This is tender to the touch, worse in certain ranges of motion, worse as the day goes on.    Additional history: as documented    MEDICAL HISTORY  Patient Active Problem List   Diagnosis     Macular degeneration     Myopia     Hiatal hernia     IBS (irritable bowel syndrome)     Hypertension goal BP (blood pressure) < 140/90     GERD (gastroesophageal reflux disease)     Atypical ductal hyperplasia of breast     Atypical lobular hyperplasia of breast     Benign Breast Disease (PASH)     Family history of breast cancer in sister     Type 2 diabetes, HbA1C goal < 8% (H)     Hyperlipidemia LDL goal <100     Breast cancer screening-high risk     Tubular adenoma of colon     Osteopenia     Alcohol ingestion, more than 4 drinks/day on alcohol screening     Umbilical hernia     Incontinence of urine     Stool incontinence     Osteoarthritis, knee     Rosacea     Anxiety     Pseudophakia     Abnormal cervical Pap smear with positive HPV DNA test     Depression with anxiety     Retinal detachment     Elevated liver function tests     Rectocele     Unexplained endometrial cells on cervical cytology     Back pain, unspecified back  "location, unspecified back pain laterality, unspecified chronicity     Urge incontinence     Dislocation of shoulder region     Acute lower GI bleeding     Morbid obesity (H)     Moderate episode of recurrent major depressive disorder (H)     Angiodysplasia of colon       Current Outpatient Medications   Medication Sig Dispense Refill     Continuous Blood Gluc  (FREESTYLE MELINA READER) AYAN 1 Units daily 1 Device 0     Continuous Blood Gluc  (FREESTYLE MELINA READER) AYAN 1 Units 3 times daily as needed 1 Device 0     continuous blood glucose monitoring (FREESTYLE MELINA) sensor For use with Freestyle Melina Flash  for continuous monitioring of blood glucose levels. Replace sensor every 10 days. 3 each 5     DULoxetine (CYMBALTA) 60 MG capsule Take 1 capsule (60 mg) by mouth 2 times daily 180 capsule 1     glipiZIDE (GLUCOTROL) 10 MG tablet TAKE 1 TABLET DAILY WITH DINNER 90 tablet 1     insulin NPH (NOVOLIN N VIAL) 100 UNIT/ML injection Inject 64 units Subcutaneous 2 times daily. (gets this from [facility] at [facility])       insulin syringe-needle U-100 (RELION INSULIN SYRINGE) 31G X 5/16\" 1 ML Use 2 syringes daily or as directed. 200 each 3     lisinopril (ZESTRIL) 5 MG tablet Take 1 tablet (5 mg) by mouth daily 90 tablet 1     MELATONIN PO 10 mg At Bedtime        metFORMIN (GLUCOPHAGE-XR) 500 MG 24 hr tablet Take 2 tablets (1,000 mg) by mouth 2 times daily (with meals) For additional refills, please schedule a follow-up appointment with Dr Medeiros at [phone] 360 tablet 0     minocycline (MINOCIN) 100 MG capsule Take 1 capsule (100 mg) by mouth every 12 hours 180 capsule 0     omeprazole (PRILOSEC) 40 MG DR capsule Take 1 capsule (40 mg) by mouth daily 30 to 60 minutes before a meal. 90 capsule 1     ONE TOUCH LANCETS None Entered       ONE TOUCH TEST STRIPS VI None Entered       order for DME Equipment being ordered: Light with four-wheel walker 1 Units 0     order for DME Equipment being " ordered: 4 wheel walker with a seat, light weight. 1 Units 0     order for DME Equipment being ordered: Walker with seat light weight 1 Device 0     simvastatin (ZOCOR) 20 MG tablet TAKE 1 TABLET AT BEDTIME 90 tablet 4     spironolactone (ALDACTONE) 100 MG tablet Take 1 tablet (100 mg) by mouth daily 90 tablet 1     traZODone (DESYREL) 100 MG tablet TAKE 1 TABLET NIGHTLY AS NEEDED FOR SLEEP 90 tablet 1     trimethoprim (TRIMPEX) 100 MG tablet Take 1 tablet (100 mg) by mouth At Bedtime 90 tablet 3     trospium (SANCTURA XR) 60 MG CP24 24 hr capsule Take 1 capsule (60 mg) by mouth every morning 90 capsule 3     vitamin D3 (CHOLECALCIFEROL) 1000 units (25 mcg) tablet Take 1 tablet (1,000 Units) by mouth daily 90 tablet 3       Allergies   Allergen Reactions     Codeine      Sulfa Drugs      Morphine Rash     Does OK with oxycodone.       Family History   Problem Relation Age of Onset     Hypertension Mother      Cerebrovascular Disease Mother      Arthritis Mother      Cardiovascular Mother      Circulatory Mother      Cerebrovascular Disease Father      Prostate Cancer Father 65         at 69     Asthma Brother      Prostate Cancer Brother 48        bone metastasis     Diabetes Sister      Hypertension Sister      Osteoporosis Sister      Depression Sister      Lipids Sister      Cancer Maternal Grandmother 95        spine     Breast Cancer Sister 39        also contralateral @53, mets to lungs     Cancer Sister 20        second uterine cancer in 30s also     Diabetes Sister      Hypertension Sister      Depression Sister      Osteoporosis Sister      Breast Cancer Sister 57        unilateral     Cancer Paternal Aunt 40        unknown type     Cancer Paternal Uncle         stomach;  in his 80s     Prostate Cancer Brother      Glaucoma No family hx of      Melanoma No family hx of      Skin Cancer No family hx of      Macular Degeneration No family hx of        Additional medical/Social/Surgical histories  "reviewed in EPIC and updated as appropriate.        PHYSICAL EXAM  Vitals:    11/05/20 1317   Weight: 88 kg (194 lb)   Height: 1.708 m (5' 7.25\")     General  - normal appearance, in no obvious distress  HEENT  - conjunctivae not injected, moist mucous membranes  CV  - normal femoral pulse  Pulm  - normal respiratory pattern, non-labored  Musculoskeletal - left hip  - stance: markedly antalgic gait, requires assistance to ambulate   - inspection: generalized atrophy of leg muscles  - palpation: tender over the greater trochanter  Neuro  - no sensory or motor deficit, grossly normal coordination, normal muscle tone  Skin  - no ecchymosis, erythema, warmth, or induration, no obvious rash  Psych  - interactive, appropriate, normal mood and affect         ASSESSMENT & PLAN  Ms. Corbett is a 69 year old female who presents to clinic today with bilateral hip and bilateral knee osteoarthritis.    I did perform injection therapy for both of her hips and both of her knees (see procedure note).    With regards to her hips I did perform bilateral corticosteroid injections.  We did discuss that with her history of diabetes her blood sugars will go high.  She is knowledgeable and understanding of this.    I did inject her knees with hyaluronic acid.  This is in part due to the multiple injections that we did today, as she has great difficulty getting to our clinic, and the fact that I would not want to do more than 2 corticosteroid injections due to her diabetes and medical history.    I did write an order for a new walker.  I am also obtaining x-rays of her hips to investigate the lump on the outside of her left hip.    It was a pleasure seeing Radha today.    Juan Jose Carias DO, [facility]  Primary Care Sports Medicine      This note was constructed using Dragon dictation software, please excuse any minor errors in spelling, grammar, or syntax.      Scribed by Becky Pennington ATC for Dr. Carias on 11/5/20 at 1:20 PM, based on the providers " "statements to me.       [facility] FOLLOW-UP VISIT 11/5/2020    Radha Corbett's chief complaint for this visit includes:  Chief Complaint   Patient presents with     RECHECK     bilateral hip joint joint cortisone inejction, bilateral knee gel injection      PCP: Trice Medeiros    Referring Provider:  No referring provider defined for this encounter.    Ht 1.708 m (5' 7.25\")   Wt 88 kg (194 lb)   LMP 11/18/1991 (Exact Date)   BMI 30.16 kg/m    Data Unavailable       Interval History:     Follow up reason: bilateral hip and knee injections     Medical History:    reviewed    Review of Systems:    Do you have fever, chills, weight loss? No    Do you have any vision problems? No    Do you have any chest pain or edema? No    Do you have any shortness of breath or wheezing?  No    Do you have stomach problems? No    Do you have any urinary track issues? Being managed    Do you have any numbness or focal weakness? No    Do you have problems with bleeding or clotting? No    Do you have an rashes or other skin lesions? No    Large Joint Injection/Arthocentesis: bilateral hip joint    Date/Time: 11/5/2020 1:19 PM  Performed by: Juan Jose Carias DO  Authorized by: Juan Jose Carias DO     Indications:  Pain  Needle Size:  22 G  Guidance: ultrasound    Location:  Hip  Laterality:  Bilateral      Site:  Bilateral hip joint  Medications (Right):  40 mg triamcinolone 40 MG/ML  Medications (Left):  40 mg triamcinolone 40 MG/ML  Outcome:  Tolerated well, no immediate complications  Procedure discussed: discussed risks, benefits, and alternatives    Consent Given by:  Patient  Timeout: timeout called immediately prior to procedure    Prep: patient was prepped and draped in usual sterile fashion    Large Joint Injection/Arthocentesis: bilateral knee    Date/Time: 11/5/2020 1:19 PM  Performed by: Juan Jose Carias DO  Authorized by: Juan Jose Carias DO     Indications:  Pain  Needle Size:  22 G  Approach:  " Lateral  Location:  Knee  Laterality:  Bilateral      Medications (Right):  30 mg cross-Linked Hyaluronate 30 MG/3ML  Medications (Left):  30 mg cross-Linked Hyaluronate 30 MG/3ML  Outcome:  Tolerated well, no immediate complications  Procedure discussed: discussed risks, benefits, and alternatives    Consent Given by:  Patient  Timeout: timeout called immediately prior to procedure    Prep: patient was prepped and draped in usual sterile fashion       Intraarticular Hip Injections - Ultrasound Guided    The patient was informed of the risks and the benefits of the procedure and a written consent was signed.    The patient's right hip was prepped with chlorhexidine in sterile fashion.   40 mg of triamcinolone suspension was drawn up into a 5 mL syringe with 4 mL of 1% lidocaine.  Injection was performed using sterile technique.  Under ultrasound guidance a 3.5-inch 22-gauge needle was used to enter the femoracetabular joint.  Anterior approach was used, needle placement was visualized and documented with ultrasound.  Ultrasound visualization was necessary due to decreased joint space in the setting of osteoarthritis.  Injection performed long axis to the probe.  Injection solution visualized within the joint space.  Images were permanently stored for the patient's record.  There were no complications. The patient tolerated the procedure well. There was negligible bleeding.     The patient's left hip was prepped with chlorhexidine in sterile fashion.   40 mg of triamcinolone suspension was drawn up into a 5 mL syringe with 4 mL of 1% lidocaine.  Injection was performed using sterile technique.  Under ultrasound guidance a 3.5-inch 22-gauge needle was used to enter the femoracetabular joint.  Anterior approach was used, needle placement was visualized and documented with ultrasound.  Ultrasound visualization was necessary due to decreased joint space in the setting of osteoarthritis.  Injection performed long axis to  the probe.  Injection solution visualized within the joint space.  Images were permanently stored for the patient's record.  There were no complications. The patient tolerated the procedure well. There was negligible bleeding.     Knee Injections with HA - Ultrasound Guided    The patient's right knee was prepped with chlorhexidine in sterile fashion.   GelOne solution removed from sterile packaging and inspected for integrity.  Injection was performed using sterile technique.  Under ultrasound guidance a 1.5-inch 22-gauge needle was used to enter the superolateral aspect of the knee.  Needle placement was visualized and documented with ultrasound.  Ultrasound visualization was necessary due to decreased joint space in the setting of osteoarthritis.  Images were permanently stored for the patient's record.    The patient's left knee was prepped with chlorhexidine in sterile fashion.   GelOne solution removed from sterile packaging and inspected for integrity.  Injection was performed using sterile technique.  Under ultrasound guidance a 1.5-inch 22-gauge needle was used to enter the superolateral aspect of the knee.  Needle placement was visualized and documented with ultrasound.  Ultrasound visualization was necessary due to decreased joint space in the setting of osteoarthritis.  Images were permanently stored for the patient's record.    There were no complications. The patient tolerated the procedure well. There was negligible bleeding.   The patient was instructed to ice the knee upon leaving clinic and refrain from overuse over the next 3 days.   The patient was instructed to call or go to the emergency room with any unusual pain, swelling, redness, or if otherwise concerned.                    Again, thank you for allowing me to participate in the care of your patient.        Sincerely,        Juan Jose Carias DO

## 2020-11-06 RX ORDER — TRIAMCINOLONE ACETONIDE 40 MG/ML
40 INJECTION, SUSPENSION INTRA-ARTICULAR; INTRAMUSCULAR
Status: SHIPPED | OUTPATIENT
Start: 2020-11-05

## 2020-12-10 ENCOUNTER — MEDICAL CORRESPONDENCE (OUTPATIENT)
Dept: HEALTH INFORMATION MANAGEMENT | Facility: CLINIC | Age: 70
End: 2020-12-10

## 2020-12-16 ENCOUNTER — TELEPHONE (OUTPATIENT)
Dept: ORTHOPEDICS | Facility: CLINIC | Age: 70
End: 2020-12-16

## 2020-12-16 DIAGNOSIS — F41.8 DEPRESSION WITH ANXIETY: ICD-10-CM

## 2020-12-16 DIAGNOSIS — E78.5 HYPERLIPIDEMIA LDL GOAL <100: ICD-10-CM

## 2020-12-16 NOTE — TELEPHONE ENCOUNTER
[facility] Call Center    Phone Message    May a detailed message be left on voicemail: yes     Reason for Call: Other: pt calling about her walker and [facility], they need update on CWO  with proper date, please advise with her     Action Taken: Message routed to:  Adult Clinics: Sports Medicine p [phone]    Travel Screening: Not Applicable

## 2020-12-17 RX ORDER — DULOXETIN HYDROCHLORIDE 60 MG/1
60 CAPSULE, DELAYED RELEASE ORAL 2 TIMES DAILY
Qty: 180 CAPSULE | Refills: 0 | Status: SHIPPED | OUTPATIENT
Start: 2020-12-17 | End: 2021-05-25

## 2020-12-17 NOTE — TELEPHONE ENCOUNTER
DULoxetine (CYMBALTA) 60 MG capsule  Last Written Prescription Date:  1/7/20  Last Fill Quantity: 180,   # refills: 1  Last Office Visit : 10/19/20  [facility]  Future Office visit:  1/19/21    High med alert    Warnings Override History for DULoxetine (CYMBALTA) 60 MG capsule [[id number]]    Overridden by Shauna Palencia RN on Oct 23, 2020 8:35 AM   Drug-Drug   1. TRAZODONE / SEROTONIN/NOREPINEPHRINE REUPTAKE INHIBITORS [Level: Major] [Reason: Tolerated medication/side effects in past]   Other Orders: traZODone (DESYREL) 100 MG tablet              Routing refill request to provider for review/approval because:  phq9       97.3

## 2020-12-21 ENCOUNTER — TELEPHONE (OUTPATIENT)
Dept: PEDIATRICS | Facility: CLINIC | Age: 70
End: 2020-12-21

## 2020-12-21 DIAGNOSIS — E11.65 TYPE 2 DIABETES MELLITUS WITH HYPERGLYCEMIA, WITH LONG-TERM CURRENT USE OF INSULIN (H): ICD-10-CM

## 2020-12-21 DIAGNOSIS — Z79.4 TYPE 2 DIABETES MELLITUS WITH HYPERGLYCEMIA, WITH LONG-TERM CURRENT USE OF INSULIN (H): ICD-10-CM

## 2020-12-21 RX ORDER — SIMVASTATIN 20 MG
20 TABLET ORAL AT BEDTIME
Qty: 90 TABLET | Refills: 3 | Status: SHIPPED | OUTPATIENT
Start: 2020-12-21 | End: 2021-12-18

## 2020-12-21 NOTE — TELEPHONE ENCOUNTER
[facility] Call Center    Phone Message    May a detailed message be left on voicemail: yes     Reason for Call: Patient states scripts never were sent into [facility] for refills.  She states she needs all of her medications refills. Patient states she will be out of some of the medications by the end of this week. Please call patient back to discuss.  Thank you.     [facility] - [place], [place] - [facility]    Action Taken: Message routed to:  Primary Care p [id number]    Travel Screening: Not Applicable

## 2020-12-23 RX ORDER — METFORMIN HCL 500 MG
1000 TABLET, EXTENDED RELEASE 24 HR ORAL 2 TIMES DAILY WITH MEALS
Qty: 360 TABLET | Refills: 0 | Status: SHIPPED | OUTPATIENT
Start: 2020-12-23 | End: 2021-04-28

## 2020-12-23 NOTE — TELEPHONE ENCOUNTER
Patient contacted, informed that per our records, [facility] should have all medications on file or recently sent - Simvastatin sent 12/21/2020 and Duloxetine sent 12/17/2020.  Patient inquired about Metformin, advised that it appears that she should not be due for a refill for another month.  Per review of chart, patient has PE scheduled with Dr. Medeiros on 1/19/2021.  Sent one additional refill to [facility] per protocol.  Reminded to review My Chart for questionnaires to be completed.  Patient to return call if she finds that [facility] does not have prescriptions for any medications that she is in need of.    Shauna Palencia RN

## 2021-02-21 ENCOUNTER — HEALTH MAINTENANCE LETTER (OUTPATIENT)
Age: 71
End: 2021-02-21

## 2021-03-31 DIAGNOSIS — L70.0 ACNE VULGARIS: ICD-10-CM

## 2021-03-31 NOTE — TELEPHONE ENCOUNTER
Patient is calling for a refill on     Minocycline (MINOCIN) 100 MG capsule    [facility] mail order pharmacy.    TANI Herrera    minocycline (MINOCIN) 100 MG capsule  Last Written Prescription Date:  6/1/2020  Last Fill Quantity: 180,  # refills: 0   Last office visit: Visit date not found with prescribing provider:  kavitha 10/19/2020   Future Office Visit:

## 2021-03-31 NOTE — TELEPHONE ENCOUNTER
"Requested Prescriptions   Pending Prescriptions Disp Refills    minocycline (MINOCIN) 100 MG capsule 180 capsule 0     Sig: Take 1 capsule (100 mg) by mouth every 12 hours       Oral Acne/Rosacea Medications Protocol Failed - 3/31/2021  1:56 PM        Failed - Recent (12 mo) or future (30 days) visit within the authorizing provider's specialty     Patient has had an office visit with the authorizing provider or a provider within the authorizing providers department within the previous 12 mos or has a future within next 30 days. See \"Patient Info\" tab in inbasket, or \"Choose Columns\" in Meds & Orders section of the refill encounter.              Failed - Confirmation of diagnosis is required     Please confirm diagnosis is acne or rosacea.     If NOT acne or rosacea; refer request to provider for further evaluation.    If diagnosis IS acne or rosacea, OK to refill BASED ON PREVIOUS REFILL CLINICAL NOTE RECOMMENDATION.          Passed - Patient is 12 years of age or older        Passed - Medication is active on med list        Passed - No active pregnancy on record        Passed - No positive prenancy test is past 12 months             "

## 2021-04-02 RX ORDER — MINOCYCLINE HYDROCHLORIDE 100 MG/1
100 CAPSULE ORAL EVERY 12 HOURS
Qty: 180 CAPSULE | Refills: 0 | Status: SHIPPED | OUTPATIENT
Start: 2021-04-02 | End: 2021-06-11

## 2021-04-12 ENCOUNTER — TELEPHONE (OUTPATIENT)
Dept: FAMILY MEDICINE | Facility: CLINIC | Age: 71
End: 2021-04-12

## 2021-04-12 NOTE — TELEPHONE ENCOUNTER
Patient Quality Outreach      Summary:    Patient has the following on her problem list/HM:     Depression / Dysthymia review    6 Month Remission: 4-8 month window range:   12 Month Remission: 10-14 month window range:        PHQ-9 SCORE 9/27/2019 1/7/2020 10/19/2020   PHQ-9 Total Score - - -   PHQ-9 Total Score MyChart - - -   PHQ-9 Total Score 10 13 12       If PHQ-9 recheck is 5 or more, route to provider for next steps.    Patient is due/failing the following:   PHQ-9 Needed    Type of outreach:    Sent MyChart message.    Questions for provider review:    None                                                                                                                                     Catherine Rosales

## 2021-04-27 DIAGNOSIS — N39.0 RECURRENT UTI: ICD-10-CM

## 2021-04-27 DIAGNOSIS — E11.65 TYPE 2 DIABETES MELLITUS WITH HYPERGLYCEMIA, WITH LONG-TERM CURRENT USE OF INSULIN (H): ICD-10-CM

## 2021-04-27 DIAGNOSIS — Z79.4 TYPE 2 DIABETES MELLITUS WITH HYPERGLYCEMIA, WITH LONG-TERM CURRENT USE OF INSULIN (H): ICD-10-CM

## 2021-04-27 DIAGNOSIS — N32.81 OVERACTIVE BLADDER: ICD-10-CM

## 2021-04-28 RX ORDER — METFORMIN HCL 500 MG
TABLET, EXTENDED RELEASE 24 HR ORAL
Qty: 360 TABLET | Refills: 0 | Status: SHIPPED | OUTPATIENT
Start: 2021-04-28 | End: 2021-06-11

## 2021-04-28 NOTE — TELEPHONE ENCOUNTER
"Routing refill request to provider for review/approval because:  Failed protocol.  No future appointment scheduled. Please advise. Thank you. Mavis Nye R.N.  This refill/encounter is being handled by a team outside your facility.  If action needs to be taken, please route the encounter back to your team that would normally handle it. Please do not send directly back to the sender. Thank you. Mavis Nye R.N.  Requested Prescriptions   Pending Prescriptions Disp Refills    metFORMIN (GLUCOPHAGE-XR) 500 MG 24 hr tablet [Pharmacy Med Name: METFORMIN HCL ER TABS 500MG] 360 tablet 3     Sig: TAKE 2 TABLETS TWICE A DAY WITH MEALS (PLEASE KEEP APPOINTMENT WITH DR. RANKIN ON 01/19/2021 FOR FURTHER REFILLS)       Biguanide Agents Failed - 4/27/2021  3:07 PM        Failed - Patient has documented A1c within the specified period of time.     If HgbA1C is 8 or greater, it needs to be on file within the past 3 months.  If less than 8, must be on file within the past 6 months.     Recent Labs   Lab Test 10/19/20  1255   A1C 8.7*             Failed - Recent (6 mo) or future (30 days) visit within the authorizing provider's specialty     Patient had office visit in the last 6 months or has a visit in the next 30 days with authorizing provider or within the authorizing provider's specialty.  See \"Patient Info\" tab in inbasket, or \"Choose Columns\" in Meds & Orders section of the refill encounter.            Passed - Patient is age 10 or older        Passed - Patient's CR is NOT>1.4 OR Patient's EGFR is NOT<45 within past 12 mos.     Recent Labs   Lab Test 10/19/20  1255   GFRESTIMATED >90   GFRESTBLACK >90       Recent Labs   Lab Test 10/19/20  1255   CR 0.53             Passed - Patient does NOT have a diagnosis of CHF.        Passed - Medication is active on med list        Passed - Patient is not pregnant        Passed - Patient has not had a positive pregnancy test within the past 12 mos.                    "

## 2021-04-29 NOTE — TELEPHONE ENCOUNTER
TRIMETHOPRIM TABS 100MG  Last Written Prescription Date:  3/23/2020  Last Fill Quantity: 90,   # refills: 3  Last Office Visit : 10/21/2019  Future Office visit:  None  Routing refill request to provider for review/approval because:  Over due office visit     Oct 2019?  Drug not on the [facility], P or [facility] refill protocol or controlled substance    TROSPIUM CHLORIDE ER CAPS 60MG  Last Written Prescription Date:  3/23/2020  Last Fill Quantity: 90,   # refills: 3  Last Office Visit : 10/21/2019  Future Office visit:  None  Routing refill request to provider for review/approval because:  Over due office visit     Oct 2019?  Refer to clinic for review       Jen Archer RN  Central Triage Red Flags/Med Refills

## 2021-04-29 NOTE — TELEPHONE ENCOUNTER
Refill completed.   Needs follow up appointment with Dr Medeiros her PCP   Needs to be with Dr Medeiros in person as not seen since January 2020

## 2021-05-03 RX ORDER — TROSPIUM CHLORIDE ER 60 MG/1
60 CAPSULE ORAL
Qty: 90 CAPSULE | Refills: 3 | Status: SHIPPED | OUTPATIENT
Start: 2021-05-03 | End: 2022-04-26

## 2021-05-03 RX ORDER — TRIMETHOPRIM 100 MG/1
100 TABLET ORAL AT BEDTIME
Qty: 90 TABLET | Refills: 3 | Status: SHIPPED | OUTPATIENT
Start: 2021-05-03 | End: 2022-06-08

## 2021-05-25 DIAGNOSIS — F41.8 DEPRESSION WITH ANXIETY: ICD-10-CM

## 2021-05-25 RX ORDER — DULOXETIN HYDROCHLORIDE 60 MG/1
CAPSULE, DELAYED RELEASE ORAL
Qty: 180 CAPSULE | Refills: 0 | Status: SHIPPED | OUTPATIENT
Start: 2021-05-25 | End: 2022-05-24

## 2021-05-25 NOTE — TELEPHONE ENCOUNTER
"Requested Prescriptions   Pending Prescriptions Disp Refills    DULoxetine (CYMBALTA) 60 MG capsule [Pharmacy Med Name: DULOXETINE HCL DR CAPS 60MG] 180 capsule 3     Sig: TAKE 1 CAPSULE TWICE A DAY       Serotonin-Norepinephrine Reuptake Inhibitors  Failed - 5/25/2021  1:30 PM        Failed - PHQ-9 score of less than 5 in past 6 months     Please review last PHQ-9 score.           Passed - Blood pressure under 140/90 in past 12 months     BP Readings from Last 3 Encounters:   10/19/20 115/72   01/07/20 (!) 144/85   10/21/19 122/68                 Passed - Medication is active on med list        Passed - Patient is age 18 or older        Passed - No active pregnancy on record        Passed - No positive pregnancy test in past 12 months        Passed - Recent (6 mo) or future (30 days) visit within the authorizing provider's specialty     Patient had office visit in the last 6 months or has a visit in the next 30 days with authorizing provider or within the authorizing provider's specialty.  See \"Patient Info\" tab in inbasket, or \"Choose Columns\" in Meds & Orders section of the refill encounter.               Belle Esquivel RN    "

## 2021-05-25 NOTE — TELEPHONE ENCOUNTER
Will send 90 day refill. Patient has upcoming appointment with PCP in 2 weeks. Please let patient know of the medication drug-drug interaction between trazodone and duloxetine. This interaction may increase the effects of the medications. This should be discussed at the next appointment. It is safe to continue for now.   Thank you,  NIKKY Esquivel, NP-C  [facility]

## 2021-05-26 NOTE — TELEPHONE ENCOUNTER
Called pt and let her know that refill was sent and that she should disucss the drug-drug interaction with Dr. Medeiros on 6/11

## 2021-06-06 ENCOUNTER — HEALTH MAINTENANCE LETTER (OUTPATIENT)
Age: 71
End: 2021-06-06

## 2021-06-10 NOTE — TELEPHONE ENCOUNTER
Called patient and informed. Helped her schedule lab appt prior to injection.    Falguni Hammer RN, [facility]     Per Dr Garcias give bactrim ds bid x 14 days. Noted and carried out. Patient made aware.

## 2021-06-11 ENCOUNTER — OFFICE VISIT (OUTPATIENT)
Dept: FAMILY MEDICINE | Facility: CLINIC | Age: 71
End: 2021-06-11
Payer: COMMERCIAL

## 2021-06-11 VITALS
OXYGEN SATURATION: 96 % | HEART RATE: 119 BPM | BODY MASS INDEX: 27.98 KG/M2 | WEIGHT: 184.6 LBS | TEMPERATURE: 98.8 F | HEIGHT: 68 IN | DIASTOLIC BLOOD PRESSURE: 68 MMHG | RESPIRATION RATE: 16 BRPM | SYSTOLIC BLOOD PRESSURE: 116 MMHG

## 2021-06-11 DIAGNOSIS — F33.42 RECURRENT MAJOR DEPRESSIVE DISORDER, IN FULL REMISSION (H): ICD-10-CM

## 2021-06-11 DIAGNOSIS — F17.200 TOBACCO USE DISORDER: ICD-10-CM

## 2021-06-11 DIAGNOSIS — K21.9 GASTROESOPHAGEAL REFLUX DISEASE WITHOUT ESOPHAGITIS: ICD-10-CM

## 2021-06-11 DIAGNOSIS — E11.65 TYPE 2 DIABETES MELLITUS WITH HYPERGLYCEMIA, WITH LONG-TERM CURRENT USE OF INSULIN (H): ICD-10-CM

## 2021-06-11 DIAGNOSIS — R63.4 WEIGHT LOSS: ICD-10-CM

## 2021-06-11 DIAGNOSIS — E78.5 HYPERLIPIDEMIA LDL GOAL <100: ICD-10-CM

## 2021-06-11 DIAGNOSIS — Z00.00 ENCOUNTER FOR MEDICARE ANNUAL WELLNESS EXAM: Primary | ICD-10-CM

## 2021-06-11 DIAGNOSIS — L70.0 ACNE VULGARIS: ICD-10-CM

## 2021-06-11 DIAGNOSIS — I10 HYPERTENSION GOAL BP (BLOOD PRESSURE) < 140/90: Chronic | ICD-10-CM

## 2021-06-11 DIAGNOSIS — Z79.4 TYPE 2 DIABETES MELLITUS WITH HYPERGLYCEMIA, WITH LONG-TERM CURRENT USE OF INSULIN (H): ICD-10-CM

## 2021-06-11 PROBLEM — E66.01 MORBID OBESITY (H): Status: RESOLVED | Noted: 2018-06-22 | Resolved: 2021-06-11

## 2021-06-11 LAB
ANION GAP SERPL CALCULATED.3IONS-SCNC: 11 MMOL/L (ref 3–14)
BUN SERPL-MCNC: 13 MG/DL (ref 7–30)
CALCIUM SERPL-MCNC: 9 MG/DL (ref 8.5–10.1)
CHLORIDE SERPL-SCNC: 103 MMOL/L (ref 94–109)
CO2 SERPL-SCNC: 22 MMOL/L (ref 20–32)
CREAT SERPL-MCNC: 0.66 MG/DL (ref 0.52–1.04)
GFR SERPL CREATININE-BSD FRML MDRD: 89 ML/MIN/{1.73_M2}
GLUCOSE SERPL-MCNC: 178 MG/DL (ref 70–99)
HBA1C MFR BLD: 8.4 % (ref 0–5.6)
POTASSIUM SERPL-SCNC: 4.2 MMOL/L (ref 3.4–5.3)
SODIUM SERPL-SCNC: 136 MMOL/L (ref 133–144)
TSH SERPL DL<=0.005 MIU/L-ACNC: 0.5 MU/L (ref 0.4–4)

## 2021-06-11 PROCEDURE — 84443 ASSAY THYROID STIM HORMONE: CPT | Performed by: INTERNAL MEDICINE

## 2021-06-11 PROCEDURE — 36415 COLL VENOUS BLD VENIPUNCTURE: CPT | Performed by: INTERNAL MEDICINE

## 2021-06-11 PROCEDURE — 80048 BASIC METABOLIC PNL TOTAL CA: CPT | Performed by: INTERNAL MEDICINE

## 2021-06-11 PROCEDURE — G0439 PPPS, SUBSEQ VISIT: HCPCS | Performed by: INTERNAL MEDICINE

## 2021-06-11 PROCEDURE — 83036 HEMOGLOBIN GLYCOSYLATED A1C: CPT | Performed by: INTERNAL MEDICINE

## 2021-06-11 PROCEDURE — 99214 OFFICE O/P EST MOD 30 MIN: CPT | Mod: 25 | Performed by: INTERNAL MEDICINE

## 2021-06-11 RX ORDER — METFORMIN HCL 500 MG
TABLET, EXTENDED RELEASE 24 HR ORAL
Qty: 360 TABLET | Refills: 1 | Status: SHIPPED | OUTPATIENT
Start: 2021-06-11 | End: 2021-12-18

## 2021-06-11 RX ORDER — DULOXETIN HYDROCHLORIDE 30 MG/1
30 CAPSULE, DELAYED RELEASE ORAL 2 TIMES DAILY
Qty: 30 CAPSULE | Refills: 0 | Status: SHIPPED | OUTPATIENT
Start: 2021-06-11 | End: 2022-05-24

## 2021-06-11 RX ORDER — OMEPRAZOLE 40 MG/1
40 CAPSULE, DELAYED RELEASE ORAL DAILY
Qty: 90 CAPSULE | Refills: 3 | Status: SHIPPED | OUTPATIENT
Start: 2021-06-11 | End: 2022-05-24

## 2021-06-11 RX ORDER — TRAZODONE HYDROCHLORIDE 100 MG/1
TABLET ORAL
Qty: 90 TABLET | Refills: 3 | Status: SHIPPED | OUTPATIENT
Start: 2021-06-11 | End: 2022-05-24

## 2021-06-11 RX ORDER — LISINOPRIL 5 MG/1
5 TABLET ORAL DAILY
Qty: 90 TABLET | Refills: 3 | Status: SHIPPED | OUTPATIENT
Start: 2021-06-11 | End: 2022-09-21

## 2021-06-11 RX ORDER — SPIRONOLACTONE 100 MG/1
100 TABLET, FILM COATED ORAL DAILY
Qty: 90 TABLET | Refills: 3 | Status: SHIPPED | OUTPATIENT
Start: 2021-06-11 | End: 2022-05-24

## 2021-06-11 RX ORDER — BLOOD-GLUCOSE METER
EACH MISCELLANEOUS
Qty: 1 KIT | Refills: 0 | Status: SHIPPED | OUTPATIENT
Start: 2021-06-11 | End: 2023-12-22

## 2021-06-11 ASSESSMENT — ANXIETY QUESTIONNAIRES
4. TROUBLE RELAXING: NOT AT ALL
7. FEELING AFRAID AS IF SOMETHING AWFUL MIGHT HAPPEN: SEVERAL DAYS
GAD7 TOTAL SCORE: 4
GAD7 TOTAL SCORE: 4
3. WORRYING TOO MUCH ABOUT DIFFERENT THINGS: SEVERAL DAYS
5. BEING SO RESTLESS THAT IT IS HARD TO SIT STILL: SEVERAL DAYS
GAD7 TOTAL SCORE: 4
7. FEELING AFRAID AS IF SOMETHING AWFUL MIGHT HAPPEN: SEVERAL DAYS
6. BECOMING EASILY ANNOYED OR IRRITABLE: NOT AT ALL
1. FEELING NERVOUS, ANXIOUS, OR ON EDGE: NOT AT ALL
2. NOT BEING ABLE TO STOP OR CONTROL WORRYING: SEVERAL DAYS

## 2021-06-11 ASSESSMENT — ENCOUNTER SYMPTOMS
WEAKNESS: 1
NERVOUS/ANXIOUS: 0
SHORTNESS OF BREATH: 0
HEADACHES: 0
CHILLS: 0
SORE THROAT: 0
HEARTBURN: 0
BREAST MASS: 0
DIARRHEA: 0
NAUSEA: 0
MYALGIAS: 1
HEMATOCHEZIA: 0
ARTHRALGIAS: 1
DIZZINESS: 0
FEVER: 0
JOINT SWELLING: 0
COUGH: 0
EYE PAIN: 0
PALPITATIONS: 0
PARESTHESIAS: 0
HEMATURIA: 0
FREQUENCY: 0
DYSURIA: 0
CONSTIPATION: 0
ABDOMINAL PAIN: 0

## 2021-06-11 ASSESSMENT — PATIENT HEALTH QUESTIONNAIRE - PHQ9
SUM OF ALL RESPONSES TO PHQ QUESTIONS 1-9: 3
10. IF YOU CHECKED OFF ANY PROBLEMS, HOW DIFFICULT HAVE THESE PROBLEMS MADE IT FOR YOU TO DO YOUR WORK, TAKE CARE OF THINGS AT HOME, OR GET ALONG WITH OTHER PEOPLE: VERY DIFFICULT
SUM OF ALL RESPONSES TO PHQ QUESTIONS 1-9: 3

## 2021-06-11 ASSESSMENT — ACTIVITIES OF DAILY LIVING (ADL)
CURRENT_FUNCTION: TRANSPORTATION REQUIRES ASSISTANCE
CURRENT_FUNCTION: HOUSEWORK REQUIRES ASSISTANCE
CURRENT_FUNCTION: SHOPPING REQUIRES ASSISTANCE
CURRENT_FUNCTION: LAUNDRY REQUIRES ASSISTANCE
CURRENT_FUNCTION: PREPARING MEALS REQUIRES ASSISTANCE

## 2021-06-11 ASSESSMENT — MIFFLIN-ST. JEOR: SCORE: 1401.87

## 2021-06-11 NOTE — PATIENT INSTRUCTIONS
1. Follow up with Dr. Carias for your hips and knees.  2. Schedule fasting lipids a MG clinic at your convenience.       Patient Education   Personalized Prevention Plan  You are due for the preventive services outlined below.  Your care team is available to assist you in scheduling these services.  If you have already completed any of these items, please share that information with your care team to update in your medical record.  Health Maintenance Due   Topic Date Due     ANNUAL REVIEW OF HM ORDERS  Never done     Zoster (Shingles) Vaccine (2 of 3) 08/05/2014     Diabetic Foot Exam  06/14/2019     Kidney Microalbumin Urine Test  09/27/2020     Eye Exam  12/02/2020     Annual Wellness Visit  01/07/2021     Cholesterol Lab  01/07/2021     FALL RISK ASSESSMENT  01/07/2021     A1C Lab  04/19/2021     Depression Assessment  04/19/2021

## 2021-06-11 NOTE — PROGRESS NOTES
"SUBJECTIVE:   Radha Corbett is a 70 year old female who presents for Preventive Visit.      Patient has been advised of split billing requirements and indicates understanding: Yes   Are you in the first 12 months of your Medicare coverage?  No    Radha has Hypertension goal BP (blood pressure) < 140/90; Atypical ductal hyperplasia of breast; Benign Breast Disease (PASH); Type 2 diabetes, HbA1C goal < 8% (H); Breast cancer screening-high risk; Tubular adenoma of colon; Abnormal cervical Pap smear with positive HPV DNA test; and Angiodysplasia of colon on their pertinent problem list.     Since COVID started, she stopped going out and stopped getting NPH from [employer]. She is only taking Mefformin and glipizide. She stopped checking her glucose too. She has lost 20 lbs since covid started.     She and her  will not go into [employer] no matter what.   She has not been checking glucose. Her glucometer broke.     She is not depressed. She will never hurt herself. She wants to get off Duloxetine. It was prescribed as 60 mg bid but she is only taking once a day. She needs trazodone to help her sleep.     Bilateral hip pain and bilateral knee pain. Working with sports medicine. Has visit coming up. Considering another injection.     Healthy Habits:     In general, how would you rate your overall health?  Poor    Frequency of exercise:  None    Do you usually eat at least 4 servings of fruit and vegetables a day, include whole grains    & fiber and avoid regularly eating high fat or \"junk\" foods?  Yes    Taking medications regularly:  Yes    Ability to successfully perform activities of daily living:  Transportation requires assistance, shopping requires assistance, preparing meals requires assistance, housework requires assistance and laundry requires assistance    Home Safety:  No safety concerns identified    Hearing Impairment:  No hearing concerns    In the past 6 months, have you been bothered by leaking of urine? " Yes    In general, how would you rate your overall mental or emotional health?  Good      PHQ-2 Total Score: 1    Additional concerns today:  No    Do you feel safe in your environment? Yes    Have you ever done Advance Care Planning? (For example, a Health Directive, POLST, or a discussion with a medical provider or your loved ones about your wishes): No, advance care planning information given to patient to review.  Advanced care planning was discussed at today's visit.     Annual Exam:  If you checked off any problems, how difficult have these problems made it for you to do your work, take care of things at home, or get along with other people?: Very difficult  PHQ9 TOTAL SCORE: 3  TARAS 7 TOTAL SCORE: 4    Fall risk  Fallen 2 or more times in the past year?: (P) No  Any fall with injury in the past year?: (P) No    Cognitive Screening   1) Repeat 3 items (Leader, Season, Table)    2) Clock draw: NORMAL  3) 3 item recall: Recalls 3 objects  Results: 3 items recalled: COGNITIVE IMPAIRMENT LESS LIKELY    Mini-CogTM Copyright [employer]. Licensed by the author for use in [facility] [employer]; reprinted with permission (aida@[facility]). All rights reserved.      Do you have sleep apnea, excessive snoring or daytime drowsiness?: yes    Reviewed and updated as needed this visit by clinical staff  Tobacco  Allergies  Meds  Problems  Med Hx  Surg Hx  Fam Hx  Soc Hx          Reviewed and updated as needed this visit by Provider   Allergies  Meds  Problems            Social History     Tobacco Use     Smoking status: Current Every Day Smoker     Packs/day: 1.00     Years: 20.00     Pack years: 20.00     Types: Cigarettes     Start date: 11/21/2018     Smokeless tobacco: Never Used     Tobacco comment: 1/2 pack per day   Substance Use Topics     Alcohol use: Yes     Comment: social/3-4 per week         Alcohol Use 6/11/2021   Prescreen: >3 drinks/day or >7 drinks/week? Not Applicable   Prescreen: >3 drinks/day or >7  "drinks/week? -     Current providers sharing in care for this patient include:   Patient Care Team:  Trice Medeiros MD PhD as PCP - General (Internal Medicine)  Dada Baltazar MD as MD (Urology)  Noni Mcdaniel, RN as Registered Nurse (Urology)  Trice Medeiros MD PhD as Referring Physician (Internal Medicine)  Perri Carias APRN CNP as Assigned PCP  Keiko Puri MD as Assigned Surgical Provider    The following health maintenance items are reviewed in Epic and correct as of today:  Health Maintenance Due   Topic Date Due     ZOSTER IMMUNIZATION (2 of 3) 08/05/2014     DIABETIC FOOT EXAM  06/14/2019     MICROALBUMIN  09/27/2020     EYE EXAM  12/02/2020     LIPID  01/07/2021     Labs reviewed in [facility]  Mammogram Screening: Mammogram Screening: Recommended mammography every 1-2 years with patient discussion and risk factor consideration        Review of Systems   Constitutional: Negative for chills and fever.   HENT: Negative for congestion, ear pain, hearing loss and sore throat.    Eyes: Negative for pain and visual disturbance.   Respiratory: Negative for cough and shortness of breath.    Cardiovascular: Negative for chest pain, palpitations and peripheral edema.   Gastrointestinal: Negative for abdominal pain, constipation, diarrhea, heartburn, hematochezia and nausea.   Breasts:  Negative for tenderness, breast mass and discharge.   Genitourinary: Positive for pelvic pain and urgency. Negative for dysuria, frequency, genital sores, hematuria, vaginal bleeding and vaginal discharge.   Musculoskeletal: Positive for arthralgias and myalgias. Negative for joint swelling.   Skin: Negative for rash.   Neurological: Positive for weakness. Negative for dizziness, headaches and paresthesias.   Psychiatric/Behavioral: Negative for mood changes. The patient is not nervous/anxious.          OBJECTIVE:   /68   Pulse 119   Temp 98.8  F (37.1  C) (Oral)   Resp 16   Ht 1.721 m (5' 7.75\")   Wt 83.7 kg " "(184 lb 9.6 oz)   LMP 11/18/1991 (Exact Date)   SpO2 96%   BMI 28.28 kg/m   Estimated body mass index is 28.28 kg/m  as calculated from the following:    Height as of this encounter: 1.721 m (5' 7.75\").    Weight as of this encounter: 83.7 kg (184 lb 9.6 oz).  Physical Exam  GENERAL: healthy, alert and no distress, has walker   EYES: Eyes grossly normal to inspection, PERRL and conjunctivae and sclerae normal  HENT: ear canals and TM's normal, nose and mouth without ulcers or lesions  NECK: no adenopathy, no asymmetry, masses, or scars and thyroid normal to palpation  RESP: lungs clear to auscultation - no rales, rhonchi or wheezes  CV: regular rate and rhythm, normal S1 S2, no S3 or S4, no murmur, click or rub, no peripheral edema and peripheral pulses strong  ABDOMEN: soft, nontender, no hepatosplenomegaly, no masses and bowel sounds normal  MS: no gross musculoskeletal defects noted, no edema  NEURO: Normal strength and tone, mentation intact and speech normal  PSYCH: mentation appears normal, affect normal/bright    Diagnostic Test Results:  pending    ASSESSMENT / PLAN:   Radha was seen today for physical.    Diagnoses and all orders for this visit:    Encounter for Medicare annual wellness exam    Weight loss  Comments:  check TSH  Orders:  -     TSH    Hypertension goal BP (blood pressure) < 140/90  Comments:  controlled  Orders:  -     Basic metabolic panel  -     Cancel: Albumin Random Urine Quantitative with Creat Ratio    Type 2 diabetes mellitus with hyperglycemia, with long-term current use of insulin (H)  Comments:  due for A1c  Orders:  -     Cancel: Hemoglobin A1c  -     metFORMIN (GLUCOPHAGE-XR) 500 MG 24 hr tablet; TAKE 2 TABLETS TWICE A DAY WITH MEALS (PLEASE KEEP APPOINTMENT WITH DR. RANKIN ON 01/19/2021 FOR FURTHER REFILLS)  -     blood glucose monitoring (ONE TOUCH ULTRA 2) meter device kit; Use to test blood sugar 1 times daily or as directed.    Acne vulgaris  Comments:  long term antibiotic is " "no longer recommended for concern of multiple drug resistance backteria. pt is okay to discontinue minocyclin. removed from med list.     Gastroesophageal reflux disease without esophagitis  -     omeprazole (PRILOSEC) 40 MG DR capsule; Take 1 capsule (40 mg) by mouth daily 30 to 60 minutes before a meal.    Hyperlipidemia LDL goal <100  -     Lipid panel reflex to direct LDL Fasting; Future    Tobacco use disorder    Recurrent major depressive disorder, in full remission (H)  Comments:  taper duloxetin to 30 mg for 2-4 weeks, then stop.     Uncontrolled type 2 diabetes mellitus with stage 3 chronic kidney disease, with long-term current use of insulin (H)    Other orders  -     Hemoglobin A1c  -     DULoxetine (CYMBALTA) 30 MG capsule; Take 1 capsule (30 mg) by mouth 2 times daily  -     lisinopril (ZESTRIL) 5 MG tablet; Take 1 tablet (5 mg) by mouth daily  -     spironolactone (ALDACTONE) 100 MG tablet; Take 1 tablet (100 mg) by mouth daily  -     traZODone (DESYREL) 100 MG tablet; TAKE 1 TABLET NIGHTLY AS NEEDED FOR SLEEP  -     REVIEW OF HEALTH MAINTENANCE PROTOCOL ORDERS        Patient has been advised of split billing requirements and indicates understanding: No  COUNSELING:  Reviewed preventive health counseling, as reflected in patient instructions    Estimated body mass index is 28.28 kg/m  as calculated from the following:    Height as of this encounter: 1.721 m (5' 7.75\").    Weight as of this encounter: 83.7 kg (184 lb 9.6 oz).        She reports that she has been smoking cigarettes. She started smoking about 2 years ago. She has a 20.00 pack-year smoking history. She has never used smokeless tobacco.  Tobacco Cessation Action Plan:   Information offered: Patient not interested at this time      Appropriate preventive services were discussed with this patient, including applicable screening as appropriate for cardiovascular disease, diabetes, osteopenia/osteoporosis, and glaucoma.  As appropriate for " age/gender, discussed screening for colorectal cancer, prostate cancer, breast cancer, and cervical cancer. Checklist reviewing preventive services available has been given to the patient.    Reviewed patients plan of care and provided an AVS. The Complex Care Plan (for patients with higher acuity and needing more deliberate coordination of services) for Radha meets the Care Plan requirement. This Care Plan has been established and reviewed with the Patient.    Counseling Resources:  ATP IV Guidelines  Pooled Cohorts Equation Calculator  Breast Cancer Risk Calculator  Breast Cancer: Medication to Reduce Risk  FRAX Risk Assessment  ICSI Preventive Guidelines  Dietary Guidelines for Americans, 2010  USDA's MyPlate  ASA Prophylaxis  Lung CA Screening    Trice Medeiros MD PhD  [facility]

## 2021-06-12 ASSESSMENT — ANXIETY QUESTIONNAIRES: GAD7 TOTAL SCORE: 4

## 2021-06-12 ASSESSMENT — PATIENT HEALTH QUESTIONNAIRE - PHQ9: SUM OF ALL RESPONSES TO PHQ QUESTIONS 1-9: 3

## 2021-06-14 ENCOUNTER — TELEPHONE (OUTPATIENT)
Dept: ORTHOPEDICS | Facility: CLINIC | Age: 71
End: 2021-06-14

## 2021-06-14 NOTE — TELEPHONE ENCOUNTER
Discussed with patient that we are checking with Dr. Black to see if he will do total joint replacements with a spinal block versus general anesthesia.      Dr. Black did reply that he will do these.  Patient would like to start with a consultation with Dr. Black for her right hip.  Patient was handed off to our  with a warm transfer to schedule.

## 2021-06-14 NOTE — TELEPHONE ENCOUNTER
[facility] Call Center    Phone Message    May a detailed message be left on voicemail: yes     Reason for Call: The patient called stating she needs to have both hips and both knees surgically repaired. She doesn't want to have full anesthesia due to memory issues. She is wondering if she can have these done with spinal anesthesia? She states she discussed this with Dr. Trice Medeiros but she was not sure. Please advise. Thank you.    Action Taken: Message routed to:  Adult Clinics: Sports Medicine p [phone]    Travel Screening: Not Applicable

## 2021-06-16 DIAGNOSIS — Z79.4 TYPE 2 DIABETES MELLITUS WITH HYPERGLYCEMIA, WITH LONG-TERM CURRENT USE OF INSULIN (H): ICD-10-CM

## 2021-06-16 DIAGNOSIS — E11.65 TYPE 2 DIABETES MELLITUS WITH HYPERGLYCEMIA, WITH LONG-TERM CURRENT USE OF INSULIN (H): ICD-10-CM

## 2021-06-16 RX ORDER — GLIPIZIDE 10 MG/1
20 TABLET, FILM COATED, EXTENDED RELEASE ORAL DAILY
Qty: 180 TABLET | Refills: 1 | Status: SHIPPED | OUTPATIENT
Start: 2021-06-16 | End: 2022-05-24

## 2021-06-17 NOTE — RESULT ENCOUNTER NOTE
Dear Radha,   Your recent test results showed the following:  --A1c has improved slightly.  Increase glipizide to 20 mg daily.  I sent you a new prescription.  --Return for diabetes follow-up and recheck A1c in 3 months  --TSH is normal.  Electrolyte panel is normal except for the glucose being a little high as expected.    Please call or Mychart to our office if you have further questions.     Trice Medeiros MD-PhD

## 2021-07-06 DIAGNOSIS — M25.559 HIP PAIN: ICD-10-CM

## 2021-07-06 DIAGNOSIS — M25.569 KNEE PAIN: Primary | ICD-10-CM

## 2021-07-09 ENCOUNTER — OFFICE VISIT (OUTPATIENT)
Dept: ORTHOPEDICS | Facility: CLINIC | Age: 71
End: 2021-07-09
Payer: COMMERCIAL

## 2021-07-09 ENCOUNTER — ANCILLARY PROCEDURE (OUTPATIENT)
Dept: GENERAL RADIOLOGY | Facility: CLINIC | Age: 71
End: 2021-07-09
Attending: ORTHOPAEDIC SURGERY
Payer: COMMERCIAL

## 2021-07-09 ENCOUNTER — TELEPHONE (OUTPATIENT)
Dept: ORTHOPEDICS | Facility: CLINIC | Age: 71
End: 2021-07-09

## 2021-07-09 VITALS — DIASTOLIC BLOOD PRESSURE: 71 MMHG | SYSTOLIC BLOOD PRESSURE: 105 MMHG | HEART RATE: 109 BPM

## 2021-07-09 DIAGNOSIS — M25.559 HIP PAIN: ICD-10-CM

## 2021-07-09 DIAGNOSIS — M25.551 RIGHT HIP PAIN: Primary | ICD-10-CM

## 2021-07-09 DIAGNOSIS — M25.569 KNEE PAIN: ICD-10-CM

## 2021-07-09 PROCEDURE — 73523 X-RAY EXAM HIPS BI 5/> VIEWS: CPT | Performed by: RADIOLOGY

## 2021-07-09 PROCEDURE — 99204 OFFICE O/P NEW MOD 45 MIN: CPT | Performed by: ORTHOPAEDIC SURGERY

## 2021-07-09 PROCEDURE — 73564 X-RAY EXAM KNEE 4 OR MORE: CPT | Mod: GC | Performed by: RADIOLOGY

## 2021-07-09 NOTE — LETTER
7/9/2021         RE: Radha Corbett  [address] [id number]        Dear Colleague,    Thank you for referring your patient, Radha Corbett, to the [facility]. Please see a copy of my visit note below.    Assessment: This is a 70 year old with bilateral end-stage OA hips and moderate OA bilateral knees. Her pain is severe and the hips especially are interfering with simple activities of daily living despite long-term, comprehensive non-operative management. We discussed the treatment options including living with it and total hip. We spent twenty minutes discussing total hip arthroplasty.  We discussed the implants, the procedure, the risks and benefits, and the post-operative course.  We discussed blood clots, blood clots to the lungs, injury to blood vessels and nerves, dislocation, infection, and leg length difference. Discussed the increased risks with smoking and that I do not recommenda active smokers have elective total joint. We reviewed templated radiographs.  All the patients questions were answered to the best of my ability.    Plan:  Smoking cessation, Pre-hab PT , PAC, right total hip    Chief Complaint: No chief complaint on file.      Physician:  No ref. provider found    HPI: Radha Corbett is a 70 year old female who presents today for evaluation of her hips     Symptom Profile  Location of symptoms:  Groin  (also with significant knee pain)  Onset: insidious  Trend: getting worse  Duration of symptoms: > 5 years   Quality of symptoms: aching, sharp/stabbing  Severity: severe  Alleviate:activity modification   Exacerbating: activities   Previous Treatments: Previous treatments include activity modification, oral pain medication, intra-articular injections    Current Status:  Results of the patient s Hip Disability and Osteoarthritis Outcome Score (HOOS)  are as follows (0-100 scales with 100 being the theoretical best):  Pain: 35   Symptoms:0  ADLs:25  "  Sports/Recreation:12.5  Quality of Life:0  ([email or web address].nu/)  [facility] Activity Score:1    MEDICAL HISTORY:   Past Medical History:   Diagnosis Date     Actinic cheilitis      Arthritis      DM (diabetes mellitus) (H)      GERD (gastroesophageal reflux disease)      Hiatal hernia      HPV in female      HTN (hypertension)      IBS (irritable bowel syndrome)      Major depression      Myopia      Other and unspecified hyperlipidemia      Pseudoangiomatous stromal hyperplasia of breast 2010    [facility]     Rotator cuff injury 7/2016     Sleep apnea     \"snore\"     Vitamin D deficiency        Medications:     Current Outpatient Medications:      blood glucose monitoring (ONE TOUCH ULTRA 2) meter device kit, Use to test blood sugar 1 times daily or as directed., Disp: 1 kit, Rfl: 0     continuous blood glucose monitoring (FREESTYLE MELINA) sensor, For use with Freestyle Melina Flash  for continuous monitioring of blood glucose levels. Replace sensor every 10 days., Disp: 3 each, Rfl: 5     DULoxetine (CYMBALTA) 30 MG capsule, Take 1 capsule (30 mg) by mouth 2 times daily, Disp: 30 capsule, Rfl: 0     DULoxetine (CYMBALTA) 60 MG capsule, TAKE 1 CAPSULE TWICE A DAY, Disp: 180 capsule, Rfl: 0     glipiZIDE (GLUCOTROL XL) 10 MG 24 hr tablet, Take 2 tablets (20 mg) by mouth daily, Disp: 180 tablet, Rfl: 1     lisinopril (ZESTRIL) 5 MG tablet, Take 1 tablet (5 mg) by mouth daily, Disp: 90 tablet, Rfl: 3     MELATONIN PO, 10 mg At Bedtime , Disp: , Rfl:      metFORMIN (GLUCOPHAGE-XR) 500 MG 24 hr tablet, TAKE 2 TABLETS TWICE A DAY WITH MEALS (PLEASE KEEP APPOINTMENT WITH DR. RANKIN ON 01/19/2021 FOR FURTHER REFILLS), Disp: 360 tablet, Rfl: 1     omeprazole (PRILOSEC) 40 MG DR capsule, Take 1 capsule (40 mg) by mouth daily 30 to 60 minutes before a meal., Disp: 90 capsule, Rfl: 3     ONE TOUCH LANCETS, None Entered, Disp: , Rfl:      ONE TOUCH TEST STRIPS VI, None Entered, Disp: , Rfl:      order for DME, Equipment being ordered: " Light with four-wheel walker, Disp: 1 Units, Rfl: 0     order for DME, Equipment being ordered: 4 wheel walker with a seat, light weight., Disp: 1 Units, Rfl: 0     order for DME, Equipment being ordered: Walker with seat light weight, Disp: 1 Device, Rfl: 0     simvastatin (ZOCOR) 20 MG tablet, Take 1 tablet (20 mg) by mouth At Bedtime, Disp: 90 tablet, Rfl: 3     spironolactone (ALDACTONE) 100 MG tablet, Take 1 tablet (100 mg) by mouth daily, Disp: 90 tablet, Rfl: 3     traZODone (DESYREL) 100 MG tablet, TAKE 1 TABLET NIGHTLY AS NEEDED FOR SLEEP, Disp: 90 tablet, Rfl: 3     trimethoprim (TRIMPEX) 100 MG tablet, Take 1 tablet (100 mg) by mouth At Bedtime, Disp: 90 tablet, Rfl: 3     trospium (SANCTURA XR) 60 MG CP24 24 hr capsule, Take 1 capsule (60 mg) by mouth every morning (before breakfast), Disp: 90 capsule, Rfl: 3     vitamin D3 (CHOLECALCIFEROL) 1000 units (25 mcg) tablet, Take 1 tablet (1,000 Units) by mouth daily, Disp: 90 tablet, Rfl: 3    Current Facility-Administered Medications:      cross-Linked Hyaluronate (GEL-ONE) injection PRSY 30 mg, 30 mg, , , Juan Jose Carias, DO, 30 mg at 11/05/20 1319     cross-Linked Hyaluronate (GEL-ONE) injection PRSY 30 mg, 30 mg, , , Juan Jose Carias Joe, DO, 30 mg at 11/05/20 1319     triamcinolone (KENALOG-40) injection 40 mg, 40 mg, , , Juan Jose Carias Joe, DO, 40 mg at 11/05/20 1319     triamcinolone (KENALOG-40) injection 40 mg, 40 mg, , , Juan Jose Carias Joe, DO, 40 mg at 11/05/20 1319     triamcinolone (KENALOG-40) injection 40 mg, 40 mg, , , Juan Jose Carias Joe, DO, 40 mg at 10/07/19 1615     triamcinolone (KENALOG-40) injection 40 mg, 40 mg, , , Juan Jose Carias Joe, DO, 40 mg at 10/07/19 1615    Facility-Administered Medications Ordered in Other Visits:      gadobutrol (GADAVIST) injection 10 mL, 10 mL, Intravenous, Once, Trice Medeiros MD PhD    Allergies: Codeine, Sulfa drugs, and Morphine    SURGICAL HISTORY:   Past Surgical History:   Procedure Laterality Date     BREAST  LUMPECTOMY, RT/LT      x2, left     CAPSULE/PILL CAM ENDOSCOPY N/A 3/4/2019    Procedure: CAPSULE/PILL CAM ENDOSCOPY;  Surgeon: Sinan Valdes MD;  Location: [facility]     CATARACT IOL, RT/LT  4/99    left, [id number] 7.0D     COLONOSCOPY       COLONOSCOPY N/A 1/22/2016    Procedure: COMBINED COLONOSCOPY, SINGLE OR MULTIPLE BIOPSY/POLYPECTOMY BY BIOPSY;  Surgeon: Praful Benjamin MD;  Location: [facility]     COLONOSCOPY WITH CO2 INSUFFLATION N/A 1/22/2016    Procedure: COLONOSCOPY WITH CO2 INSUFFLATION;  Surgeon: Praful Benjamin MD;  Location: [facility]     COMBINED ESOPHAGOSCOPY, GASTROSCOPY, DUODENOSCOPY (EGD) WITH CO2 INSUFFLATION N/A 11/27/2018    Procedure: COMBINED ESOPHAGOSCOPY, GASTROSCOPY, DUODENOSCOPY (EGD) WITH CO2 INSUFFLATION;  Surgeon: Duane, William Charles, MD;  Location: [facility]     CRYOTHERAPY  2000    cervical     CRYOTHERAPY Left 3/19/2015    Procedure: CRYOTHERAPY;  Surgeon: Keiko Puri MD;  Location:  [facility]     DILATION AND CURETTAGE, HYSTEROSCOPY DIAGNOSTIC, COMBINED N/A 1/19/2016    Procedure: COMBINED DILATION AND CURETTAGE, HYSTEROSCOPY DIAGNOSTIC;  Surgeon: Yeni Aparicio DO;  Location: [facility]     ESOPHAGOSCOPY, GASTROSCOPY, DUODENOSCOPY (EGD), COMBINED N/A 11/27/2018    Procedure: COMBINED ESOPHAGOSCOPY, GASTROSCOPY, DUODENOSCOPY (EGD), BIOPSY SINGLE OR MULTIPLE;  Surgeon: Duane, William Charles, MD;  Location: [facility]     IMPLANT STIMULATOR AND LEADS SACRAL NERVE (STAGE ONE AND TWO) N/A 7/24/2018    Procedure: IMPLANT STIMULATOR AND LEADS SACRAL NERVE (STAGE ONE AND TWO);  Interstim Implant Stage One and Two (Implant Permanent Lead and Generator);  Surgeon: Dada Baltazar MD;  [facility]: [facility]     IMPLANT STIMULATOR AND LEADS SACRAL NERVE (STAGE ONE AND TWO) Right 8/6/2019    Procedure: Replacement of Interstim Implant;  Surgeon: Dada Baltazar MD;  Location: [facility]     IMPLANT STIMULATOR SACRAL NERVE PERCUTANEOUS TRIAL N/A 6/19/2018    Procedure: IMPLANT  STIMULATOR SACRAL NERVE PERCUTANEOUS TRIAL;  Percutaneous Neural Examination (trial for interstim);  Surgeon: Dada Baltazar MD;  Location: [facility]     LAPAROSCOPIC TUBAL LIGATION       PHACOEMULSIFICATION CLEAR CORNEA WITH STANDARD INTRAOCULAR LENS IMPLANT  8/15/2013    Procedure: PHACOEMULSIFICATION CLEAR CORNEA WITH STANDARD INTRAOCULAR LENS IMPLANT;  RIGHT PHACOEMULSIFICATION CLEAR CORNEA WITH STANDARD INTRAOCULAR LENS IMPLANT ;  Surgeon: Trae Taylor MD;  Location:  [facility]     REMOVE STIMULATOR SACRAL NERVE Right 2019    Procedure: Removal of Interstim Implant;  Surgeon: Dada Baltazar MD;  Location: [facility]     SURGICAL HISTORY OF -       x4, ankle surgery       FAMILY HISTORY:   Family History   Problem Relation Age of Onset     Hypertension Mother      Cerebrovascular Disease Mother      Arthritis Mother      Cardiovascular Mother      Circulatory Mother      Cerebrovascular Disease Father      Prostate Cancer Father 65         at 69     Asthma Brother      Prostate Cancer Brother 48        bone metastasis     Diabetes Sister      Hypertension Sister      Osteoporosis Sister      Depression Sister      Lipids Sister      Cancer Maternal Grandmother 95        spine     Breast Cancer Sister 39        also contralateral @53, mets to lungs     Cancer Sister 20        second uterine cancer in 30s also     Diabetes Sister      Hypertension Sister      Depression Sister      Osteoporosis Sister      Breast Cancer Sister 57        unilateral     Cancer Paternal Aunt 40        unknown type     Cancer Paternal Uncle         stomach;  in his 80s     Prostate Cancer Brother      Glaucoma No family hx of      Melanoma No family hx of      Skin Cancer No family hx of      Macular Degeneration No family hx of        SOCIAL HISTORY:   Social History     Tobacco Use     Smoking status: Current Every Day Smoker     Packs/day: 1.00     Years: 20.00     Pack years: 20.00     Types: Cigarettes      Start date: 11/21/2018     Smokeless tobacco: Never Used     Tobacco comment: 1/2 pack per day   Substance Use Topics     Alcohol use: Yes     Comment: social/3-4 per week       REVIEW OF SYSTEMS:  The comprehensive review of systems from the intake form was reviewed with the patient.  No fever, weight change or fatigue. No dry eyes. No oral ulcers, sore throat or voice change. No palpitations, syncope, angina or edema.  No chest pain, excessive sleepiness, shortness of breath or hemoptysis.   No abdominal pain, nausea, vomiting, diarrhea or heartburn.  No skin rash. No focal weakness or numbness. No bleeding or lymphadenopathy. No rhinitis or hives.     Exam:  On physical examination the patient appears the stated age, is in no acute distress, affectThe is appropriate, and breathing is non-labored.  Vitals are documented in the EMR and have been reviewed:    [facility] 11/18/1991 (Exact Date)   Data Unavailable  There is no height or weight on file to calculate BMI.    Rises from chair: with significant   Gait: minimal ability to ambulate independent of walker. Presents in a WC from clinic    RIGHT hip subjective: a little irritated   Abd: 20  Add:10  PFC:  Flexion: 90  IRF:0  ERF:25  Impingement test: ++ groin and this reproduces her symptoms.     Distally, the circulatory, motor, and sensation exam is intact with 5/5 EHL, gastroc-soleus, and tibialis anterior.  Sensation to light touch is intact.  Dorsalis pedis and posterior tibialis pulses are palpable.  There are no sores on the feet, no bruising, and no lymphedema.    X-rays:   Bilateral end-stage osteoarthritis hips with complete loss of joint space and early bone loss and exuberant osteophyte formation.             Again, thank you for allowing me to participate in the care of your patient.        Sincerely,        Home Black MD

## 2021-07-09 NOTE — PATIENT INSTRUCTIONS
[facility]  [address] [address] [place] [address]  Phone [phone]  Fax [phone]    SURGICAL INFORMATION & INSTRUCTIONS  Dr. Home Black  Name of Surgery: Right total hip arthroplasty    Date of Surgery:     If you need to reschedule/schedule your surgery, please contact Brigitte, our surgery scheduler at [place], at [phone].    Arrival Time:     Time of Surgery:      Please arrive early so that we can prepare you for surgery. If you arrive later than your scheduled arrival time, your surgery may be cancelled.  Please note that scheduled times may change, but you will always be notified if there is a change.     Location of Surgery:     ? [facility], [place] [id number] [place], [place] [address] [place] [address], 3rd floor for check-in  Phone [phone]  Fax [phone]  Bring parking ticket with you for validation and reduced parking rate  [email or web address]    Prior to surgery    ? Medical Leave Forms  Please fax any medical leave forms from your employer/school to [phone] (if seen in [place]). It can take up to 5 business days to complete the forms. We will fax them back to the number listed on the forms, if you would like a copy, please let us know and we will mail a copy to you. Do not bring with on day of surgery as the forms may get lost.    ? Call your insurance company  Ask if you need pre-approval for your surgery.  If pre-approval is needed, please call our surgery scheduler for assistance with the pre-approval process.   If you do not have insurance, please let us know.     ? [place] for [facility] (if on [facility])  10 days before surgery, call [phone] to register with the surgery center. Have your insurance card ready.     Total Joint Replacement:  - Joint Replacement Class:  If you are scheduled to have a joint replacement, you (and any family/friends that will be  helping to care for you) are expected to attend an educational class at least two weeks prior to your surgery.  To register for the class please call the Patient Learning Center at [phone] or register online at [email or web address]. Classes are held at multiple locations as well as online.  You must know the date of your surgery before you can register for a class (approximate date OK). If registering online, please select hip or knee as surgery type as shoulder has not been made an option at this time.     o This is also a good opportunity to think about where you would like to have physical therapy after your surgery. You may also be able to set up appointments in advance so that everything is ready to go after surgery.    - Antibiotics Prophylaxis:  Certain procedures such as dental cleaning/work, colonoscopies and other surgeries can cause bacteria to enter the bloodstream.  These bacteria can attach to joint replacement devices.  To reduce this risk, you will need to take antibiotics before you have invasive procedures or dental work.  This is known as antibiotic prophylaxis.    - Dental Visit:  You may want to schedule an appointment with your dentist for a cleaning or needed work before your surgery.  We advise no dental work or cleaning until 6 months after your surgery with implants to hip(s), knee(s), or shoulder(s).  Once you are 6 months past your surgery, you will need to take prophylactic (preventative) antibiotics for the rest of your life before having any dental cleaning or work.  If dental work is needed before surgery, make sure everything is completely healed prior to surgery.  It may cause delays or cancellation of surgery if not healed completely. This is also for any other invasive procedures you may have such as a colonoscopy.  Please notify the clinic if you have any questions.    ? Schedule an appointment with a Primary Care Provider for a Pre-Op Physical.  This should be done  within 30 days of surgery  If you do not have a primary care provider, you may call [facility] at [phone], for an appointment.  Please have your office note and any labs or tests faxed to the facility where you are having surgery. Please be sure to request a copy of your pre-op physical and bring it with you on the day of surgery.      Tell your provider if you have any of the following:   - IF you have a pacemaker or an ICD (implanted cardiac defibrillator). If you do, please bring the ID card with you on the day of surgery  - IF you're a smoker. People who smoke have a higher risk of infection after surgery. Ask your provider how you can quit smoking.  - If you have diabetes, work with your provider to control your blood sugar. If its not well-controlled, we may need to delay surgery (or you may have problems healing afterward).  - If your surgeon asks you to see your dentist: You'll need to complete any dental work before surgery. Your dentist must send a letter to your surgery center saying it's ok to do the surgery.    ? Blood Bank Pre-Admission order (total joint replacement only):    You will need to have a Blood Type and Screen drawn at a [facility] or [facility] location before your surgery.  Please check your form included in your packet to determine if it needs to be done 1-30 days before surgery or 1-3 days before surgery.    ? Pre-Op Phone Call  You will receive a pre-op phone call 1-3 days before surgery to review your eating and drinking restrictions, review medications, and confirm surgery times.      ? 7-10 days BEFORE surgery  ? Stop taking aspirin, Plavix or aspirin products 10 days before surgery or as directed by your doctor.  ? Stop taking non-steroidal anti-inflammatory medications (naproxen/Aleve, ibuprofen/Advil/Motrin, celecoxib/Celebrex, meloxicam/Mobic) 3 days before surgery or as directed by your surgeon.  This will reduce the risk of bleeding during surgery.  ? Stop taking  fish oil (Omega-3-fatty acid) 1 week before surgery.  ? It is OK to take acetaminophen (Tylenol) up until 2 hours prior to surgery.  ? Take prescription medications as directed by your doctor.  Discuss which medications to take or hold prior to your surgery, with your primary care doctor.   ? If you have diabetes, ask your primary care doctor or endocrinologist how you should take your medication(s).    ? COVID-19 Testing Prior to Surgery (see included handout)  o 3-4 days prior to surgery  o Call [phone] or [phone] to schedule  o Please stay at home and out of contact with other people after your COVID test    ? 24 hours BEFORE surgery  Stop drinking alcohol (beer, wine, liquor) at least 24-hours before and after your surgery.     ? Evening BEFORE surgery  - You may eat a normal meal the night before surgery, but eat nothing after midnight.     - Take a shower - to help wash away bacteria (germs) from your skin.  It s normal to have bacteria on your skin and skin protects us from these germs.  When you have surgery, we cut the skin.  Sometimes germs get into the cuts and cause infection (illness caused by germs).  By following the showering instructions and using the special soap, you will lower the number of germs on your skin.  This decreases your chance of an infection.    - Buy or get 8 ounces of antiseptic surgical soap called 4% CHG.  Common brands of this soap are Hibiclens and Exidine.    - You can find it this soap at your local pharmacy, clinic or retail store.  If you have trouble finding it, ask your pharmacist to help you find the right substitute.  OK to use generic unscented soap if not able to purchase surgical soap.  - Do not shave within 12 inches of your incision (surgical cut) area for at least 3 days before surgery.  Shaving can make small cuts in the skin. This puts you at a higher risk of infection.    Items you will need for each shower:   - Newly washed towel  - 4 ounces of one of  the recommended soaps    Follow these instructions, the evening before surgery  - Wash your hair and body with your regular shampoo and soap. Make sure you rinse the shampoo and soap from your hair and body.  - Using clean hands, apply about 2 ounces of soap gently on your skin from the neck to your toes. Use on your groin area last. Do not use this soap on your face or head. If you get any soap in your eyes, ears or mouth, rinse right away.  - Once the soap has been on your skin for at least 1 minute, rinse off completely and repeat washing with the surgical soap one more time.  - Rinse well and dry off using a clean towel.  If you feel any tingling, itching or other irritation, rinse right away. It is normal to feel some coolness on the skin after using the antiseptic soap. Your skin may feel a bit dry after the shower, but do not use any lotions, creams or moisturizers. Do not use hair spray or other products in your hair.  - Dress in freshly washed clothes or pajamas. Use fresh pillowcases and sheets on your bed.    ? Day of Surgery  - You may drink clear liquids from midnight up to 2 hours before surgery or as directed on your pre-op phone call.  Clear liquids include: Water, Pedialyte, Gatorade, apple juice and liquids you can read through. Please avoid liquids that are red or orange in color.   - Do NOT drink: milk, liquids that have pulp, orange juice, and lemonade or tomato juice.   - Do NOT chew gum, chew tobacco or suck on hard candy.    - Take another shower          Follow these instructions:      - Wash your hair and body with your regular shampoo and soap. Make sure you rinse the shampoo and soap from your hair and body.  - Using clean hands, apply about 2 ounces of soap gently on your skin from the neck to your toes. Use on your groin area last. Do not use this soap on your face or head. If you get any soap in your eyes, ears or mouth, rinse right away.  - Once the soap has been on your skin for at  least 1 minute, rinse off completely and repeat washing with the surgical soap one more time.  - Rinse well and dry off using a clean towel.  If you feel any tingling, itching or other irritation, rinse right away. It is normal to feel some coolness on the skin after using the antiseptic soap. Your skin may feel a bit dry after the shower, but do not use any lotions, creams or moisturizers. Do not use hair spray or other products in your hair.  - Dress in freshly washed clothes.  - Do not wear deodorant, cologne, lotion, makeup, nail polish or jewelry to surgery.    ? If there is any change in your health PRIOR to your surgery, please contact your surgeon's office.  Such as a fever, body aches, fatigue, any flu-like symptoms, rash, or any new injury to related body part.    ? Arrange for someone to drive you home after discharge.    will need to be a responsible adult (18 years or older) that will provide transportation to and from surgery and stay in the waiting room during your surgery. You may not drive yourself or take public transportation to and from surgery.    ? Arrange for someone to stay with you for 24 hours after you go home.   This person must be a responsible adult (18 years or older).    ? Bring these items to the hospital/surgery center:   ? Insurance card(s)  ? Money for parking and co-pays, if needed  ? A list of all the medications you take, including vitamins, minerals, herbs and over the counter medications.    ? A copy of your Advance Health Care Directive, if you have one.  This tells us what treatment(s) you would or would not want, and who would make health care decisions, if you could no longer speak for yourself.    ? A case for glasses, contact lenses, hearing aids or dentures.   ? Your inhaler or CPAP machine, if you use these at home    ? Leave extra cash, jewelry and other valuables at home.         When you arrive for surgery  When you get to the surgery center/hospital, you  will:  - Check in. If you are under age 18, you must be with a parent or legal guardian.  - Sign consent forms, if you haven t already. These forms state that you know the risks and benefits of surgery. When you sign the forms, you give us permission to do the surgery. Do not sign them unless you understand what will happen during and after your surgery. If you have any questions about your surgery, ask to speak with your doctor before you sign the forms. If you don t understand the answers, ask again.  - Receive a copy of the Patient s Bill of Rights. If you do not receive a copy, please ask for one.  - Change into hospital clothes. Your belongings will be placed in a bag. We will return them to you after surgery.  - Meet with the anesthesia provider. He or she will tell you what kind of anesthesia (medicine) will be used to keep you comfortable during surgery.  - Remember: it s okay to remind doctors and nurses to wash their hands before touching you.  - In most cases, your surgeon will use a marker to write his or her initials on the surgery site. This ensures that the exact site is operated on.  - For safety reasons, we will ask you the same questions many times. For example, we may ask your name and birth date over and over again.  - Friends and family can stay with you until it s time for surgery. While you re in surgery, they will be in the waiting area. Please note that cell phones are not allowed in some patient care areas.  - If you have questions about what will happen in the operating room, talk to your care team.  - You will meet with an anesthesiologist, before your surgery.  He or she may reference types of anesthesia commonly used for surgeries:   o General:  This involves the use of an IV for injection of medication and anesthesia. You are put into a sleep and have a breathing tube to assist you with breathing.  o Sedation:  You are asleep, but not so deply that you need a breathing tube.   o Local  "or Regional: a nerve is injected to numb the surgical area.  o Spinal: you are numbed from the waist down with medicine injected into your back.  o Femoral Nerve Block:  Anesthesia injected into the groin of leg which you are having surgery on.      After surgery  We will move you to a recovery room, where we will watch you closely. If you have any pain or discomfort, tell your nurse. He or she will try to make you comfortable.    - We will move you to your hospital room after you are awake and stable. If you having any pain or discomfort, please let your nursing staff know.  - Please wash your hands every time you touch the wound or change bandages or dressings.  - Do not submerge the wound in water for 3 months after surgery.  You may not use a bathtub, hot tub, pool, or lake. Showering is ok. Any open area can be a source of incoming bacteria, which can lead to infection. Keep all dressings clean and dry.   - Remove your post op Aquacel/pressure dressing at 1 week post op. It is important for this to be removed to incision to \"breathe\"as well as minimize skin issues related to the dressing being in contact with your skin for so long (can cause skin tears).   - Elevate your leg(s) as much as possible to help reduce swelling. You will also receive compression stockings for the surgical leg. Please wear these during the day and fully remove them at night. Continue to wear these for approximately 4 weeks after surgery or until your swelling has resolved.  - You may put ice on the surgical area for comfort, keeping ice on area for up to 20 minutes then off for 20 minutes.  You may do this the first few days after surgery to help reduce pain and swelling.    - Drink at least 8-10 glasses of liquid each day to help your body heal.  - Keep your lungs clear by coughing and taking deep breaths every couple hours.  This is especially important the first 48 hours after surgery.  - Typically, you will be able to start driving " once you are fully off narcotics and able to do a the quick stop test (slamming on the brake) with your right leg without experiencing much pain.  - You will start physical therapy while in the hospital. Once you leave the hospital, you will need to continue going to physical therapy to maintain and improve your range of motion and strength. Please call the physical therapy clinic of your choice to set up an appointment within 1 week of being discharged from the hospital.    - Notify your doctor if you have any of the following:   o Fever of 101 F or higher  o Numbness and/or tingling  o Increased pain, redness or swelling  o Drainage from wound  o Prolonged or uncontrolled bleeding  o Difficulty with movement    Range of Motion Restrictions for total hip replacement  These are strict for 3 months post surgery, but should be followed for life. As your muscles gain strength, you will notice that you will have more range of motion, but your risk for dislocating remains. See patient education packet for details  - NO bending past 90 degrees at the waist (operative side)   - NO crossing your operative leg over or under your non-operative leg  - NO turning your operative leg inward     Follow-up Appointments, in Clinic  If you don't already have an appointment scheduled, please call to set up an appointment at [phone].      ? Nurse Only Appointment (2 weeks post op)  ? Post-Op appointments with provider (6 weeks post op)    Dealing with pain  A nurse will check your comfort level often during your stay. He or she will work with you to manage your pain.  It s expected that you will have pain after surgery.  Our goal is to reduce or minimize pain by:   - Medicine doesn t work the same for everyone. If your medicine isn t working, tell your nurse. There may be other medicines or treatments we can try.  - Medication Refills.  If you need refills on your pain medication, please call the clinic as soon as possible.  It  may take 72-business hours to obtain a refill.  Refills must be picked up at check-in 2, [facility] or mailed to a pharmacy of your choice.    - It is expected that you will wean off the pain medications in a timely manner.   - Constipation is a common side effect of pain medication, decreased activity and anesthesia from surgery.  Take a stool softener as prescribed by your doctor at the time of discharge.  You may also use over the counter medications as needed.  Be sure to increase your fiber (fruits and vegetables) and your water intake.      Please call the clinic at [phone], if you experience any problems or have questions.  If you are having an emergency, always call 911 or seek immediate evaluation at the Emergency Room.    Thank you for selecting [facility] for your care!  ---------------------------------------------      Thanks for coming today.  Ortho/Sports Medicine Clinic  [address] [address] [place] [address]    To schedule future appointments in Ortho Clinic, you may call [phone].    To schedule ordered imaging by your provider:   Call Central Imaging [phone]    To schedule an injection ordered by your provider:  Call [facility] Injection scheduling line: [phone]  [email or web address] available online at:  [email or web address]    Please call if any further questions or concerns ([phone]).  Clinic hours 8 am to 5 pm.    Return to clinic (call) if symptoms worsen or fail to improve.

## 2021-07-09 NOTE — TELEPHONE ENCOUNTER
Procedure: RTHA  Facility: [facility]  Length: 120 minutes  Anesthesia: Choice  Post-op appointments needed: 2 weeks nurse only, 6 weeks with provider only.  Surgery packet/instructions given to patient?  Yes     Will need to wait 2 months to schedule surgery after she stops smoking.  Needs PT before surgery, order placed.  Jo Ann Noel RN

## 2021-07-09 NOTE — NURSING NOTE
Radha Corbett's goals for this visit include:   Chief Complaint   Patient presents with     Right Hip - Pain     Left Hip - Pain     Left Knee - Pain     Right Knee - Pain       She requests these members of her care team be copied on today's visit information:     PCP: Trice Medeiros    Referring Provider:  No referring provider defined for this encounter.    /71 (BP Location: Left arm, Patient Position: Sitting, Cuff Size: Adult Regular)   Pulse 109   LMP 11/18/1991 (Exact Date)     Do you need any medication refills at today's visit? Natacha Cerna, [facility]

## 2021-07-09 NOTE — PROGRESS NOTES
Assessment: This is a 70 year old with bilateral end-stage OA hips and moderate OA bilateral knees. Her pain is severe and the hips especially are interfering with simple activities of daily living despite long-term, comprehensive non-operative management. We discussed the treatment options including living with it and total hip. We spent twenty minutes discussing total hip arthroplasty.  We discussed the implants, the procedure, the risks and benefits, and the post-operative course.  We discussed blood clots, blood clots to the lungs, injury to blood vessels and nerves, dislocation, infection, and leg length difference. Discussed the increased risks with smoking and that I do not recommenda active smokers have elective total joint. We reviewed templated radiographs.  All the patients questions were answered to the best of my ability.    Plan:  Smoking cessation, Pre-hab PT , PAC, right total hip    Chief Complaint: No chief complaint on file.      Physician:  No ref. provider found    HPI: Radha Corbett is a 70 year old female who presents today for evaluation of her hips     Symptom Profile  Location of symptoms:  Groin  (also with significant knee pain)  Onset: insidious  Trend: getting worse  Duration of symptoms: > 5 years   Quality of symptoms: aching, sharp/stabbing  Severity: severe  Alleviate:activity modification   Exacerbating: activities   Previous Treatments: Previous treatments include activity modification, oral pain medication, intra-articular injections    Current Status:  Results of the patient s Hip Disability and Osteoarthritis Outcome Score (HOOS)  are as follows (0-100 scales with 100 being the theoretical best):  Pain: 35   Symptoms:0  ADLs:25   Sports/Recreation:12.5  Quality of Life:0  ([email or web address].[email or web address]/)  [facility] Activity Score:1    MEDICAL HISTORY:   Past Medical History:   Diagnosis Date     Actinic cheilitis      Arthritis      DM (diabetes mellitus) (H)      GERD (gastroesophageal reflux  "disease)      Hiatal hernia      HPV in female      HTN (hypertension)      IBS (irritable bowel syndrome)      Major depression      Myopia      Other and unspecified hyperlipidemia      Pseudoangiomatous stromal hyperplasia of breast 2010    [facility]     Rotator cuff injury 7/2016     Sleep apnea     \"snore\"     Vitamin D deficiency        Medications:     Current Outpatient Medications:      blood glucose monitoring (ONE TOUCH ULTRA 2) meter device kit, Use to test blood sugar 1 times daily or as directed., Disp: 1 kit, Rfl: 0     continuous blood glucose monitoring (FREESTYLE MELINA) sensor, For use with Freestyle Melina Flash  for continuous monitioring of blood glucose levels. Replace sensor every 10 days., Disp: 3 each, Rfl: 5     DULoxetine (CYMBALTA) 30 MG capsule, Take 1 capsule (30 mg) by mouth 2 times daily, Disp: 30 capsule, Rfl: 0     DULoxetine (CYMBALTA) 60 MG capsule, TAKE 1 CAPSULE TWICE A DAY, Disp: 180 capsule, Rfl: 0     glipiZIDE (GLUCOTROL XL) 10 MG 24 hr tablet, Take 2 tablets (20 mg) by mouth daily, Disp: 180 tablet, Rfl: 1     lisinopril (ZESTRIL) 5 MG tablet, Take 1 tablet (5 mg) by mouth daily, Disp: 90 tablet, Rfl: 3     MELATONIN PO, 10 mg At Bedtime , Disp: , Rfl:      metFORMIN (GLUCOPHAGE-XR) 500 MG 24 hr tablet, TAKE 2 TABLETS TWICE A DAY WITH MEALS (PLEASE KEEP APPOINTMENT WITH DR. RANKIN ON 01/19/2021 FOR FURTHER REFILLS), Disp: 360 tablet, Rfl: 1     omeprazole (PRILOSEC) 40 MG DR capsule, Take 1 capsule (40 mg) by mouth daily 30 to 60 minutes before a meal., Disp: 90 capsule, Rfl: 3     ONE TOUCH LANCETS, None Entered, Disp: , Rfl:      ONE TOUCH TEST STRIPS VI, None Entered, Disp: , Rfl:      order for DME, Equipment being ordered: Light with four-wheel walker, Disp: 1 Units, Rfl: 0     order for DME, Equipment being ordered: 4 wheel walker with a seat, light weight., Disp: 1 Units, Rfl: 0     order for DME, Equipment being ordered: Walker with seat light weight, Disp: 1 Device, " Rfl: 0     simvastatin (ZOCOR) 20 MG tablet, Take 1 tablet (20 mg) by mouth At Bedtime, Disp: 90 tablet, Rfl: 3     spironolactone (ALDACTONE) 100 MG tablet, Take 1 tablet (100 mg) by mouth daily, Disp: 90 tablet, Rfl: 3     traZODone (DESYREL) 100 MG tablet, TAKE 1 TABLET NIGHTLY AS NEEDED FOR SLEEP, Disp: 90 tablet, Rfl: 3     trimethoprim (TRIMPEX) 100 MG tablet, Take 1 tablet (100 mg) by mouth At Bedtime, Disp: 90 tablet, Rfl: 3     trospium (SANCTURA XR) 60 MG CP24 24 hr capsule, Take 1 capsule (60 mg) by mouth every morning (before breakfast), Disp: 90 capsule, Rfl: 3     vitamin D3 (CHOLECALCIFEROL) 1000 units (25 mcg) tablet, Take 1 tablet (1,000 Units) by mouth daily, Disp: 90 tablet, Rfl: 3    Current Facility-Administered Medications:      cross-Linked Hyaluronate (GEL-ONE) injection PRSY 30 mg, 30 mg, , , Juan Jose Carias, DO, 30 mg at 11/05/20 1319     cross-Linked Hyaluronate (GEL-ONE) injection PRSY 30 mg, 30 mg, , , Juan Jose Carias Joe, DO, 30 mg at 11/05/20 1319     triamcinolone (KENALOG-40) injection 40 mg, 40 mg, , , Juan Jose Carias Joe, DO, 40 mg at 11/05/20 1319     triamcinolone (KENALOG-40) injection 40 mg, 40 mg, , , Juan Jose Cariasn, DO, 40 mg at 11/05/20 1319     triamcinolone (KENALOG-40) injection 40 mg, 40 mg, , , Juan Jose Cariasn, DO, 40 mg at 10/07/19 1615     triamcinolone (KENALOG-40) injection 40 mg, 40 mg, , , Juan Jose Carias Joe, DO, 40 mg at 10/07/19 1615    Facility-Administered Medications Ordered in Other Visits:      gadobutrol (GADAVIST) injection 10 mL, 10 mL, Intravenous, Once, Trice Medeiros MD PhD    Allergies: Codeine, Sulfa drugs, and Morphine    SURGICAL HISTORY:   Past Surgical History:   Procedure Laterality Date     BREAST LUMPECTOMY, RT/LT      x2, left     CAPSULE/PILL CAM ENDOSCOPY N/A 3/4/2019    Procedure: CAPSULE/PILL CAM ENDOSCOPY;  Surgeon: Sinan Valdes MD;  Location: [facility]     CATARACT IOL, RT/LT  4/99    left, MZ60CD 7.0D     COLONOSCOPY       COLONOSCOPY N/A  1/22/2016    Procedure: COMBINED COLONOSCOPY, SINGLE OR MULTIPLE BIOPSY/POLYPECTOMY BY BIOPSY;  Surgeon: Praful Benjamin MD;  Location: [facility]     COLONOSCOPY WITH CO2 INSUFFLATION N/A 1/22/2016    Procedure: COLONOSCOPY WITH CO2 INSUFFLATION;  Surgeon: Praful Benjamin MD;  Location: [facility]     COMBINED ESOPHAGOSCOPY, GASTROSCOPY, DUODENOSCOPY (EGD) WITH CO2 INSUFFLATION N/A 11/27/2018    Procedure: COMBINED ESOPHAGOSCOPY, GASTROSCOPY, DUODENOSCOPY (EGD) WITH CO2 INSUFFLATION;  Surgeon: Duane, William Charles, MD;  Location: [facility]     CRYOTHERAPY  2000    cervical     CRYOTHERAPY Left 3/19/2015    Procedure: CRYOTHERAPY;  Surgeon: Keiko Puri MD;  Location:  [facility]     DILATION AND CURETTAGE, HYSTEROSCOPY DIAGNOSTIC, COMBINED N/A 1/19/2016    Procedure: COMBINED DILATION AND CURETTAGE, HYSTEROSCOPY DIAGNOSTIC;  Surgeon: Yeni Aparicio DO;  Location: [facility]     ESOPHAGOSCOPY, GASTROSCOPY, DUODENOSCOPY (EGD), COMBINED N/A 11/27/2018    Procedure: COMBINED ESOPHAGOSCOPY, GASTROSCOPY, DUODENOSCOPY (EGD), BIOPSY SINGLE OR MULTIPLE;  Surgeon: Duane, William Charles, MD;  Location: [facility]     IMPLANT STIMULATOR AND LEADS SACRAL NERVE (STAGE ONE AND TWO) N/A 7/24/2018    Procedure: IMPLANT STIMULATOR AND LEADS SACRAL NERVE (STAGE ONE AND TWO);  Interstim Implant Stage One and Two (Implant Permanent Lead and Generator);  Surgeon: Dada Baltazar MD;  [facility]     IMPLANT STIMULATOR AND LEADS SACRAL NERVE (STAGE ONE AND TWO) Right 8/6/2019    Procedure: Replacement of Interstim Implant;  Surgeon: Dada Baltazar MD;  [facility]     IMPLANT STIMULATOR SACRAL NERVE PERCUTANEOUS TRIAL N/A 6/19/2018    Procedure: IMPLANT STIMULATOR SACRAL NERVE PERCUTANEOUS TRIAL;  Percutaneous Neural Examination (trial for interstim);  Surgeon: Dada Baltazar MD;  [facility]     LAPAROSCOPIC TUBAL LIGATION  1981     PHACOEMULSIFICATION CLEAR CORNEA WITH STANDARD INTRAOCULAR LENS  IMPLANT  8/15/2013    Procedure: PHACOEMULSIFICATION CLEAR CORNEA WITH STANDARD INTRAOCULAR LENS IMPLANT;  RIGHT PHACOEMULSIFICATION CLEAR CORNEA WITH STANDARD INTRAOCULAR LENS IMPLANT ;  Surgeon: Trae Taylor MD;  Location:  [facility]     REMOVE STIMULATOR SACRAL NERVE Right 2019    Procedure: Removal of Interstim Implant;  Surgeon: Dada Baltazar MD;  [facility]     SURGICAL HISTORY OF -       x4, ankle surgery       FAMILY HISTORY:   Family History   Problem Relation Age of Onset     Hypertension Mother      Cerebrovascular Disease Mother      Arthritis Mother      Cardiovascular Mother      Circulatory Mother      Cerebrovascular Disease Father      Prostate Cancer Father 65         at 69     Asthma Brother      Prostate Cancer Brother 48        bone metastasis     Diabetes Sister      Hypertension Sister      Osteoporosis Sister      Depression Sister      Lipids Sister      Cancer Maternal Grandmother 95        spine     Breast Cancer Sister 39        also contralateral @53, mets to lungs     Cancer Sister 20        second uterine cancer in 30s also     Diabetes Sister      Hypertension Sister      Depression Sister      Osteoporosis Sister      Breast Cancer Sister 57        unilateral     Cancer Paternal Aunt 40        unknown type     Cancer Paternal Uncle         stomach;  in his 80s     Prostate Cancer Brother      Glaucoma No family hx of      Melanoma No family hx of      Skin Cancer No family hx of      Macular Degeneration No family hx of        SOCIAL HISTORY:   Social History     Tobacco Use     Smoking status: Current Every Day Smoker     Packs/day: 1.00     Years: 20.00     Pack years: 20.00     Types: Cigarettes     Start date: 2018     Smokeless tobacco: Never Used     Tobacco comment: 1/2 pack per day   Substance Use Topics     Alcohol use: Yes     Comment: social/3-4 per week       REVIEW OF SYSTEMS:  The comprehensive review of systems from the intake form  was reviewed with the patient.  No fever, weight change or fatigue. No dry eyes. No oral ulcers, sore throat or voice change. No palpitations, syncope, angina or edema.  No chest pain, excessive sleepiness, shortness of breath or hemoptysis.   No abdominal pain, nausea, vomiting, diarrhea or heartburn.  No skin rash. No focal weakness or numbness. No bleeding or lymphadenopathy. No rhinitis or hives.     Exam:  On physical examination the patient appears the stated age, is in no acute distress, affectThe is appropriate, and breathing is non-labored.  Vitals are documented in the EMR and have been reviewed:    LMP 11/18/1991 (Exact Date)   Data Unavailable  There is no height or weight on file to calculate BMI.    Rises from chair: with significant   Gait: minimal ability to ambulate independent of walker. Presents in a WC from clinic    RIGHT hip subjective: a little irritated   Abd: 20  Add:10  PFC:  Flexion: 90  IRF:0  ERF:25  Impingement test: ++ groin and this reproduces her symptoms.     Distally, the circulatory, motor, and sensation exam is intact with 5/5 EHL, gastroc-soleus, and tibialis anterior.  Sensation to light touch is intact.  Dorsalis pedis and posterior tibialis pulses are palpable.  There are no sores on the feet, no bruising, and no lymphedema.    X-rays:   Bilateral end-stage osteoarthritis hips with complete loss of joint space and early bone loss and exuberant osteophyte formation.

## 2021-07-12 NOTE — TELEPHONE ENCOUNTER
Patient's rights form and contact signed and placed in patient's chart.   Spoke with the patient. She is going to talk with her  and look at the calendar and will call to get surgery scheduled.  Brigitte Amaya, Surgery Scheduling Coordinator

## 2021-07-23 ENCOUNTER — THERAPY VISIT (OUTPATIENT)
Dept: PHYSICAL THERAPY | Facility: CLINIC | Age: 71
End: 2021-07-23
Payer: COMMERCIAL

## 2021-07-23 DIAGNOSIS — M16.11 OSTEOARTHRITIS OF RIGHT HIP, UNSPECIFIED OSTEOARTHRITIS TYPE: Primary | ICD-10-CM

## 2021-07-23 DIAGNOSIS — M25.552 HIP PAIN, LEFT: ICD-10-CM

## 2021-07-23 DIAGNOSIS — M25.551 RIGHT HIP PAIN: ICD-10-CM

## 2021-07-23 PROBLEM — M16.9 OSTEOARTHRITIS OF HIP: Status: ACTIVE | Noted: 2021-07-23

## 2021-07-23 PROCEDURE — 97161 PT EVAL LOW COMPLEX 20 MIN: CPT | Mod: GP | Performed by: PHYSICAL THERAPIST

## 2021-07-23 PROCEDURE — 97110 THERAPEUTIC EXERCISES: CPT | Mod: GP | Performed by: PHYSICAL THERAPIST

## 2021-07-23 ASSESSMENT — ACTIVITIES OF DAILY LIVING (ADL)
TWISTING/PIVOTING_ON_INVOLVED_LEG: UNABLE TO DO
WALKING_15_MINUTES_OR_GREATER: UNABLE TO DO
LIGHT_TO_MODERATE_WORK: UNABLE TO DO
WALKING_UP_STEEP_HILLS: UNABLE TO DO
SITTING_FOR_15_MINUTES: MODERATE DIFFICULTY
HOS_ADL_ITEM_SCORE_TOTAL: 6
WALKING_APPROXIMATELY_10_MINUTES: UNABLE TO DO
STEPPING_UP_AND_DOWN_CURBS: EXTREME DIFFICULTY
HOS_ADL_SCORE(%): 8.82
WALKING_DOWN_STEEP_HILLS: UNABLE TO DO
DEEP_SQUATTING: UNABLE TO DO
STANDING_FOR_15_MINUTES: UNABLE TO DO
GOING_DOWN_1_FLIGHT_OF_STAIRS: UNABLE TO DO
ROLLING_OVER_IN_BED: MODERATE DIFFICULTY
RECREATIONAL_ACTIVITIES: UNABLE TO DO
GETTING_INTO_AND_OUT_OF_A_BATHTUB: UNABLE TO DO
GETTING_INTO_AND_OUT_OF_AN_AVERAGE_CAR: EXTREME DIFFICULTY
HEAVY_WORK: UNABLE TO DO
HOS_ADL_COUNT: 17
HOS_ADL_HIGHEST_POTENTIAL_SCORE: 68
WALKING_INITIALLY: MODERATE DIFFICULTY
PUTTING_ON_SOCKS_AND_SHOES: UNABLE TO DO
GOING_UP_1_FLIGHT_OF_STAIRS: UNABLE TO DO

## 2021-07-23 NOTE — PROGRESS NOTES
[facility] Initial Evaluation -- Lower Extremity    Evaluation Date: July 23, 2021  Radha Corbett is a 70 year old female with a R hip condition.   Referral: Dr. Wayne Pavon mechanical stresses:    Employment status: Retired  Functional disability score: HOS 8.82  VAS score (0-10): 0/10 at rest, 8/10 at worst  Patient goals/expectations:  Improve ROM, strength so that patient will be able to have surgery    HISTORY:    Present symptoms: no symptoms at rest.  Symptoms felt in the R groin when present.  Notes that her R hip feels like it will give way if she is on her feet too long.  She has B medial knee pain and L groin symptoms as well.  Pain quality (sharp/shooting/stabbing/aching/burning/cramping):  Ache, sharp/stabbing    Present since (onset date): Chronic hip pain; MD orders 7/9/2021    Symptoms (improving/unchanging/worsening):  worsening.      Symptoms commenced as a result of: OA   Condition occurred in the following environment: unknown     Symptoms at onset: R groin  Paresthesia (yes/no):  no  Spinal history: yes - fracture of the lumbar spine   Cough/Sneeze (pos/neg):  negative    Constant symptoms: none  Intermittent symptoms: R groin    Symptoms are worse with the following: Always Rising, Always First few steps, Sometimes Standing (10 minute tolerance), Always Walking, Always Stairs, Always Squatting, Always On the move, Sometimes Sleeping (prone/sup/side R/L) - if sleeps on the R side; better if on her back, and Time of day - Sometimes as the day progresses(tends to be worse with WB activity)   Symptoms are better with the following: Sometimes Sitting, Always When still, Sometimes Sleeping (prone/sup/side R/L) - supine and Other-avoiding aggravating activities.    Continued use makes the pain (better/worse/no effect): worse    Disturbed night (yes/no): yes      Pain at rest (yes/no):  Not usually but can be painful at rest  Site (back/hip/knee/ankle/foot):  R hip    Other questions  (swelling/clicking/locking/giving way/falling):  Giving way     Previous episodes: gradual onset of R hip/groin pain/OA  Previous treatments: injections for her hips and knees, activity modification (much more rest/laying down), Tylenol    Specific Questions:  General health (excellent/good/fair/poor):  good  Pertinent medical history includes: Diabetes, High blood pressure, History of fractures, Implanted device, Osteoarthritis, Overweight and Sleep apnea  Medications (nil/NSAIDS/analg/steroids/anticoag/other):  Tylenol, High blood pressure, medication for Diabetes, Melatonin, Cymbalta Omeprazole, Simvastatin, spironolactone, Trazodone-prn    Medical allergies:  Sulfa drugs, Codeine, Morphine  Imaging (none/Xray/MRI/other):  X-rays - Severe degenerative change of the left and right hips with severe  joint space loss with flattening of femoral head contour bilaterally  and prominent osteophytosis bilaterally. Femoral neck cortical  buttressing bilaterally.     Enthesopathic changes of the iliac crests greater trochanters similar  to prior. Partially visualized lumbar spine with degenerative facet  arthropathy. Degenerative changes of the sacroiliac joints.   Recent or major surgery (yes/no):  Not recent  Night pain (yes/no):  no  Accidents (yes/no):  no  Unexplained weight loss (yes/no):  no  Barriers at home: standing, walking, driving  Other red flags: no    Sites for physical examination (back/hip/knee/ankle/foot/other): hip    EXAMINATION    Posture:  Sitting (good/fair/poor): poor    Correction of Posture (better/worse/no effect/NA): no effect  Standing (good/fair/poor): fair  Other observations:  Patient is using a wheelchair in clinic today, has wheeled walker at home    Neurological: (NA/motor/sensory/reflexes/dural): not tested    Extremities (Hip / Knee / Ankle / Foot): R hip    Movement Loss Kobe Mod Min Nil Pain   Flexion     90 deg, groin pain   Extension     0 deg, groin pain   Abduction     20 deg,  groin pain   Adduction     10 deg, groin pain   Internal Rotation     0 deg, groin pain   External Rotation     25 deg, groin/hip pain   Dorsiflexion        Plantarflexion        Inversion        Eversion          Passive Movement (+/- over pressure)/(PDM/ERP):  Pain limits PROM - about the same as above  Resisted Test Response (pain): weak and painful generally 3/5 for the hip.  Good strength for knee flexion/extension with knee pain  Other Tests: none    Spine:  Movement loss: not assessed  Effect of repeated movements:   Effect of static positioning:   Spine testing (not relevant/relevant/secondary problem): not relevant    Provisional Classification (Extremity/Spine):  Extremity - Inconclusive/Other - Structurally Compromised      Principle of Management:   Education:  Discussed trial of physical therapy to advance strength and ROM to help patient prepare for a FLORY.  Should not work through pain.  Try to move around a bit more at home to increase weight bearing tolerance.     Equipment provided:  none  Exercise and dosage:  Strengthening 2 times a day, 10 reps each, hip flexor stretching 10 reps, 2 times a day.  Increase reps with strengthening as tolerated.      ASSESSMENT/PLAN:    Patient is a 70 year old female with right side hip complaints.    Patient has the following significant findings with corresponding treatment plan.                Diagnosis 1:  R hip pain, OA    Pain -  manual therapy, self management, education, directional preference exercise, home program and modalities as needed  Decreased ROM/flexibility - manual therapy, therapeutic exercise and home program  Decreased joint mobility - manual therapy, therapeutic exercise and home program  Decreased strength - therapeutic exercise, therapeutic activities and home program  Decreased proprioception - neuro re-education, therapeutic activities and home program  Impaired gait - home program and therapeutic activity  Impaired muscle performance -  neuro re-education and home program  Decreased function - therapeutic activities and home program    Therapy Evaluation Codes:   1) History comprised of:   Personal factors that impact the plan of care:      Overall behavior pattern, Time since onset of symptoms and patient is fearful to move as does not want to flare her pain..    Comorbidity factors that impact the plan of care are:      Diabetes, High blood pressure, Osteoarthritis and Overweight.     Medications impacting care: None.  2) Examination of Body Systems comprised of:   Body structures and functions that impact the plan of care:      Hip.   Activity limitations that impact the plan of care are:      Bending, Driving, Squatting/kneeling, Stairs, Standing, Walking and Sleeping.  3) Clinical presentation characteristics are:   Evolving/Changing.  4) Decision-Making    High complexity using standardized patient assessment instrument and/or measureable assessment of functional outcome.  Cumulative Therapy Evaluation is: Low complexity.    Previous and current functional limitations:  (See Goal Flow Sheet for this information)    Short term and Long term goals: (See Goal Flow Sheet for this information)     Communication ability:  Patient appears to be able to clearly communicate and understand verbal and written communication and follow directions correctly.  Treatment Explanation - The following has been discussed with the patient:   RX ordered/plan of care  Anticipated outcomes  Possible risks and side effects  This patient would benefit from PT intervention to resume normal activities.   Rehab potential is good.    Frequency:  1 X week, once daily  Duration:  for 12 weeks  Discharge Plan:  Achieve all LTG.  Independent in home treatment program.  Reach maximal therapeutic benefit.    Please refer to the daily flowsheet for treatment today, total treatment time and time spent performing 1:1 timed codes.

## 2021-08-26 ENCOUNTER — TELEPHONE (OUTPATIENT)
Dept: ORTHOPEDICS | Facility: CLINIC | Age: 71
End: 2021-08-26

## 2021-08-26 ENCOUNTER — VIRTUAL VISIT (OUTPATIENT)
Dept: ORTHOPEDICS | Facility: CLINIC | Age: 71
End: 2021-08-26
Payer: COMMERCIAL

## 2021-08-26 DIAGNOSIS — M17.0 BILATERAL PRIMARY OSTEOARTHRITIS OF KNEE: Primary | ICD-10-CM

## 2021-08-26 DIAGNOSIS — M17.0 BILATERAL PRIMARY OSTEOARTHRITIS OF KNEE: ICD-10-CM

## 2021-08-26 DIAGNOSIS — M16.0 BILATERAL PRIMARY OSTEOARTHRITIS OF HIP: Primary | ICD-10-CM

## 2021-08-26 PROCEDURE — 99213 OFFICE O/P EST LOW 20 MIN: CPT | Mod: 95 | Performed by: FAMILY MEDICINE

## 2021-08-26 NOTE — PROGRESS NOTES
Radha is a 70 year old who is being evaluated via a billable telephone visit.      Assessment & Plan     Bilateral primary osteoarthritis of hip  Bilateral primary osteoarthritis of knee  I'm bringing Radha back for bilateral hip and bilateral knee injections, CSI in her hips and HA for her knees.   We can do these injections on the same day, as we did in November of 2020.    Given that she got great relief from hyaluronic acid last time I do think this is reasonable treatment choice, especially given her failure of physical therapy and analgesics and corticosteroid injections in her knees in the past.                     Juan Jose Carias [facility]    Domonique Garcia is a 70 year old who presents for the following health issues.  HPI     Radha has end stage OA of her hips and knees.  She saw me in November of this past year, at that visit I injected her hips with corticosteroid and knees with hyaluronic acid.  These helped her tremendously for many months.  She was seen by Dr. Black to discuss potential hip arthroplasty, she was referred to physical therapy for rehabilitation prior to considering any surgery.  Unfortunately PT is too difficult given her extreme pain in her knees and her hips.  He is interested in repeating injections, as these definitely helped her in the past.    Review of Systems         Objective           Vitals:  No vitals were obtained today due to virtual visit.    Physical Exam   healthy, alert and no distress  PSYCH: Alert and oriented times 3; coherent speech, normal   rate and volume, able to articulate logical thoughts, able   to abstract reason, no tangential thoughts, no hallucinations   or delusions  Her affect is normal  RESP: No cough, no audible wheezing, able to talk in full sentences  Remainder of exam unable to be completed due to telephone visits                Phone call duration: 11 minutes

## 2021-08-26 NOTE — LETTER
8/26/2021         RE: Radha Corbett  [address] [id number]        Dear Colleague,    Thank you for referring your patient, Radha Corbett, to the [facility]. Please see a copy of my visit note below.    Radha is a 70 year old who is being evaluated via a billable telephone visit.      Assessment & Plan     Bilateral primary osteoarthritis of hip  Bilateral primary osteoarthritis of knee  I'm bringing Radha back for bilateral hip and bilateral knee injections, CSI in her hips and HA for her knees.   We can do these injections on the same day, as we did in November of 2020.    Given that she got great relief from hyaluronic acid last time I do think this is reasonable treatment choice, especially given her failure of physical therapy and analgesics and corticosteroid injections in her knees in the past.                     Juan Jose Carias, DO  [facility]    Subjective   Radha is a 70 year old who presents for the following health issues.  HPI     Radha has end stage OA of her hips and knees.  She saw me in November of this past year, at that visit I injected her hips with corticosteroid and knees with hyaluronic acid.  These helped her tremendously for many months.  She was seen by Dr. Black to discuss potential hip arthroplasty, she was referred to physical therapy for rehabilitation prior to considering any surgery.  Unfortunately PT is too difficult given her extreme pain in her knees and her hips.  He is interested in repeating injections, as these definitely helped her in the past.    Review of Systems         Objective           Vitals:  No vitals were obtained today due to virtual visit.    Physical Exam   healthy, alert and no distress  PSYCH: Alert and oriented times 3; coherent speech, normal   rate and volume, able to articulate logical thoughts, able   to abstract reason, no tangential thoughts, no  hallucinations   or delusions  Her affect is normal  RESP: No cough, no audible wheezing, able to talk in full sentences  Remainder of exam unable to be completed due to telephone visits                Phone call duration: 11 minutes        Again, thank you for allowing me to participate in the care of your patient.        Sincerely,        Juan Jose Carias, DO

## 2021-08-26 NOTE — LETTER
8/26/2021         RE: Radha Corbett  [address] [facility] [address]        Dear Colleague,    Thank you for referring your patient, Radha Corbett, to the [facility]. Please see a copy of my visit note below.    Radha is a 70 year old who is being evaluated via a billable telephone visit.      Assessment & Plan     Bilateral primary osteoarthritis of hip  Bilateral primary osteoarthritis of knee  I'm bringing Radha back for bilateral hip and bilateral knee injections, CSI in her hips and HA for her knees.   We can do these injections on the same day, as we did in November of 2020.    Given that she got great relief from hyaluronic acid last time I do think this is reasonable treatment choice, especially given her failure of physical therapy and analgesics and corticosteroid injections in her knees in the past.                     Juan Jose Carias, DO  [facility]    Subjective   Radha is a 70 year old who presents for the following health issues.  HPI     Radha has end stage OA of her hips and knees.  She saw me in November of this past year, at that visit I injected her hips with corticosteroid and knees with hyaluronic acid.  These helped her tremendously for many months.  She was seen by Dr. Black to discuss potential hip arthroplasty, she was referred to physical therapy for rehabilitation prior to considering any surgery.  Unfortunately PT is too difficult given her extreme pain in her knees and her hips.  He is interested in repeating injections, as these definitely helped her in the past.    Review of Systems         Objective           Vitals:  No vitals were obtained today due to virtual visit.    Physical Exam   healthy, alert and no distress  PSYCH: Alert and oriented times 3; coherent speech, normal   rate and volume, able to articulate logical thoughts, able   to abstract reason, no tangential thoughts, no  hallucinations   or delusions  Her affect is normal  RESP: No cough, no audible wheezing, able to talk in full sentences  Remainder of exam unable to be completed due to telephone visits                Phone call duration: 11 minutes        Again, thank you for allowing me to participate in the care of your patient.        Sincerely,        Juan Jose Carias, DO

## 2021-08-26 NOTE — PATIENT INSTRUCTIONS
Thanks for coming today.  Ortho/Sports Medicine Clinic  [address] [place] [address]    To schedule future appointments in Ortho Clinic, you may call [phone].    To schedule ordered imaging by your Provider: Call [facility] at [phone]    [employer] available online at:   [email or web address]    Please call if any further questions or concerns [phone] and ask for the Orthopedic Department. Clinic hours 8 am to 5 pm.    Return to clinic if symptoms worsen.

## 2021-09-09 ENCOUNTER — OFFICE VISIT (OUTPATIENT)
Dept: ORTHOPEDICS | Facility: CLINIC | Age: 71
End: 2021-09-09
Payer: COMMERCIAL

## 2021-09-09 VITALS — OXYGEN SATURATION: 98 % | DIASTOLIC BLOOD PRESSURE: 61 MMHG | SYSTOLIC BLOOD PRESSURE: 104 MMHG | HEART RATE: 112 BPM

## 2021-09-09 DIAGNOSIS — M17.0 BILATERAL PRIMARY OSTEOARTHRITIS OF KNEE: Primary | ICD-10-CM

## 2021-09-09 DIAGNOSIS — M16.0 BILATERAL PRIMARY OSTEOARTHRITIS OF HIP: ICD-10-CM

## 2021-09-09 PROCEDURE — 20611 DRAIN/INJ JOINT/BURSA W/US: CPT | Mod: 50 | Performed by: FAMILY MEDICINE

## 2021-09-09 PROCEDURE — 99207 PR DROP WITH A PROCEDURE: CPT | Performed by: FAMILY MEDICINE

## 2021-09-09 NOTE — PROGRESS NOTES
Radha is here for bilateral hip injections with corticosteroid and bilateral hyaluronic acid injections for her knees.  I last performed this series of injections in November 2020.  These injections brought her significant relief until the past month or so.      Large Joint Injection/Arthocentesis: bilateral hip joint    Date/Time: 9/9/2021 4:09 PM  Performed by: Juan Jose Carias DO  Authorized by: Juan Jose Carias DO     Indications:  Pain and osteoarthritis  Needle Size:  22 G  Guidance: ultrasound    Location:  Hip  Laterality:  Bilateral      Site:  Bilateral hip joint  Procedure discussed: discussed risks, benefits, and alternatives    Consent Given by:  Patient  Timeout: timeout called immediately prior to procedure    Prep: patient was prepped and draped in usual sterile fashion       Intraarticular Hip Injection - Ultrasound Guided    The patient was informed of the risks and the benefits of the procedure and a written consent was signed.    The patient's left hip was prepped with chlorhexidine in sterile fashion.   40 mg of triamcinolone suspension was drawn up into a 5 mL syringe with 4 mL of 1% lidocaine.  Injection was performed using sterile technique.  Under ultrasound guidance a 3.5-inch 22-gauge needle was used to enter the femoracetabular joint.  Anterior approach was used, needle placement was visualized and documented with ultrasound.  Ultrasound visualization was necessary due to decreased joint space in the setting of osteoarthritis.  Injection performed long axis to the probe.  Injection solution visualized within the joint space.  Images were permanently stored for the patient's record.    The patient s right hip was prepped with chlorhexidine in sterile fashion.   40 mg of triamcinolone suspension was drawn up into a 5 mL syringe with 4 mL of 1% lidocaine.  Injection was performed using sterile technique.  Under ultrasound guidance a 3.5-inch 22-gauge needle was used to enter the  femoracetabular joint.  Anterior approach was used, needle placement was visualized and documented with ultrasound.  Ultrasound visualization was necessary due to decreased joint space in the setting of osteoarthritis.  Injection performed long axis to the probe.  Injection solution visualized within the joint space.  Images were permanently stored for the patient's record.    Knee Injection - Ultrasound Guided    The patient was informed of the risks and the benefits of the procedure and a written consent was signed.    The patient s right knee was prepped with chlorhexidine in sterile fashion.   GelOne syringe removed from packaging and inspected for integrity.  Injection was performed using sterile technique.  Under ultrasound guidance a 1.5-inch 22-gauge needle was used to enter the superolateral aspect of the knee.  Needle placement was visualized and documented with ultrasound.  Ultrasound visualization was necessary due to decreased joint space in the setting of osteoarthritis.  Images were permanently stored for the patient's record.    The patient's left knee was prepped with chlorhexidine in sterile fashion.   GelOne syringe removed from packaging and inspected for integrity.  Injection was performed using sterile technique.  Under ultrasound guidance a 1.5-inch 22-gauge needle was used to enter the superolateral aspect of the knee.  Needle placement was visualized and documented with ultrasound.  Ultrasound visualization was necessary due to decreased joint space in the setting of osteoarthritis.  Images were permanently stored for the patient's record.    There were no complications. The patient tolerated the procedure well. There was negligible bleeding.   The patient was instructed to ice the knee upon leaving clinic and refrain from overuse over the next 3 days.   The patient was instructed to call or go to the emergency room with any unusual pain, swelling, redness, or if otherwise  concerned.        There were no complications. The patient tolerated the procedure well. There was negligible bleeding.   The patient was instructed to ice the hip upon leaving clinic and refrain from overuse over the next 3 days.   The patient was instructed to call or go to the emergency room with any unusual pain, swelling, redness, or if otherwise concerned.        NDC#[id number]    [address] [address]  Dept: [phone]  ______________________________________________________________________________    Patient: Radha Corbett   : 1950   MRN: [id number]   2021    INVASIVE PROCEDURE SAFETY CHECKLIST    Date: 2021  Procedure: Bilateral hip corticosteroid injections  Patient Name: Radha Corbett  MRN: [id number]  YOB: 1950    Action: Complete sections as appropriate. Any discrepancy results in a HARD COPY until resolved.     PRE PROCEDURE:  Patient ID verified with 2 identifiers (name and  or MRN): Yes  Procedure and site verified with patient/designee (when able): Yes  Accurate consent documentation in medical record: Yes  H&P (or appropriate assessment) documented in medical record: NA  H&P must be up to 20 days prior to procedure and updates within 24 hours of procedure as applicable: NA  Relevant diagnostic and radiology test results appropriately labeled and displayed as applicable: NA  Procedure site(s) marked with provider initials: NA    TIMEOUT:  Time-Out performed immediately prior to starting procedure, including verbal and active participation of all team members addressing the following:Yes  * Correct patient identify  * Confirmed that the correct side and site are marked  * An accurate procedure consent form  * Agreement on the procedure to be done  * Correct patient position  * Relevant images and results are properly labeled and appropriately displayed  * The need to administer antibiotics or  fluids for irrigation purposes during the procedure as applicable   * Safety precautions based on patient history or medication use    DURING PROCEDURE: Verification of correct person, site, and procedures any time the responsibility for care of the patient is transferred to another member of the care team.       Prior to injection, verified patient identity using patient's name and date of birth.  Due to injection administration, patient instructed to remain in clinic for 15 minutes  afterwards, and to report any adverse reaction to me immediately.    Joint injection was performed.      Drug Amount Wasted:  None.  Vial/Syringe: Single dose vial  Expiration Date:  04/30/2023      Grace Cordoba CMA  September 9, 2021

## 2021-09-09 NOTE — LETTER
9/9/2021         RE: Radha Corbett  [address] [facility] [address]        Dear Colleague,    Thank you for referring your patient, aRdha Corbett, to the [facility] [place]. Please see a copy of my visit note below.    Radha is here for bilateral hip injections with corticosteroid and bilateral hyaluronic acid injections for her knees.  I last performed this series of injections in November 2020.  These injections brought her significant relief until the past month or so.      Large Joint Injection/Arthocentesis: bilateral hip joint    Date/Time: 9/9/2021 4:09 PM  Performed by: Juan Jose Carias DO  Authorized by: Juan Jose Carias DO     Indications:  Pain and osteoarthritis  Needle Size:  22 G  Guidance: ultrasound    Location:  Hip  Laterality:  Bilateral      Site:  Bilateral hip joint  Procedure discussed: discussed risks, benefits, and alternatives    Consent Given by:  Patient  Timeout: timeout called immediately prior to procedure    Prep: patient was prepped and draped in usual sterile fashion       Intraarticular Hip Injection - Ultrasound Guided    The patient was informed of the risks and the benefits of the procedure and a written consent was signed.    The patient's left hip was prepped with chlorhexidine in sterile fashion.   40 mg of triamcinolone suspension was drawn up into a 5 mL syringe with 4 mL of 1% lidocaine.  Injection was performed using sterile technique.  Under ultrasound guidance a 3.5-inch 22-gauge needle was used to enter the femoracetabular joint.  Anterior approach was used, needle placement was visualized and documented with ultrasound.  Ultrasound visualization was necessary due to decreased joint space in the setting of osteoarthritis.  Injection performed long axis to the probe.  Injection solution visualized within the joint space.  Images were permanently stored for the patient's record.    The patient s right hip was prepped with  chlorhexidine in sterile fashion.   40 mg of triamcinolone suspension was drawn up into a 5 mL syringe with 4 mL of 1% lidocaine.  Injection was performed using sterile technique.  Under ultrasound guidance a 3.5-inch 22-gauge needle was used to enter the femoracetabular joint.  Anterior approach was used, needle placement was visualized and documented with ultrasound.  Ultrasound visualization was necessary due to decreased joint space in the setting of osteoarthritis.  Injection performed long axis to the probe.  Injection solution visualized within the joint space.  Images were permanently stored for the patient's record.    Knee Injection - Ultrasound Guided    The patient was informed of the risks and the benefits of the procedure and a written consent was signed.    The patient s right knee was prepped with chlorhexidine in sterile fashion.   GelOne syringe removed from packaging and inspected for integrity.  Injection was performed using sterile technique.  Under ultrasound guidance a 1.5-inch 22-gauge needle was used to enter the superolateral aspect of the knee.  Needle placement was visualized and documented with ultrasound.  Ultrasound visualization was necessary due to decreased joint space in the setting of osteoarthritis.  Images were permanently stored for the patient's record.    The patient's left knee was prepped with chlorhexidine in sterile fashion.   GelOne syringe removed from packaging and inspected for integrity.  Injection was performed using sterile technique.  Under ultrasound guidance a 1.5-inch 22-gauge needle was used to enter the superolateral aspect of the knee.  Needle placement was visualized and documented with ultrasound.  Ultrasound visualization was necessary due to decreased joint space in the setting of osteoarthritis.  Images were permanently stored for the patient's record.    There were no complications. The patient tolerated the procedure well. There was negligible  bleeding.   The patient was instructed to ice the knee upon leaving clinic and refrain from overuse over the next 3 days.   The patient was instructed to call or go to the emergency room with any unusual pain, swelling, redness, or if otherwise concerned.        There were no complications. The patient tolerated the procedure well. There was negligible bleeding.   The patient was instructed to ice the hip upon leaving clinic and refrain from overuse over the next 3 days.   The patient was instructed to call or go to the emergency room with any unusual pain, swelling, redness, or if otherwise concerned.        NDC#[id number]    [facility]  [address] [address]  Dept: [phone]  ______________________________________________________________________________    Patient: Radha Corbett   : 1950   MRN: [id number]   2021    INVASIVE PROCEDURE SAFETY CHECKLIST    Date: 2021  Procedure: Bilateral hip corticosteroid injections  Patient Name: Radha Corbett  MRN: [id number]  YOB: 1950    Action: Complete sections as appropriate. Any discrepancy results in a HARD COPY until resolved.     PRE PROCEDURE:  Patient ID verified with 2 identifiers (name and  or MRN): Yes  Procedure and site verified with patient/designee (when able): Yes  Accurate consent documentation in medical record: Yes  H&P (or appropriate assessment) documented in medical record: NA  H&P must be up to 20 days prior to procedure and updates within 24 hours of procedure as applicable: NA  Relevant diagnostic and radiology test results appropriately labeled and displayed as applicable: NA  Procedure site(s) marked with provider initials: NA    TIMEOUT:  Time-Out performed immediately prior to starting procedure, including verbal and active participation of all team members addressing the following:Yes  * Correct patient identify  * Confirmed that the correct side and  site are marked  * An accurate procedure consent form  * Agreement on the procedure to be done  * Correct patient position  * Relevant images and results are properly labeled and appropriately displayed  * The need to administer antibiotics or fluids for irrigation purposes during the procedure as applicable   * Safety precautions based on patient history or medication use    DURING PROCEDURE: Verification of correct person, site, and procedures any time the responsibility for care of the patient is transferred to another member of the care team.       Prior to injection, verified patient identity using patient's name and date of birth.  Due to injection administration, patient instructed to remain in clinic for 15 minutes  afterwards, and to report any adverse reaction to me immediately.    Joint injection was performed.      Drug Amount Wasted:  None.  Vial/Syringe: Single dose vial  Expiration Date:  2023      Grace Cordoba CMA  2021      Large Joint Injection/Arthocentesis: bilateral knee    Date/Time: 2021 4:15 PM  Performed by: Juan Jose Carias DO  Authorized by: Juan Jose Carias DO     Indications:  Pain and osteoarthritis  Needle Size:  22 G  Guidance: ultrasound    Approach:  Lateral  Location:  Knee  Laterality:  Bilateral      Medications (Right):  30 mg cross-Linked Hyaluronate 30 MG/3ML  Medications (Left):  30 mg cross-Linked Hyaluronate 30 MG/3ML      NDC#[id number]    [facility]  [address] [place] [id number]  Dept: [phone]  ______________________________________________________________________________    Patient: Radha Corbett   : 1950   MRN: [id number]   2021    INVASIVE PROCEDURE SAFETY CHECKLIST    Date: 2021  Procedure: Bilateral knee Gel-One injections  Patient Name: Radha Corbett  MRN: [id number]  YOB: 1950    Action: Complete sections as appropriate. Any discrepancy results in a HARD  COPY until resolved.     PRE PROCEDURE:  Patient ID verified with 2 identifiers (name and  or MRN): Yes  Procedure and site verified with patient/designee (when able): Yes  Accurate consent documentation in medical record: Yes  H&P (or appropriate assessment) documented in medical record: NA  H&P must be up to 20 days prior to procedure and updates within 24 hours of procedure as applicable: NA  Relevant diagnostic and radiology test results appropriately labeled and displayed as applicable: NA  Procedure site(s) marked with provider initials: NA    TIMEOUT:  Time-Out performed immediately prior to starting procedure, including verbal and active participation of all team members addressing the following:Yes  * Correct patient identify  * Confirmed that the correct side and site are marked  * An accurate procedure consent form  * Agreement on the procedure to be done  * Correct patient position  * Relevant images and results are properly labeled and appropriately displayed  * The need to administer antibiotics or fluids for irrigation purposes during the procedure as applicable   * Safety precautions based on patient history or medication use    DURING PROCEDURE: Verification of correct person, site, and procedures any time the responsibility for care of the patient is transferred to another member of the care team.       Prior to injection, verified patient identity using patient's name and date of birth.  Due to injection administration, patient instructed to remain in clinic for 15 minutes  afterwards, and to report any adverse reaction to me immediately.    Joint injection was performed.      Drug Amount Wasted:  None.  Vial/Syringe: Syringe  Expiration Date:  Rt 2023 / Lt 2023      Grace Cordoba CMA  2021        Again, thank you for allowing me to participate in the care of your patient.        Sincerely,        Juan Jose Carias DO

## 2021-09-09 NOTE — PATIENT INSTRUCTIONS
Thanks for coming today.  Ortho/Sports Medicine Clinic  [address] [place] [address]    To schedule future appointments in Ortho Clinic, you may call [phone].    To schedule ordered imaging by your provider:   Call [facility] [phone]    To schedule an injection ordered by your provider:  Call [facility] Injection scheduling line: [phone]  [email or web address] available online at:  [email or web address]    Please call if any further questions or concerns ([phone]).  Clinic hours 8 am to 5 pm.    Return to clinic (call) if symptoms worsen or fail to improve.

## 2021-09-09 NOTE — PROGRESS NOTES
Large Joint Injection/Arthocentesis: bilateral knee    Date/Time: 2021 4:15 PM  Performed by: Juan Jose Carias DO  Authorized by: Juan Jose Carias DO     Indications:  Pain and osteoarthritis  Needle Size:  22 G  Guidance: ultrasound    Approach:  Lateral  Location:  Knee  Laterality:  Bilateral      Medications (Right):  30 mg cross-Linked Hyaluronate 30 MG/3ML  Medications (Left):  30 mg cross-Linked Hyaluronate 30 MG/3ML      NDC#[id number]    [address] [address]  Dept: [phone]  ______________________________________________________________________________    Patient: Radha Corbett   : 1950   MRN: [id number]   2021    INVASIVE PROCEDURE SAFETY CHECKLIST    Date: 2021  Procedure: Bilateral knee Gel-One injections  Patient Name: Radha Corbett  MRN: [id number]  YOB: 1950    Action: Complete sections as appropriate. Any discrepancy results in a HARD COPY until resolved.     PRE PROCEDURE:  Patient ID verified with 2 identifiers (name and  or MRN): Yes  Procedure and site verified with patient/designee (when able): Yes  Accurate consent documentation in medical record: Yes  H&P (or appropriate assessment) documented in medical record: NA  H&P must be up to 20 days prior to procedure and updates within 24 hours of procedure as applicable: NA  Relevant diagnostic and radiology test results appropriately labeled and displayed as applicable: NA  Procedure site(s) marked with provider initials: NA    TIMEOUT:  Time-Out performed immediately prior to starting procedure, including verbal and active participation of all team members addressing the following:Yes  * Correct patient identify  * Confirmed that the correct side and site are marked  * An accurate procedure consent form  * Agreement on the procedure to be done  * Correct patient position  * Relevant images and results are properly labeled and  appropriately displayed  * The need to administer antibiotics or fluids for irrigation purposes during the procedure as applicable   * Safety precautions based on patient history or medication use    DURING PROCEDURE: Verification of correct person, site, and procedures any time the responsibility for care of the patient is transferred to another member of the care team.       Prior to injection, verified patient identity using patient's name and date of birth.  Due to injection administration, patient instructed to remain in clinic for 15 minutes  afterwards, and to report any adverse reaction to me immediately.    Joint injection was performed.      Drug Amount Wasted:  None.  Vial/Syringe: Syringe  Expiration Date:  Rt 06/21/2023 / Lt 05/09/2023      Grace Cordoba CMA  September 9, 2021

## 2021-09-09 NOTE — NURSING NOTE
Radha Corbett's chief complaint for this visit includes:  Chief Complaint   Patient presents with     Procedure     Bilateral hip corticosteroid injections / bilateral knee Gel-One injections     PCP: Trice Medeiros    Referring Provider:  No referring provider defined for this encounter.    /61 (BP Location: Right arm, Patient Position: Sitting, Cuff Size: Adult Regular)   Pulse 112   LMP 11/18/1991 (Exact Date)   SpO2 98%   Data Unavailable     Do you need any medication refills at today's visit? Natacha Cordoba, [facility]

## 2021-09-21 ENCOUNTER — THERAPY VISIT (OUTPATIENT)
Dept: PHYSICAL THERAPY | Facility: CLINIC | Age: 71
End: 2021-09-21
Payer: COMMERCIAL

## 2021-09-21 DIAGNOSIS — M16.11 OSTEOARTHRITIS OF RIGHT HIP, UNSPECIFIED OSTEOARTHRITIS TYPE: ICD-10-CM

## 2021-09-21 DIAGNOSIS — M25.552 HIP PAIN, LEFT: ICD-10-CM

## 2021-09-21 PROCEDURE — 97110 THERAPEUTIC EXERCISES: CPT | Mod: GP | Performed by: PHYSICAL THERAPIST

## 2021-09-22 ENCOUNTER — TELEPHONE (OUTPATIENT)
Dept: ORTHOPEDICS | Facility: CLINIC | Age: 71
End: 2021-09-22

## 2021-09-22 NOTE — PROGRESS NOTES
PROGRESS  REPORT    Progress reporting period is from 7/23/2021 to 9/21/2021.       SUBJECTIVE  Patient notes that she had injections for her hips and knees on 9/9/2021.  She notes that her L hip has been good since the injection.  The right hip is better and does feel symptoms 4/10 in the groin.  Since the injection she has gone out for lunch  Notes that she has been able to stand and cook - alternating between sit/stand while cooking (3-5 minutes of standing).  Definitely feels better than how she did prior to the injections.  Notes that her R hip gives way 4-5 times a day, while walking.  Does use a wheeled walker at home.  She is able to get into/out of the bath tub independently.     Current Pain level: 0/10 (when the hip gives out 10/10).     Initial Pain level: 8/10.   Changes in function:  Yes (See Goal flowsheet attached for changes in current functional level)  Adverse reaction to treatment or activity: None    OBJECTIVE  Changes noted in objective findings:  Yes, improved hip ROM  Objective:   PROM R hip flexion 90 deg, ER 45, IR 10, Abd 25, Add 15 past neutral   L hip PROM flexion to 90 deg w pinch, Abd 20 deg, add 10 deg past neutral, ER 45, IR 15 deg.     3/5 hip generalized strength  Fatigued after exercise but able to do all strengthening without increased pain.  Able to stand up and walk to [facility] to do exercises today.  Shuffled type of step.    ASSESSMENT/PLAN  Updated problem list and treatment plan: Diagnosis 1:  B hip pain, OA  Pain -  self management, education, directional preference exercise, home program and manual therapy/modalities as needed  Decreased ROM/flexibility - manual therapy, therapeutic exercise and home program  Decreased joint mobility - manual therapy, therapeutic exercise and home program  Decreased strength - therapeutic exercise, therapeutic activities and home program  Decreased proprioception - neuro re-education, therapeutic activities and home program  Impaired gait -  gait training, home program and therapeutic activity  Decreased function - therapeutic activities and home program  Impaired posture - neuro re-education, therapeutic activities and home program  STG/LTGs have been met or progress has been made towards goals:  Yes (See Goal flow sheet completed today.)  Assessment of Progress: The patient's condition is improving.  Self Management Plans:  Patient has been instructed in a home treatment program.  Patient  has been instructed in self management of symptoms.  I have re-evaluated this patient and find that the nature, scope, duration and intensity of the therapy is appropriate for the medical condition of the patient.  Radha continues to require the following intervention to meet STG and LTG's:  PT    Recommendations:  This patient would benefit from continued therapy.     Frequency:  1 X week, once daily  Duration:  for 8 weeks        Please refer to the daily flowsheet for treatment today, total treatment time and time spent performing 1:1 timed codes.

## 2021-09-22 NOTE — TELEPHONE ENCOUNTER
[facility] Call Center    Phone Message    May a detailed message be left on voicemail: yes     Reason for Call: The patient called with an update stating the injections are working really well so far. She is going to PT, and surprised both her self and the PT doing 10 reps of each exercise. Sunday was 's 80th birthday and they went out to dinner at the [email or web address], and she did great! She is very happy and loves Dr. Carias for it. She really wanted Dr. Carias to know the update. Thank you.    Action Taken: Message routed to:  Adult Clinics: Sports Medicine p [id number]    Travel Screening: Not Applicable

## 2021-09-26 ENCOUNTER — HEALTH MAINTENANCE LETTER (OUTPATIENT)
Age: 71
End: 2021-09-26

## 2021-09-28 ENCOUNTER — THERAPY VISIT (OUTPATIENT)
Dept: PHYSICAL THERAPY | Facility: CLINIC | Age: 71
End: 2021-09-28
Payer: COMMERCIAL

## 2021-09-28 DIAGNOSIS — M16.11 OSTEOARTHRITIS OF RIGHT HIP, UNSPECIFIED OSTEOARTHRITIS TYPE: ICD-10-CM

## 2021-09-28 DIAGNOSIS — M25.552 HIP PAIN, LEFT: ICD-10-CM

## 2021-09-28 PROCEDURE — 97110 THERAPEUTIC EXERCISES: CPT | Mod: GP | Performed by: PHYSICAL THERAPIST

## 2021-10-26 ENCOUNTER — THERAPY VISIT (OUTPATIENT)
Dept: PHYSICAL THERAPY | Facility: CLINIC | Age: 71
End: 2021-10-26
Payer: COMMERCIAL

## 2021-10-26 DIAGNOSIS — M16.11 OSTEOARTHRITIS OF RIGHT HIP, UNSPECIFIED OSTEOARTHRITIS TYPE: ICD-10-CM

## 2021-10-26 DIAGNOSIS — M25.552 HIP PAIN, LEFT: ICD-10-CM

## 2021-10-26 PROCEDURE — 97110 THERAPEUTIC EXERCISES: CPT | Mod: GP | Performed by: PHYSICAL THERAPIST

## 2021-11-18 ENCOUNTER — OFFICE VISIT (OUTPATIENT)
Dept: OPHTHALMOLOGY | Facility: CLINIC | Age: 71
End: 2021-11-18
Payer: COMMERCIAL

## 2021-11-18 DIAGNOSIS — H43.811 VITREORETINAL DEGENERATION OF RIGHT EYE: ICD-10-CM

## 2021-11-18 DIAGNOSIS — H44.2E3 BOTH EYES AFFECTED BY DEGENERATIVE MYOPIA WITH OTHER MACULOPATHY: ICD-10-CM

## 2021-11-18 DIAGNOSIS — H52.4 MYOPIA OF BOTH EYES WITH REGULAR ASTIGMATISM AND PRESBYOPIA: ICD-10-CM

## 2021-11-18 DIAGNOSIS — Z01.00 EXAMINATION OF EYES AND VISION: ICD-10-CM

## 2021-11-18 DIAGNOSIS — H33.22 LEFT RETINAL DETACHMENT: ICD-10-CM

## 2021-11-18 DIAGNOSIS — H52.223 MYOPIA OF BOTH EYES WITH REGULAR ASTIGMATISM AND PRESBYOPIA: ICD-10-CM

## 2021-11-18 DIAGNOSIS — H52.13 MYOPIA OF BOTH EYES WITH REGULAR ASTIGMATISM AND PRESBYOPIA: ICD-10-CM

## 2021-11-18 DIAGNOSIS — H44.23 DEGENERATIVE MYOPIA OF BOTH EYES, UNSPECIFIED WHETHER COMPLICATION PRESENT: ICD-10-CM

## 2021-11-18 DIAGNOSIS — Z96.1 PSEUDOPHAKIA: ICD-10-CM

## 2021-11-18 PROCEDURE — 92014 COMPRE OPH EXAM EST PT 1/>: CPT | Performed by: STUDENT IN AN ORGANIZED HEALTH CARE EDUCATION/TRAINING PROGRAM

## 2021-11-18 ASSESSMENT — VISUAL ACUITY
CORRECTION_TYPE: GLASSES
OS_CC: CF @ 3'
OD_CC+: -2
METHOD: SNELLEN - LINEAR
OD_CC: 20/30

## 2021-11-18 ASSESSMENT — CUP TO DISC RATIO
OS_RATIO: 0.1
OD_RATIO: 0.1

## 2021-11-18 ASSESSMENT — REFRACTION_WEARINGRX
OD_SPHERE: -1.75
OD_AXIS: 048
OS_SPHERE: -3.25
SPECS_TYPE: PAL
OS_ADD: +3.00
OS_AXIS: 035
OS_CYLINDER: +1.00
OD_ADD: +3.00
OD_CYLINDER: +2.25

## 2021-11-18 ASSESSMENT — TONOMETRY
OD_IOP_MMHG: 16
OS_IOP_MMHG: 16
IOP_METHOD: APPLANATION

## 2021-11-18 ASSESSMENT — CONF VISUAL FIELD
OD_NORMAL: 1
OS_NORMAL: 1

## 2021-11-18 ASSESSMENT — REFRACTION_MANIFEST
OS_CYLINDER: +0.75
OD_CYLINDER: +2.25
OS_ADD: +3.00
OD_SPHERE: -1.50
OS_SPHERE: -4.50
OS_AXIS: 030
OD_ADD: +3.00
OD_AXIS: 040

## 2021-11-18 ASSESSMENT — EXTERNAL EXAM - RIGHT EYE: OD_EXAM: NORMAL

## 2021-11-18 ASSESSMENT — SLIT LAMP EXAM - LIDS
COMMENTS: NORMAL
COMMENTS: NORMAL

## 2021-11-18 ASSESSMENT — EXTERNAL EXAM - LEFT EYE: OS_EXAM: NORMAL

## 2021-11-18 NOTE — PROGRESS NOTES
Current Eye Medications:  None     Subjective:  Diabetic eye exam. Pt notes vision with glasses is a little worse. Has flashes right eye x 2 months. Pt notes some floaters. Has seen Dr. Puri in the past.     Objective:  See Ophthalmology Exam.      Assessment:  Radha Corbett is a 70 year old female who presents with:   Encounter Diagnoses   Name Primary?     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

## 2021-11-18 NOTE — LETTER
11/18/2021         RE: Radha Corbett  [address] [id number]        Dear Colleague,    Thank you for referring your patient, Radha Corbett, to the [facility]. Please see a copy of my visit note below.     Current Eye Medications:  None     Subjective:  Diabetic eye exam. Pt notes vision with glasses is a little worse. Has flashes right eye x 2 months. Pt notes some floaters. Has seen Dr. Puri in the past.     Objective:  See Ophthalmology Exam.      Assessment:  Radha Corbett is a 70 year old female who presents with:   Encounter Diagnoses   Name Primary?     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        Sincerely,        Teresa Ken MD

## 2021-12-01 NOTE — PATIENT INSTRUCTIONS
Referral to [facility] in [place]     DMV form completed    Glasses prescription given - optional to update    Teresa Ken MD  [phone]

## 2021-12-13 PROBLEM — M25.552 HIP PAIN, LEFT: Status: RESOLVED | Noted: 2021-07-23 | Resolved: 2021-12-13

## 2021-12-13 PROBLEM — M16.9 OSTEOARTHRITIS OF HIP: Status: RESOLVED | Noted: 2021-07-23 | Resolved: 2021-12-13

## 2022-01-16 ENCOUNTER — HEALTH MAINTENANCE LETTER (OUTPATIENT)
Age: 72
End: 2022-01-16

## 2022-01-17 ENCOUNTER — OFFICE VISIT (OUTPATIENT)
Dept: ORTHOPEDICS | Facility: CLINIC | Age: 72
End: 2022-01-17
Payer: COMMERCIAL

## 2022-01-17 DIAGNOSIS — M16.11 PRIMARY OSTEOARTHRITIS OF RIGHT HIP: ICD-10-CM

## 2022-01-17 DIAGNOSIS — M17.12 PRIMARY OSTEOARTHRITIS OF LEFT KNEE: Primary | ICD-10-CM

## 2022-01-17 PROCEDURE — 20611 DRAIN/INJ JOINT/BURSA W/US: CPT | Mod: 59 | Performed by: FAMILY MEDICINE

## 2022-01-17 PROCEDURE — 99207 PR DROP WITH A PROCEDURE: CPT | Performed by: FAMILY MEDICINE

## 2022-01-17 PROCEDURE — 20610 DRAIN/INJ JOINT/BURSA W/O US: CPT | Mod: LT | Performed by: FAMILY MEDICINE

## 2022-01-17 RX ORDER — TRIAMCINOLONE ACETONIDE 40 MG/ML
40 INJECTION, SUSPENSION INTRA-ARTICULAR; INTRAMUSCULAR
Status: SHIPPED | OUTPATIENT
Start: 2022-01-17

## 2022-01-17 RX ADMIN — TRIAMCINOLONE ACETONIDE 40 MG: 40 INJECTION, SUSPENSION INTRA-ARTICULAR; INTRAMUSCULAR at 14:45

## 2022-01-17 RX ADMIN — TRIAMCINOLONE ACETONIDE 40 MG: 40 INJECTION, SUSPENSION INTRA-ARTICULAR; INTRAMUSCULAR at 14:44

## 2022-01-17 NOTE — LETTER
1/17/2022         RE: Radha Corbett  [address] [address]        Dear Colleague,    Thank you for referring your patient, Radha Corbett, to the [facility] [place]. Please see a copy of my visit note below.    Mrs. Corbett is here following up with bilateral hip and bilateral knee arthritis. She has had injections in both of her hips and both of her knees, at our last visit we injected both hips with corticosteroid  and both knees with hyaluronic acid. Today we I am injecting her right hip and left knee, as these are her most symptomatic areas.             [facility] FOLLOW-UP VISIT 1/17/2022    Radha Corbett's chief complaint for this visit includes:  Chief Complaint   Patient presents with     RECHECK     bilateral hip and bilateral knees      PCP: Trice Medeiros    Interval History:     Follow up reason: bilateral hips and knee cortisone injection     Medical History:    Any recent changes to your medical history? No    Any new medication prescribed since last visit? No    Review of Systems:    Do you have fever, chills, weight loss? No    Do you have any vision problems? No    Do you have any chest pain or edema? No    Do you have any shortness of breath or wheezing?  No    Do you have stomach problems? No    Do you have any urinary track issues? No    Do you have any numbness or focal weakness? No    Do you have diabetes? Yes,     Do you have problems with bleeding or clotting? No    Do you have an rashes or other skin lesions? No    Large Joint Injection/Arthocentesis: L knee joint    Date/Time: 1/17/2022 2:44 PM  Performed by: Juan Jose Carias DO  Authorized by: Juan Jose Carias DO     Indications:  Pain  Needle Size:  22 G  Guidance: landmark guided    Approach:  Lateral  Location:  Knee      Medications:  40 mg triamcinolone 40 MG/ML  Outcome:  Tolerated well, no immediate complications  Procedure discussed: discussed risks, benefits, and  alternatives    Consent Given by:  Patient  Timeout: timeout called immediately prior to procedure    Prep: patient was prepped and draped in usual sterile fashion    Large Joint Injection/Arthocentesis: R hip joint    Date/Time: 1/17/2022 2:45 PM  Performed by: Juan Jose Carias DO  Authorized by: Juan Jose Carias DO     Indications:  Pain  Needle Size:  25 G  Guidance: ultrasound    Approach:  Anterior  Location:  Hip      Site:  R hip joint  Medications:  40 mg triamcinolone 40 MG/ML  Outcome:  Tolerated well, no immediate complications  Procedure discussed: discussed risks, benefits, and alternatives    Consent Given by:  Patient  Timeout: timeout called immediately prior to procedure    Prep: patient was prepped and draped in usual sterile fashion       Intraarticular Hip Injection - Ultrasound Guided  The patient was informed of the risks and the benefits of the procedure and a written consent was signed.  The patient s right hip was prepped with chlorhexidine in sterile fashion.   40 mg of triamcinolone suspension was drawn up into a 5 mL syringe with 4 mL of 1% lidocaine.  Injection was performed using sterile technique.  Under ultrasound guidance a 3.5-inch 22-gauge needle was used to enter the femoracetabular joint.  Anterior approach was used, needle placement was visualized and documented with ultrasound.  Ultrasound visualization was necessary due to decreased joint space in the setting of osteoarthritis.  Injection performed long axis to the probe.  Injection solution visualized within the joint space.  Images were permanently stored for the patient's record.  There were no complications. The patient tolerated the procedure well. There was negligible bleeding.       PROCEDURE    Knee Injection - Intraarticular  The patient was informed of the risks and the benefits of the procedure and a written consent was signed.  The patient s left knee was prepped with chlorhexidine in sterile fashion.   40 mg of  triamcinolone suspension was drawn up into a 5 mL syringe with 4 mL of 1% lidocaine.  Injection was performed using substerile technique.  A 1.5-inch 22-gauge needle was used to enter the lateral aspect of the knee.  Injection performed successfully without difficulty.  There were no complications. The patient tolerated the procedure well. There was negligible bleeding.                 Again, thank you for allowing me to participate in the care of your patient.        Sincerely,        Juan Jose Carias DO

## 2022-01-17 NOTE — PROGRESS NOTES
Mrs. Corbett is here following up with bilateral hip and bilateral knee arthritis. She has had injections in both of her hips and both of her knees, at our last visit we injected both hips with corticosteroid  and both knees with hyaluronic acid. Today we I am injecting her right hip and left knee, as these are her most symptomatic areas.             [facility] FOLLOW-UP VISIT 1/17/2022    Radha Corbett's chief complaint for this visit includes:  Chief Complaint   Patient presents with     RECHECK     bilateral hip and bilateral knees      PCP: Trice Medeiors    Interval History:     Follow up reason: bilateral hips and knee cortisone injection     Medical History:    Any recent changes to your medical history? No    Any new medication prescribed since last visit? No    Review of Systems:    Do you have fever, chills, weight loss? No    Do you have any vision problems? No    Do you have any chest pain or edema? No    Do you have any shortness of breath or wheezing?  No    Do you have stomach problems? No    Do you have any urinary track issues? No    Do you have any numbness or focal weakness? No    Do you have diabetes? Yes,     Do you have problems with bleeding or clotting? No    Do you have an rashes or other skin lesions? No    Large Joint Injection/Arthocentesis: L knee joint    Date/Time: 1/17/2022 2:44 PM  Performed by: Juan Jose Carias DO  Authorized by: Juan Jose Carias DO     Indications:  Pain  Needle Size:  22 G  Guidance: landmark guided    Approach:  Lateral  Location:  Knee      Medications:  40 mg triamcinolone 40 MG/ML  Outcome:  Tolerated well, no immediate complications  Procedure discussed: discussed risks, benefits, and alternatives    Consent Given by:  Patient  Timeout: timeout called immediately prior to procedure    Prep: patient was prepped and draped in usual sterile fashion    Large Joint Injection/Arthocentesis: R hip joint    Date/Time: 1/17/2022 2:45 PM  Performed by: Jv  Juan Jose Diaz DO  Authorized by: Juan Jose Carias DO     Indications:  Pain  Needle Size:  25 G  Guidance: ultrasound    Approach:  Anterior  Location:  Hip      Site:  R hip joint  Medications:  40 mg triamcinolone 40 MG/ML  Outcome:  Tolerated well, no immediate complications  Procedure discussed: discussed risks, benefits, and alternatives    Consent Given by:  Patient  Timeout: timeout called immediately prior to procedure    Prep: patient was prepped and draped in usual sterile fashion       Intraarticular Hip Injection - Ultrasound Guided  The patient was informed of the risks and the benefits of the procedure and a written consent was signed.  The patient s right hip was prepped with chlorhexidine in sterile fashion.   40 mg of triamcinolone suspension was drawn up into a 5 mL syringe with 4 mL of 1% lidocaine.  Injection was performed using sterile technique.  Under ultrasound guidance a 3.5-inch 22-gauge needle was used to enter the femoracetabular joint.  Anterior approach was used, needle placement was visualized and documented with ultrasound.  Ultrasound visualization was necessary due to decreased joint space in the setting of osteoarthritis.  Injection performed long axis to the probe.  Injection solution visualized within the joint space.  Images were permanently stored for the patient's record.  There were no complications. The patient tolerated the procedure well. There was negligible bleeding.       PROCEDURE    Knee Injection - Intraarticular  The patient was informed of the risks and the benefits of the procedure and a written consent was signed.  The patient s left knee was prepped with chlorhexidine in sterile fashion.   40 mg of triamcinolone suspension was drawn up into a 5 mL syringe with 4 mL of 1% lidocaine.  Injection was performed using substerile technique.  A 1.5-inch 22-gauge needle was used to enter the lateral aspect of the knee.  Injection performed successfully without difficulty.   There were no complications. The patient tolerated the procedure well. There was negligible bleeding.

## 2022-01-31 ENCOUNTER — OFFICE VISIT (OUTPATIENT)
Dept: ORTHOPEDICS | Facility: CLINIC | Age: 72
End: 2022-01-31
Payer: COMMERCIAL

## 2022-01-31 DIAGNOSIS — M17.11 PRIMARY OSTEOARTHRITIS OF RIGHT KNEE: ICD-10-CM

## 2022-01-31 DIAGNOSIS — M16.12 PRIMARY OSTEOARTHRITIS OF LEFT HIP: ICD-10-CM

## 2022-01-31 DIAGNOSIS — M17.0 BILATERAL PRIMARY OSTEOARTHRITIS OF KNEE: Primary | ICD-10-CM

## 2022-01-31 PROCEDURE — 20610 DRAIN/INJ JOINT/BURSA W/O US: CPT | Mod: RT | Performed by: FAMILY MEDICINE

## 2022-01-31 PROCEDURE — 99207 PR DROP WITH A PROCEDURE: CPT | Performed by: FAMILY MEDICINE

## 2022-01-31 PROCEDURE — 20611 DRAIN/INJ JOINT/BURSA W/US: CPT | Mod: LT | Performed by: FAMILY MEDICINE

## 2022-01-31 RX ORDER — TRIAMCINOLONE ACETONIDE 40 MG/ML
40 INJECTION, SUSPENSION INTRA-ARTICULAR; INTRAMUSCULAR
Status: SHIPPED | OUTPATIENT
Start: 2022-01-31

## 2022-01-31 RX ADMIN — TRIAMCINOLONE ACETONIDE 40 MG: 40 INJECTION, SUSPENSION INTRA-ARTICULAR; INTRAMUSCULAR at 13:18

## 2022-01-31 RX ADMIN — TRIAMCINOLONE ACETONIDE 40 MG: 40 INJECTION, SUSPENSION INTRA-ARTICULAR; INTRAMUSCULAR at 13:19

## 2022-01-31 NOTE — LETTER
1/31/2022         RE: Radha Corbett  [address] [address]        Dear Colleague,    Thank you for referring your patient, Radha Corbett, to the [facility] [place]. Please see a copy of my visit note below.    Radha has bilateral hip and bilateral knee osteoarthritis.  She is here for injections of her right knee and left hip with corticosteroid.      Large Joint Injection/Arthocentesis: R knee joint    Date/Time: 1/31/2022 1:18 PM  Performed by: Juan Jose Carias DO  Authorized by: Juan Jose Carias DO     Indications:  Pain  Needle Size:  22 G  Guidance: landmark guided    Approach:  Lateral  Location:  Knee      Medications:  40 mg triamcinolone 40 MG/ML  Outcome:  Tolerated well, no immediate complications  Procedure discussed: discussed risks, benefits, and alternatives    Consent Given by:  Patient  Timeout: timeout called immediately prior to procedure    Prep: patient was prepped and draped in usual sterile fashion    Large Joint Injection/Arthocentesis: L hip joint    Date/Time: 1/31/2022 1:19 PM  Performed by: Juan Jose Carias DO  Authorized by: Juan Jose Carias DO     Indications:  Pain  Needle Size:  22 G  Guidance: ultrasound    Approach:  Anterior  Location:  Hip      Site:  L hip joint  Medications:  40 mg triamcinolone 40 MG/ML  Outcome:  Tolerated well, no immediate complications  Procedure discussed: discussed risks, benefits, and alternatives    Consent Given by:  Patient  Timeout: timeout called immediately prior to procedure    Prep: patient was prepped and draped in usual sterile fashion       PROCEDURE    Knee Injection - Intraarticular  The patient was informed of the risks and the benefits of the procedure and a written consent was signed.  The patient s right knee was prepped with chlorhexidine in sterile fashion.   40 mg of triamcinolone suspension was drawn up into a 5 mL syringe with 4 mL of 1% lidocaine.  Injection was performed  using substerile technique.  A 1.5-inch 22-gauge needle was used to enter the lateral aspect of the knee.  Injection performed successfully without difficulty.  There were no complications. The patient tolerated the procedure well. There was negligible bleeding.     Intraarticular Hip Injection - Ultrasound Guided  The patient was informed of the risks and the benefits of the procedure and a written consent was signed.  The patient s left hip was prepped with chlorhexidine in sterile fashion.   40 mg of triamcinolone suspension was drawn up into a 5 mL syringe with 4 mL of 1% lidocaine.  Injection was performed using sterile technique.  Under ultrasound guidance a 3.5-inch 22-gauge needle was used to enter the femoracetabular joint.  Anterior approach was used, needle placement was visualized and documented with ultrasound.  Ultrasound visualization was necessary due to decreased joint space in the setting of osteoarthritis.  Injection performed long axis to the probe.  Injection solution visualized within the joint space.  Images were permanently stored for the patient's record.  There were no complications. The patient tolerated the procedure well. There was negligible bleeding.             \      Again, thank you for allowing me to participate in the care of your patient.        Sincerely,        Juan Jose Carias DO

## 2022-01-31 NOTE — PROGRESS NOTES
Radha has bilateral hip and bilateral knee osteoarthritis.  She is here for injections of her right knee and left hip with corticosteroid.      Large Joint Injection/Arthocentesis: R knee joint    Date/Time: 1/31/2022 1:18 PM  Performed by: Juan Jose Carias DO  Authorized by: Juan Jose Carias DO     Indications:  Pain  Needle Size:  22 G  Guidance: landmark guided    Approach:  Lateral  Location:  Knee      Medications:  40 mg triamcinolone 40 MG/ML  Outcome:  Tolerated well, no immediate complications  Procedure discussed: discussed risks, benefits, and alternatives    Consent Given by:  Patient  Timeout: timeout called immediately prior to procedure    Prep: patient was prepped and draped in usual sterile fashion    Large Joint Injection/Arthocentesis: L hip joint    Date/Time: 1/31/2022 1:19 PM  Performed by: Juan Jose Carias DO  Authorized by: Juan Jose Carias DO     Indications:  Pain  Needle Size:  22 G  Guidance: ultrasound    Approach:  Anterior  Location:  Hip      Site:  L hip joint  Medications:  40 mg triamcinolone 40 MG/ML  Outcome:  Tolerated well, no immediate complications  Procedure discussed: discussed risks, benefits, and alternatives    Consent Given by:  Patient  Timeout: timeout called immediately prior to procedure    Prep: patient was prepped and draped in usual sterile fashion       PROCEDURE    Knee Injection - Intraarticular  The patient was informed of the risks and the benefits of the procedure and a written consent was signed.  The patient s right knee was prepped with chlorhexidine in sterile fashion.   40 mg of triamcinolone suspension was drawn up into a 5 mL syringe with 4 mL of 1% lidocaine.  Injection was performed using substerile technique.  A 1.5-inch 22-gauge needle was used to enter the lateral aspect of the knee.  Injection performed successfully without difficulty.  There were no complications. The patient tolerated the procedure well. There was negligible bleeding.      Intraarticular Hip Injection - Ultrasound Guided  The patient was informed of the risks and the benefits of the procedure and a written consent was signed.  The patient s left hip was prepped with chlorhexidine in sterile fashion.   40 mg of triamcinolone suspension was drawn up into a 5 mL syringe with 4 mL of 1% lidocaine.  Injection was performed using sterile technique.  Under ultrasound guidance a 3.5-inch 22-gauge needle was used to enter the femoracetabular joint.  Anterior approach was used, needle placement was visualized and documented with ultrasound.  Ultrasound visualization was necessary due to decreased joint space in the setting of osteoarthritis.  Injection performed long axis to the probe.  Injection solution visualized within the joint space.  Images were permanently stored for the patient's record.  There were no complications. The patient tolerated the procedure well. There was negligible bleeding.             \

## 2022-03-13 ENCOUNTER — HEALTH MAINTENANCE LETTER (OUTPATIENT)
Age: 72
End: 2022-03-13

## 2022-04-22 DIAGNOSIS — N32.81 OVERACTIVE BLADDER: ICD-10-CM

## 2022-04-26 RX ORDER — TROSPIUM CHLORIDE ER 60 MG/1
CAPSULE ORAL
Qty: 90 CAPSULE | Refills: 3 | Status: SHIPPED | OUTPATIENT
Start: 2022-04-26 | End: 2023-04-24

## 2022-04-26 NOTE — TELEPHONE ENCOUNTER
Patient called regarding her refill. She states that she is almost out and would like this filled ASAP.    Niurka Leonard RN [facility]

## 2022-05-24 ENCOUNTER — ANCILLARY PROCEDURE (OUTPATIENT)
Dept: GENERAL RADIOLOGY | Facility: CLINIC | Age: 72
End: 2022-05-24
Attending: INTERNAL MEDICINE
Payer: COMMERCIAL

## 2022-05-24 ENCOUNTER — OFFICE VISIT (OUTPATIENT)
Dept: FAMILY MEDICINE | Facility: CLINIC | Age: 72
End: 2022-05-24

## 2022-05-24 VITALS
SYSTOLIC BLOOD PRESSURE: 92 MMHG | RESPIRATION RATE: 20 BRPM | BODY MASS INDEX: 28.7 KG/M2 | TEMPERATURE: 98.2 F | HEART RATE: 120 BPM | OXYGEN SATURATION: 96 % | DIASTOLIC BLOOD PRESSURE: 56 MMHG | HEIGHT: 67 IN

## 2022-05-24 DIAGNOSIS — M25.551 HIP PAIN, RIGHT: ICD-10-CM

## 2022-05-24 DIAGNOSIS — I10 HYPERTENSION GOAL BP (BLOOD PRESSURE) < 140/90: ICD-10-CM

## 2022-05-24 DIAGNOSIS — F51.04 CHRONIC INSOMNIA: ICD-10-CM

## 2022-05-24 DIAGNOSIS — B35.1 ONYCHOMYCOSIS: ICD-10-CM

## 2022-05-24 DIAGNOSIS — F33.42 RECURRENT MAJOR DEPRESSIVE DISORDER, IN FULL REMISSION (H): ICD-10-CM

## 2022-05-24 DIAGNOSIS — E28.39 ESTROGEN DEFICIENCY: ICD-10-CM

## 2022-05-24 DIAGNOSIS — E78.5 HYPERLIPIDEMIA LDL GOAL <100: ICD-10-CM

## 2022-05-24 DIAGNOSIS — M85.852 OSTEOPENIA OF BOTH HIPS: ICD-10-CM

## 2022-05-24 DIAGNOSIS — F17.200 CURRENT SMOKER: ICD-10-CM

## 2022-05-24 DIAGNOSIS — M85.851 OSTEOPENIA OF BOTH HIPS: ICD-10-CM

## 2022-05-24 DIAGNOSIS — E11.9 TYPE 2 DIABETES MELLITUS WITHOUT COMPLICATION, WITHOUT LONG-TERM CURRENT USE OF INSULIN (H): Primary | ICD-10-CM

## 2022-05-24 DIAGNOSIS — M16.11 PRIMARY OSTEOARTHRITIS OF RIGHT HIP: ICD-10-CM

## 2022-05-24 DIAGNOSIS — K21.9 GASTROESOPHAGEAL REFLUX DISEASE WITHOUT ESOPHAGITIS: ICD-10-CM

## 2022-05-24 DIAGNOSIS — M85.80 OSTEOPENIA, UNSPECIFIED LOCATION: ICD-10-CM

## 2022-05-24 DIAGNOSIS — Z23 HIGH PRIORITY FOR 2019-NCOV VACCINE: ICD-10-CM

## 2022-05-24 DIAGNOSIS — R00.0 SINUS TACHYCARDIA: ICD-10-CM

## 2022-05-24 DIAGNOSIS — Z12.31 VISIT FOR SCREENING MAMMOGRAM: ICD-10-CM

## 2022-05-24 LAB
ANION GAP SERPL CALCULATED.3IONS-SCNC: 8 MMOL/L (ref 3–14)
BUN SERPL-MCNC: 19 MG/DL (ref 7–30)
CALCIUM SERPL-MCNC: 9.3 MG/DL (ref 8.5–10.1)
CHLORIDE BLD-SCNC: 105 MMOL/L (ref 94–109)
CHOLEST SERPL-MCNC: 161 MG/DL
CO2 SERPL-SCNC: 23 MMOL/L (ref 20–32)
CREAT SERPL-MCNC: 0.86 MG/DL (ref 0.52–1.04)
FASTING STATUS PATIENT QL REPORTED: YES
GFR SERPL CREATININE-BSD FRML MDRD: 72 ML/MIN/1.73M2
GLUCOSE BLD-MCNC: 89 MG/DL (ref 70–99)
HBA1C MFR BLD: 6.3 % (ref 0–5.6)
HDLC SERPL-MCNC: 45 MG/DL
HGB BLD-MCNC: 13.2 G/DL (ref 11.7–15.7)
LDLC SERPL CALC-MCNC: 84 MG/DL
NONHDLC SERPL-MCNC: 116 MG/DL
POTASSIUM BLD-SCNC: 4.6 MMOL/L (ref 3.4–5.3)
SODIUM SERPL-SCNC: 136 MMOL/L (ref 133–144)
TRIGL SERPL-MCNC: 162 MG/DL
TSH SERPL DL<=0.005 MIU/L-ACNC: 0.52 MU/L (ref 0.4–4)

## 2022-05-24 PROCEDURE — 91306 COVID-19,PF,MODERNA (18+ YRS BOOSTER .25ML): CPT | Performed by: INTERNAL MEDICINE

## 2022-05-24 PROCEDURE — 80061 LIPID PANEL: CPT | Performed by: INTERNAL MEDICINE

## 2022-05-24 PROCEDURE — 73502 X-RAY EXAM HIP UNI 2-3 VIEWS: CPT | Mod: TC | Performed by: RADIOLOGY

## 2022-05-24 PROCEDURE — 83036 HEMOGLOBIN GLYCOSYLATED A1C: CPT | Performed by: INTERNAL MEDICINE

## 2022-05-24 PROCEDURE — 85018 HEMOGLOBIN: CPT | Performed by: INTERNAL MEDICINE

## 2022-05-24 PROCEDURE — 99215 OFFICE O/P EST HI 40 MIN: CPT | Mod: 25 | Performed by: INTERNAL MEDICINE

## 2022-05-24 PROCEDURE — 0064A COVID-19,PF,MODERNA (18+ YRS BOOSTER .25ML): CPT | Performed by: INTERNAL MEDICINE

## 2022-05-24 PROCEDURE — 84443 ASSAY THYROID STIM HORMONE: CPT | Performed by: INTERNAL MEDICINE

## 2022-05-24 PROCEDURE — 90750 HZV VACC RECOMBINANT IM: CPT | Performed by: INTERNAL MEDICINE

## 2022-05-24 PROCEDURE — 36415 COLL VENOUS BLD VENIPUNCTURE: CPT | Performed by: INTERNAL MEDICINE

## 2022-05-24 PROCEDURE — 90471 IMMUNIZATION ADMIN: CPT | Performed by: INTERNAL MEDICINE

## 2022-05-24 PROCEDURE — 99207 PR FOOT EXAM NO CHARGE: CPT | Performed by: INTERNAL MEDICINE

## 2022-05-24 PROCEDURE — 96127 BRIEF EMOTIONAL/BEHAV ASSMT: CPT | Performed by: INTERNAL MEDICINE

## 2022-05-24 PROCEDURE — 80048 BASIC METABOLIC PNL TOTAL CA: CPT | Performed by: INTERNAL MEDICINE

## 2022-05-24 RX ORDER — OMEPRAZOLE 40 MG/1
40 CAPSULE, DELAYED RELEASE ORAL DAILY
Qty: 90 CAPSULE | Refills: 3 | Status: SHIPPED | OUTPATIENT
Start: 2022-05-24 | End: 2023-05-30

## 2022-05-24 RX ORDER — SIMVASTATIN 20 MG
20 TABLET ORAL AT BEDTIME
Qty: 90 TABLET | Refills: 0 | Status: SHIPPED | OUTPATIENT
Start: 2022-05-24 | End: 2022-08-26

## 2022-05-24 RX ORDER — GLIPIZIDE 10 MG/1
10 TABLET, FILM COATED, EXTENDED RELEASE ORAL DAILY
Qty: 90 TABLET | Refills: 1 | Status: SHIPPED | OUTPATIENT
Start: 2022-05-24 | End: 2023-01-10

## 2022-05-24 RX ORDER — TRAZODONE HYDROCHLORIDE 100 MG/1
TABLET ORAL
Qty: 90 TABLET | Refills: 3 | Status: SHIPPED | OUTPATIENT
Start: 2022-05-24 | End: 2023-06-13

## 2022-05-24 RX ORDER — METFORMIN HCL 500 MG
1000 TABLET, EXTENDED RELEASE 24 HR ORAL 2 TIMES DAILY WITH MEALS
Qty: 360 TABLET | Refills: 1 | Status: SHIPPED | OUTPATIENT
Start: 2022-05-24 | End: 2023-01-12

## 2022-05-24 ASSESSMENT — PATIENT HEALTH QUESTIONNAIRE - PHQ9
SUM OF ALL RESPONSES TO PHQ QUESTIONS 1-9: 3
SUM OF ALL RESPONSES TO PHQ QUESTIONS 1-9: 3
10. IF YOU CHECKED OFF ANY PROBLEMS, HOW DIFFICULT HAVE THESE PROBLEMS MADE IT FOR YOU TO DO YOUR WORK, TAKE CARE OF THINGS AT HOME, OR GET ALONG WITH OTHER PEOPLE: VERY DIFFICULT

## 2022-05-24 ASSESSMENT — PAIN SCALES - GENERAL: PAINLEVEL: WORST PAIN (10)

## 2022-05-24 NOTE — PATIENT INSTRUCTIONS
Additional instructions:  Reduce glipizide to 10 mg daily.   Mammogram/bone density: Call [phone] to schedule at [facility]

## 2022-05-24 NOTE — PROGRESS NOTES
Assessment & Plan   Radha was seen today for diabetes and hip pain.    Diagnoses and all orders for this visit:    Type 2 diabetes mellitus without complication, without long-term current use of insulin (H)  -     Albumin Random Urine Quantitative with Creat Ratio; Future  -     HEMOGLOBIN A1C; Future  -     Orthopedic  Referral; Future  -     HEMOGLOBIN A1C  -     FOOT EXAM    Visit for screening mammogram  -     MA SCREENING DIGITAL BILAT - Future  (s+30); Future    Recurrent major depressive disorder, in full remission (H)  Comments:  no longer on duloxetine.     Hypertension goal BP (blood pressure) < 140/90  Comments:  BP low. stop spironolactone. continue with Lisinopril 5 mg daily.   Orders:  -     Hemoglobin; Future  -     Albumin Random Urine Quantitative with Creat Ratio; Future  -     BASIC METABOLIC PANEL; Future  -     Hemoglobin  -     BASIC METABOLIC PANEL    Hip pain, right  Comments:  xray showed no fracture but severe OA. follow up with Dr. Carias in sports med  Orders:  -     XR Hip Right 2-3 Views; Future    High priority for 2019-nCoV vaccine  -     COVID-19,PF,MODERNA (18+ YRS BOOSTER .25ML)    Sinus tachycardia  Comments:  present for a few years, had severe ECG all with sinus tachycardia. today low BP, may be reflex tachy? May consider BB down the road.   Orders:  -     TSH with free T4 reflex; Future  -     TSH with free T4 reflex    Onychomycosis  -     Orthopedic  Referral; Future    Hyperlipidemia LDL goal <100  -     Lipid panel reflex to direct LDL Fasting  -     simvastatin (ZOCOR) 20 MG tablet; Take 1 tablet (20 mg) by mouth At Bedtime    Gastroesophageal reflux disease without esophagitis  -     omeprazole (PRILOSEC) 40 MG DR capsule; Take 1 capsule (40 mg) by mouth daily 30 to 60 minutes before a meal.    Chronic insomnia  Comments:  continue trazodone.   Orders:  -     traZODone (DESYREL) 100 MG tablet; TAKE 1 TABLET NIGHTLY AS NEEDED FOR SLEEP    Current  smoker  Comments:  not ready to quit. knows she needs to. Gave quit partner info  Orders:  -     DX Hip/Pelvis/Spine; Future    Primary osteoarthritis of right hip  Comments:  see sports med    Osteopenia of both hips  -     DX Hip/Pelvis/Spine; Future    Osteopenia, unspecified location    Estrogen deficiency  -     DX Hip/Pelvis/Spine; Future    Other orders  -     ZOSTER VACCINE RECOMBINANT ADJUVANTED (SHINGRIX)  -     REVIEW OF HEALTH MAINTENANCE PROTOCOL ORDERS  -     glipiZIDE (GLUCOTROL XL) 10 MG 24 hr tablet; Take 1 tablet (10 mg) by mouth daily  -     metFORMIN (GLUCOPHAGE XR) 500 MG 24 hr tablet; Take 2 tablets (1,000 mg) by mouth 2 times daily (with meals)         Return in 6 months (on 11/24/2022) for wellness visit.    I spent a total of 41 minutes on the day of the visit.  doing chart review, history and exam, documentation and further activities as noted above      Trice Medeiros MD PhD  [facility] [place]    Domonique Garcia is a 71 year old who presents for the following health issues     Pt would like to get moderna booster today    History of Present Illness       Diabetes:   She presents for follow up of diabetes.  She is checking home blood glucose a few times a month. She checks blood glucose before meals.  Blood glucose is never over 200 and never under 70. When her blood glucose is low, the patient is asymptomatic for confusion, blurred vision, lethargy and reports not feeling dizzy, shaky, or weak.  She has no concerns regarding her diabetes at this time.  She is not experiencing numbness or burning in feet, excessive thirst, blurry vision, weight changes or redness, sores or blisters on feet.         She eats 2-3 servings of fruits and vegetables daily.She consumes 0 sweetened beverage(s) daily.She exercises with enough effort to increase her heart rate 9 or less minutes per day.  She exercises with enough effort to increase her heart rate 3 or less days per week.   She is  "taking medications regularly.    Today's PHQ-9         PHQ-9 Total Score: 3    PHQ-9 Q9 Thoughts of better off dead/self-harm past 2 weeks :   Not at all    How difficult have these problems made it for you to do your work, take care of things at home, or get along with other people: Very difficult     Last year has become more crippled up. Has been working with sports medicine Dr. Carias last year, doing PT. Has OA in both hips and knees. Has had injections in hip and knees last fall. Last visit with Dr. Carias in January.     This past Sunday was trying to take a bath and heard a crack on her RT hip area. She is unable to stand or move without pain.    DM: has not checked glucose for a few months. She takes the glipizide 10 mg XR at least once a day, sometimes 2 a day. Prescription is for 20 mg daily. Takes metformin as prescribed.     PHQ-9 SCORE 10/19/2020 6/11/2021 5/24/2022   PHQ-9 Total Score - - -   PHQ-9 Total Score MyChart - 3 (Minimal depression) 3 (Minimal depression)   PHQ-9 Total Score 12 3 3     TARAS-7 SCORE 1/7/2020 10/19/2020 6/11/2021   Total Score - - -   Total Score - - 4 (minimal anxiety)   Total Score 16 11 4     Started up smoking again. Lost 2 friends, one to cancer and one to covid.     She takes spironolactone for \"greasy skin\". She currently takes 1/2 of the spironolactone, not the full tablet (100 mg).     Quit smoking at age 35. Started off and on since 2018. Not more than 1/2 pack a day.     Review of Systems   Constitutional, HEENT, cardiovascular, pulmonary, gi and gu systems are negative, except as otherwise noted.      Objective    BP 92/56   Pulse 120   Temp 98.2  F (36.8  C) (Tympanic)   Resp 20   Ht 1.708 m (5' 7.25\")   LMP 11/18/1991 (Exact Date)   SpO2 96%   BMI 28.70 kg/m    Body mass index is 28.7 kg/m .  Physical Exam   GENERAL: healthy, alert and no distress  NECK: no adenopathy, no asymmetry, masses, or scars and thyroid normal to palpation  RESP: lungs clear to " auscultation - no rales, rhonchi or wheezes  CV: regular rate and rhythm, normal S1 S2, no S3 or S4, no murmur, click or rub, no peripheral edema and peripheral pulses strong  ABDOMEN: soft, nontender, no hepatosplenomegaly, no masses and bowel sounds normal  MS: no gross musculoskeletal defects noted, no edema  Diabetic foot exam: normal DP and PT pulses, no trophic changes or ulcerative lesions, normal sensory exam and onychomycosis    Results for orders placed or performed in visit on 05/24/22 (from the past 24 hour(s))   Lipid panel reflex to direct LDL Fasting   Result Value Ref Range    Cholesterol 161 <200 mg/dL    Triglycerides 162 (H) <150 mg/dL    Direct Measure HDL 45 (L) >=50 mg/dL    LDL Cholesterol Calculated 84 <=100 mg/dL    Non HDL Cholesterol 116 <130 mg/dL    Patient Fasting > 8hrs? Yes     Narrative    Cholesterol  Desirable:  <200 mg/dL    Triglycerides  Normal:  Less than 150 mg/dL  Borderline High:  150-199 mg/dL  High:  200-499 mg/dL  Very High:  Greater than or equal to 500 mg/dL    Direct Measure HDL  Female:  Greater than or equal to 50 mg/dL   Male:  Greater than or equal to 40 mg/dL    LDL Cholesterol  Desirable:  <100mg/dL  Above Desirable:  100-129 mg/dL   Borderline High:  130-159 mg/dL   High:  160-189 mg/dL   Very High:  >= 190 mg/dL    Non HDL Cholesterol  Desirable:  130 mg/dL  Above Desirable:  130-159 mg/dL  Borderline High:  160-189 mg/dL  High:  190-219 mg/dL  Very High:  Greater than or equal to 220 mg/dL   Hemoglobin   Result Value Ref Range    Hemoglobin 13.2 11.7 - 15.7 g/dL   HEMOGLOBIN A1C   Result Value Ref Range    Hemoglobin A1C 6.3 (H) 0.0 - 5.6 %   BASIC METABOLIC PANEL   Result Value Ref Range    Sodium 136 133 - 144 mmol/L    Potassium 4.6 3.4 - 5.3 mmol/L    Chloride 105 94 - 109 mmol/L    Carbon Dioxide (CO2) 23 20 - 32 mmol/L    Anion Gap 8 3 - 14 mmol/L    Urea Nitrogen 19 7 - 30 mg/dL    Creatinine 0.86 0.52 - 1.04 mg/dL    Calcium 9.3 8.5 - 10.1 mg/dL     Glucose 89 70 - 99 mg/dL    GFR Estimate 72 >60 mL/min/1.73m2   TSH with free T4 reflex   Result Value Ref Range    TSH 0.52 0.40 - 4.00 mU/L       Recent Results (from the past 744 hour(s))   XR Hip Right 2-3 Views    Narrative    XR HIP RIGHT 2-3 VIEWS 5/24/2022 3:22 PM     HISTORY: right hip pain, heard a crack while trying to get into  shower, unable to bear weight, no fall.; Hip pain, right    COMPARISON: 7/9/2021       Impression    IMPRESSION: Severe degenerative changes of the right hip. No acute  fracture or dislocation. Right-sided sacral stimulator device is  unchanged.    JUDITH MULLEN MD         SYSTEM ID:  [id number]

## 2022-05-24 NOTE — RESULT ENCOUNTER NOTE
Please call patient:  xray didn't show fracture. It showed severe osteoarthritis of the right hip. Advise her to call Dr. Carias for evaluation. She may need another injection or repeat xray     Trice Medeiros MD PhD

## 2022-05-25 NOTE — RESULT ENCOUNTER NOTE
Pt wanted to be called regarding result: labs were normal or stale. A1c is excellent at 6.3. continue with metformin but take glipiide only 10 mg daily (1 tablet daily). Do not take the second dose. Return for wellness visit and A1c/urine protein in 6 months. Do not need to fast for that. She is also due for mammogram/bone density. She can schedule these at her convenience.

## 2022-05-31 ENCOUNTER — TELEPHONE (OUTPATIENT)
Dept: ORTHOPEDICS | Facility: CLINIC | Age: 72
End: 2022-05-31
Payer: COMMERCIAL

## 2022-05-31 NOTE — TELEPHONE ENCOUNTER
[facility] Call Center    Phone Message    May a detailed message be left on voicemail: yes     Reason for Call: Other: Other: Something cracked in patient's groin. Radiologist said there was no fracture. Since then, patient still cannot put weight on legs when standing in certain positions. Patient would like to talk specifically to Dr. Carias about issue. Please call back at [phone].      Action Taken: Other: [facility]    Travel Screening: Not Applicable

## 2022-06-01 NOTE — TELEPHONE ENCOUNTER
6/1 Called and spoke to patient. She declined to schedule follow up appointment and wants to talk to dr. Carias nurse in regards to what is going on.     Can you please call patient.     Thanks   Twila srinivasan Procedure   Orthopedics, Podiatry, Sports Medicine, ENT/Eye Specialties  [facility]   [phone]    Yes

## 2022-06-02 NOTE — TELEPHONE ENCOUNTER
Patient was contacted, she states she is unable to WB on her right leg. She felt a pop in her right groin area. She was seen by her PCP, no fractures found. She would like Dr. Carias to review her XR. Dr. Carias is out of the office until Monday, she is agreeable. She knows she needs a hip replacement, but she is not interested in that at this time. She was asked how the hip injection in Jan was, she feels it helped some. It was explained to the patient that she can have 3-4 a year, with at least 3 months in between injections. She is agreeable and will call to schedule.

## 2022-06-08 ENCOUNTER — TELEPHONE (OUTPATIENT)
Dept: UROLOGY | Facility: CLINIC | Age: 72
End: 2022-06-08
Payer: COMMERCIAL

## 2022-06-08 DIAGNOSIS — N39.0 RECURRENT UTI: ICD-10-CM

## 2022-06-08 RX ORDER — TRIMETHOPRIM 100 MG/1
100 TABLET ORAL AT BEDTIME
Qty: 90 TABLET | Refills: 3 | Status: SHIPPED | OUTPATIENT
Start: 2022-06-08 | End: 2023-05-15

## 2022-06-08 RX ORDER — TRIMETHOPRIM 100 MG/1
TABLET ORAL
Qty: 90 TABLET | Refills: 3 | OUTPATIENT
Start: 2022-06-08

## 2022-06-08 NOTE — TELEPHONE ENCOUNTER
trimethoprim (TRIMPEX) 100 MG tablet      Last Written Prescription Date:  5/3/21  Last Fill Quantity: 90,   # refills: 3  Last Office Visit : 10/21/19-return in 6-8 weeks with a voiding diary  Future Office visit:  0    Routing refill request to provider for review/approval because:  Drug not on the [facility], P or [facility] refill protocol   Pt outside of RTC timeframe.

## 2022-06-08 NOTE — TELEPHONE ENCOUNTER
[facility] Call Center    Phone Message    May a detailed message be left on voicemail: yes     Reason for Call: Medication Refill Request    Has the patient contacted the pharmacy for the refill? Yes   Name of medication being requested: trimethoprim (TRIMPEX) 100 MG tablet  Provider who prescribed the medication:   Pharmacy: [facility] - [address]  Date medication is needed: asap- pt has 1 week left         Action Taken: Message routed to:  Other: med refills    Travel Screening: Not Applicable

## 2022-06-10 NOTE — TELEPHONE ENCOUNTER
Patient was contacted today. She states that her hip pain in slightly better. She is concerned about not drinking enough water as she has a hard time getting up to use the restroom.  The patient was encouraged to be drinking fluid and to follow up with PCP about increasing fluids. Dr. Carias is recommending an MRI, a consult with a surgeon and or a wheelchair. The patient would like to give her hip another week to see if it gets better. She will call back next week with an update.

## 2022-08-01 ENCOUNTER — TELEPHONE (OUTPATIENT)
Dept: FAMILY MEDICINE | Facility: CLINIC | Age: 72
End: 2022-08-01

## 2022-08-01 DIAGNOSIS — R09.89 PHLEGM IN THROAT: Primary | ICD-10-CM

## 2022-08-01 NOTE — TELEPHONE ENCOUNTER
"Patient quit smoking 25 years ago and restarted two years ago.  For the last couple of weeks had phlegm in her throat that she can't clear up and is requesting a referral to ENT.  \"I am scared, I have a blockage. I quit smoking a 1.5  week ago.\"    Offered to schedule a clinic visit.  Patient replied: \"I have a stress fracture in my hip, I can't get into the car, I have not showered in 6 weeks.  My  is 80 years old and has a hip fracture, he can't help me get into the car, but he can drive.  I don't have any young people to help me into the car.    I want to go to ENT straight, don't want to waste time. It doesn't have to be today.\"    Advised patient to call transportation line on the back of her medical card to arrange a ride with assistance.     Please review and advise when able.  Carina Torrez RN    "

## 2022-08-02 NOTE — TELEPHONE ENCOUNTER
Patient is calling very tearful in regards to referral for ENT that was placed. Patient stated that they called from the [facility] to schedule and the patient does not want to be seen at the [facility] due to lack of mobility.     Patient stated that she would like to be seen at the ENT in [place].     Patient is reaching out to provider wondering if a referral can be placed for the ENT in [place].     Patient is very tearful and is very worried about what is going on in her throat and would like provider to call patient.     Routing to provider to review and advise.     Susi Javier RN, BSN  [facility]

## 2022-08-02 NOTE — TELEPHONE ENCOUNTER
Placed new referral to request [facility]   Also can call and discuss wanting to be at [facility] will call you to coordinate your care as prescribed by the provider. If you don t hear from a representative within 2 business days, please call [phone].

## 2022-08-02 NOTE — TELEPHONE ENCOUNTER
Diagnoses: Phlegm in throat   Order: Adult Ent  Referral        Comment: Please be aware that coverage of these services is subject to the terms and limitations of your health insurance plan.  Call member services at your health plan with any benefit or coverage questions.   [facility] will call you to coordinate your care as prescribed by the provider. If you don t hear from a representative within 2 business days, please call [phone].

## 2022-08-16 ENCOUNTER — OFFICE VISIT (OUTPATIENT)
Dept: OTOLARYNGOLOGY | Facility: CLINIC | Age: 72
End: 2022-08-16
Attending: NURSE PRACTITIONER
Payer: COMMERCIAL

## 2022-08-16 VITALS — DIASTOLIC BLOOD PRESSURE: 68 MMHG | HEART RATE: 91 BPM | SYSTOLIC BLOOD PRESSURE: 101 MMHG

## 2022-08-16 DIAGNOSIS — R09.82 PND (POST-NASAL DRIP): ICD-10-CM

## 2022-08-16 DIAGNOSIS — R09.A2 GLOBUS SENSATION: Primary | ICD-10-CM

## 2022-08-16 DIAGNOSIS — R09.89 PHLEGM IN THROAT: ICD-10-CM

## 2022-08-16 DIAGNOSIS — K21.9 LPRD (LARYNGOPHARYNGEAL REFLUX DISEASE): ICD-10-CM

## 2022-08-16 PROCEDURE — 99204 OFFICE O/P NEW MOD 45 MIN: CPT | Mod: 25 | Performed by: OTOLARYNGOLOGY

## 2022-08-16 PROCEDURE — 31575 DIAGNOSTIC LARYNGOSCOPY: CPT | Performed by: OTOLARYNGOLOGY

## 2022-08-16 RX ORDER — ESOMEPRAZOLE MAGNESIUM 40 MG/1
40 CAPSULE, DELAYED RELEASE ORAL
Qty: 30 CAPSULE | Refills: 11 | Status: SHIPPED | OUTPATIENT
Start: 2022-08-16 | End: 2024-06-06 | Stop reason: ALTCHOICE

## 2022-08-16 NOTE — LETTER
8/16/2022         RE: Radha Corbett  [address] [facility] [address]        Dear Colleague,    Thank you for referring your patient, Radha Corbett, to the [facility]. Please see a copy of my visit note below.    I am seeing this patient in consultation for phlegm in throat at the request of the provider Milly Carias.    Chief Complaint - foreign body sensation in throat    History of Present Illness - Radha Corbett is a 71 year old female who presents with a history of feeling like something is in the back of the throat since 1 month. She feels like phlegm is in the back, but she feels something is stuck in the throat. No true dysphagia in that she is eating everything. No pain. No hemoptysis. Voicing has seemed normal. They note a history of relux. Currently, the patient is taking omeprazole 40 mg. She also takes lisinopril. Some cough. She does have a smoking history. She quit for many years, but the last few years she has smoked intermittently 1/2 ppd.    Tests personally reviewed today for this visit:   1.) TSH 5/24/22 was normal  2.) BMP 5/24/22 was normal  3.) Hgb A1C 6.3    Past Medical History -   Patient Active Problem List   Diagnosis     Macular degeneration     Myopia     Hiatal hernia     IBS (irritable bowel syndrome)     Hypertension goal BP (blood pressure) < 140/90     GERD (gastroesophageal reflux disease)     Atypical ductal hyperplasia of breast     Atypical lobular hyperplasia of breast     Benign Breast Disease (PASH)     Family history of breast cancer in sister     Hyperlipidemia LDL goal <100     Breast cancer screening-high risk     Tubular adenoma of colon     Osteopenia     Alcohol ingestion, more than 4 drinks/day on alcohol screening     Umbilical hernia     Incontinence of urine     Stool incontinence     Osteoarthritis, knee     Rosacea     Anxiety     Pseudophakia     Abnormal cervical Pap smear with positive HPV DNA test     Depression with anxiety      Retinal detachment     Elevated liver function tests     Rectocele     Unexplained endometrial cells on cervical cytology     Back pain, unspecified back location, unspecified back pain laterality, unspecified chronicity     Urge incontinence     Dislocation of shoulder region     Acute lower GI bleeding     Moderate episode of recurrent major depressive disorder (H)     Angiodysplasia of colon     Diabetes mellitus, type 2 (H)       Current Medications -   Current Outpatient Medications:      ASPIRIN 81 PO, , Disp: , Rfl:      blood glucose monitoring (ONE TOUCH ULTRA 2) meter device kit, Use to test blood sugar 1 times daily or as directed., Disp: 1 kit, Rfl: 0     continuous blood glucose monitoring (FREESTYLE MELINA) sensor, For use with Freestyle Melina Flash  for continuous monitioring of blood glucose levels. Replace sensor every 10 days., Disp: 3 each, Rfl: 5     glipiZIDE (GLUCOTROL XL) 10 MG 24 hr tablet, Take 1 tablet (10 mg) by mouth daily, Disp: 90 tablet, Rfl: 1     lisinopril (ZESTRIL) 5 MG tablet, Take 1 tablet (5 mg) by mouth daily, Disp: 90 tablet, Rfl: 3     MELATONIN PO, 10 mg At Bedtime , Disp: , Rfl:      metFORMIN (GLUCOPHAGE XR) 500 MG 24 hr tablet, Take 2 tablets (1,000 mg) by mouth 2 times daily (with meals), Disp: 360 tablet, Rfl: 1     omeprazole (PRILOSEC) 40 MG DR capsule, Take 1 capsule (40 mg) by mouth daily 30 to 60 minutes before a meal., Disp: 90 capsule, Rfl: 3     ONE TOUCH LANCETS, None Entered, Disp: , Rfl:      ONE TOUCH TEST STRIPS VI, None Entered, Disp: , Rfl:      order for DME, Equipment being ordered: Light with four-wheel walker, Disp: 1 Units, Rfl: 0     order for DME, Equipment being ordered: 4 wheel walker with a seat, light weight., Disp: 1 Units, Rfl: 0     order for DME, Equipment being ordered: Walker with seat light weight, Disp: 1 Device, Rfl: 0     simvastatin (ZOCOR) 20 MG tablet, Take 1 tablet (20 mg) by mouth At Bedtime, Disp: 90 tablet, Rfl: 0      traZODone (DESYREL) 100 MG tablet, TAKE 1 TABLET NIGHTLY AS NEEDED FOR SLEEP, Disp: 90 tablet, Rfl: 3     trimethoprim (TRIMPEX) 100 MG tablet, Take 1 tablet (100 mg) by mouth At Bedtime For more refills,schedule an appointment at [phone], Disp: 90 tablet, Rfl: 3     trospium (SANCTURA XR) 60 MG CP24 24 hr capsule, TAKE 1 CAPSULE EVERY MORNING BEFORE BREAKFAST, Disp: 90 capsule, Rfl: 3    Current Facility-Administered Medications:      cross-Linked Hyaluronate (GEL-ONE) injection PRSY 30 mg, 30 mg, , , Juan Jose Carias, DO, 30 mg at 09/09/21 1615     cross-Linked Hyaluronate (GEL-ONE) injection PRSY 30 mg, 30 mg, , , Juan Jose Cariasn, DO, 30 mg at 09/09/21 1615     cross-Linked Hyaluronate (GEL-ONE) injection PRSY 30 mg, 30 mg, , , Juan Jose Cariasn, DO, 30 mg at 11/05/20 1319     cross-Linked Hyaluronate (GEL-ONE) injection PRSY 30 mg, 30 mg, , , Juan Jose Carias Joe, DO, 30 mg at 11/05/20 1319     triamcinolone (KENALOG-40) injection 40 mg, 40 mg, , , Juan Jose Cariasn, DO, 40 mg at 01/31/22 1318     triamcinolone (KENALOG-40) injection 40 mg, 40 mg, , , Juan Jose Carias Joe, DO, 40 mg at 01/31/22 1319     triamcinolone (KENALOG-40) injection 40 mg, 40 mg, , , Juan Jose Cariasn, DO, 40 mg at 01/17/22 1444     triamcinolone (KENALOG-40) injection 40 mg, 40 mg, , , Juan Jose Carias Joe, DO, 40 mg at 01/17/22 1445     triamcinolone (KENALOG-40) injection 40 mg, 40 mg, , , Juan Jose Carias Joe, DO, 40 mg at 11/05/20 1319     triamcinolone (KENALOG-40) injection 40 mg, 40 mg, , , Juan Jose Carias Joe, DO, 40 mg at 11/05/20 1319     triamcinolone (KENALOG-40) injection 40 mg, 40 mg, , , Juan Jose Carias DO, 40 mg at 10/07/19 1615     triamcinolone (KENALOG-40) injection 40 mg, 40 mg, , , Juan Jose Carias DO, 40 mg at 10/07/19 1615    Facility-Administered Medications Ordered in Other Visits:      gadobutrol (GADAVIST) injection 10 mL, 10 mL, Intravenous, Once, Trice Medeiros MD PhD    Allergies -   Allergies   Allergen Reactions      Codeine      Sulfa Drugs      Morphine Rash     Does OK with oxycodone.       Social History -   Social History     Socioeconomic History     Marital status:      Number of children: 1   Occupational History     Occupation: manager of senior living      Employer: [employer]   Tobacco Use     Smoking status: Current Every Day Smoker     Packs/day: 0.50     Years: 20.00     Pack years: 10.00     Types: Cigarettes     Start date: 2018     Smokeless tobacco: Never Used     Tobacco comment: quit at age 35. Restarted in 2018 off and on, not more than 1/2 pack per day   Substance and Sexual Activity     Alcohol use: Yes     Comment: social/3-4 per week     Drug use: No     Sexual activity: Yes     Partners: Male     Birth control/protection: Surgical   Other Topics Concern     Parent/sibling w/ CABG, MI or angioplasty before 65F 55M? No      Service No     Blood Transfusions No     Caffeine Concern No     Comment: quit drinking caffeine     Occupational Exposure No     Hobby Hazards No     Sleep Concern Yes     Comment: takes benadryl medication at night     Stress Concern Yes     Comment: some stress at work     Special Diet No     Back Care No     Exercise No     Bike Helmet No     Comment: doesn't ride bike     Seat Belt Yes       Family History -   Family History   Problem Relation Age of Onset     Hypertension Mother      Cerebrovascular Disease Mother      Arthritis Mother      Cardiovascular Mother      Circulatory Mother      Cerebrovascular Disease Father      Prostate Cancer Father 65         at 69     Asthma Brother      Prostate Cancer Brother 48        bone metastasis     Diabetes Sister      Hypertension Sister      Osteoporosis Sister      Depression Sister      Lipids Sister      Cancer Maternal Grandmother 95        spine     Breast Cancer Sister 39        also contralateral @53, mets to lungs     Cancer Sister 20        second uterine cancer in 30s also     Diabetes Sister       Hypertension Sister      Depression Sister      Osteoporosis Sister      Breast Cancer Sister 57        unilateral     Cancer Paternal Aunt 40        unknown type     Cancer Paternal Uncle         stomach;  in his 80s     Prostate Cancer Brother      Glaucoma No family hx of      Melanoma No family hx of      Skin Cancer No family hx of      Macular Degeneration No family hx of        Review of Systems - As per HPI and PMHx, she is very concerned about having throat cancer, has right hip pain from fracture, anxious, otherwise 10+ comprehensive system review is negative.    Physical Exam  /68   Pulse 91   LMP 1991 (Exact Date)   General - The patient is in no distress. Alert and oriented to person and place, answers questions and cooperates with examination appropriately.   Voice and Breathing - The patient was breathing comfortably without the use of accessory muscles. There was no wheezing, stridor, or stertor.  The patients voice was clear and strong.  Eyes - Extraocular movements intact.  Sclera were not icteric or injected, conjunctiva were pink and moist.  Mouth - Examination of the oral cavity showed pink, healthy oral mucosa. No lesions or ulcerations noted.  The tongue was mobile and midline.  Throat - The walls of the oropharynx were smooth, symmetric, and had no lesions or ulcerations.  The tonsillar pillars and soft palate were symmetric.  The uvula was midline on elevation. Tonsils absent or 0+. No masses.  Nose - External contour is symmetric, no gross deflection or scars.  Nasal mucosa is pink and moist with no abnormal mucus. The septum was midline and non-obstructive, turbinates of normal size and position.  No polyps, masses, or purulence noted on examination.  Neck -  Soft, non-tender. Palpation of the occipital, submental, submandibular, internal jugular chain, and supraclavicular nodes did not demonstrate any abnormal lymph nodes or masses. No parotid masses. Palpation of the  thyroid was soft and smooth, with no nodules or goiter appreciated.  The trachea was mobile and midline.  Neurologic - CN II-XII are grossly intact, no focal neurologic deficits.   Cardiovascular - carotid pulses are 2+ bilaterally, regular rhythm    Procedure: Flexible Endoscopy  Indication: Globus Sensation    Attempts at mirror laryngoscopy could not be performed due to gag reflex and patient anatomy. To best visualize the upper airway anatomy and due to the chief complaint and HPI, I proceeded with a fiberoptic examination. Color photographs were taken for the medical record. First I sprayed the right side of the nose with a mixture of lidocaine and neosynephrine.  I then passed the scope through the nasal cavity.  The nasal cavity was unremarkable. Some posterior clear postnasal drainage. The nasopharynx was mucosally covered and symmetric.  The Eustachian tube openings were unobstructed.  Going further down I had a clear view of the base of tongue which had normal appearing lingual tonsillar tissue.  The base of tongue was free of lesions, masses, and the vallecula was open.  The epiglottis was smooth and mucosally covered.  The supraglottic larynx was then clearly visualized. It was mostly normal, maybe some edema. The vocal cords moved smoothly and symmetrically, they were pearly white and no lesions were seen.  The pyriform sinuses were open, and the limited view of the postcricoid region did not show any lesions.                    A/P - Radhalara Corbett is a 71 year old female with globus sensation but no evidence of infection, inflammation, or neoplasm on exam to explain the symptoms. I think the most likely etiology is laryngopharyngeal reflux.  She has been on omeprazole for many years and therefore I recommend changing to Nexium 40 mg.  Possible this could be more effective.  We discussed Atrovent as a possibility if she feels 1 to 2 months of Nexium does not improve upon the globus sensation.  Lastly  lisinopril could be causing some throat symptoms and we could consider changing her off of this if postnasal drainage treatment fails.     Adult lifestyle changes to prevent LPR reviewed      Avoid eating and drinking within two to three hours prior to bedtime    Do not drink alcohol    Eat small meals and slowly    Limit problem foods:    o Caffeine  o Carbonated drinks  o Chocolate  o Peppermint  o Tomato  o Citrus fruits  o Fatty and fried foods      Lose weight    Quit smoking    Wear loose clothing      Luther Harrison MD  Otolaryngology  [facility]        Again, thank you for allowing me to participate in the care of your patient.        Sincerely,        Luther Harrison MD

## 2022-08-16 NOTE — PROGRESS NOTES
I am seeing this patient in consultation for phlegm in throat at the request of the provider Milly Carias.    Chief Complaint - foreign body sensation in throat    History of Present Illness - Radha Corbett is a 71 year old female who presents with a history of feeling like something is in the back of the throat since 1 month. She feels like phlegm is in the back, but she feels something is stuck in the throat. No true dysphagia in that she is eating everything. No pain. No hemoptysis. Voicing has seemed normal. They note a history of relux. Currently, the patient is taking omeprazole 40 mg. She also takes lisinopril. Some cough. She does have a smoking history. She quit for many years, but the last few years she has smoked intermittently 1/2 ppd.    Tests personally reviewed today for this visit:   1.) TSH 5/24/22 was normal  2.) BMP 5/24/22 was normal  3.) Hgb A1C 6.3    Past Medical History -   Patient Active Problem List   Diagnosis     Macular degeneration     Myopia     Hiatal hernia     IBS (irritable bowel syndrome)     Hypertension goal BP (blood pressure) < 140/90     GERD (gastroesophageal reflux disease)     Atypical ductal hyperplasia of breast     Atypical lobular hyperplasia of breast     Benign Breast Disease (PASH)     Family history of breast cancer in sister     Hyperlipidemia LDL goal <100     Breast cancer screening-high risk     Tubular adenoma of colon     Osteopenia     Alcohol ingestion, more than 4 drinks/day on alcohol screening     Umbilical hernia     Incontinence of urine     Stool incontinence     Osteoarthritis, knee     Rosacea     Anxiety     Pseudophakia     Abnormal cervical Pap smear with positive HPV DNA test     Depression with anxiety     Retinal detachment     Elevated liver function tests     Rectocele     Unexplained endometrial cells on cervical cytology     Back pain, unspecified back location, unspecified back pain laterality, unspecified chronicity     Urge incontinence      Dislocation of shoulder region     Acute lower GI bleeding     Moderate episode of recurrent major depressive disorder (H)     Angiodysplasia of colon     Diabetes mellitus, type 2 (H)       Current Medications -   Current Outpatient Medications:      ASPIRIN 81 PO, , Disp: , Rfl:      blood glucose monitoring (ONE TOUCH ULTRA 2) meter device kit, Use to test blood sugar 1 times daily or as directed., Disp: 1 kit, Rfl: 0     continuous blood glucose monitoring (FREESTYLE MELINA) sensor, For use with Freestyle Melina Flash  for continuous monitioring of blood glucose levels. Replace sensor every 10 days., Disp: 3 each, Rfl: 5     glipiZIDE (GLUCOTROL XL) 10 MG 24 hr tablet, Take 1 tablet (10 mg) by mouth daily, Disp: 90 tablet, Rfl: 1     lisinopril (ZESTRIL) 5 MG tablet, Take 1 tablet (5 mg) by mouth daily, Disp: 90 tablet, Rfl: 3     MELATONIN PO, 10 mg At Bedtime , Disp: , Rfl:      metFORMIN (GLUCOPHAGE XR) 500 MG 24 hr tablet, Take 2 tablets (1,000 mg) by mouth 2 times daily (with meals), Disp: 360 tablet, Rfl: 1     omeprazole (PRILOSEC) 40 MG DR capsule, Take 1 capsule (40 mg) by mouth daily 30 to 60 minutes before a meal., Disp: 90 capsule, Rfl: 3     ONE TOUCH LANCETS, None Entered, Disp: , Rfl:      ONE TOUCH TEST STRIPS VI, None Entered, Disp: , Rfl:      order for DME, Equipment being ordered: Light with four-wheel walker, Disp: 1 Units, Rfl: 0     order for DME, Equipment being ordered: 4 wheel walker with a seat, light weight., Disp: 1 Units, Rfl: 0     order for DME, Equipment being ordered: Walker with seat light weight, Disp: 1 Device, Rfl: 0     simvastatin (ZOCOR) 20 MG tablet, Take 1 tablet (20 mg) by mouth At Bedtime, Disp: 90 tablet, Rfl: 0     traZODone (DESYREL) 100 MG tablet, TAKE 1 TABLET NIGHTLY AS NEEDED FOR SLEEP, Disp: 90 tablet, Rfl: 3     trimethoprim (TRIMPEX) 100 MG tablet, Take 1 tablet (100 mg) by mouth At Bedtime For more refills,schedule an appointment at [phone],  Disp: 90 tablet, Rfl: 3     trospium (SANCTURA XR) 60 MG CP24 24 hr capsule, TAKE 1 CAPSULE EVERY MORNING BEFORE BREAKFAST, Disp: 90 capsule, Rfl: 3    Current Facility-Administered Medications:      cross-Linked Hyaluronate (GEL-ONE) injection PRSY 30 mg, 30 mg, , , Juan Jose Carias Joe, DO, 30 mg at 09/09/21 1615     cross-Linked Hyaluronate (GEL-ONE) injection PRSY 30 mg, 30 mg, , , Jv, Juan Jose Joe, DO, 30 mg at 09/09/21 1615     cross-Linked Hyaluronate (GEL-ONE) injection PRSY 30 mg, 30 mg, , , Jv, Juan Jose Joe, DO, 30 mg at 11/05/20 1319     cross-Linked Hyaluronate (GEL-ONE) injection PRSY 30 mg, 30 mg, , , Jv, Juan Jose Joe, DO, 30 mg at 11/05/20 1319     triamcinolone (KENALOG-40) injection 40 mg, 40 mg, , , Jv, Juan Jose Joe, DO, 40 mg at 01/31/22 1318     triamcinolone (KENALOG-40) injection 40 mg, 40 mg, , , Carias, Juan Jose Joe, DO, 40 mg at 01/31/22 1319     triamcinolone (KENALOG-40) injection 40 mg, 40 mg, , , Jv, Juan Jose Joe, DO, 40 mg at 01/17/22 1444     triamcinolone (KENALOG-40) injection 40 mg, 40 mg, , , Jv, Juan Jose Joe, DO, 40 mg at 01/17/22 1445     triamcinolone (KENALOG-40) injection 40 mg, 40 mg, , , Jv, Juan Jose Joe, DO, 40 mg at 11/05/20 1319     triamcinolone (KENALOG-40) injection 40 mg, 40 mg, , , Jv, Juan Jose Joe, DO, 40 mg at 11/05/20 1319     triamcinolone (KENALOG-40) injection 40 mg, 40 mg, , , Jv, Juan Jose Joe, DO, 40 mg at 10/07/19 1615     triamcinolone (KENALOG-40) injection 40 mg, 40 mg, , , Jv, Juan Jose Joe, DO, 40 mg at 10/07/19 1615    Facility-Administered Medications Ordered in Other Visits:      gadobutrol (GADAVIST) injection 10 mL, 10 mL, Intravenous, Once, Trice Medeiros MD PhD    Allergies -   Allergies   Allergen Reactions     Codeine      Sulfa Drugs      Morphine Rash     Does OK with oxycodone.       Social History -   Social History     Socioeconomic History     Marital status:      Number of children: 1   Occupational History     Occupation: manager of senior  living      Employer: [employer]   Tobacco Use     Smoking status: Current Every Day Smoker     Packs/day: 0.50     Years: 20.00     Pack years: 10.00     Types: Cigarettes     Start date: 2018     Smokeless tobacco: Never Used     Tobacco comment: quit at age 35. Restarted in 2018 off and on, not more than 1/2 pack per day   Substance and Sexual Activity     Alcohol use: Yes     Comment: social/3-4 per week     Drug use: No     Sexual activity: Yes     Partners: Male     Birth control/protection: Surgical   Other Topics Concern     Parent/sibling w/ CABG, MI or angioplasty before 65F 55M? No      Service No     Blood Transfusions No     Caffeine Concern No     Comment: quit drinking caffeine     Occupational Exposure No     Hobby Hazards No     Sleep Concern Yes     Comment: takes benadryl medication at night     Stress Concern Yes     Comment: some stress at work     Special Diet No     Back Care No     Exercise No     Bike Helmet No     Comment: doesn't ride bike     Seat Belt Yes       Family History -   Family History   Problem Relation Age of Onset     Hypertension Mother      Cerebrovascular Disease Mother      Arthritis Mother      Cardiovascular Mother      Circulatory Mother      Cerebrovascular Disease Father      Prostate Cancer Father 65         at 69     Asthma Brother      Prostate Cancer Brother 48        bone metastasis     Diabetes Sister      Hypertension Sister      Osteoporosis Sister      Depression Sister      Lipids Sister      Cancer Maternal Grandmother 95        spine     Breast Cancer Sister 39        also contralateral @53, mets to lungs     Cancer Sister 20        second uterine cancer in 30s also     Diabetes Sister      Hypertension Sister      Depression Sister      Osteoporosis Sister      Breast Cancer Sister 57        unilateral     Cancer Paternal Aunt 40        unknown type     Cancer Paternal Uncle         stomach;  in his 80s     Prostate Cancer Brother       Glaucoma No family hx of      Melanoma No family hx of      Skin Cancer No family hx of      Macular Degeneration No family hx of        Review of Systems - As per HPI and PMHx, she is very concerned about having throat cancer, has right hip pain from fracture, anxious, otherwise 10+ comprehensive system review is negative.    Physical Exam  /68   Pulse 91   LMP 11/18/1991 (Exact Date)   General - The patient is in no distress. Alert and oriented to person and place, answers questions and cooperates with examination appropriately.   Voice and Breathing - The patient was breathing comfortably without the use of accessory muscles. There was no wheezing, stridor, or stertor.  The patients voice was clear and strong.  Eyes - Extraocular movements intact.  Sclera were not icteric or injected, conjunctiva were pink and moist.  Mouth - Examination of the oral cavity showed pink, healthy oral mucosa. No lesions or ulcerations noted.  The tongue was mobile and midline.  Throat - The walls of the oropharynx were smooth, symmetric, and had no lesions or ulcerations.  The tonsillar pillars and soft palate were symmetric.  The uvula was midline on elevation. Tonsils absent or 0+. No masses.  Nose - External contour is symmetric, no gross deflection or scars.  Nasal mucosa is pink and moist with no abnormal mucus. The septum was midline and non-obstructive, turbinates of normal size and position.  No polyps, masses, or purulence noted on examination.  Neck -  Soft, non-tender. Palpation of the occipital, submental, submandibular, internal jugular chain, and supraclavicular nodes did not demonstrate any abnormal lymph nodes or masses. No parotid masses. Palpation of the thyroid was soft and smooth, with no nodules or goiter appreciated.  The trachea was mobile and midline.  Neurologic - CN II-XII are grossly intact, no focal neurologic deficits.   Cardiovascular - carotid pulses are 2+ bilaterally, regular  rhythm    Procedure: Flexible Endoscopy  Indication: Globus Sensation    Attempts at mirror laryngoscopy could not be performed due to gag reflex and patient anatomy. To best visualize the upper airway anatomy and due to the chief complaint and HPI, I proceeded with a fiberoptic examination. Color photographs were taken for the medical record. First I sprayed the right side of the nose with a mixture of lidocaine and neosynephrine.  I then passed the scope through the nasal cavity.  The nasal cavity was unremarkable. Some posterior clear postnasal drainage. The nasopharynx was mucosally covered and symmetric.  The Eustachian tube openings were unobstructed.  Going further down I had a clear view of the base of tongue which had normal appearing lingual tonsillar tissue.  The base of tongue was free of lesions, masses, and the vallecula was open.  The epiglottis was smooth and mucosally covered.  The supraglottic larynx was then clearly visualized. It was mostly normal, maybe some edema. The vocal cords moved smoothly and symmetrically, they were pearly white and no lesions were seen.  The pyriform sinuses were open, and the limited view of the postcricoid region did not show any lesions.                    A/P - Radhalara Corbett is a 71 year old female with globus sensation but no evidence of infection, inflammation, or neoplasm on exam to explain the symptoms. I think the most likely etiology is laryngopharyngeal reflux.  She has been on omeprazole for many years and therefore I recommend changing to Nexium 40 mg.  Possible this could be more effective.  We discussed Atrovent as a possibility if she feels 1 to 2 months of Nexium does not improve upon the globus sensation.  Lastly lisinopril could be causing some throat symptoms and we could consider changing her off of this if postnasal drainage treatment fails.     Adult lifestyle changes to prevent LPR reviewed      Avoid eating and drinking within two to three hours  prior to bedtime    Do not drink alcohol    Eat small meals and slowly    Limit problem foods:    o Caffeine  o Carbonated drinks  o Chocolate  o Peppermint  o Tomato  o Citrus fruits  o Fatty and fried foods      Lose weight    Quit smoking    Wear loose clothing      Luther Harrison MD  Otolaryngology  [facility]

## 2023-01-10 DIAGNOSIS — E11.9 TYPE 2 DIABETES MELLITUS WITHOUT COMPLICATION, WITHOUT LONG-TERM CURRENT USE OF INSULIN (H): Primary | ICD-10-CM

## 2023-01-10 RX ORDER — GLIPIZIDE 10 MG/1
10 TABLET, FILM COATED, EXTENDED RELEASE ORAL DAILY
Qty: 90 TABLET | Refills: 0 | Status: SHIPPED | OUTPATIENT
Start: 2023-01-10 | End: 2023-05-30

## 2023-01-10 NOTE — TELEPHONE ENCOUNTER
Called patient and notified of note below   Patient states that she is unable to schedule at the moment - she has nobody to take her - she will give us a call back when she is ready to schedule

## 2023-01-10 NOTE — TELEPHONE ENCOUNTER
Reason for Call:  Medication or medication refill:    Do you use a [facility]?  Name of the pharmacy and phone number for the current request: [facility] - [place], [place] - [address] [place] [address]    Name of the medication requested: glipiZIDE (GLUCOTROL XL) 10 MG 24 hr tablet    Can we leave a detailed message on this number? YES    Phone number patient can be reached at: Home number on file [phone] (home)    Best Time: anytime    Call taken on 1/10/2023 at 9:27 AM by Ashleigh Jeffrey

## 2023-01-10 NOTE — TELEPHONE ENCOUNTER
Please call pt: she is overdue for visit. I gave her 3 months supply. Need wellness with fasting labs. Please schedule.

## 2023-01-19 ENCOUNTER — LAB (OUTPATIENT)
Dept: FAMILY MEDICINE | Facility: CLINIC | Age: 73
End: 2023-01-19

## 2023-01-19 ENCOUNTER — VIRTUAL VISIT (OUTPATIENT)
Dept: FAMILY MEDICINE | Facility: CLINIC | Age: 73
End: 2023-01-19
Payer: COMMERCIAL

## 2023-01-19 DIAGNOSIS — Z12.11 COLON CANCER SCREENING: ICD-10-CM

## 2023-01-19 DIAGNOSIS — N18.31 STAGE 3A CHRONIC KIDNEY DISEASE (H): ICD-10-CM

## 2023-01-19 DIAGNOSIS — E11.9 TYPE 2 DIABETES MELLITUS WITHOUT COMPLICATION, WITH LONG-TERM CURRENT USE OF INSULIN (H): Primary | ICD-10-CM

## 2023-01-19 DIAGNOSIS — E78.5 HYPERLIPIDEMIA LDL GOAL <100: ICD-10-CM

## 2023-01-19 DIAGNOSIS — F33.42 RECURRENT MAJOR DEPRESSIVE DISORDER, IN FULL REMISSION (H): ICD-10-CM

## 2023-01-19 DIAGNOSIS — I10 HYPERTENSION GOAL BP (BLOOD PRESSURE) < 140/90: ICD-10-CM

## 2023-01-19 DIAGNOSIS — Z12.11 SCREEN FOR COLON CANCER: ICD-10-CM

## 2023-01-19 DIAGNOSIS — Z79.4 TYPE 2 DIABETES MELLITUS WITHOUT COMPLICATION, WITH LONG-TERM CURRENT USE OF INSULIN (H): Primary | ICD-10-CM

## 2023-01-19 PROCEDURE — 96127 BRIEF EMOTIONAL/BEHAV ASSMT: CPT | Mod: 95 | Performed by: INTERNAL MEDICINE

## 2023-01-19 PROCEDURE — 99214 OFFICE O/P EST MOD 30 MIN: CPT | Mod: 95 | Performed by: INTERNAL MEDICINE

## 2023-01-19 ASSESSMENT — PATIENT HEALTH QUESTIONNAIRE - PHQ9: SUM OF ALL RESPONSES TO PHQ QUESTIONS 1-9: 2

## 2023-01-19 NOTE — PROGRESS NOTES
Radha is a 72 year old who is being evaluated via a billable telephone visit.      What phone number would you like to be contacted at? [phone]  How would you like to obtain your AVS? Kandis  Distant Location (provider location):  Off-site    Assessment & Plan     Radha was seen today for recheck medication.    Diagnoses and all orders for this visit:    Type 2 diabetes mellitus without complication, with long-term current use of insulin (H)  -     Adult Eye  Referral; Future  -     Hemoglobin A1c; Future  -     CBC with platelets; Future  -     Comprehensive metabolic panel; Future  -     Albumin Random Urine Quantitative with Creat Ratio; Future    Screen for colon cancer  -     Comprehensive metabolic panel; Future    Stage 3a chronic kidney disease (H)  -     CBC with platelets; Future  -     Comprehensive metabolic panel; Future  -     Albumin Random Urine Quantitative with Creat Ratio; Future    Recurrent major depressive disorder, in full remission (H)  -     OK BEHAV ASSMT W/SCORE & DOCD/STAND INSTRUMENT    Hyperlipidemia LDL goal <100  -     Lipid Profile; Future    Colon cancer screening  -     COLOGUARD(EXACT SCIENCES); Future    Hypertension goal BP (blood pressure) < 140/90  -     Comprehensive metabolic panel; Future  -     Albumin Random Urine Quantitative with Creat Ratio; Future    Diabetes had been well controlled in the past.  Patient is not checking glucose.  She is not able to get to the clinic due to lack of transportation.  We will have her continue her medications and return in 3 months to get her labs and wellness visit.      Return in about 3 months (around 4/19/2023) for wellness visit, Fasting Lab appointment.    Trice Medeiros MD PhD  [facility]   Radha is a 72 year old, presenting for the following health issues:  Recheck Medication      HPI     Pt's  had a bad fall, broke his arm at a spot that he couldn't do surgery, had a cast and  went to [facility] for rehab. He is now home but he hasn't healed up properly. He is not able to  her walker yet. She is dependent on him to take her anywhere. She walks with a walker totally. She is having a hard time to get around.     Diabetes Follow-up      How often are you checking your blood sugar? Not at all    What concerns do you have today about your diabetes? None     Do you have any of these symptoms? (Select all that apply)  No numbness or tingling in feet.  No redness, sores or blisters on feet.  No complaints of excessive thirst.  No reports of blurry vision.  No significant changes to weight.    Have you had a diabetic eye exam in the last 12 months? No      Hyperlipidemia Follow-Up      Are you regularly taking any medication or supplement to lower your cholesterol?   Yes- .    Are you having muscle aches or other side effects that you think could be caused by your cholesterol lowering medication?  No    Hypertension Follow-up      Do you check your blood pressure regularly outside of the clinic? No     Are you following a low salt diet? Yes    Are your blood pressures ever more than 140 on the top number (systolic) OR more   than 90 on the bottom number (diastolic), for example 140/90? No    BP Readings from Last 2 Encounters:   08/16/22 101/68   05/24/22 92/56     Hemoglobin A1C (%)   Date Value   05/24/2022 6.3 (H)   06/11/2021 8.4 (H)   10/19/2020 8.7 (H)     LDL Cholesterol Calculated (mg/dL)   Date Value   05/24/2022 84   01/07/2020 78   09/27/2019 106 (H)     PHQ-9 SCORE 6/11/2021 5/24/2022 1/19/2023   PHQ-9 Total Score - - -   PHQ-9 Total Score MyChart 3 (Minimal depression) 3 (Minimal depression) -   PHQ-9 Total Score 3 3 2         Review of Systems   Constitutional, HEENT, cardiovascular, pulmonary, gi and gu systems are negative, except as otherwise noted.      Objective    Vitals - Patient Reported  Pain Score: No Pain (0)      Vitals:  No vitals were obtained today due to virtual  visit.    Physical Exam   healthy, alert and no distress  PSYCH: Alert and oriented times 3; coherent speech, normal   rate and volume, able to articulate logical thoughts, able   to abstract reason, no tangential thoughts, no hallucinations   or delusions  Her affect is normal  RESP: No cough, no audible wheezing, able to talk in full sentences  Remainder of exam unable to be completed due to telephone visits    Office Visit on 05/24/2022   Component Date Value Ref Range Status     Cholesterol 05/24/2022 161  <200 mg/dL Final     Triglycerides 05/24/2022 162 (H)  <150 mg/dL Final     Direct Measure HDL 05/24/2022 45 (L)  >=50 mg/dL Final     LDL Cholesterol Calculated 05/24/2022 84  <=100 mg/dL Final     Non HDL Cholesterol 05/24/2022 116  <130 mg/dL Final     Patient Fasting > 8hrs? 05/24/2022 Yes   Final     Hemoglobin 05/24/2022 13.2  11.7 - 15.7 g/dL Final     Hemoglobin A1C 05/24/2022 6.3 (H)  0.0 - 5.6 % Final    Normal <5.7%   Prediabetes 5.7-6.4%    Diabetes 6.5% or higher     Note: Adopted from ADA consensus guidelines.     Sodium 05/24/2022 136  133 - 144 mmol/L Final     Potassium 05/24/2022 4.6  3.4 - 5.3 mmol/L Final     Chloride 05/24/2022 105  94 - 109 mmol/L Final     Carbon Dioxide (CO2) 05/24/2022 23  20 - 32 mmol/L Final     Anion Gap 05/24/2022 8  3 - 14 mmol/L Final     Urea Nitrogen 05/24/2022 19  7 - 30 mg/dL Final     Creatinine 05/24/2022 0.86  0.52 - 1.04 mg/dL Final     Calcium 05/24/2022 9.3  8.5 - 10.1 mg/dL Final     Glucose 05/24/2022 89  70 - 99 mg/dL Final     GFR Estimate 05/24/2022 72  >60 mL/min/1.73m2 Final    Effective December 21, 2021 eGFRcr in adults is calculated using the 2021 CKD-EPI creatinine equation which includes age and gender (Amira michael al., NEJM, DOI: 10.1056/JMSHau3860671)     TSH 05/24/2022 0.52  0.40 - 4.00 mU/L Final             Phone call duration: 12 minutes

## 2023-04-17 DIAGNOSIS — N32.81 OVERACTIVE BLADDER: ICD-10-CM

## 2023-04-19 NOTE — TELEPHONE ENCOUNTER
TROSPIUM CHLORIDE ER CAPS 60MG      Last Written Prescription Date:  4/26/22  Last Fill Quantity: 90,   # refills: 3  Last Office Visit : 10/21/19  Future Office visit:  NONE    Routing refill request to provider for review/approval because:  Overdue for office visit > 18mo

## 2023-04-20 RX ORDER — TROSPIUM CHLORIDE ER 60 MG/1
CAPSULE ORAL
Qty: 30 CAPSULE | Refills: 0 | OUTPATIENT
Start: 2023-04-20

## 2023-04-20 NOTE — TELEPHONE ENCOUNTER
4/20 Patient is scheduled for appointment.     Sally srinivasan Procedure   Dermatology, Surgery, Urology  [facility]

## 2023-04-21 NOTE — TELEPHONE ENCOUNTER
Pt scheduled 5/15 for follow up with Jordyn Osorio. Wondering if we can refill medication until then.   Note: pt worried because her  had a stroke and is currently on hospice

## 2023-04-24 DIAGNOSIS — E11.9 TYPE 2 DIABETES MELLITUS WITHOUT COMPLICATION, WITHOUT LONG-TERM CURRENT USE OF INSULIN (H): ICD-10-CM

## 2023-04-24 RX ORDER — GLIPIZIDE 10 MG/1
10 TABLET, FILM COATED, EXTENDED RELEASE ORAL DAILY
Qty: 90 TABLET | Refills: 0 | OUTPATIENT
Start: 2023-04-24

## 2023-04-24 NOTE — TELEPHONE ENCOUNTER
"Patient needs a refill,  had a stroke and won't be coming home, patient very distraught. She will not be able to come in until her \"life is sorted out\". Please advise. Med and pharmacy is pended,          Michele Pedro      "

## 2023-04-24 NOTE — TELEPHONE ENCOUNTER
Attempted to reach pt.  Left detailed message to call clinic back at [phone].   To confirm pharmacy to send order since [facility] takes longer to mail out. Pt's follow up appointment is on 5/15/23.     Enedina Contreras, MSN RN

## 2023-04-25 RX ORDER — TROSPIUM CHLORIDE ER 60 MG/1
60 CAPSULE ORAL
Qty: 20 CAPSULE | Refills: 0 | Status: SHIPPED | OUTPATIENT
Start: 2023-04-25 | End: 2023-05-15

## 2023-04-25 NOTE — TELEPHONE ENCOUNTER
Called and spoke to patient who requests for medication to be sent to [facility]. Medication refilled per Jordyn Osorio PA-C and sent to [facility] per patient request.     Sophia Seo RN, BSN

## 2023-05-15 ENCOUNTER — VIRTUAL VISIT (OUTPATIENT)
Dept: UROLOGY | Facility: CLINIC | Age: 73
End: 2023-05-15
Payer: COMMERCIAL

## 2023-05-15 DIAGNOSIS — N39.0 RECURRENT UTI: ICD-10-CM

## 2023-05-15 DIAGNOSIS — N32.81 OVERACTIVE BLADDER: ICD-10-CM

## 2023-05-15 PROCEDURE — 99442 PR PHYSICIAN TELEPHONE EVALUATION 11-20 MIN: CPT | Mod: VID | Performed by: PHYSICIAN ASSISTANT

## 2023-05-15 RX ORDER — CEPHALEXIN 500 MG/1
500 CAPSULE ORAL 2 TIMES DAILY
Qty: 14 CAPSULE | Refills: 0 | Status: SHIPPED | OUTPATIENT
Start: 2023-05-15 | End: 2024-02-13

## 2023-05-15 RX ORDER — TROSPIUM CHLORIDE ER 60 MG/1
60 CAPSULE ORAL
Qty: 20 CAPSULE | Refills: 0 | Status: SHIPPED | OUTPATIENT
Start: 2023-05-15 | End: 2023-05-22

## 2023-05-15 RX ORDER — TRIMETHOPRIM 100 MG/1
100 TABLET ORAL AT BEDTIME
Qty: 90 TABLET | Refills: 0 | Status: SHIPPED | OUTPATIENT
Start: 2023-05-15 | End: 2024-07-23

## 2023-05-15 NOTE — PROGRESS NOTES
"Virtual Visit Details    Type of service:  Video Visit   Video Start Time: {video visit start/end time for provider to select:125024}  Video End Time:{video visit start/end time for provider to select:041532}    Originating Location (pt. Location): {video visit patient location:689401::\"Home\"}  {PROVIDER LOCATION On-site should be selected for visits conducted from your clinic location or adjoining [facility] hospital, academic office, or other nearby [facility] building. Off-site should be selected for all other provider locations, including home:759277}  Distant Location (provider location):  {virtual location provider:458742}  Platform used for Video Visit: {Virtual Visit Platforms:498033::\"[facility]\"}  "

## 2023-05-15 NOTE — NURSING NOTE
"Is the patient currently in the state of [place]? YES    Visit mode:TELEPHONE    If the visit is dropped, the patient can be reconnected by: TELEPHONE VISIT: Phone number: [phone]    Will anyone else be joining the visit? NO      How would you like to obtain your AVS? MyChart    Are changes needed to the allergy or medication list? NO    Reason for visit: Follow Up    Pt. Is requesting assistance with refills for both a bladder medication as well as another non-urology related medication.     Pt. is requesting to do a telephone visit at this time, as she states her telephone is the only device she has.     VF unable to assess Med List and PHQ as pt expressed concerns regarding her phone overheating and \"blowing up\". She wished to speak with the provider when provider was ready for appointment to avoid her telephone overheating.     Jacqui Osorio on 5/15/2023 at 2:29 PM    "

## 2023-05-15 NOTE — PROGRESS NOTES
Radha Corbett  who is being evaluated via a billable video visit and agrees to virtual visit today.    How would you like to obtain your AVS? MyChart  If the video visit is dropped, the invitation should be resent by: Text to cell phone: [phone]  Will anyone else be joining your video visit? No    Video-Visit Details    Type of service:  Video Visit    Video Start Time: 2:32 PM    Video End Time:2:32 PM. Switched to phone visit as patient unable to get video up. Phone call started at 2:33PM and ended at 2:45PM    Originating Location (pt. Location): Home    Distant Location (provider location): onsite    Platform used for Video Visit: [facility]    Urology Clinic    Name: Radha Corbett    MRN: [id number]   YOB: 1950  Accompanied at today's visit by:self              Assessment and Plan:   72 year old female with UUI, urinary urgency, Kory    - Renewed her trospium 60mg daily.   - Last encounter discussed adding botox to the trospium, on top of her interstim. Will hold off on this at this time as doing well on trospium.   - Advised patient to leave a UA/UC, however patient states she can't leave her house because she doesn't have a . Patient [facility]. Discussed with patient at length that if she doesn't leave a urine sample and symptoms do not improve, will not have a UC to go off of to treat her appropriately. Patient verbalizes understanding and is adamant about not leaving a urine sample and wanting an antibiotic.   - Advised patient to hold the trimethoprim while taking keflex [facility]. Discussed with patient to go to ER if develops fevers/chills, gross hematuria or flank pain in the mean time.   - Continue trimethoprim x3 more months and will trial off of it. Consider starting methenamine if UTIs persist after stopping trimethoprim.   - Advised to push fluids.   - follow-up in 6 months.   - After discussing the assessment and plan with  patient, patient verbalized understanding and agreed to the above plan. All questions answered.     23 minutes were spent today on the date of the encounter in reviewing the EMR, direct patient care, coordination of care and documentation in addition to reviewing Dr. Baltazar's note, reviewing labs, ordering medications.     Jordyn Osorio PA-C  May 15, 2023    Patient Care Team:  Trice Medeiros MD PhD as PCP - General (Internal Medicine)  Dada Baltazar MD as MD (Urology)  Noni Mcdaniel, RN as Registered Nurse (Urology)  Trice Medeiros MD PhD as Referring Physician (Internal Medicine)  Trice Medeiros MD PhD as Assigned PCP  Luther Harrison MD as MD (Otolaryngology)  Luther Harrison MD as Assigned Surgical Provider            Chief Complaint:   UUI          History of Present Illness:   May 15, 2023    HISTORY: Radha Corbett is a 72 year old female is here for follow-up. We have been following her for UUI, urinary urgency, Kory. Failed detrol, oxybutynin and Myrbetriq. Cystoscopy on 3/19/18 was normal except for 2+ trabeculation and a diverticulum at the dome of the bladder. Last saw Dr. Baltazar on 10/21/19 for follow-up after her interstim implant replacement. Though was bothered by her bladder symptoms; wearing 2 pads per day. Was started on Trospium 60mg daily for her UUI at that time and encouraged to drink more fluids and continue trimethoprim. There was consideration of switching to methenamine with vitamin C. Per chart review, has not had a UTI since 2020. Patient here today for follow-up. States the trospium has been working well for her but recently ran out and her symptoms came back. She has also been under a lot of stress as her  just had a massive stroke and is now on hospice. Given all the stress she thinks she has a UTI. Symptoms include feels hot and voiding all the time that started about 1-2 weeks ago. Denies fevers/chills, flank pain or gross hematuria. Patient voices no other concerns at this  "time.            Past Medical History:     Past Medical History:   Diagnosis Date     Actinic cheilitis      Arthritis      DM (diabetes mellitus) (H)      GERD (gastroesophageal reflux disease)      Hiatal hernia      HPV in female      HTN (hypertension)      IBS (irritable bowel syndrome)      Major depression      Myopia      Other and unspecified hyperlipidemia      Pseudoangiomatous stromal hyperplasia of breast 2010    PAS     Rotator cuff injury 7/2016     Sleep apnea     \"snore\"     Vitamin D deficiency             Past Surgical History:     Past Surgical History:   Procedure Laterality Date     BREAST LUMPECTOMY, RT/LT      x2, left     CAPSULE/PILL CAM ENDOSCOPY N/A 3/4/2019    Procedure: CAPSULE/PILL CAM ENDOSCOPY;  Surgeon: Sinan Valdes MD;  Location: [facility]     CATARACT IOL, RT/LT  4/99    left, MZ60CD 7.0D     COLONOSCOPY       COLONOSCOPY N/A 1/22/2016    Procedure: COMBINED COLONOSCOPY, SINGLE OR MULTIPLE BIOPSY/POLYPECTOMY BY BIOPSY;  Surgeon: Praful Benjamin MD;  Location: [facility]     COLONOSCOPY WITH CO2 INSUFFLATION N/A 1/22/2016    Procedure: COLONOSCOPY WITH CO2 INSUFFLATION;  Surgeon: Praful Benjamin MD;  Location: [facility]     COMBINED ESOPHAGOSCOPY, GASTROSCOPY, DUODENOSCOPY (EGD) WITH CO2 INSUFFLATION N/A 11/27/2018    Procedure: COMBINED ESOPHAGOSCOPY, GASTROSCOPY, DUODENOSCOPY (EGD) WITH CO2 INSUFFLATION;  Surgeon: Duane, William Charles, MD;  Location: [facility]     CRYOTHERAPY  2000    cervical     CRYOTHERAPY Left 3/19/2015    Procedure: CRYOTHERAPY;  Surgeon: Keiko Puri MD;  Location:  [facility]     DILATION AND CURETTAGE, HYSTEROSCOPY DIAGNOSTIC, COMBINED N/A 1/19/2016    Procedure: COMBINED DILATION AND CURETTAGE, HYSTEROSCOPY DIAGNOSTIC;  Surgeon: Yeni Aparicio DO;  Location: [facility]     ESOPHAGOSCOPY, GASTROSCOPY, DUODENOSCOPY (EGD), COMBINED N/A 11/27/2018    Procedure: COMBINED ESOPHAGOSCOPY, GASTROSCOPY, DUODENOSCOPY (EGD), BIOPSY SINGLE OR " MULTIPLE;  Surgeon: Duane, William Charles, MD;  Location: [facility]     IMPLANT STIMULATOR AND LEADS SACRAL NERVE (STAGE ONE AND TWO) N/A 7/24/2018    Procedure: IMPLANT STIMULATOR AND LEADS SACRAL NERVE (STAGE ONE AND TWO);  Interstim Implant Stage One and Two (Implant Permanent Lead and Generator);  Surgeon: Dada Baltazar MD;  [facility]     IMPLANT STIMULATOR AND LEADS SACRAL NERVE (STAGE ONE AND TWO) Right 8/6/2019    Procedure: Replacement of Interstim Implant;  Surgeon: aDda Baltazar MD;  [facility]     IMPLANT STIMULATOR SACRAL NERVE PERCUTANEOUS TRIAL N/A 6/19/2018    Procedure: IMPLANT STIMULATOR SACRAL NERVE PERCUTANEOUS TRIAL;  Percutaneous Neural Examination (trial for interstim);  Surgeon: Dada Baltazar MD;  [facility]     LAPAROSCOPIC TUBAL LIGATION  1981     PHACOEMULSIFICATION CLEAR CORNEA WITH STANDARD INTRAOCULAR LENS IMPLANT  8/15/2013    Procedure: PHACOEMULSIFICATION CLEAR CORNEA WITH STANDARD INTRAOCULAR LENS IMPLANT;  RIGHT PHACOEMULSIFICATION CLEAR CORNEA WITH STANDARD INTRAOCULAR LENS IMPLANT ;  Surgeon: Trae Taylor MD;  Location:  [facility]     REMOVE STIMULATOR SACRAL NERVE Right 8/6/2019    Procedure: Removal of Interstim Implant;  Surgeon: Dada Baltazar MD;  [facility]     SURGICAL HISTORY OF -       x4, ankle surgery            Social History:     Social History     Tobacco Use     Smoking status: Every Day     Packs/day: 0.50     Years: 20.00     Pack years: 10.00     Types: Cigarettes     Start date: 11/21/2018     Smokeless tobacco: Never     Tobacco comments:     quit at age 35. Restarted in 2018 off and on, not more than 1/2 pack per day   Vaping Use     Vaping status: Not on file   Substance Use Topics     Alcohol use: Yes     Comment: social/3-4 per week            Family History:     Family History   Problem Relation Age of Onset     Hypertension Mother      Cerebrovascular Disease Mother      Arthritis Mother      Cardiovascular  Mother      Circulatory Mother      Cerebrovascular Disease Father      Prostate Cancer Father 65         at 69     Asthma Brother      Prostate Cancer Brother 48        bone metastasis     Diabetes Sister      Hypertension Sister      Osteoporosis Sister      Depression Sister      Lipids Sister      Cancer Maternal Grandmother 95        spine     Breast Cancer Sister 39        also contralateral @53, mets to lungs     Cancer Sister 20        second uterine cancer in 30s also     Diabetes Sister      Hypertension Sister      Depression Sister      Osteoporosis Sister      Breast Cancer Sister 57        unilateral     Cancer Paternal Aunt 40        unknown type     Cancer Paternal Uncle         stomach;  in his 80s     Prostate Cancer Brother      Glaucoma No family hx of      Melanoma No family hx of      Skin Cancer No family hx of      Macular Degeneration No family hx of               Allergies:     Allergies   Allergen Reactions     Morphine And Related      Sulfa Antibiotics      Morphine Rash     Does OK with oxycodone.            Medications:     Current Outpatient Medications   Medication Sig     cephALEXin (KEFLEX) 500 MG capsule Take 1 capsule (500 mg) by mouth 2 times daily Hold the trimethoprim while taking keflex. Once completed the keflex can resume the trimethoprim     trimethoprim (TRIMPEX) 100 MG tablet Take 1 tablet (100 mg) by mouth At Bedtime For more refills,schedule an appointment at [phone]     trospium (SANCTURA XR) 60 MG CP24 24 hr capsule Take 1 capsule (60 mg) by mouth daily before breakfast     ASPIRIN 81 PO      blood glucose monitoring (ONE TOUCH ULTRA 2) meter device kit Use to test blood sugar 1 times daily or as directed.     continuous blood glucose monitoring (FREESTYLE MELINA) sensor For use with Freestyle Melina Flash  for continuous monitioring of blood glucose levels. Replace sensor every 10 days.     esomeprazole (NEXIUM) 40 MG DR capsule Take 1 capsule (40  mg) by mouth every morning (before breakfast) Take 30-60 minutes before eating.     glipiZIDE (GLUCOTROL XL) 10 MG 24 hr tablet Take 1 tablet (10 mg) by mouth daily (Due for follow up with PCP for future refills)     lisinopril (ZESTRIL) 5 MG tablet TAKE 1 TABLET DAILY     MELATONIN PO 10 mg At Bedtime      metFORMIN (GLUCOPHAGE XR) 500 MG 24 hr tablet TAKE 2 TABLETS TWICE A DAY WITH MEALS (DUE FOR FOLLOW UP WITH PRIMARY CARE PHYSICIAN FOR FUTURE REFILLS)     omeprazole (PRILOSEC) 40 MG DR capsule Take 1 capsule (40 mg) by mouth daily 30 to 60 minutes before a meal.     ONE TOUCH LANCETS None Entered     ONE TOUCH TEST STRIPS VI None Entered     order for DME Equipment being ordered: Light with four-wheel walker     order for DME Equipment being ordered: 4 wheel walker with a seat, light weight.     order for DME Equipment being ordered: Walker with seat light weight     simvastatin (ZOCOR) 20 MG tablet TAKE 1 TABLET AT BEDTIME     traZODone (DESYREL) 100 MG tablet TAKE 1 TABLET NIGHTLY AS NEEDED FOR SLEEP     Current Facility-Administered Medications   Medication     cross-Linked Hyaluronate (GEL-ONE) injection PRSY 30 mg     cross-Linked Hyaluronate (GEL-ONE) injection PRSY 30 mg     cross-Linked Hyaluronate (GEL-ONE) injection PRSY 30 mg     cross-Linked Hyaluronate (GEL-ONE) injection PRSY 30 mg     triamcinolone (KENALOG-40) injection 40 mg     triamcinolone (KENALOG-40) injection 40 mg     triamcinolone (KENALOG-40) injection 40 mg     triamcinolone (KENALOG-40) injection 40 mg     triamcinolone (KENALOG-40) injection 40 mg     triamcinolone (KENALOG-40) injection 40 mg     triamcinolone (KENALOG-40) injection 40 mg     triamcinolone (KENALOG-40) injection 40 mg     Facility-Administered Medications Ordered in Other Visits   Medication     gadobutrol (GADAVIST) injection 10 mL             Review of Systems:    ROS: 14 point ROS neg other than the symptoms noted above in the HPI.          Physical Exam:   Last  menstrual period 11/18/1991, not currently breastfeeding.  Data Unavailable, There is no height or weight on file to calculate BMI., 0 lbs 0 oz  Gen appearance: Age-appropriate appearing female in NAD.   HEENT:  EOMI, conjunctiva clear/white. Normal ROM of neck for age.   Psych:  alert , In no acute distress.  Neuro:  A&Ox3  Resp:  Normal respiratory effort; not in acute respiratory distress.          Data:    Labs:    Creatinine   Date Value Ref Range Status   05/24/2022 0.86 0.52 - 1.04 mg/dL Final   06/11/2021 0.66 0.52 - 1.04 mg/dL Final

## 2023-05-22 ENCOUNTER — TELEPHONE (OUTPATIENT)
Dept: UROLOGY | Facility: CLINIC | Age: 73
End: 2023-05-22
Payer: COMMERCIAL

## 2023-05-22 DIAGNOSIS — N32.81 OVERACTIVE BLADDER: ICD-10-CM

## 2023-05-22 RX ORDER — TROSPIUM CHLORIDE ER 60 MG/1
60 CAPSULE ORAL
Qty: 90 CAPSULE | Refills: 3 | Status: SHIPPED | OUTPATIENT
Start: 2023-05-22 | End: 2024-07-17

## 2023-05-22 NOTE — TELEPHONE ENCOUNTER
[facility] Call Center    Phone Message    May a detailed message be left on voicemail: yes     Reason for Call: Medication Refill Request    Has the patient contacted the pharmacy for the refill? Yes   Name of medication being requested: trospium (SANCTURA XR) 60 MG CP24 24 hr capsule   Provider who prescribed the medication: Jo  Pharmacy: [facility] - [address]  Date medication is needed: ASAP   Pt stated Maranda had only prescribed her 20 days worth of medications and she needs it for roughly a year. Please call pt back to further discuss, thanks!    Action Taken: Message routed to:  Adult Clinics: Urology p [id number]    Travel Screening: Not Applicable

## 2023-05-22 NOTE — TELEPHONE ENCOUNTER
Jordyn Osorio PA-C  You 23 minutes ago (10:24 AM)     KB  I sent the correct Rx of 60mg daily for 90 day supply with [facility]     Received the above message from Jordyn Osorio PA-C. Called and spoke to patient who is aware of the above information. Patient verbalized understanding and was grateful for the help.    Sophia Seo RN, BSN

## 2023-05-29 DIAGNOSIS — E78.5 HYPERLIPIDEMIA LDL GOAL <100: ICD-10-CM

## 2023-05-30 RX ORDER — SIMVASTATIN 20 MG
20 TABLET ORAL AT BEDTIME
Qty: 90 TABLET | Refills: 0 | Status: SHIPPED | OUTPATIENT
Start: 2023-05-30 | End: 2023-08-15

## 2023-06-13 ENCOUNTER — TELEPHONE (OUTPATIENT)
Dept: FAMILY MEDICINE | Facility: CLINIC | Age: 73
End: 2023-06-13
Payer: COMMERCIAL

## 2023-06-13 DIAGNOSIS — E11.9 TYPE 2 DIABETES MELLITUS WITHOUT COMPLICATION, WITHOUT LONG-TERM CURRENT USE OF INSULIN (H): Primary | ICD-10-CM
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
## 2023-06-14 ENCOUNTER — PATIENT OUTREACH (OUTPATIENT)
Dept: CARE COORDINATION | Facility: CLINIC | Age: 73
End: 2023-06-14
Payer: COMMERCIAL

## 2023-06-14 ASSESSMENT — ACTIVITIES OF DAILY LIVING (ADL): DEPENDENT_IADLS:: CLEANING;COOKING;LAUNDRY;SHOPPING;MEAL PREPARATION

## 2023-06-14 NOTE — PROGRESS NOTES
Clinic Care Coordination Contact    Clinic Care Coordination Contact  OUTREACH    Referral Information:  Referral Source: PCP    Primary Diagnosis: Psychosocial    Chief Complaint   Patient presents with     Clinic Care Coordination - Initial     Korey DUONG        Alba Utilization: [facility] Utilization  Difficulty keeping appointments:: No  Compliance Concerns: No  No-Show Concerns: No  No PCP office visit in Past Year: No  Utilization    Hospital Admissions  0             ED Visits  0             No Show Count (past year)  0                Current as of: 6/13/2023 11:30 PM              Clinical Concerns:  Current Medical Concerns:    Patient Active Problem List   Diagnosis     Macular degeneration     Myopia     Hiatal hernia     IBS (irritable bowel syndrome)     Hypertension goal BP (blood pressure) < 140/90     GERD (gastroesophageal reflux disease)     Atypical ductal hyperplasia of breast     Atypical lobular hyperplasia of breast     Benign Breast Disease (PASH)     Family history of breast cancer in sister     Hyperlipidemia LDL goal <100     Breast cancer screening-high risk     Tubular adenoma of colon     Osteopenia     Alcohol ingestion, more than 4 drinks/day on alcohol screening     Umbilical hernia     Incontinence of urine     Stool incontinence     Osteoarthritis, knee     Rosacea     Anxiety     Pseudophakia     Abnormal cervical Pap smear with positive HPV DNA test     Depression with anxiety     Retinal detachment     Elevated liver function tests     Rectocele     Unexplained endometrial cells on cervical cytology     Back pain, unspecified back location, unspecified back pain laterality, unspecified chronicity     Urge incontinence     Dislocation of shoulder region     Acute lower GI bleeding     Moderate episode of recurrent major depressive disorder (H)     Angiodysplasia of colon     Diabetes mellitus, type 2 (H)     Stage 3a chronic kidney disease  (H)         Current Behavioral Concerns: Depression with anxiety    Education Provided to patient: Introduced self and role to patient.    Pain  Pain (GOAL):: No  Health Maintenance Reviewed: Due/Overdue   Health Maintenance Due   Topic Date Due     MICROALBUMIN  09/27/2020     DEXA  02/04/2021     MAMMO SCREENING  01/07/2022     MEDICARE ANNUAL WELLNESS VISIT  06/11/2022     COVID-19 Vaccine (4 - Moderna series) 07/19/2022     COLORECTAL CANCER SCREENING  08/01/2022     EYE EXAM  11/18/2022     A1C  11/24/2022     DTAP/TDAP/TD IMMUNIZATION (2 - Td or Tdap) 03/05/2023     BMP  05/24/2023     LIPID  05/24/2023     DIABETIC FOOT EXAM  05/24/2023     ZOSTER IMMUNIZATION (2 of 2) 07/19/2022     HEMOGLOBIN  05/24/2023       Clinical Pathway: None    Medication Management:  Medication review status: Medications reviewed and no changes reported per patient.        Patient receives medication via mail     Functional Status:  Dependent ADLs:: Bathing, Dressing, Ambulation-walker  Dependent IADLs:: Cleaning, Cooking, Laundry, Shopping, Meal Preparation  Bed or wheelchair confined:: No  Mobility Status: Independent w/Device  Fallen 2 or more times in the past year?: No  Any fall with injury in the past year?: No    Living Situation:  Current living arrangement:: I live alone  Type of residence:: Private home - stairs    Lifestyle & Psychosocial Needs: [facility] contacted patient. Introduced self and role. Patient explained her situation.  had a severe stroke in March, 2023. He was her primary caregiver prior to the stroke and assisted her with all ADLs and IADLs. He has been in [facility] on their long term care unit and is currently on hospice. Patient does not have any means of transportation. She reports that her back pain is debilitating enough where she is only able to walk very short distances with a walker. 's niece has brought groceries to her a couple times, but she has not asked her for much help  "outside of that. Patient also reports that 's daughter had him sign financial power of  over to her and now patient has no access to funds to help with bills. She reports that 's daughter took $25,000 from his account as well. Patient only gets $900/month in social security and has been using her savings ( patient have separate accounts) to pay for bills. This will be running out soon and her mortgage is $1200/month. 's daughter is not helping patient at all. She has been in contact with some attorneys, but she has been told that they are not able to help her, as they are not elder law attorneys. [facility] offered to send her information on [employer] Attorneys. Patient agreeable. She stated that she has Macular degeneration, so she is not able to see anything sent to her. She does have a friend who lives in [place], [place] who could come over and look at the resources with her. She also reports that she was told that she will need to establish guardianship over  to ensure that he is not being financially exploited by daughter. She is interested in resources on this as well. Patient is needing in home supports, but cannot afford it. Right now, she states that her meds are mailed and her neighbor gets her mail for her twice a week. Other than that, she has been on her own. Hasn't taken a proper bath/shower since he went to LTC. She says that she \"does her best to keep herself clean\". Reviewed [place] guidelines and explained that it would be beneficial to get FRW involved to see if she can apply without her husbands financial information. If not, patient will have to talk to 's daughter to obtain this information.     Vulnerable adult report was made on this date: Report # [id number]    Social Determinants of Health     Tobacco Use: High Risk (5/15/2023)    Patient History      Smoking Tobacco Use: Every Day      Smokeless Tobacco Use: Never      Passive Exposure: Not on file "   Alcohol Use: Not on file   Financial Resource Strain: Not on file   Food Insecurity: Not on file   Transportation Needs: Not on file   Physical Activity: Not on file   Stress: Not on file   Social Connections: Not on file   Intimate Partner Violence: Not on file   Depression: Not at risk (1/19/2023)    PHQ-2      PHQ-2 Score: 2   Housing Stability: Not on file     Diet:: Regular  Inadequate nutrition (GOAL):: No  Tube Feeding: No  Inadequate activity/exercise (GOAL):: No  Significant changes in sleep pattern (GOAL): No        Taoism or spiritual beliefs that impact treatment:: No  Mental health DX:: Yes  Mental health management concern (GOAL):: No  Chemical Dependency Status: No Current Concerns  Informal Support system:: Friends        Resources and Interventions:  Current Resources:      Community Resources: None  Supplies Currently Used at Home: None  Equipment Currently Used at Home: wheelchair, manual  Employment Status: retired         Advance Care Plan/Directive  Advanced Care Plans/Directives on file:: No  Advanced Care Plan/Directive Status: Declined Further Information    Referrals Placed: Financial Services, Senior Linkage Line         Care Plan:  Care Plan: Help At Home     Problem: Insufficient In-home support     Goal: Establish adequate home support     Start Date: 6/14/2023 Expected End Date: 1/5/2024    Note:     Barriers: Financial constraints for private pay; Unsure if patient qualifies for MA/EW  Strengths: Patient is a great self advocate; Patient is enrolled in Care Coordination  Patient expressed understanding of goal: Yes  Action steps to achieve this goal:  1. I will work with FRW to see if I qualify for MA  2. I will contact the [facility] to schedule [place]hoices assessment to determine eligibility for a waiver  3. I will obtain in home services through the [facility], if eligible                      Care Plan: Financial Wellbeing     Problem: Patient expresses financial resource strain      Goal: I will apply for [employer] Benefits within the next 1-2 weeks if I am eligible.     Start Date: 6/14/2023 Expected End Date: 10/6/2023    Note:     Strengths: Patient is willing to work with FRW; Patient is enrolled in Care Coordination; Patient is a great self advocate  Barriers:  Patient may not qualify     Patient expressed understanding of goal: Yes  Action steps to achieve this goal:  1. I understand a referral was placed to the Financial Resource Worker, I will receive a call within the next 3 business days.    2. I understand the financial worker will make two attempts to call me. If I still need help with this goal, I will connect with my Community Health Worker Zita at [phone].                       Care Plan: Financial Wellbeing     Problem: Patient expresses financial resource strain     Goal: I will apply for health insurance within the next 1-2 weeks if I am eligible.     Start Date: 6/14/2023 Expected End Date: 10/6/2023    Note:     Strengths:  Patient is willing to work with FRW; Patient is enrolled in Care Coordination; Patient is a great self advocate  Barriers: Patient may not qualify    Patient expressed understanding of goal: Yes  Action steps to achieve this goal:  1. I understand a referral was placed to the Financial Resource Worker, I will receive a call within the next 3 business days.    2. I understand the financial worker will make two attempts to call me. If I still need help with this goal, I will connect with my Community Health Worker Zita at [phone].                           Care Plan: Financial Wellbeing     Problem: Patient expresses financial resource strain     Goal: Obtain financial supports from      Start Date: 6/14/2023 Expected End Date: 11/10/2023    Note:     Barriers: 's daughter took over financial POA; Patient will need guardianship over , which takes quite a bit of time  Strengths: Patient is a great self advocate; Patient is  enrolled in CC.   Patient expressed understanding of goal: Yes  Action steps to achieve this goal:  1. I will contact [employer] 's to start guardianship process  2. I will work with  to establish guardianship for   3. I will establish guardianship for , if appropriate                          Patient/Caregiver understanding: Yes    Outreach Frequency: monthly  Future Appointments              In 5 days Perri Carias APRN CNP [facility]    In 5 months Jordyn Osorio PA-C [facility]          Plan: Patient was enrolled in Care Coordination. She will utilize resources that will be mailed to her to work on processing through guardianship for . Will also work with FRW to see if she could qualify for Medical Assistance. Intro letter, patient centered plan of care and resources for [employer] and Guardianship were sent to patient. [facility] also included a vision loss resource to see if they could help her be a bit more independent with reading paperwork. CHW will contact patient in two weeks. SWCC will review chart in 6 weeks, per standard workflow.        Financial Resource Worker Screening    [facility] Benefits  Is patient requesting help applying for [facility] benefits?: Yes  Have you recently applied for any [facility] benefits?: No  How many people in your household?: 1  Do you buy/eat food together?: No  What is the monthly gross income for the household (wages, social security, workers comp, and pension)? : 900    Insurance:  Was [place] verified for active insurance?: No  Is this an insurance renewal?: No  Is this a new insurance application request?: Yes  Have you recently applied for insurance?: No  How many people in your household? : 1  Do you file taxes?: Yes  How many dependents do you claim?: 0  What is the monthly gross income for the household (wages, social security, workers comp, and pension)? :  900    Any other information for the FRW?: Patient is .  is in [facility] on hospice. His daughter has access to all of his financials. Patient is living off savings and her $900 social security check. Unsure if she will be eligible, but she needs help    Care Coordination team will tell patient:   Thank you for answering all the questions, based on screening questions, our Financial Resource Worker will reach out to you with additional questions and next steps.    NAYELI Linda, [facility]  Primary Care Clinic- Social Work Care Coordinator  [facility], [place], [facility]  Assisting with Francisca   Ph: [phone]  6/14/2023 12:44 PM

## 2023-06-14 NOTE — LETTER
[facility]  Patient Centered Plan of Care  About Me:        Patient Name:  Radha Forte    YOB: 1950  Age:         72 year old   Rachel MRN:    [id number] Telephone Information:  Home Phone [phone]   Mobile [phone]       Address:  [address] [place] [address] Email address:  [email or web address]      Emergency Contact(s)    Name Relationship Lgl Grd Work Phone Home Phone Mobile Phone   1. ASHLYN FORTE Spouse No none [phone]    2. AMARILYS FORTE Relative No none [phone]           Primary language:  English     needed? No   [employer]:  [phone] op. 1  Other communication barriers:No data recorded  Preferred Method of Communication:  Mail  Current living arrangement: I live alone    Mobility Status/ Medical Equipment: Independent w/Device        Health Maintenance  Health Maintenance Reviewed: Due/Overdue   Health Maintenance Due   Topic Date Due    MICROALBUMIN  09/27/2020    DEXA  02/04/2021    MAMMO SCREENING  01/07/2022    MEDICARE ANNUAL WELLNESS VISIT  06/11/2022    COVID-19 Vaccine (4 - Moderna series) 07/19/2022    COLORECTAL CANCER SCREENING  08/01/2022    EYE EXAM  11/18/2022    A1C  11/24/2022    DTAP/TDAP/TD IMMUNIZATION (2 - Td or Tdap) 03/05/2023    BMP  05/24/2023    LIPID  05/24/2023    DIABETIC FOOT EXAM  05/24/2023    ZOSTER IMMUNIZATION (2 of 2) 07/19/2022    HEMOGLOBIN  05/24/2023           My Access Plan  Medical Emergency 911   Primary Clinic Line [facility] [phone]   24 Hour Appointment Line [phone] or  [phone]) (toll-free)   24 Hour Nurse Line [phone] (toll-free)   Preferred Urgent Care [facility] [phone]       Preferred Hospital No data recorded   Preferred Pharmacy [facility]     Behavioral Health Crisis Line The National Suicide Prevention Lifeline at 1-218.787.5352 or Text/Call  548             My Care Team Members  Patient Care Team         Relationship Specialty Notifications Start End    Trice Medeiros MD PhD PCP - General Internal Medicine  6/6/13     Phone: [phone] Fax: [phone] [address] [facility] [address]    Dada Baltazar MD MD Urology  6/25/18     Phone: [phone] Fax: [phone] [address] [facility] [address]    Noni Mcdaniel, RN Registered Nurse Urology  6/25/18     Phone: [phone] Pager: [phone]        Trice Medeiros MD PhD Referring Physician Internal Medicine  6/25/18     Phone: [phone] Fax: [phone]         [address] [facility] [address]    Trice Medeiros MD PhD Assigned PCP   6/20/21     Phone: [phone] Fax: [phone] [address] [facility] [address]    Luther Harrison MD MD Otolaryngology  8/2/22     Phone: [phone] Fax: [phone]         [address] [facility] [address]    Luther Harrison MD Assigned Surgical Provider   5/6/23     Phone: [phone] Fax: [phone]         [address] [facility] [address]    Korey Preston BSW Lead Care Coordinator  Admissions 6/14/23     Zita Mcmullen Community Health Worker Primary Care - CC Admissions 6/14/23     Phone: [phone] Fax: [phone]                  My Care Plans  Self Management and Treatment Plan  Care Plan  Care Plan: Help At Home       Problem: Insufficient In-home support       Goal: Establish adequate home support       Start Date: 6/14/2023 Expected End Date: 1/5/2024    Note:     Barriers: Financial constraints for private pay; Unsure if patient qualifies for MA/EW  Strengths: Patient is a great self advocate; Patient is enrolled in Care Coordination  Patient expressed understanding of goal: Yes  Action steps to achieve this goal:  1. I will work with FRW to see if I qualify for MA  2. I will contact the [facility] to schedule MNChoices assessment to determine eligibility for a  waiver  3. I will obtain in home services through the [facility], if eligible                              Care Plan: Financial Wellbeing       Problem: Patient expresses financial resource strain       Goal: I will apply for [facility] Benefits within the next 1-2 weeks if I am eligible.       Start Date: 6/14/2023 Expected End Date: 10/6/2023    Note:     Strengths: Patient is willing to work with FRW; Patient is enrolled in Care Coordination; Patient is a great self advocate  Barriers:  Patient may not qualify     Patient expressed understanding of goal: Yes  Action steps to achieve this goal:  I understand a referral was placed to the Financial Resource Worker, I will receive a call within the next 3 business days.    I understand the financial worker will make two attempts to call me. If I still need help with this goal, I will connect with my Community Health Worker Zita at [phone].                               Care Plan: Financial Wellbeing       Problem: Patient expresses financial resource strain       Goal: I will apply for health insurance within the next 1-2 weeks if I am eligible.       Start Date: 6/14/2023 Expected End Date: 10/6/2023    Note:     Strengths:  Patient is willing to work with FRW; Patient is enrolled in Care Coordination; Patient is a great self advocate  Barriers: Patient may not qualify    Patient expressed understanding of goal: Yes  Action steps to achieve this goal:  I understand a referral was placed to the Financial Resource Worker, I will receive a call within the next 3 business days.    I understand the financial worker will make two attempts to call me. If I still need help with this goal, I will connect with my Community Health Worker Zita at [phone].                                      Action Plans on File:                       Advance Care Plans/Directives Type:   No data recorded    My Medical and Care Information  Problem List   Patient Active Problem List    Diagnosis    Macular degeneration    Myopia    Hiatal hernia    IBS (irritable bowel syndrome)    Hypertension goal BP (blood pressure) < 140/90    GERD (gastroesophageal reflux disease)    Atypical ductal hyperplasia of breast    Atypical lobular hyperplasia of breast    Benign Breast Disease (PASH)    Family history of breast cancer in sister    Hyperlipidemia LDL goal <100    Breast cancer screening-high risk    Tubular adenoma of colon    Osteopenia    Alcohol ingestion, more than 4 drinks/day on alcohol screening    Umbilical hernia    Incontinence of urine    Stool incontinence    Osteoarthritis, knee    Rosacea    Anxiety    Pseudophakia    Abnormal cervical Pap smear with positive HPV DNA test    Depression with anxiety    Retinal detachment    Elevated liver function tests    Rectocele    Unexplained endometrial cells on cervical cytology    Back pain, unspecified back location, unspecified back pain laterality, unspecified chronicity    Urge incontinence    Dislocation of shoulder region    Acute lower GI bleeding    Moderate episode of recurrent major depressive disorder (H)    Angiodysplasia of colon    Diabetes mellitus, type 2 (H)    Stage 3a chronic kidney disease (H)      Current Medications and Allergies:  See printed Medication Report.    Care Coordination Start Date: 6/13/2023   Frequency of Care Coordination: monthly       Form Last Updated: 06/14/2023

## 2023-06-14 NOTE — LETTER
[facility] CARE COORDINATION  [address]  [place] [address]    June 14, 2023    Radha Corbett  [address] [address]      Dear Radha,        I am a  clinic care coordinator who works with Trice Medeiros MD PhD with the [facility]. I wanted to thank you for spending the time to talk with me.  Below is a description of clinic care coordination and how I can further assist you.       The clinic care coordination team is made up of a registered nurse, , financial resource worker and community health worker who understand the health care system. The goal of clinic care coordination is to help you manage your health and improve access to the health care system. Our team works alongside your provider to assist you in determining your health and social needs. We can help you obtain health care and community resources, providing you with necessary information and education. We can work with you through any barriers and develop a care plan that helps coordinate and strengthen the communication between you and your care team.  Our services are voluntary and are offered without charge to you personally.    Please feel free to contact me with any questions or concerns regarding care coordination and what we can offer.      We are focused on providing you with the highest-quality healthcare experience possible.    Sincerely,     Korey Preston, NAYELI, [facility]  Primary Care Clinic- Social Work Care Coordinator  [facility] [place], [facility]  Assisting with Francisca   Ph: [phone]      Enclosed: I have enclosed a copy of the Patient Centered Plan of Care. This has helpful information and goals that we have talked about. Please keep this in an easy to access place to use as needed.

## 2023-06-15 ENCOUNTER — PATIENT OUTREACH (OUTPATIENT)
Dept: CARE COORDINATION | Facility: CLINIC | Age: 73
End: 2023-06-15
Payer: COMMERCIAL

## 2023-06-15 NOTE — PROGRESS NOTES
Clinic Care Coordination Contact    [facility] received a voicemail from Suzan with [facility] Adult Protection. She stated that patient's adult protection report will be opened with Adult Protection Worker, Nell. His number is [phone]. He will be getting the report moved over to him within 24 hours.     NAYELI Linda, [facility]  Primary Care Clinic- Social Work Care Coordinator  [facility], [facility]  Assisting with Francisca   Ph: [phone]  6/15/2023 11:26 AM

## 2023-06-15 NOTE — PROGRESS NOTES
Clinic Care Coordination Contact  Program: Health insurance and county benefits   County: [facility] Case #:  [facility] Worker:   Mnsure #:   Subscriber #:   Renewal:  Date Applied:     FRW Outreach:   6/15/23- FRW reached out regarding referral. At this time patient is unable to provide income information for spouse. FRW is not able to determine eligibility for patient. Patient needs help retrieving income information from spouse's daughter. Please advise next steps.     Health Insurance:      Referral/Screening:  FRW Screening    Row Name 06/14/23 1045       [facility] Benefits   Is patient requesting help applying for [facility] benefits? Yes       Have you recently applied for any [facility] benefits? No       How many people in your household? 1       Do you buy/eat food together? No       What is the monthly gross income for the household (wages, social security, workers comp, and pension)?  900       Insurance:   [place]? No       Is this an insurance renewal? No       Is this a new insurance application request? Yes       Have you recently applied for insurance? No       How many people in your household?  1       Do you file taxes? Yes       How many dependents do you claim? 0       What is the monthly gross income for the household (wages, social security, workers comp, and pension)?  900       OTHER   Is this a gilmar care application? No       Any other information for the FRW? Patient is .  is in [facility] [place] on hospice. His daughter has access to all of his financials. Patient is living off savings and her $900 social security check. Unsure if she will be eligible, but she needs help

## 2023-06-19 ENCOUNTER — VIRTUAL VISIT (OUTPATIENT)
Dept: FAMILY MEDICINE | Facility: CLINIC | Age: 73
End: 2023-06-19
Payer: COMMERCIAL

## 2023-06-19 DIAGNOSIS — F33.1 MODERATE EPISODE OF RECURRENT MAJOR DEPRESSIVE DISORDER (H): ICD-10-CM

## 2023-06-19 DIAGNOSIS — Z78.0 ASYMPTOMATIC MENOPAUSAL STATE: ICD-10-CM

## 2023-06-19 DIAGNOSIS — Z79.4 TYPE 2 DIABETES MELLITUS WITH OTHER SPECIFIED COMPLICATION, WITH LONG-TERM CURRENT USE OF INSULIN (H): ICD-10-CM

## 2023-06-19 DIAGNOSIS — E11.9 TYPE 2 DIABETES MELLITUS WITHOUT COMPLICATION, WITHOUT LONG-TERM CURRENT USE OF INSULIN (H): ICD-10-CM

## 2023-06-19 DIAGNOSIS — E11.69 TYPE 2 DIABETES MELLITUS WITH OTHER SPECIFIED COMPLICATION, WITH LONG-TERM CURRENT USE OF INSULIN (H): ICD-10-CM

## 2023-06-19 DIAGNOSIS — F41.9 ANXIETY: Primary | ICD-10-CM

## 2023-06-19 DIAGNOSIS — Z12.31 VISIT FOR SCREENING MAMMOGRAM: ICD-10-CM

## 2023-06-19 DIAGNOSIS — M85.80 OSTEOPENIA, UNSPECIFIED LOCATION: ICD-10-CM

## 2023-06-19 PROCEDURE — G0439 PPPS, SUBSEQ VISIT: HCPCS | Mod: VID | Performed by: NURSE PRACTITIONER

## 2023-06-19 ASSESSMENT — ANXIETY QUESTIONNAIRES
GAD7 TOTAL SCORE: 19
GAD7 TOTAL SCORE: 19
5. BEING SO RESTLESS THAT IT IS HARD TO SIT STILL: NEARLY EVERY DAY
GAD7 TOTAL SCORE: 19
1. FEELING NERVOUS, ANXIOUS, OR ON EDGE: NEARLY EVERY DAY
6. BECOMING EASILY ANNOYED OR IRRITABLE: SEVERAL DAYS
7. FEELING AFRAID AS IF SOMETHING AWFUL MIGHT HAPPEN: NEARLY EVERY DAY
4. TROUBLE RELAXING: NEARLY EVERY DAY
7. FEELING AFRAID AS IF SOMETHING AWFUL MIGHT HAPPEN: NEARLY EVERY DAY
3. WORRYING TOO MUCH ABOUT DIFFERENT THINGS: NEARLY EVERY DAY
2. NOT BEING ABLE TO STOP OR CONTROL WORRYING: NEARLY EVERY DAY

## 2023-06-19 NOTE — Clinical Note
Spencer Medeiros  Routing to you so you are aware Would home health be appropriate at this point  Perri Carias, NP, APRN CNP

## 2023-06-19 NOTE — PATIENT INSTRUCTIONS
PLAN:   1.   Symptomatic therapy suggested: Continue current medications as prescribed.   2.  Orders Placed This Encounter   Procedures    REVIEW OF HEALTH MAINTENANCE PROTOCOL ORDERS    DEXA HIP/PELVIS/SPINE - Future    MA SCREENING DIGITAL BILAT - Future  (s+30)     3. Patient needs to follow up in if no improvement,or sooner if worsening of symptoms or other symptoms develop.  Need to consider home health referral   Need to get fasting labs completed   Will follow up and/or notify patient on results via phone or mail to determine further need for followup

## 2023-06-19 NOTE — PROGRESS NOTES
Radha is a 72 year old who is being evaluated via a billable video visit.      How would you like to obtain your AVS? Mail a copy  If the video visit is dropped, the invitation should be resent by: Text to cell phone: [phone]  Will anyone else be joining your video visit? No      Subjective   Radha is a 72 year old, presenting for the following health issues:  Anxiety and Depression        6/19/2023     9:53 AM   Additional Questions   Roomed by Henny     History of Present Illness       Mental Health Follow-up:  Patient presents to follow-up on Depression & Anxiety.Patient's depression since last visit has been:  Bad  The patient is not having other symptoms associated with depression.  Patient's anxiety since last visit has been:  Bad  The patient is not having other symptoms associated with anxiety.  Any significant life events: grief or loss  Patient is feeling anxious or having panic attacks.  Patient has no concerns about alcohol or drug use.    She eats 0-1 servings of fruits and vegetables daily.She consumes 1 sweetened beverage(s) daily.She exercises with enough effort to increase her heart rate 9 or less minutes per day.  She exercises with enough effort to increase her heart rate 3 or less days per week.   She is taking medications regularly.  Today's TARAS-7 Score: 19       Depression and Anxiety Follow-Up    How are you doing with your depression since your last visit? Worsened     How are you doing with your anxiety since your last visit?  Worsened     Are you having other symptoms that might be associated with depression or anxiety? Yes:  anxiety     Have you had a significant life event? OTHER:  illness      Do you have any concerns with your use of alcohol or other drugs? No   had a stroke and is in [facility] [place]   Now they called her and let her know that he has no one to take her there     His daughter has power of  and she states just took $25,000  She states is considered  a vulnerable adult and the state sent someone in to see her   Her  used to take care of her   She has no car or license to drive so can not get to him   Can not get a ride into office   Someone from the state has come to her house to help her with    A worker from the [facility] she says is an investigator from the [facility]     Social History     Tobacco Use     Smoking status: Former     Packs/day: 0.50     Years: 20.00     Pack years: 10.00     Types: Cigarettes     Start date: 11/21/2018     Smokeless tobacco: Never     Tobacco comments:     quit at age 35. Restarted in 2018 off and on, not more than 1/2 pack per day   Vaping Use     Vaping Use: Never used   Substance Use Topics     Alcohol use: Yes     Comment: social/3-4 per week     Drug use: No         6/11/2021     2:35 PM 5/24/2022     1:58 PM 1/19/2023    11:16 AM   PHQ   PHQ-9 Total Score 3 3 2   Q9: Thoughts of better off dead/self-harm past 2 weeks Not at all Not at all Not at all         10/19/2020     2:13 PM 6/11/2021     2:36 PM 6/19/2023     9:56 AM   TARAS-7 SCORE   Total Score  4 (minimal anxiety) 19 (severe anxiety)   Total Score 11 4 19         1/19/2023    11:16 AM   Last PHQ-9   1.  Little interest or pleasure in doing things 1   2.  Feeling down, depressed, or hopeless 1   3.  Trouble falling or staying asleep, or sleeping too much 0   4.  Feeling tired or having little energy 0   5.  Poor appetite or overeating 0   6.  Feeling bad about yourself 0   7.  Trouble concentrating 0   8.  Moving slowly or restless 0   Q9: Thoughts of better off dead/self-harm past 2 weeks 0   PHQ-9 Total Score 2   Difficulty at work, home, or with people Not difficult at all         6/19/2023     9:56 AM   TARAS-7    1. Feeling nervous, anxious, or on edge 3   2. Not being able to stop or control worrying 3   3. Worrying too much about different things 3   4. Trouble relaxing 3   5. Being so restless that it is hard to sit still 3   6. Becoming easily annoyed  or irritable 1   7. Feeling afraid, as if something awful might happen 3   TARAS-7 Total Score 19       Suicide Assessment Five-step Evaluation and Treatment (SAFE-T)    Diabetes Follow-up      How often are you checking your blood sugar? Not at all    What concerns do you have today about your diabetes? Worried no medication      Do you have any of these symptoms? (Select all that apply)  Numbness in feet    Have you had a diabetic eye exam in the last 12 months? No        BP Readings from Last 2 Encounters:   08/16/22 101/68   05/24/22 92/56     Hemoglobin A1C (%)   Date Value   05/24/2022 6.3 (H)   06/11/2021 8.4 (H)   10/19/2020 8.7 (H)     LDL Cholesterol Calculated (mg/dL)   Date Value   05/24/2022 84   01/07/2020 78   09/27/2019 106 (H)             Labs reviewed in EPIC  BP Readings from Last 3 Encounters:   08/16/22 101/68   05/24/22 92/56   09/09/21 104/61    Wt Readings from Last 3 Encounters:   06/11/21 83.7 kg (184 lb 9.6 oz)   11/05/20 88 kg (194 lb)   10/19/20 88.4 kg (194 lb 14.4 oz)                  Patient Active Problem List   Diagnosis     Macular degeneration     Myopia     Hiatal hernia     IBS (irritable bowel syndrome)     Hypertension goal BP (blood pressure) < 140/90     GERD (gastroesophageal reflux disease)     Atypical ductal hyperplasia of breast     Atypical lobular hyperplasia of breast     Benign Breast Disease (PASH)     Family history of breast cancer in sister     Hyperlipidemia LDL goal <100     Breast cancer screening-high risk     Tubular adenoma of colon     Osteopenia     Alcohol ingestion, more than 4 drinks/day on alcohol screening     Umbilical hernia     Incontinence of urine     Stool incontinence     Osteoarthritis, knee     Rosacea     Anxiety     Pseudophakia     Abnormal cervical Pap smear with positive HPV DNA test     Depression with anxiety     Retinal detachment     Elevated liver function tests     Rectocele     Unexplained endometrial cells on cervical cytology      Back pain, unspecified back location, unspecified back pain laterality, unspecified chronicity     Urge incontinence     Dislocation of shoulder region     Acute lower GI bleeding     Moderate episode of recurrent major depressive disorder (H)     Angiodysplasia of colon     Diabetes mellitus, type 2 (H)     Stage 3a chronic kidney disease (H)     Past Surgical History:   Procedure Laterality Date     BREAST LUMPECTOMY, RT/LT      x2, left     CAPSULE/PILL CAM ENDOSCOPY N/A 3/4/2019    Procedure: CAPSULE/PILL CAM ENDOSCOPY;  Surgeon: Sinan Valdes MD;  Location: [facility]     CATARACT IOL, RT/LT  4/99    left, [id number] 7.0D     COLONOSCOPY       COLONOSCOPY N/A 1/22/2016    Procedure: COMBINED COLONOSCOPY, SINGLE OR MULTIPLE BIOPSY/POLYPECTOMY BY BIOPSY;  Surgeon: Praful Benjamin MD;  Location: [facility]     COLONOSCOPY WITH CO2 INSUFFLATION N/A 1/22/2016    Procedure: COLONOSCOPY WITH CO2 INSUFFLATION;  Surgeon: Praful Benjamin MD;  Location: [facility]     COMBINED ESOPHAGOSCOPY, GASTROSCOPY, DUODENOSCOPY (EGD) WITH CO2 INSUFFLATION N/A 11/27/2018    Procedure: COMBINED ESOPHAGOSCOPY, GASTROSCOPY, DUODENOSCOPY (EGD) WITH CO2 INSUFFLATION;  Surgeon: Duane, William Charles, MD;  Location: [facility]     CRYOTHERAPY  2000    cervical     CRYOTHERAPY Left 3/19/2015    Procedure: CRYOTHERAPY;  Surgeon: Keiko Puri MD;  [facility]     DILATION AND CURETTAGE, HYSTEROSCOPY DIAGNOSTIC, COMBINED N/A 1/19/2016    Procedure: COMBINED DILATION AND CURETTAGE, HYSTEROSCOPY DIAGNOSTIC;  Surgeon: Yeni Aparicio DO;  Location: [facility]     ESOPHAGOSCOPY, GASTROSCOPY, DUODENOSCOPY (EGD), COMBINED N/A 11/27/2018    Procedure: COMBINED ESOPHAGOSCOPY, GASTROSCOPY, DUODENOSCOPY (EGD), BIOPSY SINGLE OR MULTIPLE;  Surgeon: Duane, William Charles, MD;  Location: [facility]     IMPLANT STIMULATOR AND LEADS SACRAL NERVE (STAGE ONE AND TWO) N/A 7/24/2018    Procedure: IMPLANT STIMULATOR AND LEADS SACRAL NERVE  (STAGE ONE AND TWO);  Interstim Implant Stage One and Two (Implant Permanent Lead and Generator);  Surgeon: Dada Baltazar MD;  [facility]: [facility]     IMPLANT STIMULATOR AND LEADS SACRAL NERVE (STAGE ONE AND TWO) Right 2019    Procedure: Replacement of Interstim Implant;  Surgeon: Dada Baltazar MD;  [facility]: [facility]     IMPLANT STIMULATOR SACRAL NERVE PERCUTANEOUS TRIAL N/A 2018    Procedure: IMPLANT STIMULATOR SACRAL NERVE PERCUTANEOUS TRIAL;  Percutaneous Neural Examination (trial for interstim);  Surgeon: Dada Baltazar MD;  Location: [facility]     LAPAROSCOPIC TUBAL LIGATION  1981     PHACOEMULSIFICATION CLEAR CORNEA WITH STANDARD INTRAOCULAR LENS IMPLANT  8/15/2013    Procedure: PHACOEMULSIFICATION CLEAR CORNEA WITH STANDARD INTRAOCULAR LENS IMPLANT;  RIGHT PHACOEMULSIFICATION CLEAR CORNEA WITH STANDARD INTRAOCULAR LENS IMPLANT ;  Surgeon: Trae Taylor MD;  Location:  [facility]     REMOVE STIMULATOR SACRAL NERVE Right 2019    Procedure: Removal of Interstim Implant;  Surgeon: Dada Baltazar MD;  Location:  OR     SURGICAL HISTORY OF -       x4, ankle surgery       Social History     Tobacco Use     Smoking status: Former     Packs/day: 0.50     Years: 20.00     Pack years: 10.00     Types: Cigarettes     Start date: 2018     Smokeless tobacco: Never     Tobacco comments:     quit at age 35. Restarted in 2018 off and on, not more than 1/2 pack per day   Vaping Use     Vaping status: Never Used   Substance Use Topics     Alcohol use: Yes     Comment: social/3-4 per week     Family History   Problem Relation Age of Onset     Hypertension Mother      Cerebrovascular Disease Mother      Arthritis Mother      Cardiovascular Mother      Circulatory Mother      Cerebrovascular Disease Father      Prostate Cancer Father 65         at 69     Asthma Brother      Prostate Cancer Brother 48        bone metastasis     Diabetes Sister      Hypertension Sister      Osteoporosis Sister       Depression Sister      Lipids Sister      Cancer Maternal Grandmother 95        spine     Breast Cancer Sister 39        also contralateral @53, mets to lungs     Cancer Sister 20        second uterine cancer in 30s also     Diabetes Sister      Hypertension Sister      Depression Sister      Osteoporosis Sister      Breast Cancer Sister 57        unilateral     Cancer Paternal Aunt 40        unknown type     Cancer Paternal Uncle         stomach;  in his 80s     Prostate Cancer Brother      Glaucoma No family hx of      Melanoma No family hx of      Skin Cancer No family hx of      Macular Degeneration No family hx of          Current Outpatient Medications   Medication Sig Dispense Refill     ASPIRIN 81 PO        blood glucose monitoring (ONE TOUCH ULTRA 2) meter device kit Use to test blood sugar 1 times daily or as directed. 1 kit 0     cephALEXin (KEFLEX) 500 MG capsule Take 1 capsule (500 mg) by mouth 2 times daily Hold the trimethoprim while taking keflex. Once completed the keflex can resume the trimethoprim 14 capsule 0     continuous blood glucose monitoring (FREESTYLE MELINA) sensor For use with Freestyle Melina Flash  for continuous monitioring of blood glucose levels. Replace sensor every 10 days. 3 each 5     esomeprazole (NEXIUM) 40 MG DR capsule Take 1 capsule (40 mg) by mouth every morning (before breakfast) Take 30-60 minutes before eating. 30 capsule 11     glipiZIDE (GLUCOTROL XL) 10 MG 24 hr tablet Take 1 tablet (10 mg) by mouth daily (Due for follow up with PCP for future refills) 90 tablet 0     lisinopril (ZESTRIL) 5 MG tablet TAKE 1 TABLET DAILY 90 tablet 1     MELATONIN PO 10 mg At Bedtime        metFORMIN (GLUCOPHAGE XR) 500 MG 24 hr tablet TAKE 2 TABLETS TWICE A DAY WITH MEALS (DUE FOR FOLLOW UP WITH PRIMARY CARE PHYSICIAN FOR FUTURE REFILLS) 360 tablet 1     omeprazole (PRILOSEC) 40 MG DR capsule TAKE 1 CAPSULE DAILY 30 TO 60 MINUTES BEFORE A MEAL 90 capsule 1     ONE TOUCH  LANCETS None Entered       ONE TOUCH TEST STRIPS VI None Entered       order for DME Equipment being ordered: Light with four-wheel walker 1 Units 0     order for DME Equipment being ordered: 4 wheel walker with a seat, light weight. 1 Units 0     order for DME Equipment being ordered: Walker with seat light weight 1 Device 0     simvastatin (ZOCOR) 20 MG tablet Take 1 tablet (20 mg) by mouth At Bedtime (Due for clinic appointment before future refills) 90 tablet 0     traZODone (DESYREL) 100 MG tablet TAKE 1 TABLET NIGHTLY AS NEEDED FOR SLEEP 90 tablet 0     trimethoprim (TRIMPEX) 100 MG tablet Take 1 tablet (100 mg) by mouth At Bedtime For more refills,schedule an appointment at [phone] 90 tablet 0     trospium (SANCTURA XR) 60 MG CP24 24 hr capsule Take 1 capsule (60 mg) by mouth daily before breakfast 90 capsule 3     Allergies   Allergen Reactions     Morphine And Related      Sulfa Antibiotics      Morphine Rash     Does OK with oxycodone.     Recent Labs   Lab Test 05/24/22  1531 06/11/21  1556 10/19/20  1255 01/07/20  1051 09/27/19  1031   A1C 6.3* 8.4* 8.7* 7.3* 7.5*   LDL 84  --   --  78 106*   HDL 45*  --   --  65 54   TRIG 162*  --   --  118 123   ALT  --   --  23 18 21   CR 0.86 0.66 0.53  --  0.56   GFRESTIMATED 72 89 >90  --  >90   GFRESTBLACK  --  >90 >90  --  >90   POTASSIUM 4.6 4.2 4.4  --  4.2   TSH 0.52 0.50  --   --   --         Review of Systems   Constitutional, HEENT, cardiovascular, pulmonary, GI, , musculoskeletal, neuro, skin, endocrine and psych systems are negative, except as otherwise noted.      Objective    Vitals - Patient Reported  Pain Score: Extreme Pain (9)      Physical Exam   GENERAL: alert, no distress, elderly, fatigued and anxious   EYES: Eyes grossly normal to inspection.  No discharge or erythema, or obvious scleral/conjunctival abnormalities.  HENT: normal cephalic/atraumatic and oral mucous membranes moist  RESP: No audible wheeze, cough, or visible cyanosis.  No  visible retractions or increased work of breathing.    MS: No gross musculoskeletal defects noted.  Normal range of motion.  No visible edema.  SKIN: Visible skin clear. No significant rash, abnormal pigmentation or lesions.  NEURO: mentation intact  PSYCH: Alert, oriented, thought content appropriate, affect: increased in intensity, thought content exhibits flight of ideas,mentation appears normal., patient appears normal, good hygiene, anxious, oriented X 3  MENTAL STATUS EXAM:  Appearance/Behavior: Distressed, Casually groomed and Dressed appropriately for weather  Speech: Rambling  Mood/Affect: anxiety  Insight: Adequate       Office Visit on 05/24/2022   Component Date Value Ref Range Status     Cholesterol 05/24/2022 161  <200 mg/dL Final     Triglycerides 05/24/2022 162 (H)  <150 mg/dL Final     Direct Measure HDL 05/24/2022 45 (L)  >=50 mg/dL Final     LDL Cholesterol Calculated 05/24/2022 84  <=100 mg/dL Final     Non HDL Cholesterol 05/24/2022 116  <130 mg/dL Final     Patient Fasting > 8hrs? 05/24/2022 Yes   Final     Hemoglobin 05/24/2022 13.2  11.7 - 15.7 g/dL Final     Hemoglobin A1C 05/24/2022 6.3 (H)  0.0 - 5.6 % Final    Normal <5.7%   Prediabetes 5.7-6.4%    Diabetes 6.5% or higher     Note: Adopted from ADA consensus guidelines.     Sodium 05/24/2022 136  133 - 144 mmol/L Final     Potassium 05/24/2022 4.6  3.4 - 5.3 mmol/L Final     Chloride 05/24/2022 105  94 - 109 mmol/L Final     Carbon Dioxide (CO2) 05/24/2022 23  20 - 32 mmol/L Final     Anion Gap 05/24/2022 8  3 - 14 mmol/L Final     Urea Nitrogen 05/24/2022 19  7 - 30 mg/dL Final     Creatinine 05/24/2022 0.86  0.52 - 1.04 mg/dL Final     Calcium 05/24/2022 9.3  8.5 - 10.1 mg/dL Final     Glucose 05/24/2022 89  70 - 99 mg/dL Final     GFR Estimate 05/24/2022 72  >60 mL/min/1.73m2 Final    Effective December 21, 2021 eGFRcr in adults is calculated using the 2021 CKD-EPI creatinine equation which includes age and gender (Amira et al., NEJM,  DOI: 10.1056/YMNUwl3188935)     TSH 05/24/2022 0.52  0.40 - 4.00 mU/L Final     Assessment & Plan     Anxiety  Discussed the pathophysiology of anxiety episodes and the various symptoms seen associated with anxiety episodes.  Discussed possible triggers including fatigue, depression, stress, and chemicals such as alcohol, caffeine and certain drugs.  Discussed the treatment including an aerobic exercise program, adequate rest, and both rescue meds and maintenance meds.  We discussed present situation with  being ill and worry about how she is going to care of herself   Wanted to make referral for home health but patient wanted me to delay because was waiting for investigaor to call her back this week     Moderate episode of recurrent major depressive disorder (H)  Reviewed concept of depression as function of biochemical imbalance of neurotransmitters/rationale for treatment.  Risks and benefits of medication(s) reviewed with patient.  Questions answered.  Counseling advised  Followup appointment in 1 month(s)  Patient instructed to call for significant side effects medications or problems  Patient advised immediate presentation to hospital for suicidal thought, etc.  No-harm verbal contract agreed upon    Diabetes mellitus, type 2 (H)  glycohemoglobin monitoring discussed and long term diabetic complications discussed  No changes in current regimen  Weight loss advised  Increased exercise advised  Needs to get her labs completed    Recheck in 1 month, sooner should new symptoms or   problems arise.      Osteopenia, unspecified location  - DEXA HIP/PELVIS/SPINE - Future    Visit for screening mammogram  - MA SCREENING DIGITAL BILAT - Future  (s+30)    Asymptomatic menopausal state  - DEXA HIP/PELVIS/SPINE - Future    PLAN:   Patient needs to follow up in if no improvement,or sooner if worsening of symptoms or other symptoms develop.  Need to consider home health referral   Patient states will let me know  about the help she has pending   Need to get fasting labs completed   Will follow up and/or notify patient on results via phone or mail to determine further need for followup  Ordering of each unique test   Time spent by me doing chart review, history and exam, documentation and further activities per the note       See Patient Instructions  Patient Instructions     PLAN:   1.   Symptomatic therapy suggested: Continue current medications as prescribed.   2.  Orders Placed This Encounter   Procedures     REVIEW OF HEALTH MAINTENANCE PROTOCOL ORDERS     DEXA HIP/PELVIS/SPINE - Future     MA SCREENING DIGITAL BILAT - Future  (s+30)     3. Patient needs to follow up in if no improvement,or sooner if worsening of symptoms or other symptoms develop.  Need to consider home health referral   Need to get fasting labs completed   Will follow up and/or notify patient on results via phone or mail to determine further need for followup    NIKKY Lutz [facility]          Video-Visit Details    Type of service:  Video Visit   Video Start Time: 3:30 PM  Video End Time:3:58 PM    Originating Location (pt. Location): Home    Distant Location (provider location):  On-site  Platform used for Video Visit: Lizeth

## 2023-06-21 ENCOUNTER — PATIENT OUTREACH (OUTPATIENT)
Dept: CARE COORDINATION | Facility: CLINIC | Age: 73
End: 2023-06-21
Payer: COMMERCIAL

## 2023-06-21 NOTE — PROGRESS NOTES
Clinic Care Coordination Contact  Program: Health insurance and county benefits   County: [facility] Case #:  [facility] Worker:   [place] #:   Subscriber #:   Renewal:  Date Applied:     FRW Outreach:   6/21/23:Sending message to RHODA/CHW again.  6/15/23- FRW reached out regarding referral. At this time patient is unable to provide income information for spouse. FRW is not able to determine eligibility for patient. Patient needs help retrieving income information from spouse's daughter. Please advise next steps.     Health Insurance:      Referral/Screening:  FRW Screening    Row Name 06/14/23 1045       [facility] Benefits   Is patient requesting help applying for [facility] benefits? Yes       Have you recently applied for any [employer] [facility] benefits? No       How many people in your household? 1       Do you buy/eat food together? No       What is the monthly gross income for the household (wages, social security, workers comp, and pension)?  900       Insurance:   [place] No       Is this an insurance renewal? No       Is this a new insurance application request? Yes       Have you recently applied for insurance? No       How many people in your household?  1       Do you file taxes? Yes       How many dependents do you claim? 0       What is the monthly gross income for the household (wages, social security, workers comp, and pension)?  900       OTHER   Is this a gilmar care application? No       Any other information for the FRW? Patient is .  is in [facility] [place] [facility]. His daughter has access to all of his financials. Patient is living off savings and her $900 social security check. Unsure if she will be eligible, but she needs help

## 2023-06-22 ENCOUNTER — TELEPHONE (OUTPATIENT)
Dept: FAMILY MEDICINE | Facility: CLINIC | Age: 73
End: 2023-06-22
Payer: COMMERCIAL

## 2023-06-22 NOTE — TELEPHONE ENCOUNTER
Patient Returning Call    Reason for call:  Labs    Information relayed to patient:  no    Patient has additional questions:  Yes    What are your questions/concerns:  Labs     Okay to leave a detailed message?: Yes at Home number on file [phone] (home)

## 2023-06-22 NOTE — PROGRESS NOTES
Moved CHW outreach date to this week due to recent calls to clinic.     IBRAHIMA Copeland   Social Work [facility] Care Coordinator   [employer]  Providing coverage for [facility] today

## 2023-06-22 NOTE — TELEPHONE ENCOUNTER
"Called patient for clarification on lab message below.     Patient states she is needing to complete labs that Dr. Medeiros requested from her due to she is a diabetic. Patient states her  passed away today and she is \"mess and needs help.\" Patient requested this information to be shared with provider. Patient states she had visit recently with Perri Carias and discussed this with her. Writer noted referral for care coordinator and home health started, patient states someone has reach out to her.     Writer advise patient she has available active labs that Dr. Medeiros placed 1/19/23 and just need to have lab appointment. Patient confirmed she already has a lab appointment for 6/26/23. Patient verbalized understanding and denies questions.     Routing to provider as AUDRA Lynch RN  [facility] Triage  [place]        "

## 2023-06-23 ENCOUNTER — PATIENT OUTREACH (OUTPATIENT)
Dept: CARE COORDINATION | Facility: CLINIC | Age: 73
End: 2023-06-23
Payer: COMMERCIAL

## 2023-06-23 ENCOUNTER — TELEPHONE (OUTPATIENT)
Dept: FAMILY MEDICINE | Facility: CLINIC | Age: 73
End: 2023-06-23
Payer: COMMERCIAL

## 2023-06-23 NOTE — PROGRESS NOTES
Clinic Care Coordination Contact    Home care orders entered 6/22/23 for RN/HHA/RHODA.     [facility] declined referral due to capacity.     Home care orders sent:     [facility] - sent and declined 6/23/23, no staffing    [facility] - sent and declined 6/23/23, no staffing    [facility] - sent and accepted 6/23/23; Simran called and they can accept    [facility] - sent and declined 6/23/23, called clinic in separate note, at capacity    [facility] - sent and declined 6/23/23, at max for insurance plan    [facility] - sent 6/23/23, called they can take her but RHODA HEMPHILL informed them that someone else already did.     [facility] - sent 6/23/23    [facility] - sent 6/23/23    Plan: [facility] will start home care services. Routing to lead RHODA CC and PCP as FYI.     Catherine Mathis, IBRAHIMA   Social Work Primary Care Clinic Care Coordinator   [facility]

## 2023-06-23 NOTE — TELEPHONE ENCOUNTER
Flavia calling from [facility] sent over a referral for [facility] for Pt to be seen but  they are at capacity so will not be able to see Pt. Just an SARAHI CASTRO Visit Facilitator

## 2023-06-26 ENCOUNTER — LAB (OUTPATIENT)
Dept: LAB | Facility: CLINIC | Age: 73
End: 2023-06-26
Payer: COMMERCIAL

## 2023-06-26 DIAGNOSIS — I10 HYPERTENSION GOAL BP (BLOOD PRESSURE) < 140/90: ICD-10-CM

## 2023-06-26 DIAGNOSIS — D64.9 NORMOCYTIC ANEMIA: ICD-10-CM

## 2023-06-26 DIAGNOSIS — Z79.4 TYPE 2 DIABETES MELLITUS WITHOUT COMPLICATION, WITH LONG-TERM CURRENT USE OF INSULIN (H): ICD-10-CM

## 2023-06-26 DIAGNOSIS — E11.9 TYPE 2 DIABETES MELLITUS WITHOUT COMPLICATION, WITH LONG-TERM CURRENT USE OF INSULIN (H): ICD-10-CM

## 2023-06-26 DIAGNOSIS — Z12.11 SCREEN FOR COLON CANCER: ICD-10-CM

## 2023-06-26 DIAGNOSIS — E78.5 HYPERLIPIDEMIA LDL GOAL <100: ICD-10-CM

## 2023-06-26 DIAGNOSIS — N18.31 STAGE 3A CHRONIC KIDNEY DISEASE (H): ICD-10-CM

## 2023-06-26 LAB
ALBUMIN SERPL BCG-MCNC: 4.3 G/DL (ref 3.5–5.2)
ALP SERPL-CCNC: 71 U/L (ref 35–104)
ALT SERPL W P-5'-P-CCNC: 12 U/L (ref 0–50)
ANION GAP SERPL CALCULATED.3IONS-SCNC: 14 MMOL/L (ref 7–15)
AST SERPL W P-5'-P-CCNC: 22 U/L (ref 0–45)
BILIRUB SERPL-MCNC: 0.2 MG/DL
BUN SERPL-MCNC: 16.2 MG/DL (ref 8–23)
CALCIUM SERPL-MCNC: 9.4 MG/DL (ref 8.8–10.2)
CHLORIDE SERPL-SCNC: 105 MMOL/L (ref 98–107)
CHOLEST SERPL-MCNC: 144 MG/DL
CREAT SERPL-MCNC: 0.89 MG/DL (ref 0.51–0.95)
DEPRECATED HCO3 PLAS-SCNC: 21 MMOL/L (ref 22–29)
ERYTHROCYTE [DISTWIDTH] IN BLOOD BY AUTOMATED COUNT: 14.1 % (ref 10–15)
GFR SERPL CREATININE-BSD FRML MDRD: 69 ML/MIN/1.73M2
GLUCOSE SERPL-MCNC: 127 MG/DL (ref 70–99)
HBA1C MFR BLD: 6.2 % (ref 0–5.6)
HCT VFR BLD AUTO: 34.6 % (ref 35–47)
HDLC SERPL-MCNC: 41 MG/DL
HGB BLD-MCNC: 11.3 G/DL (ref 11.7–15.7)
LDLC SERPL CALC-MCNC: 74 MG/DL
MCH RBC QN AUTO: 29 PG (ref 26.5–33)
MCHC RBC AUTO-ENTMCNC: 32.7 G/DL (ref 31.5–36.5)
MCV RBC AUTO: 89 FL (ref 78–100)
NONHDLC SERPL-MCNC: 103 MG/DL
PLATELET # BLD AUTO: 302 10E3/UL (ref 150–450)
POTASSIUM SERPL-SCNC: 4 MMOL/L (ref 3.4–5.3)
PROT SERPL-MCNC: 6.9 G/DL (ref 6.4–8.3)
RBC # BLD AUTO: 3.9 10E6/UL (ref 3.8–5.2)
SODIUM SERPL-SCNC: 140 MMOL/L (ref 136–145)
TRIGL SERPL-MCNC: 143 MG/DL
WBC # BLD AUTO: 9.8 10E3/UL (ref 4–11)

## 2023-06-26 PROCEDURE — 85027 COMPLETE CBC AUTOMATED: CPT

## 2023-06-26 PROCEDURE — 80061 LIPID PANEL: CPT

## 2023-06-26 PROCEDURE — 80053 COMPREHEN METABOLIC PANEL: CPT

## 2023-06-26 PROCEDURE — 83540 ASSAY OF IRON: CPT

## 2023-06-26 PROCEDURE — 83036 HEMOGLOBIN GLYCOSYLATED A1C: CPT

## 2023-06-26 PROCEDURE — 82728 ASSAY OF FERRITIN: CPT

## 2023-06-26 PROCEDURE — 36415 COLL VENOUS BLD VENIPUNCTURE: CPT

## 2023-06-26 PROCEDURE — 83550 IRON BINDING TEST: CPT

## 2023-06-26 NOTE — LETTER
June 28, 2023      Radha Corbett  [address] [address]        Dear ,    We are writing to inform you of your test results.    Your labs are normal or stable with minor abnormalities. Hemoglobin is slightly low.     Resulted Orders   Lipid Profile   Result Value Ref Range    Cholesterol 144 <200 mg/dL    Triglycerides 143 <150 mg/dL    Direct Measure HDL 41 (L) >=50 mg/dL    LDL Cholesterol Calculated 74 <=100 mg/dL    Non HDL Cholesterol 103 <130 mg/dL    Narrative    Cholesterol  Desirable:  <200 mg/dL    Triglycerides  Normal:  Less than 150 mg/dL  Borderline High:  150-199 mg/dL  High:  200-499 mg/dL  Very High:  Greater than or equal to 500 mg/dL    Direct Measure HDL  Female:  Greater than or equal to 50 mg/dL   Male:  Greater than or equal to 40 mg/dL    LDL Cholesterol  Desirable:  <100mg/dL  Above Desirable:  100-129 mg/dL   Borderline High:  130-159 mg/dL   High:  160-189 mg/dL   Very High:  >= 190 mg/dL    Non HDL Cholesterol  Desirable:  130 mg/dL  Above Desirable:  130-159 mg/dL  Borderline High:  160-189 mg/dL  High:  190-219 mg/dL  Very High:  Greater than or equal to 220 mg/dL   Comprehensive metabolic panel   Result Value Ref Range    Sodium 140 136 - 145 mmol/L    Potassium 4.0 3.4 - 5.3 mmol/L    Chloride 105 98 - 107 mmol/L    Carbon Dioxide (CO2) 21 (L) 22 - 29 mmol/L    Anion Gap 14 7 - 15 mmol/L    Urea Nitrogen 16.2 8.0 - 23.0 mg/dL    Creatinine 0.89 0.51 - 0.95 mg/dL    Calcium 9.4 8.8 - 10.2 mg/dL    Glucose 127 (H) 70 - 99 mg/dL    Alkaline Phosphatase 71 35 - 104 U/L    AST 22 0 - 45 U/L      Comment:      Reference intervals for this test were updated on 6/12/2023 to more accurately reflect our healthy population. There may be differences in the flagging of prior results with similar values performed with this method. Interpretation of those prior results can be made in the context of the updated reference intervals.    ALT 12 0 - 50 U/L      Comment:       Reference intervals for this test were updated on 6/12/2023 to more accurately reflect our healthy population. There may be differences in the flagging of prior results with similar values performed with this method. Interpretation of those prior results can be made in the context of the updated reference intervals.      Protein Total 6.9 6.4 - 8.3 g/dL    Albumin 4.3 3.5 - 5.2 g/dL    Bilirubin Total 0.2 <=1.2 mg/dL    GFR Estimate 69 >60 mL/min/1.73m2   CBC with platelets   Result Value Ref Range    WBC Count 9.8 4.0 - 11.0 10e3/uL    RBC Count 3.90 3.80 - 5.20 10e6/uL    Hemoglobin 11.3 (L) 11.7 - 15.7 g/dL    Hematocrit 34.6 (L) 35.0 - 47.0 %    MCV 89 78 - 100 fL    MCH 29.0 26.5 - 33.0 pg    MCHC 32.7 31.5 - 36.5 g/dL    RDW 14.1 10.0 - 15.0 %    Platelet Count 302 150 - 450 10e3/uL   Hemoglobin A1c   Result Value Ref Range    Hemoglobin A1C 6.2 (H) 0.0 - 5.6 %      Comment:      Normal <5.7%   Prediabetes 5.7-6.4%    Diabetes 6.5% or higher     Note: Adopted from ADA consensus guidelines.       If you have any questions or concerns, please call the clinic at the number listed above.       Sincerely,      Trice Medeiros MD PhD

## 2023-06-26 NOTE — LETTER
June 30, 2023      Radha Corbett  [address] [address]        Dear ,    We are writing to inform you of your test results.    Your labs are normal or stable except for hemoglobin is drifting down.  There is mild iron deficiency.  Please schedule a virtual visit to evaluate potential cause for the anemia.    Resulted Orders   Lipid Profile   Result Value Ref Range    Cholesterol 144 <200 mg/dL    Triglycerides 143 <150 mg/dL    Direct Measure HDL 41 (L) >=50 mg/dL    LDL Cholesterol Calculated 74 <=100 mg/dL    Non HDL Cholesterol 103 <130 mg/dL    Narrative    Cholesterol  Desirable:  <200 mg/dL    Triglycerides  Normal:  Less than 150 mg/dL  Borderline High:  150-199 mg/dL  High:  200-499 mg/dL  Very High:  Greater than or equal to 500 mg/dL    Direct Measure HDL  Female:  Greater than or equal to 50 mg/dL   Male:  Greater than or equal to 40 mg/dL    LDL Cholesterol  Desirable:  <100mg/dL  Above Desirable:  100-129 mg/dL   Borderline High:  130-159 mg/dL   High:  160-189 mg/dL   Very High:  >= 190 mg/dL    Non HDL Cholesterol  Desirable:  130 mg/dL  Above Desirable:  130-159 mg/dL  Borderline High:  160-189 mg/dL  High:  190-219 mg/dL  Very High:  Greater than or equal to 220 mg/dL   Comprehensive metabolic panel   Result Value Ref Range    Sodium 140 136 - 145 mmol/L    Potassium 4.0 3.4 - 5.3 mmol/L    Chloride 105 98 - 107 mmol/L    Carbon Dioxide (CO2) 21 (L) 22 - 29 mmol/L    Anion Gap 14 7 - 15 mmol/L    Urea Nitrogen 16.2 8.0 - 23.0 mg/dL    Creatinine 0.89 0.51 - 0.95 mg/dL    Calcium 9.4 8.8 - 10.2 mg/dL    Glucose 127 (H) 70 - 99 mg/dL    Alkaline Phosphatase 71 35 - 104 U/L    AST 22 0 - 45 U/L      Comment:      Reference intervals for this test were updated on 6/12/2023 to more accurately reflect our healthy population. There may be differences in the flagging of prior results with similar values performed with this method. Interpretation of those prior results can be  made in the context of the updated reference intervals.    ALT 12 0 - 50 U/L      Comment:      Reference intervals for this test were updated on 6/12/2023 to more accurately reflect our healthy population. There may be differences in the flagging of prior results with similar values performed with this method. Interpretation of those prior results can be made in the context of the updated reference intervals.      Protein Total 6.9 6.4 - 8.3 g/dL    Albumin 4.3 3.5 - 5.2 g/dL    Bilirubin Total 0.2 <=1.2 mg/dL    GFR Estimate 69 >60 mL/min/1.73m2   CBC with platelets   Result Value Ref Range    WBC Count 9.8 4.0 - 11.0 10e3/uL    RBC Count 3.90 3.80 - 5.20 10e6/uL    Hemoglobin 11.3 (L) 11.7 - 15.7 g/dL    Hematocrit 34.6 (L) 35.0 - 47.0 %    MCV 89 78 - 100 fL    MCH 29.0 26.5 - 33.0 pg    MCHC 32.7 31.5 - 36.5 g/dL    RDW 14.1 10.0 - 15.0 %    Platelet Count 302 150 - 450 10e3/uL   Hemoglobin A1c   Result Value Ref Range    Hemoglobin A1C 6.2 (H) 0.0 - 5.6 %      Comment:      Normal <5.7%   Prediabetes 5.7-6.4%    Diabetes 6.5% or higher     Note: Adopted from ADA consensus guidelines.   Ferritin   Result Value Ref Range    Ferritin 12 11 - 328 ng/mL   Iron and iron binding capacity   Result Value Ref Range    Iron 41 37 - 145 ug/dL    Iron Binding Capacity 357 240 - 430 ug/dL    Iron Sat Index 11 (L) 15 - 46 %       If you have any questions or concerns, please call the clinic at the number listed above.       Sincerely,      Trice Medeiros MD PhD

## 2023-06-28 ENCOUNTER — PATIENT OUTREACH (OUTPATIENT)
Dept: CARE COORDINATION | Facility: CLINIC | Age: 73
End: 2023-06-28
Payer: COMMERCIAL

## 2023-06-28 DIAGNOSIS — D64.9 NORMOCYTIC ANEMIA: Primary | ICD-10-CM
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
## 2023-06-28 NOTE — Clinical Note
Zita, I do have a message out to Dr. Medeiros to see if he is open to placing a CP referral. I'm not sure if the patient will agree to schedule since she declined home care but I think we can at least try.  I also connected with Catherine Mathis. She is going to cover at [facility] for this patient instead of [facility] just as a precaution given the [facility] case and challenges right now. Thank you again for all of your communication. Amanda

## 2023-06-29 ENCOUNTER — TELEPHONE (OUTPATIENT)
Dept: CARE COORDINATION | Facility: CLINIC | Age: 73
End: 2023-06-29
Payer: COMMERCIAL

## 2023-06-29 DIAGNOSIS — E11.69 TYPE 2 DIABETES MELLITUS WITH OTHER SPECIFIED COMPLICATION, WITH LONG-TERM CURRENT USE OF INSULIN (H): ICD-10-CM

## 2023-06-29 DIAGNOSIS — Z79.4 TYPE 2 DIABETES MELLITUS WITH OTHER SPECIFIED COMPLICATION, WITH LONG-TERM CURRENT USE OF INSULIN (H): ICD-10-CM

## 2023-06-29 DIAGNOSIS — F41.8 DEPRESSION WITH ANXIETY: ICD-10-CM

## 2023-06-29 DIAGNOSIS — I10 HYPERTENSION GOAL BP (BLOOD PRESSURE) < 140/90: Primary | Chronic | ICD-10-CM

## 2023-06-29 LAB
FERRITIN SERPL-MCNC: 12 NG/ML (ref 11–328)
IRON BINDING CAPACITY (ROCHE): 357 UG/DL (ref 240–430)
IRON SATN MFR SERPL: 11 % (ref 15–46)
IRON SERPL-MCNC: 41 UG/DL (ref 37–145)
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
## 2023-07-10 ENCOUNTER — OFFICE VISIT (OUTPATIENT)
Dept: OPHTHALMOLOGY | Facility: CLINIC | Age: 73
End: 2023-07-10
Payer: COMMERCIAL

## 2023-07-10 DIAGNOSIS — Z86.69 HISTORY OF DETACHED RETINA REPAIR: ICD-10-CM

## 2023-07-10 DIAGNOSIS — Z01.01 ENCOUNTER FOR EXAMINATION OF EYES AND VISION WITH ABNORMAL FINDINGS: Primary | ICD-10-CM

## 2023-07-10 DIAGNOSIS — H43.811 POSTERIOR VITREOUS DETACHMENT, RIGHT EYE: ICD-10-CM

## 2023-07-10 DIAGNOSIS — H52.4 PRESBYOPIA: ICD-10-CM

## 2023-07-10 DIAGNOSIS — H44.23 DEGENERATIVE MYOPIA OF BOTH EYES, UNSPECIFIED WHETHER COMPLICATION PRESENT: ICD-10-CM

## 2023-07-10 DIAGNOSIS — Z98.890 HISTORY OF DETACHED RETINA REPAIR: ICD-10-CM

## 2023-07-10 DIAGNOSIS — H50.00 ESOTROPIA: ICD-10-CM

## 2023-07-10 DIAGNOSIS — Z79.4 TYPE 2 DIABETES MELLITUS WITH OTHER SPECIFIED COMPLICATION, WITH LONG-TERM CURRENT USE OF INSULIN (H): ICD-10-CM

## 2023-07-10 DIAGNOSIS — E11.69 TYPE 2 DIABETES MELLITUS WITH OTHER SPECIFIED COMPLICATION, WITH LONG-TERM CURRENT USE OF INSULIN (H): ICD-10-CM

## 2023-07-10 DIAGNOSIS — Z96.1 PSEUDOPHAKIA OF BOTH EYES: ICD-10-CM

## 2023-07-10 PROCEDURE — 92134 CPTRZ OPH DX IMG PST SGM RTA: CPT | Performed by: OPHTHALMOLOGY

## 2023-07-10 PROCEDURE — 92014 COMPRE OPH EXAM EST PT 1/>: CPT | Performed by: OPHTHALMOLOGY

## 2023-07-10 PROCEDURE — 92015 DETERMINE REFRACTIVE STATE: CPT | Performed by: OPHTHALMOLOGY

## 2023-07-10 ASSESSMENT — CONF VISUAL FIELD
OD_NORMAL: 1
OD_SUPERIOR_TEMPORAL_RESTRICTION: 0
OD_INFERIOR_TEMPORAL_RESTRICTION: 0
OD_INFERIOR_NASAL_RESTRICTION: 0
OD_SUPERIOR_NASAL_RESTRICTION: 0

## 2023-07-10 ASSESSMENT — REFRACTION_WEARINGRX
OD_AXIS: 055
OD_CYLINDER: +2.75
SPECS_TYPE: PAL
OS_ADD: +3.00
OS_ADD: +2.75
OS_AXIS: 137
OS_SPHERE: +0.50
OD_AXIS: 050
SPECS_TYPE: PAL
OS_AXIS: 091
OS_CYLINDER: +1.00
OS_CYLINDER: +0.75
OD_SPHERE: -1.50
OD_SPHERE: -1.75
OD_CYLINDER: +2.25
OS_SPHERE: -3.25
OD_ADD: +3.00
OD_ADD: +2.75

## 2023-07-10 ASSESSMENT — VISUAL ACUITY
OS_CC: HM ECC
OD_PH_CC+: -1
CORRECTION_TYPE: GLASSES
OD_PH_CC: 20/40
OD_CC: 2
OD_CC: 20/50
METHOD: SNELLEN - LINEAR

## 2023-07-10 ASSESSMENT — REFRACTION_MANIFEST
OD_CYLINDER: +1.25
OD_SPHERE: -0.75
OS_CYLINDER: +0.50
OS_ADD: +3.00
OS_AXIS: 112
OD_AXIS: 053
OS_SPHERE: -6.25
OD_ADD: +3.00

## 2023-07-10 ASSESSMENT — CUP TO DISC RATIO
OD_RATIO: 0.1
OS_RATIO: 0.1

## 2023-07-10 ASSESSMENT — EXTERNAL EXAM - RIGHT EYE: OD_EXAM: NORMAL

## 2023-07-10 ASSESSMENT — TONOMETRY
IOP_METHOD: APPLANATION
OD_IOP_MMHG: 17
OS_IOP_MMHG: 13

## 2023-07-10 ASSESSMENT — SLIT LAMP EXAM - LIDS
COMMENTS: 2+ DERMATOCHALASIS, 1+ SCLERAL SHOW
COMMENTS: 2+ DERMATOCHALASIS, 1+ SCLERAL SHOW

## 2023-07-10 ASSESSMENT — EXTERNAL EXAM - LEFT EYE: OS_EXAM: NORMAL

## 2023-07-10 NOTE — PATIENT INSTRUCTIONS
"Glasses prescription given - optional  Gently wash lids with water in the morning.  May use artificial tears up to four times a day (like Refresh Optive, Systane Balance, TheraTears, or generic artificial tears are ok. Avoid \"get the red out\" drops).   Possible clouding of posterior capsule right eye discussed.  Obtain OCT today - will call with any concerns.    Call in March 2024 for an appointment in July 2024 for Complete Exam    Dr. Krishna [phone]      Patient Education   Diabetes weakens the blood vessels all over the body, including the eyes. Damage to the blood vessels in the eyes can cause swelling or bleeding into part of the eye (called the retina). This is called diabetic retinopathy (CHARIS-tin--pu-thee). If not treated, this disease can cause vision loss or blindness.   Symptoms may include blurred or distorted vision, but many people have no symptoms. It's important to see your eye doctor regularly to check for problems.   Early treatment and good control can help protect your vision. Here are the things you can do to help prevent vision loss:      1. Keep your blood sugar levels under tight control.      2. Bring high blood pressure under control.      3. No smoking.      4. Have yearly dilated eye exams.       "

## 2023-07-10 NOTE — Clinical Note
7/10/2023         RE: Radha Corbett  [address] [address]        Dear Colleague,    Thank you for referring your patient, Radha Corbett, to the [facility]. Please see a copy of my visit note below.    No notes on file    Again, thank you for allowing me to participate in the care of your patient.        Sincerely,        Hayder Krishna MD

## 2023-07-10 NOTE — PROGRESS NOTES
" Current Eye Medications:  None.       Subjective:  Patient is here for a Diabetic Eye Exam.  Patient states her vision is clearer with her older prescription glasses.  Patient complains of some crusting of her eyelids recently.  Right eye has been \"flickering\" for the past couple months.      Lab Results   Component Value Date    A1C 6.2 06/26/2023    A1C 6.3 05/24/2022    A1C 8.4 06/11/2021    A1C 8.7 10/19/2020    A1C 7.3 01/07/2020    A1C 7.5 09/27/2019    A1C 6.7 07/30/2019        Objective:  See Ophthalmology Exam.       Assessment:  Stable eye exam in patient with diabetes and poor vision left eye after retinal detachment repair.  No diabetic retinopathy.      ICD-10-CM    1. Encounter for examination of eyes and vision with abnormal findings  Z01.01 REFRACTIVE STATUS     EYE EXAM (SIMPLE-NONBILLABLE)      2. Presbyopia  H52.4 REFRACTIVE STATUS      3. Type 2 diabetes mellitus with other specified complication, with long-term current use of insulin (H)  E11.69 EYE EXAM (SIMPLE-NONBILLABLE)    Z79.4       4. History of detached retina repair, os  Z98.890     Z86.69       5. Pseudophakia of both eyes; Yag Caps, os  Z96.1       6. Degenerative myopia of both eyes, unspecified whether complication present  H44.23 UT COMPUTERIZED OPHTHALMIC IMAGING RETINA      7. Esotropia  H50.00       8. Posterior vitreous detachment, right eye  H43.811            Plan:  Glasses prescription given - optional  Gently wash lids with water in the morning.  May use artificial tears up to four times a day (like Refresh Optive, Systane Balance, TheraTears, or generic artificial tears are ok. Avoid \"get the red out\" drops).   Possible clouding of posterior capsule right eye discussed.  Obtain OCT today - will call with any concerns.    Call in March 2024 for an appointment in July 2024 for Complete Exam    Dr. Krishna [phone]           "

## 2023-07-12 NOTE — TELEPHONE ENCOUNTER
Patient calling back stating that she has not heard back from anyone regarding getting the help she needs after the death of her . She is unable to take care of herself at home. Patient is requesting for care coordinator team to reach out to her again to further discuss her concerns and needs.     Routing to care coordinator team to follow-up with patient.    CHAPIN Mary  [facility] Primary Care Triage

## 2023-07-13 NOTE — TELEPHONE ENCOUNTER
Pt declined home care after referral when they tried to come out per CHW 6/28/23 note.     PCP - please enter community paramedic order proactively to offer to patient.     Care coordination will outreach again to pt and/or APS worker to see if we can get her 's financial information to apply for Medical Assistance (MA). Without MA, there are no other options for covered home care. In home services would be out of pocket.     Chart review:   - Nell ([phone]) with [facility]  - Carole Holman [phone] with [facility]    Catherine Mathis [facility]   Social Work Primary Care Clinic Care Coordinator   [phone]  [email or web address]

## 2023-07-14 NOTE — PROGRESS NOTES
CP order entered 7/13/23.     CHW to outreach every 2 weeks.     Possible supports for pt to get 's financial info from his daughter:   - Nell ([phone]) with [facility]  - Carole Holman [phone] with [facility]    IBRAHIMA Copeland   Social Work Primary Care Clinic Care Coordinator   [facility]  [phone]  [email or web address]

## 2023-07-17 ENCOUNTER — PATIENT OUTREACH (OUTPATIENT)
Dept: CARE COORDINATION | Facility: CLINIC | Age: 73
End: 2023-07-17
Payer: COMMERCIAL

## 2023-07-17 DIAGNOSIS — E11.69 TYPE 2 DIABETES MELLITUS WITH OTHER SPECIFIED COMPLICATION, WITH LONG-TERM CURRENT USE OF INSULIN (H): Primary | ICD-10-CM

## 2023-07-17 DIAGNOSIS — I10 HYPERTENSION GOAL BP (BLOOD PRESSURE) < 140/90: ICD-10-CM

## 2023-07-17 DIAGNOSIS — F41.8 DEPRESSION WITH ANXIETY: ICD-10-CM

## 2023-07-17 DIAGNOSIS — Z79.4 TYPE 2 DIABETES MELLITUS WITH OTHER SPECIFIED COMPLICATION, WITH LONG-TERM CURRENT USE OF INSULIN (H): Primary | ICD-10-CM
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
## 2023-07-18 ENCOUNTER — PATIENT OUTREACH (OUTPATIENT)
Dept: CARE COORDINATION | Facility: CLINIC | Age: 73
End: 2023-07-18
Payer: COMMERCIAL

## 2023-07-18 ENCOUNTER — TELEPHONE (OUTPATIENT)
Dept: FAMILY MEDICINE | Facility: CLINIC | Age: 73
End: 2023-07-18
Payer: COMMERCIAL

## 2023-07-18 DIAGNOSIS — N18.2 TYPE 2 DIABETES MELLITUS WITH STAGE 2 CHRONIC KIDNEY DISEASE, WITH LONG-TERM CURRENT USE OF INSULIN (H): Primary | ICD-10-CM

## 2023-07-18 DIAGNOSIS — E11.22 TYPE 2 DIABETES MELLITUS WITH STAGE 2 CHRONIC KIDNEY DISEASE, WITH LONG-TERM CURRENT USE OF INSULIN (H): Primary | ICD-10-CM

## 2023-07-18 DIAGNOSIS — Z79.4 TYPE 2 DIABETES MELLITUS WITH STAGE 2 CHRONIC KIDNEY DISEASE, WITH LONG-TERM CURRENT USE OF INSULIN (H): Primary | ICD-10-CM

## 2023-07-18 NOTE — TELEPHONE ENCOUNTER
Let patient know a referral was made. She will be contacted in one to two business days to schedule an appointment.

## 2023-07-18 NOTE — TELEPHONE ENCOUNTER
Patient called. Would like a referral to a podiatrist. Her toe nails are so long and painful.     Looks like she is working with a  to try and get some help but she was asking again.

## 2023-07-18 NOTE — PROGRESS NOTES
[facility]/Voicemail       Clinical Data: Care Coordinator Outreach  Outreach attempted x 1.  Left message on patient's voicemail with call back information and requested return call.  Plan:   Care Coordinator will try to reach patient again in 3-5 business days.    Next outreach due: 7/25/23

## 2023-07-20 ENCOUNTER — TELEPHONE (OUTPATIENT)
Dept: FAMILY MEDICINE | Facility: CLINIC | Age: 73
End: 2023-07-20
Payer: COMMERCIAL

## 2023-07-20 NOTE — TELEPHONE ENCOUNTER
1. Podiatry: she can call Kevin Representative at: [phone] to schedule appointment.   2. It is challenging to address her needs when she refuses all the services we try to arrange and not returning calls to our care corrodinator. I will have our  reach out again.

## 2023-07-20 NOTE — TELEPHONE ENCOUNTER
Incoming call from an individual identifying herself as Carole Holman, from [employer].     Carole states she has been working with pt following the death of her , that pt indicated was her primary caregiver.    Carole states she is calling regarding the followin. Pt needs a Podiatry appointment. Carole states pt indicated that no one from the clinic calls her back. Per chart review, pt has not returned call to [facility] Care Coordination outreach attempts.    2. Pt is requesting PCA services. - Per chart review, pt has declined home care evaluation after referral was placed on her behalf and home care attempted to arrange.   Pt also declined community paramedic services after referral was placed and outreach was made.    3. Pt appears to have a fear of leaving her home due to her reported concerns about falling. She states pt expressed needing to go to the bank so she brought pt there and states pt did fine in the community and was able to perform her necessary tasks in the community despite her prior statements about difficulty.    4. Carole states she has been working with pt helping her apply for Medical Assistance insurance and Medicare coverage.    Message routed to provider to review and advise as pt has been declining recommended and services that clinic staff are attempting to arrange.    Carole is reporting pt's concerns and expressed needs, and requests clinic staff call pt back to address her needs. Carole Holman states she can be reached at [phone] as needed.    Falguni Xiao, JAIDAN, RN
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
## 2023-07-28 ENCOUNTER — TELEPHONE (OUTPATIENT)
Dept: FAMILY MEDICINE | Facility: CLINIC | Age: 73
End: 2023-07-28
Payer: COMMERCIAL

## 2023-07-28 NOTE — TELEPHONE ENCOUNTER
[facility] reached out to patient for possible home services but the patient declined.

## 2023-08-02 ENCOUNTER — PATIENT OUTREACH (OUTPATIENT)
Dept: CARE COORDINATION | Facility: CLINIC | Age: 73
End: 2023-08-02
Payer: COMMERCIAL
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
## 2023-08-02 NOTE — PROGRESS NOTES
Covering  CC received VM from pt. She has a [facility] worker involved, they spoke about this referral and she would now like to set up a visit.     Catherine Mathis, IBRAHIMA   Social Work Primary Care Clinic Care Coordinator   [facility]

## 2023-08-03 ENCOUNTER — PATIENT OUTREACH (OUTPATIENT)
Dept: CARE COORDINATION | Facility: CLINIC | Age: 73
End: 2023-08-03
Payer: COMMERCIAL

## 2023-08-03 NOTE — PROGRESS NOTES
PCP - can you enter a new community paramedic referral?     Thank you,     Catherine Mathis [facility]   Social Work Primary Care Clinic Care Coordinator   [facility]
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
## 2023-08-03 NOTE — PROGRESS NOTES
New CP order entered - see encounter dated 7/17/23.     Catherine Mathis, IBRAHIMA   Social Work Primary Care Clinic Care Coordinator

## 2023-08-04 NOTE — TELEPHONE ENCOUNTER
Patient called. She said she spoke to Community Paramedics but they were unable to help her with the help that she needs.    She is looking to get a PCA worker. She can not bath by herself or clip her toe nails. She has Medicare but needs to get on some sort of Medicaid so this can be covered.

## 2023-08-07 ENCOUNTER — PATIENT OUTREACH (OUTPATIENT)
Dept: CARE COORDINATION | Facility: CLINIC | Age: 73
End: 2023-08-07
Payer: COMMERCIAL

## 2023-08-07 ASSESSMENT — ACTIVITIES OF DAILY LIVING (ADL): DEPENDENT_IADLS:: CLEANING;COOKING;LAUNDRY;SHOPPING;MEAL PREPARATION
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
## 2023-08-09 ENCOUNTER — TELEPHONE (OUTPATIENT)
Dept: FAMILY MEDICINE | Facility: CLINIC | Age: 73
End: 2023-08-09
Payer: COMMERCIAL

## 2023-08-09 NOTE — TELEPHONE ENCOUNTER
"Radha called, She is very frustrated and depressed. She stated she doesn't have any help. Needs someone to help her get Medicaid so she can get a PCA (needs someone to help getting around, go through her mail, take a shower, prepare meals etc.) .  She stated she lives in [place]. She made statement\"why am I even here? I have pills\"    Informed her to contact her .   "

## 2023-08-09 NOTE — TELEPHONE ENCOUNTER
See phone encounter 7/20/2023.     RHODA CC left message for Carole Youngbloodkins [phone] with [facility].      - explained podiatry referral was entered 7/18/23, pt can call #[phone]          Please give pt the number to call and schedule.

## 2023-08-09 NOTE — CONFIDENTIAL NOTE
Left message on vm for patient to call me back. Podiatry does not do nail trimming. I may have some other options for her.  [facility]. [phone]  [facility] foot [facility] (RN home care) [phone].    Kennedi  Referral coordinator

## 2023-08-10 ENCOUNTER — TELEPHONE (OUTPATIENT)
Dept: CARE COORDINATION | Facility: CLINIC | Age: 73
End: 2023-08-10
Payer: COMMERCIAL

## 2023-08-10 ENCOUNTER — PATIENT OUTREACH (OUTPATIENT)
Dept: CARE COORDINATION | Facility: CLINIC | Age: 73
End: 2023-08-10
Payer: COMMERCIAL

## 2023-08-10 ASSESSMENT — ACTIVITIES OF DAILY LIVING (ADL): DEPENDENT_IADLS:: CLEANING;COOKING;LAUNDRY;SHOPPING;MEAL PREPARATION

## 2023-08-10 NOTE — TELEPHONE ENCOUNTER
See additional 8/10/23 telephone encounter. Welfare check to pt address has been initiated at 4:50pm, per provider request.    JAIDA GleasonN, RN
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
## 2023-08-10 NOTE — CONFIDENTIAL NOTE
Dr. Medeiros and Care Team,    Please see care coordination note and advise if health and welfare check or other actions are advisable. Patient did acknowledge her understanding of crisis resources.     Tianna Davey, [facility]  Social Work Primary Care Clinic Care Coordinator  [facility]  [phone]  [email or web address]

## 2023-08-10 NOTE — LETTER
[facility] CARE COORDINATION  [address]  [place] [address]    August 10, 2023    Radha Corbett  [address] [address]      Dear Radha,      Thank you speaking with me today, attached are the crisis resources we discussed. Please let me know if you have any questions,    Tianna Davey [facility]  Social Work Primary Care Clinic Care Coordinator  [facility]  [phone]  [email or web address]       Crisis Line Resources:  [facility] Crisis Team: [phone]  [place] Crisis Lines: [phone], [phone]   [place] Warm Line, Support Line: [phone]   Crisis Line- 252, can call or text

## 2023-08-11 ENCOUNTER — TELEPHONE (OUTPATIENT)
Dept: FAMILY MEDICINE | Facility: CLINIC | Age: 73
End: 2023-08-11
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
## 2023-08-11 NOTE — CONFIDENTIAL NOTE
Spoke with patient regarding toenail trimming. She would be open to having [facility], which is an RN that goes to homes for around $45 - $50, but patient states she has no money to pay for it.  She also states she is bed ridden, and is requesting help from the [facility] for a PCA.  Kennedi  Referral coordinator     [facility] #[phone] Nydia Carmen RN.

## 2023-08-14 ENCOUNTER — PATIENT OUTREACH (OUTPATIENT)
Dept: CARE COORDINATION | Facility: CLINIC | Age: 73
End: 2023-08-14
Payer: COMMERCIAL

## 2023-08-14 NOTE — PROGRESS NOTES
Clinic Care Coordination Contact  Follow Up Progress Note      Assessment: RHODA HEMPHILL outreached to patient to follow up and provide additional resources to patient. Patient asked RHODA HEMPHILL about welfare check sent to her house on Thursday, RHODA HEMPHILL explained to patient she could not speak to the welfare check as that is confidential. RHODA HEMPHILL explained the reason for call was to provide additional resources, RHODA CC offered Senior Linkage Line, grief support group, and Friends and Co. Patient denied interest in all resources. Patient did acknowledge the 988 crisis support line as an option for dealing with her grief. Patient reported again that she needs a PCA at home, RHODA HEMPHILL encouraged patient to call her county workers to identify where they are at in the Medicaid process. RHODA HEMPHILL to send a letter to patient with the above mentioned resources if patient wishes to utilize them in the future.    Care Gaps:    Health Maintenance Due   Topic Date Due    MICROALBUMIN  09/27/2020    DEXA  02/04/2021    MAMMO SCREENING  01/07/2022    ZOSTER IMMUNIZATION (3 of 3) 07/19/2022    COVID-19 Vaccine (4 - Moderna series) 07/19/2022    COLORECTAL CANCER SCREENING  08/01/2022    DTAP/TDAP/TD IMMUNIZATION (2 - Td or Tdap) 03/05/2023    DIABETIC FOOT EXAM  05/24/2023    PHQ-9  07/19/2023     Care Plans  Care Plan: Help At Home       Problem: Insufficient In-home support       Goal: Establish adequate home support       Start Date: 6/14/2023 Expected End Date: 1/5/2024    This Visit's Progress: On Hold Recent Progress: 10%    Note:     Barriers: Financial constraints for private pay; Unsure if patient qualifies for MA/EW  Strengths: Patient is a great self advocate; Patient is enrolled in Care Coordination  Patient expressed understanding of goal: Yes  Action steps to achieve this goal:  1. I will work with FRW to see if I qualify for MA  2. I will contact the county to schedule [place] to determine eligibility for a waiver  3. I will  obtain in home services through the [facility], if eligible    Patient declined home care and community paramedic visits as she is more interested in PCA hours. 8/07/2023                            Care Plan: Financial Wellbeing       Problem: Patient expresses financial resource strain       Goal: I will apply for health insurance within the next 1-2 weeks if I am eligible.       Start Date: 6/14/2023 Expected End Date: 10/6/2023    This Visit's Progress: 0%    Note:     Strengths:  Patient is willing to work with FRW; Patient is enrolled in Care Coordination; Patient is a great self advocate  Barriers: Patient may not qualify    Patient expressed understanding of goal: Yes  Action steps to achieve this goal:  I understand a referral was placed to the Financial Resource Worker, I will receive a call within the next 3 business days.    I understand the financial worker will make two attempts to call me. If I still need help with this goal, I will connect with my Community Health Worker Zita at [phone].                                   Care Plan: Legal       Problem: Patient expresses financial resource strain       Goal: Legal       Start Date: 6/14/2023 Expected End Date: 11/10/2023    Note:     Barriers: 's daughter took over financial POA; Patient will need guardianship over , which takes quite a bit of time  Strengths: Patient is a great self advocate; Patient is enrolled in CC.   Patient expressed understanding of goal: Yes  Action steps to achieve this goal:  1. I will contact [facility] Law 's to start guardianship process  2. I will work with  to establish guardianship for   3. I will establish guardianship for , if appropriate                                Intervention/Education provided during outreach: RHODA HEMPHILL provided patient with additional resources.      Outreach Frequency: 2 weeks    Plan: Patient was provided with this writer's contact information and  encouraged to call with any questions or concerns. RHODA CC will send resources to patient via mail.    Care Coordinator will follow up in 2 weeks.     Tianna Davey, [facility]  Social Work Primary Care Clinic Care Coordinator  [facility]  [phone]  [email or web address]

## 2023-08-14 NOTE — LETTER
[facility]  [address] [place] [place] [address]       August 14th, 2023    Radha Corbett  [address] [id number]       Dear Radha,    Thank you for taking the time to speak with me today. Attached is the list of resources we discussed and the [place] resource line as well. Please let me know if you have any questions.    [facility] [place] Crisis Line: [phone]    Senior Linkage Line: [phone]    [facility] Virtual Grief Support Group: [phone]     Friends and Co: [phone]      Thank you,    Tianna Davey, [facility]  Social Work Primary Care Clinic Care Coordinator  [facility]  [phone]  [email or web address]

## 2023-08-15 ENCOUNTER — TELEPHONE (OUTPATIENT)
Dept: FAMILY MEDICINE | Facility: CLINIC | Age: 73
End: 2023-08-15
Payer: COMMERCIAL

## 2023-08-15 ENCOUNTER — PATIENT OUTREACH (OUTPATIENT)
Dept: CARE COORDINATION | Facility: CLINIC | Age: 73
End: 2023-08-15
Payer: COMMERCIAL

## 2023-08-15 DIAGNOSIS — E11.9 TYPE 2 DIABETES MELLITUS WITHOUT COMPLICATION, WITHOUT LONG-TERM CURRENT USE OF INSULIN (H): ICD-10-CM

## 2023-08-15 DIAGNOSIS — E78.5 HYPERLIPIDEMIA LDL GOAL <100: ICD-10-CM

## 2023-08-15 RX ORDER — SIMVASTATIN 20 MG
TABLET ORAL
Qty: 90 TABLET | Refills: 0 | Status: SHIPPED | OUTPATIENT
Start: 2023-08-15 | End: 2024-06-11

## 2023-08-15 RX ORDER — GLIPIZIDE 10 MG/1
TABLET, FILM COATED, EXTENDED RELEASE ORAL
Qty: 90 TABLET | Refills: 0 | Status: SHIPPED | OUTPATIENT
Start: 2023-08-15 | End: 2024-04-02

## 2023-08-15 NOTE — PROGRESS NOTES
Clinic Care Coordination Contact    Situation: Patient chart reviewed by care coordinator.    Background: Patient enrolled in care coordination    Assessment: CMA routed message to RHODA CC regarding a duplication of resources writer has already provided to patient multiple times. Per our last conversation, patient verbalized understanding of resources given.     Plan/Recommendations: RHODA HEMPHILL plans to follow up with patient in two weeks on 8/29.    Tianna Davey, [facility]  Social Work Primary Care Clinic Care Coordinator  [facility]  [phone]  [email or web address]

## 2023-08-15 NOTE — TELEPHONE ENCOUNTER
Pt called clinic requesting a call back to discuss a PCA.Care coordination has been following pt and trying to inform her of resources. Pt is requesting to speak to someone who can help her get a PCA. Per PCP this is not a medical issue pt needs to follow up with the resources provided to her in regards to getting help or services in her home. Pt can set up an appointment to discuss medical concerns or questions but pt wanted someone to call her back to discuss getting services in her home.

## 2023-08-15 NOTE — TELEPHONE ENCOUNTER
Thank you Sadaf.    Care team please see 8/14 and 8/15 patient outreach encounter.     Tianna Davey [facility]  Social Work Primary Care Clinic Care Coordinator  [facility]  [phone]  [email or web address]

## 2023-08-16 ENCOUNTER — PATIENT OUTREACH (OUTPATIENT)
Dept: CARE COORDINATION | Facility: CLINIC | Age: 73
End: 2023-08-16
Payer: COMMERCIAL

## 2023-08-16 NOTE — PROGRESS NOTES
Clinic Care Coordination Contact  Program: Health insurance and county benefits   County: [facility] Case #:  [facility] Worker:   [place] #:   Subscriber #:   Renewal:  Date Applied:     FRW Outreach:   8/16/23: FRW hasn't heard back from pt/care team. FRW called pt to discuss her MA status and if she was able to get her income information for spouse. Patient stated she thinks she applied for MA and is having a helper come to her home tomorrow from [facility] [employer] to help with [place]/County Benefits. FRW routing note to new CC staff for direction.  Milvia Cates   Financial Resource Worker  [facility]  Clinic Care Coordination  [phone]   6/21/23:Sending message to SW/CHW again.  6/15/23- FRW reached out regarding referral. At this time patient is unable to provide income information for spouse. FRW is not able to determine eligibility for patient. Patient needs help retrieving income information from spouse's daughter. Please advise next steps.     Health Insurance:      Referral/Screening:  FRW Screening    Row Name 06/14/23 1045       [facility] Benefits   Is patient requesting help applying for [facility] benefits? Yes       Have you recently applied for any [facility] benefits? No       How many people in your household? 1       Do you buy/eat food together? No       What is the monthly gross income for the household (wages, social security, workers comp, and pension)?  900       Insurance:   [place]? No       Is this an insurance renewal? No       Is this a new insurance application request? Yes       Have you recently applied for insurance? No       How many people in your household?  1       Do you file taxes? Yes       How many dependents do you claim? 0       What is the monthly gross income for the household (wages, social security, workers comp, and pension)?  900       OTHER   Is this a gilmar care application? No       Any other information for the FRW?  Patient is .  is in [place] on hospice. His daughter has access to all of his financials. Patient is living off savings and her $900 social security check. Unsure if she will be eligible, but she needs help

## 2023-08-17 ENCOUNTER — PATIENT OUTREACH (OUTPATIENT)
Dept: CARE COORDINATION | Facility: CLINIC | Age: 73
End: 2023-08-17
Payer: COMMERCIAL

## 2023-08-17 NOTE — PROGRESS NOTES
Spencer Steel, per the county worker, the patient applied for Medicaid in late June/early July. I'm not sure that there is any further action you need to take at this time.    Thank you,    Tianna Davey, Health system  Social Work Primary Care Clinic Care Coordinator  [facility]  [phone]  [email or web address]

## 2023-08-17 NOTE — PROGRESS NOTES
Clinic Care Coordination Contact  Follow Up Progress Note      Assessment: RHODA HEMPHILL received VM from [place] at [facility] on 8/17 regarding services for patient. RHODA HEMPHILL returned call and [place] requested home care services for patient, stating she is now wanting home care again.     RHODA HEMPHILL left VM for [place] to discuss the home care option more indepth. RHODA HEMPHILL will wait for a return call before moving forward.     RHODA HEMPHILL verified again that patient has applied for Medicaid and it is pending, per [place] she applied in late June/early July.     Care Gaps:  Health Maintenance Due   Topic Date Due    MICROALBUMIN  09/27/2020    DEXA  02/04/2021    MAMMO SCREENING  01/07/2022    ZOSTER IMMUNIZATION (3 of 3) 07/19/2022    COVID-19 Vaccine (4 - Moderna series) 07/19/2022    COLORECTAL CANCER SCREENING  08/01/2022    DTAP/TDAP/TD IMMUNIZATION (2 - Td or Tdap) 03/05/2023    DIABETIC FOOT EXAM  05/24/2023    PHQ-9  07/19/2023     Care Plans  Care Plan: Help At Home       Problem: Insufficient In-home support       Goal: Establish adequate home support       Start Date: 6/14/2023 Expected End Date: 1/5/2024    This Visit's Progress: On Hold Recent Progress: 10%    Note:     Barriers: Financial constraints for private pay; Unsure if patient qualifies for MA/EW  Strengths: Patient is a great self advocate; Patient is enrolled in Care Coordination  Patient expressed understanding of goal: Yes  Action steps to achieve this goal:  1. I will work with FRW to see if I qualify for MA  2. I will contact the [facility] to schedule MNChoices assessment to determine eligibility for a waiver  3. I will obtain in home services through the [facility], if eligible    Patient declined home care and community paramedic visits as she is more interested in PCA hours. 8/07/2023                            Care Plan: Financial Wellbeing       Problem: Patient expresses financial resource strain       Goal: I will apply for health insurance within the next 1-2 weeks if I am  eligible.       Start Date: 6/14/2023 Expected End Date: 10/6/2023    This Visit's Progress: 0%    Note:     Strengths:  Patient is willing to work with FRW; Patient is enrolled in Care Coordination; Patient is a great self advocate  Barriers: Patient may not qualify    Patient expressed understanding of goal: Yes  Action steps to achieve this goal:  I understand a referral was placed to the Financial Resource Worker, I will receive a call within the next 3 business days.    I understand the financial worker will make two attempts to call me. If I still need help with this goal, I will connect with my Community Health Worker Zita at [phone].                                   Care Plan: Legal       Problem: Patient expresses financial resource strain       Goal: Legal       Start Date: 6/14/2023 Expected End Date: 11/10/2023    Note:     Barriers: 's daughter took over financial POA; Patient will need guardianship over , which takes quite a bit of time  Strengths: Patient is a great self advocate; Patient is enrolled in CC.   Patient expressed understanding of goal: Yes  Action steps to achieve this goal:  1. I will contact [facility] Law 's to start guardianship process  2. I will work with  to establish guardianship for   3. I will establish guardianship for , if appropriate                                Outreach Frequency: 2 weeks    Plan: RHODA CC will await a callback from [place] at [facility] to discuss home care in depth.     RHODA CC will follow up with patient on 8/28 for scheduled outreach.     Tianna Davey [facility]  Social Work Primary Care Clinic Care Coordinator  [facility]  [phone]  [email or web address]

## 2023-08-18 NOTE — TELEPHONE ENCOUNTER
"Patient is calling frustrated that her Dr. Medeiros didn't call her.  \"My  .  I am handicapped. He took care of me. I didn't have a shower for three weeks.  I need PCA. I can't get to the bathroom.  I don't have family here.\"    Informed that Dr. Medeiros didn't call her because PCA is not a medical issue.     notes reviewed with the patient.  \"I didn't refuse anything.  Paramedics refused me because they can't help me.  They were only able to take my blood pressure. I didn't refuse homecare.\"     At this time, patient would like home care orders be placed so she can have PCA.    Carina Torrez RN, BSN  [facility]    "

## 2023-08-21 ENCOUNTER — TELEPHONE (OUTPATIENT)
Dept: CARE COORDINATION | Facility: CLINIC | Age: 73
End: 2023-08-21
Payer: COMMERCIAL

## 2023-08-21 ENCOUNTER — PATIENT OUTREACH (OUTPATIENT)
Dept: CARE COORDINATION | Facility: CLINIC | Age: 73
End: 2023-08-21
Payer: COMMERCIAL

## 2023-08-21 DIAGNOSIS — I10 HYPERTENSION GOAL BP (BLOOD PRESSURE) < 140/90: ICD-10-CM

## 2023-08-21 DIAGNOSIS — F41.8 DEPRESSION WITH ANXIETY: ICD-10-CM

## 2023-08-21 DIAGNOSIS — Z79.4 TYPE 2 DIABETES MELLITUS WITH OTHER SPECIFIED COMPLICATION, WITH LONG-TERM CURRENT USE OF INSULIN (H): Primary | ICD-10-CM

## 2023-08-21 DIAGNOSIS — E11.69 TYPE 2 DIABETES MELLITUS WITH OTHER SPECIFIED COMPLICATION, WITH LONG-TERM CURRENT USE OF INSULIN (H): Primary | ICD-10-CM

## 2023-08-21 NOTE — TELEPHONE ENCOUNTER
Hi Dr. Medeiros,    I spoke at length with adult protection workers and patient would now like skilled home care ordered again.    Suggesting RN and PT/OT evals.    Tianna Davey, [facility]  Social Work Primary Care Clinic Care Coordinator  [facility]  [phone]  [email or web address]

## 2023-08-21 NOTE — PROGRESS NOTES
Clinic Care Coordination Contact  Follow Up Progress Note      Assessment: RHODA HEMPHILL contacted APS worker [place] to clarify patient's request, patient is requesting home care again. RHODA HEMPHILL routed home care request to PCP, see 8/12 telephone encounter.    Care Gaps:    Health Maintenance Due   Topic Date Due    MICROALBUMIN  09/27/2020    DEXA  02/04/2021    MAMMO SCREENING  01/07/2022    ZOSTER IMMUNIZATION (3 of 3) 07/19/2022    COVID-19 Vaccine (4 - Moderna series) 07/19/2022    COLORECTAL CANCER SCREENING  08/01/2022    DTAP/TDAP/TD IMMUNIZATION (2 - Td or Tdap) 03/05/2023    DIABETIC FOOT EXAM  05/24/2023    PHQ-9  07/19/2023     Care Plans  Care Plan: Help At Home       Problem: Insufficient In-home support       Goal: Establish adequate home support       Start Date: 6/14/2023 Expected End Date: 1/5/2024    This Visit's Progress: On Hold Recent Progress: 10%    Note:     Barriers: Financial constraints for private pay; Unsure if patient qualifies for MA/EW  Strengths: Patient is a great self advocate; Patient is enrolled in Care Coordination  Patient expressed understanding of goal: Yes  Action steps to achieve this goal:  1. I will work with FRW to see if I qualify for MA  2. [facility]hoices assessment to determine eligibility for a waiver  3. I will [facility], if eligible    Patient declined home care and community paramedic visits as she is more interested in PCA hours. 8/07/2023                            Care Plan: Financial Wellbeing       Problem: Patient expresses financial resource strain       Goal: I will apply for health insurance within the next 1-2 weeks if I am eligible.       Start Date: 6/14/2023 Expected End Date: 10/6/2023    This Visit's Progress: 0%    Note:     Strengths:  Patient is willing to work with FRW; Patient is enrolled in Care Coordination; Patient is a great self advocate  Barriers: Patient may not qualify    Patient  expressed understanding of goal: Yes  Action steps to achieve this goal:  I understand a referral was placed to the Financial Resource Worker, I will receive a call within the next 3 business days.    I understand the financial worker will make two attempts to call me. If I still need help with this goal, I will connect with my Community Health Worker Zita at [phone].                                   Care Plan: Legal       Problem: Patient expresses financial resource strain       Goal: Legal       Start Date: 6/14/2023 Expected End Date: 11/10/2023    Note:     Barriers: 's daughter took over financial POA; Patient will need guardianship over , which takes quite a bit of time  Strengths: Patient is a great self advocate; Patient is enrolled in CC.   Patient expressed understanding of goal: Yes  Action steps to achieve this goal:  1. I will contact [facility] Law 's to start guardianship process  2. I will work with  to establish guardianship for   3. I will establish guardianship for , if appropriate                                Intervention/Education provided during outreach: RHODA HEMPHILL provided education on home care and notified APS worker that a new home care referral needs to be placed.      Outreach Frequency: 2 weeks    Plan: RHODA HEMPHILL routed message to PCP for a request for a home care order.    Care Coordinator will follow up with patient next Monday, 8/28.    Tianna Davey [facility]  Social Work Primary Care Clinic Care Coordinator  [facility]  [phone]  [email or web address]

## 2023-08-22 ENCOUNTER — TELEPHONE (OUTPATIENT)
Dept: FAMILY MEDICINE | Facility: CLINIC | Age: 73
End: 2023-08-22
Payer: COMMERCIAL

## 2023-08-22 NOTE — TELEPHONE ENCOUNTER
Order signed. Patient needs to understand that home care does not provider PCA. They provider RN, PT/OT services.

## 2023-08-22 NOTE — PROGRESS NOTES
"Clinic Care Coordination Contact  Care Team Conversations    RHODA HEMPHILL and patient's PCP discussed home health aid orders this morning via Epic Staff Messages. RHODA HEMPHILL spoke to [facility] and verified they did accept patient's home care order and will be calling to schedule with her.     RHODA HEMPHILL sent message to PCP -\"I believe you want to add home health to her orders, they are not on the current home care order. I cannot call in this order. Please ask triage to call in home health aid verbal orders to [facility].\"    Tianna Davey, [facility]  Social Work Primary Care Clinic Care Coordinator  [facility]  [phone]  [email or web address]    "

## 2023-08-22 NOTE — TELEPHONE ENCOUNTER
Home Care is calling regarding an established patient with [facility].       Requesting orders from: Trice Medeiros  Provider is following patient: Yes  Is this a 60-day recertification request?  No    Orders Requested  Delay in Care until 8/25/23    Skilled Nursing  Request for delay in care, service is not able to be provided within same scheduled day.   8/25/23    Physical Therapy  Request for delay in care, service is not able to be provided within same scheduled day.   8/25/23    Occupational Therapy  Request for delay in care, service is not able to be provided within same scheduled day.   8/25/23    HHA (Home Health Aide)  Request for delay in care, service is not able to be provided within same scheduled day.   8/25/23    Information was gathered and will be sent to provider for review.  RN will contact Home Care with information after provider review.  Confirmed ok to leave a detailed message with call back.  Contact information confirmed and updated as needed.    Kristina M Kjellberg, RN

## 2023-08-24 PROBLEM — H43.811 POSTERIOR VITREOUS DETACHMENT, RIGHT EYE: Status: ACTIVE | Noted: 2023-08-24

## 2023-08-24 PROBLEM — H50.00 ESOTROPIA: Status: ACTIVE | Noted: 2023-08-24

## 2023-08-24 PROBLEM — Z98.890 HISTORY OF DETACHED RETINA REPAIR: Status: ACTIVE | Noted: 2023-08-24

## 2023-08-24 PROBLEM — Z86.69 HISTORY OF DETACHED RETINA REPAIR: Status: ACTIVE | Noted: 2023-08-24

## 2023-08-24 PROBLEM — Z96.1 PSEUDOPHAKIA OF BOTH EYES: Status: ACTIVE | Noted: 2023-08-24

## 2023-08-24 PROBLEM — H44.23: Status: ACTIVE | Noted: 2023-08-24

## 2023-08-25 NOTE — TELEPHONE ENCOUNTER
Suzan, home care nurse from [facility] is calling to request a verbal order to delay start of care till Monday because patient didn't feel well today.    Suzan states that she doesn't feel that homecare will meet her needs.    Patient wants PCA that should be done thru the [facility].    Routing to PCP and  within [place] that patient worked with.    Suzan [phone]    Carina Torrez RN, BSN  [facility]

## 2023-08-26 NOTE — TELEPHONE ENCOUNTER
Okay to delay start of home care.  PCA needs to go through the counter.  I requested Home health aid to be provided through home care.

## 2023-08-28 ENCOUNTER — PATIENT OUTREACH (OUTPATIENT)
Dept: CARE COORDINATION | Facility: CLINIC | Age: 73
End: 2023-08-28
Payer: COMMERCIAL

## 2023-08-28 NOTE — TELEPHONE ENCOUNTER
Home care nurse Suzan clarifying that patient is only requesting PCA services.     [facility] is also unsure of what patient needs skill wise to qualify for SNV/PT/OT as there has not been any changes with patient since last visit in June with provider. Home care has not done a face to face or admission visit with patient due to this.    Suzan states that patient may need more services regarding behavioral/emotional assistance. That is something that [facility] does not have the capability to do at this time as they do not have a behavioral health clinician or nurse.  has been updated of this information.    CHAPIN Mary  [facility] Primary Care Triage

## 2023-08-28 NOTE — PROGRESS NOTES
"Clinic Care Coordination Contact  [facility]/Voicemail    Background: [facility] declines patient:  \"They will not continue with admission for skilled services as patient has repeated request only a PCA. Home care is also unsure of what skilled services patient needs as no visit has been completed, nor has patient had any physical changes that requires for SNV/PT/OT from last appointment in June. They feel patient may need more behavioral/emotional services at this time, and that is something that they do not offer.\"     Clinical Data: Care Coordinator Outreach  Outreach attempted x 1.  Left message on  [facility] Worker's  voicemail with call back information and requested return call.  Plan: Care Coordinator will try to reach patient again in 1-2 business days.    Tianna Davey, [facility]  Social Work Primary Care Clinic Care Coordinator  [facility]  [phone]  [email or web address]    "

## 2023-08-28 NOTE — TELEPHONE ENCOUNTER
Patient does not have Medicaid, Adult Protection is working with her on this process. She does not have coverage for a PCA.   If patient continues to call and ask about PCA services please direct her to call her [facility] workers.     Tianna Davey [facility]  Social Work Primary Care Clinic Care Coordinator  [facility]  [phone]  [email or web address]

## 2023-08-28 NOTE — TELEPHONE ENCOUNTER
RN called Suzan to relay provider message below. Suzan states that upon further research, [facility] unable to follow patient due to her request for PCA services as patient needs this service billed under Medicaid. [facility] has not completed admission for patient at this time.     Requesting for patient's  to follow-up with patient to find PCA service that would be covered under her Medicaid insurance.    CHAPIN Mary  [facility] Primary Care Triage

## 2023-08-28 NOTE — Clinical Note
Spencer Medeiros, closing patient for care coordination. Resources have been provided to patient. Please review my bolded paragraph for rationale. Thank you!

## 2023-08-29 ENCOUNTER — PATIENT OUTREACH (OUTPATIENT)
Dept: CARE COORDINATION | Facility: CLINIC | Age: 73
End: 2023-08-29
Payer: COMMERCIAL

## 2023-08-29 ENCOUNTER — TELEPHONE (OUTPATIENT)
Dept: FAMILY MEDICINE | Facility: CLINIC | Age: 73
End: 2023-08-29
Payer: COMMERCIAL

## 2023-08-29 NOTE — TELEPHONE ENCOUNTER
Forms/Letter Request    Type of form/letter:  [facility] Order [id number]    Have you been seen for this request: N/A    Do we have the form/letter: Yes: Will place form on providers desk for review/signature    When is form/letter needed by: asap    How would you like the form/letter returned: Fax : [phone]

## 2023-08-29 NOTE — PROGRESS NOTES
Clinic Care Coordination Contact  Follow Up Progress Note      Assessment: RHODA HEMPHILL outreached to patient's APS worker, Carole Holman, as well as the patient for scheduled follow up.     APS worker reports patient's Medicaid is still pending. APS worker also reports she is willing to transport patient to and from her in person OV if she schedules one in the future and said she would be calling patient later today. RHODA HEMPHILL explained to APS worker that care coordination would be closing the patient's care coordination program.    RHODA HEMPHILL then spoke to patient and explained that moving forward the patient should contact the county workers if she has questions regarding PCA needs or her Medicaid process. RHODA HEMPHILL explained to patient that if she has a medical concern she should schedule an in person OV and relayed the message that her APS worker is willing to transport her to and from the appt and would be giving her a call later today.     RHODA HEMPHILL to close out care coordination program, resources were given. No further outreaches will be made. Patient's request is for a PCA, there are two ways to obtain a PCA, one is private pay, and the other is through a [place] via the [facility]. [place] declines [place], therefore [place] the other option. Thankfully, patient has direct contact with county worker, Carole Holman, APS worker. Carole is agreeable to facilitating patient's request of PCA.    Care Gaps:    Health Maintenance Due   Topic Date Due    MICROALBUMIN  09/27/2020    DEXA  02/04/2021    MAMMO SCREENING  01/07/2022    ZOSTER IMMUNIZATION (3 of 3) 07/19/2022    COVID-19 Vaccine (4 - Moderna series) 07/19/2022    COLORECTAL CANCER SCREENING  08/01/2022    DTAP/TDAP/TD IMMUNIZATION (2 - Td or Tdap) 03/05/2023    DIABETIC FOOT EXAM  05/24/2023    PHQ-9  07/19/2023    INFLUENZA VACCINE (1) 09/01/2023     Intervention/Education provided during outreach: RHODA HEMPHILL provided education to both patient and  APS worker on care coordination resources and role of care coordination.     Outreach Frequency: monthly    Plan: RHODA CC will close out care coordination program and do no further outreaches.     Tianna Davey [facility]  Social Work Primary Care Clinic Care Coordinator  [facility]  [phone]  [email or web address]

## 2023-08-29 NOTE — PROGRESS NOTES
Clinic Care Coordination Contact  Program: Health insurance and county benefits   County: [facility] Case #:  [facility] Worker:   Isidra #:   Subscriber #:   Renewal:  Date Applied:     FRW Outreach:   8/29/23: FRW received staff message that Adult Protection services is helping pt with MA. No further FRW needs.   Milvia Cates   Financial Resource Worker  [facility] Care Coordination  [phone]   8/16/23: FRW hasn't heard back from pt/care team. FRW called pt to discuss her MA status and if she was able to get her income information for spouse. Patient stated she thinks she applied for MA and is having a helper come to her home tomorrow from [email or web address] to help with [place] Benefits. FRW routing note to new CC staff for direction.  Milvia Cates   Financial Resource Worker  [facility] Care Coordination  [phone]   6/21/23:Sending message to SW/CHW again.  6/15/23- FRW reached out regarding referral. At this time patient is unable to provide income information for spouse. FRW is not able to determine eligibility for patient. Patient needs help retrieving income information from spouse's daughter. Please advise next steps.     Health Insurance:      Referral/Screening:  FRW Screening    Row Name 06/14/23 1045       [facility] Benefits   Is patient requesting help applying for [facility] benefits? Yes       Have you recently applied for any [facility] benefits? No       How many people in your household? 1       Do you buy/eat food together? No       What is the monthly gross income for the household (wages, social security, workers comp, and pension)?  900       Ins[place]:   [place]? No       Is this an insurance renewal? No       Is this a new insurance application request? Yes       Have you recently applied for insurance? No       How many people in your household?  1       Do you file taxes? Yes       How many dependents do  you claim? 0       What is the monthly gross income for the household (wages, social security, workers comp, and pension)?  900       OTHER   Is this a [facility] care application? No       Any other information for the FRW? Patient is .  is in [place] on hospice. His daughter has access to all of his financials. Patient is living off savings and her $900 social security check. Unsure if she will be eligible, but she needs help

## 2023-08-30 ENCOUNTER — MEDICAL CORRESPONDENCE (OUTPATIENT)
Dept: HEALTH INFORMATION MANAGEMENT | Facility: CLINIC | Age: 73
End: 2023-08-30
Payer: COMMERCIAL

## 2023-08-30 NOTE — TELEPHONE ENCOUNTER
[facility] and faxed it to [phone] and sent for scanning.  Sadaf Diaz, CMA

## 2023-09-05 DIAGNOSIS — F51.04 CHRONIC INSOMNIA: ICD-10-CM

## 2023-09-05 RX ORDER — TRAZODONE HYDROCHLORIDE 100 MG/1
TABLET ORAL
Qty: 90 TABLET | Refills: 1 | Status: SHIPPED | OUTPATIENT
Start: 2023-09-05 | End: 2023-11-15 | Stop reason: SINTOL

## 2023-09-06 ENCOUNTER — TELEPHONE (OUTPATIENT)
Dept: FAMILY MEDICINE | Facility: CLINIC | Age: 73
End: 2023-09-06
Payer: COMMERCIAL

## 2023-09-06 NOTE — TELEPHONE ENCOUNTER
"Patient states she has been trying to \"get ahold of Dr. Medeiros or Grace Carias for four weeks.\" States she had the \" called on her thinking she was suicidal a few weeks back\" but hasn't heard from her doctor directly. Patient states that her  passed away two months ago. Her friend is on cymbalta and it works well for them. Patient is wondering if she can start on an antidepressant medication.     Advised patient to have visit with provider to discuss medications. Patient requested Grace Carias NP. Assisted patient to schedule telephone visit on Friday. Patient also requested to be placed on wait list if any appointment is available sooner.    Anastasiia Bridges RN    [facility]        "

## 2023-09-08 ENCOUNTER — VIRTUAL VISIT (OUTPATIENT)
Dept: FAMILY MEDICINE | Facility: CLINIC | Age: 73
End: 2023-09-08
Payer: COMMERCIAL

## 2023-09-08 DIAGNOSIS — G89.29 CHRONIC PAIN OF BOTH KNEES: Primary | ICD-10-CM

## 2023-09-08 DIAGNOSIS — N18.2 TYPE 2 DIABETES MELLITUS WITH STAGE 2 CHRONIC KIDNEY DISEASE, WITH LONG-TERM CURRENT USE OF INSULIN (H): ICD-10-CM

## 2023-09-08 DIAGNOSIS — Z79.4 TYPE 2 DIABETES MELLITUS WITH STAGE 2 CHRONIC KIDNEY DISEASE, WITH LONG-TERM CURRENT USE OF INSULIN (H): ICD-10-CM

## 2023-09-08 DIAGNOSIS — M25.562 CHRONIC PAIN OF BOTH KNEES: Primary | ICD-10-CM

## 2023-09-08 DIAGNOSIS — I10 HYPERTENSION GOAL BP (BLOOD PRESSURE) < 140/90: ICD-10-CM

## 2023-09-08 DIAGNOSIS — M25.552 BILATERAL HIP PAIN: ICD-10-CM

## 2023-09-08 DIAGNOSIS — M25.561 CHRONIC PAIN OF BOTH KNEES: Primary | ICD-10-CM

## 2023-09-08 DIAGNOSIS — E11.22 TYPE 2 DIABETES MELLITUS WITH STAGE 2 CHRONIC KIDNEY DISEASE, WITH LONG-TERM CURRENT USE OF INSULIN (H): ICD-10-CM

## 2023-09-08 DIAGNOSIS — M25.551 BILATERAL HIP PAIN: ICD-10-CM

## 2023-09-08 DIAGNOSIS — F33.1 MODERATE EPISODE OF RECURRENT MAJOR DEPRESSIVE DISORDER (H): ICD-10-CM

## 2023-09-08 DIAGNOSIS — R53.81 PHYSICAL DECONDITIONING: ICD-10-CM

## 2023-09-08 PROCEDURE — 99214 OFFICE O/P EST MOD 30 MIN: CPT | Mod: VID | Performed by: NURSE PRACTITIONER

## 2023-09-08 RX ORDER — DULOXETIN HYDROCHLORIDE 30 MG/1
30 CAPSULE, DELAYED RELEASE ORAL DAILY
Qty: 30 CAPSULE | Refills: 1 | Status: SHIPPED | OUTPATIENT
Start: 2023-09-08 | End: 2023-10-18

## 2023-09-08 ASSESSMENT — PATIENT HEALTH QUESTIONNAIRE - PHQ9
SUM OF ALL RESPONSES TO PHQ QUESTIONS 1-9: 17
SUM OF ALL RESPONSES TO PHQ QUESTIONS 1-9: 17
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
## 2023-09-08 NOTE — PATIENT INSTRUCTIONS
PLAN:   1.   Symptomatic therapy suggested: will start on Cymbalta once a day   2.  Orders Placed This Encounter   Medications    DULoxetine (CYMBALTA) 30 MG capsule     Sig: Take 1 capsule (30 mg) by mouth daily     Dispense:  30 capsule     Refill:  1     Orders Placed This Encounter   Procedures    Home Care Referral     3.  CONSULTATION/REFERRAL to home health   Follow up in 1 month with Dr Medeiros    Patient needs to follow up in if no improvement,or sooner if worsening of symptoms or other symptoms develop.

## 2023-09-12 ENCOUNTER — PATIENT OUTREACH (OUTPATIENT)
Dept: CARE COORDINATION | Facility: CLINIC | Age: 73
End: 2023-09-12
Payer: COMMERCIAL

## 2023-09-12 NOTE — PROGRESS NOTES
Clinic Care Coordination Contact    PCP entered home care orders at PCP virtual visit 9/08/2023 for PT and home safety eval.     Pt lives in private home.     [facility] declined patient.    Home care orders sent to:  [facility]: Declined due to staffing.  [facility]:  Request sent.  [facility]: Request sent.  [facility]: Request sent.  [facility]: Accepted pending new orders and signed note.   [facility]: Request sent.  [facility]: Declined.     Plan: [facility] needs new PCP orders and the note to be signed in epic, new orders in and note signed as it was a virtual visit.     SW CC to close out primary care coordination program as patient works closely with the [facility].    Tianna Davey, [facility]  Social Work Primary Care Clinic Care Coordinator  [facility]  [phone]  [email or web address]

## 2023-09-12 NOTE — PROGRESS NOTES
Patient accepted by [facility], however, per [facility], they need new HH orders and need last provider note to be signed in Epic. Once they receive the new active orders and last visit note signed, they will be able to schedule the patient.     Thank you,   Tianna Davey, [facility]  Social Work Primary Care Clinic Care Coordinator  [facility]  [phone]  [email or web address]

## 2023-09-12 NOTE — LETTER
9/12/2023         RE: Radha Corbett  [address] [address]        Dear Home Health Care Agency,     This is a PCP home care referral for PT and home safety eval. Please review chart for last office visit and orders. Please let me know if you can or cannot accept patient.          Point of contact: JOSE Briones     Social Work Primary Care Clinic Care Coordinator     [facility]     [phone]  [email or web address]      Sincerely,      JOSE Haas

## 2023-09-12 NOTE — LETTER
9/12/2023         RE: Radha Corbett  [address] [address]        Dear Home Health Care Agency,     This is a PCP home care referral for PT and home safety eval. Please review chart for last office visit and orders. Please let me know if you can or cannot accept patient.      Point of contact: JOSE Briones     Social Work Primary Care Clinic Care Coordinator     [facility]     [phone]  [email or web address]    Sincerely,        JOSE Haas

## 2023-09-13 ENCOUNTER — TELEPHONE (OUTPATIENT)
Dept: FAMILY MEDICINE | Facility: CLINIC | Age: 73
End: 2023-09-13
Payer: COMMERCIAL

## 2023-09-13 NOTE — TELEPHONE ENCOUNTER
Home Care is calling regarding an established patient with  [facility].        9/13/2023     9:46 AM   Home Care Information   Date of Home Care episode start 9/13/2023    Name/Phone Number M[facility] [phone]   Home Care agency [facility]     Requesting orders from: Trice Medeiros  Provider is following patient: No       Orders Requested    Physical Therapy  Request for initial evaluation and treatment (one time) (first set of orders)     Social Work  Request for initial evaluation and treatment (one time) (first set of orders)     HHA (Home Health Aide)  Request for initial evaluation and treatment (one time) (first set of orders)     Information was gathered and will be sent to provider to confirm provider will be following patient.  RN will contact Home Care with information after provider review.  Confirmed ok to leave a detailed message with call back.  Contact information confirmed and updated as needed.    CHAPIN Mary  [facility] Triage

## 2023-09-13 NOTE — TELEPHONE ENCOUNTER
RN called [facility] and spoke with Sadaf to relay provider approval of home care orders below. Sadaf verbalized good understanding. No further questions or concerns.    CHAPIN Mary  [facility] Primary Care Triage

## 2023-09-13 NOTE — TELEPHONE ENCOUNTER
I approve of requested home care orders.  Yes, I can follow the patient.     Trice Medeiros MD PhD

## 2023-09-14 ENCOUNTER — TELEPHONE (OUTPATIENT)
Dept: FAMILY MEDICINE | Facility: CLINIC | Age: 73
End: 2023-09-14
Payer: COMMERCIAL

## 2023-09-14 ENCOUNTER — TELEPHONE (OUTPATIENT)
Dept: BEHAVIORAL HEALTH | Facility: CLINIC | Age: 73
End: 2023-09-14
Payer: COMMERCIAL

## 2023-09-14 NOTE — TELEPHONE ENCOUNTER
Home Care is calling regarding an established patient with [employer].        9/13/2023     9:46 AM   Home Care Information   Date of Home Care episode start 9/13/2023    Name/Phone Number Mavis [phone]   Home Care agency [facility]     Requesting orders from: Trice Medeiros  Provider is following patient: Yes  Is this a 60-day recertification request?  No    Orders Requested    Physical Therapy  Request for delay in care, service is not able to be provided within same scheduled day.   Delay of care for PT until 9/18/23    Confirmed ok to leave a detailed message with call back.  Contact information confirmed and updated as needed.    Anastasiia Thomas RN

## 2023-09-14 NOTE — TELEPHONE ENCOUNTER
Forms/Letter Request    Type of form/letter:  [facility]    Have you been seen for this request: N/A    Do we have the form/letter: Yes: Will place form on providers desk for review/signature    When is form/letter needed by: asap    How would you like the form/letter returned: Fax : [phone]

## 2023-09-14 NOTE — TELEPHONE ENCOUNTER
"Reached out to pt per the request of . Pt states that she needs grief counseling due to her  passing in June. However she states that she really needs a PCA and home health care due to being disabled and her  used to be her primary care taker. Pt is in distress and states that she needs help now. [facility] reviewed chart and informed pt she is connected to care coordination and a referral for home health care was placed on the 12th. Provider will reach out to CC to inform them of conversation and request more clarity of what these services will provide.     Pt states that she would like [facility] to call her again to check in \"since I have no one checking on me anymore.\" Scheduled a telephone appt on 9/28/23.  "

## 2023-09-15 ENCOUNTER — TELEPHONE (OUTPATIENT)
Dept: FAMILY MEDICINE | Facility: CLINIC | Age: 73
End: 2023-09-15
Payer: COMMERCIAL

## 2023-09-15 ENCOUNTER — MEDICAL CORRESPONDENCE (OUTPATIENT)
Dept: HEALTH INFORMATION MANAGEMENT | Facility: CLINIC | Age: 73
End: 2023-09-15
Payer: COMMERCIAL

## 2023-09-15 NOTE — TELEPHONE ENCOUNTER
Forms/Letter Request    Type of form/letter:  [facility]    Have you been seen for this request: N/A    Do we have the form/letter: Yes: Will place form on providers desk for review/signature    When is form/letter needed by: asap    How would you like the form/letter returned: Fax : [phone]

## 2023-09-15 NOTE — TELEPHONE ENCOUNTER
RN called Sadaf and relayed provider approval of home care order below. Sadaf verbalized good understanding. No further questions or concerns.    CHAPIN Mary  [facility] Primary Care Triage

## 2023-09-17 ENCOUNTER — TELEPHONE (OUTPATIENT)
Dept: NURSING | Facility: CLINIC | Age: 73
End: 2023-09-17
Payer: COMMERCIAL

## 2023-09-17 NOTE — TELEPHONE ENCOUNTER
Home Care is calling regarding an established patient with [employer] [facility].        9/13/2023     9:46 AM   Home Care Information   Date of Home Care episode start 9/13/2023    Name/Phone Number Mavis [phone]   Home Care agency [facility]     Requesting orders from: Trice Medeiros  Provider is following patient: Yes  Is this a 60-day recertification request?  No    Orders Requested    Physical Therapy  Request for delay in care, service is not able to be provided within same scheduled day.       Social Work  Request for delay in care, service is not able to be provided within same scheduled day.       HHA (Home Health Aide)  Request for delay in care, service is not able to be provided within same scheduled day.     New Schedule 9/21/23    Information was gathered and will be sent to provider for review.  RN will contact Home Care with information after provider review.  Confirmed ok to leave a detailed message with call back.  Contact information confirmed and updated as needed.    Kim Stone RN

## 2023-09-18 NOTE — TELEPHONE ENCOUNTER
I approve of requested home care orders.  Check with RN is this home care agency's delay or the patient's request for delay. The last delay request was to start on 9/18/2023. If the delay request is from the patient, we'll need  to connect with her and ask why the delay.     Trice Medeiros MD PhD

## 2023-09-18 NOTE — TELEPHONE ENCOUNTER
RN called home care nurse Sadaf to rely provider approval of home care orders below. Sadaf states delay request is due to staffing and not being able to see patient until later this week.    Routing to provider as an update.    CHAPIN Mary  [facility] Primary Care Triage

## 2023-09-19 ENCOUNTER — TELEPHONE (OUTPATIENT)
Dept: FAMILY MEDICINE | Facility: CLINIC | Age: 73
End: 2023-09-19
Payer: COMMERCIAL

## 2023-09-19 NOTE — TELEPHONE ENCOUNTER
Forms/Letter Request    Type of form/letter:  [facility]    Have you been seen for this request: N/A    Do we have the form/letter: Yes: Will place form on providers desk for review/signature    When is form/letter needed by: asap    How would you like the form/letter returned: Fax : [phone]

## 2023-09-20 ENCOUNTER — MEDICAL CORRESPONDENCE (OUTPATIENT)
Dept: HEALTH INFORMATION MANAGEMENT | Facility: CLINIC | Age: 73
End: 2023-09-20
Payer: COMMERCIAL

## 2023-09-21 ENCOUNTER — MEDICAL CORRESPONDENCE (OUTPATIENT)
Dept: HEALTH INFORMATION MANAGEMENT | Facility: CLINIC | Age: 73
End: 2023-09-21

## 2023-09-21 ENCOUNTER — TELEPHONE (OUTPATIENT)
Dept: FAMILY MEDICINE | Facility: CLINIC | Age: 73
End: 2023-09-21
Payer: COMMERCIAL

## 2023-09-21 NOTE — TELEPHONE ENCOUNTER
Jeanette[facility] calling for the following orders:      Home Care is calling regarding an established patient with [employer] [facility].        9/13/2023     9:46 AM   Home Care Information   Date of Home Care episode start 9/13/2023    Name/Phone Number Mavis [phone]   Home Care agency [facility]     Requesting orders from: Trice Medeiros  Provider is following patient: Yes  Is this a 60-day recertification request?  No    Orders Requested    Physical Therapy  Request for initial certification (first set of orders)   Frequency:  2x/wk for 4 wks  for strengthening and balance    HHA (Home Health Aide)  Request for initial certification (first set of orders)   Frequency:  1x/wk for 4 wks      Information was gathered and will be sent to provider for review.  RN will contact Home Care with information after provider review.  Confirmed ok to leave a detailed message with call back.  Contact information confirmed and updated as needed.    CHAPIN Meza also reporting pulse was elevated at 112 today    Medication list has 2 meds patient no longer takes:    Patient is not taking Esomeprazole,  only taking the Omeprazole  2.   No longer taking Cephalexin    Needing these meds discontinued from med list  And needing diagnosis for ASA        To provider to advise      Sophia BOGGS, RN

## 2023-09-22 NOTE — TELEPHONE ENCOUNTER
Nurse notified of provider message as written.  Nurse verbalized good understanding.  Kalpana Siddiqui BSN, RN

## 2023-09-28 ENCOUNTER — VIRTUAL VISIT (OUTPATIENT)
Dept: BEHAVIORAL HEALTH | Facility: CLINIC | Age: 73
End: 2023-09-28
Payer: COMMERCIAL

## 2023-09-28 DIAGNOSIS — F33.1 MODERATE EPISODE OF RECURRENT MAJOR DEPRESSIVE DISORDER (H): Primary | ICD-10-CM

## 2023-09-28 PROCEDURE — 90832 PSYTX W PT 30 MINUTES: CPT | Mod: 95 | Performed by: SOCIAL WORKER
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
## 2023-09-29 ENCOUNTER — TELEPHONE (OUTPATIENT)
Dept: FAMILY MEDICINE | Facility: CLINIC | Age: 73
End: 2023-09-29
Payer: COMMERCIAL

## 2023-09-29 NOTE — TELEPHONE ENCOUNTER
CHAPIN Boykin from [facility] called regarding orders they received for therapy on this patient.  Diagnosis is noted as osteoarthritis of knee. Does not specify which knee.   Also notes hip pain but no mention of underlying cause.     Verbal request to please clarify: 1) Osteoarthritis of which knee? Both? Regarding knee pain.           2) Hip pain: Which hip? Both? And underlying cause or diagnosis?     Please clarify.    Can take a verbal order. Okay to call back with this.      Routing to provider to review and advise.        Joann Guerrero RN  Clinical Triage/Primary Care  [facility]

## 2023-10-02 NOTE — TELEPHONE ENCOUNTER
RN called [facility] and left detailed message relaying provider message below verbatim. Left clinic number [phone] for any further questions or concerns.    CHAPIN Mary  [facility] Primary Care Triage

## 2023-10-05 ENCOUNTER — PATIENT OUTREACH (OUTPATIENT)
Dept: CARE COORDINATION | Facility: CLINIC | Age: 73
End: 2023-10-05
Payer: COMMERCIAL

## 2023-10-05 ENCOUNTER — VIRTUAL VISIT (OUTPATIENT)
Dept: BEHAVIORAL HEALTH | Facility: CLINIC | Age: 73
End: 2023-10-05
Payer: COMMERCIAL

## 2023-10-05 DIAGNOSIS — F33.1 MODERATE EPISODE OF RECURRENT MAJOR DEPRESSIVE DISORDER (H): Primary | ICD-10-CM

## 2023-10-05 PROCEDURE — 90832 PSYTX W PT 30 MINUTES: CPT | Mod: 95 | Performed by: SOCIAL WORKER

## 2023-10-05 NOTE — PROGRESS NOTES
Clinic Care Coordination Contact  Care Team Conversations    Received call from patient's adult protection worker, [place]. RHODA HEMPHILL started conversation by telling Denver that care coordination is not working with this patient and hasn't been for over a month. Denver reported wanting to know if patient was getting home care or not, RHODA HEMPHILL reported to [place] that she could not speak to that as patient is not working with care coordination due to her only request is wanting a PCA and care coordination cannot provide a PCA for the patient. RHODA HEMPHILL ended the call by explaining patient would need a referral from PCP for care coordination if there was a need in the future.     Tianna Davey, [facility]  Social Work Primary Care Clinic Care Coordinator  [facility]  [phone]  [email or web address]

## 2023-10-05 NOTE — PROGRESS NOTES
"[facility] Primary Care: : Integrated Behavioral Health  2023      Behavioral Health Clinician Progress Note    Patient Name: Radha Corbett           Service Type:  Individual      Service Location:   Phone call (patient / identified key support person reached)     Session Start Time: 2:36pm  Session End Time: 3:00pm      Session Length: 16 - 37      Attendees: Client     Service Modality:  Phone Visit:      Provider verified identity through the following two step process.  Patient provided:  Patient  and Patient address    Telephone Visit: The patient's condition can be safely assessed and treated via synchronous audio telemedicine encounter.      Reason for Audio Telemedicine Visit: Patient has requested telehealth visit    Originating Site (Patient Location): Patient's home    Distant Site (Provider Location): [facility]    Consent:  The patient/guardian has verbally consented to:     1. The potential risks and benefits of telemedicine (telephone visit) versus in person care;    The patient has been notified of the following:      \"We have found that certain health care needs can be provided without the need for a face to face visit.  This service lets us provide the care you need with a phone conversation.       I will have full access to your [facility] medical record during this entire phone call.   I will be taking notes for your medical record.      Since this is like an office visit, we will bill your insurance company for this service.       There are potential benefits and risks of telephone visits (e.g. limits to patient confidentiality) that differ from in-person visits.?Confidentiality still applies for telephone services, and nobody will record the visit.  It is important to be in a quiet, private space that is free of distractions (including cell phone or other devices) during the visit.??      If during the course of the " "call I believe a telephone visit is not appropriate, you will not be charged for this service\"     Consent has been obtained for this service by care team member: Yes     Visit Activities (Refresh list every visit): [facility] Only    Diagnostic Assessment Date: Will be created in the first four sessions    Treatment Plan Review Date: Provider and patient will continue to build rapport and discuss tx goals in their next few sessions.     See Flowsheets for today's PHQ-9 and TARAS-7 results  Previous PHQ-9:       5/24/2022     1:58 PM 1/19/2023    11:16 AM 9/8/2023    11:48 AM   PHQ-9 SCORE   PHQ-9 Total Score MyChart 3 (Minimal depression)  17 (Moderately severe depression)   PHQ-9 Total Score 3 2 17     Previous TARAS-7:       10/19/2020     2:13 PM 6/11/2021     2:36 PM 6/19/2023     9:56 AM   TARAS-7 SCORE   Total Score  4 (minimal anxiety) 19 (severe anxiety)   Total Score 11 4 19       MACHELLE LEVEL:      5/23/2011    12:00 PM   MACHELLE Score (Last Two)   MACHELLE Raw Score 49   Activation Score 82.8   MACHELLE Level 4       DATA  Extended Session (60+ minutes): No  Interactive Complexity: No  Crisis: No  [facility] Patient: No    Treatment Objective(s) Addressed in This Session:  Target Behavior(s):  depression    Depressed Mood: Increase interest, engagement, and pleasure in doing things  Decrease frequency and intensity of feeling down, depressed, hopeless  Improve quantity and quality of night time sleep / decrease daytime naps  Identify negative self-talk and behaviors: challenge core beliefs, myths, and actions  Decrease thoughts that you'd be better off dead or of suicide / self-harm    Current Stressors / Issues:    Pt arrived in [facility] for her individual telephone session.She shares she is confused on who she was talking to due to having so many people calling her lately. Pt shares that she is still waiting on in home services to be started and has not heard when this will happen. Updated the pt on the conversation the writer had " with the clinic  this morning, stating that her adult protective worker is trying to get her medical assistance but this can take some time. Pt shares she was suppose to have [facility] come out to her house on Monday for bathing assistance but shares she was sick and need to reschedule for this coming Friday. Reminded pt that during the last session we discovered that [facility] does offer other services as well such as light house keeping, which is pt's primary concern. She shares that due to her lack of eyesight she is unable to look up their phone number, provider offered to call them with her during the session. Unfortunately the agency did not answer but pt was okay with leaving a VM and requested a call back on their services. She also shares a family friend who has followed through with helping her  groceries, which she is thankful for and plan on doing this weekly with him. Pt denies she needs support with her mental health at this time and feels she is handling her grief well. She was appreciative of the call and shares that she is open to future phone calls but does not like scheduled visits. Provided information on how [facility] services work and encouraged pt to call into the clinic if she would like to be reconnected with services, she verbalizes understanding and will do so.       Progress on Treatment Objective(s) / Homework:  New Objective established this session - PREPARATION (Decided to change - considering how); Intervened by negotiating a change plan and determining options / strategies for behavior change, identifying triggers, exploring social supports, and working towards setting a date to begin behavior change    Motivational Interviewing    MI Intervention: Expressed Empathy/Understanding, Permission to raise concern or advise, Open-ended questions, Reflections: simple and complex, and Change talk (evoked)     Change Talk Expressed by the Patient: Desire to change Reasons to  change Need to change Taking steps    Provider Response to Change Talk: E - Evoked more info from patient about behavior change, A - Affirmed patient's thoughts, decisions, or attempts at behavior change, R - Reflected patient's change talk, and S - Summarized patient's change talk statements    Also provided psychoeducation about behavioral health condition, symptoms, and treatment options    Assessments completed prior to visit:  The following assessments were completed by patient for this visit:  PHQ9:       9/27/2019    11:36 AM 1/7/2020    12:40 PM 10/19/2020     2:13 PM 6/11/2021     2:35 PM 5/24/2022     1:58 PM 1/19/2023    11:16 AM 9/8/2023    11:48 AM   PHQ-9 SCORE   PHQ-9 Total Score MyChart    3 (Minimal depression) 3 (Minimal depression)  17 (Moderately severe depression)   PHQ-9 Total Score 10 13 12 3 3 2 17     GAD7:       4/5/2019    10:39 AM 7/30/2019     2:17 PM 9/27/2019    11:36 AM 1/7/2020    12:41 PM 10/19/2020     2:13 PM 6/11/2021     2:36 PM 6/19/2023     9:56 AM   TARAS-7 SCORE   Total Score      4 (minimal anxiety) 19 (severe anxiety)   Total Score 17 9 10 16 11 4 19       Care Plan review completed: Yes    Medication Review:  No changes to current psychiatric medication(s)    Medication Compliance:  Yes    Changes in Health Issues:   None reported    Chemical Use Review:   Substance Use: Chemical use reviewed, no active concerns identified      Tobacco Use: No current tobacco use.      Assessment: Current Emotional / Mental Status (status of significant symptoms):  Risk status (Self / Other harm or suicidal ideation)  Patient has had a history of suicidal ideation: reports a hx of passive  s.I-thoughts of not wanting to wake up  Patient denies current fears or concerns for personal safety.  Patient reports the following current or recent suicidal ideation or behaviors: passive thoughts of not wanting to wake up.  Patient denies current or recent homicidal ideation or behaviors.  Patient  denies current or recent self injurious behavior or ideation.  Patient denies other safety concerns.  A safety and risk management plan has not been developed at this time, however patient was encouraged to call [facility] / 911 should there be a change in any of these risk factors. Pt verbalizes understanding that she would call 988 or 911 if her S.I increased or if she felt she couldn't stay safe.     Appearance:   Could not assess due to telephone visit    Eye Contact:   Could not assess due to telephone visit    Psychomotor Behavior: Could not assess due to telephone visit    Attitude:   Cooperative  Friendly  Orientation:   All  Speech   Rate / Production: Normal    Volume:  Normal   Mood:    Anxious   Affect:    Worrisome   Thought Content:  Clear   Thought Form:  Coherent  Goal Directed  Circumstantial  Insight:    Good     Diagnoses:  1. Moderate episode of recurrent major depressive disorder (H)          Collateral Reports Completed:  Not Applicable    Plan: (Homework, other):  Patient was given information about behavioral services and encouraged to schedule a follow up appointment with the clinic C as needed.  She was also given information about mental health symptoms and treatment options .  CD Recommendations: No indications of CD issues.       Leigh Chua, [facility]    ______________________________________________________________________    Integrated Primary Care Behavioral Health Treatment Plan    Patient's Name: Radha Corbett  YOB: 1950    Date of Creation: will be created in the first few sessions    Date Treatment Plan Last Reviewed/Revised: TBD    DSM5 Diagnoses: 296.32 (F33.1) Major Depressive Disorder, Recurrent Episode, Moderate _  Psychosocial / Contextual Factors: disabled, bed ridden, recently loss  who was primary care taker, no local support   PROMIS (reviewed every 90 days): Was not completed by pt- [facility] will help pt fill out during next visit    Referral /  Collaboration:  Referral to another professional/service is not indicated at this time..    Anticipated number of session for this episode of care: 10+  Anticipation frequency of session: Every other week  Anticipated Duration of each session: 16-37 minutes  Treatment plan will be reviewed in 90 days or when goals have been changed.     Patient has reviewed and agreed to the above plan.      Leigh Chua, [facility] Center[facility]  October 5. 2023

## 2023-10-11 ENCOUNTER — TELEPHONE (OUTPATIENT)
Dept: FAMILY MEDICINE | Facility: CLINIC | Age: 73
End: 2023-10-11
Payer: COMMERCIAL

## 2023-10-11 NOTE — TELEPHONE ENCOUNTER
Forms/Letter Request    Type of form/letter: [facility]    Have you been seen for this request: N/A    Do we have the form/letter: Yes: Will place form on providers desk for review/signature    When is form/letter needed by: asap    How would you like the form/letter returned: Fax : [phone]

## 2023-10-13 ENCOUNTER — TELEPHONE (OUTPATIENT)
Dept: FAMILY MEDICINE | Facility: CLINIC | Age: 73
End: 2023-10-13
Payer: COMMERCIAL

## 2023-10-13 DIAGNOSIS — Z53.9 DIAGNOSIS NOT YET DEFINED: Primary | ICD-10-CM

## 2023-10-13 PROCEDURE — G0180 MD CERTIFICATION HHA PATIENT: HCPCS | Performed by: INTERNAL MEDICINE

## 2023-10-13 NOTE — TELEPHONE ENCOUNTER
Home Care is calling regarding an established patient with [employer] [facility].        9/13/2023     9:46 AM   Home Care Information   Date of Home Care episode start 9/13/2023    Name/Phone Number Mavis [phone]   Home Care agency [facility]     Requesting orders from: Trice Medeiros  Provider is following patient: Yes  Is this a 60-day recertification request?  No    Orders Requested    Social Work  Request for initial certification (first set of orders)   Frequency:  1x/wk for 2 wks for Community resources.      Information was gathered and will be sent to provider for review.  RN will contact Home Care with information after provider review.  Confirmed ok to leave a detailed message with call back.  Contact information confirmed and updated as needed.    Anselmo Elizondo RN

## 2023-10-16 NOTE — TELEPHONE ENCOUNTER
Called and left Elena christine  notifying her that provider has approved the requested home care orders below.     Rhoda Baca, JAIDAN, RN   [facility]

## 2023-10-17 ENCOUNTER — TELEPHONE (OUTPATIENT)
Dept: FAMILY MEDICINE | Facility: CLINIC | Age: 73
End: 2023-10-17
Payer: COMMERCIAL

## 2023-10-17 DIAGNOSIS — F33.1 MODERATE EPISODE OF RECURRENT MAJOR DEPRESSIVE DISORDER (H): ICD-10-CM

## 2023-10-17 NOTE — TELEPHONE ENCOUNTER
Forms/Letter Request    Type of form/letter: [facility]    Have you been seen for this request: N/A    Do we have the form/letter: Yes: placed in providers forms basket for review    When is form/letter needed by: ASAP    How would you like the form/letter returned: Fax [phone]

## 2023-10-18 ENCOUNTER — MEDICAL CORRESPONDENCE (OUTPATIENT)
Dept: HEALTH INFORMATION MANAGEMENT | Facility: CLINIC | Age: 73
End: 2023-10-18
Payer: COMMERCIAL

## 2023-10-18 ENCOUNTER — TELEPHONE (OUTPATIENT)
Dept: FAMILY MEDICINE | Facility: CLINIC | Age: 73
End: 2023-10-18
Payer: COMMERCIAL

## 2023-10-18 RX ORDER — DULOXETIN HYDROCHLORIDE 30 MG/1
30 CAPSULE, DELAYED RELEASE ORAL DAILY
Qty: 30 CAPSULE | Refills: 5 | Status: SHIPPED | OUTPATIENT
Start: 2023-10-18 | End: 2023-12-22

## 2023-10-18 NOTE — TELEPHONE ENCOUNTER
Forms/Letter Request    Type of form/letter:  [facility]    Have you been seen for this request: N/A    Do we have the form/letter: Yes: Will place form on providers desk for review/signature    When is form/letter needed by: asap    How would you like the form/letter returned: Fax : [phone]

## 2023-10-24 DIAGNOSIS — E11.9 TYPE 2 DIABETES MELLITUS WITHOUT COMPLICATION, WITHOUT LONG-TERM CURRENT USE OF INSULIN (H): ICD-10-CM

## 2023-10-24 DIAGNOSIS — K21.9 GASTROESOPHAGEAL REFLUX DISEASE WITHOUT ESOPHAGITIS: ICD-10-CM

## 2023-10-25 ENCOUNTER — MEDICAL CORRESPONDENCE (OUTPATIENT)
Dept: HEALTH INFORMATION MANAGEMENT | Facility: CLINIC | Age: 73
End: 2023-10-25
Payer: COMMERCIAL

## 2023-10-25 ENCOUNTER — TELEPHONE (OUTPATIENT)
Dept: FAMILY MEDICINE | Facility: CLINIC | Age: 73
End: 2023-10-25
Payer: COMMERCIAL

## 2023-10-25 RX ORDER — OMEPRAZOLE 40 MG/1
CAPSULE, DELAYED RELEASE ORAL
Qty: 90 CAPSULE | Refills: 3 | Status: SHIPPED | OUTPATIENT
Start: 2023-10-25

## 2023-10-25 RX ORDER — METFORMIN HCL 500 MG
TABLET, EXTENDED RELEASE 24 HR ORAL
Qty: 120 TABLET | Refills: 0 | Status: SHIPPED | OUTPATIENT
Start: 2023-10-25 | End: 2024-04-02

## 2023-10-25 NOTE — TELEPHONE ENCOUNTER
"RHODA Parker with [employer], calling to provide update to PCP.     Elena was able to have one visit with patient on 10/12/2023 for initial eval and treat visit. At that visit patient was unreceptive to SW being there. Since then, patient has cancelled two other visits with RHODA on 10/18 and 10/25. Elena states that patient has \"fought\" or not been approachable with the services that RHODA has to offer her. Patient did not like SW being there.     Patient is wanting free services. However, Elena states that patient does not qualify for [facility] services because patient has assets and ability to pay for services. Elena has also followed up with adult protection services regarding this as well. Adult protection wonders if patient possibly has a personality disorder. [facility] does not have behavioral clinicians available to further address that thought.     At this time, Elena will not be reaching out to patient due to declining her services. Patient was welcomed to call Elena back if she ever changes her mind.    Routing to PCP as an update.    CHAPIN Mary  [facility] Primary Care Triage  "

## 2023-10-26 ENCOUNTER — TELEPHONE (OUTPATIENT)
Dept: UROLOGY | Facility: CLINIC | Age: 73
End: 2023-10-26
Payer: COMMERCIAL

## 2023-10-26 NOTE — TELEPHONE ENCOUNTER
[facility] Call Center    Phone Message    May a detailed message be left on voicemail: no     Reason for Call: Other: Patient calls with questions about incontinence, implant and Trospium. Patient is wondering if there may be a different medication that would be more helpful or if there is maybe something wrong with her implant. Patient has virtual appointment with Jordyn Osorio PA-C on 11/20/23 but didn't want to wait that long to talk to someone. Patient is requesting a call back.      Action Taken: Message routed to:  [facility] Urology    Travel Screening: Not Applicable

## 2023-10-26 NOTE — CONFIDENTIAL NOTE
"Called and spoke to patient who is requesting an alternative medication or an additional medication for trospium XR 60mg daily that she is currently taking. Patient reports that she has continued to have ongoing urinary incontinence, urinary urgency, and urinary frequency for the past year. Per patient, these symptoms have been consistent for the past year and denies a sudden change in symptoms. Patient denies dysuria, flank pain, and fevers.     Patient reports that she is no longer taking trimethoprim daily at bedtime.    Patient reports that her spouse had a stroke and passed away in June and she is now starting to focus on helping herself. Per patient, she is doing physical therapy to get stronger so she is able to leave her house. Patient reports that she does not feel ready to leave her house yet to complete a urine sample or for office visit due to her current strength and also embarrassment of incontinence. Per patient, she does have a friend that may be able to help her in the near future.     Discussed interstim device with patient who reports that she is no longer feeling it working. Patient stated, \"I don't even know how to work it anymore.\"     Informed patient that a message will be sent to Jordyn Osorio PA-C with request to review and advise on options. Informed patient that a message will also be sent to the [employer] representative with request to contact patient to discuss interstim device over the phone.  Patient verbalized understanding and was comfortable with plan.    Sophia Seo RN, BSN       "

## 2023-10-27 ENCOUNTER — TELEPHONE (OUTPATIENT)
Dept: FAMILY MEDICINE | Facility: CLINIC | Age: 73
End: 2023-10-27
Payer: COMMERCIAL

## 2023-10-27 ENCOUNTER — MEDICAL CORRESPONDENCE (OUTPATIENT)
Dept: HEALTH INFORMATION MANAGEMENT | Facility: CLINIC | Age: 73
End: 2023-10-27
Payer: COMMERCIAL

## 2023-10-27 NOTE — CONFIDENTIAL NOTE
Called and spoke to patient who is aware that a video visit with Jordyn Osorio PA-C is  limited at this time. Patient reports that she would like to see Dr. Baltazar. Offered video visit with Dr. Baltazar on 10/30/23 at 1:30pm and patient would like this. Appointment scheduled.     Sophia Seo RN, BSN

## 2023-10-27 NOTE — TELEPHONE ENCOUNTER
Forms/Letter Request    Type of form/letter: [facility]     Have you been seen for this request: N/A    Do we have the form/letter: Yes     Who is the form from? Home Care     Where did/will the form come from? Home Care     When is form/letter needed by: asap    How would you like the form/letter returned: faxed [phone]

## 2023-10-27 NOTE — CONFIDENTIAL NOTE
"Jordyn Osorio PA-C  You18 hours ago (2:35 PM)     KB  Have her follow-up with me either at  or [place] next available in person so I can assess her interstim and discuss options.    forrest     Received the above message from Jordyn Osorio PA-C. Called and spoke to patient who is aware of the above information. Patient is currently scheduled for a video visit with Jordyn Osorio PA-C on 11/20/23 and writer offered to change that visit to in person. Patient reports that she is unsure if she will be strong enough to come into clinic on that day and stated, \"is there anyway we can move my video visit up to a sooner date.\" Informed patient that Jordyn Osorio PA-C is booked up at the moment, but writer will look into this further and call her back. Patient was comfortable with plan.    Sophia Seo RN, BSN      "

## 2023-10-30 ENCOUNTER — VIRTUAL VISIT (OUTPATIENT)
Dept: UROLOGY | Facility: CLINIC | Age: 73
End: 2023-10-30
Payer: COMMERCIAL

## 2023-10-30 VITALS — WEIGHT: 150 LBS | BODY MASS INDEX: 23.54 KG/M2 | HEIGHT: 67 IN

## 2023-10-30 DIAGNOSIS — N95.8 GENITOURINARY SYNDROME OF MENOPAUSE: ICD-10-CM

## 2023-10-30 DIAGNOSIS — N39.41 URGE INCONTINENCE: Primary | ICD-10-CM

## 2023-10-30 PROCEDURE — 99203 OFFICE O/P NEW LOW 30 MIN: CPT | Mod: VID | Performed by: UROLOGY

## 2023-10-30 RX ORDER — MIRABEGRON 50 MG/1
50 TABLET, EXTENDED RELEASE ORAL DAILY
Qty: 30 TABLET | Refills: 11 | Status: SHIPPED | OUTPATIENT
Start: 2023-10-30 | End: 2023-12-22

## 2023-10-30 ASSESSMENT — PAIN SCALES - GENERAL: PAINLEVEL: NO PAIN (0)

## 2023-10-30 NOTE — PATIENT INSTRUCTIONS
-continue Trospium 60 mg  -add mirabegron  -f/u in 2 month, if patient patitne able to come in person that is preferable to interrogate her device, if not then will plan on virtual visit.

## 2023-10-30 NOTE — PROGRESS NOTES
Virtual Visit Details    Type of service:  Video Visit   Video Start Time: 1:38 PM  Video End Time:2:15 PM    Originating Location (pt. Location): Home    Distant Location (provider location):  Off-site  Platform used for Video Visit: Lizeth

## 2023-10-30 NOTE — NURSING NOTE
Is the patient currently in the state of [place]? YES    Visit mode:VIDEO    If the visit is dropped, the patient can be reconnected by: VIDEO VISIT: Text to cell phone:   Telephone Information:   Mobile [phone]       Will anyone else be joining the visit? NO  (If patient encounters technical issues they should call [phone]    How would you like to obtain your AVS? MyChart    Are changes needed to the allergy or medication list? No    Reason for visit: RECHECK    Bobbilynn Grossaint VVF
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
## 2023-10-31 ENCOUNTER — TELEPHONE (OUTPATIENT)
Dept: UROLOGY | Facility: CLINIC | Age: 73
End: 2023-10-31
Payer: COMMERCIAL

## 2023-10-31 ENCOUNTER — TELEPHONE (OUTPATIENT)
Dept: FAMILY MEDICINE | Facility: CLINIC | Age: 73
End: 2023-10-31
Payer: COMMERCIAL

## 2023-10-31 NOTE — TELEPHONE ENCOUNTER
Forms/Letter Request    Type of form/letter:  [facility]    Do we have the form/letter: Yes: In provider's bin    How would you like the form/letter returned: Fax : [phone]

## 2023-10-31 NOTE — TELEPHONE ENCOUNTER
Pt called back stating she would like to do virtual, as in person is inconvenient for her at this time due to her ailments. Writer was unable to populate template for a virtual visit. Please reach back out to pt for scheduling. Thanks

## 2023-10-31 NOTE — CONFIDENTIAL NOTE
Left Voicemail (1st Attempt) for the patient to call back and schedule the following:    Appointment type: return   Provider:   Return date: 2 month follow up - around 12/30/2023  Specialty phone number: [phone]  Additional appointment(s) needed:   Additonal Notes: -f/u in 2 months, if patient patient able to come in person that is preferable to interrogate her device, if not then will plan on virtual visit       Viki srinivasan Complex   Gastroenterology, Infectious Diseases, Nephrology, Pulmonology and Rheumatology Specialties  [facility]

## 2023-11-01 NOTE — TELEPHONE ENCOUNTER
11/1 Patient scheduled.     Sally srinivasan Complex   Dermatology, Surgery, Urology  [facility]

## 2023-11-02 ENCOUNTER — MEDICAL CORRESPONDENCE (OUTPATIENT)
Dept: HEALTH INFORMATION MANAGEMENT | Facility: CLINIC | Age: 73
End: 2023-11-02
Payer: COMMERCIAL

## 2023-11-13 ENCOUNTER — TELEPHONE (OUTPATIENT)
Dept: FAMILY MEDICINE | Facility: CLINIC | Age: 73
End: 2023-11-13
Payer: COMMERCIAL

## 2023-11-13 DIAGNOSIS — K59.00 CONSTIPATION, UNSPECIFIED CONSTIPATION TYPE: Primary | ICD-10-CM

## 2023-11-13 RX ORDER — DOCUSATE SODIUM 100 MG/1
100 CAPSULE, LIQUID FILLED ORAL DAILY
Qty: 90 CAPSULE | Refills: 3 | Status: SHIPPED | OUTPATIENT
Start: 2023-11-13 | End: 2023-12-22

## 2023-11-13 NOTE — TELEPHONE ENCOUNTER
Pt was prescribe Mybetriq for peeing issue from her urology and requesting a stool softener so she can use the restroom. Pt is wanting it sent thru [facility] ASAP.       Sending to provider to review

## 2023-11-14 ENCOUNTER — TELEPHONE (OUTPATIENT)
Dept: FAMILY MEDICINE | Facility: CLINIC | Age: 73
End: 2023-11-14
Payer: COMMERCIAL

## 2023-11-14 DIAGNOSIS — K59.09 CONSTIPATION, CHRONIC: Primary | ICD-10-CM

## 2023-11-14 NOTE — TELEPHONE ENCOUNTER
Pt called regarding note Pt was notified that script was sent to pharmacy.         Eliana CASTRO Visit Facilitator

## 2023-11-14 NOTE — TELEPHONE ENCOUNTER
"Patient calling regarding Duloxetine prescription. Is wanting to get off medication due to side effects. Has been taking for about a month. Side effects include constipation and difficulty sleeping at night. Patient reports that she is not depressed. Denies wanting to hurt herself or others. Kept stating that she is \"desperate to get off of this\". Reports that her friend thought she was depressed because her  recently . Patient reported that her   because he was an alcoholic; he broke her leg, was \"awful\" to her.     Routing to provider to see how patient should taper off medication if she is okay with patient stopping it.     Rhoda Baca, JAIDAN, RN   [facility]    "

## 2023-11-15 RX ORDER — POLYETHYLENE GLYCOL 3350 17 G/17G
1 POWDER, FOR SOLUTION ORAL DAILY
Qty: 510 G | Refills: 5 | Status: SHIPPED | OUTPATIENT
Start: 2023-11-15

## 2023-11-15 NOTE — TELEPHONE ENCOUNTER
"Called patient back and notified her of provider's response below. Patient verbalized understanding. RN was able to help assist patient in scheduling virtual visit with PCP on Wednesday, November 29th at 2:00 PM.     Kalee,  also currently with patient at this time. Wanted to request more orders from PCP:    Home Care is calling regarding an established patient with [facility].        11/15/2023     1:50 PM 9/13/2023     9:46 AM   Home Care Information   Date of Home Care episode start  9/13/2023   Current following provider Dr. Trice Medeiros     Name/Phone Number Kalee,  Mavis, [phone]   Home Care agency [facility]     Requesting orders from: Trice Medeiros  Provider is following patient: Yes  Is this a 60-day recertification request?  Yes    Orders Requested    Physical Therapy  Request for recertification   Frequency:  2x/wk for 4 wks    HHA (Home Health Aide)  Request for recertification   Frequency:  1x/wk for 4 wks    1.) Also wanted up notify provider that patient is no longer taking Trazodone due to constipation and melatonin due to being ineffective. Wanted to confirm with provider that it is okay for discontinuation of these medications.     2.) Was also wondering if PCP had any recommendations that patient could take over the counter to help with constipation. Patient last had bowel movement yesterday, but had to \"dig it out\".     Kalee's call back number is: [phone]. Voicemail is confidential, okay to leave detailed message.     Routing to provider to review and advise.       Confirmed ok to leave a detailed message with call back.  Contact information confirmed and updated as needed.    Rhoda Baca RN    "

## 2023-11-15 NOTE — TELEPHONE ENCOUNTER
Writer called Kalee back and relayed provider message below regarding patient. She verbalized understanding and had no further questions or concerns at this time.    Anselmo Elizondo RN  [facility]

## 2023-11-15 NOTE — TELEPHONE ENCOUNTER
I approve of requested home care orders.  Discontinue trazodone and melatonin.  MiraLAX 17 mg powder with 8 ounces water once a day.  I sent the prescription to her pharmacy as well.    Trice Medeiros MD PhD

## 2023-11-15 NOTE — TELEPHONE ENCOUNTER
Her dose is small. She can take every other day for 1 week and then go off.     Please schedule a virtual visit with me in 2 weeks to see how she is doing without it.

## 2023-11-20 ENCOUNTER — MEDICAL CORRESPONDENCE (OUTPATIENT)
Dept: HEALTH INFORMATION MANAGEMENT | Facility: CLINIC | Age: 73
End: 2023-11-20

## 2023-11-29 ENCOUNTER — VIRTUAL VISIT (OUTPATIENT)
Dept: FAMILY MEDICINE | Facility: CLINIC | Age: 73
End: 2023-11-29
Payer: COMMERCIAL

## 2023-11-29 DIAGNOSIS — I10 HYPERTENSION GOAL BP (BLOOD PRESSURE) < 140/90: ICD-10-CM

## 2023-11-29 DIAGNOSIS — R00.0 SINUS TACHYCARDIA: ICD-10-CM

## 2023-11-29 DIAGNOSIS — E11.9 TYPE 2 DIABETES MELLITUS WITHOUT COMPLICATION, WITHOUT LONG-TERM CURRENT USE OF INSULIN (H): Primary | ICD-10-CM

## 2023-11-29 DIAGNOSIS — M17.0 PRIMARY OSTEOARTHRITIS OF BOTH KNEES: ICD-10-CM

## 2023-11-29 DIAGNOSIS — M16.10 HIP ARTHRITIS: ICD-10-CM

## 2023-11-29 DIAGNOSIS — D50.9 IRON DEFICIENCY ANEMIA, UNSPECIFIED IRON DEFICIENCY ANEMIA TYPE: ICD-10-CM

## 2023-11-29 PROCEDURE — 99214 OFFICE O/P EST MOD 30 MIN: CPT | Mod: VID | Performed by: INTERNAL MEDICINE

## 2023-11-29 ASSESSMENT — PATIENT HEALTH QUESTIONNAIRE - PHQ9
10. IF YOU CHECKED OFF ANY PROBLEMS, HOW DIFFICULT HAVE THESE PROBLEMS MADE IT FOR YOU TO DO YOUR WORK, TAKE CARE OF THINGS AT HOME, OR GET ALONG WITH OTHER PEOPLE: VERY DIFFICULT
SUM OF ALL RESPONSES TO PHQ QUESTIONS 1-9: 12
SUM OF ALL RESPONSES TO PHQ QUESTIONS 1-9: 12

## 2023-11-29 NOTE — PROGRESS NOTES
"Instructions Relayed to Patient by Virtual Roomer:     If patient is not active on [employer]:  Relayed the following to patient: \"Would you like us to text or email you a link to join your appointment now or when your provider is ready to initiate the virtual visit?\"    Reminded patient to ensure they were logged on to virtual visit by arrival time listed. Documented in appointment notes if patient had flexibility to initiate visit sooner than arrival time. If pediatric virtual visit, ensured pediatric patient along with parent/guardian will be present for video visit.     Patient offered the website [email or web address] and/or phone number to [employer] Help line: [phone]     How would you like to obtain your AVS? Mail a copy  If the video visit is dropped, the invitation should be resent by: Text to cell phone: [phone]  Will anyone else be joining your video visit? No        Assessment & Plan     Radha was seen today for recheck medication, hypertension, pain management and  follow up.    Diagnoses and all orders for this visit:    Type 2 diabetes mellitus without complication, without long-term current use of insulin (H)  -     Hemoglobin A1c; Future    Hypertension goal BP (blood pressure) < 140/90  Comments:  controlled per patient.    Iron deficiency anemia, unspecified iron deficiency anemia type  -     CBC with platelets; Future  -     Iron and iron binding capacity; Future    Sinus tachycardia  -     TSH with free T4 reflex; Future    Primary osteoarthritis of both knees  Comments:  need to connect with ortho.    Hip arthritis  Comments:  need to connect with ortho.    Other orders  -     PRIMARY CARE FOLLOW-UP SCHEDULING; Future       Check labs to rule out anemia/hyperthyroid state. If negative, consider adding metoprolol to help reduce heart rate. May need to reduce lisinopril from 5 to 2.5 mg as well.     Due for A1c. Will have home care nurse get the above labs drawn.     If labs " are stable, return for wellness visit in June, in person visit. Virtual visit in between for needs and med adjustment if needed.     Has limited financial resources. Not qualify for most services. For the time being, continue with current home care services.     Trice Medeiros MD PhD  [facility]    Domonique Garcia is a 72 year old, presenting for the following health issues:  Recheck Medication, Hypertension (Stable), Pain Management, and MH Follow Up  - Contact for Kalee Linder (home health )   [phone]    - Interested in trying out CBD. Does not want the effects of THC, does not want to fall down but would like to use it for pain management.    -Elevated resting heart rate. Consistently it is 104-116 BPM, with like activity 120-130 BPM. All of her other vitals have been stable.   - BP stable, elevated Today due to daughters health  - previously had tachycardia, ECG was done last year, sinus.     - Over the past week the nurse has mentioned they have noticed increase shakiness and weakness in the legs. Was doing well with PT getting her strength back but seems to be going downhill or backwards. Pain is a little worse and walking. No specific reasoning. No falls.    - She currently does have home PT and a home health aid that can be there temporarily. If provider believes it is necessary for a home nurse aide. No long term care plan in place. All services right now are temporary for the next month or so. They had a  come out to talk to her. Does not qualify for a lot of the services due to finances. Very distressing for Radha    Has a boyfriend who helps her with meals, shopping, and gets her to appointments. He is more than 10 years younger than her and still works, so provides limited help.     Reported she needs new hips and new knees. Unable to handle surgery right now. Has not been able to go out for appointments with orthopedics. Minimal ambulation at home.          9/8/2023    11:41 AM   Additional Questions   Roomed by Lalo HERNANDEZ       History of Present Illness       Diabetes:   She presents for follow up of diabetes.    She is not checking blood glucose.         She has no concerns regarding her diabetes at this time.  She is having numbness in feet.            Hyperlipidemia:  She presents for follow up of hyperlipidemia.   She is taking medication to lower cholesterol. She is not having myalgia or other side effects to statin medications.    Hypertension: She presents for follow up of hypertension.  She does check blood pressure  regularly outside of the clinic. Outpatient blood pressures have not been over 140/90. She does not follow a low salt diet.             Review of Systems   Constitutional, HEENT, cardiovascular, pulmonary, gi and gu systems are negative, except as otherwise noted.      Objective           Vitals:  No vitals were obtained today due to virtual visit.    Physical Exam   GENERAL: Healthy, alert and no distress  EYES: Eyes grossly normal to inspection.  No discharge or erythema, or obvious scleral/conjunctival abnormalities.  RESP: No audible wheeze, cough, or visible cyanosis.  No visible retractions or increased work of breathing.    SKIN: Visible skin clear. No significant rash, abnormal pigmentation or lesions.  NEURO: Cranial nerves grossly intact.  Mentation and speech appropriate for age.  PSYCH: Mentation appears normal, affect normal/bright, judgement and insight intact, normal speech and appearance well-groomed.        Video-Visit Details    Type of service:  Video Visit   Video Start Time:  2:08 PM  Video End Time:2:39 PM    Originating Location (pt. Location): Home    Distant Location (provider location):  On-site  Platform used for Video Visit: BrandinWell

## 2023-12-01 ENCOUNTER — TELEPHONE (OUTPATIENT)
Dept: FAMILY MEDICINE | Facility: CLINIC | Age: 73
End: 2023-12-01
Payer: COMMERCIAL

## 2023-12-01 DIAGNOSIS — D64.9 ANEMIA, UNSPECIFIED TYPE: Primary | ICD-10-CM

## 2023-12-01 DIAGNOSIS — D50.9 IRON DEFICIENCY ANEMIA, UNSPECIFIED IRON DEFICIENCY ANEMIA TYPE: ICD-10-CM

## 2023-12-01 NOTE — TELEPHONE ENCOUNTER
RN called Kalee and left detailed message relaying provider message below. Left clinic number [phone] for any further questions or concerns.    CHAPIN Mary  [facility] Primary Care Triage

## 2023-12-01 NOTE — TELEPHONE ENCOUNTER
She is due for A1c, CBC, TSH, ferritin, iron and iron binding capacity, vitamin B12, urine microalbumin. These have been ordered and on file (from      Restart home care RN visit.

## 2023-12-01 NOTE — TELEPHONE ENCOUNTER
Home Care is calling regarding an established patient with [facility].        11/15/2023     1:50 PM 9/13/2023     9:46 AM   Home Care Information   Date of Home Care episode start  9/13/2023   Current following provider Dr. Trice Medeiros     Name/Phone Number Kalee  Mavis, [phone]   Home Care agency [facility]     Requesting orders from: Trice Medeiros  Provider is following patient: Yes  Is this a 60-day recertification request?  No    Orders Requested    Skilled Nursing    Kalee is requesting verbal order for skilled nursing to evaluate and treat, reasoning is because patient had visit with PCP earlier this week and wanted blood work done. Kalee states they do have RN's who can come to the house next week to draw labs and drop off to hospital but will need specific labs orders on what provider wants.     Information was gathered and will be sent to provider for review.  RN will contact Home Care with information after provider review.  Confirmed ok to leave a detailed message with call back.  Contact information confirmed and updated as needed.    Cris Rain RN     Patient called back and received message below. Patient was very pleased and will let us know if that team at the [facility] will need a referral     Chikis Burleson  [facility]

## 2023-12-06 ENCOUNTER — TELEPHONE (OUTPATIENT)
Dept: FAMILY MEDICINE | Facility: CLINIC | Age: 73
End: 2023-12-06
Payer: COMMERCIAL

## 2023-12-06 DIAGNOSIS — R35.0 URINARY FREQUENCY: Primary | ICD-10-CM

## 2023-12-06 NOTE — TELEPHONE ENCOUNTER
"  Home Care is calling regarding an established patient with [facility].        11/15/2023     1:50 PM 9/13/2023     9:46 AM   Home Care Information   Date of Home Care episode start  9/13/2023   Current following provider Dr. Trice Medeiros     Name/Phone Number Kalee  Mavis, [phone]   Home Care agency [facility]     Requesting orders from: Trice Medeiros  Provider is following patient: Yes  Is this a 60-day recertification request?  No    Orders Requested    1., Skilled Nursing  Request for initial certification (first set of orders)   Frequency:  1x/wk for 4 wks for education on disease.    2.  UA/UC : Pt reported to CHAPIN High that she has been having urinary frequency for \"awhile,\" and reports \"a little bit\" of pain when voiding. Home care RN also states she is unable to determine if pt's urine has unusual odor or not because there were many soiled diapers in the home that had not been taken to an outdoor garbage so the home had a smell of urine. Please place lab order if you agree, and fax to [phone]. Then route back to RN team to give as VO to CHAPIN High, at [phone]  as Anila states she may still be able to get to pt's home to collect the order today.    3. CHAPIN High states that she is aware pt has future lab orders to be fulfilled. CHAPIN High, states please fax any lab orders to [facility] at [phone] and she will have home care staff draw the necessary labs and drop the samples off at the clinic to be processed.    Information was gathered and will be sent to provider for review.  RN will contact Home Care with information after provider review.    Falguni Xiao RN    "

## 2023-12-06 NOTE — TELEPHONE ENCOUNTER
Anila calling back to report that patient declined getting UA/UC completed today. Patient requests to have home care complete tomorrow. Anila calling for verbal approval to have home care collect lab specimen tomorrow 12/7/2023 instead of today.    Routing to provider to review and advise.    CHAPIN Mary  [facility] Primary Care Triage

## 2023-12-06 NOTE — TELEPHONE ENCOUNTER
RN called Anila to relay provider approval of orders below. Anila verbalized good understanding.     Routing to  to fax orders below if not been faxed over to [facility] yet.    CHAPIN Mary  [facility] Primary Care Triage

## 2023-12-06 NOTE — TELEPHONE ENCOUNTER
"RN spoke with Dr. Trice Medeiros. Per Dr. Medeiros, VO can be provided to home care RN to collect the urine for pt to process UA/UC as requested.     RN returned call to CHAPIN High, from [facility] and provided her with the verbal order to collect pt's clean-catch urine specimen today for UA/UC orders. Anila states she will be in a meeting until 3pm, and will then go to pt's home to collect urine and bring specimen to an [facility] lab to be processed.    Provider will review and sign order in Epic when able.    Provider: After signing the order, please route message to  pool to print the UA/UC order, and also other unfulfilled existing future lab orders to be faxed to [facility] at [phone].      Orders Requested     1., Skilled Nursing  Request for initial certification (first set of orders)   Frequency:  1x/wk for 4 wks for education on disease.     2.  UA/UC : Pt reported to CHAPIN High that she has been having urinary frequency for \"awhile,\" and reports \"a little bit\" of pain when voiding. Home care RN also states she is unable to determine if pt's urine has unusual odor or not because there were many soiled diapers in the home that had not been taken to an outdoor garbage so the home had a smell of urine. Please fax order to [phone].      3. CHAPIN High states that she is aware pt has future lab orders to be fulfilled. CHAPIN High, states please fax any lab orders to [facility] at [phone] and she will have home care staff draw the necessary labs and drop the samples off at the clinic to be processed.     Information was gathered and will be sent to provider for review.  RN will contact Home Care with information after provider review to give any approved verbal orders (skilled nursing order request) and  to fax any lab orders to [facility] at [phone].    AMBROSE Gleason, RN    "

## 2023-12-07 ENCOUNTER — TELEPHONE (OUTPATIENT)
Dept: FAMILY MEDICINE | Facility: CLINIC | Age: 73
End: 2023-12-07
Payer: COMMERCIAL

## 2023-12-07 NOTE — TELEPHONE ENCOUNTER
Home Care is calling regarding an established patient with [facility].        11/15/2023     1:50 PM 9/13/2023     9:46 AM   Home Care Information   Date of Home Care episode start  9/13/2023   Current following provider Dr. Trice Medeiros     Name/Phone Number Kalee  Mavis, [phone]   Home Care agency [facility]     Requesting orders from: Trice Medeiros  Provider is following patient: Yes  Is this a 60-day recertification request?  No    Orders Requested    Nurse was out to get UAUC and patient was unable to void. Ask if they can try tomorrow?       Information was gathered and will be sent to provider for review.  RN will contact Home Care with information after provider review.  Confirmed ok to leave a detailed message with call back.  Contact information confirmed and updated as needed.    Anselmo Elizondo RN

## 2023-12-08 ENCOUNTER — TELEPHONE (OUTPATIENT)
Dept: FAMILY MEDICINE | Facility: CLINIC | Age: 73
End: 2023-12-08
Payer: COMMERCIAL

## 2023-12-08 ENCOUNTER — PATIENT OUTREACH (OUTPATIENT)
Dept: GASTROENTEROLOGY | Facility: CLINIC | Age: 73
End: 2023-12-08
Payer: COMMERCIAL

## 2023-12-08 NOTE — TELEPHONE ENCOUNTER
Home care has tried twice (12/7 and 12/8) to get urine sample, but have not been able to. Sharyn asking if pt should be straight cath'd (pt does not want to have cath) to get sample or if they should keep trying with clean catch? Can go back to pt's house to collect sample on Monday. Pt does not have transportation to go to hospital/clinic to collect.       Sharyn also states pt said she discussed getting labs done with Dr. Medeiros, but [facility] does not have orders. RN able to see orders in chart. Sharyn requesting lab orders be faxed to [facility] (F: [phone]) and they can draw on Monday.     Routing to provider and TC pool.     Piper Wiley, RN

## 2023-12-08 NOTE — TELEPHONE ENCOUNTER
Called Lauren at [facility] and left VM on secure voicemail stating provider's response below. Left phone number for clinic in case of any further questions/concerns.    Rhoda Baca, JAIDAN, RN   [facility] Primary Care [facility]

## 2023-12-11 NOTE — TELEPHONE ENCOUNTER
CHAPIN Aguilar called from [facility]. Was wondering if they are able to straight cath patient tomorrow using a 14 German straight cath. Patient is agreeable to this. Reported a verbal order is okay for this.     CHAPIN Aguilar also wondering about lab orders to have them draw them. Notified her that orders were faxed today a few hours. She will look into this and will let us know if anything else is needed for this.     She had no further questions/concerns at this time.     Routing to provider to review.     Rhoda Baca, JAIDAN, RN   [facility] Primary Care [facility]     rest, fluids, Take meds as prescribed, See your GI MD with in 1 week.,

## 2023-12-11 NOTE — TELEPHONE ENCOUNTER
Home care nurse Sharyn calling back and RN relayed provider approval of orders below. Sharyn verbalized good understanding. No further requests at this time.    CHAPIN Mary  [facility] Primary Care Triage

## 2023-12-11 NOTE — TELEPHONE ENCOUNTER
okay to do the clean catch, if patient refuses cath  Please fax the urine test as requested below..

## 2023-12-11 NOTE — TELEPHONE ENCOUNTER
Called CHAPIN Coles to relay provider's message below. Sharyn is requesting for provider to clarify if straight cath is approved as they have tried getting clean catch twice but both times patient urinated prior to RN arriving  to home for collection.     Advise will send message to provider, Sharyn will wait for call back for verbal order if straight cath approved.     Routing to provider to review and advise.     CHAPIN Lynch  [facility]

## 2023-12-11 NOTE — TELEPHONE ENCOUNTER
This writer attempted to contact CHAPIN Aguilar on 12/11/23.    Reason for call provider's message. Writer left message to call clinic back at [phone].    If Lauren calls back:   Please relay approved orders below.      CHAPIN Lynch  [facility]

## 2023-12-12 ENCOUNTER — TELEPHONE (OUTPATIENT)
Dept: FAMILY MEDICINE | Facility: CLINIC | Age: 73
End: 2023-12-12
Payer: COMMERCIAL

## 2023-12-12 NOTE — TELEPHONE ENCOUNTER
Forms/Letter Request    Type of form/letter: [facility] Order Number [id number]    Have you been seen for this request: N/A    Do we have the form/letter: Yes: placed in providers forms basket for review    When is form/letter needed by: ASAP    How would you like the form/letter returned: Fax  [phone]

## 2023-12-13 ENCOUNTER — MEDICAL CORRESPONDENCE (OUTPATIENT)
Dept: HEALTH INFORMATION MANAGEMENT | Facility: CLINIC | Age: 73
End: 2023-12-13

## 2023-12-13 ENCOUNTER — TELEPHONE (OUTPATIENT)
Dept: FAMILY MEDICINE | Facility: CLINIC | Age: 73
End: 2023-12-13
Payer: COMMERCIAL

## 2023-12-13 DIAGNOSIS — Z53.9 DIAGNOSIS NOT YET DEFINED: Primary | ICD-10-CM

## 2023-12-13 PROCEDURE — G0179 MD RECERTIFICATION HHA PT: HCPCS | Performed by: INTERNAL MEDICINE

## 2023-12-13 NOTE — TELEPHONE ENCOUNTER
Forms/Letter Request    Type of form/letter: [facility], Order Number: [id number]    Have you been seen for this request: N/A    Do we have the form/letter: Yes: placed in providers forms basket for review    When is form/letter needed by: ASAP    How would you like the form/letter returned: Fax  [phone]

## 2023-12-15 DIAGNOSIS — N39.0 ACUTE UTI: Primary | ICD-10-CM

## 2023-12-15 RX ORDER — NITROFURANTOIN 25; 75 MG/1; MG/1
100 CAPSULE ORAL 2 TIMES DAILY
Qty: 10 CAPSULE | Refills: 0 | Status: SHIPPED | OUTPATIENT
Start: 2023-12-15 | End: 2023-12-20

## 2023-12-15 NOTE — TELEPHONE ENCOUNTER
Urine test didn't get send to me but I am able to see it on her chart now.   Urine culture was not done. UA is suggestive of infection.   Where should I send the antibiotic? [facility] is the default pharmacy. I see a [facility] on there. Not sure if there anyone who can pick it up for her.

## 2023-12-15 NOTE — TELEPHONE ENCOUNTER
"[facility] calling to ask if provider can interpret the UA and Urinalysis from 12/12/23 PCP ordered for patient. Patient reported UTI symptoms and home care took a cath sample of urine, brought to lab, results in and patient reported to Kalee SAMSON she \"feels like she has a UTI\" but waiting for results from provider if she needs treatment or not.     Routing to provider to review and advise.     Dave Pino, JAIDAN, RN, PHN  [facility] Primary Care [facility]     " [Dear  ___] : Dear  [unfilled], [Consult Letter:] : Your patient, [unfilled] was seen in my office today for consultation. [Please see my note below.] : Please see my note below.

## 2023-12-18 ENCOUNTER — TELEPHONE (OUTPATIENT)
Dept: FAMILY MEDICINE | Facility: CLINIC | Age: 73
End: 2023-12-18

## 2023-12-18 ENCOUNTER — VIRTUAL VISIT (OUTPATIENT)
Dept: FAMILY MEDICINE | Facility: CLINIC | Age: 73
End: 2023-12-18
Payer: COMMERCIAL

## 2023-12-18 ENCOUNTER — VIRTUAL VISIT (OUTPATIENT)
Dept: UROLOGY | Facility: CLINIC | Age: 73
End: 2023-12-18
Payer: COMMERCIAL

## 2023-12-18 VITALS — BODY MASS INDEX: 23.49 KG/M2 | HEIGHT: 67 IN

## 2023-12-18 DIAGNOSIS — I10 HYPERTENSION GOAL BP (BLOOD PRESSURE) < 140/90: Primary | Chronic | ICD-10-CM

## 2023-12-18 DIAGNOSIS — R39.15 URGENCY OF URINATION: Primary | ICD-10-CM

## 2023-12-18 DIAGNOSIS — M54.9 BACK PAIN, UNSPECIFIED BACK LOCATION, UNSPECIFIED BACK PAIN LATERALITY, UNSPECIFIED CHRONICITY: ICD-10-CM

## 2023-12-18 DIAGNOSIS — R35.0 FREQUENCY OF URINATION: ICD-10-CM

## 2023-12-18 DIAGNOSIS — N95.8 GENITOURINARY SYNDROME OF MENOPAUSE: ICD-10-CM

## 2023-12-18 DIAGNOSIS — N39.41 URGE INCONTINENCE OF URINE: ICD-10-CM

## 2023-12-18 DIAGNOSIS — R53.81 PHYSICAL DECONDITIONING: ICD-10-CM

## 2023-12-18 PROCEDURE — 99213 OFFICE O/P EST LOW 20 MIN: CPT | Mod: VID | Performed by: NURSE PRACTITIONER

## 2023-12-18 PROCEDURE — 99213 OFFICE O/P EST LOW 20 MIN: CPT | Mod: VID | Performed by: UROLOGY

## 2023-12-18 ASSESSMENT — PAIN SCALES - GENERAL: PAINLEVEL: MILD PAIN (2)

## 2023-12-18 NOTE — TELEPHONE ENCOUNTER
RN called number on file for home care nurse Lauren with [facility]. No answer and left detailed message for nurse to call clinic back at [phone].    If home care calls back, please relay provider message below and route to provider to review.          CHAPIN Mary  [facility] Primary Care Triage

## 2023-12-18 NOTE — TELEPHONE ENCOUNTER
Lauren calling back and states that patient was discharged from PT due to max potential outreaches and patient not progressing in goals.     Patient still has 2 more HHA visits for showers and 3 skilled RN visits scheduled. Due to the holiday week and staffing, patient will have a HHA visit this week and then the last visit next week. Nursing visits scheduled for this week, next week, and the following week.    Lauren states that patient has a county assessment scheduled for 12/20/2023. She also reports that patient does not have any further skills needed at this time, given that she does not have any acute changes, and patient will most likely discharged from home care on 1/1/2024.     Lauren states that patient has been requesting to have continued to home care despite this. Given patient's low skill need and her having assets available, home care may not be able to continue.     Lauren will continue to visit patient for the next three weeks. If there are any lab orders that are needed during this time, please fax orders to [phone].    CHAPIN Mary  [facility] Primary Care Triage

## 2023-12-18 NOTE — TELEPHONE ENCOUNTER
"Pt calling because she is currently having a UTI.  States that she had a visit with her urologist today and was advised to contact primary care regarding her showers for UTI prevention.    Pt states that she usually gets one shower a week by home care but believes that her insurance is \"cutting\" her out. States that she does not have a shower scheduled for today.     Pt states that she needs her showers to prevent UTIs because wears dippers and does not want to get recurrent infections.    Pt wants to discuss concerns with Grace.     Assisted in scheduling a visit.    Blanka Palmer RN  [employer]    "

## 2023-12-18 NOTE — PROGRESS NOTES
Virtual Visit Details    Type of service:  Video Visit   Video Start Time: 11:30 AM  Video End Time:11:45 AM    Originating Location (pt. Location): Home    Distant Location (provider location):  Off-site  Platform used for Video Visit: Lizeth

## 2023-12-18 NOTE — PROGRESS NOTES
Radha is a 73 year old who is being evaluated via a billable telephone visit.      What phone number would you like to be contacted at? [phone]   How would you like to obtain your AVS? Mail a copy    Distant Location (provider location):  On-site        Subjective   Radha is a 73 year old, presenting for the following health issues:  Home Care/Hospice      Patient would like to discuss Home Care, she needs someone to help her shower    History of Present Illness       Reason for visit:  Home care    She eats 0-1 servings of fruits and vegetables daily.She consumes 0 sweetened beverage(s) daily.She exercises with enough effort to increase her heart rate 9 or less minutes per day.  She exercises with enough effort to increase her heart rate 3 or less days per week.   She is taking medications regularly.     Patient states that her Home care was discontinued   Found out that this last Friday was her last shower. She is not able to do this on her own   What she did have was physical therapy twice a week and shower once a week and now everything was discontinued   Is being treated for UTI now   Can not drive and is not able to this on her own   She thinks is related to insurance coverage issue     Does have a friend that helps bring her groceries         Labs reviewed in EPIC  BP Readings from Last 3 Encounters:   08/16/22 101/68   05/24/22 92/56   09/09/21 104/61    Wt Readings from Last 3 Encounters:   10/30/23 68 kg (150 lb)   06/11/21 83.7 kg (184 lb 9.6 oz)   11/05/20 88 kg (194 lb)                  Patient Active Problem List   Diagnosis    Macular degeneration    Hiatal hernia    IBS (irritable bowel syndrome)    Hypertension goal BP (blood pressure) < 140/90    GERD (gastroesophageal reflux disease)    Atypical ductal hyperplasia of breast    Atypical lobular hyperplasia of breast    Benign Breast Disease (PASH)    Family history of breast cancer in sister    Hyperlipidemia LDL goal <100    Breast cancer  screening-high risk    Tubular adenoma of colon    Osteopenia    Alcohol ingestion, more than 4 drinks/day on alcohol screening    Umbilical hernia    Incontinence of urine    Stool incontinence    Osteoarthritis, knee    Rosacea    Anxiety    Abnormal cervical Pap smear with positive HPV DNA test    Depression with anxiety    Elevated liver function tests    Rectocele    Unexplained endometrial cells on cervical cytology    Back pain, unspecified back location, unspecified back pain laterality, unspecified chronicity    Urge incontinence    Dislocation of shoulder region    Acute lower GI bleeding    Moderate episode of recurrent major depressive disorder (H)    Angiodysplasia of colon    Diabetes mellitus, type 2 (H)    Stage 3a chronic kidney disease (H)    History of detached retina repair, os    Pseudophakia of both eyes; Yag Caps, os    Degenerative myopia of both eyes, unspecified whether complication present    Esotropia    Posterior vitreous detachment, right eye     Past Surgical History:   Procedure Laterality Date    BREAST LUMPECTOMY, RT/LT      x2, left    CAPSULE/PILL CAM ENDOSCOPY N/A 3/4/2019    Procedure: CAPSULE/PILL CAM ENDOSCOPY;  Surgeon: Sinan Valdes MD;  Location: [facility]    CATARACT IOL, RT/LT  4/99    left, MZ60CD 7.0D    COLONOSCOPY      COLONOSCOPY N/A 1/22/2016    Procedure: COMBINED COLONOSCOPY, SINGLE OR MULTIPLE BIOPSY/POLYPECTOMY BY BIOPSY;  Surgeon: Praful Benjamin MD;  Location: [facility]    COLONOSCOPY WITH CO2 INSUFFLATION N/A 1/22/2016    Procedure: COLONOSCOPY WITH CO2 INSUFFLATION;  Surgeon: Praful Benjamin MD;  Location: [facility]    COMBINED ESOPHAGOSCOPY, GASTROSCOPY, DUODENOSCOPY (EGD) WITH CO2 INSUFFLATION N/A 11/27/2018    Procedure: COMBINED ESOPHAGOSCOPY, GASTROSCOPY, DUODENOSCOPY (EGD) WITH CO2 INSUFFLATION;  Surgeon: Duane, William Charles, MD;  Location: [facility]    CRYOTHERAPY  2000    cervical    CRYOTHERAPY Left 3/19/2015    Procedure: CRYOTHERAPY;   Surgeon: Keiko Puri MD;  Location:  [facility]    DILATION AND CURETTAGE, HYSTEROSCOPY DIAGNOSTIC, COMBINED N/A 1/19/2016    Procedure: COMBINED DILATION AND CURETTAGE, HYSTEROSCOPY DIAGNOSTIC;  Surgeon: Yeni Aparicio DO;  Location: [facility]    ESOPHAGOSCOPY, GASTROSCOPY, DUODENOSCOPY (EGD), COMBINED N/A 11/27/2018    Procedure: COMBINED ESOPHAGOSCOPY, GASTROSCOPY, DUODENOSCOPY (EGD), BIOPSY SINGLE OR MULTIPLE;  Surgeon: Duane, William Charles, MD;  Location: [facility]    IMPLANT STIMULATOR AND LEADS SACRAL NERVE (STAGE ONE AND TWO) N/A 7/24/2018    Procedure: IMPLANT STIMULATOR AND LEADS SACRAL NERVE (STAGE ONE AND TWO);  Interstim Implant Stage One and Two (Implant Permanent Lead and Generator);  Surgeon: Dada Baltazar MD;  [facility]    IMPLANT STIMULATOR AND LEADS SACRAL NERVE (STAGE ONE AND TWO) Right 8/6/2019    Procedure: Replacement of Interstim Implant;  Surgeon: Dada Baltazar MD;  [facility]    IMPLANT STIMULATOR SACRAL NERVE PERCUTANEOUS TRIAL N/A 6/19/2018    Procedure: IMPLANT STIMULATOR SACRAL NERVE PERCUTANEOUS TRIAL;  Percutaneous Neural Examination (trial for interstim);  Surgeon: Dada Baltazar MD;  [facility]    LAPAROSCOPIC TUBAL LIGATION  1981    PHACOEMULSIFICATION CLEAR CORNEA WITH STANDARD INTRAOCULAR LENS IMPLANT  8/15/2013    Procedure: PHACOEMULSIFICATION CLEAR CORNEA WITH STANDARD INTRAOCULAR LENS IMPLANT;  RIGHT PHACOEMULSIFICATION CLEAR CORNEA WITH STANDARD INTRAOCULAR LENS IMPLANT ;  Surgeon: Trae Taylor MD;  Location:  [facility]    REMOVE STIMULATOR SACRAL NERVE Right 8/6/2019    Procedure: Removal of Interstim Implant;  Surgeon: Dada Baltazar MD;  [facility]    SURGICAL HISTORY OF -       x4, ankle surgery       Social History     Tobacco Use    Smoking status: Former     Packs/day: 0.50     Years: 20.00     Additional pack years: 0.00     Total pack years: 10.00     Types: Cigarettes     Start date: 11/21/2018    Smokeless  tobacco: Never    Tobacco comments:     quit at age 35. Restarted in 2018 off and on, not more than 1/2 pack per day   Substance Use Topics    Alcohol use: Yes     Comment: social/3-4 per week     Family History   Problem Relation Age of Onset    Hypertension Mother     Cerebrovascular Disease Mother     Arthritis Mother     Cardiovascular Mother     Circulatory Mother     Cerebrovascular Disease Father     Prostate Cancer Father 65         at 69    Asthma Brother     Prostate Cancer Brother 48        bone metastasis    Diabetes Sister     Hypertension Sister     Osteoporosis Sister     Depression Sister     Lipids Sister     Cancer Maternal Grandmother 95        spine    Breast Cancer Sister 39        also contralateral @53, mets to lungs    Cancer Sister 20        second uterine cancer in 30s also    Diabetes Sister     Hypertension Sister     Depression Sister     Osteoporosis Sister     Breast Cancer Sister 57        unilateral    Cancer Paternal Aunt 40        unknown type    Cancer Paternal Uncle         stomach;  in his 80s    Prostate Cancer Brother     Glaucoma No family hx of     Melanoma No family hx of     Skin Cancer No family hx of     Macular Degeneration No family hx of          Current Outpatient Medications   Medication Sig Dispense Refill    ASPIRIN 81 PO       cephALEXin (KEFLEX) 500 MG capsule Take 1 capsule (500 mg) by mouth 2 times daily Hold the trimethoprim while taking keflex. Once completed the keflex can resume the trimethoprim 14 capsule 0    esomeprazole (NEXIUM) 40 MG DR capsule Take 1 capsule (40 mg) by mouth every morning (before breakfast) Take 30-60 minutes before eating. 30 capsule 11    glipiZIDE (GLUCOTROL XL) 10 MG 24 hr tablet TAKE 1 TABLET DAILY (DUE FOR FOLLOW UP WITH PRIMARY CARE PHYSICIAN FOR FUTURE REFILLS) 90 tablet 0    lisinopril (ZESTRIL) 5 MG tablet TAKE 1 TABLET DAILY 90 tablet 1    metFORMIN (GLUCOPHAGE XR) 500 MG 24 hr tablet TAKE 2 TABLETS TWICE A DAY  WITH MEALS (DUE FOR FOLLOW UP WITH PRIMARY CARE PHYSICIAN FOR FUTURE REFILLS) 120 tablet 0    nitroFURantoin macrocrystal-monohydrate (MACROBID) 100 MG capsule Take 1 capsule (100 mg) by mouth 2 times daily for 5 days 10 capsule 0    omeprazole (PRILOSEC) 40 MG DR capsule TAKE 1 CAPSULE DAILY 30 TO 60 MINUTES BEFORE A MEAL 90 capsule 3    order for DME Equipment being ordered: Walker with seat light weight 1 Device 0    simvastatin (ZOCOR) 20 MG tablet TAKE 1 TABLET AT BEDTIME (DUE FOR CLINIC APPOINTMENT BEFORE FUTURE REFILLS) 90 tablet 0    trimethoprim (TRIMPEX) 100 MG tablet Take 1 tablet (100 mg) by mouth At Bedtime For more refills,schedule an appointment at [phone] 90 tablet 0    trospium (SANCTURA XR) 60 MG CP24 24 hr capsule Take 1 capsule (60 mg) by mouth daily before breakfast 90 capsule 3    blood glucose monitoring (ONE TOUCH ULTRA 2) meter device kit Use to test blood sugar 1 times daily or as directed. (Patient not taking: Reported on 12/18/2023) 1 kit 0    continuous blood glucose monitoring (FREESTYLE MELINA) sensor For use with Freestyle Melina Flash  for continuous monitioring of blood glucose levels. Replace sensor every 10 days. (Patient not taking: Reported on 12/18/2023) 3 each 5    docusate sodium (COLACE) 100 MG capsule Take 1 capsule (100 mg) by mouth daily (Patient not taking: Reported on 12/18/2023) 90 capsule 3    DULoxetine (CYMBALTA) 30 MG capsule TAKE 1 CAPSULE DAILY (Patient not taking: Reported on 12/18/2023) 30 capsule 5    mirabegron (MYRBETRIQ) 50 MG 24 hr tablet Take 1 tablet (50 mg) by mouth daily (Patient not taking: Reported on 12/18/2023) 30 tablet 11    ONE TOUCH LANCETS None Entered (Patient not taking: Reported on 12/18/2023)      ONE TOUCH TEST STRIPS VI None Entered (Patient not taking: Reported on 12/18/2023)      order for DME Equipment being ordered: Light with four-wheel walker (Patient not taking: Reported on 12/18/2023) 1 Units 0    order for DME  Equipment being ordered: 4 wheel walker with a seat, light weight. (Patient not taking: Reported on 12/18/2023) 1 Units 0    polyethylene glycol (MIRALAX) 17 GM/Dose powder Take 17 g (1 Capful) by mouth daily (Patient not taking: Reported on 12/18/2023) 510 g 5     Allergies   Allergen Reactions    Sulfa Antibiotics     Morphine Rash     Does OK with oxycodone.    Trazodone      Constipation           Review of Systems   Constitutional, HEENT, cardiovascular, pulmonary, gi and gu systems are negative, except as otherwise noted.      Objective         Physical Exam   alert, no distress, and worried   Is basically bedridden now she states   PSYCH: Alert and oriented times 3; coherent speech, normal   rate and volume, able to articulate logical thoughts, able   to abstract reason, no tangential thoughts, no hallucinations   or delusions  Her affect is normal and pleasant  RESP: No cough, no audible wheezing, able to talk in full sentences  Remainder of exam unable to be completed due to telephone visits    Lab on 06/26/2023   Component Date Value Ref Range Status    Cholesterol 06/26/2023 144  <200 mg/dL Final    Triglycerides 06/26/2023 143  <150 mg/dL Final    Direct Measure HDL 06/26/2023 41 (L)  >=50 mg/dL Final    LDL Cholesterol Calculated 06/26/2023 74  <=100 mg/dL Final    Non HDL Cholesterol 06/26/2023 103  <130 mg/dL Final    Sodium 06/26/2023 140  136 - 145 mmol/L Final    Potassium 06/26/2023 4.0  3.4 - 5.3 mmol/L Final    Chloride 06/26/2023 105  98 - 107 mmol/L Final    Carbon Dioxide (CO2) 06/26/2023 21 (L)  22 - 29 mmol/L Final    Anion Gap 06/26/2023 14  7 - 15 mmol/L Final    Urea Nitrogen 06/26/2023 16.2  8.0 - 23.0 mg/dL Final    Creatinine 06/26/2023 0.89  0.51 - 0.95 mg/dL Final    Calcium 06/26/2023 9.4  8.8 - 10.2 mg/dL Final    Glucose 06/26/2023 127 (H)  70 - 99 mg/dL Final    Alkaline Phosphatase 06/26/2023 71  35 - 104 U/L Final    AST 06/26/2023 22  0 - 45 U/L Final    Reference intervals  for this test were updated on 6/12/2023 to more accurately reflect our healthy population. There may be differences in the flagging of prior results with similar values performed with this method. Interpretation of those prior results can be made in the context of the updated reference intervals.    ALT 06/26/2023 12  0 - 50 U/L Final    Reference intervals for this test were updated on 6/12/2023 to more accurately reflect our healthy population. There may be differences in the flagging of prior results with similar values performed with this method. Interpretation of those prior results can be made in the context of the updated reference intervals.      Protein Total 06/26/2023 6.9  6.4 - 8.3 g/dL Final    Albumin 06/26/2023 4.3  3.5 - 5.2 g/dL Final    Bilirubin Total 06/26/2023 0.2  <=1.2 mg/dL Final    GFR Estimate 06/26/2023 69  >60 mL/min/1.73m2 Final    WBC Count 06/26/2023 9.8  4.0 - 11.0 10e3/uL Final    RBC Count 06/26/2023 3.90  3.80 - 5.20 10e6/uL Final    Hemoglobin 06/26/2023 11.3 (L)  11.7 - 15.7 g/dL Final    Hematocrit 06/26/2023 34.6 (L)  35.0 - 47.0 % Final    MCV 06/26/2023 89  78 - 100 fL Final    MCH 06/26/2023 29.0  26.5 - 33.0 pg Final    MCHC 06/26/2023 32.7  31.5 - 36.5 g/dL Final    RDW 06/26/2023 14.1  10.0 - 15.0 % Final    Platelet Count 06/26/2023 302  150 - 450 10e3/uL Final    Hemoglobin A1C 06/26/2023 6.2 (H)  0.0 - 5.6 % Final    Normal <5.7%   Prediabetes 5.7-6.4%    Diabetes 6.5% or higher     Note: Adopted from ADA consensus guidelines.    Ferritin 06/26/2023 12  11 - 328 ng/mL Final    Iron 06/26/2023 41  37 - 145 ug/dL Final    Iron Binding Capacity 06/26/2023 357  240 - 430 ug/dL Final    Iron Sat Index 06/26/2023 11 (L)  15 - 46 % Final     Assessment & Plan     Physical deconditioning  Will reach out to Home health to clarify what is potentially happening at home with her physical therapy and home health     Back pain, unspecified back location, unspecified back pain  laterality, unspecified chronicity  Chronic back pain issue. Needs help with bath care at home     Hypertension goal BP (blood pressure) < 140/90  HTN Plan:  1)  Medication: continue current medication regimen unchanged  2)  Dietary sodium restriction  3)  Regular aerobic exercise  4)  Recheck in 6 months, sooner should new symptoms or   problems arise.  5) See todays orders.    Patient Education: Reviewed risks of hypertension and principles of   treatment.      Urge incontinence of urine  FOLLOW UP WITH SPECIALIST :Urology        Review of external notes as documented elsewhere in note  Ordering of each unique test   Time spent by me doing chart review, history and exam, documentation and further activities per the note       See Patient Instructions      NIKKY Lutz [facility]      Video call duration: 14 minutes
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
## 2023-12-18 NOTE — PATIENT INSTRUCTIONS
-continue Trospium 60 mg  -continue mirabegron 50  -She has someone who comes to give her showers but her insurance is cutting that off, but this is important for UTI prevention.  -f/u with PA in 6 months

## 2023-12-18 NOTE — PROGRESS NOTES
See telephone encounter 12/18/2023 regarding home care questions.    CHAPIN Mary  [facility] Primary Care Triage

## 2023-12-18 NOTE — PROGRESS NOTES
Please call home health as patient states that her PT and her shower once a week has been discontinued   She states thinks related to insurance can we see what is going on

## 2023-12-18 NOTE — NURSING NOTE
Is the patient currently in the state of [place]? YES    Visit mode:VIDEO    If the visit is dropped, the patient can be reconnected by: VIDEO VISIT: Text to cell phone:   Telephone Information:   Mobile [phone]       Will anyone else be joining the visit? NO  (If patient encounters technical issues they should call [phone]    How would you like to obtain your AVS? Mail a copy    Are changes needed to the allergy or medication list? No      Pt would like to review Myrbetriq prescription.     Reason for visit: JAMES PENN      
No

## 2023-12-20 ENCOUNTER — TELEPHONE (OUTPATIENT)
Dept: FAMILY MEDICINE | Facility: CLINIC | Age: 73
End: 2023-12-20
Payer: COMMERCIAL

## 2023-12-20 NOTE — TELEPHONE ENCOUNTER
Home Care is calling regarding an established patient with [facility].        11/15/2023     1:50 PM 9/13/2023     9:46 AM   Home Care Information   Date of Home Care episode start  9/13/2023   Current following provider Dr. Trice Medeiros     Name/Phone Number Kalee  Mavis, [phone]   Home Care agency [facility]     Requesting orders from: Trice Medeiros  Provider is following patient: Yes  Is this a 60-day recertification request?  No    Orders Requested    HHA (Home Health Aide)  Request for an additional one visit just for this week, patient has been incontinent.     Information was gathered and will be sent to provider for review.  RN will contact Home Care with information after provider review.  Confirmed ok to leave a detailed message with call back.  Contact information confirmed and updated as needed.    Rhoda Baca RN

## 2023-12-20 NOTE — TELEPHONE ENCOUNTER
[facility] and left VM on confidential line notifying her that Dr. Trice Medeiros has approved the requested home care orders below. Left phone number for clinic in case of any further questions/concerns.     JAIDA RiceN, RN   [facility]

## 2023-12-21 ENCOUNTER — TELEPHONE (OUTPATIENT)
Dept: FAMILY MEDICINE | Facility: CLINIC | Age: 73
End: 2023-12-21

## 2023-12-21 ENCOUNTER — TELEPHONE (OUTPATIENT)
Dept: FAMILY MEDICINE | Facility: CLINIC | Age: 73
End: 2023-12-21
Payer: COMMERCIAL

## 2023-12-21 ENCOUNTER — MEDICAL CORRESPONDENCE (OUTPATIENT)
Dept: HEALTH INFORMATION MANAGEMENT | Facility: CLINIC | Age: 73
End: 2023-12-21
Payer: COMMERCIAL

## 2023-12-21 NOTE — TELEPHONE ENCOUNTER
Forms/Letter Request     [facility]/letter: [facility], Order Number: [id number]  Have you been seen for this request: N/A     Do we have the form/letter: Yes: placed in providers forms basket for review     When is form/letter needed by: ASAP     How would you like the form/letter returned: Fax  [phone]

## 2023-12-21 NOTE — TELEPHONE ENCOUNTER
Forms/Letter Request     Type of form/letter: [facility]     Have you been seen for this request: N/A     Do we have the form/letter: Yes: Will place form on providers desk for review/signature     When is form/letter needed by: ASAP     How would you like the form/letter returned: Fax : [phone]

## 2023-12-22 ENCOUNTER — TELEPHONE (OUTPATIENT)
Dept: FAMILY MEDICINE | Facility: CLINIC | Age: 73
End: 2023-12-22
Payer: COMMERCIAL

## 2023-12-22 DIAGNOSIS — F51.04 CHRONIC INSOMNIA: Primary | ICD-10-CM

## 2023-12-22 NOTE — TELEPHONE ENCOUNTER
RN called Ashley and relayed provider approval of orders below. Ashley verbalized good understanding. No further requests at this time.    CHAPIN Mary  [facility] Primary Care Triage

## 2023-12-22 NOTE — TELEPHONE ENCOUNTER
Completed form has been faxed to [phone] .Right-Fax reviewed to confirm form was sent without error.   [facility]

## 2023-12-22 NOTE — TELEPHONE ENCOUNTER
Home Care is calling regarding an established patient with  [facility].        11/15/2023     1:50 PM 9/13/2023     9:46 AM   Home Care Information   Date of Home Care episode start  9/13/2023   Current following provider Dr. Trice Medeiros     Name/Phone Number Kalee  Mavis, [phone]   Home Care agency [facility]     Requesting orders from: Trice Medeiros  Provider is following patient: Yes  Is this a 60-day recertification request?  No    Orders Requested    Skilled Nursing  Request for delay in care, service is not able to be provided within same scheduled day.   Ashley, nurse from [facility], calling to report that patient declined nurse visit today. Patient is scheduled for both HHA for shower assistance and skilled nursing visit today. HHA is not scheduled to see patient until later today. Due to this patient states she does not need nursing visit today. Nurse visit is already scheduled for next week and nurse will try to see patient then.       Confirmed ok to leave a detailed message with call back.  Contact information confirmed and updated as needed.    CHAPIN Mary  [facility] Primary Care Triage

## 2023-12-22 NOTE — TELEPHONE ENCOUNTER
Pt called to follow up on the trazodone. [facility] to [facility] taking benad[facility]. She was told at that time that the script for trazodone had been cancelled. She is unsure why but wants to be sure this does not get cancelled. Pt said juan cancelled the cymbalta due to side effects but nothing else. Pt stated she has enough medication to last her through next week but will need a refill after that. Pt said she is taking 100 mg every night. I was unable to se this on pt list unsure if I missed it but pt said she has been taking this for along time and needs to be sure this does not get cancelled. I informed pt I would send her req to her PCP and pt requested this get sent to Perri Carias.   Sending to both PCP and Perri Carias per pt req

## 2023-12-22 NOTE — TELEPHONE ENCOUNTER
[facility] forms completed and signed will be faxed to [phone]        Eliana CASTRO Visit Facilitator

## 2023-12-26 RX ORDER — TRAZODONE HYDROCHLORIDE 100 MG/1
100 TABLET ORAL AT BEDTIME
Qty: 90 TABLET | Refills: 1 | Status: SHIPPED | OUTPATIENT
Start: 2023-12-26 | End: 2024-06-11

## 2023-12-26 NOTE — TELEPHONE ENCOUNTER
Pt returned call to clinic. RN reviewed the 12/26/23 provider note below with pt.    Pt states please remove trazodone allergy/SE from her Allergy list.   Pt states she takes 100 mg of trazodone every night at bedtime including last night.    Pt states the medication that needed to be discontinued was Cymbalta, NOT trazodone.   Per chart review, Cymbalta is no longer active on her medication list.    Pt requests that her current dose of trazodone 100 mg at bedtime be refilled to [facility] mail order pharmacy.    JAIDA GleasonN, RN

## 2023-12-26 NOTE — TELEPHONE ENCOUNTER
She was noted to be discontinued due to severe constipation.  This the patient will go back on a lower dose 50 mg once a day questioning

## 2023-12-26 NOTE — TELEPHONE ENCOUNTER
RN called patient and LVM to call clinic at [phone].      If patient calls back, please relay provider message below.     Rhoda Baca RN

## 2023-12-28 ENCOUNTER — TELEPHONE (OUTPATIENT)
Dept: FAMILY MEDICINE | Facility: CLINIC | Age: 73
End: 2023-12-28
Payer: COMMERCIAL

## 2023-12-28 NOTE — TELEPHONE ENCOUNTER
Elizabeth Aguilar RN, returning clinic call. RN relayed message. Lauren states pt is being discharged from PT as she has met all goals and plateau'd. There is no skilled need for nursing any longer so patient is also being discharged from nursing. When pt is discharged from skilled nursing the home health aide is also discontinued.     Lauren has spoke with pt repeatedly about this and provided pt with a list of 5 private pay home care agencies that might be able to provide PCA services. Pt has asked Lauren to make up a reason that she still needs skill nursing. Lauren has explained to the patient that this is medicare fraud and she is not able to do this. Pt also had a [facility] assessment last week, but will not be getting services through the [facility]. Lauren is not sure the exact reason why she will not be getting services through the [facility], but in speaking with pt the pt makes it seem like she was not happy with what the [facility] was able to provide. Lauren stated there is nothing the clinic/provider can do to get the patient to qualify.     Routing to RN pool and provider.     Piper Wiley RN

## 2023-12-28 NOTE — TELEPHONE ENCOUNTER
"Patient called regarding home care. Reports that she is going to be discharged from home care after next week. Last visit with nurse will be next week and last visit with home health aide (to help with shower) will be tomorrow. Reports that she wants to continue to receive home care visits to help with showers at least once a week, preferably twice per week if possible. Would also like to continue with physical therapy as well. Patient also reports that she has a friend that comes out who helps with cooking and cleaning, otherwise, would not be able to do this herself.     Per telephone encounter on 12/18, patient was going to have a county assessment scheduled for 12/20. Patient reports that they came out and were there for a few hours. Reports that they were just \"sales people\" working for the state and that they will \"take away her home\". Did not find them helpful and has not heard from then since visit.     Per telephone encounter, it was noted that RN from [facility] reported that patient does not have any further skills needed at this time and that she has low skill need and has assets available, which is the reasoning for home care being discontinued.     RN called patient's home care nurse, Lauren at [facility], and left VM to return call to clinic at [phone]. RN was calling her to try to obtain any new information on what may be needed to continue home care visits.     Will route to provider for any additional recommendations.     Rhoda Baca, JAIDAN, RN   [facility]    "

## 2023-12-29 NOTE — TELEPHONE ENCOUNTER
Agree not anything we can do if she does not meet criteria   Will route to Dr Medeiros her PCP as well

## 2024-01-04 ENCOUNTER — PATIENT OUTREACH (OUTPATIENT)
Dept: CARE COORDINATION | Facility: CLINIC | Age: 74
End: 2024-01-04
Payer: COMMERCIAL

## 2024-01-04 ENCOUNTER — TELEPHONE (OUTPATIENT)
Dept: FAMILY MEDICINE | Facility: CLINIC | Age: 74
End: 2024-01-04
Payer: COMMERCIAL

## 2024-01-04 DIAGNOSIS — R46.0 SELF-CARE DEFICIT FOR BATHING: Primary | ICD-10-CM

## 2024-01-04 NOTE — TELEPHONE ENCOUNTER
Pt discharged from [facility], but states she is desperate for home care services. RN relayed message from [facility] RN, Dr. Medeiros, and Perri Carias from 12/22/23 that pt no longer qualifies for services d/t meeting goals. RN encouraged pt to call numbers provided by [employer] for private pay home care services, pt states she has been trying, but hasn't been able to get hold of anyone.     Pt requested appointments to discuss home care services/resources. RN assisted in scheduling.     Care coordination referral placed to assist with resources for PCA services for showering.     Piper Wiley RN

## 2024-01-04 NOTE — PROGRESS NOTES
Clinic Care Coordination Contact  Care Coordination Clinician Chart Review    Situation: Patient chart reviewed by care coordinator.    Background: Clinic Care Coordination Referral placed by triage RN.    Assessment: Upon chart review, patient is not a candidate for Primary Care Clinic Care Coordination enrollment due to reason stated below:  Patient has been given resources in the past and by her home care agency for private pay resources and is not a candidate for care coordination.     Plan/Recommendations: Clinic Care Coordination Referral/order cancelled. RN/SW CC will perform no further monitoring/outreaches at this time and will remain available as needed. If new needs arise, a new Care Coordination Referral may be placed.    Tianna Davey, [facility]  Social Work Primary Care Clinic Care Coordinator  [facility]  [phone]  [email or web address]

## 2024-01-04 NOTE — Clinical Note
Received care coordination referral from triage RN, discussed referral with triage RN as well. I will be closing this referral, patient is not a candidate for care coordination at this time and she has been given PCA resources in the past and now again by her home care agency. Just wanted to send an FYI, thank you.

## 2024-01-08 NOTE — TELEPHONE ENCOUNTER
Called pt and clarified, Pt only taking Tolterodine tart ER caps. Please sign form.  Mary Ann LANDIS       Patient went unconscious for less than 30 seconds and had tremors for a few seconds.

## 2024-01-09 ENCOUNTER — VIRTUAL VISIT (OUTPATIENT)
Dept: FAMILY MEDICINE | Facility: CLINIC | Age: 74
End: 2024-01-09
Payer: COMMERCIAL

## 2024-01-09 DIAGNOSIS — Z74.09 IMPAIRED MOBILITY AND ADLS: Primary | ICD-10-CM

## 2024-01-09 DIAGNOSIS — Z78.9 IMPAIRED MOBILITY AND ADLS: Primary | ICD-10-CM

## 2024-01-09 PROCEDURE — 99213 OFFICE O/P EST LOW 20 MIN: CPT | Mod: 95 | Performed by: INTERNAL MEDICINE

## 2024-01-09 ASSESSMENT — PATIENT HEALTH QUESTIONNAIRE - PHQ9
10. IF YOU CHECKED OFF ANY PROBLEMS, HOW DIFFICULT HAVE THESE PROBLEMS MADE IT FOR YOU TO DO YOUR WORK, TAKE CARE OF THINGS AT HOME, OR GET ALONG WITH OTHER PEOPLE: EXTREMELY DIFFICULT
SUM OF ALL RESPONSES TO PHQ QUESTIONS 1-9: 19
SUM OF ALL RESPONSES TO PHQ QUESTIONS 1-9: 19
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
## 2024-01-12 ENCOUNTER — TELEPHONE (OUTPATIENT)
Dept: FAMILY MEDICINE | Facility: CLINIC | Age: 74
End: 2024-01-12

## 2024-01-12 NOTE — TELEPHONE ENCOUNTER
Spoke to patient - She did receive a list of self paid service from RN but she can not afford any of them. Income is very limited. And does not want to leave her home.     Explained to her that with her medical history a MD is best suited for her care. Syed Carias is an APRN CNP.  She is interested in switching provider. Is there some we would recommend?
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
## 2024-01-12 NOTE — TELEPHONE ENCOUNTER
Unfortunately I am not comfortable being her PCP   She has a complex medical history recommend she continue with a MD

## 2024-01-12 NOTE — TELEPHONE ENCOUNTER
I don't have specific provider to recommend as our practice philosophies are similar.   Patient lives in [place].  She can check out MDs in [place] in addition to MD at [place].  Check out the website and read the provider bio is a good place to start.

## 2024-01-12 NOTE — TELEPHONE ENCOUNTER
I was informed by our  that [facility] home care nurse has provided her a list of self paid services. The  doesn't have more to offer. Does she has the discharge [facility] or list  [facility]? If not, we can reach out to [facility] to send her the paper again.

## 2024-02-06 ENCOUNTER — TELEPHONE (OUTPATIENT)
Dept: FAMILY MEDICINE | Facility: CLINIC | Age: 74
End: 2024-02-06
Payer: COMMERCIAL

## 2024-02-08 ENCOUNTER — TELEPHONE (OUTPATIENT)
Dept: FAMILY MEDICINE | Facility: CLINIC | Age: 74
End: 2024-02-08
Payer: COMMERCIAL

## 2024-02-08 DIAGNOSIS — R46.0 SELF-CARE DEFICIT FOR BATHING: ICD-10-CM

## 2024-02-08 DIAGNOSIS — Z74.09 IMPAIRED MOBILITY AND ADLS: Primary | ICD-10-CM

## 2024-02-08 DIAGNOSIS — R53.81 PHYSICAL DECONDITIONING: ICD-10-CM

## 2024-02-08 DIAGNOSIS — Z78.9 IMPAIRED MOBILITY AND ADLS: Primary | ICD-10-CM
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
## 2024-02-08 NOTE — TELEPHONE ENCOUNTER
Please let patient know unfortunately I can not offer anything else that Dr Medeiros has already suggested   Assisted living means that she has to be able to some self care abilities and she states she is not able to care for herself.   Patient has been given resources in the past and by her home care agency for private pay resources would be all I could suggest as well     Can we also call Yamilka Johnson her  for [facility] and let her be aware of the situation

## 2024-02-08 NOTE — TELEPHONE ENCOUNTER
RN called patient and LVM to call clinic at [phone].     If patient calls back please relay provider message below.    CHAPIN Briones  [facility] Triage

## 2024-02-08 NOTE — TELEPHONE ENCOUNTER
Pt called back and said that she spoke to [facility] and they assured her they will re-instate her they just need an updated order. She said they will help her with PT and bathing like they were doing before. She is asking for this to be sent ASAP. She wants Perri Carias to send this over for her and not Dr. Medeiros

## 2024-02-09 ENCOUNTER — PATIENT OUTREACH (OUTPATIENT)
Dept: CARE COORDINATION | Facility: CLINIC | Age: 74
End: 2024-02-09
Payer: COMMERCIAL

## 2024-02-09 NOTE — TELEPHONE ENCOUNTER
I can not write the order as with medicare it has to be done in conjunction with a face to face visit   Also I will not be managing her care so she needs to decide if she wants to establish with a new provider as I am not comfortable doing  that if she chávez not want Dr Medeiros to manage her care
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
## 2024-02-09 NOTE — Clinical Note
Hello, please see note. I consulted with my supervisor. Because patient already has care management through [facility] she is not a candidate for our care coordination program. Soledad reports she will place a [facility] report. With Soledad leaving a message for the general case management line for [facility] someone should be covering and following up. Thank you
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
## 2024-02-11 ENCOUNTER — TELEPHONE (OUTPATIENT)
Dept: FAMILY MEDICINE | Facility: CLINIC | Age: 74
End: 2024-02-11
Payer: COMMERCIAL

## 2024-02-11 NOTE — TELEPHONE ENCOUNTER
Patient is scheduled for video visit to establish care  She is a long term patient of Dr. Medeiros, hasn't seen PCP for more than a year.  She does not need to establish care with a new provider  I would recommend patient to continue with current PCP Dr. Medeiros, please help her schedule for a video visit tomorrow with Dr. Medeiros  If patient is desiring to switch providers, please help her schedule with other Internal Medicine providers. Dr. Joyner, our geriatric provider has opening tomorrow

## 2024-02-12 NOTE — TELEPHONE ENCOUNTER
Also addressed in other 2/6/2024 telephone encounter.     If are  calls back please relay messages below and other documentation.    CHAPIN Mary  [facility] Primary Care Triage   English

## 2024-02-13 ENCOUNTER — TELEPHONE (OUTPATIENT)
Dept: FAMILY MEDICINE | Facility: CLINIC | Age: 74
End: 2024-02-13

## 2024-02-13 ENCOUNTER — PATIENT OUTREACH (OUTPATIENT)
Dept: GASTROENTEROLOGY | Facility: CLINIC | Age: 74
End: 2024-02-13

## 2024-02-13 ENCOUNTER — PATIENT OUTREACH (OUTPATIENT)
Dept: CARE COORDINATION | Facility: CLINIC | Age: 74
End: 2024-02-13

## 2024-02-13 ENCOUNTER — VIRTUAL VISIT (OUTPATIENT)
Dept: FAMILY MEDICINE | Facility: CLINIC | Age: 74
End: 2024-02-13
Payer: COMMERCIAL

## 2024-02-13 DIAGNOSIS — E11.9 TYPE 2 DIABETES MELLITUS WITHOUT COMPLICATION, WITHOUT LONG-TERM CURRENT USE OF INSULIN (H): ICD-10-CM

## 2024-02-13 DIAGNOSIS — I10 HYPERTENSION GOAL BP (BLOOD PRESSURE) < 140/90: ICD-10-CM

## 2024-02-13 DIAGNOSIS — E78.5 HYPERLIPIDEMIA LDL GOAL <100: ICD-10-CM

## 2024-02-13 DIAGNOSIS — Z74.09 MOBILITY IMPAIRED: Primary | ICD-10-CM

## 2024-02-13 DIAGNOSIS — F33.1 MODERATE EPISODE OF RECURRENT MAJOR DEPRESSIVE DISORDER (H): ICD-10-CM

## 2024-02-13 DIAGNOSIS — D64.9 NORMOCYTIC ANEMIA: ICD-10-CM

## 2024-02-13 DIAGNOSIS — E11.9 TYPE 2 DIABETES MELLITUS WITHOUT COMPLICATION, WITHOUT LONG-TERM CURRENT USE OF INSULIN (H): Primary | ICD-10-CM

## 2024-02-13 DIAGNOSIS — N18.31 STAGE 3A CHRONIC KIDNEY DISEASE (H): ICD-10-CM

## 2024-02-13 PROCEDURE — 99213 OFFICE O/P EST LOW 20 MIN: CPT | Mod: 95 | Performed by: INTERNAL MEDICINE

## 2024-02-13 RX ORDER — TRAZODONE HYDROCHLORIDE 100 MG/1
100 TABLET ORAL AT BEDTIME
Qty: 90 TABLET | Refills: 1 | Status: CANCELLED | OUTPATIENT
Start: 2024-02-13

## 2024-02-13 ASSESSMENT — PATIENT HEALTH QUESTIONNAIRE - PHQ9
10. IF YOU CHECKED OFF ANY PROBLEMS, HOW DIFFICULT HAVE THESE PROBLEMS MADE IT FOR YOU TO DO YOUR WORK, TAKE CARE OF THINGS AT HOME, OR GET ALONG WITH OTHER PEOPLE: VERY DIFFICULT
SUM OF ALL RESPONSES TO PHQ QUESTIONS 1-9: 15
SUM OF ALL RESPONSES TO PHQ QUESTIONS 1-9: 15

## 2024-02-13 NOTE — TELEPHONE ENCOUNTER
Pt stated she had a virtual visit with Dr. Joyner today and he told her things she didn't want to hear like how he couldn't help her. She is requesting Dr. Medeiros put in a referral to [facility].

## 2024-02-13 NOTE — PROGRESS NOTES
"    Instructions Relayed to Patient by Virtual Roomer:     If patient is not active on [facility]:  Relayed the following to patient: \"Would you like us to text or email you a link to join your appointment now or when your provider is ready to initiate the virtual visit?\"    Reminded patient to ensure they were logged on to virtual visit by arrival time listed. Documented in appointment notes if patient had flexibility to initiate visit sooner than arrival time. If pediatric virtual visit, ensured pediatric patient along with parent/guardian will be present for video visit.     Patient offered the website [email or web address] and/or phone number to [facility] Help line: [phone]    Radha is a 73 year old who is being evaluated via a billable video visit.      How would you like to obtain your AVS? Mail a copy  If the video visit is dropped, the invitation should be resent by: Text to cell phone: [phone]  Will anyone else be joining your video visit? No          Assessment & Plan     1.  Mobility impairment.  Patient claims it is related to arthritis of the knees and hips.  Requesting home health aide for bathing and also physical therapy.  Informed patient that since I have never met her before, I would need to see her in person to do physical examination and determine home care needed.  She indicates she has no way to get to the clinic.  I will refer her to primary care care coordinator to see if there is ways to bring her into the clinic for assessment.  2.  Moderate episode of recurrent major depression.  3.  Diabetes mellitus type 2    Depression Screening Follow Up        2/13/2024     8:51 AM   PHQ   PHQ-9 Total Score 15   Q9: Thoughts of better off dead/self-harm past 2 weeks Several days   F/U: Thoughts of suicide or self-harm Yes   F/U: Self harm-plan No   F/U: Self-harm action No   F/U: Safety concerns No         Follow Up    Follow Up Actions Taken      Discussed the following ways the " patient can remain in a safe environment:          Domonique Garcia is a 73 year old, presenting for the following health issues:  Depression and Referral      2/13/2024     8:52 AM   Additional Questions   Roomed by dacia   Accompanied by self     History of Present Illness       Mental Health Follow-up:  Patient presents to follow-up on Depression.Patient's depression since last visit has been:  Worse  The patient is having other symptoms associated with depression.      Any significant life events: other  Patient is not feeling anxious or having panic attacks.  Patient has no concerns about alcohol or drug use.      Patient indicates that she set up this video visit to get a home health care.  Previously she was serviced by [facility].  Due to insurance reasons they no longer come.  She needs to be certified again.  She indicates she needs help with showers.  She does have a walk-in shower but has a lip.  She uses walker at home.  Indicates she has not showered in 3 weeks.  She also is requesting physical therapy at home.  She has arthritis of knee and hip.  Also has a longstanding history of depression and she is feeling down.          11/29/2023     1:58 PM 1/9/2024    10:42 AM 2/13/2024     8:51 AM   PHQ   PHQ-9 Total Score 12 19 15   Q9: Thoughts of better off dead/self-harm past 2 weeks Not at all Not at all Several days   F/U: Thoughts of suicide or self-harm   Yes   F/U: Self harm-plan   No   F/U: Self-harm action   No   F/U: Safety concerns   No                  Review of Systems  Constitutional, HEENT, cardiovascular, pulmonary, gi and gu systems are negative, except as otherwise noted.      Objective           Vitals:  No vitals were obtained today due to virtual visit.    Physical Exam   GENERAL: alert and no distress  EYES: Eyes grossly normal to inspection.  No discharge or erythema, or obvious scleral/conjunctival abnormalities.  RESP: No audible wheeze, cough, or visible cyanosis.    SKIN: Visible  skin clear. No significant rash, abnormal pigmentation or lesions.  NEURO: Cranial nerves grossly intact.  Mentation and speech appropriate for age.            Video-Visit Details    Type of service:  Video Visit   Video Start Time: 9:30 AM  Video End Time:9:39 AM    Originating Location (pt. Location): Home    Distant Location (provider location):  On-site  Platform used for Video Visit: Lizeth  Signed Electronically by: Isra Joyner MD

## 2024-02-13 NOTE — Clinical Note
Hello, I reviewed the recent referral and I contacted [facility] case management and left a voicemail at their general care management line and also for patient's [facility] care coordinator to follow up with patient once she returns as well as she is out until 2/19. Due to duplication of care management services primary care coordination will not be opening this patient to our program. Going forward if there are concerns, Yamilka Johnson at [facility] should be the main contact.

## 2024-02-13 NOTE — TELEPHONE ENCOUNTER
"Writer called patient and relayed provider's message.  Patient became tearful and exclaimed she needs help.  Reiterated the provider's message and patient stated she wears a brief and needs help with showering.  Patient states that [facility] will reinstate her if the provider sends a referral.      Again reiterated the provider's message.  Patient then stated \"I'm about ready to take a bunch of pills.\" Writer asked patient if she had thoughts of killing herself and she stated \"I need help and if you send someone over to break down my door, I am not going to let them in.\"  She then stated that she wouldn't because of her daughter in [place].      She then again stated that her door has a lockbox and she wouldn't let anyone in.  She said she had a lot of pills at her house because her  just .  Patient stated she wouldn't do it because of her daughter, who is a level 2 nurse in [place].  Patient again iterated that she needs help, she wears a brief and hasn't showered in over three weeks.  She wanted this information sent to the provider.      Routing to provider to review and advise.    Kristina Kjellberg, MSN, RN  [facility] Primary Care Triage                "

## 2024-02-13 NOTE — PROGRESS NOTES
Clinic Care Coordination Contact    Situation: Patient chart reviewed by care coordinator.    Background: Per chart review, patient has care coordination through [facility] Yamilka Johnson [phone].    Assessment: RHODA HEMPHILL contacted [facility]  Yamilka and general case management line and left voicemails at both numbers for someone to follow up with the patient.     Plan/Recommendations: Clinic care coordinator will close care coordination program due to duplication of services, will continue to consult with care team as needed.     Tianna Davey, [facility]  Social Work Primary Care Clinic Care Coordinator  [facility]  [phone]  [email or web address]

## 2024-02-13 NOTE — TELEPHONE ENCOUNTER
Patient was discharged by home care on 12/28/2023, see phone encounter of the same date.     There is no skilled need for nursing, therefore I cannot make new referral for her to get home care.     She has PCA needs but that is not a reason for referral to home care. Elizabeth has provided resources for her.     Elizabeth RN note below.   Elizabeth Aguilar RN, returning clinic call. RN relayed message. Lauren states pt is being discharged from PT as she has met all goals and plateau'd. There is no skilled need for nursing any longer so patient is also being discharged from nursing. When pt is discharged from skilled nursing the home health aide is also discontinued.      Lauren has spoke with pt repeatedly about this and provided pt with a list of 5 private pay home care agencies that might be able to provide PCA services. Pt has asked Lauren to make up a reason that she still needs skill nursing. Lauren has explained to the patient that this is medicare fraud and she is not able to do this. Pt also had a [facility] assessment last week, but will not be getting services through the [facility]. Lauren is not sure the exact reason why she will not be getting services through the [facility], but in speaking with pt the pt makes it seem like she was not happy with what the [facility] was able to provide. Lauren stated there is nothing the clinic/provider can do to get the patient to qualify.

## 2024-02-14 NOTE — TELEPHONE ENCOUNTER
Reason for Call:  Appointment Request    Patient requesting this type of appt:  Preventive     Requested provider: Trice Medeiros    Reason patient unable to be scheduled: Not within requested timeframe    When does patient want to be seen/preferred time: ASAP - ONLY AVAILABLE TUES, WEDS, THURS - only time they have a ride.    Comments: needs appt for med refill, annual wellness phys     Okay to leave a detailed message?: No at Home number on file [phone] (home)    Call taken on 2/13/2024 at 6:26 PM by QUIANA MONREAL

## 2024-02-14 NOTE — TELEPHONE ENCOUNTER
Patient is scheduled for Thur 4/11 at 1:30pm - she can not do mornings - early afternoon only time she can get a ride. She is aware she will need fast for her lab work. She is OK with that.   She was hoping to get in sooner.

## 2024-02-14 NOTE — TELEPHONE ENCOUNTER
Vulnerable adult report has been filed a second time (from last week).  This will go to patient's [facility]  Yamilka, who has been contacted by our . I cannot make up a reason for her to get home care at this time. I will call the patient tomorrow to get permission to speak with her daughter regarding her health issues.

## 2024-02-14 NOTE — TELEPHONE ENCOUNTER
We can get her scheduled in 2-3 months. I can refill her medication until then. Preferably Wednesday morning appointment so we can get fasting labs on the same day.

## 2024-02-15 ENCOUNTER — TELEPHONE (OUTPATIENT)
Dept: FAMILY MEDICINE | Facility: CLINIC | Age: 74
End: 2024-02-15

## 2024-02-15 ENCOUNTER — VIRTUAL VISIT (OUTPATIENT)
Dept: FAMILY MEDICINE | Facility: CLINIC | Age: 74
End: 2024-02-15
Payer: COMMERCIAL

## 2024-02-15 DIAGNOSIS — N18.2 TYPE 2 DIABETES MELLITUS WITH STAGE 2 CHRONIC KIDNEY DISEASE, WITH LONG-TERM CURRENT USE OF INSULIN (H): ICD-10-CM

## 2024-02-15 DIAGNOSIS — E11.22 TYPE 2 DIABETES MELLITUS WITH STAGE 2 CHRONIC KIDNEY DISEASE, WITH LONG-TERM CURRENT USE OF INSULIN (H): ICD-10-CM

## 2024-02-15 DIAGNOSIS — Z79.4 TYPE 2 DIABETES MELLITUS WITH STAGE 2 CHRONIC KIDNEY DISEASE, WITH LONG-TERM CURRENT USE OF INSULIN (H): ICD-10-CM

## 2024-02-15 DIAGNOSIS — N18.31 STAGE 3A CHRONIC KIDNEY DISEASE (H): ICD-10-CM

## 2024-02-15 DIAGNOSIS — Z74.09 IMPAIRED MOBILITY AND ADLS: Primary | ICD-10-CM

## 2024-02-15 DIAGNOSIS — Z78.9 IMPAIRED MOBILITY AND ADLS: Primary | ICD-10-CM

## 2024-02-15 PROCEDURE — 99442 PR PHYSICIAN TELEPHONE EVALUATION 11-20 MIN: CPT | Mod: 93 | Performed by: INTERNAL MEDICINE

## 2024-02-15 NOTE — PROGRESS NOTES
Radha is a 73 year old who is being evaluated via a billable telephone visit.      What phone number would you like to be contacted at? [phone]  How would you like to obtain your AVS? Mail a copy    Distant Location (provider location):  Off-site        Instructions Relayed to Patient by Virtual Roomer:         Reminded patient to ensure they were logged on to virtual visit by arrival time listed. Documented in appointment notes if patient had flexibility to initiate visit sooner than arrival time. If pediatric virtual visit, ensured pediatric patient along with parent/guardian will be present for video visit.     Patient offered the website [email or web address] and/or phone number to Mychart Help line: [phone]     Assessment & Plan     Radha was seen today for home care/hospice.    Diagnoses and all orders for this visit:    Impaired mobility and ADLs    Stage 3a chronic kidney disease (H)    Type 2 diabetes mellitus with stage 2 chronic kidney disease, with long-term current use of insulin (H)       We had a discussion that her primary issue is impaired mobility and ADLs. She had been through Home care and had met all goals, maxed out on benefit. She doesn't have any skilled nursing needs at this point per documentation from home care discharge RN note dated 12/28/2023.     At this point, it is either self pay for services vs nursing home placement. I will connect with her  on this and have [facility] services involved.     She declined getting her daughter involved.     She has appointment with me in April. We'll revisit progress on getting help.    Trice Medeiros MD PhD         Subjective   Radha is a 73 year old, presenting for the following health issues:  Home Care/Hospice        2/15/2024     3:48 PM   Additional Questions   Roomed by Shala         2/15/2024     3:48 PM   Patient Reported Additional Medications   Patient reports taking the following new medications none     HPI  "    Patient would like to set up homecare to assist with LDA's. Patient states they haven't had a shower in three weeks, due to being unable to manage alone. Patient expressed having no one else to help her with anything, so is unable to go to nursing home as she would need help to sort through her home and belongings first. Patient feels like she's \"being kicked to the curb\" by being told she needs to go to a nursing home. Stating that she's been alone for nearly a year and just needs help before taking the step of a nursing home.     She needs help to clean out her house. Her closet is full, basement is full, and garage is full.   She is getting help for this.   She hasn't had a shower for 3 1/2 weeks.   She reported she doesn't see very well. She is not sure about the list [facility] gave her.  She wears a diaper. She is able to change the diaper but has not been able to shower.   She reported she doesn't really a lot of help but needs the walker and someone to assist her to cross over the elevated area to get into the shower. She is not comfortable to ask her carlos friend to help her with this.   His carlos friend has been helping to get her groceries.     She has a daughter in [place]. 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 She has heart issue, is wearing a holter.   She doesn't want to bother her daughter. She talks to her every day. Her daughter knows what's going one with her.         Review of Systems  Constitutional, HEENT, cardiovascular, pulmonary, gi and gu systems are negative, except as otherwise noted.      Objective           Vitals:  No vitals were obtained today due to virtual visit.    Physical Exam   General: Alert and no distress //Respiratory: No audible wheeze, cough, or shortness of breath // Psychiatric:  Appropriate affect, tone, and pace of words          Phone call duration: 17 minutes  Signed Electronically by: Trice Medeiros MD PhD    "

## 2024-02-15 NOTE — TELEPHONE ENCOUNTER
from [facility].  She is trying to confirm what resources she needs.  Pt does not want to leave home.     1) is another home care assessment needed or is it too soon, since the last one?  Are you willing to order.    2) do you just recommend that she go to assisted living or nursing home due to level of care she needs?    3) she is really just looking for an overview from the provider about what provider feels would be best for pt?      Nurse will be out of office from 2/8/24 to 2/19/24 so if she could get response before that it would be helpful.  Kalpana Siddiqui BSN, RN    
Please let the  know that the patient needs nursing home care. Assisted living will not be sufficient. She is not able to do any self care. Another home care consult will not be helpful.    
RN called Yamilka and left detailed message relaying provider message below verbatim. Left clinic number [phone] for any further questions or concerns.    CHAPIN Mary  [facility] Primary Care Triage  
RN called and left detailed message on General Case Management line to call clinic at [phone].    If [facility]  calls back please relay messages below and relay 2/9/2024 documentation.    CHAPIN Mary  [facility]  
See messages below and also see 2/9/24 documentation within the 2/8/24 encounter.    Attempted to reach Yamilka Johnson RN, , to relay provider's 2/7/2024  1:30 PM note below in this encounter.   Please also relay the 2/8/2024  1:07 PM message and 2/9/24 documentation that can both be found within the 2/8/24 telephone encounter.     There was no answer and Yamilka's voicemail indicates she is off work until 2/19/24 and will start returning calls at that time, and advises needs prior to 2/19/24 should be called to the General Case Management Line [phone].    Left message on the General Case Management Line [phone] to return call to a nurse at [phone] to discuss all the message items above.    Falguni Xiao, JAIDAN, RN    
Three attempts have been made to contact [facility]  Yamilka and general  line. Upon chart review, care coordinator Tianna also reached out to Yamilka as of 2/14/2024 as well.     CHAPIN Mary [facility] Primary Care Triage  
Victor Manuel calling from [facility] returning a call from 2/6/24.  See 2/6/24 telephone encounter:    Trice Medeiros MD PhD    2/7/24  1:31 PM  Please let the  know that the patient needs nursing home care. Assisted living will not be sufficient. She is not able to do any self care. Another home care consult will not be helpful.            This RN read the above message to her and she didn't have any other questions.   Margie SENAN, RN    
No

## 2024-02-16 NOTE — TELEPHONE ENCOUNTER
RN called Lauren and VICENTE to call clinic at [phone].     If Lauren calls back please relay provider message below.    CHAPIN Briones  [facility] Triage

## 2024-02-16 NOTE — TELEPHONE ENCOUNTER
Please call [facility] Home care RN Lauren: [phone]   Lauren saw the patient last year and discharged pt on 12/28/23. She provided patient a list of 5 private duty agencies. Patient reported she couldn't find the list. She cannot see, one eye is legally blind. She would like to get the list of the 5 private duty agencies, their phone numbers.     Please ask Lauren if she can give us the list. Patient does not need to be seen again. No new home care orders. Only to get the list she had provided to the patient. Then can call patient with the info. Patient asked the info be given to her carlos friend Kalyan. She will get his number and give verbal permission on the phone.

## 2024-02-20 NOTE — TELEPHONE ENCOUNTER
"CC: Comprehensive examination      Initial HPI:    Lucia Oliver 62 year old  female presents for comprehensive examination. She has a history of T2DM (last A1c 7.4% in 10/2022), and notes that her diabetes is \"stable\". Interested in new glasses. No flashes, floaters, curtains. Not currently using any eyedrops.    POHx:  Last eye exam: 3/2021    Prior eye surgery/laser/Trauma: no    CTL wearer (Brand soft CTL, RGP, Scleral etc.): no    Glasses (SV. Bifocal, Trifocal, PAL): occasional    Family Hx of eye disease: none      PMHx:   Past Medical History:   Diagnosis Date     Asthma      Cataract      Diabetes mellitus type II      Essential hypertension, benign      High cholesterol      Numbness in both hands      Unspecified asthma(493.90)        PSHx: No past surgical history on file.      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, both eyes  - Slightly visually significant, but happy to try glasses  - 20/20 with new MRx today  - New glasses rx dispensed today  - Monitor annually  - Discussed presence and natural history    #Type 2 diabetes mellitus without retinopathy  - No evidence of diabetic retinopathy on DFE  - Regular f/u with PCP  - Recommended A1c <7.0%  - Monitor with annual dilation    #Glaucoma suspect, both eyes  - Due to elevated C/D  - IOP wnl, no FHx glaucoma  - OCT RNFL at last visit full  - No progressive thinning, no notching, appears stable compared with prior DFE  - Monitor    Follow up:  1 year V,T, D MRx at next visit, call if problems sooner to clinic.    Petr Patel MD  Fellow, Cornea, External Disease and Refractive Surgery  Department of Ophthalmology  [facility]    Attending Physician Attestation:  Complete documentation of historical and exam elements from today's encounter can be found in the full encounter summary report (not reduplicated in this progress note).  I personally obtained the " RN called Lauren and VICENTE to call clinic at [phone].     If Lauren or home care calls back please relay provider message below.    CHAPIN Mary  [facility] Triage   chief complaint(s) and history of present illness.  I confirmed and edited as necessary the review of systems, past medical/surgical history, family history, social history, and examination findings as documented by others; and I examined the patient myself.  I personally reviewed the relevant tests, images, and reports as documented above.  I formulated and edited as necessary the assessment and plan and discussed the findings and management plan with the patient and family. - Petr Patel MD

## 2024-02-21 ENCOUNTER — TELEPHONE (OUTPATIENT)
Dept: FAMILY MEDICINE | Facility: CLINIC | Age: 74
End: 2024-02-21
Payer: COMMERCIAL

## 2024-02-21 NOTE — TELEPHONE ENCOUNTER
Third Attempt:    RN called patient and LVM to call clinic at [phone].     If patient calls back please relay provider message below.    CHAPIN Briones  [facility] Triage

## 2024-02-21 NOTE — TELEPHONE ENCOUNTER
I left a voicemail with Yamilka that I am not able to place an order for home care right now.  There is complex history that is we need to discuss.  I will be out of the office to next Tuesday.  I will return on Wednesday.  I will try to connect with her at that time.

## 2024-02-21 NOTE — TELEPHONE ENCOUNTER
"Requesting home care orders placed.    Yamilka, from patient [facility] , calling in today on Radha's behalf. Patient reports [facility] home care was discontinued because patient had maxed out on visits.  [facility] denies these claims, and reports if home care is medically needed, [facility] will pay.     Dr. Medeiros had virtual visit with patient and per Yamilka's report after speaking with the patient, Dr. Medeiros suggests patient is not safe in the home, not assisted living, and suggested long term care facility per patient report. Per patient according to Yamilka patient is \"begging for home care orders, so she can stay in the home.\"      Background:  Vulnerable adult initiated. [facility] was out to the home and Yamilka reports [facility] has deemed they are not able to remove patient from home, due to patient is not cognitively impaired and able to make own decisions.    Anastasiia Thomas RN    "

## 2024-02-21 NOTE — TELEPHONE ENCOUNTER
Lauren returned phone call. States she had looked online at the time for private pay home care. Lauren believes these are some of the places she had discussed with the patient at the time-     [facility] - [phone]  [facility] - [phone]  She thinks [facility] was discussed - [phone]  and [facility] - [phone].    Writer called Kalyan to give information of all places listed, and received voicemail. Voicemail box is full and could not leave a message. RN to retry calling at a later time. If Ron calls back, relay the provided places above with their respective phone numbers.    Anselmo Elizondo RN  [facility]

## 2024-02-21 NOTE — TELEPHONE ENCOUNTER
Second Attempt:     Writer called Kalyan to give information of all places listed, and received voicemail. Voicemail box is full and could not leave a message. RN to retry calling at a later time. If Ron calls back, relay the provided places listed below with their respective phone numbers.     Anselmo Elizondo RN  [facility]

## 2024-02-22 NOTE — TELEPHONE ENCOUNTER
"Third Attempt:    Writer called Kalyan to give information of all places listed, and received voicemail. Voicemail box is full and could not leave a message. RN to retry calling at a later time. If Ron calls back, relay the provided places listed below with their respective phone numbers.     Writer called patient to update about not being able to contact Kalyan. Writer mentioned the thought that maybe Kalyan was seeing the phone number when called and didn't answer because he didn't know who it was, and since writer could not leave voicemail, there would be no reason for him to call back. Radha also will be talking to Kalyan tomorrow, and will talk to him and see if he can answer his phone or give the clinic a call back to speak to a nurse regarding the phone number list.    During telephone encounter, patient also states the following:    She would like to get back on [employer]. States she was told she needed to go to nursing home and is not ready for that yet. She has too much stuff in her home, her basement is full, her 2 car garage is full, her 2 room shed in the backyard is full and has $40k-$50k of stuff she would have to dispose of and can't leave all the money behind, she can't afford to leave. \"It's important because she needs physical therapy, the ability to get showers, and states she wants to get better. Doesn't understand why Dr. Medeiros wants to \"kick her to the curb\", as she voices multiple times that she wants to get better, she doesn't want to go and sit in a nursing home. States she has 2 daughters and grand-kids, and has a reason to get better. \"Why can't Dr. Medeiros see this, she should know better as a doctor, she should be trying to help me. Right now I want you to understand that I want to get better, I don't want to be \"thrown away\" to a nursing home. I need Dr Medeiros to help me, not put me in a nursing home, and I'm looking forward to possibly seeing my daughters in [place] when I get better.\"    Writer verbalized " understanding, and told Radha we will update with any provider response. Patient verbalized understanding and had no further questions.    Anselmo Elizondo RN  [facility]

## 2024-03-21 ENCOUNTER — TELEPHONE (OUTPATIENT)
Dept: FAMILY MEDICINE | Facility: CLINIC | Age: 74
End: 2024-03-21

## 2024-03-21 ENCOUNTER — NURSE TRIAGE (OUTPATIENT)
Dept: FAMILY MEDICINE | Facility: CLINIC | Age: 74
End: 2024-03-21

## 2024-03-21 ENCOUNTER — VIRTUAL VISIT (OUTPATIENT)
Dept: URGENT CARE | Facility: CLINIC | Age: 74
End: 2024-03-21
Payer: COMMERCIAL

## 2024-03-21 DIAGNOSIS — N39.0 ACUTE LOWER UTI: Primary | ICD-10-CM

## 2024-03-21 PROCEDURE — 99442 PR PHYSICIAN TELEPHONE EVALUATION 11-20 MIN: CPT | Mod: 93

## 2024-03-21 RX ORDER — CEPHALEXIN 500 MG/1
500 CAPSULE ORAL 2 TIMES DAILY
Qty: 14 CAPSULE | Refills: 0 | Status: SHIPPED | OUTPATIENT
Start: 2024-03-21 | End: 2024-03-28

## 2024-03-21 NOTE — TELEPHONE ENCOUNTER
"Reason for Disposition   Pain or burning with passing urine (urination) and female   Age > 50 years    Additional Information   Negative: Shock suspected (e.g., cold/pale/clammy skin, too weak to stand, low BP, rapid pulse)   Negative: Sounds like a life-threatening emergency to the triager   Negative: Shock suspected (e.g., cold/pale/clammy skin, too weak to stand, low BP, rapid pulse)   Negative: Sounds like a life-threatening emergency to the triager   Negative: Taking antibiotic for urinary tract infection (UTI)   Negative: Unable to urinate (or only a few drops) and bladder feels very full   Negative: Vomiting   Negative: Patient sounds very sick or weak to the triager   Negative: SEVERE pain with urination   Negative: Fever > 100.4 F (38.0 C)   Negative: Side (flank) or lower back pain present   Negative: Taking antibiotic > 24 hours for UTI and fever persists   Negative: Taking antibiotic > 3 days for UTI and painful urination not improved   Negative: Unusual vaginal discharge   Negative: > 2 UTIs in last year   Negative: Patient is worried they have a sexually transmitted infection (STI)    Answer Assessment - Initial Assessment Questions  1. SYMPTOM: \"What's the main symptom you're concerned about?\" (e.g., frequency, incontinence)      Smelly, burning, hot  2. ONSET: \"When did the  symptoms  start?\"      Couple weeks ago now  3. PAIN: \"Is there any pain?\" If Yes, ask: \"How bad is it?\" (Scale: 1-10; mild, moderate, severe)      no  4. CAUSE: \"What do you think is causing the symptoms?\"        5. OTHER SYMPTOMS: \"Do you have any other symptoms?\" (e.g., blood in urine, fever, flank pain, pain with urination)      no  6. PREGNANCY: \"Is there any chance you are pregnant?\" \"When was your last menstrual period?\"      no    Protocols used: Urinary Symptoms-A-OH, Urination Pain - Female-A-OH    "

## 2024-03-21 NOTE — PROGRESS NOTES
"Assessment & Plan     Acute lower UTI  - cephALEXin (KEFLEX) 500 MG capsule; Take 1 capsule (500 mg) by mouth 2 times daily for 7 days    We discussed at length that this is not the optimal treatment plan.  Because she is not able to get into the clinic to leave a sample, I did agree to send a prescription for Keflex to her pharmacy.  If she develops a fever, back pain, abdominal pain, worsening symptoms it is imperative that she calls 911 or go to the emergency room right away.   If symptoms do not improve with the antibiotic, she will also need to go to the emergency room for urine collection and testing.  She is agreeable to this plan.     Return in about 3 days (around 3/24/2024) for go to ER if not improving, sooner if worsening.       Surekha Long PA-C  [facility]    Subjective   Radha Corbett is a 73 year old who presents for the following health issues    HPI    Radha presents via telephone for presumed urinary tract infection.  Symptoms first began over 2 weeks ago.  She states that her urine is \"hot\" has a foul odor and is darkly colored.  She has had no fever, nausea, vomiting, back pain.  She has no way of getting into the clinic to leave urine sample and states that she cannot urinate on demand so she'll need to be catheterized to have a sample collected. She is not able to leave her home to  prescriptions and has no friends or family members who can  prescriptions from the pharmacy for her.     Review of Systems   ROS negative except as stated above.      Objective    Physical Exam   healthy, alert and no distress  PSYCH: Alert and oriented times 3; coherent speech, normal   rate and volume, able to articulate logical thoughts, able   to abstract reason, no tangential thoughts, no hallucinations   or delusions. Affect is normal and pleasant  RESP: No cough, no audible wheezing, able to talk in full sentences  Remainder of exam unable to be completed due to " telephone visits    Call duration: 20 min  Provider location: offsite at home, [facility]    No results found for any visits on 03/21/24.

## 2024-03-21 NOTE — TELEPHONE ENCOUNTER
"Sylwia SAMSON Case manager is taking over patient's [employer] case. Sylwia would like to discuss with PCP patient's situation over phone and the challenges with home care orders/assisted living.  would like to let PCP know she spoke with patient yesterday and patient reported \"she understands she needs to move, but just needs some time to get her affairs in order. Is there a way PCP can put some home care orders in the meantime until she is able to find more care?\"    Sylwia RN's direct line is [phone]. Sylwia also informed this writer patient reported UTI symptoms of \"burning\" and \"smelly\" urine. RN replied we would reach out to patient to triage her symptoms.     Routing to provider as update/to connect with .     JAIDA DaleN, RN, N  [facility]  [facility]     "

## 2024-03-22 ENCOUNTER — TELEPHONE (OUTPATIENT)
Dept: FAMILY MEDICINE | Facility: CLINIC | Age: 74
End: 2024-03-22
Payer: COMMERCIAL

## 2024-03-22 DIAGNOSIS — I10 HYPERTENSION GOAL BP (BLOOD PRESSURE) < 140/90: ICD-10-CM

## 2024-03-22 DIAGNOSIS — E11.9 TYPE 2 DIABETES MELLITUS WITHOUT COMPLICATION, WITHOUT LONG-TERM CURRENT USE OF INSULIN (H): ICD-10-CM

## 2024-03-22 NOTE — TELEPHONE ENCOUNTER
Spoke with Sylwia regarding patient's care. Sylwia took over case from previous  Yamilka. Patient doesn't have skilled nursing needs and therefore will not qualify for home care referral. Her health condition has not changed from when she was discharged from [facility] home care services back in 12/28/2023 (See phone encounter of the same date).     Previous [employer] manager Yamilka had provided patient with resources for PCA services but pt has declined. Yamilka had recommended [facility], pt had declined as well.     Sylwia will connect with the patient and provide these resources to her again and help her with finding a facility to move into.     Sylwia will reach out if there is new issues.

## 2024-03-22 NOTE — TELEPHONE ENCOUNTER
"Incoming call from CHAPIN Dao, [facility] , to report the following pt update to pt's PCP:    Pt reportedly told CHAPIN Dao, that she was going to kill herself by taking pills. CHAPIN Dao, explains that she did tell pt she would be calling 911 to report this suicidal thought. CHAPIN Dao, states after explaining that 911 will be called, the pt eventually stated to Sylwia, \"I am not going to kill myself,\" so after further discussion with pt Sylwia did not call 911.    CHAPIN Dao, states pt has reported she does not check her BG because glucometer is downstairs in the home and was her spouse's glucometer that he used before he passed away. She also states when she spoke to pt she was eating ice cream, and stated she didn't know how often she is supposed to check her BG.    CHAPIN Dao, states pt has reported she only takes glipizide and metformin when she remembers. Pt reportedly does not always remember to take the evening metformin dose. Based on last glipizide order date, pt should have run out at least 3 months ago if taking as directed.    AMBROSE Gleason, RN    "

## 2024-03-22 NOTE — TELEPHONE ENCOUNTER
[facility] did not receive his RX and can not fill this prescription in time.    Please call in RX for   cephALEXin (KEFLEX) 500 MG capsule     To local pharmacy for  ASAP  [facility]   [address] [place] [address] Rd   [place], [place]   [phone]    Please call patient once RX has been sent. . She has mobility issues and needs to have someone  meds for her.

## 2024-03-22 NOTE — TELEPHONE ENCOUNTER
RN contacted patient to relay message that keflex prescription has chery given to pharmacy and pharmacy staff thought it would be ready for  shortly. RN deferred patient to contact pharmacy to coordinate exact  times. Patient verbalized understanding and no further questions at this time.     JAIDA DaleN, RN, N  [facility]

## 2024-03-22 NOTE — TELEPHONE ENCOUNTER
CHAPIN Dao,  returning call to provider. CHAPIN Dao, , [facility] provider can reach her back at [phone] today.    Falguni Xiao, BSN, RN

## 2024-03-22 NOTE — TELEPHONE ENCOUNTER
RN contacted [facility] and spoke with Falguni Pharmacist to give verbal order for prescription of keflex 500 mg capsule, take one capsule by mouth twice daily for 7 days. 0 refills, 14 capsules total by Surekha Long PA-C.    Staff will work on prescription and will be ready in about 45 minutes.     JAIDA DaleN, RN, PHN  [facility]

## 2024-04-02 RX ORDER — METFORMIN HCL 500 MG
1000 TABLET, EXTENDED RELEASE 24 HR ORAL 2 TIMES DAILY WITH MEALS
Qty: 180 TABLET | Refills: 0 | Status: SHIPPED | OUTPATIENT
Start: 2024-04-02 | End: 2024-06-11

## 2024-04-02 RX ORDER — LISINOPRIL 5 MG/1
5 TABLET ORAL DAILY
Qty: 90 TABLET | Refills: 0 | Status: SHIPPED | OUTPATIENT
Start: 2024-04-02 | End: 2024-06-06

## 2024-04-02 RX ORDER — GLIPIZIDE 10 MG/1
10 TABLET, FILM COATED, EXTENDED RELEASE ORAL DAILY
Qty: 90 TABLET | Refills: 0 | Status: SHIPPED | OUTPATIENT
Start: 2024-04-02 | End: 2024-06-11

## 2024-04-02 NOTE — TELEPHONE ENCOUNTER
Spoke to Radha. She will have to call us back to rescheduled physical by the end of June. She is checking with her 's availability. She did not realize it has been since May 2022 since she was in clinic.

## 2024-04-02 NOTE — TELEPHONE ENCOUNTER
"Called patient and relayed provider's message. Patient confirmed pharmacy is [facility].     Patient states she does not have an appointment for April, requested writer to check. Writer noted annual visit was set for 4/11/24 but was cancelled on 3/22/24 due to no ride to appointment as shown below. Informed this to patient. Patient states she is \"homebound\" and may have a carlos friend who can bring her when weather is warmer but will reschedule annual to another date when she can find ride. Advise the importance of annual visit/labs, patient verbalized understanding.         After writer hung up phone with patient and pended pharmacy, writer noted pended medications are Lisinopril and Metformin and not Metformin and Glipizide as mentioned in note below.     Routing update to provider to review.    CHAPIN Lynch  [facility] Triage  [place]      "

## 2024-04-02 NOTE — TELEPHONE ENCOUNTER
Please let pt know that she needs to have an in person visit within 3 months. I'm giving her last 3 months supply of these medications. She needs to be seen by the end of June. She has not had an in person visit in primary care since May 2022. We cannot continue to provide care if she doesn't come in for appointments.

## 2024-04-02 NOTE — TELEPHONE ENCOUNTER
Please check with patient if she uses [facility] or [facility]. I weill send her metformin and glipizide. That way she won't need to go pick them up.     She can check her glucose once a day before breakfast if she is able. Her last A1c 6.2 was really good. She has appt in April we'll recheck her labs before the visit to see if Diabetes is still controlled.

## 2024-04-21 ENCOUNTER — HEALTH MAINTENANCE LETTER (OUTPATIENT)
Age: 74
End: 2024-04-21

## 2024-05-01 ENCOUNTER — VIRTUAL VISIT (OUTPATIENT)
Dept: FAMILY MEDICINE | Facility: CLINIC | Age: 74
End: 2024-05-01
Payer: COMMERCIAL

## 2024-05-01 ENCOUNTER — NURSE TRIAGE (OUTPATIENT)
Dept: FAMILY MEDICINE | Facility: CLINIC | Age: 74
End: 2024-05-01

## 2024-05-01 DIAGNOSIS — H35.30 MACULAR DEGENERATION: ICD-10-CM

## 2024-05-01 DIAGNOSIS — F41.9 ANXIETY: ICD-10-CM

## 2024-05-01 DIAGNOSIS — Z74.2 HOME HELP NEEDED: ICD-10-CM

## 2024-05-01 DIAGNOSIS — F41.8 DEPRESSION WITH ANXIETY: ICD-10-CM

## 2024-05-01 DIAGNOSIS — M17.9 OSTEOARTHRITIS, KNEE: ICD-10-CM

## 2024-05-01 DIAGNOSIS — R32 INCONTINENCE OF URINE: ICD-10-CM

## 2024-05-01 DIAGNOSIS — I10 HYPERTENSION GOAL BP (BLOOD PRESSURE) < 140/90: Primary | Chronic | ICD-10-CM

## 2024-05-01 DIAGNOSIS — H35.3190 NONEXUDATIVE AGE-RELATED MACULAR DEGENERATION, UNSPECIFIED LATERALITY, UNSPECIFIED STAGE: ICD-10-CM

## 2024-05-01 DIAGNOSIS — M54.9 BACK PAIN, UNSPECIFIED BACK LOCATION, UNSPECIFIED BACK PAIN LATERALITY, UNSPECIFIED CHRONICITY: ICD-10-CM

## 2024-05-01 DIAGNOSIS — E11.22 TYPE 2 DIABETES MELLITUS WITH STAGE 2 CHRONIC KIDNEY DISEASE, WITH LONG-TERM CURRENT USE OF INSULIN (H): ICD-10-CM

## 2024-05-01 DIAGNOSIS — R19.7 DIARRHEA, UNSPECIFIED TYPE: ICD-10-CM

## 2024-05-01 DIAGNOSIS — M17.0 PRIMARY OSTEOARTHRITIS OF BOTH KNEES: Primary | ICD-10-CM

## 2024-05-01 DIAGNOSIS — Z79.4 TYPE 2 DIABETES MELLITUS WITH STAGE 2 CHRONIC KIDNEY DISEASE, WITH LONG-TERM CURRENT USE OF INSULIN (H): ICD-10-CM

## 2024-05-01 DIAGNOSIS — N18.2 TYPE 2 DIABETES MELLITUS WITH STAGE 2 CHRONIC KIDNEY DISEASE, WITH LONG-TERM CURRENT USE OF INSULIN (H): ICD-10-CM

## 2024-05-01 DIAGNOSIS — R15.9 STOOL INCONTINENCE: ICD-10-CM

## 2024-05-01 PROCEDURE — 99214 OFFICE O/P EST MOD 30 MIN: CPT | Mod: 95 | Performed by: INTERNAL MEDICINE

## 2024-05-01 ASSESSMENT — PATIENT HEALTH QUESTIONNAIRE - PHQ9
SUM OF ALL RESPONSES TO PHQ QUESTIONS 1-9: 12
10. IF YOU CHECKED OFF ANY PROBLEMS, HOW DIFFICULT HAVE THESE PROBLEMS MADE IT FOR YOU TO DO YOUR WORK, TAKE CARE OF THINGS AT HOME, OR GET ALONG WITH OTHER PEOPLE: EXTREMELY DIFFICULT
SUM OF ALL RESPONSES TO PHQ QUESTIONS 1-9: 12

## 2024-05-01 NOTE — PROGRESS NOTES
"    Instructions Relayed to Patient by Virtual Roomer:     Patient is active on Fingo:   Relayed following to patient: \"It looks like you are active on [employer]ingo, are you able to join the visit this way? If not, do you need us to send you a link now or would you like your provider to send a link via text or email when they are ready to initiate the visit?\"      Patient Confirmed they will join visit via: Text Link to Cell Phone  Reminded patient to ensure they were logged on to virtual visit by arrival time listed.   Asked if patient has flexibility to initiate visit sooner than arrival time: patient is unable to initiate visit earlier than arrival time     If pediatric virtual visit, ensured pediatric patient along with parent/guardian will be present for video visit.     Patient offered the website [email or web address] and/or phone number to [employer]ingo Help line: [phone]    Radha is a 73 year old who is being evaluated via a billable video visit.    How would you like to obtain your AVS? [employer]  If the video visit is dropped, the invitation should be resent by: Text to cell phone: [phone]  Will anyone else be joining your video visit? No      Assessment & Plan     Primary osteoarthritis of both knees  She has severe osteoarthritis of both knees  She is completely homebound  She also has osteoarthritis of both hips  She does not drive  She needs some physical therapy  - Home Care Referral  - Primary Care - Care Coordination Referral; Future    Home help needed  She is from [place]  Her daughter lives in [place]  Her   last year  She has no friends or family here except 1 friend who  lives in [place] [place]  She needs help with bathing and toileting  She also has macular degeneration  She cannot see very well  She cannot drive  - Home Care Referral  - Primary Care - Care Coordination Referral; Future    Nonexudative age-related macular degeneration, unspecified laterality, " unspecified stage  As above she cannot drive and cannot see very well because of macular degeneration    Diarrhea, unspecified type  Diarrhea for the last 2 days  Loose bowel movements  No blood she cannot even get to the toilet  She is having accidents in her diaper  No crampy abdominal pain  Did not eat anywhere outside  Recently had 2 bouts of antibiotics for UTI   I am concerned about C. Difficile  Discussed this with her  We will check some stool studies for C. difficile and bacteria  Advised to take probiotics  Her social situation is very bad  She lives alone  No friends or family  She is having trouble keeping herself clean  She used to have a home health aide in the past but she no longer has that service  Will order home health  I will also get some  to talk with her to see if she will benefit from any respite care and additional resources  Situation is so bad that she does not even have anyone to  the stool kits and drop them back  She has 1 friend who does her groceries  She is going to ask him if he can help  In fact one of her friends has to come down from [facility] to help her with toileting  She needs to going to nursing home  - Enteric Bacteria and Virus Panel by ERENDIRA Stool; Future  - C. difficile Toxin B PCR with reflex to C. difficile Antigen and Toxins A/B EIA; Future      30 minutes spent by me on the date of the encounter doing chart review, history and exam, documentation and further activities per the note            Subjective   Radha is a 73 year old, presenting for the following health issues:  Diarrhea        5/1/2024     9:56 AM   Additional Questions   Roomed by Kostas BARRIOS       Video Start Time: 1130 AM    History of Present Illness       Reason for visit:  Diarrhea        Diarrhea  Onset/Duration: Diarrhea, 2nd day of soiling self so bad that she needed help and she had it about a week ago Today too  Description:       Consistency of stool: watery, runny, and  loose       Blood in stool: No       Number of loose stools past 24 hours: 6 or so  Progression of Symptoms: worsening  Accompanying signs and symptoms:       Fever: No       Nausea/Vomiting: No       Abdominal pain: No       Weight loss: Unknown       Episodes of constipation: No  History   Ill contacts: No  Recent use of antibiotics: YES- 2 UTI's in the last 2 months and believes it is due to her hygiene. Has done 2 rounds of antibiotics  Recent travels: No  Recent medication-new or changes(Rx or OTC): No  Precipitating or alleviating factors: None  Therapies tried and outcome: None        Review of Systems  Constitutional, HEENT, cardiovascular, pulmonary, gi and gu systems are negative, except as otherwise noted.      Objective           Vitals:  No vitals were obtained today due to virtual visit.    Physical Exam   GENERAL: alert and no distress  EYES: Eyes grossly normal to inspection.  No discharge or erythema, or obvious scleral/conjunctival abnormalities.  RESP: No audible wheeze, cough, or visible cyanosis.    SKIN: Visible skin clear. No significant rash, abnormal pigmentation or lesions.  NEURO: Cranial nerves grossly intact.  Mentation and speech appropriate for age.  PSYCH: Appropriate affect, tone, and pace of words          Video-Visit Details    Type of service:  Video Visit   Video End Time:12:21 PM  Originating Location (pt. Location): Home    Distant Location (provider location):  Off-site  Platform used for Video Visit: Lizeth  Signed Electronically by: Cole Saavedra MD

## 2024-05-01 NOTE — TELEPHONE ENCOUNTER
Patient reports that she has had diarrhea for the last 2 days and sitting in her own diarrhea currently.  She has a friend coming down to help her clean up. She had this about 2 weeks ago for about 2 days then is stopped.  It has started up again. She has no control of her  bowels. She does not know why this is happening. 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 She usually can get to the restroom before she soils herself, but the last 2 days she cannot. She is able to urinate at least every 8 hours. Yesterday she had about 5-6 diarrhea stools and today, so far, about 3-4 diarrhea stools.   She feels a little more weak that usual. She reports that she has been trying to get help and will take all the help she can get since her   last year.  She needs help bathing and a neighbor gets her groceries, but does not clean her house.    Nursing advice:  Patient needs to be assessed today for her symptoms.  She reports there is no way she can get to the clinic with her current situation.  We have set up a video visit for her as listed below. Care coordination referral placed for her.  She is agreeable to this support.  Patient was given signs and symptoms to go to the E.R. or call 911.  Patient verbalizes good understanding, agrees with plan and states she needs no further support. Mavis Nye R.N.        Reason for Disposition   MODERATE diarrhea (e.g., 4-6 times / day more than normal) and present > 48 hours (2 days)    Additional Information   Negative: Shock suspected (e.g., cold/pale/clammy skin, too weak to stand, low BP, rapid pulse)   Negative: Difficult to awaken or acting confused (e.g., disoriented, slurred speech)   Negative: Sounds like a life-threatening emergency to the triager   Negative: SEVERE abdominal pain (e.g., excruciating) and present > 1 hour   Negative: SEVERE abdominal pain and age > 60 years   Negative: Bloody, black, or tarry bowel movements  (Exception:  Chronic-unchanged black-grey bowel movements and is taking iron pills or Pepto-Bismol.)   Negative: SEVERE diarrhea (e.g., 7 or more times / day more than normal) and age > 60 years   Negative: Constant abdominal pain lasting > 2 hours   Negative: Drinking very little and dehydration suspected (e.g., no urine > 12 hours, very dry mouth, very lightheaded)   Negative: Patient sounds very sick or weak to the triager   Negative: SEVERE diarrhea (e.g., 7 or more times / day more than normal) and present > 24 hours (1 day)    Protocols used: Diarrhea-A-OH

## 2024-05-02 ENCOUNTER — PATIENT OUTREACH (OUTPATIENT)
Dept: CARE COORDINATION | Facility: CLINIC | Age: 74
End: 2024-05-02
Payer: COMMERCIAL

## 2024-05-02 ENCOUNTER — TELEPHONE (OUTPATIENT)
Dept: FAMILY MEDICINE | Facility: CLINIC | Age: 74
End: 2024-05-02
Payer: COMMERCIAL

## 2024-05-02 NOTE — LETTER
[facility] CARE COORDINATION  [address]  [place] [address]     May 2, 2024    Radha Corbett  [address] [address]      Dear Radha,    I am a clinic care coordinator who works with Trice Medeiros MD PhD with the [facility]. I wanted to introduce myself and provide you with my contact information for you to be able to call me with any questions or concerns. Below is a description of clinic care coordination and how I can further assist you.       The clinic care coordination team is made up of a registered nurse, , financial resource worker and community health worker who understand the health care system. The goal of clinic care coordination is to help you manage your health and improve access to the health care system. Our team works alongside your provider to assist you in determining your health and social needs. We can help you obtain health care and community resources, providing you with necessary information and education. We can work with you through any barriers and develop a care plan that helps coordinate and strengthen the communication between you and your care team.  Our services are voluntary and are offered without charge to you personally.    Please feel free to contact me with any questions or concerns regarding care coordination and what we can offer.      We are focused on providing you with the highest-quality healthcare experience possible.    Sincerely,     Kavitha Frankel, IBRAHIMA, MSW Clinic   [facility]  Care Coordination  [facility]   [email or web address]  Office: [phone]  Employed by [facility]

## 2024-05-02 NOTE — TELEPHONE ENCOUNTER
Radha finally got a list of Home Care Agencies that are covered by her insurance. She wanted to make sure Dr Saavedra received this list.     1st Choice - [facility]     2nd Choice - Home Health Care [facility].     3rd Choice - [facility]

## 2024-05-02 NOTE — PROGRESS NOTES
Clinic Care Coordination Contact  [facility]/Voicemail    Clinical Data: Care Coordinator Outreach    Outreach Documentation Number of Outreach Attempt   5/2/2024   4:32 PM 1       VM is full     Plan: Care Coordinator sent care coordination introduction letter via Resource Data. Care Coordinator will try to reach patient again in 1-2 business days.    IBRAHIMA Winchester, MSW   [facility]  Care Coordination  [facility]  [phone]  5/2/2024 4:32 PM

## 2024-05-03 ENCOUNTER — TELEPHONE (OUTPATIENT)
Dept: FAMILY MEDICINE | Facility: CLINIC | Age: 74
End: 2024-05-03
Payer: COMMERCIAL

## 2024-05-03 NOTE — TELEPHONE ENCOUNTER
Ok to leave a message with the provider's response.      Home Care is calling regarding an established patient with [facility].          11/15/2023     1:50 PM 9/13/2023     9:46 AM   Home Care Information   Date of Home Care episode start  9/13/2023   Current following provider Dr. Trice Medeiros     Name/Phone Number Kaele  Mavis, [phone]   Home Care agency [facility]     Requesting orders from: Trice Medeiros  Provider is following patient: No       Orders Requested  Delay in start of care to date: 5/6/2024 due to they received the order yesterday    Information was gathered and will be sent to provider to confirm provider will be following patient.  RN will contact Home Care with information after provider review.  Confirmed ok to leave a detailed message with call back.  Contact information confirmed and updated as needed.    Mavis Nye RN

## 2024-05-03 NOTE — TELEPHONE ENCOUNTER
Called RN back, number went to general RN HHC line. Spoke with Mavis SAMSON who stated she can take the verbal order - relayed provider's verbal order below (delay for PT/OT), as well as letting them know HHC orders will be followed by Dr. Saavedra, not Dr. Medeiros. RN verbalized understanding, no further questions or concerns.      Malu Roe, RN, BSN  [facility]

## 2024-05-06 ENCOUNTER — MEDICAL CORRESPONDENCE (OUTPATIENT)
Dept: HEALTH INFORMATION MANAGEMENT | Facility: CLINIC | Age: 74
End: 2024-05-06
Payer: COMMERCIAL

## 2024-05-07 ENCOUNTER — TELEPHONE (OUTPATIENT)
Dept: FAMILY MEDICINE | Facility: CLINIC | Age: 74
End: 2024-05-07
Payer: COMMERCIAL

## 2024-05-07 NOTE — PROGRESS NOTES
"Clinic Care Coordination Contact    Call to patient, she reports her VM does not always work properly.      SW discussed home care needs, patient reports she has OT coming out tomorrow.  Patient reports she \"needs everything\" but will \"take the freebies for now\".  Patient reports she is financially \"doing very poorly\".  SW noted that patient has orders for PT, OT and a HHA with [facility].  Patient reports she is bedridden.  SW discussed additional support for patient, patient states she cannot afford anything further at this time.  Patient states prefernece to concentrate on OT coming out tomorrow and home care.  Patient requested SW text her # to her, SW stated she does not have text capability.  SW inquired if she could send her # via [employer], Patient stated she does not have MyChart or email.  SW encouraged patient to call her care team and request a CC referral if something further is needed in the future.    Patient stated she will.      SW will close to CC     Kavitha Frankel, IBRAHIMA, MSW   [facility]  Care Coordination  [facility]  [phone]  5/7/2024 4:04 PM   "

## 2024-05-07 NOTE — TELEPHONE ENCOUNTER
Forms/Letter Request    Type of form/letter: Home Health Certification    Do we have the form/letter: Yes: will place form in providers bin    Who is the form from? [facility] [id number]    Where did/will the form come from? form was faxed in    When is form/letter needed by: asap    How would you like the form/letter returned: Fax : [phone]

## 2024-05-08 ENCOUNTER — TELEPHONE (OUTPATIENT)
Dept: FAMILY MEDICINE | Facility: CLINIC | Age: 74
End: 2024-05-08
Payer: COMMERCIAL

## 2024-05-08 NOTE — TELEPHONE ENCOUNTER
Home Care is calling regarding an established patient with [facility].        11/15/2023     1:50 PM 9/13/2023     9:46 AM   Home Care Information   Date of Home Care episode start  9/13/2023   Current following provider Dr. Trice Medeiros     Name/Phone Number Kalee  Mavis, [phone]   Home Care agency [facility]     Requesting orders from: Dr. Saavedra  Provider is following patient: Yes    Orders Requested    Occupational Therapy  Request for  continuation of care:  Frequency: 2x a week for 3 weeks for ADLs, mobility, and therapeutic exercise.     Information was gathered and will be sent to provider for review.  RN will contact Home Care with information after provider review.  Confirmed ok to leave a detailed message with call back.  Contact information confirmed and updated as needed.    Anastasiia Thomas RN

## 2024-05-08 NOTE — TELEPHONE ENCOUNTER
Please call homecare and do the following orders    Occupational Therapy   continuation of care:  Frequency: 2x a week for 3 weeks for ADLs, mobility, and therapeutic exercise.

## 2024-05-08 NOTE — TELEPHONE ENCOUNTER
Called back and spoke with Mary. Gave orders approval per Dr. Saavedra.     Anastasiia Bridges RN    [facility]- Primary Care

## 2024-05-22 ENCOUNTER — TELEPHONE (OUTPATIENT)
Dept: FAMILY MEDICINE | Facility: CLINIC | Age: 74
End: 2024-05-22
Payer: COMMERCIAL

## 2024-05-22 ENCOUNTER — MEDICAL CORRESPONDENCE (OUTPATIENT)
Dept: HEALTH INFORMATION MANAGEMENT | Facility: CLINIC | Age: 74
End: 2024-05-22
Payer: COMMERCIAL

## 2024-05-22 DIAGNOSIS — Z53.9 DIAGNOSIS NOT YET DEFINED: Primary | ICD-10-CM

## 2024-05-22 PROCEDURE — G0180 MD CERTIFICATION HHA PATIENT: HCPCS | Performed by: INTERNAL MEDICINE

## 2024-05-22 NOTE — TELEPHONE ENCOUNTER
Forms/Letter Request    Type of form/letter: Home Health Certification # [id number]    Do we have the form/letter: YES- Placed on provider's desk    Who is the form from? [facility]     Where did/will the form come from? form was faxed in    How would you like the form/letter returned: Fax : [phone]

## 2024-05-22 NOTE — TELEPHONE ENCOUNTER
Forms/Letter Request     Type of form/letter: Home Health Certification # [id number]     Do we have the form/letter: YES- Placed on provider's desk     Who is the form from? [facility]      Where did/will the form come from? form was faxed in     How would you like the form/letter returned: Fax : [phone]

## 2024-05-22 NOTE — TELEPHONE ENCOUNTER
Completed form has been faxed to [phone] . Right-Fax reviewed to confirm form was sent without error.  [facility].

## 2024-05-23 ENCOUNTER — TELEPHONE (OUTPATIENT)
Dept: UROLOGY | Facility: CLINIC | Age: 74
End: 2024-05-23
Payer: COMMERCIAL

## 2024-05-23 ENCOUNTER — TELEPHONE (OUTPATIENT)
Dept: FAMILY MEDICINE | Facility: CLINIC | Age: 74
End: 2024-05-23
Payer: COMMERCIAL

## 2024-05-23 ENCOUNTER — PATIENT OUTREACH (OUTPATIENT)
Dept: CARE COORDINATION | Facility: CLINIC | Age: 74
End: 2024-05-23
Payer: COMMERCIAL

## 2024-05-23 DIAGNOSIS — N39.41 URGE INCONTINENCE: Primary | ICD-10-CM

## 2024-05-23 NOTE — TELEPHONE ENCOUNTER
[facility]    Phone Message    May a detailed message be left on voicemail: yes     Reason for Call: Other: Pt returning call for below- no answer on back line. Please call the pt back asap to discuss      Action Taken: Message routed to:  Other: uro    Travel Screening: Not Applicable

## 2024-05-23 NOTE — TELEPHONE ENCOUNTER
Attempted to reach patient by phone, but no answer. Left generic message with request to return call to clinic. When patient returns call, will discuss further.    Sophia Seo RN, BSN

## 2024-05-23 NOTE — TELEPHONE ENCOUNTER
Called and spoke to Elena. Relayed provider's message below, Elena verbalized understanding.     CHAPIN Lynch  [facility]

## 2024-05-23 NOTE — TELEPHONE ENCOUNTER
Home Care is calling regarding an established patient with [facility].        11/15/2023     1:50 PM 9/13/2023     9:46 AM   Home Care Information   Date of Home Care episode start  9/13/2023   Current following provider Dr. Trice Medeiros     Name/Phone Number Kalee  Mavis, [phone]   Home Care agency [facility]     Requesting orders from: Trice Medeiros  Provider is following patient: Yes  Is this a 60-day recertification request?  No    Orders Requested    Social Work  Request for initial certification (first set of orders)   Frequency:  1x/month for 1 months for completion of health care directive      Information was gathered and will be sent to provider for review.  RN will contact Home Care with information after provider review.  Confirmed ok to leave a detailed message with call back.  Contact information confirmed and updated as needed.    Kristina M Kjellberg, RN

## 2024-05-23 NOTE — TELEPHONE ENCOUNTER
[facility]    Phone Message    May a detailed message be left on voicemail: no     Reason for Call: Other: pt calling and she is peeing all over, what ever was put in her is not working and she wants to know what to do, please call pt     Action Taken: Other: urology    Travel Screening: Not Applicable

## 2024-05-23 NOTE — TELEPHONE ENCOUNTER
"Called and spoke to patient. Per patient, she is scheduled to see Dr. Medeiros on 6/6/24. Per patient, she is bed bound and is having her son and friend bring her to the Dr. Medeiros visit. Patient stated, \"I am peeing all of over and I don't want to end up peeing in my son's car.\" Per patient, she is taking trospium 60mg daily and myrbetriq 50mg daily. Patient with implanted [employer]tronic device and writer asked if patient is able to feel the sensation in the bike seat area. Per patient, she is unsure if she feels the sensation and is unsure if her device is on. Informed patient that a message will be sent to the [employer] Rep with request to contact her via telephone. Encouraged patient to have her  near her for when the rep calls. Patient reports that she has home PT coming today at 2:00pm, so a call after 2pm today works for her. Per patient, or they could call her tomorrow after 12:00pm as OT will be with her at that time.     Patient denies fever, chills, blood in the urine, and is unsure if she is experiencing burning with urination. Per patient, her biggest complaint is the urinary incontinence which has been ongoing for \"years.\" Per patient, she wears an incontinent brief all the time. Patient reports that because she is bed bound, she is unable to complete a urine sample until her 6/6/24 visit with Dr. Medeiros. Patient reports that she has home OT and home PT, but does not currently have a home healthcare nurse. Per patient, she may have some resources available through [employer] if a urine sample is needed.     Encouraged patient to start with discussion with [employer] [facility] and we will go from there. Patient verbalized understanding and was comfortable with plan. Informed patient to call with any questions, concerns, or changes in symptoms in the meantime.     Sophia Seo RN, BSN      "

## 2024-05-24 ENCOUNTER — TELEPHONE (OUTPATIENT)
Dept: FAMILY MEDICINE | Facility: CLINIC | Age: 74
End: 2024-05-24
Payer: COMMERCIAL

## 2024-05-24 ENCOUNTER — MEDICAL CORRESPONDENCE (OUTPATIENT)
Dept: HEALTH INFORMATION MANAGEMENT | Facility: CLINIC | Age: 74
End: 2024-05-24
Payer: COMMERCIAL

## 2024-05-24 NOTE — TELEPHONE ENCOUNTER
This is fine.   Can she get fasting labs done a few days before her appointment?   Her appt is in the afternoon. If she is able to fast until she comes in, this is fine too.

## 2024-05-24 NOTE — TELEPHONE ENCOUNTER
Patient spoke to her insurance carrier and they will cover an annual physical on 6/6. Appointment type was changed.

## 2024-05-27 NOTE — PROGRESS NOTES
Discharge Note    Progress reporting period is from last progress note on 09/21/21 to Oct 26, 2021.    Radha failed to follow up and current status is unknown.  Please see information below for last relevant information on current status.  Patient seen for 4 visits.    SUBJECTIVE  Subjective changes noted by patient:  Patient notes that she has not been able to do her exercises due to her  being hospitalized.  was fearful of falling and with concern/depression, she did not do her exercises.  Feels like she needs to start over and keep the same group of exercises until ready to advance.      Patient did call at the end of November, 2021.  Notes that she is not planning to have any surgery for her hips or knees at this time.  She will continue to work on her own.  Current pain level is 0/10 at rest, but can still be severe when weightbearing/moving.     Previous pain level was  8/10.   Changes in function:  Yes (See Goal flowsheet attached for changes in current functional level)  Adverse reaction to treatment or activity: None    OBJECTIVE  Changes noted in objective findings: Pt has slow small steps when moving from walker to stand at plinth.  R hip and L knee limit standing tolerance with exercise.    ASSESSMENT/PLAN  Diagnosis: R hip pain, OA   Updated problem list and treatment plan:   Pain - HEP  Decreased ROM/flexibility - HEP  Decreased function - HEP  Decreased strength - HEP  STG/LTGs have been met or progress has been made towards goals:  Yes, please see goal flowsheet for most current information  Assessment of Progress: current status is unknown.    Last current status: Pt has not made progress   Self Management Plans:  HEP  I have re-evaluated this patient and find that the nature, scope, duration and intensity of the therapy is appropriate for the medical condition of the patient.  Radha continues to require the following intervention to meet STG and LTG's:  HEP.    Recommendations:  Discharge with  current home program.  Patient to follow up with MD as needed.    Please refer to the daily flowsheet for treatment today, total treatment time and time spent performing 1:1 timed codes.         Unable to determine.

## 2024-05-28 ENCOUNTER — TELEPHONE (OUTPATIENT)
Dept: FAMILY MEDICINE | Facility: CLINIC | Age: 74
End: 2024-05-28
Payer: COMMERCIAL

## 2024-05-28 NOTE — TELEPHONE ENCOUNTER
Received update from [employer] Rep and patient can call [employer] Patient Services at [phone].     Called and spoke to patient who was inquiring about how invasive the procedure is with Dr. Baltazar if she needed to change out her interstim battery. Informed patient that it is a simple procedure done under a light anesthetic usually. Informed patient that the [employer] team is unable to call her but we have a number for patient to call. Per patient, she can't see well enough to write the number down. Per patient, home PT will be with her today at 2:30pm and we can try to reach patient at that time to provide the [employer] number.     Sophia Seo RN, BSN

## 2024-05-28 NOTE — TELEPHONE ENCOUNTER
[facility]    Phone Message    May a detailed message be left on voicemail: no     Reason for Call: Other: Patient called to ask the nurse another question. Please call patient back to discuss.     Action Taken: Other: [facility]    Travel Screening: Not Applicable

## 2024-05-28 NOTE — TELEPHONE ENCOUNTER
"Called and spoke to patient for follow up. Per patient, she has not received a call from the [employer] team. Offered to provide patient a telephone number for her to call [employer] when it is convenient for her. Patient stated, \"I can't see very well. Can you text it to me.\" Informed patient that writer is unable to text to her but a my chart could be sent. Patient stated, \"I don't have my chart.\" Informed patient that on our end it shows that she has my chart however patient reports that she hasn't been able to do that for years. Informed patient that writer will contact the [employer] team with request for them to call patient.     Discussed the need for a urine sample to rule out UTI. Patient stated, \"It is going to have to wait until my June 6th appointment. My biggest concern is the incontinence.\" Future UA/UC ordered. Informed patient to call with any questions, concerns, or changes in symptoms in the meantime. Patient was comfortable with plan.    Sophia Seo RN, BSN      "

## 2024-05-28 NOTE — TELEPHONE ENCOUNTER
Called and spoke with patient who was with her home physical therapist. Patient's home therapist wrote down the number for [employer]. Patient will call [employer].     Will watch for UA/UC results after patient's 6/6/24 visit with her PCP.    Sophia Seo RN, BSN

## 2024-05-28 NOTE — TELEPHONE ENCOUNTER
Forms/Letter Request    Type of form/letter: [facility] Order # [id number]    Do we have the form/letter: Yes:     Who is the form from? Home care    Where did/will the form come from? form was faxed in    When is form/letter needed by: asap    How would you like the form/letter returned: Fax : [phone]

## 2024-05-30 ENCOUNTER — TELEPHONE (OUTPATIENT)
Dept: FAMILY MEDICINE | Facility: CLINIC | Age: 74
End: 2024-05-30
Payer: COMMERCIAL

## 2024-05-30 NOTE — TELEPHONE ENCOUNTER
Home Care is calling regarding an established patient with [facility].        11/15/2023     1:50 PM 9/13/2023     9:46 AM   Home Care Information   Date of Home Care episode start  9/13/2023   Current following provider Dr. Trice Medeiros     Name/Phone Number Kalee  Mavis, [phone]   Home Care agency [facility]     Requesting orders from: Trice Medeiros  Provider is following patient: Yes  Is this a 60-day recertification request?  No    Orders Requested    Occupational Therapy  Request for discontinuation of care   Goals have not been met/not progressing.  Barriers to care:  Lack of patient motivation. Pt is at a plateau.  Goals partially met.        Information was gathered and will be sent to provider for review.  RN will contact Home Care with information after provider review.  Confirmed ok to leave a detailed message with call back.  Contact information confirmed and updated as needed.    Blanka Sanches RN

## 2024-06-03 ENCOUNTER — TELEPHONE (OUTPATIENT)
Dept: FAMILY MEDICINE | Facility: CLINIC | Age: 74
End: 2024-06-03
Payer: COMMERCIAL

## 2024-06-06 ENCOUNTER — OFFICE VISIT (OUTPATIENT)
Dept: FAMILY MEDICINE | Facility: CLINIC | Age: 74
End: 2024-06-06
Payer: COMMERCIAL

## 2024-06-06 ENCOUNTER — ORDERS ONLY (AUTO-RELEASED) (OUTPATIENT)
Dept: FAMILY MEDICINE | Facility: CLINIC | Age: 74
End: 2024-06-06

## 2024-06-06 ENCOUNTER — ANCILLARY PROCEDURE (OUTPATIENT)
Dept: GENERAL RADIOLOGY | Facility: CLINIC | Age: 74
End: 2024-06-06
Attending: INTERNAL MEDICINE
Payer: COMMERCIAL

## 2024-06-06 VITALS
HEART RATE: 103 BPM | DIASTOLIC BLOOD PRESSURE: 83 MMHG | OXYGEN SATURATION: 96 % | SYSTOLIC BLOOD PRESSURE: 136 MMHG | TEMPERATURE: 98.5 F | RESPIRATION RATE: 16 BRPM

## 2024-06-06 DIAGNOSIS — R00.0 SINUS TACHYCARDIA: ICD-10-CM

## 2024-06-06 DIAGNOSIS — Z00.00 ENCOUNTER FOR MEDICARE ANNUAL WELLNESS EXAM: Primary | ICD-10-CM

## 2024-06-06 DIAGNOSIS — G89.29 CHRONIC PAIN OF LEFT KNEE: ICD-10-CM

## 2024-06-06 DIAGNOSIS — E78.5 HYPERLIPIDEMIA LDL GOAL <100: ICD-10-CM

## 2024-06-06 DIAGNOSIS — D64.9 NORMOCYTIC ANEMIA: ICD-10-CM

## 2024-06-06 DIAGNOSIS — I10 HYPERTENSION GOAL BP (BLOOD PRESSURE) < 140/90: ICD-10-CM

## 2024-06-06 DIAGNOSIS — Z12.11 SCREEN FOR COLON CANCER: ICD-10-CM

## 2024-06-06 DIAGNOSIS — E11.9 TYPE 2 DIABETES MELLITUS WITHOUT COMPLICATION, WITHOUT LONG-TERM CURRENT USE OF INSULIN (H): ICD-10-CM

## 2024-06-06 DIAGNOSIS — N18.31 STAGE 3A CHRONIC KIDNEY DISEASE (H): ICD-10-CM

## 2024-06-06 DIAGNOSIS — M25.562 CHRONIC PAIN OF LEFT KNEE: ICD-10-CM

## 2024-06-06 DIAGNOSIS — L72.0 EPIDERMOID CYST: ICD-10-CM

## 2024-06-06 LAB
ERYTHROCYTE [DISTWIDTH] IN BLOOD BY AUTOMATED COUNT: 14.8 % (ref 10–15)
HBA1C MFR BLD: 6.7 % (ref 0–5.6)
HCT VFR BLD AUTO: 37 % (ref 35–47)
HGB BLD-MCNC: 11.5 G/DL (ref 11.7–15.7)
MCH RBC QN AUTO: 25.5 PG (ref 26.5–33)
MCHC RBC AUTO-ENTMCNC: 31.1 G/DL (ref 31.5–36.5)
MCV RBC AUTO: 82 FL (ref 78–100)
PLATELET # BLD AUTO: 351 10E3/UL (ref 150–450)
RBC # BLD AUTO: 4.51 10E6/UL (ref 3.8–5.2)
WBC # BLD AUTO: 10 10E3/UL (ref 4–11)

## 2024-06-06 PROCEDURE — G0439 PPPS, SUBSEQ VISIT: HCPCS | Performed by: INTERNAL MEDICINE

## 2024-06-06 PROCEDURE — 80053 COMPREHEN METABOLIC PANEL: CPT | Performed by: INTERNAL MEDICINE

## 2024-06-06 PROCEDURE — 82570 ASSAY OF URINE CREATININE: CPT | Performed by: INTERNAL MEDICINE

## 2024-06-06 PROCEDURE — 73562 X-RAY EXAM OF KNEE 3: CPT | Mod: LT | Performed by: RADIOLOGY

## 2024-06-06 PROCEDURE — 36415 COLL VENOUS BLD VENIPUNCTURE: CPT | Performed by: INTERNAL MEDICINE

## 2024-06-06 PROCEDURE — 84443 ASSAY THYROID STIM HORMONE: CPT | Performed by: INTERNAL MEDICINE

## 2024-06-06 PROCEDURE — 83036 HEMOGLOBIN GLYCOSYLATED A1C: CPT | Performed by: INTERNAL MEDICINE

## 2024-06-06 PROCEDURE — 85027 COMPLETE CBC AUTOMATED: CPT | Performed by: INTERNAL MEDICINE

## 2024-06-06 PROCEDURE — 80061 LIPID PANEL: CPT | Performed by: INTERNAL MEDICINE

## 2024-06-06 PROCEDURE — 99214 OFFICE O/P EST MOD 30 MIN: CPT | Mod: 25 | Performed by: INTERNAL MEDICINE

## 2024-06-06 PROCEDURE — 82043 UR ALBUMIN QUANTITATIVE: CPT | Performed by: INTERNAL MEDICINE

## 2024-06-06 RX ORDER — LANCETS
EACH MISCELLANEOUS
Qty: 100 EACH | Refills: 3 | Status: SHIPPED | OUTPATIENT
Start: 2024-06-06

## 2024-06-06 RX ORDER — LANCING DEVICE
EACH MISCELLANEOUS
Qty: 1 EACH | Refills: 1 | Status: SHIPPED | OUTPATIENT
Start: 2024-06-06 | End: 2024-06-06

## 2024-06-06 RX ORDER — LISINOPRIL 5 MG/1
5 TABLET ORAL DAILY
Qty: 90 TABLET | Refills: 3 | Status: SHIPPED | OUTPATIENT
Start: 2024-06-06

## 2024-06-06 RX ORDER — LANCING DEVICE
EACH MISCELLANEOUS
Qty: 1 EACH | Refills: 1 | Status: SHIPPED | OUTPATIENT
Start: 2024-06-06

## 2024-06-06 RX ORDER — PEN NEEDLE, DIABETIC 31 GX5/16"
1 NEEDLE, DISPOSABLE MISCELLANEOUS DAILY
Qty: 100 EACH | Refills: 3 | Status: SHIPPED | OUTPATIENT
Start: 2024-06-06

## 2024-06-06 RX ORDER — LANCETS
EACH MISCELLANEOUS
Qty: 100 EACH | Refills: 3 | Status: SHIPPED | OUTPATIENT
Start: 2024-06-06 | End: 2024-06-06

## 2024-06-06 SDOH — HEALTH STABILITY: PHYSICAL HEALTH: ON AVERAGE, HOW MANY MINUTES DO YOU ENGAGE IN EXERCISE AT THIS LEVEL?: 0 MIN

## 2024-06-06 SDOH — HEALTH STABILITY: PHYSICAL HEALTH: ON AVERAGE, HOW MANY DAYS PER WEEK DO YOU ENGAGE IN MODERATE TO STRENUOUS EXERCISE (LIKE A BRISK WALK)?: 0 DAYS

## 2024-06-06 ASSESSMENT — PATIENT HEALTH QUESTIONNAIRE - PHQ9
10. IF YOU CHECKED OFF ANY PROBLEMS, HOW DIFFICULT HAVE THESE PROBLEMS MADE IT FOR YOU TO DO YOUR WORK, TAKE CARE OF THINGS AT HOME, OR GET ALONG WITH OTHER PEOPLE: NOT DIFFICULT AT ALL
SUM OF ALL RESPONSES TO PHQ QUESTIONS 1-9: 7
SUM OF ALL RESPONSES TO PHQ QUESTIONS 1-9: 7

## 2024-06-06 ASSESSMENT — PAIN SCALES - GENERAL: PAINLEVEL: SEVERE PAIN (6)

## 2024-06-06 ASSESSMENT — SOCIAL DETERMINANTS OF HEALTH (SDOH): HOW OFTEN DO YOU GET TOGETHER WITH FRIENDS OR RELATIVES?: ONCE A WEEK

## 2024-06-06 NOTE — PATIENT INSTRUCTIONS
"Preventive Care Advice   This is general advice we often give to help people stay healthy. Your care team may have specific advice just for you. Please talk to your care team about your own preventive care needs.  Lifestyle  Exercise at least 150 minutes each week (30 minutes a day, 5 days a week).  Do muscle strengthening activities 2 days a week. These help control your weight and prevent disease.  No smoking.  Wear sunscreen to prevent skin cancer.  Have your home tested for radon every 2 to 5 years. Radon is a colorless, odorless gas that can harm your lungs. To learn more, go to www.health.[facility].mn. and search for \"Radon in Homes.\"  Keep guns unloaded and locked up in a safe place like a safe or gun vault, or, use a gun lock and hide the keys. Always lock away bullets separately. To learn more, visit [place].gov and search for \"safe gun storage.\"  Nutrition  Eat 5 or more servings of fruits and vegetables each day.  Try wheat bread, brown rice and whole grain pasta (instead of white bread, rice, and pasta).  Get enough calcium and vitamin D. Check the label on foods and aim for 100% of the RDA (recommended daily allowance).  Regular exams  Have a dental exam and cleaning every 6 months.  See your health care team every year to talk about:  Any changes in your health.  Any medicines your care team has prescribed.  Preventive care, family planning, and ways to prevent chronic diseases.  Shots (vaccines)   HPV shots (up to age 26), if you've never had them before.  Hepatitis B shots (up to age 59), if you've never had them before.  COVID-19 shot: Get this shot when it's due.  Flu shot: Get a flu shot every year.  Tetanus shot: Get a tetanus shot every 10 years.  Pneumococcal, hepatitis A, and RSV shots: Ask your care team if you need these based on your risk.  Shingles shot (for age 50 and up).  General health tests  Diabetes screening:  Starting at age 35, Get screened for diabetes at least every 3 years.  If " you are younger than age 35, ask your care team if you should be screened for diabetes.  Cholesterol test: At age 39, start having a cholesterol test every 5 years, or more often if advised.  Bone density scan (DEXA): At age 50, ask your care team if you should have this scan for osteoporosis (brittle bones).  Hepatitis C: Get tested at least once in your life.  Abdominal aortic aneurysm screening: Talk to your doctor about having this screening if you:  Have ever smoked; and  Are biologically male; and  Are between the ages of 65 and 75.  STIs (sexually transmitted infections)  Before age 24: Ask your care team if you should be screened for STIs.  After age 24: Get screened for STIs if you're at risk. You are at risk for STIs (including HIV) if:  You are sexually active with more than one person.  You don't use condoms every time.  You or a partner was diagnosed with a sexually transmitted infection.  If you are at risk for HIV, ask about PrEP medicine to prevent HIV.  Get tested for HIV at least once in your life, whether you are at risk for HIV or not.  Cancer screening tests  Cervical cancer screening: If you have a cervix, begin getting regular cervical cancer screening tests at age 21. Most people who have regular screenings with normal results can stop after age 65. Talk about this with your provider.  Breast cancer scan (mammogram): If you've ever had breasts, begin having regular mammograms starting at age 40. This is a scan to check for breast cancer.  Colon cancer screening: It is important to start screening for colon cancer at age 45.  Have a colonoscopy test every 10 years (or more often if you're at risk) Or, ask your provider about stool tests like a FIT test every year or Cologuard test every 3 years.  To learn more about your testing options, visit: [email or web address]/783337.pdf.  For help making a decision, visit: padmini/[email or web address].  Prostate cancer screening test: If you have a prostate and are age 55  to 69, ask your provider if you would benefit from a yearly prostate cancer screening test.  Lung cancer screening: If you are a current or former smoker age 50 to 80, ask your care team if ongoing lung cancer screenings are right for you.  For informational purposes only. Not to replace the advice of your health care provider. Copyright   2023 [facility]. All rights reserved. Clinically reviewed by the [facility]itions Program. [employer] 033485 - REV 04/24.    Learning About Activities of Daily Living  What are activities of daily living?     Activities of daily living (ADLs) are the basic self-care tasks you do every day. These include eating, bathing, dressing, and moving around.  As you age, and if you have health problems, you may find that it's harder to do some of these tasks. If so, your doctor can suggest ideas that may help.  To measure what kind of help you may need, your doctor will ask how well you are able to do ADLs. Let your doctor know if there are any tasks that you are having trouble doing. This is an important first step to getting help. And when you have the help you need, you can stay as independent as possible.  How will a doctor assess your ADLs?  Asking about ADLs is part of a routine health checkup your doctor will likely do as you age. Your health check might be done in a doctor's office, in your home, or at a hospital. The goal is to find out if you are having any problems that could make it hard to care for yourself or that make it unsafe for you to be on your own.  To measure your ADLs, your doctor will ask how hard it is for you to do routine tasks. Your doctor may also want to know if you have changed the way you do a task because of a health problem. Your doctor may watch how you:  Walk back and forth.  Keep your balance while you stand or walk.  Move from sitting to standing or from a bed to a chair.  Button or unbutton a shirt or sweater.  Remove and put  on your shoes.  It's common to feel a little worried or anxious if you find you can't do all the things you used to be able to do. Talking with your doctor about ADLs is a way to make sure you're as safe as possible and able to care for yourself as well as you can. You may want to bring a caregiver, friend, or family member to your checkup. They can help you talk to your doctor.  Follow-up care is a key part of your treatment and safety. Be sure to make and go to all appointments, and call your doctor if you are having problems. It's also a good idea to know your test results and keep a list of the medicines you take.  Current as of: October 24, 2023               Content Version: 14.0    8211-1964 [employer].   Care instructions adapted under license by your healthcare professional. If you have questions about a medical condition or this instruction, always ask your healthcare professional. [employer] disclaims any warranty or liability for your use of this information.      Preventing Falls: Care Instructions  Injuries and health problems such as trouble walking or poor eyesight can increase your risk of falling. So can some medicines. But there are things you can do to help prevent falls. You can exercise to get stronger. You can also arrange your home to make it safer.    Talk to your doctor about the medicines you take. Ask if any of them increase the risk of falls and whether they can be changed or stopped.   Try to exercise regularly. It can help improve your strength and balance. This can help lower your risk of falling.     Practice fall safety and prevention.    Wear low-heeled shoes that fit well and give your feet good support. Talk to your doctor if you have foot problems that make this hard.  Carry a cellphone or wear a medical alert device that you can use to call for help.  Use stepladders instead of chairs to reach high objects. Don't climb if you're at risk for falls.  "Ask for help, if needed.  Wear the correct eyeglasses, if you need them.    Make your home safer.    Remove rugs, cords, clutter, and furniture from walkways.  Keep your house well lit. Use night-lights in hallways and bathrooms.  Install and use sturdy handrails on stairways.  Wear nonskid footwear, even inside. Don't walk barefoot or in socks without shoes.    Be safe outside.    Use handrails, curb cuts, and ramps whenever possible.  Keep your hands free by using a shoulder bag or backpack.  Try to walk in well-lit areas. Watch out for uneven ground, changes in pavement, and debris.  Be careful in the winter. Walk on the grass or gravel when sidewalks are slippery. Use de-icer on steps and walkways. Add non-slip devices to shoes.    Put grab bars and nonskid mats in your shower or tub and near the toilet. Try to use a shower chair or bath bench when bathing.   Get into a tub or shower by putting in your weaker leg first. Get out with your strong side first. Have a phone or medical alert device in the bathroom with you.   Where can you learn more?  Go to https://www.[employer].net/patiented  Enter G117 in the search box to learn more about \"Preventing Falls: Care Instructions.\"  Current as of: July 17, 2023               Content Version: 14.0    5326-2122 [email or web address].   Care instructions adapted under license by your healthcare professional. If you have questions about a medical condition or this instruction, always ask your healthcare professional. [email or web address] disclaims any warranty or liability for your use of this information.      Learning About Sleeping Well  What does sleeping well mean?     Sleeping well means getting enough sleep to feel good and stay healthy. How much sleep is enough varies among people.  The number of hours you sleep and how you feel when you wake up are both important. If you do not feel refreshed, you probably need more sleep. Another sign of not getting " "enough sleep is feeling tired during the day.  Experts recommend that adults get at least 7 or more hours of sleep per day. Children and older adults need more sleep.  Why is getting enough sleep important?  Getting enough quality sleep is a basic part of good health. When your sleep suffers, your physical health, mood, and your thoughts can suffer too. You may find yourself feeling more grumpy or stressed. Not getting enough sleep also can lead to serious problems, including injury, accidents, anxiety, and depression.  What might cause poor sleeping?  Many things can cause sleep problems, including:  Changes to your sleep schedule.  Stress. Stress can be caused by fear about a single event, such as giving a speech. Or you may have ongoing stress, such as worry about work or school.  Depression, anxiety, and other mental or emotional conditions.  Changes in your sleep habits or surroundings. This includes changes that happen where you sleep, such as noise, light, or sleeping in a different bed. It also includes changes in your sleep pattern, such as having jet lag or working a late shift.  Health problems, such as pain, breathing problems, and restless legs syndrome.  Lack of regular exercise.  Using alcohol, nicotine, or caffeine before bed.  How can you help yourself?  Here are some tips that may help you sleep more soundly and wake up feeling more refreshed.  Your sleeping area   Use your bedroom only for sleeping and sex. A bit of light reading may help you fall asleep. But if it doesn't, do your reading elsewhere in the house. Try not to use your TV, computer, smartphone, or tablet while you are in bed.  Be sure your bed is big enough to stretch out comfortably, especially if you have a sleep partner.  Keep your bedroom quiet, dark, and cool. Use curtains, blinds, or a sleep mask to block out light. To block out noise, use earplugs, soothing music, or a \"white noise\" machine.  Your evening and bedtime routine " "  Create a relaxing bedtime routine. You might want to take a warm shower or bath, or listen to soothing music.  Go to bed at the same time every night. And get up at the same time every morning, even if you feel tired.  What to avoid   Limit caffeine (coffee, tea, caffeinated sodas) during the day, and don't have any for at least 6 hours before bedtime.  Avoid drinking alcohol before bedtime. Alcohol can cause you to wake up more often during the night.  Try not to smoke or use tobacco, especially in the evening. Nicotine can keep you awake.  Limit naps during the day, especially close to bedtime.  Avoid lying in bed awake for too long. If you can't fall asleep or if you wake up in the middle of the night and can't get back to sleep within about 20 minutes, get out of bed and go to another room until you feel sleepy.  Avoid taking medicine right before bed that may keep you awake or make you feel hyper or energized. Your doctor can tell you if your medicine may do this and if you can take it earlier in the day.  If you can't sleep   Imagine yourself in a peaceful, pleasant scene. Focus on the details and feelings of being in a place that is relaxing.  Get up and do a quiet or boring activity until you feel sleepy.  Avoid drinking any liquids before going to bed to help prevent waking up often to use the bathroom.  Where can you learn more?  Go to https://www.[employer].net/patiented  Enter J942 in the search box to learn more about \"Learning About Sleeping Well.\"  Current as of: July 10, 2023               Content Version: 14.0    9988-6293 [employer].   Care instructions adapted under license by your healthcare professional. If you have questions about a medical condition or this instruction, always ask your healthcare professional. [employer] disclaims any warranty or liability for your use of this information.      Bladder Training: Care Instructions  Your Care Instructions   "   Bladder training is used to treat urge incontinence and stress incontinence. Urge incontinence means that the need to urinate comes on so fast that you can't get to a toilet in time. Stress incontinence means that you leak urine because of pressure on your bladder. For example, it may happen when you laugh, cough, or lift something heavy.  Bladder training can increase how long you can wait before you have to urinate. It can also help your bladder hold more urine. And it can give you better control over the urge to urinate.  It is important to remember that bladder training takes a few weeks to a few months to make a difference. You may not see results right away, but don't give up.  Follow-up care is a key part of your treatment and safety. Be sure to make and go to all appointments, and call your doctor if you are having problems. It's also a good idea to know your test results and keep a list of the medicines you take.  How can you care for yourself at home?  Work with your doctor to come up with a bladder training program that is right for you. You may use one or more of the following methods.  Delayed urination  In the beginning, try to keep from urinating for 5 minutes after you first feel the need to go.  While you wait, take deep, slow breaths to relax. Kegel exercises can also help you delay the need to go to the bathroom.  After some practice, when you can easily wait 5 minutes to urinate, try to wait 10 minutes before you urinate.  Slowly increase the waiting period until you are able to control when you have to urinate.  Scheduled urination  Empty your bladder when you first wake up in the morning.  Schedule times throughout the day when you will urinate.  Start by going to the bathroom every hour, even if you don't need to go.  Slowly increase the time between trips to the bathroom.  When you have found a schedule that works well for you, keep doing it.  If you wake up during the night and have to  "urinate, do it. Apply your schedule to waking hours only.  Kegel exercises  These tighten and strengthen pelvic muscles, which can help you control the flow of urine. (If doing these exercises causes pain, stop doing them and talk with your doctor.) To do Kegel exercises:  Squeeze your muscles as if you were trying not to pass gas. Or squeeze your muscles as if you were stopping the flow of urine. Your belly, legs, and buttocks shouldn't move.  Hold the squeeze for 3 seconds, then relax for 5 to 10 seconds.  Start with 3 seconds, then add 1 second each week until you are able to squeeze for 10 seconds.  Repeat the exercise 10 times a session. Do 3 to 8 sessions a day.  When should you call for help?  Watch closely for changes in your health, and be sure to contact your doctor if:    Your incontinence is getting worse.     You do not get better as expected.   Where can you learn more?  Go to https://www.[employer].net/patiented  Enter V684 in the search box to learn more about \"Bladder Training: Care Instructions.\"  Current as of: November 15, 2023               Content Version: 14.0    4784-2280 [employer].   Care instructions adapted under license by your healthcare professional. If you have questions about a medical condition or this instruction, always ask your healthcare professional. [employer] disclaims any warranty or liability for your use of this information.      Learning About Depression Screening  What is depression screening?  Depression screening is a way to see if you have depression symptoms. It may be done by a doctor or counselor. It's often part of a routine checkup. That's because your mental health is just as important as your physical health.  Depression is a mental health condition that affects how you feel, think, and act. You may:  Have less energy.  Lose interest in your daily activities.  Feel sad and grouchy for a long time.  Depression is very common. It " "affects people of all ages.  Many things can lead to depression. Some people become depressed after they have a stroke or find out they have a major illness like cancer or heart disease. The death of a loved one or a breakup may lead to depression. It can run in families. Most experts believe that a combination of inherited genes and stressful life events can cause it.  What happens during screening?  You may be asked to fill out a form about your depression symptoms. You and the doctor will discuss your answers. The doctor may ask you more questions to learn more about how you think, act, and feel.  What happens after screening?  If you have symptoms of depression, your doctor will talk to you about your options.  Doctors usually treat depression with medicines or counseling. Often, combining the two works best. Many people don't get help because they think that they'll get over the depression on their own. But people with depression may not get better unless they get treatment.  The cause of depression is not well understood. There may be many factors involved. But if you have depression, it's not your fault.  A serious symptom of depression is thinking about death or suicide. If you or someone you care about talks about this or about feeling hopeless, get help right away.  It's important to know that depression can be treated. Medicine, counseling, and self-care may help.  Where can you learn more?  Go to https://www.[employer].net/patiented  Enter T185 in the search box to learn more about \"Learning About Depression Screening.\"  Current as of: June 24, 2023               Content Version: 14.0    1555-7171 [employer].   Care instructions adapted under license by your healthcare professional. If you have questions about a medical condition or this instruction, always ask your healthcare professional. [employer] disclaims any warranty or liability for your use of this information.      "
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
## 2024-06-08 LAB
ALBUMIN SERPL BCG-MCNC: 4.4 G/DL (ref 3.5–5.2)
ALP SERPL-CCNC: 91 U/L (ref 40–150)
ALT SERPL W P-5'-P-CCNC: 17 U/L (ref 0–50)
ANION GAP SERPL CALCULATED.3IONS-SCNC: 15 MMOL/L (ref 7–15)
AST SERPL W P-5'-P-CCNC: 19 U/L (ref 0–45)
BILIRUB SERPL-MCNC: 0.4 MG/DL
BUN SERPL-MCNC: 12.2 MG/DL (ref 8–23)
CALCIUM SERPL-MCNC: 9.5 MG/DL (ref 8.8–10.2)
CHLORIDE SERPL-SCNC: 99 MMOL/L (ref 98–107)
CHOLEST SERPL-MCNC: 222 MG/DL
CREAT SERPL-MCNC: 1.01 MG/DL (ref 0.51–0.95)
CREAT UR-MCNC: 170 MG/DL
DEPRECATED HCO3 PLAS-SCNC: 24 MMOL/L (ref 22–29)
EGFRCR SERPLBLD CKD-EPI 2021: 58 ML/MIN/1.73M2
FASTING STATUS PATIENT QL REPORTED: NO
FASTING STATUS PATIENT QL REPORTED: NO
GLUCOSE SERPL-MCNC: 120 MG/DL (ref 70–99)
HDLC SERPL-MCNC: 55 MG/DL
LDLC SERPL CALC-MCNC: 142 MG/DL
MICROALBUMIN UR-MCNC: 96 MG/L
MICROALBUMIN/CREAT UR: 56.47 MG/G CR (ref 0–25)
NONHDLC SERPL-MCNC: 167 MG/DL
POTASSIUM SERPL-SCNC: 3.9 MMOL/L (ref 3.4–5.3)
PROT SERPL-MCNC: 7.5 G/DL (ref 6.4–8.3)
SODIUM SERPL-SCNC: 138 MMOL/L (ref 135–145)
TRIGL SERPL-MCNC: 125 MG/DL
TSH SERPL DL<=0.005 MIU/L-ACNC: 0.43 UIU/ML (ref 0.3–4.2)

## 2024-06-10 ENCOUNTER — TELEPHONE (OUTPATIENT)
Dept: FAMILY MEDICINE | Facility: CLINIC | Age: 74
End: 2024-06-10
Payer: COMMERCIAL

## 2024-06-10 NOTE — TELEPHONE ENCOUNTER
Forms/Letter Request    Type of form/letter: [facility] order # [id number], [id number]    Do we have the form/letter: YES- Placed on provider's desk    Who is the form from? Home care    Where did/will the form come from? form was faxed in    How would you like the form/letter returned: Fax : [phone]

## 2024-06-11 ENCOUNTER — VIRTUAL VISIT (OUTPATIENT)
Dept: FAMILY MEDICINE | Facility: CLINIC | Age: 74
End: 2024-06-11
Payer: COMMERCIAL

## 2024-06-11 DIAGNOSIS — E11.22 TYPE 2 DIABETES MELLITUS WITH STAGE 3A CHRONIC KIDNEY DISEASE, WITH LONG-TERM CURRENT USE OF INSULIN (H): Primary | ICD-10-CM

## 2024-06-11 DIAGNOSIS — N18.31 TYPE 2 DIABETES MELLITUS WITH STAGE 3A CHRONIC KIDNEY DISEASE, WITH LONG-TERM CURRENT USE OF INSULIN (H): Primary | ICD-10-CM

## 2024-06-11 DIAGNOSIS — M17.12 PRIMARY OSTEOARTHRITIS OF LEFT KNEE: ICD-10-CM

## 2024-06-11 DIAGNOSIS — Z79.4 TYPE 2 DIABETES MELLITUS WITH STAGE 3A CHRONIC KIDNEY DISEASE, WITH LONG-TERM CURRENT USE OF INSULIN (H): Primary | ICD-10-CM

## 2024-06-11 DIAGNOSIS — E78.5 HYPERLIPIDEMIA LDL GOAL <100: ICD-10-CM

## 2024-06-11 DIAGNOSIS — F51.04 CHRONIC INSOMNIA: ICD-10-CM

## 2024-06-11 PROCEDURE — G2211 COMPLEX E/M VISIT ADD ON: HCPCS | Mod: 95 | Performed by: INTERNAL MEDICINE

## 2024-06-11 PROCEDURE — 99214 OFFICE O/P EST MOD 30 MIN: CPT | Mod: 95 | Performed by: INTERNAL MEDICINE

## 2024-06-11 RX ORDER — SIMVASTATIN 20 MG
20 TABLET ORAL AT BEDTIME
Qty: 90 TABLET | Refills: 3 | Status: SHIPPED | OUTPATIENT
Start: 2024-06-11

## 2024-06-11 RX ORDER — TRAZODONE HYDROCHLORIDE 100 MG/1
100 TABLET ORAL AT BEDTIME
Qty: 90 TABLET | Refills: 3 | Status: SHIPPED | OUTPATIENT
Start: 2024-06-11

## 2024-06-11 RX ORDER — GLIPIZIDE 10 MG/1
10 TABLET, FILM COATED, EXTENDED RELEASE ORAL DAILY
Qty: 90 TABLET | Refills: 1 | Status: SHIPPED | OUTPATIENT
Start: 2024-06-11 | End: 2024-08-05

## 2024-06-11 RX ORDER — METFORMIN HCL 500 MG
1000 TABLET, EXTENDED RELEASE 24 HR ORAL 2 TIMES DAILY WITH MEALS
Qty: 180 TABLET | Refills: 1 | Status: SHIPPED | OUTPATIENT
Start: 2024-06-11

## 2024-06-11 NOTE — TELEPHONE ENCOUNTER
Called Radha Corbett to schedule follow up video appointment per Dr. Medeiros.  Radha states she had not had any appointments with Dr. Medeiros and doesn't know who she is.  Unable to schedule further appointments.    LAKIA Self

## 2024-06-11 NOTE — PROGRESS NOTES
"    Instructions Relayed to Patient by Virtual Roomer:     If patient is not active on [employer]:  Relayed the following to patient: \"Would you like us to text or email you a link to join your appointment now or when your provider is ready to initiate the virtual visit?\"      Patient Confirmed they will join visit via: Text Link to Cell Phone  Reminded patient to ensure they were logged on to virtual visit by arrival time listed.   Asked if patient has flexibility to initiate visit sooner than arrival time: patient is unable to initiate visit earlier than arrival time     If pediatric virtual visit, ensured pediatric patient along with parent/guardian will be present for video visit.     Patient offered the website [email or web address] and/or phone number to [employer] Help line: [phone]      Radha is a 73 year old who is being evaluated via a billable video visit.    How would you like to obtain your AVS? [email or web address]  If the video visit is dropped, the invitation should be resent by: Text to cell phone: [phone]  Will anyone else be joining your video visit? No      Assessment & Plan     Radha was seen today for results.    Diagnoses and all orders for this visit:    Type 2 diabetes mellitus with stage 3a chronic kidney disease, with long-term current use of insulin (H)  Comments:  a1c is excellent. continue with metformin and glipizide  Orders:  -     Adult Eye  Referral; Future  -     glipiZIDE (GLUCOTROL XL) 10 MG 24 hr tablet; Take 1 tablet (10 mg) by mouth daily  -     metFORMIN (GLUCOPHAGE XR) 500 MG 24 hr tablet; Take 2 tablets (1,000 mg) by mouth 2 times daily (with meals)  -     Hemoglobin A1c; Future  -     Basic metabolic panel  (Ca, Cl, CO2, Creat, Gluc, K, Na, BUN); Future    Hyperlipidemia LDL goal <100  Comments:  has not been taking simvastatin. Restarted. recheck in 6 months  Orders:  -     simvastatin (ZOCOR) 20 MG tablet; Take 1 tablet (20 mg) by mouth at bedtime  -     " Lipid panel reflex to direct LDL Fasting; Future    Chronic insomnia  -     traZODone (DESYREL) 100 MG tablet; Take 1 tablet (100 mg) by mouth at bedtime    Primary osteoarthritis of left knee  Comments:  referral mmade to sports medicine last week. pt will call to schedule.        Diabetes follow up in 6 months, video visit. Fasting lab a few days before.   Pt will schedule for mammogram/bone density and sports medicine.         Domonique Garcia is a 73 year old, presenting for the following health issues:  Results (Lab and X Ray Results and Next Steps )        6/11/2024    10:10 AM   Additional Questions   Roomed by Rhoda MCPHERSON   Accompanied by Self     Video Start Time: 10:48 AM    History of Present Illness       Reason for visit:  Lab Result    She eats 0-1 servings of fruits and vegetables daily.She consumes 0 sweetened beverage(s) daily.She exercises with enough effort to increase her heart rate 9 or less minutes per day.  She exercises with enough effort to increase her heart rate 3 or less days per week.   She is taking medications regularly.           Review of Systems  Constitutional, HEENT, cardiovascular, pulmonary, gi and gu systems are negative, except as otherwise noted.      Objective           Vitals:  No vitals were obtained today due to virtual visit.    Physical Exam   GENERAL: alert and no distress  EYES: Eyes grossly normal to inspection.  No discharge or erythema, or obvious scleral/conjunctival abnormalities.  RESP: No audible wheeze, cough, or visible cyanosis.    SKIN: Visible skin clear. No significant rash, abnormal pigmentation or lesions.  NEURO: Cranial nerves grossly intact.  Mentation and speech appropriate for age.  PSYCH: Appropriate affect, tone, and pace of words    Office Visit on 06/06/2024   Component Date Value Ref Range Status    Cholesterol 06/06/2024 222 (H)  <200 mg/dL Final    Triglycerides 06/06/2024 125  <150 mg/dL Final    Direct Measure HDL 06/06/2024 55  >=50 mg/dL  Final    LDL Cholesterol Calculated 06/06/2024 142 (H)  <=100 mg/dL Final    Non HDL Cholesterol 06/06/2024 167 (H)  <130 mg/dL Final    Patient Fasting > 8hrs? 06/06/2024 No   Final    Creatinine Urine mg/dL 06/06/2024 170.0  mg/dL Final    The reference ranges have not been established in urine creatinine. The results should be integrated into the clinical context for interpretation.    Albumin Urine mg/L 06/06/2024 96.0  mg/L Final    The reference ranges have not been established in urine albumin. The results should be integrated into the clinical context for interpretation.    Albumin Urine mg/g Cr 06/06/2024 56.47 (H)  0.00 - 25.00 mg/g Cr Final    Microalbuminuria is defined as an albumin:creatinine ratio of 17 to 299 for males and 25 to 299 for females. A ratio of albumin:creatinine of 300 or higher is indicative of overt proteinuria.  Due to biologic variability, positive results should be confirmed by a second, first-morning random or 24-hour timed urine specimen. If there is discrepancy, a third specimen is recommended. When 2 out of 3 results are in the microalbuminuria range, this is evidence for incipient nephropathy and warrants increased efforts at glucose control, blood pressure control, and institution of therapy with an angiotensin-converting-enzyme (ACE) inhibitor (if the patient can tolerate it).      Sodium 06/06/2024 138  135 - 145 mmol/L Final    Reference intervals for this test were updated on 09/26/2023 to more accurately reflect our healthy population. There may be differences in the flagging of prior results with similar values performed with this method. Interpretation of those prior results can be made in the context of the updated reference intervals.     Potassium 06/06/2024 3.9  3.4 - 5.3 mmol/L Final    Carbon Dioxide (CO2) 06/06/2024 24  22 - 29 mmol/L Final    Anion Gap 06/06/2024 15  7 - 15 mmol/L Final    Urea Nitrogen 06/06/2024 12.2  8.0 - 23.0 mg/dL Final    Creatinine  06/06/2024 1.01 (H)  0.51 - 0.95 mg/dL Final    GFR Estimate 06/06/2024 58 (L)  >60 mL/min/1.73m2 Final    Calcium 06/06/2024 9.5  8.8 - 10.2 mg/dL Final    Chloride 06/06/2024 99  98 - 107 mmol/L Final    Glucose 06/06/2024 120 (H)  70 - 99 mg/dL Final    Alkaline Phosphatase 06/06/2024 91  40 - 150 U/L Final    AST 06/06/2024 19  0 - 45 U/L Final    Reference intervals for this test were updated on 6/12/2023 to more accurately reflect our healthy population. There may be differences in the flagging of prior results with similar values performed with this method. Interpretation of those prior results can be made in the context of the updated reference intervals.    ALT 06/06/2024 17  0 - 50 U/L Final    Reference intervals for this test were updated on 6/12/2023 to more accurately reflect our healthy population. There may be differences in the flagging of prior results with similar values performed with this method. Interpretation of those prior results can be made in the context of the updated reference intervals.      Protein Total 06/06/2024 7.5  6.4 - 8.3 g/dL Final    Albumin 06/06/2024 4.4  3.5 - 5.2 g/dL Final    Bilirubin Total 06/06/2024 0.4  <=1.2 mg/dL Final    Patient Fasting > 8hrs? 06/06/2024 No   Final    Hemoglobin A1C 06/06/2024 6.7 (H)  0.0 - 5.6 % Final    Normal <5.7%   Prediabetes 5.7-6.4%    Diabetes 6.5% or higher     Note: Adopted from ADA consensus guidelines.    WBC Count 06/06/2024 10.0  4.0 - 11.0 10e3/uL Final    RBC Count 06/06/2024 4.51  3.80 - 5.20 10e6/uL Final    Hemoglobin 06/06/2024 11.5 (L)  11.7 - 15.7 g/dL Final    Hematocrit 06/06/2024 37.0  35.0 - 47.0 % Final    MCV 06/06/2024 82  78 - 100 fL Final    MCH 06/06/2024 25.5 (L)  26.5 - 33.0 pg Final    MCHC 06/06/2024 31.1 (L)  31.5 - 36.5 g/dL Final    RDW 06/06/2024 14.8  10.0 - 15.0 % Final    Platelet Count 06/06/2024 351  150 - 450 10e3/uL Final    TSH 06/06/2024 0.43  0.30 - 4.20 uIU/mL Final       Recent Results (from  the past 744 hour(s))   XR Knee Left 3 Views    Narrative    EXAM: XR KNEE LEFT 3 VIEWS  LOCATION: [facility]  DATE: 6/6/2024    INDICATION:  Chronic pain of left knee.   COMPARISON: 7/9/2021.      Impression    IMPRESSION: Again seen are mild to moderate tricompartmental degenerative changes. Ossific density along medial joint line measuring 1.2 cm, chronic and unchanged. There is no evidence of an acute fracture. No sizable joint effusion.          Video-Visit Details    Type of service:  Video Visit   Video End Time:11:06 AM  Originating Location (pt. Location): Home    Distant Location (provider location):  Off-site  Platform used for Video Visit: Doximalexander  Signed Electronically by: Trice Medeiros MD PhD

## 2024-06-11 NOTE — RESULT ENCOUNTER NOTE
Reviewed during clinic/virtual visit.     Please call or make an appointment if you have further questions.     Trice Medeiros MD-PhD

## 2024-06-12 ENCOUNTER — TELEPHONE (OUTPATIENT)
Dept: FAMILY MEDICINE | Facility: CLINIC | Age: 74
End: 2024-06-12
Payer: COMMERCIAL

## 2024-06-12 ENCOUNTER — MEDICAL CORRESPONDENCE (OUTPATIENT)
Dept: HEALTH INFORMATION MANAGEMENT | Facility: CLINIC | Age: 74
End: 2024-06-12
Payer: COMMERCIAL

## 2024-06-12 NOTE — TELEPHONE ENCOUNTER
This writer attempted to contact [facility] RNs on 06/12/24      Reason for call visit request and left message.      If [facility] RNs calls back:   Route to RN line, relay provider request below. If unable to assist, please reach out to patient and help w/ scheduling patient appt to see provider      Malu Roe, RN, BSN  [facility]

## 2024-06-12 NOTE — TELEPHONE ENCOUNTER
[place] a call from [facility] stating that pt is requesting Skilled nursing orders to do an eval on her toe nails, fingernails and a cyst on her back. Pt uses [facility] and pt asked if they could contact the clinic for her and ask for these additional home care orders.

## 2024-06-12 NOTE — TELEPHONE ENCOUNTER
Please contact [facility].  This the patient currently have skilled nursing.  If the nurse can check patient's concerns.

## 2024-06-12 NOTE — TELEPHONE ENCOUNTER
Completed form faxed to [phone].   PT had Virtual Visit with PCP on Tuesday 6/11 with Dr Medeiros

## 2024-06-13 NOTE — TELEPHONE ENCOUNTER
She is able to get diabetes foot care with podiatrist. They may trim the toe nails as part of the foot care. They do not evaluate fingernails. However, this requires patient goes into a clinic. I offered to put a referral for her at the clinic, she declined. She reported she was told podiatrist doesn't do nails anymore. Also going into a clinic is difficult for her.     Since she doesn't have any skilled nursing need, she will need to rely on friends or self pay for PCA service for nail care.

## 2024-06-13 NOTE — TELEPHONE ENCOUNTER
CHAPIN Alexander called back from [facility].     Reports that patient receives services through [facility] but only physical therapy. Patient is not open to nursing visits. Also, their nurses don't do anything with finger nails or toe nails.     Called and spoke with patient. Patient reported that she is actually open to nursing visits. ALENA Concepcion is currently with patient. Patient reported that she had a friend come out and help trim her toe nails. Carlene reported that it may still be nice to have patient follow up with someone to help manage her nails. Carlene will notify nursing at [facility] that patient wants to follow through with visits with them and to do an evaluation. Patient did not want to schedule an appointment at this time with Dr. Medeiros to discuss cyst since she was just recently seen.     ALENA Concepcion wanted to update PCP that patient's heart rate during visit is running between 112 (when checked manually) and up to 119 on pulse oximeter. Patient denies any symptoms at this time such as chest pain, difficulty breathing, dizziness, weakness, palpitations, or any other symptoms. Denies drinking any coffee. Reports that she only had a few sips of Sprite. Patient reports that she was advised to resume on her Lisinopril prescription but has not done so yet. Patient calm and sitting down when checking pulse.   Patient reports this morning she almost fell off her bed. Reports that she had to try to pull herself up back on the bed a little bit at a time, took almost an hour to get back on it. Did not actually fall. Reports that she almost called 911. Patient feels like she is still stressed and anxious about this incident.     RN advised patient to call 911 and or go to the emergency room if she starts to develop any of the symptoms mentioned above. Patient verbalized understanding.     Routing to provider for any further recommendations regarding heart rate. Please also advise on any other services that  may be able to help manage patient's nails since nursing doesn't do anything with nails.     Rhoda Baca, JAIDAN, RN   [facility]

## 2024-06-13 NOTE — TELEPHONE ENCOUNTER
This writer attempted to contact [facility] RNs on 06/13/24 to follow up on patient request received and left detailed message.      If they call back:  Please confirm they are able to visit patient to follow up on her request. Provider okayed the request to evaluate her finger nails, toe nails, and cyst on her back.         Falguni Blackburn, RN, BSN, PHN  [facility]  Nurse Triage, [facility]

## 2024-06-13 NOTE — CONFIDENTIAL NOTE
Tired to provide imaging for patient at the [facility] and ended up having to schedule the patient at them [facility]. Patient verbalized being unable to self-transfer and bedridden. Male with patient verbalized she walks with a walker at home. After an assisted transfer to the X-ray table patient continuously verbalized that they were in pain and wanted to rush this along. Patient was unwilling to help assist/ accommodate the positions needed for the imaging. After talking with the provider, the patient agreed to go to the specialty clinic where there would be more support staff to help acquire the images needed for the provider

## 2024-06-13 NOTE — TELEPHONE ENCOUNTER
Called and spoke to pt. States that she has a fired that helps with her toe nails and podiatry does not do toe nails any more.     Pt states that she is fine with her friend helping her and does now want to go into a clinic.    No other questions at this time.    JAIDA TorresN, RN  [facility]

## 2024-06-14 ENCOUNTER — DOCUMENTATION ONLY (OUTPATIENT)
Dept: OTHER | Facility: CLINIC | Age: 74
End: 2024-06-14
Payer: COMMERCIAL

## 2024-07-09 ENCOUNTER — TELEPHONE (OUTPATIENT)
Dept: FAMILY MEDICINE | Facility: CLINIC | Age: 74
End: 2024-07-09
Payer: COMMERCIAL

## 2024-07-09 DIAGNOSIS — R53.81 PHYSICAL DECONDITIONING: ICD-10-CM

## 2024-07-09 DIAGNOSIS — G89.29 CHRONIC PAIN OF LEFT KNEE: ICD-10-CM

## 2024-07-09 DIAGNOSIS — M17.12 PRIMARY OSTEOARTHRITIS OF LEFT KNEE: Primary | ICD-10-CM

## 2024-07-09 DIAGNOSIS — M25.562 CHRONIC PAIN OF LEFT KNEE: ICD-10-CM

## 2024-07-09 DIAGNOSIS — R46.0 SELF-CARE DEFICIT FOR BATHING: ICD-10-CM

## 2024-07-09 DIAGNOSIS — Z74.09 MOBILITY IMPAIRED: ICD-10-CM

## 2024-07-09 NOTE — TELEPHONE ENCOUNTER
Patient was seen Doctor Waldemar several times in the past.  Please take a same-day spot on Dr. Medeiros's schedule for this patient.

## 2024-07-09 NOTE — TELEPHONE ENCOUNTER
Pt is requesting home health care referral for physical therapy and assistance with a shower (home health aide) through [facility]. Patient states she was discharged too soon.    Pt requesting orders from Dr. Saavedra. Pt states she wants to switch primary care providers from Dr. Medeiros to Dr. Saavedra.     Pt had virtual visit 5/1/24 with Dr. Saavedra last and a physical with Dr. Medeiros on 6/6/24.    Routing to Dr. Saavedra to review.    Anastasiia Bridges RN    [facility]

## 2024-07-09 NOTE — TELEPHONE ENCOUNTER
Called Pt regarding note below Pt needs referral Dr. Saavedra does not have an opening until mid July Pt was wondering if another provider can see her for a virtual to get this taken care of.     Eliana CASTRO Visit Facilitator

## 2024-07-16 ENCOUNTER — VIRTUAL VISIT (OUTPATIENT)
Dept: FAMILY MEDICINE | Facility: CLINIC | Age: 74
End: 2024-07-16
Payer: COMMERCIAL

## 2024-07-16 ENCOUNTER — TELEPHONE (OUTPATIENT)
Dept: FAMILY MEDICINE | Facility: CLINIC | Age: 74
End: 2024-07-16

## 2024-07-16 DIAGNOSIS — Z74.09 IMPAIRED MOBILITY AND ACTIVITIES OF DAILY LIVING: Primary | ICD-10-CM

## 2024-07-16 DIAGNOSIS — Z78.9 IMPAIRED MOBILITY AND ACTIVITIES OF DAILY LIVING: Primary | ICD-10-CM

## 2024-07-16 PROCEDURE — 99213 OFFICE O/P EST LOW 20 MIN: CPT | Mod: 95 | Performed by: INTERNAL MEDICINE

## 2024-07-16 ASSESSMENT — PATIENT HEALTH QUESTIONNAIRE - PHQ9
SUM OF ALL RESPONSES TO PHQ QUESTIONS 1-9: 13
SUM OF ALL RESPONSES TO PHQ QUESTIONS 1-9: 13
10. IF YOU CHECKED OFF ANY PROBLEMS, HOW DIFFICULT HAVE THESE PROBLEMS MADE IT FOR YOU TO DO YOUR WORK, TAKE CARE OF THINGS AT HOME, OR GET ALONG WITH OTHER PEOPLE: EXTREMELY DIFFICULT

## 2024-07-16 NOTE — PROGRESS NOTES
"  If patient has telephone visit, have they been educated on video visit as preferred visit method and offered to change to video visit? NOT APPLICABLE        Instructions Relayed to Patient by Virtual Roomer:     Patient is active on [employer]:   Relayed following to patient: \"It looks like you are active on [employer], are you able to join the visit this way? If not, do you need us to send you a link now or would you like your provider to send a link via text or email when they are ready to initiate the visit?\"      Patient Confirmed they will join visit via: Text Link to Cell Phone  Reminded patient to ensure they were logged on to virtual visit by arrival time listed.   Asked if patient has flexibility to initiate visit sooner than arrival time: patient stated yes, documented in appointment notes availability to initiate visit earlier than arrival time     If pediatric virtual visit, ensured pediatric patient along with parent/guardian will be present for video visit.     Patient offered the website [email or web address] and/or phone number to [employer] Help line: [phone]      Radha is a 73 year old who is being evaluated via a billable video visit.    How would you like to obtain your AVS? [employer]oo  If the video visit is dropped, the invitation should be resent by: Text to cell phone: [phone]  Will anyone else be joining your video visit? No      Assessment & Plan     Radha was seen today for patient request.    Diagnoses and all orders for this visit:    Impaired mobility and activities of daily living      This patient is well-known to me.  She has failed 2 courses of home care services in terms of failure to progress with therapy.  She does not have any skilled nursing needs.  It appears that patient is wanting home care services so that she can have private pay duties covered by her health insurance.  I discussed with patient to reach out to [facility] again regarding elderly waiver and " transition to assisted living.  She has been provided information through her  in the past.    I have not seen the formal discharge order from [facility].  I will have our staff reach out to her to get a better understanding of any benefits of further therapy.    She has an appointment with another provider in our clinic next week.    No follow-up scheduled at this time.    Domonique Garcia is a 73 year old, presenting for the following health issues:  No chief complaint on file.        7/16/2024    10:12 AM   Additional Questions   Roomed by Aime   Accompanied by self       Video Start Time:  11:27 AM      Radha Corbett is a 73 year old female who has Hypertension goal BP (blood pressure) < 140/90; Atypical ductal hyperplasia of breast; Benign Breast Disease (PASH); Breast cancer screening-high risk; Tubular adenoma of colon; Abnormal cervical Pap smear with positive HPV DNA test; Angiodysplasia of colon; and Type 2 diabetes mellitus with stage 2 chronic kidney disease, with long-term current use of insulin (H) on their pertinent problem list.       Pt would like to get back to [facility] home care service again.  She was recently discharged from home care service.  Patient reported it was last week but could not recall the date.  She reported she needs help with ADLs, such as shower.  She is not able to drive.  She lives in her own home, but not able to care for it.  She has gone through 2 courses of home care services with [facility].  She has failed to progress, not participating in therapy exercises.  Occupational therapist note commented that there was lack of effort on patient's part.  She was discharged from  on May 30, 2024.   I have not yet seen formal discharge from [facility] PT    In the past [place] services have come and see her to discuss elderly waiver, transition into a nursing home due to her inability to self-care.  Patient did not want to leave her house.    In the past patient has  declined skilled nursing, and only wanted help for ADL services, which could be part of home care service if she had skilled nursing needs.    Patient reported she has no money to do private pay for ADLs.    Patient reported that she knows that she needs to go to assisted living but is not ready yet.  She says she wants to get stronger.    When I told her that she was just discharged from home care services and there has been no new health changes.  It is not likely that she will benefit from another round.  It seems that all she wanted is the ADL services to be paid for by health insurance, going through the channel of home care.    Patient told me that she has a lot of bottles of pills that she can take.  I asked her that if she is intending to take these pills, I will call 911.  Patient reported that she would never do that, she cannot do that to her daughter.    History of Present Illness       Reason for visit:  Requesting referral to [facility]    She eats 0-1 servings of fruits and vegetables daily.She consumes 1 sweetened beverage(s) daily.She exercises with enough effort to increase her heart rate 9 or less minutes per day.  She exercises with enough effort to increase her heart rate 3 or less days per week.   She is taking medications regularly.           Review of Systems  Constitutional, HEENT, cardiovascular, pulmonary, gi and gu systems are negative, except as otherwise noted.      Objective           Vitals:  No vitals were obtained today due to virtual visit.    Physical Exam   GENERAL: alert and no distress  EYES: Eyes grossly normal to inspection.  No discharge or erythema, or obvious scleral/conjunctival abnormalities.  RESP: No audible wheeze, cough, or visible cyanosis.    SKIN: Visible skin clear. No significant rash, abnormal pigmentation or lesions.  NEURO: Cranial nerves grossly intact.  Mentation and speech appropriate for age.  PSYCH: Appropriate affect, tone, and pace of words           Video-Visit Details    Type of service:  Video Visit   Video End Time:11:41 AM  Originating Location (pt. Location): Home    Distant Location (provider location):  Off-site  Platform used for Video Visit: Lizeth  Signed Electronically by: Trice Medeiros MD PhD

## 2024-07-16 NOTE — TELEPHONE ENCOUNTER
Forms/Letter Request    Type of form/letter: Home Health Certification and Plan of Care, Order Number: [id number]    Have you been seen for this request: N/A    Do we have the form/letter: Yes: placed in providers forms basket for review    When is form/letter needed by: ASAP    How would you like the form/letter returned: Fax  [phone]    The patient is 63-year-old male with past medical history as noted below known to Dr. Negrete is presenting to our clinic today to discuss her recent EGD and colonoscopy results. The patient is doing well and no GI issues today. He is currently taking TUMS as needed.   Diagnostic studies: -- RUQ US on 05/13/2024 with liver is of normal size but demonstrates increased echogenicity when compared to the right renal parenchyma compatible with hepatic steatosis. There is a 3.2 x 2.9 x 3.2 cm simple cyst, and in addition there is a 0.7 x 0.5 x 0.6 cm smaller simple cyst in the left lobe.  There is no intrahepatic or extrahepatic biliary dilatation with the common bile duct measuring 3.6 mm. The visualized pancreas appears unremarkable.  The right kidney measures 10.7 x 4.8 x 5.4 cm. The right kidney appears unremarkable.  The gallbladder demonstrates no gallstones. There may be a few gallbladder polyps measuring less than 3 mm in size. Recommend follow-up in 3-6 months to document stability. There is no pericholecystic fluid.  There is no ultrasonographic Sofia's sign.  The gallbladder wall is normal in thickness and measures 2.2 mm. The visualized inferior vena cava appears unremarkable.  -- Labs on 04/11/2024 CBC, CMP, TSH, Free T4, HgA1c, and Lipid panel unremarakble.   Procedures: -- EGD with biopsy on 06/18/2024 by Dr. Negrete noted normal examined duodenum.  A small hiatal hernia.  Erythematous mucosa in the stomach.  Non-obstructing Schatzki ring.  Erythema at the gastroesophageal junction.  Biopsy stomach with chronic gastritis.  No H.pylori organism.  No intestinal metaplasia.  Esophagus with reflux type changes.  No Berry's esophagus or eosinophilic esophagitis.  -- Colonoscopy polypectomy on 06/18/2024 by Dr. Negrete noted one 3 mm polyp in the ascending colon, removed with a cold biopsy forceps.  Resected and retrieved.  Diverticulosis in sigmoid colon, in the descending colon, in the transverse colon, and in the ascending colon.  Non-bleeding internal hemorrhoids.  Examination was otherwise normal.  Repeat colonoscopy in 5 years for surveillance.  Biopsy with tubular adenoma colon polyp.

## 2024-07-16 NOTE — TELEPHONE ENCOUNTER
Please call [facility].  Pt had video visit with me that [facility] discharged her last week but she really wanted to continue with [facility] so she can continue to receive help for ADLs such as shower. She reported wanted to get stronger before she is ready to leave her house. I am not seeing any specific skilled nursing needs.     I saw OT discharge on 5/30/2024, but not complete discharge from home care.    When was the patient discharged from home care? Has she reached maximal benefit? Will they take her back for another round of home care?

## 2024-07-16 NOTE — TELEPHONE ENCOUNTER
"Called [facility] and spoke with one of the triage RN's at the request of PCP.    Triage RN states that the patient was discharged from the agency and PT on 7/2 for the reason of \"Max potential reached.\" She went on to read in her discharge summary that the pt's plan of care was extended, in an attempt to motivate patient to participate in her therapies, but to find that pt would give submaximal effort. Therefore, she was discharged from home care services.       Outpatient referral was placed with [facility] on 6/28- not in pt's network, cannot afford    Client in agreement to consistently do exercises independently at home and will be reassessed within the next couple weeks for potentially re- referring.    Call transferred to Rhoda, one of the patient's primary therapists. Left voicemail to return call to clinic. If Rhoda calls back, please have her confirm what the patient would need to do to have services re-started, if that is even a possibility.    Catherine Amador, RN, BSN  [facility]       "

## 2024-07-16 NOTE — TELEPHONE ENCOUNTER
Rhoda, PT from [facility] calling in wanting to give clarification on patient's recent discharge from PT services.    PT states that patient frequently would state that she wants to participate in therapies and say she wants to get stronger, and then refuse to do the therapies that would push her to improve. Essentially, declining most/all of the therapies that are recommended to continually improve. Self-limiting.    PT states that patient has a fear of falling, and one of their therapies was to practice walking from the bed to the front of her home to help manage the fear and work on balance, and patient would sometimes try this once and then refuse to try again for weeks, only wanting to complete exercises in bed.     PT felt that there may be some manipulation going on or patient is not telling the complete story, PT also thinks that patient's [facility]  may be aware of situation as well. PT is sympathetic to patient wanting to improve, and does want to help, but without patient effort, feels their hands are tied in furthering therapy.      Routing to provider as SARAHI Roe, RN, BSN  [facility]

## 2024-07-17 ENCOUNTER — TELEPHONE (OUTPATIENT)
Dept: UROLOGY | Facility: CLINIC | Age: 74
End: 2024-07-17
Payer: COMMERCIAL

## 2024-07-17 DIAGNOSIS — N32.81 OVERACTIVE BLADDER: ICD-10-CM

## 2024-07-17 RX ORDER — TROSPIUM CHLORIDE ER 60 MG/1
60 CAPSULE ORAL
Qty: 90 CAPSULE | Refills: 3 | Status: SHIPPED | OUTPATIENT
Start: 2024-07-17 | End: 2024-08-08

## 2024-07-17 NOTE — TELEPHONE ENCOUNTER
[facility] Adult [place]  Caller: Unspecified (Today,  8:42 AM)  Patient scheduled. Patient wants medication sent to [facility] going forward.    Sally [facility]ivasan Complex   Dermatology, Surgery, Urology  [facility]    Received the above message from scheduling team.     Refill request review:  Medication request: trospium (SANCTURA XR) 60 MG CP24 24 hr capsule   Sig: Take 1 capsule (60 mg) by mouth daily before breakfast   Prescription written: 5/22/23  Last Qty: 90  Pt's last office visit: 12/18/23  Next scheduled office visit: 8/8/24    Refill approved per refill protocol.  Prescription sent to [facility] per note above.    Sophia Seo RN, BSN

## 2024-07-17 NOTE — TELEPHONE ENCOUNTER
[facility] Call Center    Phone Message    May a detailed message be left on voicemail: yes     Reason for Call: Medication Refill Request    Has the patient contacted the pharmacy for the refill? Yes   Name of medication being requested: trospium (SANCTURA XR) 60 MG CP24 24 hr capsule [21913] (Order [id number])     Provider who prescribed the medication: Giovanny  [facility] [address]  Date medication is needed: asap, only has a few left     Action Taken: Other: urology    Travel Screening: Not Applicable     Date of Service:

## 2024-07-17 NOTE — TELEPHONE ENCOUNTER
7/17 Patient scheduled for follow up.     Sally [facility]rinivasan Complex   Dermatology, Surgery, Urology  [facility]

## 2024-07-18 ENCOUNTER — TELEPHONE (OUTPATIENT)
Dept: UROLOGY | Facility: CLINIC | Age: 74
End: 2024-07-18
Payer: COMMERCIAL

## 2024-07-18 NOTE — TELEPHONE ENCOUNTER
Good evening,  This is iV, a pharmacist in the [facility]. I am contacting you regarding Radha's Trimethoprim. Radha recently requested that we transfer all of her prescriptions from [facility] to our pharmacy, and one of the prescriptions that had no refills was Trimethoprim. I asked Radha if she knew whether she should still be taking and she was unsure. She asked us to reach out to you for this clarification. Would you consider sending us a new rx for Trimethoprim if it is still a current medication for her and that way we may fill it for her?  Thanks for your time and consideration,  Vi MCPHERSON, PharmD  [phone] (call center)  [phone] (direct line to a pharmacist)

## 2024-07-18 NOTE — TELEPHONE ENCOUNTER
"Writer called patient and relayed provider message below as written.  Patient states the following:    \"That's interesting because I've never refused anything. Im desperate for this help. I would never refuse the help, that's crazy. Why would I be asking for help if I would be refusing? What else can be done? Im begging for help so why would refuse it? That just sounds crazy? I wouldn't be refusing to participate when I'm sitting here begging for help, its counter productive.\"    Patient asking to have Dr. Medeiros review and see if there's anything she can do, as she stresses numerous times that it doesn't make sense that she would refuse when she is begging for help as she wants to get better.     Anselmo Elizondo RN  [facility]    "

## 2024-07-18 NOTE — TELEPHONE ENCOUNTER
Pt calling back.    Relayed provider's note to contact [facility] .    Pt said that she is not able to take down the phone number now and does not have access to [employer].    Pt requested to call her back and leave a voicemail with Sylwia's number.    Left message with [facility]  info.    JAIDA TorresN, RN  [facility]

## 2024-07-18 NOTE — TELEPHONE ENCOUNTER
RN called patient and LVM to call clinic at [phone].     If patient calls back please relay provider message below.    CHAPIN Briones  [facility] Triage

## 2024-07-18 NOTE — TELEPHONE ENCOUNTER
She will need to reach out to her [facility]  CHAPIN Dao for resources that may be helpful for her. See phone encounter on 3/22/2024.

## 2024-07-18 NOTE — TELEPHONE ENCOUNTER
"Please inform patient that [facility] doesn't think there will be benefit for re-enroll in home care. Pt had [facility] for extended period of time, had been referred twice. However, despite patient agreeing to make effort to follow through with therapy exercise, she had not demonstrated willingness to put in the effort to do the exercise recommended to improve. She \"had essentially, declining most/all of the therapies that are recommended to continually improve.\" Therefore, they will not re-enroll her a third time.     I recommend pt reach out to her are worker about her options.   "

## 2024-07-22 ENCOUNTER — VIRTUAL VISIT (OUTPATIENT)
Dept: FAMILY MEDICINE | Facility: CLINIC | Age: 74
End: 2024-07-22
Payer: COMMERCIAL

## 2024-07-22 ENCOUNTER — TELEPHONE (OUTPATIENT)
Dept: FAMILY MEDICINE | Facility: CLINIC | Age: 74
End: 2024-07-22

## 2024-07-22 ENCOUNTER — MYC MEDICAL ADVICE (OUTPATIENT)
Dept: FAMILY MEDICINE | Facility: CLINIC | Age: 74
End: 2024-07-22

## 2024-07-22 DIAGNOSIS — Z78.9 IMPAIRED MOBILITY AND ACTIVITIES OF DAILY LIVING: Primary | ICD-10-CM

## 2024-07-22 DIAGNOSIS — Z74.09 IMPAIRED MOBILITY AND ACTIVITIES OF DAILY LIVING: Primary | ICD-10-CM

## 2024-07-22 DIAGNOSIS — M17.0 OSTEOARTHRITIS OF BOTH KNEES, UNSPECIFIED OSTEOARTHRITIS TYPE: ICD-10-CM

## 2024-07-22 DIAGNOSIS — Z74.2 HOME HELP NEEDED: ICD-10-CM

## 2024-07-22 DIAGNOSIS — Z74.09 MOBILITY IMPAIRED: ICD-10-CM

## 2024-07-22 PROCEDURE — 99214 OFFICE O/P EST MOD 30 MIN: CPT | Mod: 95 | Performed by: INTERNAL MEDICINE

## 2024-07-22 ASSESSMENT — PATIENT HEALTH QUESTIONNAIRE - PHQ9
SUM OF ALL RESPONSES TO PHQ QUESTIONS 1-9: 12
SUM OF ALL RESPONSES TO PHQ QUESTIONS 1-9: 12
10. IF YOU CHECKED OFF ANY PROBLEMS, HOW DIFFICULT HAVE THESE PROBLEMS MADE IT FOR YOU TO DO YOUR WORK, TAKE CARE OF THINGS AT HOME, OR GET ALONG WITH OTHER PEOPLE: NOT DIFFICULT AT ALL

## 2024-07-22 NOTE — TELEPHONE ENCOUNTER
Patient calling re: home care referral.    Wanting to know if the home care referral they discussed at her visit early was placed.    Advised that yes, Dr. Saavedra put in the referral. Should expect a call within 48 hours from the home care agency and if she doesn't receive one, to call the clinic back.    Pt verbalized understanding.    Catherine Amador, RN, BSN  [facility]

## 2024-07-22 NOTE — PROGRESS NOTES
"  If patient has telephone visit, have they been educated on video visit as preferred visit method and offered to change to video visit? NOT APPLICABLE        Instructions Relayed to Patient by Virtual Roomer:     Patient is active on "[employer]:   Relayed following to patient: \"It looks like you are active on "[employer] are you able to join the visit this way? If not, do you need us to send you a link now or would you like your provider to send a link via text or email when they are ready to initiate the visit?\"      Patient Confirmed they will join visit via: Text Link to Cell Phone  Reminded patient to ensure they were logged on to virtual visit by arrival time listed.   Asked if patient has flexibility to initiate visit sooner than arrival time: patient stated yes, documented in appointment notes availability to initiate visit earlier than arrival time     If pediatric virtual visit, ensured pediatric patient along with parent/guardian will be present for video visit.     Patient offered the website [email or web address] and/or phone number to "[employer] Help line: [phone]     Answers submitted by the patient for this visit:  Patient Health Questionnaire (Submitted on 7/22/2024)  If you checked off any problems, how difficult have these problems made it for you to do your work, take care of things at home, or get along with other people?: Not difficult at all  PHQ9 TOTAL SCORE: 12  General Questionnaire (Submitted on 7/16/2024)  Chief Complaint: Chronic problems general questions HPI Form  What is the reason for your visit today? : requesting referral to [facility]  How many servings of fruits and vegetables do you eat daily?: 0-1  On average, how many sweetened beverages do you drink each day (Examples: soda, juice, sweet tea, etc.  Do NOT count diet or artificially sweetened beverages)?: 1  How many minutes a day do you exercise enough to make your heart beat faster?: 9 or less  How many days a " week do you exercise enough to make your heart beat faster?: 3 or less  How many days per week do you miss taking your medication?: 0  Radha is a 73 year old who is being evaluated via a billable video visit.    How would you like to obtain your AVS? Mail a copy  If the video visit is dropped, the invitation should be resent by: Text to cell phone: [phone]  Will anyone else be joining your video visit? No      Assessment & Plan     Impaired mobility and activities of daily living  She has arthritis of both knees  She tells me she is unable to mobilize very much with this and would like to get physical therapy and physical Occupational Therapy  I have reviewed the chart extensively  It is clearly documented in the chart that she has not made progress with physical therapy and she was refusing to follow the therapy recommendations  [facility]  I told her very clearly that she should contact her  at [facility]  Patient tells me she did that without much benefit  I see that her primary care was helping patient to do a elderly waiver to go to assisted living   Patient tells me she is not keen to go there as she does not want to lose her home and spend money  She is insisting on physical therapy and Occupational Therapy referral  I clearly told her that a provider has already documented that she is not a candidate for this therapist anymore however patient Insisting to Me That She Knows What Is Best to Her and She Wants These Referrals to Be Made  Since she is not my patient and I do not know much about her and she told me that she is willing to participate in therapy this time I have to trust her made the referrals for home care again  However I clearly told her that  more likely than not they will refuse her  She kept on insisting that she needs these therapies  I told her to discuss this with the home care if she  is accepted by them  If she is not accepted by them she asked me what  her options were  I told her she needs to talk with her insurance company and find out about other home care providers  However patient is not keen to have any other home care providers and wants only [facility]  I told her that it is up to [facility] whether they accept or refuse her and I can only send referral  - Home Care Referral    Mobility impaired  She wants home care for PT and OT  - Home Care Referral    Home help needed  She does not want to go to  assisted living  I asked her to get in touch with her [facility]  but she tells me that she did that without much benefit and nobody got in touch with her  - Home Care Referral    Osteoarthritis of both knees, unspecified osteoarthritis type  As above she wants OT and PT  - Home Care Referral                Subjective   Radha is a 73 year old, presenting for the following health issues:  No chief complaint on file.        7/22/2024    12:38 PM   Additional Questions   Roomed by Aime   Accompanied by self       Video Start Time:  200 pm    History of Present Illness       Reason for visit:  [facility]    She eats 0-1 servings of fruits and vegetables daily.She consumes 1 sweetened beverage(s) daily.She exercises with enough effort to increase her heart rate 9 or less minutes per day.  She exercises with enough effort to increase her heart rate 3 or less days per week.   She is taking medications regularly.               Review of Systems  Constitutional, HEENT, cardiovascular, pulmonary, gi and gu systems are negative, except as otherwise noted.      Objective           Vitals:  No vitals were obtained today due to virtual visit.    Physical Exam   GENERAL: alert and no distress  EYES: Eyes grossly normal to inspection.  No discharge or erythema, or obvious scleral/conjunctival abnormalities.  RESP: No audible wheeze, cough, or visible cyanosis.    SKIN: Visible skin clear. No significant rash, abnormal pigmentation or  lesions.  NEURO: Cranial nerves grossly intact.  Mentation and speech appropriate for age.  PSYCH: Appropriate affect, tone, and pace of words          Video-Visit Details    Type of service:  Video Visit   Video End Time: 230 pm  Originating Location (pt. Location): Home    Distant Location (provider location):  Off-site  Platform used for Video Visit: Lizeth  Signed Electronically by: Cole Saavedra MD

## 2024-07-23 DIAGNOSIS — N39.0 RECURRENT UTI: ICD-10-CM

## 2024-07-23 RX ORDER — TRIMETHOPRIM 100 MG/1
100 TABLET ORAL AT BEDTIME
Qty: 90 TABLET | Refills: 3 | Status: SHIPPED | OUTPATIENT
Start: 2024-07-23

## 2024-07-24 ENCOUNTER — MEDICAL CORRESPONDENCE (OUTPATIENT)
Dept: HEALTH INFORMATION MANAGEMENT | Facility: CLINIC | Age: 74
End: 2024-07-24

## 2024-07-25 ENCOUNTER — TELEPHONE (OUTPATIENT)
Dept: FAMILY MEDICINE | Facility: CLINIC | Age: 74
End: 2024-07-25
Payer: COMMERCIAL

## 2024-07-25 NOTE — TELEPHONE ENCOUNTER
EMERGENCY DEPARTMENT ENCOUNTER      NAME: Paradise Yuan  AGE: 72 year old female  YOB: 1949  MRN: [id number]  EVALUATION DATE & TIME: 2021  8:12 AM    PCP: Kodak Smith    ED PROVIDER: Flakita Goodman M.D.        Chief Complaint   Patient presents with     Stroke Symptoms     last normal prior to          FINAL IMPRESSION:    1. Ischemic stroke (H)    2. Facial droop    3. Right arm weakness    4. Right leg weakness            MEDICAL DECISION MAKIN year old female Who presents emergency department with facial droop, right arm and leg weakness that began .  They presented to primary care today who referred to the ER.  In the ER was found to have subacute and more acute strokes.  Plan at this time is admission to the hospital for neurology consultation as the MRI shows some vessel narrowing.  Not a TPA or emergent IR intervention candidate as her initial symptoms began nearly 3 weeks ago.  Patient will be transferred to [facility] to a neuro telemetry bed as accepted by Dr. Blevins, hospitalist.          ED COURSE:  9:07 AM Patient seen and examined by me.  Clinically seems like a stroke on exam.  Plan is to start with CT scan of the head and if this is negative move onto MRI imaging.  Symptoms began on  and therefore patient is not a TPA or IR intervention candidate.    10:54 AM I spoke with neurologist, Dr. Martin.  Recommends doing a brain MRI including the carotids to look for any critical stenosis.  These results will help determine disposition such as inpatient evaluation versus outpatient evaluation.    1:10 PM  Awaiting MRI results and UA results.    1:53 PM  Talk with the neuroradiologist who reports that the MRI shows multiple strokes.  She has some area in the left middle cerebellar area that looks probably a couple weeks old which would be consistent with her reported history of .  Though they do note some other changes and possible  Please call homecare and give the following orders    Skilled Nursing  initial evaluation and treatment (one time)      Physical Therapy  initial certification (first set of orders)   Frequency:  1x/wk for 5 wks  then Every other x/wk for 4 wks     Occupational Therapy   initial evaluation and treatment (one time)      Social Work   initial evaluation and treatment (one time)      HHA (Home Health Aide)  initial certification (first set of orders)   Frequency:  1x/wk for 4 wks  For assistance with showers     I did not prescribe the trimethoprim  Trimethoprim as prescribed by the urologist so they should have checked for the interactions at the time of prescription  Regardless this patient's potassium has been stable and is being monitored on a regular basis so we are okay for her to take trimethoprim and lisinopril      neuro strokes it may be only a few days old.  They do note some decreased flow in these vessels in general.  The neck vessels look okay.  We will discuss this with neurology but since she has these areas that look like new or strokes I will err on the side of admitting her.    3:09 PM  Patient has been accepted to the hospital at [facility] by Dr. Blevins, hospitalist.  She will go to neuro telemetry.  Blood pressures have been in the target range of 150-180 systolic.  I did update patient and one family member at bedside.  Initial family member, nephew, who was the primary person stepped out and will be coming back at some point in the near future.  He will need to be updated at that time as well.  Testing family and patient are aware of the need to transfer to another facility though given that we do not care for acute stroke patients here at [facility].    3:14 PM  Nephrajinder return to the emergency department and he was updated on all results.    At this time I do not think that the headache represents SAH, glaucoma, temporal arteritis, sinus thrombosis, vascular dissection, pseudotumor cerebri, meningitis, enchephalitis, hypertensive urgency or emergency, or other such etiologies.    COVID-19 PPE worn during patient evaluation:  Mask: n95 and homemade masks  Eye Protection: none  Gown: none  Hair cover: yes  Face shield: none  Patient wearing a mask: yes    At the conclusion of the encounter I discussed the results of all of the tests and the disposition. Their questions were answered. The patient (and any family present) acknowledged understanding and were agreeable with the care plan.      CONSULTANTS:  Neurologist, Dr. Martin   Radiologist - Dr. Myrick      MEDICATIONS GIVEN IN THE EMERGENCY:  Medications   aspirin (ASA) tablet 325 mg (has no administration in time range)           NEW PRESCRIPTIONS STARTED AT TODAY'S ER VISIT     Medication List      There are no discharge medications for this visit.              CONDITION:  stable        DISPOSITION:  Transfer to [facility] as accepted by Dr. Blevins hospitalist         =================================================================  =================================================================    HPI    Patient information was obtained from: Patient and nephew     Use of Intrepreter: N/A      Paradise ACUÑA Kwabena is a 72 year old female with history of HTN, CKD stage 3, diabetes mellitus II, and memory problems, who presents to the ER with complaints of stroke symptoms.    Since returning from a vacation on 7/6/21, nephew reports patient has had slurred speech, right sided facial droop, and weakness of her arms and legs. Patient's family reports they had not noticed patient's symptoms before the vacation and are unsure when symptoms first started.  Several members of the family were home during this time of early July but none of them noticed the symptoms until the nephew returned.  Patient was seeing her PCP earlier this morning and was referred to the ED for probably stroke. Nephew reports he believes patient is currently seeing a neurologist for memory issues. Patient reports a fall on 7/11/21, and states she thinks it was due to tripping. Patient denies headache, chest pain, abdominal pain, and infectious symptoms. She has no other complaints at this time.      REVIEW OF SYSTEMS  Review of Systems   Constitutional: Negative for fever.   Respiratory: Negative for cough and shortness of breath.    Cardiovascular: Negative for chest pain.   Gastrointestinal: Negative for abdominal pain, diarrhea, nausea and vomiting.   Genitourinary: Negative for dysuria.   Musculoskeletal: Positive for gait problem.   Allergic/Immunologic: Negative for immunocompromised state.   Neurological: Positive for speech difficulty and weakness. Negative for headaches.   Psychiatric/Behavioral: Negative for confusion.   All other systems reviewed and are  negative.        PAST MEDICAL HISTORY:  Past Medical History:   Diagnosis Date     Hypertension          PAST SURGICAL HISTORY:  Past Surgical History:   Procedure Laterality Date     IR MISCELLANEOUS PROCEDURE  3/8/2003     IR MISCELLANEOUS PROCEDURE  3/8/2003     IR MISCELLANEOUS PROCEDURE  3/8/2003     IR MISCELLANEOUS PROCEDURE  3/8/2003     IR MISCELLANEOUS PROCEDURE  3/8/2003     IR MISCELLANEOUS PROCEDURE  3/10/2003     IR MISCELLANEOUS PROCEDURE  3/11/2003     IR MISCELLANEOUS PROCEDURE  3/11/2003     IR MISCELLANEOUS PROCEDURE  3/11/2003     IR MISCELLANEOUS PROCEDURE  3/11/2003     IR MISCELLANEOUS PROCEDURE  3/12/2003     IR MISCELLANEOUS PROCEDURE  3/12/2003     IR MISCELLANEOUS PROCEDURE  3/12/2003     IR MISCELLANEOUS PROCEDURE  3/12/2003     IR MISCELLANEOUS PROCEDURE  3/16/2003     IR MISCELLANEOUS PROCEDURE  3/16/2003     IR MISCELLANEOUS PROCEDURE  3/16/2003     IR MISCELLANEOUS PROCEDURE  3/16/2003     IR MISCELLANEOUS PROCEDURE  3/16/2003     IR MISCELLANEOUS PROCEDURE  3/16/2003     IR MISCELLANEOUS PROCEDURE  3/16/2003     IR MISCELLANEOUS PROCEDURE  3/16/2003     IR SPINAL ANGIOGRAM  3/16/2003     IR SPINAL ANGIOGRAM  3/16/2003     IR SPINAL ANGIOGRAM  3/16/2003     IR SPINAL ANGIOGRAM  3/16/2003     IR SPINAL ANGIOGRAM  3/16/2003     IR SPINAL ANGIOGRAM  3/16/2003     IR SPINAL ANGIOGRAM  3/16/2003     IR SPINAL ANGIOGRAM  3/16/2003     IR THORACIC AORTOGRAM  3/16/2003     IR VISCERAL ANGIOGRAM  3/8/2003     IR VISCERAL ANGIOGRAM  3/8/2003     IR VISCERAL ANGIOGRAM  3/10/2003     IR VISCERAL ANGIOGRAM  3/11/2003         CURRENT MEDICATIONS:    Prior to Admission medications    Medication Sig Start Date End Date Taking? Authorizing Provider   atenoloL (TENORMIN) 50 MG tablet [ATENOLOL (TENORMIN) 50 MG TABLET] Take 1 tablet (50 mg total) by mouth daily. 5/25/21   Kodak Smith MD   atorvastatin (LIPITOR) 20 MG tablet [ATORVASTATIN (LIPITOR) 20 MG TABLET] Take 1 tablet (20 mg total) by mouth at  "bedtime. 5/25/21   Kodak Smith MD   blood glucose test strips [BLOOD GLUCOSE TEST STRIPS] Use 1 each As Directed 2 (two) times a day. Test blood sugar twice a day 10/27/20   Kodak Smith MD   blood-glucose meter Misc [BLOOD-GLUCOSE METER MISC] Test blood sugar twice a day 10/27/20   Kodak Smith MD   generic lancets [GENERIC LANCETS] Use 1 each As Directed 2 (two) times a day. Test blood sugar twice a day 10/27/20   Kodak Smith MD   insulin glargine (LANTUS SOLOSTAR PEN) 100 unit/mL (3 mL) pen [INSULIN GLARGINE (LANTUS SOLOSTAR PEN) 100 UNIT/ML (3 ML) PEN] Inject 16 Units under the skin at bedtime. 4/20/21   Kodak Smith MD   lisinopriL (PRINIVIL,ZESTRIL) 20 MG tablet [LISINOPRIL (PRINIVIL,ZESTRIL) 20 MG TABLET] Take one tab daily 5/25/21   Kodak Smith MD   pen needle, diabetic 31 gauge x 1/4\" Ndle [PEN NEEDLE, DIABETIC 31 GAUGE X 1/4\" NDLE] Use one daily as directed 10/28/20   Kodak Smith MD         ALLERGIES:  No Known Allergies      FAMILY HISTORY:  Family History   Problem Relation Age of Onset     Breast Cancer No family hx of          SOCIAL HISTORY:  Social History     Socioeconomic History     Marital status: Single     Spouse name: Not on file     Number of children: Not on file     Years of education: Not on file     Highest education level: Not on file   Occupational History     Not on file   Tobacco Use     Smoking status: Never Smoker     Smokeless tobacco: Never Used   Substance and Sexual Activity     Alcohol use: No     Drug use: No     Sexual activity: Not on file   Other Topics Concern     Not on file   Social History Narrative     Not on file     Social Determinants of Health     Financial Resource Strain:      Difficulty of Paying Living Expenses:    Food Insecurity:      Worried About Running Out of Food in the Last Year:      Ran Out of Food in the Last Year:    Transportation Needs:      Lack of Transportation (Medical):      Lack of Transportation (Non-Medical):    Physical Activity:      Days of " Exercise per Week:      Minutes of Exercise per Session:    Stress:      Feeling of Stress :    Social Connections:      Frequency of Communication with Friends and Family:      Frequency of Social Gatherings with Friends and Family:      Attends [employer] Services:      Active Member of Clubs or Organizations:      Attends Club or Organization Meetings:      Marital Status:    Intimate Partner Violence:      Fear of Current or Ex-Partner:      Emotionally Abused:      Physically Abused:      Sexually Abused:          VITALS:  Patient Vitals for the past 24 hrs:   BP Temp Temp src Pulse Resp SpO2   07/22/21 1200 (!) 162/88 -- -- 81 25 98 %   07/22/21 1130 (!) 165/88 -- -- 85 22 97 %   07/22/21 1100 (!) 155/109 -- -- 78 16 96 %   07/22/21 1045 -- -- -- 76 18 97 %   07/22/21 1030 (!) 167/81 -- -- 79 16 97 %   07/22/21 1000 (!) 150/91 -- -- -- -- --   07/22/21 0944 (!) 160/84 -- -- 76 -- 98 %   07/22/21 0900 (!) 149/90 -- -- 81 23 --   07/22/21 0830 (!) 166/84 -- -- 84 (!) 7 98 %   07/22/21 0820 (!) 169/86 98.2  F (36.8  C) Oral 87 -- 97 %       Wt Readings from Last 3 Encounters:   07/22/21 48.7 kg (107 lb 7 oz)   05/25/21 52.2 kg (115 lb)   04/20/21 50.1 kg (110 lb 8 oz)         PHYSICAL EXAM    Constitutional:  Well developed, Well nourished, NAD, GCS 15  HENT:  Normocephalic, Atraumatic, Bilateral external ears normal, Nose normal. Neck- Normal range of motion, No tenderness, Supple, No stridor.   Eyes:  PERRL, EOMI, Conjunctiva normal, No discharge.  Respiratory:  Normal breath sounds, No respiratory distress, No wheezing, Speaks full sentences easily. No cough.  Cardiovascular:  Normal heart rate, Regular rhythm, No murmurs, No rubs, No gallops. Chest wall nontender.   GI:  No excessive obesity.  Bowel sounds normal, Soft, No tenderness, No masses, No flank tenderness. No rebound or guarding.  : deferred  Musculoskeletal: 2+ DP pulses. No pitting edema. No cyanosis, No clubbing. Good range of motion in all  major joints. No tenderness to palpation or major deformities noted. No tenderness of the CTLS spine.   Integument:  Warm, Dry, No erythema, No rash.  No petechiae.   Neurologic:  Alert & interactive, +right facial droop, westley at mouth. Normal facial and extremity sensation. +right arm and leg pronator drip (hits bed in less then 10 seconds)  Psychiatric:  Affect normal, Judgment normal, Mood normal. Cooperative          LAB:  All pertinent labs reviewed and interpreted.  Recent Results (from the past 24 hour(s))   Hemoglobin A1c    Collection Time: 07/22/21  7:23 AM   Result Value Ref Range    Hemoglobin A1C 10.0 (H) 0.0 - 5.6 %   CBC (+ platelets, no diff)    Collection Time: 07/22/21  9:47 AM   Result Value Ref Range    WBC Count 12.4 (H) 4.0 - 11.0 10e3/uL    RBC Count 4.56 3.80 - 5.20 10e6/uL    Hemoglobin 13.3 11.7 - 15.7 g/dL    Hematocrit 40.8 35.0 - 47.0 %    MCV 90 78 - 100 fL    MCH 29.2 26.5 - 33.0 pg    MCHC 32.6 31.5 - 36.5 g/dL    RDW 13.1 10.0 - 15.0 %    Platelet Count 318 150 - 450 10e3/uL   Basic metabolic panel    Collection Time: 07/22/21  9:47 AM   Result Value Ref Range    Sodium 141 136 - 145 mmol/L    Potassium 4.2 3.5 - 5.0 mmol/L    Chloride 106 98 - 107 mmol/L    Carbon Dioxide (CO2) 23 22 - 31 mmol/L    Anion Gap 12 5 - 18 mmol/L    Urea Nitrogen 29 (H) 8 - 28 mg/dL    Creatinine 1.15 (H) 0.60 - 1.10 mg/dL    Calcium 9.2 8.5 - 10.5 mg/dL    Glucose 135 (H) 70 - 125 mg/dL    GFR Estimate 48 (L) >60 mL/min/1.73m2   INR    Collection Time: 07/22/21  9:47 AM   Result Value Ref Range    INR 1.03 0.85 - 1.15   PTT    Collection Time: 07/22/21  9:47 AM   Result Value Ref Range    aPTT 26 22 - 38 Seconds   Magnesium    Collection Time: 07/22/21  9:47 AM   Result Value Ref Range    Magnesium 2.1 1.8 - 2.6 mg/dL   TSH with free T4 reflex    Collection Time: 07/22/21  9:47 AM   Result Value Ref Range    TSH 0.90 0.30 - 5.00 uIU/mL       No results found for: ABORH        RADIOLOGY:  Reviewed all  pertinent imaging. Please see official radiology report.    MR Brain COW Carotid wwo Contrast   Final Result   CONCLUSION:   HEAD MRI:   1.  There are recent infarcts of varying age within the left middle cerebral artery territory affecting the deep frontal white matter and cortex at the frontoparietal junction.   2.  Infarcts involving the left corona radiata/deep white matter and to lesser extent frontoparietal cortex appear to be acute to early subacute in age.   3.  Infarct affecting the left insula and to lesser extent frontoparietal cortex appear to represent slightly older subacute infarcts.   4.  No acute hemorrhage.   5.  A 3 cm calcified mass within the left cerebellum could represent a meningioma or cavernoma. It demonstrates at most minimal associated enhancement. No adjacent edema.      HEAD MRA:   1.  Diminished flow within the left middle cerebral artery beyond the distal M1 segment favored to reflect occlusion or high-grade stenosis of the superior/anterior division and slowed flow within the posterior/inferior division.   2.  Otherwise patent intracranial circulation.      NECK MRA:   1.  No evidence for dissection or hemodynamically significant narrowing in the neck based on NASCET criteria.      Findings were discussed with Dr. MORIAH CROWE via telephone at 1353 hours on 07/22/2021.      Cervical spine CT w/o contrast   Final Result   IMPRESSION:   1.  No fracture or posttraumatic subluxation.   2.  No high-grade spinal canal or neural foraminal stenosis.      Head CT w/o contrast   Final Result   IMPRESSION:   1.  Presumed large partially calcified meningioma in the left side of the posterior fossa measuring 3.4 x 3.0 cm.   2.  No CT evidence for acute intracranial process.   3.  Brain atrophy and presumed chronic microvascular ischemic changes as above.            EKG:    Performed at: 8:28am    Impression: Sinus rhythm at 88 bpm.  Flipped T waves noted in lead aVR.  OK interval 146  ms, QRS 64 ms,  ms.  Specific ST changes compared to prior EKG from March 8, 2003.    I have independently reviewed and interpreted the EKG(s) documented above.        PROCEDURES:  none      I, Anthony Clark, am serving as a scribe to document services personally performed by Dr. Flakita Goodman based on my observation and the provider's statements to me. I, Dr. Flakita Goodman MD attest that Anthony Clark is acting in a scribe capacity, has observed my performance of the services and has documented them in accordance with my direction.        Flakita Goodman M.D. formerly [facility]  Emergency Medicine and Medical Toxicology  Formerly [facility] EMERGENCY ROOM  [address] [id number] [phone]  Dept: [phone]           Flakita Goodman MD  07/22/21 7829

## 2024-07-25 NOTE — TELEPHONE ENCOUNTER
Called back and left detailed message.    Anastasiia Bridges RN    [facility]- Primary Care

## 2024-07-25 NOTE — TELEPHONE ENCOUNTER
Home Care is calling regarding an established patient with [facility].        11/15/2023     1:50 PM 9/13/2023     9:46 AM   Home Care Information   Date of Home Care episode start  9/13/2023   Current following provider Dr. Trice Medeiros     Name/Phone Number Kalee  Mavis, [phone]   Home Care agency [facility]     Requesting orders from: Cole Saavedra  Provider is following patient: Yes  Is this a 60-day recertification request?  No    Orders Requested    Skilled Nursing  Request for initial evaluation and treatment (one time)     Physical Therapy  Request for initial certification (first set of orders)   Frequency:  1x/wk for 5 wks  then Every other x/wk for 4 wks    Occupational Therapy  Request for initial evaluation and treatment (one time)     Social Work  Request for initial evaluation and treatment (one time)     HHA (Home Health Aide)  Request for initial certification (first set of orders)   Frequency:  1x/wk for 4 wks  For assistance with showers    Also wants provider to be aware of med interaction between lisinopril and Trimethoprim.  Trimethoprim ordered by urologist on 7/23/24.     Verbal orders given for SN, OT and SW Eval and Treats.  Information was gathered for PT and HHA initial orders.  Provider review needed.  RN will contact Home Care with information after provider review.  Confirmed ok to leave a detailed message with call back.  Contact information confirmed and updated as needed.    Kristina M Kjellberg, RN

## 2024-07-31 ENCOUNTER — TELEPHONE (OUTPATIENT)
Dept: FAMILY MEDICINE | Facility: CLINIC | Age: 74
End: 2024-07-31
Payer: COMMERCIAL

## 2024-07-31 NOTE — TELEPHONE ENCOUNTER
Please call the homecare and convey the below information      Skilled Nursing  initial certification (first set of orders)   Frequency:  1x/wk for 4 wks  2x/month for 1 months    I have not managed this patient's diabetes till now and is being managed by her prior PCP  If this patient wants to change her glucometer and to discuss about diabetes she has to make  an appointment so that we can discuss about continuous glucose monitors

## 2024-07-31 NOTE — TELEPHONE ENCOUNTER
Home Care is calling regarding an established patient with [facility].        11/15/2023     1:50 PM 9/13/2023     9:46 AM   Home Care Information   Date of Home Care episode start  9/13/2023   Current following provider Dr. Trice Medeiros     Name/Phone Number Kalee  Mavis, [phone]   Home Care agency [facility]     Requesting orders from: Cole Saavedra  Provider is following patient: Yes  Is this a 60-day recertification request?  No    Orders Requested    Skilled Nursing  Request for initial certification (first set of orders)   Frequency:  1x/wk for 4 wks  2x/month for 1 months      Also asking if the Blood glucose monitor could be changed to a Dexacom or Continuous monitor due to patient's sight issues.        Information was gathered and will be sent to provider for review.  RN will contact Home Care with information after provider review.  Confirmed ok to leave a detailed message with call back.  Contact information confirmed and updated as needed.    Kristina M Kjellberg, RN

## 2024-08-01 NOTE — TELEPHONE ENCOUNTER
Writer attempted to call Jase (Home Care). Patient picked up phone. Patient stated Jase is her RN with [facility]. Writer notes that orders below were stated to be taken from Jase at [facility]. [facility] was discharged at the beginning of July.    Writer unable to find Jase or [facility] anywhere within patient's chart.    Located [facility] number online and was able to access Home Care RN.     Writer was able to talk to Jase and relay message below. Jase states patient would not be able to check it on her own, probably should be a virtual visit. When he went to start nursing services, glucometer was sitting there and unwrapped and not sure the last time she used it. Recommended to set up appointment with PCP to change glucometer and discuss diabetes.    Writer calling patient to schedule with PCP. She verbalized understanding and had no further questions or concerns. Appointment made for patient.       Anselmo Elizondo RN  [facility]

## 2024-08-05 ENCOUNTER — VIRTUAL VISIT (OUTPATIENT)
Dept: FAMILY MEDICINE | Facility: CLINIC | Age: 74
End: 2024-08-05
Payer: COMMERCIAL

## 2024-08-05 DIAGNOSIS — N18.31 TYPE 2 DIABETES MELLITUS WITH STAGE 3A CHRONIC KIDNEY DISEASE, WITH LONG-TERM CURRENT USE OF INSULIN (H): ICD-10-CM

## 2024-08-05 DIAGNOSIS — Z79.4 TYPE 2 DIABETES MELLITUS WITH STAGE 3A CHRONIC KIDNEY DISEASE, WITH LONG-TERM CURRENT USE OF INSULIN (H): Primary | ICD-10-CM

## 2024-08-05 DIAGNOSIS — N18.31 TYPE 2 DIABETES MELLITUS WITH STAGE 3A CHRONIC KIDNEY DISEASE, WITH LONG-TERM CURRENT USE OF INSULIN (H): Primary | ICD-10-CM

## 2024-08-05 DIAGNOSIS — E11.22 TYPE 2 DIABETES MELLITUS WITH STAGE 3A CHRONIC KIDNEY DISEASE, WITH LONG-TERM CURRENT USE OF INSULIN (H): ICD-10-CM

## 2024-08-05 DIAGNOSIS — E11.22 TYPE 2 DIABETES MELLITUS WITH STAGE 3A CHRONIC KIDNEY DISEASE, WITH LONG-TERM CURRENT USE OF INSULIN (H): Primary | ICD-10-CM

## 2024-08-05 DIAGNOSIS — Z79.4 TYPE 2 DIABETES MELLITUS WITH STAGE 3A CHRONIC KIDNEY DISEASE, WITH LONG-TERM CURRENT USE OF INSULIN (H): ICD-10-CM

## 2024-08-05 PROCEDURE — 99214 OFFICE O/P EST MOD 30 MIN: CPT | Mod: 95 | Performed by: INTERNAL MEDICINE

## 2024-08-05 RX ORDER — ACYCLOVIR 400 MG/1
1 TABLET ORAL
Qty: 9 EACH | Refills: 5 | Status: SHIPPED | OUTPATIENT
Start: 2024-08-05

## 2024-08-05 RX ORDER — ACYCLOVIR 400 MG/1
1 TABLET ORAL ONCE
Qty: 1 EACH | Refills: 0 | Status: SHIPPED | OUTPATIENT
Start: 2024-08-05 | End: 2024-08-05

## 2024-08-05 ASSESSMENT — PATIENT HEALTH QUESTIONNAIRE - PHQ9: SUM OF ALL RESPONSES TO PHQ QUESTIONS 1-9: 6

## 2024-08-05 NOTE — PROGRESS NOTES
"  If patient has telephone visit, have they been educated on video visit as preferred visit method and offered to change to video visit? No- already video        Instructions Relayed to Patient by Virtual Roomer:     Patient is active on mInfo:   Relayed following to patient: \"It looks like you are active on mInfo, are you able to join the visit this way? If not, do you need us to send you a link now or would you like your provider to send a link via text or email when they are ready to initiate the visit?\"      Patient Confirmed they will join visit via: link through phone  Reminded patient to ensure they were logged on to virtual visit by arrival time listed.   Asked if patient has flexibility to initiate visit sooner than arrival time: patient stated yes, documented in appointment notes availability to initiate visit earlier than arrival time     If pediatric virtual visit, ensured pediatric patient along with parent/guardian will be present for video visit.     Patient offered the website [email or web address] and/or phone number to mInfo Help line: [phone]   Radha is a 73 year old who is being evaluated via a billable video visit.    How would you like to obtain your AVS? [employer]  If the video visit is dropped, the invitation should be resent by: Text to cell phone: [phone]  Will anyone else be joining your video visit? No      Assessment & Plan     Type 2 diabetes mellitus with stage 3a chronic kidney disease, with long-term current use of insulin (H)  Today she wanted to discuss about family continuous glucose monitor  I explained to her that probably the insurance will cover this since she is not on insulin  She has trouble using the glucometer due to dexterity and also eyesight  I also discussed with her about the diabetes management  She is on metformin and glipizide XL  In this elderly lady who lives alone using glipizide XL is probably not a good idea since it can cause " hypoglycemia  I told her that we will stop this and substitute with Januvia  Her last A1c is good at 6.7  - Continuous Glucose  (DEXCOM G7 ) AYAN; 1 each once for 1 dose Use to read blood sugars as per manufacturers instructions  - Continuous Glucose Sensor (DEXCOM G7 SENSOR) MISC; 1 each every 10 days Change sensor every 10 days  - sitagliptin (JANUVIA) 100 MG tablet; Take 1 tablet (100 mg) by mouth daily    Type 2 diabetes mellitus with stage 3a chronic kidney disease, with long-term current use of insulin (H)  Will get a CGM ordered for her however I am not sure if this is going to be covered by the insurance  - Continuous Glucose  (DEXCOM G7 ) AYAN; 1 each once for 1 dose Use to read blood sugars as per manufacturers instructions  - Continuous Glucose Sensor (DEXCOM G7 SENSOR) MISC; 1 each every 10 days Change sensor every 10 days  - sitagliptin (JANUVIA) 100 MG tablet; Take 1 tablet (100 mg) by mouth daily                Domonique Garcia is a 73 year old, presenting for the following health issues:  No chief complaint on file.      Video Start Time:  300 PM    History of Present Illness       Reason for visit:  Diabetes    She eats 0-1 servings of fruits and vegetables daily.She consumes 0 sweetened beverage(s) daily.She exercises with enough effort to increase her heart rate 9 or less minutes per day.  She exercises with enough effort to increase her heart rate 3 or less days per week.   She is taking medications regularly.       Diabetes Follow-up    How often are you checking your blood sugar? Patient is not able to read her readings  What concerns do you have today about your diabetes? Maybe switch to dexcom so patient would like to talk about   Do you have any of these symptoms? (Select all that apply)  Numbness in feet  Have you had a diabetic eye exam in the last 12 months? No        BP Readings from Last 2 Encounters:   06/06/24 136/83   08/16/22 101/68     Hemoglobin  A1C (%)   Date Value   06/06/2024 6.7 (H)   06/26/2023 6.2 (H)   06/11/2021 8.4 (H)   10/19/2020 8.7 (H)     LDL Cholesterol Calculated (mg/dL)   Date Value   06/06/2024 142 (H)   06/26/2023 74   01/07/2020 78   09/27/2019 106 (H)         How many servings of fruits and vegetables do you eat daily?  0-1  On average, how many sweetened beverages do you drink each day (Examples: soda, juice, sweet tea, etc.  Do NOT count diet or artificially sweetened beverages)?   0  How many days per week do you exercise enough to make your heart beat faster? 3 or less  How many minutes a day do you exercise enough to make your heart beat faster? 9 or less  How many days per week do you miss taking your medication? 0        Review of Systems  Constitutional, HEENT, cardiovascular, pulmonary, gi and gu systems are negative, except as otherwise noted.      Objective           Vitals:  No vitals were obtained today due to virtual visit.    Physical Exam   GENERAL: alert and no distress  EYES: Eyes grossly normal to inspection.  No discharge or erythema, or obvious scleral/conjunctival abnormalities.  RESP: No audible wheeze, cough, or visible cyanosis.    SKIN: Visible skin clear. No significant rash, abnormal pigmentation or lesions.  NEURO: Cranial nerves grossly intact.  Mentation and speech appropriate for age.  PSYCH: Appropriate affect, tone, and pace of words          Video-Visit Details    Type of service:  Video Visit   Video End Time: 315 PM  Originating Location (pt. Location): Home    Distant Location (provider location):  Off-site  Platform used for Video Visit: Lizeth  Signed Electronically by: Cole Saavedra MD

## 2024-08-07 ENCOUNTER — TELEPHONE (OUTPATIENT)
Dept: FAMILY MEDICINE | Facility: CLINIC | Age: 74
End: 2024-08-07
Payer: COMMERCIAL

## 2024-08-07 NOTE — TELEPHONE ENCOUNTER
Patient is requesting new prescription for jardiance 25mg.    Please review and verify, send if appropriate  Did not see on active med list please verify and send new rx. Thank you!  Rachel spec/mail pharmacy  [phone]

## 2024-08-08 ENCOUNTER — VIRTUAL VISIT (OUTPATIENT)
Dept: UROLOGY | Facility: CLINIC | Age: 74
End: 2024-08-08
Payer: COMMERCIAL

## 2024-08-08 VITALS — WEIGHT: 180 LBS | HEIGHT: 67 IN | BODY MASS INDEX: 28.25 KG/M2

## 2024-08-08 DIAGNOSIS — R35.0 FREQUENCY OF URINATION: ICD-10-CM

## 2024-08-08 DIAGNOSIS — N32.81 OVERACTIVE BLADDER: ICD-10-CM

## 2024-08-08 DIAGNOSIS — N39.41 URGE INCONTINENCE: Primary | ICD-10-CM

## 2024-08-08 DIAGNOSIS — N39.0 RECURRENT UTI: ICD-10-CM

## 2024-08-08 DIAGNOSIS — N95.8 GENITOURINARY SYNDROME OF MENOPAUSE: ICD-10-CM

## 2024-08-08 PROCEDURE — 99214 OFFICE O/P EST MOD 30 MIN: CPT | Mod: 95 | Performed by: PHYSICIAN ASSISTANT

## 2024-08-08 RX ORDER — MIRABEGRON 50 MG/1
50 TABLET, EXTENDED RELEASE ORAL DAILY
Qty: 90 TABLET | Refills: 3 | Status: SHIPPED | OUTPATIENT
Start: 2024-08-08 | End: 2024-10-03

## 2024-08-08 RX ORDER — TROSPIUM CHLORIDE ER 60 MG/1
60 CAPSULE ORAL
Qty: 90 CAPSULE | Refills: 3 | Status: SHIPPED | OUTPATIENT
Start: 2024-08-08

## 2024-08-08 ASSESSMENT — PAIN SCALES - GENERAL: PAINLEVEL: MILD PAIN (2)

## 2024-08-08 NOTE — TELEPHONE ENCOUNTER
Patient called back.  Patient denies calling in regarding any prescription.  Per chart review, Januvia was recently sent to the pharmacy.  Patient states she was waiting for Dr. Saavedra to send that one over but didn't call about it.      Kristina Kjellberg, MSN, RN

## 2024-08-08 NOTE — NURSING NOTE
Current patient location: [address] [place] [id number]    Is the patient currently in the state of [place]? YES    Visit mode:VIDEO    If the visit is dropped, the patient can be reconnected by: VIDEO VISIT: Text to cell phone:   Telephone Information:   Mobile [phone]       Will anyone else be joining the visit? NO  (If patient encounters technical issues they should call [phone]    How would you like to obtain your AVS? MyChart    Are changes needed to the allergy or medication list? No    Are refills needed on medications prescribed by this physician? NO    Rooming Documentation:  Not applicable      Reason for visit: RECHECK Bobbilynn Grossaint VVF

## 2024-08-08 NOTE — LETTER
8/8/2024       RE: Radha Corbett  [address] [facility] [address] [id number]     Dear Colleague,    Thank you for referring your patient, Radha Corbett, to the [facility] at [facility]. Please see a copy of my visit note below.    Video-Visit Details    Type of service:  Video Visit    Video Start Time: 9:25 AM    Video End Time:9:32 AM    Originating Location (pt. Location): Home in [place]    Distant Location (provider location): onsite    Platform used for Video Visit: [facility]    Urology Clinic    Name: Radha Corbett    MRN: [id number]   YOB: 1950  Accompanied at today's visit by:self              Assessment and Plan:   73 year old female with  UUI,  syndrome of menopause, hx of UTIs. S/p interstim implant     - wants to continue OAB medications; renewed for 1 year today. Patient wanting to know of cheaper medication than myrbetriq. Discussed at length that there is no other options of beta 3 agonists that would be cheaper, however myrbetriq did go generic this year so will renew and patient will call if too expensive.   - discussed risks/benefits of long term use of Trimethoprim for UTI prevention. Patient would like to trial off the trimethoprim since has been UTI free for about 4 years. Advised to contact clinic if develops any s/s of UTI in the future.   - consider botox in the future if fails medications as patient not interested in replacing SNS device.   - not interested in estrogen cream.  - After discussing the assessment and plan with patient, patient verbalized understanding and agreed to the above plan. All questions answered.     21 minutes were spent today on the date of the encounter in reviewing the EMR, direct patient care, coordination of care and documentation in addition to renewing medications    Jordyn Osorio PA-C  August 8, 2024    Patient Care Team:  Cole Saavedra MD as PCP - General (Internal  Medicine)  Dada Baltazar MD as MD (Urology)  Noni Mcdaniel, RN as Registered Nurse (Urology)  Trice Medeiros MD PhD as Referring Physician (Internal Medicine)  Luther Harrison MD as MD (Otolaryngology)  Hayder Krishna MD as MD (Ophthalmology)  Dada Baltazar MD as Assigned Surgical Provider  Yamilka TOVAR as Care Manager  Trice Medeiros MD PhD as Assigned PCP  Leigh Chua LICSW as Assigned Behavioral Health Provider  SELF, REFERRED          Chief Complaint:   UUI          History of Present Illness:   2024    HISTORY: Radha Corbett is a 73 year old female is here for follow-up. We have been following her for UUI,  syndrome of menopause, hx of UTIs. S/p interstim implant in 2018. Unfortunately was not pleased with results of interstim device. Radiograph indicated possible migration of lead and underwent repeat interstim implant on 19. Last seen by Dr. Baltazar on 10/30/23 with interstim rep and no impedances noted and with recent switch to program 3 at that time. Advised to continue on trospium 60mg daily and myrbetriq 50mg daily. Not using estrogen cream but continues to use daily trimethoprim. Here today for follow-up. States the medtronic device no longer works and thinks the battery , but is not interested in getting SNS replaced. Continues on tropsium and myrbetriq but states the myrbetriq has been expensive in the past. Continues to have incontinence but dual medication helping and she wants to continue this if she can. Continues on trimethoprim. Per EMR, has not had UTIs for 4 years. Patient voices no other concerns at this time.           Past Medical History:     Past Medical History:   Diagnosis Date     Actinic cheilitis      Arthritis      DM (diabetes mellitus) (H)      GERD (gastroesophageal reflux disease)      Hiatal hernia      HPV in female      HTN (hypertension)      IBS (irritable bowel syndrome)      Major depression      Myopia      Other and unspecified  "hyperlipidemia      Pseudoangiomatous stromal hyperplasia of breast 2010    [facility]     Rotator cuff injury 7/2016     Sleep apnea     \"snore\"     Vitamin D deficiency             Past Surgical History:     Past Surgical History:   Procedure Laterality Date     BREAST LUMPECTOMY, RT/LT      x2, left     CAPSULE/PILL CAM ENDOSCOPY N/A 3/4/2019    Procedure: CAPSULE/PILL CAM ENDOSCOPY;  Surgeon: Sinan Valdes MD;  Location: [facility]     CATARACT IOL, RT/LT  4/99    left, [id number] 7.0D     COLONOSCOPY       COLONOSCOPY N/A 1/22/2016    Procedure: COMBINED COLONOSCOPY, SINGLE OR MULTIPLE BIOPSY/POLYPECTOMY BY BIOPSY;  Surgeon: Praful Benjamin MD;  Location: [facility]     COLONOSCOPY WITH CO2 INSUFFLATION N/A 1/22/2016    Procedure: COLONOSCOPY WITH CO2 INSUFFLATION;  Surgeon: Praful Benjamin MD;  Location: [facility]     COMBINED ESOPHAGOSCOPY, GASTROSCOPY, DUODENOSCOPY (EGD) WITH CO2 INSUFFLATION N/A 11/27/2018    Procedure: COMBINED ESOPHAGOSCOPY, GASTROSCOPY, DUODENOSCOPY (EGD) WITH CO2 INSUFFLATION;  Surgeon: Duane, William Charles, MD;  Location: [facility]     CRYOTHERAPY  2000    cervical     CRYOTHERAPY Left 3/19/2015    Procedure: CRYOTHERAPY;  Surgeon: Keiko Puri MD;  Location:  [facility]     DILATION AND CURETTAGE, HYSTEROSCOPY DIAGNOSTIC, COMBINED N/A 1/19/2016    Procedure: COMBINED DILATION AND CURETTAGE, HYSTEROSCOPY DIAGNOSTIC;  Surgeon: Yeni Aparicio DO;  Location: [facility]     ESOPHAGOSCOPY, GASTROSCOPY, DUODENOSCOPY (EGD), COMBINED N/A 11/27/2018    Procedure: COMBINED ESOPHAGOSCOPY, GASTROSCOPY, DUODENOSCOPY (EGD), BIOPSY SINGLE OR MULTIPLE;  Surgeon: Duane, William Charles, MD;  Location: [facility]     IMPLANT STIMULATOR AND LEADS SACRAL NERVE (STAGE ONE AND TWO) N/A 7/24/2018    Procedure: IMPLANT STIMULATOR AND LEADS SACRAL NERVE (STAGE ONE AND TWO);  Interstim Implant Stage One and Two (Implant Permanent Lead and Generator);  Surgeon: Dada Baltazar MD;  Location:  OR     " IMPLANT STIMULATOR AND LEADS SACRAL NERVE (STAGE ONE AND TWO) Right 2019    Procedure: Replacement of Interstim Implant;  Surgeon: Dada Baltazar MD;  Location:  OR     IMPLANT STIMULATOR SACRAL NERVE PERCUTANEOUS TRIAL N/A 2018    Procedure: IMPLANT STIMULATOR SACRAL NERVE PERCUTANEOUS TRIAL;  Percutaneous Neural Examination (trial for interstim);  Surgeon: Dada Baltazar MD;  Location:  OR     LAPAROSCOPIC TUBAL LIGATION  1981     PHACOEMULSIFICATION CLEAR CORNEA WITH STANDARD INTRAOCULAR LENS IMPLANT  8/15/2013    Procedure: PHACOEMULSIFICATION CLEAR CORNEA WITH STANDARD INTRAOCULAR LENS IMPLANT;  RIGHT PHACOEMULSIFICATION CLEAR CORNEA WITH STANDARD INTRAOCULAR LENS IMPLANT ;  Surgeon: Trae Taylor MD;  Location:  [facility]     REMOVE STIMULATOR SACRAL NERVE Right 2019    Procedure: Removal of Interstim Implant;  Surgeon: Dada Baltazar MD;  Location:  OR     SURGICAL HISTORY OF -       x4, ankle surgery            Social History:     Social History     Tobacco Use     Smoking status: Former     Current packs/day: 0.50     Average packs/day: 0.5 packs/day for 20.0 years (10.0 ttl pk-yrs)     Types: Cigarettes     Start date: 2018     Smokeless tobacco: Never     Tobacco comments:     quit at age 35. Restarted in 2018 off and on, not more than 1/2 pack per day   Substance Use Topics     Alcohol use: Yes     Comment: social/3-4 per week            Family History:     Family History   Problem Relation Age of Onset     Hypertension Mother      Cerebrovascular Disease Mother      Arthritis Mother      Cardiovascular Mother      Circulatory Mother      Cerebrovascular Disease Father      Prostate Cancer Father 65         at 69     Asthma Brother      Prostate Cancer Brother 48        bone metastasis     Diabetes Sister      Hypertension Sister      Osteoporosis Sister      Depression Sister      Lipids Sister      Cancer Maternal Grandmother 95        spine     Breast  Cancer Sister 39        also contralateral @53, mets to lungs     Cancer Sister 20        second uterine cancer in 30s also     Diabetes Sister      Hypertension Sister      Depression Sister      Osteoporosis Sister      Breast Cancer Sister 57        unilateral     Cancer Paternal Aunt 40        unknown type     Cancer Paternal Uncle         stomach;  in his 80s     Prostate Cancer Brother      Glaucoma No family hx of      Melanoma No family hx of      Skin Cancer No family hx of      Macular Degeneration No family hx of               Allergies:     Allergies   Allergen Reactions     Sulfa Antibiotics      Morphine Rash     Does OK with oxycodone.            Medications:     Current Outpatient Medications   Medication Sig Dispense Refill     Alcohol Swabs (ALCOHOL PREP) PADS 1 Units daily 100 each 3     ASPIRIN 81 PO        blood glucose (NO BRAND SPECIFIED) lancing device Device to be used with lancets. 1 each 1     blood glucose (NO BRAND SPECIFIED) test strip Use to test blood sugar 1 times daily or as directed. To accompany: Blood Glucose Monitor Brands: per insurance. 100 strip 3     blood glucose monitoring (NO BRAND SPECIFIED) meter device kit Use to test blood sugar 1 times daily or as directed. Preferred blood glucose meter OR supplies to accompany: Blood Glucose Monitor Brands: per insurance. 1 kit 0     Continuous Glucose Sensor (DEXCOM G7 SENSOR) MISC 1 each every 10 days Change sensor every 10 days 9 each 5     lisinopril (ZESTRIL) 5 MG tablet Take 1 tablet (5 mg) by mouth daily 90 tablet 3     metFORMIN (GLUCOPHAGE XR) 500 MG 24 hr tablet Take 2 tablets (1,000 mg) by mouth 2 times daily (with meals) 180 tablet 1     mirabegron (MYRBETRIQ) 50 MG 24 hr tablet Take 1 tablet (50 mg) by mouth daily 90 tablet 3     omeprazole (PRILOSEC) 40 MG DR capsule TAKE 1 CAPSULE DAILY 30 TO 60 MINUTES BEFORE A MEAL 90 capsule 3     order for DME Equipment being ordered: Light with four-wheel walker 1 Units 0      order for DME Equipment being ordered: 4 wheel walker with a seat, light weight. 1 Units 0     order for DME Equipment being ordered: Walker with seat light weight 1 Device 0     polyethylene glycol (MIRALAX) 17 GM/Dose powder Take 17 g (1 Capful) by mouth daily 510 g 5     simvastatin (ZOCOR) 20 MG tablet Take 1 tablet (20 mg) by mouth at bedtime 90 tablet 3     sitagliptin (JANUVIA) 100 MG tablet Take 1 tablet (100 mg) by mouth daily 90 tablet 0     thin (NO BRAND SPECIFIED) lancets Use with lanceting device. To accompany: Blood Glucose Monitor Brands: per insurance. 100 each 3     traZODone (DESYREL) 100 MG tablet Take 1 tablet (100 mg) by mouth at bedtime 90 tablet 3     trimethoprim (TRIMPEX) 100 MG tablet Take 1 tablet (100 mg) by mouth at bedtime For more refills,schedule an appointment at [phone] 90 tablet 3     trospium (SANCTURA XR) 60 MG CP24 24 hr capsule Take 1 capsule (60 mg) by mouth every morning (before breakfast) 90 capsule 3     Current Facility-Administered Medications   Medication Dose Route Frequency Provider Last Rate Last Admin     cross-Linked Hyaluronate (GEL-ONE) injection PRSY 30 mg  30 mg   Juan Jose Carias, DO   30 mg at 09/09/21 1615     cross-Linked Hyaluronate (GEL-ONE) injection PRSY 30 mg  30 mg   Juan Joes Carias, DO   30 mg at 09/09/21 1615     cross-Linked Hyaluronate (GEL-ONE) injection PRSY 30 mg  30 mg   Juan Jose Carias, DO   30 mg at 11/05/20 1319     cross-Linked Hyaluronate (GEL-ONE) injection PRSY 30 mg  30 mg   Juan Jose Carias, DO   30 mg at 11/05/20 1319     triamcinolone (KENALOG-40) injection 40 mg  40 mg   Juan Jose Carias, DO   40 mg at 01/31/22 1318     triamcinolone (KENALOG-40) injection 40 mg  40 mg   Juan Jose Carias, DO   40 mg at 01/31/22 1319     triamcinolone (KENALOG-40) injection 40 mg  40 mg   Juan Jose Carias, DO   40 mg at 01/17/22 1444     triamcinolone (KENALOG-40) injection 40 mg  40 mg   Juan Jose Carias, DO   40 mg at 01/17/22 1443      "triamcinolone (KENALOG-40) injection 40 mg  40 mg   Juan Jose Carias Joe, DO   40 mg at 11/05/20 1319     triamcinolone (KENALOG-40) injection 40 mg  40 mg   Juan Jose Carias Joe, DO   40 mg at 11/05/20 1319     triamcinolone (KENALOG-40) injection 40 mg  40 mg   Juan Jose Carias Joe, DO   40 mg at 10/07/19 1615     triamcinolone (KENALOG-40) injection 40 mg  40 mg   Juan Jose Carias Joe, DO   40 mg at 10/07/19 1615     Facility-Administered Medications Ordered in Other Visits   Medication Dose Route Frequency Provider Last Rate Last Admin     gadobutrol (GADAVIST) injection 10 mL  10 mL Intravenous Once Trice Medeiros MD PhD                 Review of Systems:    ROS: 14 point ROS neg other than the symptoms noted above in the HPI.          Physical Exam:   Height 1.702 m (5' 7\"), weight 81.6 kg (180 lb), last menstrual period 11/18/1991, not currently breastfeeding.  5' 7\", Body mass index is 28.19 kg/m ., 180 lbs 0 oz  Gen appearance: Age-appropriate appearing female in NAD.   HEENT:  EOMI, conjunctiva clear/white. Normal ROM of neck for age.   Psych:  alert , In no acute distress.  Neuro:  A&Ox3  Resp:  Normal respiratory effort; not in acute respiratory distress.          Data:    Labs:    Creatinine   Date Value Ref Range Status   06/06/2024 1.01 (H) 0.51 - 0.95 mg/dL Final   06/11/2021 0.66 0.52 - 1.04 mg/dL Final            Again, thank you for allowing me to participate in the care of your patient.      Sincerely,    NINO WALLACE PA-C    "

## 2024-08-08 NOTE — PROGRESS NOTES
Video-Visit Details    Type of service:  Video Visit    Video Start Time: 9:25 AM    Video End Time:9:32 AM    Originating Location (pt. Location): Home in [place]    Distant Location (provider location): onsite    Platform used for Video Visit: [facility]    Urology Clinic    Name: Radha Corbett    MRN: [id number]   YOB: 1950  Accompanied at today's visit by:self              Assessment and Plan:   73 year old female with  UUI,  syndrome of menopause, hx of UTIs. S/p interstim implant     - wants to continue OAB medications; renewed for 1 year today. Patient wanting to know of cheaper medication than myrbetriq. Discussed at length that there is no other options of beta 3 agonists that would be cheaper, however myrbetriq did go generic this year so will renew and patient will call if too expensive.   - discussed risks/benefits of long term use of Trimethoprim for UTI prevention. Patient would like to trial off the trimethoprim since has been UTI free for about 4 years. Advised to contact clinic if develops any s/s of UTI in the future.   - consider botox in the future if fails medications as patient not interested in replacing SNS device.   - not interested in estrogen cream.  - After discussing the assessment and plan with patient, patient verbalized understanding and agreed to the above plan. All questions answered.     21 minutes were spent today on the date of the encounter in reviewing the EMR, direct patient care, coordination of care and documentation in addition to renewing medications    Jordyn Osorio PA-C  August 8, 2024    Patient Care Team:  Cole Saavedra MD as PCP - General (Internal Medicine)  Dada Baltazar MD as MD (Urology)  Noni Mcdaniel RN as Registered Nurse (Urology)  Trice Medeiros MD PhD as Referring Physician (Internal Medicine)  Luther Harrison MD as MD (Otolaryngology)  Hayder Krishna MD as MD (Ophthalmology)  Dada Baltazar MD as Assigned Surgical Provider  Yamilka  "Alex TOVAR as Care Manager  Trice Medeiros MD PhD as Assigned PCP  Leigh Chua LICSW as Assigned Behavioral Health Provider  SELF, REFERRED          Chief Complaint:   UUI          History of Present Illness:   2024    HISTORY: Radha Corbett is a 73 year old female is here for follow-up. We have been following her for UUI,  syndrome of menopause, hx of UTIs. S/p interstim implant in 2018. Unfortunately was not pleased with results of interstim device. Radiograph indicated possible migration of lead and underwent repeat interstim implant on 19. Last seen by Dr. Baltzaar on 10/30/23 with interstim rep and no impedances noted and with recent switch to program 3 at that time. Advised to continue on trospium 60mg daily and myrbetriq 50mg daily. Not using estrogen cream but continues to use daily trimethoprim. Here today for follow-up. States the medtronic device no longer works and thinks the battery , but is not interested in getting SNS replaced. Continues on tropsium and myrbetriq but states the myrbetriq has been expensive in the past. Continues to have incontinence but dual medication helping and she wants to continue this if she can. Continues on trimethoprim. Per EMR, has not had UTIs for 4 years. Patient voices no other concerns at this time.           Past Medical History:     Past Medical History:   Diagnosis Date    Actinic cheilitis     Arthritis     DM (diabetes mellitus) (H)     GERD (gastroesophageal reflux disease)     Hiatal hernia     HPV in female     HTN (hypertension)     IBS (irritable bowel syndrome)     Major depression     Myopia     Other and unspecified hyperlipidemia     Pseudoangiomatous stromal hyperplasia of breast     [facility]    Rotator cuff injury 2016    Sleep apnea     \"snore\"    Vitamin D deficiency             Past Surgical History:     Past Surgical History:   Procedure Laterality Date    BREAST LUMPECTOMY, RT/LT      x2, left    CAPSULE/PILL CAM ENDOSCOPY N/A " 3/4/2019    Procedure: CAPSULE/PILL CAM ENDOSCOPY;  Surgeon: Sinan Valdes MD;  Location: [facility]    CATARACT IOL, RT/LT  4/99    left, [id number] 7.0D    COLONOSCOPY      COLONOSCOPY N/A 1/22/2016    Procedure: COMBINED COLONOSCOPY, SINGLE OR MULTIPLE BIOPSY/POLYPECTOMY BY BIOPSY;  Surgeon: Praful Benjamni MD;  Location: [facility]    COLONOSCOPY WITH CO2 INSUFFLATION N/A 1/22/2016    Procedure: COLONOSCOPY WITH CO2 INSUFFLATION;  Surgeon: Praful Benjamin MD;  Location: [facility]    COMBINED ESOPHAGOSCOPY, GASTROSCOPY, DUODENOSCOPY (EGD) WITH CO2 INSUFFLATION N/A 11/27/2018    Procedure: COMBINED ESOPHAGOSCOPY, GASTROSCOPY, DUODENOSCOPY (EGD) WITH CO2 INSUFFLATION;  Surgeon: Duane, William Charles, MD;  Location: [facility]    CRYOTHERAPY  2000    cervical    CRYOTHERAPY Left 3/19/2015    Procedure: CRYOTHERAPY;  Surgeon: Keiko Puri MD;  Location:  [facility]    DILATION AND CURETTAGE, HYSTEROSCOPY DIAGNOSTIC, COMBINED N/A 1/19/2016    Procedure: COMBINED DILATION AND CURETTAGE, HYSTEROSCOPY DIAGNOSTIC;  Surgeon: Yeni Aparicio DO;  Location: [facility]    ESOPHAGOSCOPY, GASTROSCOPY, DUODENOSCOPY (EGD), COMBINED N/A 11/27/2018    Procedure: COMBINED ESOPHAGOSCOPY, GASTROSCOPY, DUODENOSCOPY (EGD), BIOPSY SINGLE OR MULTIPLE;  Surgeon: Duane, William Charles, MD;  Location: [facility]    IMPLANT STIMULATOR AND LEADS SACRAL NERVE (STAGE ONE AND TWO) N/A 7/24/2018    Procedure: IMPLANT STIMULATOR AND LEADS SACRAL NERVE (STAGE ONE AND TWO);  Interstim Implant Stage One and Two (Implant Permanent Lead and Generator);  Surgeon: Dada Baltazar MD;  [facility]    IMPLANT STIMULATOR AND LEADS SACRAL NERVE (STAGE ONE AND TWO) Right 8/6/2019    Procedure: Replacement of Interstim Implant;  Surgeon: Dada Baltazar MD;  [facility]    IMPLANT STIMULATOR SACRAL NERVE PERCUTANEOUS TRIAL N/A 6/19/2018    Procedure: IMPLANT STIMULATOR SACRAL NERVE PERCUTANEOUS TRIAL;  Percutaneous Neural Examination  (trial for interstim);  Surgeon: Dada Baltazar MD;  [facility]    LAPAROSCOPIC TUBAL LIGATION      PHACOEMULSIFICATION CLEAR CORNEA WITH STANDARD INTRAOCULAR LENS IMPLANT  8/15/2013    Procedure: PHACOEMULSIFICATION CLEAR CORNEA WITH STANDARD INTRAOCULAR LENS IMPLANT;  RIGHT PHACOEMULSIFICATION CLEAR CORNEA WITH STANDARD INTRAOCULAR LENS IMPLANT ;  Surgeon: Trae Taylor MD;  Location:  [facility]    REMOVE STIMULATOR SACRAL NERVE Right 2019    Procedure: Removal of Interstim Implant;  Surgeon: Dada Baltazar MD;  [facility]    SURGICAL HISTORY OF -       x4, ankle surgery            Social History:     Social History     Tobacco Use    Smoking status: Former     Current packs/day: 0.50     Average packs/day: 0.5 packs/day for 20.0 years (10.0 ttl pk-yrs)     Types: Cigarettes     Start date: 2018    Smokeless tobacco: Never    Tobacco comments:     quit at age 35. Restarted in 2018 off and on, not more than 1/2 pack per day   Substance Use Topics    Alcohol use: Yes     Comment: social/3-4 per week            Family History:     Family History   Problem Relation Age of Onset    Hypertension Mother     Cerebrovascular Disease Mother     Arthritis Mother     Cardiovascular Mother     Circulatory Mother     Cerebrovascular Disease Father     Prostate Cancer Father 65         at 69    Asthma Brother     Prostate Cancer Brother 48        bone metastasis    Diabetes Sister     Hypertension Sister     Osteoporosis Sister     Depression Sister     Lipids Sister     Cancer Maternal Grandmother 95        spine    Breast Cancer Sister 39        also contralateral @53, mets to lungs    Cancer Sister 20        second uterine cancer in 30s also    Diabetes Sister     Hypertension Sister     Depression Sister     Osteoporosis Sister     Breast Cancer Sister 57        unilateral    Cancer Paternal Aunt 40        unknown type    Cancer Paternal Uncle         stomach;  in his 80s     Prostate Cancer Brother     Glaucoma No family hx of     Melanoma No family hx of     Skin Cancer No family hx of     Macular Degeneration No family hx of               Allergies:     Allergies   Allergen Reactions    Sulfa Antibiotics     Morphine Rash     Does OK with oxycodone.            Medications:     Current Outpatient Medications   Medication Sig Dispense Refill    Alcohol Swabs (ALCOHOL PREP) PADS 1 Units daily 100 each 3    ASPIRIN 81 PO       blood glucose (NO BRAND SPECIFIED) lancing device Device to be used with lancets. 1 each 1    blood glucose (NO BRAND SPECIFIED) test strip Use to test blood sugar 1 times daily or as directed. To accompany: Blood Glucose Monitor Brands: per insurance. 100 strip 3    blood glucose monitoring (NO BRAND SPECIFIED) meter device kit Use to test blood sugar 1 times daily or as directed. Preferred blood glucose meter OR supplies to accompany: Blood Glucose Monitor Brands: per insurance. 1 kit 0    Continuous Glucose Sensor (DEXCOM G7 SENSOR) MISC 1 each every 10 days Change sensor every 10 days 9 each 5    lisinopril (ZESTRIL) 5 MG tablet Take 1 tablet (5 mg) by mouth daily 90 tablet 3    metFORMIN (GLUCOPHAGE XR) 500 MG 24 hr tablet Take 2 tablets (1,000 mg) by mouth 2 times daily (with meals) 180 tablet 1    mirabegron (MYRBETRIQ) 50 MG 24 hr tablet Take 1 tablet (50 mg) by mouth daily 90 tablet 3    omeprazole (PRILOSEC) 40 MG DR capsule TAKE 1 CAPSULE DAILY 30 TO 60 MINUTES BEFORE A MEAL 90 capsule 3    order for DME Equipment being ordered: Light with four-wheel walker 1 Units 0    order for DME Equipment being ordered: 4 wheel walker with a seat, light weight. 1 Units 0    order for DME Equipment being ordered: Walker with seat light weight 1 Device 0    polyethylene glycol (MIRALAX) 17 GM/Dose powder Take 17 g (1 Capful) by mouth daily 510 g 5    simvastatin (ZOCOR) 20 MG tablet Take 1 tablet (20 mg) by mouth at bedtime 90 tablet 3    sitagliptin (JANUVIA) 100 MG  tablet Take 1 tablet (100 mg) by mouth daily 90 tablet 0    thin (NO BRAND SPECIFIED) lancets Use with lanceting device. To accompany: Blood Glucose Monitor Brands: per insurance. 100 each 3    traZODone (DESYREL) 100 MG tablet Take 1 tablet (100 mg) by mouth at bedtime 90 tablet 3    trimethoprim (TRIMPEX) 100 MG tablet Take 1 tablet (100 mg) by mouth at bedtime For more refills,schedule an appointment at [phone] 90 tablet 3    trospium (SANCTURA XR) 60 MG CP24 24 hr capsule Take 1 capsule (60 mg) by mouth every morning (before breakfast) 90 capsule 3     Current Facility-Administered Medications   Medication Dose Route Frequency Provider Last Rate Last Admin    cross-Linked Hyaluronate (GEL-ONE) injection PRSY 30 mg  30 mg   Juan Jose Carias, DO   30 mg at 09/09/21 1615    cross-Linked Hyaluronate (GEL-ONE) injection PRSY 30 mg  30 mg   Juan Jose Carias, DO   30 mg at 09/09/21 1615    cross-Linked Hyaluronate (GEL-ONE) injection PRSY 30 mg  30 mg   Juan Jose Carias, DO   30 mg at 11/05/20 1319    cross-Linked Hyaluronate (GEL-ONE) injection PRSY 30 mg  30 mg   Juan Jose Carias, DO   30 mg at 11/05/20 1319    triamcinolone (KENALOG-40) injection 40 mg  40 mg   Juan Jose Carias Joe, DO   40 mg at 01/31/22 1318    triamcinolone (KENALOG-40) injection 40 mg  40 mg   Juan Jose Carias Joe, DO   40 mg at 01/31/22 1319    triamcinolone (KENALOG-40) injection 40 mg  40 mg   Juan Jose Carias Joe, DO   40 mg at 01/17/22 1444    triamcinolone (KENALOG-40) injection 40 mg  40 mg   Juan Jose Carias Joe, DO   40 mg at 01/17/22 1445    triamcinolone (KENALOG-40) injection 40 mg  40 mg   Juan Jose Carias Joe, DO   40 mg at 11/05/20 1319    triamcinolone (KENALOG-40) injection 40 mg  40 mg   Juan Jose Carias Joe, DO   40 mg at 11/05/20 1319    triamcinolone (KENALOG-40) injection 40 mg  40 mg   Juan Jose Carias,    40 mg at 10/07/19 1615    triamcinolone (KENALOG-40) injection 40 mg  40 mg   Juan Jose Carias DO   40 mg at 10/07/19 1616  "    Facility-Administered Medications Ordered in Other Visits   Medication Dose Route Frequency Provider Last Rate Last Admin    gadobutrol (GADAVIST) injection 10 mL  10 mL Intravenous Once Trice Medeiros MD PhD                 Review of Systems:    ROS: 14 point ROS neg other than the symptoms noted above in the HPI.          Physical Exam:   Height 1.702 m (5' 7\"), weight 81.6 kg (180 lb), last menstrual period 11/18/1991, not currently breastfeeding.  5' 7\", Body mass index is 28.19 kg/m ., 180 lbs 0 oz  Gen appearance: Age-appropriate appearing female in NAD.   HEENT:  EOMI, conjunctiva clear/white. Normal ROM of neck for age.   Psych:  alert , In no acute distress.  Neuro:  A&Ox3  Resp:  Normal respiratory effort; not in acute respiratory distress.          Data:    Labs:    Creatinine   Date Value Ref Range Status   06/06/2024 1.01 (H) 0.51 - 0.95 mg/dL Final   06/11/2021 0.66 0.52 - 1.04 mg/dL Final            "

## 2024-08-09 ENCOUNTER — TELEPHONE (OUTPATIENT)
Dept: FAMILY MEDICINE | Facility: CLINIC | Age: 74
End: 2024-08-09
Payer: COMMERCIAL

## 2024-08-09 DIAGNOSIS — E11.22 TYPE 2 DIABETES MELLITUS WITH STAGE 3A CHRONIC KIDNEY DISEASE, WITH LONG-TERM CURRENT USE OF INSULIN (H): Primary | ICD-10-CM

## 2024-08-09 DIAGNOSIS — N18.31 TYPE 2 DIABETES MELLITUS WITH STAGE 3A CHRONIC KIDNEY DISEASE, WITH LONG-TERM CURRENT USE OF INSULIN (H): Primary | ICD-10-CM

## 2024-08-09 DIAGNOSIS — Z79.4 TYPE 2 DIABETES MELLITUS WITH STAGE 3A CHRONIC KIDNEY DISEASE, WITH LONG-TERM CURRENT USE OF INSULIN (H): Primary | ICD-10-CM

## 2024-08-09 NOTE — TELEPHONE ENCOUNTER
Please call and explain to patient that Jardiance is not an option for her because it can cause urinary tract infections and she already has history of recurrent urinary tract infection in the past so if Januvia is expensive to her then we do not have to do that but we can do some small dose of glipizide  Please call her and convey the above and if she is in agreement I can send a prescription for glipizide

## 2024-08-09 NOTE — TELEPHONE ENCOUNTER
Please call homecare and do the following orders  Social Work  initial certification (first set of orders)   Frequency:  1 visit within 21 days  For assistance with resources for vision loss and county services. Working on getting patient a AMPARO waiver.

## 2024-08-09 NOTE — TELEPHONE ENCOUNTER
Patient is requesting Jardiance, I spoke with pt again and she was confused by the names of the drugs. The Januvia was too expensive and that's why she wanted to see about getting Jardiance.    Please review and verify, send if appropriate  Did not see on active med list please verify and send new rx. Thank you!  [facility] spec/mail pharmacy  [phone]

## 2024-08-09 NOTE — TELEPHONE ENCOUNTER
Called RHODA Keith back and left  on confidential line notifying her that patient's PCP, Dr. Saavedra, has approved the requested home care orders as noted below. Left phone number for clinic in case of any further questions/concerns.     Rhoda Baca, JAIDAN, RN   [facility]

## 2024-08-09 NOTE — TELEPHONE ENCOUNTER
Inna  with [facility] is calling regarding an established patient with [facility].        8/9/2024     1:35 PM 11/15/2023     1:50 PM   Home Care Information   Current following provider Dr. Quentin Medeiros    Name/Phone Number Social Jhonny Keith@ [phone] Kalee    Home Care agency [facility]     Requesting orders from: Cole Saavedra  Provider is following patient: Yes  Is this a 60-day recertification request?  No    Orders Requested    Social Work  Request for initial certification (first set of orders)   Frequency:  1 visit within 21 days  For assistance with resources for vision loss and county services. Working on getting patient a APMARO waiver.      Information was gathered and will be sent to provider for review.  RN will contact Home Care with information after provider review.  Confirmed ok to leave a detailed message with call back.  Contact information confirmed and updated as needed.        Joann Guerrero RN  Clinical Triage/Primary Care  [facility]

## 2024-08-12 ENCOUNTER — TELEPHONE (OUTPATIENT)
Dept: FAMILY MEDICINE | Facility: CLINIC | Age: 74
End: 2024-08-12
Payer: COMMERCIAL

## 2024-08-12 RX ORDER — GLIPIZIDE 2.5 MG/1
5 TABLET, EXTENDED RELEASE ORAL DAILY
Qty: 90 TABLET | Refills: 0 | Status: SHIPPED | OUTPATIENT
Start: 2024-08-12 | End: 2024-08-13

## 2024-08-12 NOTE — TELEPHONE ENCOUNTER
FYI - Status Update    Who is Calling: patient    Update: Pt calling to let provider know the co pay for her Dexcom is too much so she wouldn't be taking it.     Does caller want a call/response back: No

## 2024-08-12 NOTE — TELEPHONE ENCOUNTER
Called pt and relayed message from provider.    Pt states that she would like to do the glipizide.    Wants to know what kind of precautions she should take since it can cause hypoglycemia.    Routing to PCP to review and advise.    Catherine Amador RN, BSN  [facility]

## 2024-08-13 ENCOUNTER — TELEPHONE (OUTPATIENT)
Dept: FAMILY MEDICINE | Facility: CLINIC | Age: 74
End: 2024-08-13
Payer: COMMERCIAL

## 2024-08-13 DIAGNOSIS — Z79.4 TYPE 2 DIABETES MELLITUS WITH STAGE 3A CHRONIC KIDNEY DISEASE, WITH LONG-TERM CURRENT USE OF INSULIN (H): ICD-10-CM

## 2024-08-13 DIAGNOSIS — N18.31 TYPE 2 DIABETES MELLITUS WITH STAGE 3A CHRONIC KIDNEY DISEASE, WITH LONG-TERM CURRENT USE OF INSULIN (H): ICD-10-CM

## 2024-08-13 DIAGNOSIS — E11.22 TYPE 2 DIABETES MELLITUS WITH STAGE 3A CHRONIC KIDNEY DISEASE, WITH LONG-TERM CURRENT USE OF INSULIN (H): ICD-10-CM

## 2024-08-13 RX ORDER — GLIPIZIDE 2.5 MG/1
5 TABLET, EXTENDED RELEASE ORAL DAILY
Qty: 90 TABLET | Refills: 0 | Status: SHIPPED | OUTPATIENT
Start: 2024-08-13 | End: 2024-09-04

## 2024-08-13 NOTE — TELEPHONE ENCOUNTER
Called and notified patient of provider's note.    Verbalized understanding that she will now restart Glipizide, but at half the dose she was on previously.    Informed pt that her Rx was sent to [facility] mail order pharmacy, but she says she doesn't use that pharmacy any longer. Would like it sent to [facility] mail order instead.    Rx changed as requested. Pt has no further needs at this time.    Catherine Amador RN, BSN  [facility]

## 2024-08-13 NOTE — TELEPHONE ENCOUNTER
Hello pt is requesting Jardiance 25mg tabs and I do not see this on the med list can we please get this sent over please and thank you.

## 2024-08-13 NOTE — TELEPHONE ENCOUNTER
Please call and explain to patient that I never discussed about Jardiance with her and we only talked about Janashley  Jardiance is not a good medication for her because she has recurrent urinary tract infections and Jardiance can increase the chance of having urinary tract infections because it excretes glucose in the urine which is a good  food for bacteria  I did prescribe her Januvia but she could not afford the co-pay  So I have put her back on small dose of glipizide XL and I sent a prescription for her yesterday

## 2024-08-14 ENCOUNTER — MEDICAL CORRESPONDENCE (OUTPATIENT)
Dept: HEALTH INFORMATION MANAGEMENT | Facility: CLINIC | Age: 74
End: 2024-08-14
Payer: COMMERCIAL

## 2024-08-14 ENCOUNTER — TELEPHONE (OUTPATIENT)
Dept: FAMILY MEDICINE | Facility: CLINIC | Age: 74
End: 2024-08-14
Payer: COMMERCIAL

## 2024-08-14 NOTE — TELEPHONE ENCOUNTER
Completed form has been faxed to [phone] . Right-Fax reviewed to confirm form was sent without error.   [facility]

## 2024-08-14 NOTE — TELEPHONE ENCOUNTER
Forms/Letter Request    Type of form/letter: Nursing Home/Assisted Living Orders      Do we have the form/letter: Yes: [facility], Order Number: [id number]    Who is the form from? Home care    Where did/will the form come from? form was faxed in    When is form/letter needed by: 5-7 days    How would you like the form/letter returned: Fax : [phone]

## 2024-08-15 ENCOUNTER — TELEPHONE (OUTPATIENT)
Dept: FAMILY MEDICINE | Facility: CLINIC | Age: 74
End: 2024-08-15
Payer: COMMERCIAL

## 2024-08-15 NOTE — TELEPHONE ENCOUNTER
Please call homecare and give the following orders      Occupational Therapy   initial certification (first set of orders)  Frequency: Every other week for 6 weeks

## 2024-08-15 NOTE — TELEPHONE ENCOUNTER
Home Care is calling regarding an established patient with  [facility].        8/9/2024     1:35 PM 11/15/2023     1:50 PM   Home Care Information   Current following provider Dr. Quentin Medeiros    Name/Phone Number Inna @ [phone] Kalee    Home Care agency [facility]     Requesting orders from: Cole Saavedra  Provider is following patient: Yes  Is this a 60-day recertification request?  No    Orders Requested    Occupational Therapy  Request for initial certification (first set of orders)  Frequency: Every other week for 6 weeks    Information was gathered and will be sent to provider for review.  RN will contact Home Care with information after provider review.  Confirmed ok to leave a detailed message with call back.  Contact information confirmed and updated as needed.    Catherine Amador RN

## 2024-08-15 NOTE — TELEPHONE ENCOUNTER
Called Scarlett KRAMER and relayed approval below.    No further questions or concerns.    Edwina Nettles RN  [facility]

## 2024-08-16 ENCOUNTER — TELEPHONE (OUTPATIENT)
Dept: FAMILY MEDICINE | Facility: CLINIC | Age: 74
End: 2024-08-16
Payer: COMMERCIAL

## 2024-08-16 NOTE — TELEPHONE ENCOUNTER
Forms/Letter Request    Type of form/letter: Home Health Certification      Do we have the form/letter: Yes: [facility], Order number: [id number]    Who is the form from? Home care    Where did/will the form come from? form was faxed in    When is form/letter needed by: 5-7days    How would you like the form/letter returned: Fax : [phone]

## 2024-08-19 DIAGNOSIS — Z53.9 DIAGNOSIS NOT YET DEFINED: Primary | ICD-10-CM

## 2024-08-19 PROCEDURE — G0180 MD CERTIFICATION HHA PATIENT: HCPCS | Performed by: INTERNAL MEDICINE

## 2024-08-20 ENCOUNTER — TELEPHONE (OUTPATIENT)
Dept: FAMILY MEDICINE | Facility: CLINIC | Age: 74
End: 2024-08-20
Payer: COMMERCIAL

## 2024-08-20 DIAGNOSIS — N18.31 TYPE 2 DIABETES MELLITUS WITH STAGE 3A CHRONIC KIDNEY DISEASE, WITH LONG-TERM CURRENT USE OF INSULIN (H): Primary | ICD-10-CM

## 2024-08-20 DIAGNOSIS — E11.22 TYPE 2 DIABETES MELLITUS WITH STAGE 3A CHRONIC KIDNEY DISEASE, WITH LONG-TERM CURRENT USE OF INSULIN (H): Primary | ICD-10-CM

## 2024-08-20 DIAGNOSIS — Z79.4 TYPE 2 DIABETES MELLITUS WITH STAGE 3A CHRONIC KIDNEY DISEASE, WITH LONG-TERM CURRENT USE OF INSULIN (H): Primary | ICD-10-CM

## 2024-08-20 NOTE — TELEPHONE ENCOUNTER
Home Care is calling regarding an established patient with  [facility].        8/9/2024     1:35 PM 11/15/2023     1:50 PM   Home Care Information   Current following provider Dr. Quentin Medeiros    Name/Phone Number Inna @ [phone] Kalee    Home Care agency [facility]     Requesting orders from: Cole Saavedra  Provider is following patient: Yes  Is this a 60-day recertification request?  No    Orders Requested    Occupational Therapy  Request for initial certification (first set of orders)   Frequency:  1x/wk for 6 wks      Information was gathered and will be sent to provider for review.  RN will contact Home Care with information after provider review.  Confirmed ok to leave a detailed message with call back.  Contact information confirmed and updated as needed.    Kalpana Siddiqui RN

## 2024-08-20 NOTE — TELEPHONE ENCOUNTER
Left detailed message on [employer] relaying provider's verbal approval for requested HHC orders below. Also left clinic call back number if any further questions/concerns.      Malu Roe, JAIDAN, RN  [facility]

## 2024-08-20 NOTE — TELEPHONE ENCOUNTER
Please call and inform homecare that I will put a formal referral for diabetic education with Rachel and sometimes they can help with getting a meter at least for a few days  With regards to her elderly waiver there is nothing much I can do if she does not want to do up her house

## 2024-08-20 NOTE — TELEPHONE ENCOUNTER
CHAPIN Laguna from [facility] calling about patient. Patient has visual impairment and cannot do finger sticks by herself although she has device. Insurance has decided that they are not covering her Dexcom completely somehow and patient cannot pay for it out of pocket. Jase states they were meant to do diabetic education for patient, but unable to do so with the circumstances and patient is not currently checking blood sugars at this time.     Jase calling to mainly update on the situation but states he is open to alternatives if provider as any suggestions.    He lastly states patient is borderline elderly waiver, but she has a home she doesn't want to give up which prevents that.    Routing to provider to review and advise and as an FYI.    Anselmo Elizondo RN  [facility]

## 2024-08-20 NOTE — TELEPHONE ENCOUNTER
Please call homecare and give the following orders    Occupational Therapy  initial certification (first set of orders)   Frequency:  1x/wk for 6 wks

## 2024-08-20 NOTE — TELEPHONE ENCOUNTER
Called CHAPIN Laguna back and left VM on confidential line notifying him of provider's recommendations as noted below. Left phone number for clinic in case he has any further questions/concerns.     JAIDA RiceN, RN   [facility]

## 2024-08-23 ENCOUNTER — TELEPHONE (OUTPATIENT)
Dept: FAMILY MEDICINE | Facility: CLINIC | Age: 74
End: 2024-08-23
Payer: COMMERCIAL

## 2024-08-23 NOTE — TELEPHONE ENCOUNTER
Forms/Letter Request    Type of form/letter: Nursing Home/Assisted Living Orders      Do we have the form/letter: Yes: [facility], Client Coordination Note report, Note Date: 8/15/24    Who is the form from? Home care    Where did/will the form come from? form was faxed in    When is form/letter needed by:     How would you like the form/letter returned: Fax : [phone]

## 2024-08-26 ENCOUNTER — TELEPHONE (OUTPATIENT)
Dept: UROLOGY | Facility: CLINIC | Age: 74
End: 2024-08-26
Payer: COMMERCIAL

## 2024-08-26 NOTE — TELEPHONE ENCOUNTER
Pt does not want to pay $35 for brand name Myrbetriq. She is requesting a PA for generic. ..    [facility]    Fax: [phone]    Spec: [phone]    MO: [phone]

## 2024-08-27 NOTE — TELEPHONE ENCOUNTER
PA Initiation    Medication: MIRABEGRON ER 50 MG PO TB24  Insurance Company: [employer] - Phone [phone] Fax [phone]  Pharmacy Filling the Rx: [facility] - [place], [place] - [facility] [address]  Filling Pharmacy Phone: [phone]  Filling Pharmacy Fax:    Start Date: 8/27/2024

## 2024-08-28 ENCOUNTER — TELEPHONE (OUTPATIENT)
Dept: FAMILY MEDICINE | Facility: CLINIC | Age: 74
End: 2024-08-28
Payer: COMMERCIAL

## 2024-08-28 NOTE — TELEPHONE ENCOUNTER
CHAPIN Laguna with [facility] is calling regarding an established patient with [facility].        8/9/2024     1:35 PM 11/15/2023     1:50 PM   Home Care Information   Current following provider Dr. Quentin Medeiros    Name/Phone Number Inna @ [phone] Kalee,    Home Care agency [facility]     Requesting orders from: Cole Saavedra  Provider is following patient: Yes  Is this a 60-day recertification request?  No    Orders Requested    HHA (Home Health Aide)  Request for delay in care, service is not able to be provided within same scheduled day.   Rescheduled to tomorrow, missed last Thursday's visit. Pt cancelled.        Information was gathered and will be sent to provider for review.  RN will contact Home Care with information after provider review.  Confirmed ok to leave a detailed message with call back.  Contact information confirmed and updated as needed.        Joann Guerrero RN  Clinical Triage/Primary Care  [facility]

## 2024-08-29 NOTE — TELEPHONE ENCOUNTER
PRIOR AUTHORIZATION DENIED    Medication: MIRABEGRON ER 50 MG PO TB24  Insurance Company: [employer] - Phone [phone] Fax [phone]  Denial Date: 8/29/2024  Denial Reason(s):     Appeal Information:     Patient Notified: NO

## 2024-08-29 NOTE — TELEPHONE ENCOUNTER
I left a message with the provider's ok for delay of care as written below.  Thank you. Mavis Nye R.N.

## 2024-09-04 ENCOUNTER — MEDICAL CORRESPONDENCE (OUTPATIENT)
Dept: HEALTH INFORMATION MANAGEMENT | Facility: CLINIC | Age: 74
End: 2024-09-04
Payer: COMMERCIAL

## 2024-09-04 ENCOUNTER — TELEPHONE (OUTPATIENT)
Dept: FAMILY MEDICINE | Facility: CLINIC | Age: 74
End: 2024-09-04
Payer: COMMERCIAL

## 2024-09-04 DIAGNOSIS — N18.31 TYPE 2 DIABETES MELLITUS WITH STAGE 3A CHRONIC KIDNEY DISEASE, WITH LONG-TERM CURRENT USE OF INSULIN (H): ICD-10-CM

## 2024-09-04 DIAGNOSIS — E11.22 TYPE 2 DIABETES MELLITUS WITH STAGE 3A CHRONIC KIDNEY DISEASE, WITH LONG-TERM CURRENT USE OF INSULIN (H): ICD-10-CM

## 2024-09-04 DIAGNOSIS — Z79.4 TYPE 2 DIABETES MELLITUS WITH STAGE 3A CHRONIC KIDNEY DISEASE, WITH LONG-TERM CURRENT USE OF INSULIN (H): ICD-10-CM

## 2024-09-04 RX ORDER — GLIPIZIDE 2.5 MG/1
5 TABLET, EXTENDED RELEASE ORAL DAILY
Qty: 90 TABLET | Refills: 0 | Status: SHIPPED | OUTPATIENT
Start: 2024-09-04

## 2024-09-04 NOTE — TELEPHONE ENCOUNTER
Forms/Letter Request    Type of form/letter: [facility]   Order # [id number]  Do we have the form/letter: Yes:     Who is the form from? Home care    Where did/will the form come from? form was faxed in    When is form/letter needed by: asap    How would you like the form/letter returned: Fax : [phone]

## 2024-09-04 NOTE — TELEPHONE ENCOUNTER
From: Drea Kaba  To: Heaven Bear  Sent: 9/4/2024 11:57 AM CDT  Subject: Test tesults    Hi Dr Bear    Was wondering if you looked at my test results yet? My ultrasound is next week. The sleep medication is working. I take 100mg.     Drea Kaba   [facility] Call Center    Phone Message    Name of Caller: Radha    Phone Number: Home number on file [phone] (home)    Best time to return call: Any    May a detailed message be left on voicemail: yes    Relation to patient: Self    Reason for Call: Radha called and requested to speak with Catherine regarding her visit and [employer] denying to pay for the visit.  Please advise.  Thank you.     Action Taken:

## 2024-09-04 NOTE — TELEPHONE ENCOUNTER
Please call patient and inform her that she should take 2 tablets of glipizide XL 2.5 mg daily  That is a total of 5 mg  I have sent prescriptions to the pharmacy
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
## 2024-09-05 ENCOUNTER — TELEPHONE (OUTPATIENT)
Dept: FAMILY MEDICINE | Facility: CLINIC | Age: 74
End: 2024-09-05
Payer: COMMERCIAL

## 2024-09-05 NOTE — TELEPHONE ENCOUNTER
Ok to leave a message with the provider's response.      Home Care is calling regarding an established patient with [employer] [facility].        8/9/2024     1:35 PM 11/15/2023     1:50 PM   Home Care Information   Current following provider Dr. Quentin Medeiros    Name/Phone Number Inna @ [phone] Kalee,    Home Care agency [facility]     Requesting orders from: Cole Saavedra  Provider is following patient: Yes  Is this a 60-day recertification request?  No    Orders Requested    HHA (Home Health Aide) weekly showers.  This it the service she is getting now.   Request for continuation of care with no increase or decrease in frequency        Verbal orders given.  Home Care will send orders for provider to sign.  Confirmed ok to leave a detailed message with call back.  Contact information confirmed and updated as needed.    Mavis Nye RN

## 2024-09-05 NOTE — TELEPHONE ENCOUNTER
Called patient and relayed provider's message below.     Patient verbalized understanding and is extremely appreciative to Dr. Saavedra for sending in a new Rx.    Catherine Amador, RN, BSN  [facility]

## 2024-09-06 ENCOUNTER — TELEPHONE (OUTPATIENT)
Dept: FAMILY MEDICINE | Facility: CLINIC | Age: 74
End: 2024-09-06
Payer: COMMERCIAL

## 2024-09-09 ENCOUNTER — MEDICAL CORRESPONDENCE (OUTPATIENT)
Dept: HEALTH INFORMATION MANAGEMENT | Facility: CLINIC | Age: 74
End: 2024-09-09
Payer: COMMERCIAL

## 2024-09-09 NOTE — TELEPHONE ENCOUNTER
Completed form has been faxed to [phone] . Right-Fax reviewed to confirm form was sent without error.   [facility].

## 2024-09-10 ENCOUNTER — TELEPHONE (OUTPATIENT)
Dept: FAMILY MEDICINE | Facility: CLINIC | Age: 74
End: 2024-09-10
Payer: COMMERCIAL

## 2024-09-10 NOTE — TELEPHONE ENCOUNTER
General Call    Contacts       Contact Date/Time Type Contact Phone/Fax    09/10/2024 11:59 AM CDT Phone (Incoming) [facility] (Home Care) [phone]     Nurse          Reason for Call:  Patient nurse wanted to inform care team patient was not seen last week due to short staffing. Please advise patient will be seen next week, 9/17/2024.    What are your questions or concerns:  n/a    Date of last appointment with provider: 08/05/2024    Okay to leave a detailed message?: Yes at Other phone number:  [phone]*

## 2024-09-17 ENCOUNTER — TELEPHONE (OUTPATIENT)
Dept: FAMILY MEDICINE | Facility: CLINIC | Age: 74
End: 2024-09-17
Payer: COMMERCIAL

## 2024-09-17 NOTE — TELEPHONE ENCOUNTER
Francesca from [facility] calling re: home care orders.    Says the orders were faxed over awhile ago after the nurse called to request them on 9/05, but they have not received anything back yet. The order ends in 4950 and it was an order to add a HHA 1x/wk for 2 wks.    Upon reviewing chart, nothing was documented as received here at the clinic. Did confirm with caller that they have the correct fax number.    Francesca will send the fax again to [phone]. Will route message to TC's for awareness. Please notify [facility] if no fax is received by end of day today.    Catherine Amador, RN, BSN  [facility]

## 2024-09-18 ENCOUNTER — MEDICAL CORRESPONDENCE (OUTPATIENT)
Dept: HEALTH INFORMATION MANAGEMENT | Facility: CLINIC | Age: 74
End: 2024-09-18
Payer: COMMERCIAL

## 2024-09-18 ENCOUNTER — TELEPHONE (OUTPATIENT)
Dept: FAMILY MEDICINE | Facility: CLINIC | Age: 74
End: 2024-09-18
Payer: COMMERCIAL

## 2024-09-18 NOTE — CONFIDENTIAL NOTE
Home Care is calling regarding an established patient with [employer] [facility].        8/9/2024     1:35 PM 11/15/2023     1:50 PM   Home Care Information   Current following provider Dr. Quentin Medeiros    Name/Phone Number Inna @ [phone] Kalee    Home Care agency [facility]     Requesting orders from: Cole Saavedra  Provider is following patient: Yes  Is this a 60-day recertification request?  Yes    Orders Requested    Skilled Nursing  Request for recertification   Frequency:  1x/wk for 1 wks then once every other week x 8 weeks    HHA (Home Health Aide)  Request for recertification   Frequency:  1x/wk for 5 wks for assisting with showering since patient is blind. It has been discussed with the patient that this service will no longer be available to her so she will need to pay for this service or get a CADI waiver.    FYI:  Home care will be filing a vulnerable adult report due to patient being blind and not being able to care for herself.    Information was gathered and will be sent to provider for review.  RN will contact Home Care with information after provider review.  Confirmed ok to leave a detailed message with call back.  Contact information confirmed and updated as needed.    Olive Hussein RN

## 2024-09-18 NOTE — TELEPHONE ENCOUNTER
Called back and gave verbal approval of orders.  Anastasiia Bridges RN    [facility]- Primary Care

## 2024-09-18 NOTE — TELEPHONE ENCOUNTER
Home Care is calling regarding an established patient with  [facility].        8/9/2024     1:35 PM 11/15/2023     1:50 PM   Home Care Information   Current following provider Dr. Quentin Medeiros    Name/Phone Number Inna @ [phone] Kalee    Home Care agency [facility]     Requesting orders from: Cole Saavedra  Provider is following patient: No       Orders Requested    Occupational Therapy  Request for  Every other week for 4 weeks      Confirmed ok to leave a detailed message with call back.  Contact information confirmed and updated as needed.    Carina Torrez RN

## 2024-09-18 NOTE — TELEPHONE ENCOUNTER
Forms/Letter Request    Type of form/letter: Home Health Certification    Do we have the form/letter: YES- Placed on provider's desk    Who is the form from? [facility] order #[id number], [id number], [id number] and [id number]    Where did/will the form come from? form was faxed in    How would you like the form/letter returned: Fax : [phone]

## 2024-09-18 NOTE — TELEPHONE ENCOUNTER
Please call homecare and do the following orders    Skilled Nursing   recertification   Frequency:  1x/wk for 1 wks then once every other week x 8 weeks     HHA (Home Health Aide)  recertification   Frequency:  1x/wk for 5 wks for assisting with showering since patient is blind.

## 2024-09-18 NOTE — TELEPHONE ENCOUNTER
Please call homecare and give the following orders    Occupational Therapy   Every other week for 4 weeks

## 2024-09-19 NOTE — TELEPHONE ENCOUNTER
Called and left detailed message with orders approval.  Anastasiia Bridges RN    [facility]- Primary Care

## 2024-09-20 ENCOUNTER — TELEPHONE (OUTPATIENT)
Dept: FAMILY MEDICINE | Facility: CLINIC | Age: 74
End: 2024-09-20
Payer: COMMERCIAL

## 2024-09-20 NOTE — TELEPHONE ENCOUNTER
Forms/Letter Request    Type of form/letter: Nursing Home/Assisted Living Orders      Do we have the form/letter: Yes: [facility], Order Number: [id number]    Who is the form from? Home care    Where did/will the form come from? form was faxed in    When is form/letter needed by:     How would you like the form/letter returned: Fax : [phone]

## 2024-09-22 ENCOUNTER — MEDICAL CORRESPONDENCE (OUTPATIENT)
Dept: HEALTH INFORMATION MANAGEMENT | Facility: CLINIC | Age: 74
End: 2024-09-22

## 2024-09-23 ENCOUNTER — MEDICAL CORRESPONDENCE (OUTPATIENT)
Dept: HEALTH INFORMATION MANAGEMENT | Facility: CLINIC | Age: 74
End: 2024-09-23
Payer: COMMERCIAL

## 2024-09-26 ENCOUNTER — TELEPHONE (OUTPATIENT)
Dept: FAMILY MEDICINE | Facility: CLINIC | Age: 74
End: 2024-09-26
Payer: COMMERCIAL

## 2024-09-26 NOTE — TELEPHONE ENCOUNTER
Forms/Letter Request     Type of form/letter: Nursing Home/Assisted Living Orders        Do we have the form/letter: Yes: [facility], Client Coordination Note report, Note Date: 9/18/2024     Who is the form from? Home care     Where did/will the form come from? form was faxed in     When is form/letter needed by:      How would you like the form/letter returned: Fax : [phone]

## 2024-10-03 ENCOUNTER — TELEPHONE (OUTPATIENT)
Dept: UROLOGY | Facility: CLINIC | Age: 74
End: 2024-10-03
Payer: COMMERCIAL

## 2024-10-03 DIAGNOSIS — N39.41 URGE INCONTINENCE: Primary | ICD-10-CM

## 2024-10-03 NOTE — TELEPHONE ENCOUNTER
Health Call Center    Phone Message    May a detailed message be left on voicemail: no    Reason for Call: Medication Question or concern regarding medication   Prescription Clarification  Name of Medication: mirabegron (MYRBETRIQ) 50 MG 24 hr tablet   Prescribing Provider: Jordyn Osorio PA-C  Pharmacy:     [facility] - [place], [place] - [address] (Ph: [phone])    What on the order needs clarification? Patient calls because she can't afford to take take medication every day and was hoping to try the generic but found out that insurance does not cover the generic. Patient is wondering if there is a different medication that could be prescribed that patient could afford. Patient would like to speak with someone, she has an appointment at  that she will be unavailable for a phone call.      Action Taken: Message routed to:  [facility] Urology    Travel Screening: Not Applicable

## 2024-10-03 NOTE — TELEPHONE ENCOUNTER
"Called patient and informed per Jordyn Osorio PA- \"Currently on trospium and myrbetriq. Discontinued myrbetriq since too expensive. Please order gemtesa 75mg daily. Not sure what pharmacy she wants it sent to. If expensive advise patient to notify team so we can get a PA. Please advise to continue trospium 60mg daily in the mean time. \"  Patient is aware and will keep taking trospium, until she picks up Gemtesa. She will discontinue the trospium once starting Gemtesa.  Vivi Diaz LPN    "

## 2024-10-09 ENCOUNTER — TELEPHONE (OUTPATIENT)
Dept: FAMILY MEDICINE | Facility: CLINIC | Age: 74
End: 2024-10-09
Payer: COMMERCIAL

## 2024-10-09 DIAGNOSIS — Z53.9 DIAGNOSIS NOT YET DEFINED: Primary | ICD-10-CM

## 2024-10-09 PROCEDURE — G0179 MD RECERTIFICATION HHA PT: HCPCS | Performed by: INTERNAL MEDICINE

## 2024-10-09 NOTE — TELEPHONE ENCOUNTER
Forms/Letter Request    Type of form/letter: Home Health Certification    Do we have the form/letter: Yes: will place form in provider's bin    Who is the form from? [facility] order #[id number]    Where did/will the form come from? form was faxed in    When is form/letter needed by: asap    How would you like the form/letter returned: Fax : [phone]

## 2024-10-09 NOTE — TELEPHONE ENCOUNTER
Completed form has been faxed to [phone] . Right-Fax reviewed to confirm form was sent without error. [facility].

## 2024-10-21 ENCOUNTER — TELEPHONE (OUTPATIENT)
Dept: FAMILY MEDICINE | Facility: CLINIC | Age: 74
End: 2024-10-21
Payer: COMMERCIAL

## 2024-10-21 NOTE — TELEPHONE ENCOUNTER
Home Care is calling regarding an established patient with  [employer].        8/9/2024     1:35 PM 11/15/2023     1:50 PM   Home Care Information   Current following provider Dr. Quentin Medeiros    Name/Phone Number Inna @ [phone] Kalee    Home Care agency [facility]     Requesting orders from: Cole Saavedra  Provider is following patient: No       Orders Requested    HHA (Home Health Aide)  Request for initial certification (first set of orders)   Frequency:  1x/wk for 3 wks      Confirmed ok to leave a detailed message with call back.  Contact information confirmed and updated as needed.    Carina Torrez RN

## 2024-10-22 NOTE — TELEPHONE ENCOUNTER
Please call homecare and give the following orders    HHA (Home Health Aide)   initial certification (first set of orders)   Frequency:  1x/wk for 3 wks

## 2024-10-23 RX ORDER — KETOROLAC TROMETHAMINE 30 MG/ML
1 INJECTION, SOLUTION INTRAMUSCULAR; INTRAVENOUS ONCE
Qty: 1 EACH | Refills: 0 | Status: CANCELLED | OUTPATIENT
Start: 2024-10-23 | End: 2024-10-23

## 2024-10-23 RX ORDER — HYDROCHLOROTHIAZIDE 12.5 MG/1
CAPSULE ORAL
Qty: 6 EACH | Refills: 3 | Status: CANCELLED | OUTPATIENT
Start: 2024-10-23

## 2024-10-23 NOTE — TELEPHONE ENCOUNTER
Patient home care RN calling in regards to continuous glucose monitoring. Patient will not pay for dexcom, despite prior auth approval. Patient is on metformin and checking blood glucose 1x daily.    Provider: home care RN wondering if patient needing to check blood glucose at home, and if so, requesting to order the nicole 3+. Pended supplies and pharmacy if appropriate. Patient will most likely need a prior auth on the nicole 3+, as well.    Anastasiia Thomas RN

## 2024-10-23 NOTE — TELEPHONE ENCOUNTER
I am not able to understand this message  Patient is a diabetic and to check her blood sugars  Her insurance does not cover nicole 3  If she does not want to use Dexcom or want to pay for it I cannot help  Yes she should check her blood sugars  But if she does not want to do that we cannot force her  She already has glucometer with strips but I can resend them if she wants to do it that way  Apart from this there is nothing much I can do  It is up to the patient whether he wants to check her blood sugars are not and what modality she wants to use to check them or if she does not want to check them at all  I strongly recommend checking blood sugars at least twice a day and I can provide any supplies that are needed for them which are covered by her insurance but at the end of the day if she does not want to do that it is patient's choice

## 2024-10-28 ENCOUNTER — TELEPHONE (OUTPATIENT)
Dept: FAMILY MEDICINE | Facility: CLINIC | Age: 74
End: 2024-10-28
Payer: COMMERCIAL

## 2024-10-28 NOTE — TELEPHONE ENCOUNTER
Forms/Letter Request     Type of form/letter: Home Health Certification     Do we have the form/letter: Yes: will place form in provider's bin     Who is the form from? [facility] order #[id number]     Where did/will the form come from? form was faxed in     When is form/letter needed by: asap     How would you like the form/letter returned: Fax : [phone]

## 2024-10-30 ENCOUNTER — MEDICAL CORRESPONDENCE (OUTPATIENT)
Dept: HEALTH INFORMATION MANAGEMENT | Facility: CLINIC | Age: 74
End: 2024-10-30
Payer: COMMERCIAL

## 2024-11-06 DIAGNOSIS — N18.31 TYPE 2 DIABETES MELLITUS WITH STAGE 3A CHRONIC KIDNEY DISEASE, WITH LONG-TERM CURRENT USE OF INSULIN (H): ICD-10-CM

## 2024-11-06 DIAGNOSIS — K21.9 GASTROESOPHAGEAL REFLUX DISEASE WITHOUT ESOPHAGITIS: ICD-10-CM

## 2024-11-06 DIAGNOSIS — E11.22 TYPE 2 DIABETES MELLITUS WITH STAGE 3A CHRONIC KIDNEY DISEASE, WITH LONG-TERM CURRENT USE OF INSULIN (H): ICD-10-CM

## 2024-11-06 DIAGNOSIS — Z79.4 TYPE 2 DIABETES MELLITUS WITH STAGE 3A CHRONIC KIDNEY DISEASE, WITH LONG-TERM CURRENT USE OF INSULIN (H): ICD-10-CM

## 2024-11-06 RX ORDER — METFORMIN HYDROCHLORIDE 500 MG/1
1000 TABLET, EXTENDED RELEASE ORAL 2 TIMES DAILY WITH MEALS
Qty: 360 TABLET | Refills: 1 | Status: SHIPPED | OUTPATIENT
Start: 2024-11-06

## 2024-11-06 RX ORDER — OMEPRAZOLE 40 MG/1
CAPSULE, DELAYED RELEASE ORAL
Qty: 90 CAPSULE | Refills: 2 | Status: SHIPPED | OUTPATIENT
Start: 2024-11-06

## 2024-11-06 NOTE — TELEPHONE ENCOUNTER
New Medication Request    Contacts       Contact Date/Time Type Contact Phone/Fax    11/06/2024 11:05 AM CST Phone (Incoming) Radha Corbett (Self) [phone] (M)            What medication are you requesting?: omeprazole, metformin    Reason for medication request: Patient is requesting 90 days for each medication, pt was only has enough for 45 days.     Have you taken this medication before?: Yes: (B) Last taken 11/06/2024     Controlled Substance Agreement on file:   CSA -- Patient Level:    CSA: None found at the patient level.         Patient offered an appointment? No    Preferred Pharmacy:   [facility]  [address] [facility] [facility] [facility] [address]  Phone: [phone] Fax: [phone] Alternate Fax: [phone]    [facility] - [place], [place] - [address]  [address] [facility] [address]  Phone: [phone] Fax: [phone]      Okay to leave a detailed message?: Yes at Cell number on file:    Telephone Information:   Mobile [phone]

## 2024-11-12 ENCOUNTER — TELEPHONE (OUTPATIENT)
Dept: FAMILY MEDICINE | Facility: CLINIC | Age: 74
End: 2024-11-12
Payer: COMMERCIAL

## 2024-11-12 NOTE — TELEPHONE ENCOUNTER
FYI - Status Update    Who is Calling: [facility]     Update: We missed a shower with Radha last week due to staffing issues and she will be getting one this week.     Does caller want a call/response back: No

## 2024-11-19 ENCOUNTER — TELEPHONE (OUTPATIENT)
Dept: FAMILY MEDICINE | Facility: CLINIC | Age: 74
End: 2024-11-19
Payer: COMMERCIAL

## 2024-11-19 DIAGNOSIS — Z78.9 IMPAIRED MOBILITY AND ACTIVITIES OF DAILY LIVING: Primary | ICD-10-CM

## 2024-11-19 DIAGNOSIS — Z74.09 IMPAIRED MOBILITY AND ACTIVITIES OF DAILY LIVING: Primary | ICD-10-CM

## 2024-11-19 NOTE — TELEPHONE ENCOUNTER
See separate encounter. Pt not being discharged from [facility] at this time.   Piper Wiley RN

## 2024-11-19 NOTE — TELEPHONE ENCOUNTER
"Patient calling in stating that she thinks she is getting discharged from her home care agency, [facility] today.  She wants to get another agency, [facility], to come in instead to give her showers.  Discussed with patient that she should call her insurance company to see what agencies are covered.  She states she has had [facility] before.  Discussed with patient that should [facility] not have the availability to take her on as a patient, she would need alternative agencies that are covered by her insurance.  Patient states she is hoping the provider can \"pull some strings\" to get her into [facility].  Discussed with patient that this is not the relationship between outside agencies and the clinic.  Patient agreed eventually to call her insurance to get a list of approved home care agencies.      Patient also mentioned some issues with finanaces \"It costs me more to live here than what I bring in in a month.\"  She states she has been looking for help since her  had a stroke and hasn't gotten any.  Patient notes her   about 6 months ago.  Talked with patient about a  contacting her, she agreed to this.      Provider: Patient asking for a referral to [facility] home care for shower assistance only.      Kristina Kjellberg, MSN, RN    " Dupixent Counseling: I discussed with the patient the risks of dupilumab including but not limited to eye infection and irritation, cold sores, injection site reactions, worsening of asthma, allergic reactions and increased risk of parasitic infection.  Live vaccines should be avoided while taking dupilumab. Dupilumab will also interact with certain medications such as warfarin and cyclosporine. The patient understands that monitoring is required and they must alert us or the primary physician if symptoms of infection or other concerning signs are noted.

## 2024-11-19 NOTE — TELEPHONE ENCOUNTER
If I suggest any home care agency that would be considered a Medicare fraud  I cannot recommend any home care agency to the patient  Patient's choose which home care agencies they want to go with  Please call patient and ask her to see if her insurance covers the [facility]  If it covers she has to make a video appointment with me as I need to have a face-to-face encounter that I ordered for a new home care referral

## 2024-11-19 NOTE — TELEPHONE ENCOUNTER
"  Home Care is calling regarding an established patient with  [employer] [facility].        8/9/2024     1:35 PM 11/15/2023     1:50 PM   Home Care Information   Current following provider Dr. Quentin Medeiros    Name/Phone Number Inna @ [phone] Kalee    Home Care agency [facility]     Requesting orders from: Cole Saavedra  Provider is following patient: Yes  Is this a 60-day recertification request?  Yes    Orders Requested    Skilled Nursing  Request for recertification   Frequency:  1x every other week x 9 weeks  Disease education. Per [facility], pt cannot poke her finger to monitor her glucose, insurance does not cover CGM. Pt is not actively monitoring her diabetes.     HHA (Home Health Aide)  Request for recertification   Frequency:  1x/wk for 6 wks    Behavioral Health  Eval & Treat    Provider Update:   CHAPIN Laguna states that pt is not currently being discharged from services, but likely will be after this recertification. When discussing possible discharge with pt before [facility] reports that pt made statements that she was having suicidal ideation, threatened to swallow \"a bunch of pills.\" Home Care has referred her to the [facility], but pt does not qualify for services d/t to being above income limit. If pt makes these statements again when discharged agency will file vulnerable adult.         Information was gathered and will be sent to provider for review.  RN will contact Home Care with information after provider review.  Confirmed ok to leave a detailed message with call back.  Contact information confirmed and updated as needed.    Piper Wiley RN    "

## 2024-11-19 NOTE — TELEPHONE ENCOUNTER
Ordered signed by provider.     RN spoke with pt and relayed provider message. Pt verbalized understanding and will call insurance. Declined to schedule visit at this time.    Piper Wiley RN

## 2024-11-20 ENCOUNTER — PATIENT OUTREACH (OUTPATIENT)
Dept: CARE COORDINATION | Facility: CLINIC | Age: 74
End: 2024-11-20
Payer: COMMERCIAL

## 2024-11-20 NOTE — PROGRESS NOTES
"Clinic Care Coordination Contact  Community Health Worker Initial Outreach    CHW Initial Information Gathering:  Referral Source: PCP  Current living arrangement:: I live alone  Supplies Currently Used at Home: None  Equipment Currently Used at Home: none  No PCP office visit in Past Year: No       Patient accepts CC: Yes. Patient scheduled for assessment with the  on 11/22/24 at 2:00 pm. Patient noted desire to discuss supports for possible PCA services. The patient stated that she is wary of supports due to the cost and also wanting to make sure that [facility] will actually be able to support her. The CHW tried to assure her about CCC but the patient understands that it may take some time for trust to be built. The patient stated that she is not able to walk and is \"almost blind\". The patient does have assistance for 6 more showers through [facility].     MILENA Hernandez  [phone]  Connected Care Resource Center  [facility]    "

## 2024-11-20 NOTE — TELEPHONE ENCOUNTER
Please call homecare and do the following orders  Skilled Nursing  recertification   Frequency:  1x every other week x 9 weeks  Disease education.   I have tried multiple times to talk to patient about monitoring her sugars but she refuses to do so we cannot do much about it     HHA (Home Health Aide)   recertification   Frequency:  1x/wk for 6 wks     Behavioral Health  Eval & Treat

## 2024-11-20 NOTE — TELEPHONE ENCOUNTER
Called [facility] Laguna and relayed provider message below, RN verbalized understanding.     RN also mentioned that patient is unable to check BGs at home d/t being legally blind in one eye and then vastly decreased vision in the other. Not doing finger pokes d/t this - only time BGs are being checked by RN during C visits weekly. Asking if there's any progress on dexcom or CGM - noted that PA was approved for this but per RN, patient cannot afford the $200 co-pay for this, even with PA approval.     Looks like patient has a care coordination appt this Friday, will send as a FYI to care coordination to see if they may be able to assist with costs of CGM. Referral also made to diabetes education back in August and patient aware, but patient declined seeing diabetes ed.      Malu Roe RN, BSN  [facility]

## 2024-11-20 NOTE — TELEPHONE ENCOUNTER
This writer attempted to contact CHAPIN Laguna on 11/20/24      Reason for call provider message and unable to leave message. Voicemail box full.      If Jase calls back:   Relay message below, (read verbatim), document that home care RN called and close encounter    Anastasiia Bridges RN

## 2024-11-21 ENCOUNTER — MEDICAL CORRESPONDENCE (OUTPATIENT)
Dept: HEALTH INFORMATION MANAGEMENT | Facility: CLINIC | Age: 74
End: 2024-11-21

## 2024-11-26 ENCOUNTER — TELEPHONE (OUTPATIENT)
Dept: UROLOGY | Facility: CLINIC | Age: 74
End: 2024-11-26
Payer: COMMERCIAL

## 2024-11-26 DIAGNOSIS — R39.15 URGENCY OF URINATION: Primary | ICD-10-CM

## 2024-11-26 RX ORDER — MIRABEGRON 50 MG/1
50 TABLET, FILM COATED, EXTENDED RELEASE ORAL DAILY
Qty: 90 TABLET | Refills: 3 | Status: SHIPPED | OUTPATIENT
Start: 2024-11-26 | End: 2024-11-27

## 2024-11-26 NOTE — TELEPHONE ENCOUNTER
Hello,     Patient is requesting a refill for Myrbetriq 50mg tabs. Did not see on active med list please verify and send new rx.    Thank you,  Staci Penn, [facility]  Pharmacy Technician III - Specialty  [facility], [facility].    Phone: [phone]

## 2024-11-27 ENCOUNTER — VIRTUAL VISIT (OUTPATIENT)
Dept: UROLOGY | Facility: CLINIC | Age: 74
End: 2024-11-27
Payer: COMMERCIAL

## 2024-11-27 VITALS — HEIGHT: 67 IN | BODY MASS INDEX: 28.62 KG/M2

## 2024-11-27 DIAGNOSIS — N32.81 OVERACTIVE BLADDER: ICD-10-CM

## 2024-11-27 DIAGNOSIS — N39.41 URGE INCONTINENCE: Primary | ICD-10-CM

## 2024-11-27 DIAGNOSIS — R39.15 URGENCY OF URINATION: ICD-10-CM

## 2024-11-27 RX ORDER — MIRABEGRON 50 MG/1
50 TABLET, FILM COATED, EXTENDED RELEASE ORAL DAILY
Qty: 90 TABLET | Refills: 3 | Status: SHIPPED | OUTPATIENT
Start: 2024-11-27

## 2024-11-27 ASSESSMENT — PAIN SCALES - GENERAL: PAINLEVEL_OUTOF10: MILD PAIN (3)

## 2024-11-27 NOTE — LETTER
11/27/2024       RE: Radha Corbett  [address] [id number]     Dear Colleague,    Thank you for referring your patient, Radha Corbett, to the [facility] at [facility]. Please see a copy of my visit note below.    Video-Visit Details    Type of service:  Video Visit    Video Start Time: 11:15 AM    Video End Time:11:23 AM    Originating Location (pt. Location): Home    Distant Location (provider location): onsite    Platform used for Video Visit: [facility]    Urology Clinic    Name: Radha Corbett    MRN: [id number]   YOB: 1950  Accompanied at today's visit by:self              Assessment and Plan:   73 year old female with  UUI,  syndrome of menopause, hx of UTIs. S/p interstim implant     - stop gemtesa and resume myrbetriq 50mg daily. Discussed with patient that myrbetriq does not appear covered, however patient states that she will pay the cost as she prefers myrbetriq over gemtesa. Continue trospium daily.   - continue off bactrim as continues to be UTI free. Has not been interested in estrogen cream.  - contact clinic if develops s/s of UTI in the future.   - follow-up in 6 months virtually.   - offered coming into clinic to interrogate device and patient declined. Offered botox or replacing battery of SNS device and patient unable to come to clinic/OR for procedures stating she is bed bound from arthritis.  - discussed risks/benefits of chronic szymanski catheter for incontinence due to lack of options and decreased quality of life with current sx. Patient not interested in this.   - After discussing the assessment and plan with patient, patient verbalized understanding and agreed to the above plan. All questions answered.     18 minutes were spent today on the date of the encounter in reviewing the EMR, direct patient care, coordination of care and documentation in addition to reviewing labs, ordering myrbetriq,  discontinuing gemtesa    Jordyn Osorio PA-C  2024    Patient Care Team:  Cole Saavedra MD as PCP - General (Internal Medicine)  Dada Baltazar MD as MD (Urology)  Noni Mcdaniel, RN as Registered Nurse (Urology)  Trice Medeiros MD PhD as Referring Physician (Internal Medicine)  Luther Harrison MD as MD (Otolaryngology)  Hayder Krishna MD as MD (Ophthalmology)  Yamilka TOVAR as Care Manager  Trice Medeiros MD PhD as Assigned PCP  Leigh Chua LICSW as Assigned Behavioral Health Provider  Dada Baltazar MD as Assigned Surgical Provider  SELF, REFERRED          Chief Complaint:   UUI          History of Present Illness:   2024    HISTORY: Radha Corbett is a 73 year old female is here for follow-up. We have been following her for UUI,  syndrome of menopause, hx of UTIs. S/p interstim implant in 2018. Last seen on 24 and wanted to continue OAB medications and was not interested in replacing SNS device as she reported that she thought her SNS battery . Patient wanted to know of cheaper medication than myrbetriq. Discussed at length at last encounter that there is no other options of beta 3 agonists that would be cheaper, however myrbetriq did go generic this year so renewed and attempted prior auth. The myrbetriq was too expensive so was switched to gemtesa. Here today for follow-up. States the gemtesa does not work as well as the myrbetriq and wants to go back on myrbetriq. Patient also wanted to trial off bactrim at last encounter as had been UTI free for 4 years. Per EMR, she has not had UTIs since stopping the bactrim. Denies s/s of UTI today. Patient reports she is bed bound secondary to her arthritis, so she is unable to come in to get battery changed or trial botox. Patient voices no other concerns at this time.            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     Rotator cuff injury 7/2016     Sleep apnea     \"snore\"     Vitamin D deficiency             Past Surgical History:     Past Surgical History:   Procedure Laterality Date     BREAST LUMPECTOMY, RT/LT      x2, left     CAPSULE/PILL CAM ENDOSCOPY N/A 3/4/2019    Procedure: CAPSULE/PILL CAM ENDOSCOPY;  Surgeon: Sinan Valdes MD;  Location: [facility]     CATARACT IOL, RT/LT  4/99    left, [id number] 7.0D     COLONOSCOPY       COLONOSCOPY N/A 1/22/2016    Procedure: COMBINED COLONOSCOPY, SINGLE OR MULTIPLE BIOPSY/POLYPECTOMY BY BIOPSY;  Surgeon: Praful Benjamin MD;  Location: [facility]     COLONOSCOPY WITH CO2 INSUFFLATION N/A 1/22/2016    Procedure: COLONOSCOPY WITH CO2 INSUFFLATION;  Surgeon: Praful Benjamin MD;  Location: [facility]     COMBINED ESOPHAGOSCOPY, GASTROSCOPY, DUODENOSCOPY (EGD) WITH CO2 INSUFFLATION N/A 11/27/2018    Procedure: COMBINED ESOPHAGOSCOPY, GASTROSCOPY, DUODENOSCOPY (EGD) WITH CO2 INSUFFLATION;  Surgeon: Duane, William Charles, MD;  Location: [facility]     CRYOTHERAPY  2000    cervical     CRYOTHERAPY Left 3/19/2015    Procedure: CRYOTHERAPY;  Surgeon: Keiko Puri MD;  Location: [facility]     DILATION AND CURETTAGE, HYSTEROSCOPY DIAGNOSTIC, COMBINED N/A 1/19/2016    Procedure: COMBINED DILATION AND CURETTAGE, HYSTEROSCOPY DIAGNOSTIC;  Surgeon: Yeni Aparicio DO;  Location: [facility]     ESOPHAGOSCOPY, GASTROSCOPY, DUODENOSCOPY (EGD), COMBINED N/A 11/27/2018    Procedure: COMBINED ESOPHAGOSCOPY, GASTROSCOPY, DUODENOSCOPY (EGD), BIOPSY SINGLE OR MULTIPLE;  Surgeon: Duane, William Charles, MD;  Location: [facility]     IMPLANT STIMULATOR AND LEADS SACRAL NERVE (STAGE ONE AND TWO) N/A 7/24/2018    Procedure: " IMPLANT STIMULATOR AND LEADS SACRAL NERVE (STAGE ONE AND TWO);  Interstim Implant Stage One and Two (Implant Permanent Lead and Generator);  Surgeon: Dada Baltazar MD;  Location: [facility]     IMPLANT STIMULATOR AND LEADS SACRAL NERVE (STAGE ONE AND TWO) Right 2019    Procedure: Replacement of Interstim Implant;  Surgeon: Dada Baltazar MD;  Location: [facility]     IMPLANT STIMULATOR SACRAL NERVE PERCUTANEOUS TRIAL N/A 2018    Procedure: IMPLANT STIMULATOR SACRAL NERVE PERCUTANEOUS TRIAL;  Percutaneous Neural Examination (trial for interstim);  Surgeon: Dada Baltazar MD;  Location: [facility]     LAPAROSCOPIC TUBAL LIGATION  1981     PHACOEMULSIFICATION CLEAR CORNEA WITH STANDARD INTRAOCULAR LENS IMPLANT  8/15/2013    Procedure: PHACOEMULSIFICATION CLEAR CORNEA WITH STANDARD INTRAOCULAR LENS IMPLANT;  RIGHT PHACOEMULSIFICATION CLEAR CORNEA WITH STANDARD INTRAOCULAR LENS IMPLANT ;  Surgeon: Trae Taylor MD;  Location:  [facility]     REMOVE STIMULATOR SACRAL NERVE Right 2019    Procedure: Removal of Interstim Implant;  Surgeon: Dada Baltazar MD;  Location:  OR     SURGICAL HISTORY OF -       x4, ankle surgery            Social History:     Social History     Tobacco Use     Smoking status: Former     Current packs/day: 0.50     Average packs/day: 0.5 packs/day for 20.0 years (10.0 ttl pk-yrs)     Types: Cigarettes     Start date: 2018     Smokeless tobacco: Never     Tobacco comments:     quit at age 35. Restarted in 2018 off and on, not more than 1/2 pack per day   Substance Use Topics     Alcohol use: Yes     Comment: social/3-4 per week            Family History:     Family History   Problem Relation Age of Onset     Hypertension Mother      Cerebrovascular Disease Mother      Arthritis Mother      Cardiovascular Mother      Circulatory Mother      Cerebrovascular Disease Father      Prostate Cancer Father 65         at 69     Asthma Brother      Prostate Cancer Brother 48         bone metastasis     Diabetes Sister      Hypertension Sister      Osteoporosis Sister      Depression Sister      Lipids Sister      Cancer Maternal Grandmother 95        spine     Breast Cancer Sister 39        also contralateral @53, mets to lungs     Cancer Sister 20        second uterine cancer in 30s also     Diabetes Sister      Hypertension Sister      Depression Sister      Osteoporosis Sister      Breast Cancer Sister 57        unilateral     Cancer Paternal Aunt 40        unknown type     Cancer Paternal Uncle         stomach;  in his 80s     Prostate Cancer Brother      Glaucoma No family hx of      Melanoma No family hx of      Skin Cancer No family hx of      Macular Degeneration No family hx of               Allergies:     Allergies   Allergen Reactions     Sulfa Antibiotics      Morphine Rash     Does OK with oxycodone.            Medications:     Current Outpatient Medications   Medication Sig Dispense Refill     mirabegron (MYRBETRIQ) 50 MG 24 hr tablet Take 1 tablet (50 mg) by mouth daily. 90 tablet 3     Alcohol Swabs (ALCOHOL PREP) PADS 1 Units daily 100 each 3     ASPIRIN 81 PO        blood glucose (NO BRAND SPECIFIED) lancing device Device to be used with lancets. 1 each 1     blood glucose (NO BRAND SPECIFIED) test strip Use to test blood sugar 1 times daily or as directed. To accompany: Blood Glucose Monitor Brands: per insurance. 100 strip 3     blood glucose monitoring (NO BRAND SPECIFIED) meter device kit Use to test blood sugar 1 times daily or as directed. Preferred blood glucose meter OR supplies to accompany: Blood Glucose Monitor Brands: per insurance. 1 kit 0     Continuous Glucose Sensor (DEXCOM G7 SENSOR) MISC 1 each every 10 days Change sensor every 10 days 9 each 5     glipiZIDE (GLUCOTROL XL) 2.5 MG 24 hr tablet Take 2 tablets (5 mg) by mouth daily. 90 tablet 0     lisinopril (ZESTRIL) 5 MG tablet Take 1 tablet (5 mg) by mouth daily 90 tablet 3     metFORMIN (GLUCOPHAGE  XR) 500 MG 24 hr tablet Take 2 tablets (1,000 mg) by mouth 2 times daily (with meals). 360 tablet 1     omeprazole (PRILOSEC) 40 MG DR capsule TAKE ONE CAPSULE BY MOUTH ONCE DAILY 30-60 MINUTES BEFORE A MEAL 90 capsule 2     order for DME Equipment being ordered: Light with four-wheel walker 1 Units 0     order for DME Equipment being ordered: 4 wheel walker with a seat, light weight. 1 Units 0     order for DME Equipment being ordered: Walker with seat light weight 1 Device 0     polyethylene glycol (MIRALAX) 17 GM/Dose powder Take 17 g (1 Capful) by mouth daily 510 g 5     simvastatin (ZOCOR) 20 MG tablet Take 1 tablet (20 mg) by mouth at bedtime 90 tablet 3     sitagliptin (JANUVIA) 100 MG tablet Take 1 tablet (100 mg) by mouth daily 90 tablet 0     thin (NO BRAND SPECIFIED) lancets Use with lanceting device. To accompany: Blood Glucose Monitor Brands: per insurance. 100 each 3     traZODone (DESYREL) 100 MG tablet Take 1 tablet (100 mg) by mouth at bedtime 90 tablet 3     trospium (SANCTURA XR) 60 MG CP24 24 hr capsule Take 1 capsule (60 mg) by mouth every morning (before breakfast) 90 capsule 3     Current Facility-Administered Medications   Medication Dose Route Frequency Provider Last Rate Last Admin     cross-Linked Hyaluronate (GEL-ONE) injection PRSY 30 mg  30 mg   Juan Jose Carias, DO   30 mg at 09/09/21 1615     cross-Linked Hyaluronate (GEL-ONE) injection PRSY 30 mg  30 mg   Juan Jose Carias, DO   30 mg at 09/09/21 1615     cross-Linked Hyaluronate (GEL-ONE) injection PRSY 30 mg  30 mg   Juan Jose Carias, DO   30 mg at 11/05/20 1319     cross-Linked Hyaluronate (GEL-ONE) injection PRSY 30 mg  30 mg   Juan Jose Carias, DO   30 mg at 11/05/20 1319     triamcinolone (KENALOG-40) injection 40 mg  40 mg   Juan Jose Carias, DO   40 mg at 01/31/22 1318     triamcinolone (KENALOG-40) injection 40 mg  40 mg   Juan Jose Carias, DO   40 mg at 01/31/22 1319     triamcinolone (KENALOG-40) injection 40 mg  40 mg    "Juan Jose Carias Joe, DO   40 mg at 01/17/22 1444     triamcinolone (KENALOG-40) injection 40 mg  40 mg   Carias Juan Jose Joe, DO   40 mg at 01/17/22 1445     triamcinolone (KENALOG-40) injection 40 mg  40 mg   Carias, Juan Jose Joe, DO   40 mg at 11/05/20 1319     triamcinolone (KENALOG-40) injection 40 mg  40 mg   Carias Juan Jose Joe, DO   40 mg at 11/05/20 1319     triamcinolone (KENALOG-40) injection 40 mg  40 mg   Carias, Juan Jose Joe, DO   40 mg at 10/07/19 1615     triamcinolone (KENALOG-40) injection 40 mg  40 mg   Carias Juan Jose Joe, DO   40 mg at 10/07/19 1615     Facility-Administered Medications Ordered in Other Visits   Medication Dose Route Frequency Provider Last Rate Last Admin     gadobutrol (GADAVIST) injection 10 mL  10 mL Intravenous Once Trice Medeiros MD PhD                 Review of Systems:    ROS: 14 point ROS neg other than the symptoms noted above in the HPI.          Physical Exam:   Height 1.689 m (5' 6.5\"), last menstrual period 11/18/1991, not currently breastfeeding.  5' 6.5\", Body mass index is 28.62 kg/m ., 0 lbs 0 oz  Gen appearance: Age-appropriate appearing female in NAD.   HEENT:  EOMI, conjunctiva clear/white. Normal ROM of neck for age.   Psych:  alert , In no acute distress.  Neuro:  A&Ox3  Resp:  Normal respiratory effort; not in acute respiratory distress.          Data:    Labs:    Creatinine   Date Value Ref Range Status   06/06/2024 1.01 (H) 0.51 - 0.95 mg/dL Final   06/11/2021 0.66 0.52 - 1.04 mg/dL Final          Again, thank you for allowing me to participate in the care of your patient.      Sincerely,    NINO WALLACE PA-C    "

## 2024-11-27 NOTE — PROGRESS NOTES
Video-Visit Details    Type of service:  Video Visit    Video Start Time: 11:15 AM    Video End Time:11:23 AM    Originating Location (pt. Location): Home    Distant Location (provider location): onsite    Platform used for Video Visit: [facility]    Urology Clinic    Name: Radha Corbett    MRN: [id number]   YOB: 1950  Accompanied at today's visit by:self              Assessment and Plan:   73 year old female with  UUI,  syndrome of menopause, hx of UTIs. S/p interstim implant     - stop gemtesa and resume myrbetriq 50mg daily. Discussed with patient that myrbetriq does not appear covered, however patient states that she will pay the cost as she prefers myrbetriq over gemtesa. Continue trospium daily.   - continue off bactrim as continues to be UTI free. Has not been interested in estrogen cream.  - contact clinic if develops s/s of UTI in the future.   - follow-up in 6 months virtually.   - offered coming into clinic to interrogate device and patient declined. Offered botox or replacing battery of SNS device and patient unable to come to clinic/OR for procedures stating she is bed bound from arthritis.  - discussed risks/benefits of chronic szymanski catheter for incontinence due to lack of options and decreased quality of life with current sx. Patient not interested in this.   - After discussing the assessment and plan with patient, patient verbalized understanding and agreed to the above plan. All questions answered.     18 minutes were spent today on the date of the encounter in reviewing the EMR, direct patient care, coordination of care and documentation in addition to reviewing labs, ordering myrbetriq, discontinuing gemtesa    Jordyn Osorio PA-C  November 27, 2024    Patient Care Team:  Cole Saavedra MD as PCP - General (Internal Medicine)  Dada Baltazar MD as MD (Urology)  Noni Mcdaniel, RN as Registered Nurse (Urology)  Trice Medeiros MD PhD as Referring Physician (Internal Medicine)  Hunter  Luther Camp MD as MD (Otolaryngology)  Hayder Krishna MD as MD (Ophthalmology)  Yamilka TOVAR as Care Manager  Trice Medeiros MD PhD as Assigned PCP  Leigh Chua LICSW as Assigned Behavioral Health Provider  Dada Baltazar MD as Assigned Surgical Provider  SELF, REFERRED          Chief Complaint:   UUI          History of Present Illness:   2024    HISTORY: Radha Corbett is a 73 year old female is here for follow-up. We have been following her for UUI,  syndrome of menopause, hx of UTIs. S/p interstim implant in 2018. Last seen on 24 and wanted to continue OAB medications and was not interested in replacing SNS device as she reported that she thought her SNS battery . Patient wanted to know of cheaper medication than myrbetriq. Discussed at length at last encounter that there is no other options of beta 3 agonists that would be cheaper, however myrbetriq did go generic this year so renewed and attempted prior auth. The myrbetriq was too expensive so was switched to gemtesa. Here today for follow-up. States the gemtesa does not work as well as the myrbetriq and wants to go back on myrbetriq. Patient also wanted to trial off bactrim at last encounter as had been UTI free for 4 years. Per EMR, she has not had UTIs since stopping the bactrim. Denies s/s of UTI today. Patient reports she is bed bound secondary to her arthritis, so she is unable to come in to get battery changed or trial botox. Patient voices no other concerns at this time.            Past Medical History:     Past Medical History:   Diagnosis Date    Actinic cheilitis     Arthritis     DM (diabetes mellitus) (H)     GERD (gastroesophageal reflux disease)     Hiatal hernia     HPV in female     HTN (hypertension)     IBS (irritable bowel syndrome)     Major depression     Myopia     Other and unspecified hyperlipidemia     Pseudoangiomatous stromal hyperplasia of breast     [facility]    Rotator cuff injury 2016    Sleep  "apnea     \"snore\"    Vitamin D deficiency             Past Surgical History:     Past Surgical History:   Procedure Laterality Date    BREAST LUMPECTOMY, RT/LT      x2, left    CAPSULE/PILL CAM ENDOSCOPY N/A 3/4/2019    Procedure: CAPSULE/PILL CAM ENDOSCOPY;  Surgeon: Sinan Valdes MD;  [facility]: [facility]    CATARACT IOL, RT/LT  4/99    left, MZ60CD 7.0D    COLONOSCOPY      COLONOSCOPY N/A 1/22/2016    Procedure: COMBINED COLONOSCOPY, SINGLE OR MULTIPLE BIOPSY/POLYPECTOMY BY BIOPSY;  Surgeon: Praful Benjamin MD;  Location: [facility]    COLONOSCOPY WITH CO2 INSUFFLATION N/A 1/22/2016    Procedure: COLONOSCOPY WITH CO2 INSUFFLATION;  Surgeon: Praful Benjamin MD;  Location: [facility]    COMBINED ESOPHAGOSCOPY, GASTROSCOPY, DUODENOSCOPY (EGD) WITH CO2 INSUFFLATION N/A 11/27/2018    Procedure: COMBINED ESOPHAGOSCOPY, GASTROSCOPY, DUODENOSCOPY (EGD) WITH CO2 INSUFFLATION;  Surgeon: Duane, William Charles, MD;  Location: [facility]    CRYOTHERAPY  2000    cervical    CRYOTHERAPY Left 3/19/2015    Procedure: CRYOTHERAPY;  Surgeon: Keiko Puri MD;  Location:  [facility]    DILATION AND CURETTAGE, HYSTEROSCOPY DIAGNOSTIC, COMBINED N/A 1/19/2016    Procedure: COMBINED DILATION AND CURETTAGE, HYSTEROSCOPY DIAGNOSTIC;  Surgeon: Yeni Aparicio DO;  Location: [facility]    ESOPHAGOSCOPY, GASTROSCOPY, DUODENOSCOPY (EGD), COMBINED N/A 11/27/2018    Procedure: COMBINED ESOPHAGOSCOPY, GASTROSCOPY, DUODENOSCOPY (EGD), BIOPSY SINGLE OR MULTIPLE;  Surgeon: Duane, William Charles, MD;  Location: [facility]    IMPLANT STIMULATOR AND LEADS SACRAL NERVE (STAGE ONE AND TWO) N/A 7/24/2018    Procedure: IMPLANT STIMULATOR AND LEADS SACRAL NERVE (STAGE ONE AND TWO);  Interstim Implant Stage One and Two (Implant Permanent Lead and Generator);  Surgeon: Dada Baltazar MD;  [facility]    IMPLANT STIMULATOR AND LEADS SACRAL NERVE (STAGE ONE AND TWO) Right 8/6/2019    Procedure: Replacement of Interstim Implant;  Surgeon: " Dada Baltazar MD;  [facility]    IMPLANT STIMULATOR SACRAL NERVE PERCUTANEOUS TRIAL N/A 2018    Procedure: IMPLANT STIMULATOR SACRAL NERVE PERCUTANEOUS TRIAL;  Percutaneous Neural Examination (trial for interstim);  Surgeon: Dada Baltazar MD;  [facility]    LAPAROSCOPIC TUBAL LIGATION  1981    PHACOEMULSIFICATION CLEAR CORNEA WITH STANDARD INTRAOCULAR LENS IMPLANT  8/15/2013    Procedure: PHACOEMULSIFICATION CLEAR CORNEA WITH STANDARD INTRAOCULAR LENS IMPLANT;  RIGHT PHACOEMULSIFICATION CLEAR CORNEA WITH STANDARD INTRAOCULAR LENS IMPLANT ;  Surgeon: Trae Taylor MD;  Location:  [facility]    REMOVE STIMULATOR SACRAL NERVE Right 2019    Procedure: Removal of Interstim Implant;  Surgeon: Dada Baltazar MD;  [facility]    SURGICAL HISTORY OF -       x4, ankle surgery            Social History:     Social History     Tobacco Use    Smoking status: Former     Current packs/day: 0.50     Average packs/day: 0.5 packs/day for 20.0 years (10.0 ttl pk-yrs)     Types: Cigarettes     Start date: 2018    Smokeless tobacco: Never    Tobacco comments:     quit at age 35. Restarted in 2018 off and on, not more than 1/2 pack per day   Substance Use Topics    Alcohol use: Yes     Comment: social/3-4 per week            Family History:     Family History   Problem Relation Age of Onset    Hypertension Mother     Cerebrovascular Disease Mother     Arthritis Mother     Cardiovascular Mother     Circulatory Mother     Cerebrovascular Disease Father     Prostate Cancer Father 65         at 69    Asthma Brother     Prostate Cancer Brother 48        bone metastasis    Diabetes Sister     Hypertension Sister     Osteoporosis Sister     Depression Sister     Lipids Sister     Cancer Maternal Grandmother 95        spine    Breast Cancer Sister 39        also contralateral @53, mets to lungs    Cancer Sister 20        second uterine cancer in 30s also    Diabetes Sister     Hypertension  Sister     Depression Sister     Osteoporosis Sister     Breast Cancer Sister 57        unilateral    Cancer Paternal Aunt 40        unknown type    Cancer Paternal Uncle         stomach;  in his 80s    Prostate Cancer Brother     Glaucoma No family hx of     Melanoma No family hx of     Skin Cancer No family hx of     Macular Degeneration No family hx of               Allergies:     Allergies   Allergen Reactions    Sulfa Antibiotics     Morphine Rash     Does OK with oxycodone.            Medications:     Current Outpatient Medications   Medication Sig Dispense Refill    mirabegron (MYRBETRIQ) 50 MG 24 hr tablet Take 1 tablet (50 mg) by mouth daily. 90 tablet 3    Alcohol Swabs (ALCOHOL PREP) PADS 1 Units daily 100 each 3    ASPIRIN 81 PO       blood glucose (NO BRAND SPECIFIED) lancing device Device to be used with lancets. 1 each 1    blood glucose (NO BRAND SPECIFIED) test strip Use to test blood sugar 1 times daily or as directed. To accompany: Blood Glucose Monitor Brands: per insurance. 100 strip 3    blood glucose monitoring (NO BRAND SPECIFIED) meter device kit Use to test blood sugar 1 times daily or as directed. Preferred blood glucose meter OR supplies to accompany: Blood Glucose Monitor Brands: per insurance. 1 kit 0    Continuous Glucose Sensor (DEXCOM G7 SENSOR) MISC 1 each every 10 days Change sensor every 10 days 9 each 5    glipiZIDE (GLUCOTROL XL) 2.5 MG 24 hr tablet Take 2 tablets (5 mg) by mouth daily. 90 tablet 0    lisinopril (ZESTRIL) 5 MG tablet Take 1 tablet (5 mg) by mouth daily 90 tablet 3    metFORMIN (GLUCOPHAGE XR) 500 MG 24 hr tablet Take 2 tablets (1,000 mg) by mouth 2 times daily (with meals). 360 tablet 1    omeprazole (PRILOSEC) 40 MG DR capsule TAKE ONE CAPSULE BY MOUTH ONCE DAILY 30-60 MINUTES BEFORE A MEAL 90 capsule 2    order for DME Equipment being ordered: Light with four-wheel walker 1 Units 0    order for DME Equipment being ordered: 4 wheel walker with a seat, light  weight. 1 Units 0    order for DME Equipment being ordered: Walker with seat light weight 1 Device 0    polyethylene glycol (MIRALAX) 17 GM/Dose powder Take 17 g (1 Capful) by mouth daily 510 g 5    simvastatin (ZOCOR) 20 MG tablet Take 1 tablet (20 mg) by mouth at bedtime 90 tablet 3    sitagliptin (JANUVIA) 100 MG tablet Take 1 tablet (100 mg) by mouth daily 90 tablet 0    thin (NO BRAND SPECIFIED) lancets Use with lanceting device. To accompany: Blood Glucose Monitor Brands: per insurance. 100 each 3    traZODone (DESYREL) 100 MG tablet Take 1 tablet (100 mg) by mouth at bedtime 90 tablet 3    trospium (SANCTURA XR) 60 MG CP24 24 hr capsule Take 1 capsule (60 mg) by mouth every morning (before breakfast) 90 capsule 3     Current Facility-Administered Medications   Medication Dose Route Frequency Provider Last Rate Last Admin    cross-Linked Hyaluronate (GEL-ONE) injection PRSY 30 mg  30 mg   Juan Jose Carias, DO   30 mg at 09/09/21 1615    cross-Linked Hyaluronate (GEL-ONE) injection PRSY 30 mg  30 mg   Juan Jose Carias Joe, DO   30 mg at 09/09/21 1615    cross-Linked Hyaluronate (GEL-ONE) injection PRSY 30 mg  30 mg   Juan Jose Carias Joe, DO   30 mg at 11/05/20 1319    cross-Linked Hyaluronate (GEL-ONE) injection PRSY 30 mg  30 mg   Juan Jose Carias, DO   30 mg at 11/05/20 1319    triamcinolone (KENALOG-40) injection 40 mg  40 mg   Juan Jose Carias Joe, DO   40 mg at 01/31/22 1318    triamcinolone (KENALOG-40) injection 40 mg  40 mg   Juan Jose Carias Joe, DO   40 mg at 01/31/22 1319    triamcinolone (KENALOG-40) injection 40 mg  40 mg   Juan Jose Carias Joe, DO   40 mg at 01/17/22 1444    triamcinolone (KENALOG-40) injection 40 mg  40 mg   Juan Jose Carias Joe, DO   40 mg at 01/17/22 1445    triamcinolone (KENALOG-40) injection 40 mg  40 mg   Juan Jose Cariasn, DO   40 mg at 11/05/20 1319    triamcinolone (KENALOG-40) injection 40 mg  40 mg   Juan Jose Carias,    40 mg at 11/05/20 1319    triamcinolone (KENALOG-40) injection 40 mg   "40 mg   Juan Jose Carias, DO   40 mg at 10/07/19 1615    triamcinolone (KENALOG-40) injection 40 mg  40 mg   Juan Jose Carias, DO   40 mg at 10/07/19 1615     Facility-Administered Medications Ordered in Other Visits   Medication Dose Route Frequency Provider Last Rate Last Admin    gadobutrol (GADAVIST) injection 10 mL  10 mL Intravenous Once Trice Medeiros MD PhD                 Review of Systems:    ROS: 14 point ROS neg other than the symptoms noted above in the HPI.          Physical Exam:   Height 1.689 m (5' 6.5\"), last menstrual period 11/18/1991, not currently breastfeeding.  5' 6.5\", Body mass index is 28.62 kg/m ., 0 lbs 0 oz  Gen appearance: Age-appropriate appearing female in NAD.   HEENT:  EOMI, conjunctiva clear/white. Normal ROM of neck for age.   Psych:  alert , In no acute distress.  Neuro:  A&Ox3  Resp:  Normal respiratory effort; not in acute respiratory distress.          Data:    Labs:    Creatinine   Date Value Ref Range Status   06/06/2024 1.01 (H) 0.51 - 0.95 mg/dL Final   06/11/2021 0.66 0.52 - 1.04 mg/dL Final          "

## 2024-11-27 NOTE — NURSING NOTE
Current patient location: [address] [place] [id number]    Is the patient currently in the state of [place]? YES    Visit mode:VIDEO    If the visit is dropped, the patient can be reconnected by:VIDEO VISIT: Text to cell phone:   Telephone Information:   Mobile [phone]       Will anyone else be joining the visit? NO  (If patient encounters technical issues they should call [phone])    Are changes needed to the allergy or medication list? No    Are refills needed on medications prescribed by this physician? NO    Rooming Documentation:  Unable to complete questionnaire(s) due to time    Reason for visit: RECHECK    Rimma BOYLEF

## 2024-12-17 ENCOUNTER — TELEPHONE (OUTPATIENT)
Dept: FAMILY MEDICINE | Facility: CLINIC | Age: 74
End: 2024-12-17
Payer: COMMERCIAL

## 2024-12-17 NOTE — TELEPHONE ENCOUNTER
Forms/Letter Request    Type of form/letter: OTHER: Home Health Care       Do we have the form/letter: Yes:      Who is the form from? Home care    Where did/will the form come from? form was faxed in    When is form/letter needed by: ASAP    How would you like the form/letter returned: Fax : [phone]

## 2024-12-18 DIAGNOSIS — Z53.9 DIAGNOSIS NOT YET DEFINED: Primary | ICD-10-CM

## 2024-12-18 PROCEDURE — G0179 MD RECERTIFICATION HHA PT: HCPCS | Performed by: INTERNAL MEDICINE

## 2024-12-18 NOTE — TELEPHONE ENCOUNTER
Received signed form back from provider and faxed to [phone]. Copy sent for scanning.  Sadaf Diaz CMA

## 2024-12-18 NOTE — TELEPHONE ENCOUNTER
I have not seen these forms on my desk this morning  I was wondering if you still have them somewhere else

## 2024-12-20 ENCOUNTER — TELEPHONE (OUTPATIENT)
Dept: FAMILY MEDICINE | Facility: CLINIC | Age: 74
End: 2024-12-20
Payer: COMMERCIAL

## 2024-12-20 NOTE — LETTER
January 6, 2025      Radha Corbett  [address] [address]        Dear Radha,   I want to put it in the form of a letter the matter that we discussed during your virtual visit recently  I have got paperwork from [employer] for examination of housebound status or permanent need for required aid or attendance paperwork for you  Unfortunately to complete this paperwork I have to personally examine the patient  I have to do some physical examination as per the guidelines   since I have never met you in person and we always did virtual visits I cannot complete this paperwork  If you want to get this paperwork done you are more than welcome to make an in person visit with me and I can do the paperwork at the visit  I am returning the paperwork to you            Sincerely,        Cole Saavedra MD    Electronically signed

## 2024-12-20 NOTE — TELEPHONE ENCOUNTER
Forms/Letter Request    Type of form/letter: OTHER: [employer]       Do we have the form/letter: Yes:  [employer], Examination for housebound status or permanent need for regular aid and attendance    Who is the form from?  [employer] veterans [employer]     Where did/will the form come from? form was faxed in    When is form/letter needed by:     How would you like the form/letter returned: Mail  Is this the correct address?: Yes  [address] [facility] [address]

## 2024-12-27 ENCOUNTER — TELEPHONE (OUTPATIENT)
Dept: FAMILY MEDICINE | Facility: CLINIC | Age: 74
End: 2024-12-27
Payer: COMMERCIAL

## 2024-12-27 NOTE — TELEPHONE ENCOUNTER
Forms/Letter Request    Type of form/letter: Home Health Certification-order #[id number]      Do we have the form/letter: Yes: Placed in provider's in basket    Who is the form from? [facility] (if other please explain)    Where did/will the form come from? form was faxed in    When is form/letter needed by: ASAP    How would you like the form/letter returned: Fax : [phone]

## 2024-12-30 ENCOUNTER — VIRTUAL VISIT (OUTPATIENT)
Dept: FAMILY MEDICINE | Facility: CLINIC | Age: 74
End: 2024-12-30
Payer: COMMERCIAL

## 2024-12-30 DIAGNOSIS — Z79.4 TYPE 2 DIABETES MELLITUS WITH STAGE 3A CHRONIC KIDNEY DISEASE, WITH LONG-TERM CURRENT USE OF INSULIN (H): Primary | ICD-10-CM

## 2024-12-30 DIAGNOSIS — F51.04 CHRONIC INSOMNIA: ICD-10-CM

## 2024-12-30 DIAGNOSIS — M17.0 PRIMARY OSTEOARTHRITIS OF BOTH KNEES: ICD-10-CM

## 2024-12-30 DIAGNOSIS — N18.31 TYPE 2 DIABETES MELLITUS WITH STAGE 3A CHRONIC KIDNEY DISEASE, WITH LONG-TERM CURRENT USE OF INSULIN (H): Primary | ICD-10-CM

## 2024-12-30 DIAGNOSIS — Z74.09 MOBILITY IMPAIRED: ICD-10-CM

## 2024-12-30 DIAGNOSIS — E11.22 TYPE 2 DIABETES MELLITUS WITH STAGE 3A CHRONIC KIDNEY DISEASE, WITH LONG-TERM CURRENT USE OF INSULIN (H): Primary | ICD-10-CM

## 2024-12-30 DIAGNOSIS — Z74.2 HOME HELP NEEDED: ICD-10-CM

## 2024-12-30 PROCEDURE — 99214 OFFICE O/P EST MOD 30 MIN: CPT | Mod: 95 | Performed by: INTERNAL MEDICINE

## 2024-12-30 ASSESSMENT — PATIENT HEALTH QUESTIONNAIRE - PHQ9
10. IF YOU CHECKED OFF ANY PROBLEMS, HOW DIFFICULT HAVE THESE PROBLEMS MADE IT FOR YOU TO DO YOUR WORK, TAKE CARE OF THINGS AT HOME, OR GET ALONG WITH OTHER PEOPLE: VERY DIFFICULT
SUM OF ALL RESPONSES TO PHQ QUESTIONS 1-9: 13
SUM OF ALL RESPONSES TO PHQ QUESTIONS 1-9: 13

## 2024-12-30 NOTE — PROGRESS NOTES
"  If patient has telephone visit, have they been educated on video visit as preferred visit method and offered to change to video visit? NOT APPLICABLE        Instructions Relayed to Patient by Virtual Roomer:     If patient is not active on [employer]:  Relayed the following to patient: \"Would you like us to text or email you a link to join your appointment now or when your provider is ready to initiate the virtual visit?\"      Patient Confirmed they will join visit via: Text Link to Cell Phone  Reminded patient to ensure they were logged on to virtual visit by arrival time listed.   Asked if patient has flexibility to initiate visit sooner than arrival time: patient is unable to initiate visit earlier than arrival time     If pediatric virtual visit, ensured pediatric patient along with parent/guardian will be present for video visit.     Patient offered the website [email or web address] and/or phone number to [employer] Help line: [phone]      Radha is a 74 year old who is being evaluated via a billable video visit.    How would you like to obtain your AVS? Mail a copy  If the video visit is dropped, the invitation should be resent by: Text to cell phone: [phone]  Will anyone else be joining your video visit? No      Assessment & Plan     Type 2 diabetes mellitus with stage 3a chronic kidney disease, with long-term current use of insulin (H)  She does not check her blood sugars at home  She tells me she is legally blind and cannot see the meter  She only gets this checked whenever the nurse comes to visit  So we do not know what her sugars are running at  She tells me that diabetes is not her priority  She tells me abnormalities getting home health  She is currently on Januvia/Glucotrol XL/metformin    Home help needed  She has trouble walking because of her severe arthritis  She also cannot see and  tells me she is legally blind  She wants home health aide to help her with giving bath  - Home " "Care Referral    Mobility impaired  She also wants a nurse to come and check her medicines once a week  Home care will be ordered  She already currently has [facility] but apparently they only have 1 more visit left and after that they are done with her and she wants me to refer her back to [facility]  Explained to her that I can do that however I do not know if she has used up all her home care days and if they will cover her or not  Regardless I told her I can place the referral and we can take it from there  Also of note I have got some paperwork from the [facility] today  I discussed about the paperwork with her  Paperwork will very extensive and needs a visit from her so that I can extensively do the examination for her mobility and also check her blood pressure weight and eyesight  Patient told me that she cannot come to the clinic for in person visit for this  For this reason I will be unable to complete this paperwork  I will send the paperwork back to [facility]  - Home Care Referral    Chronic insomnia  Patient is on trazodone    Primary osteoarthritis of both knees  She needs physical therapy but tells me she is not keen on that  She tells me she has taken Tylenol and oxycodone in the past without much benefit          BMI  Estimated body mass index is 28.62 kg/m  as calculated from the following:    Height as of 11/27/24: 1.689 m (5' 6.5\").    Weight as of 8/8/24: 81.6 kg (180 lb).             Domonique Garcia is a 74 year old, presenting for the following health issues:  No chief complaint on file.        12/30/2024     2:50 PM   Additional Questions   Roomed by Aime   Accompanied by self       Video Start Time:  500 PM    History of Present Illness       Reason for visit:  Referral                Review of Systems  Constitutional, HEENT, cardiovascular, pulmonary, gi and gu systems are negative, except as otherwise noted.      Objective           Vitals:  No vitals were obtained today due to virtual " visit.    Physical Exam   GENERAL: alert and no distress  EYES: Eyes grossly normal to inspection.  No discharge or erythema, or obvious scleral/conjunctival abnormalities.  RESP: No audible wheeze, cough, or visible cyanosis.    SKIN: Visible skin clear. No significant rash, abnormal pigmentation or lesions.  NEURO: Cranial nerves grossly intact.  Mentation and speech appropriate for age.  PSYCH: Appropriate affect, tone, and pace of words          Video-Visit Details    Type of service:  Video Visit   Video End Time: 520 PM  Originating Location (pt. Location): Home    Distant Location (provider location):  Off-site  Platform used for Video Visit: Well  Signed Electronically by: Cole Saavedra MD

## 2024-12-30 NOTE — TELEPHONE ENCOUNTER
Writer called pt and informed her of provider's message. She stated at the moment she can't do in person visit and she will let provider know at today's virtual

## 2024-12-30 NOTE — TELEPHONE ENCOUNTER
I met this patient in person  I have always been seeing her as virtual visits  The last time I saw her was in August  I have reviewed the [employer] paperwork  It needs extensive documentation including some examination findings which needs a in person visit  Please call patient and advise her to make an in person visit with me so that I can complete the paperwork  Without in person visit completing this paperwork will be impossible and also can be construed as a fraud    Please call and relay the above information and assist with in person appointment

## 2025-01-01 ENCOUNTER — TRANSFERRED RECORDS (OUTPATIENT)
Dept: MULTI SPECIALTY CLINIC | Facility: CLINIC | Age: 75
End: 2025-01-01

## 2025-01-01 LAB — RETINOPATHY: NORMAL

## 2025-01-06 NOTE — TELEPHONE ENCOUNTER
Please mail the paperwork back to the patient along with the letter I did today  I could not complete the paperwork and I  have explained the reasons and the letter

## 2025-01-08 ENCOUNTER — TELEPHONE (OUTPATIENT)
Dept: FAMILY MEDICINE | Facility: CLINIC | Age: 75
End: 2025-01-08
Payer: COMMERCIAL

## 2025-01-08 NOTE — TELEPHONE ENCOUNTER
Relayed verbal to CHAPIN Laguna. Closing encounter.     Maureen Briones RN on 1/8/2025 at 5:29 PM

## 2025-01-08 NOTE — TELEPHONE ENCOUNTER
Home Care is calling regarding an established patient with [facility].        8/9/2024     1:35 PM 11/15/2023     1:50 PM   Home Care Information   Current following provider Dr. Quentin Medeiros    Name/Phone Number Inna @ [phone] [place]    Home Care agency [facility]     Requesting orders from: Cole Saavedra  Provider is following patient: Yes  Is this a 60-day recertification request?  No    Orders Requested    HHA (Home Health Aide)  Request for initial certification (first set of orders)   Frequency: 1x/week for 2 weeks for showers    - States patient will likely need to pay OOP in the future for weekly showers if she wants to stay with [facility], may be switching to [facility] though. Will update provider as needed. Asking to be sent as high priority as they are trying to get out to patient tomorrow for shower      Information was gathered and will be sent to provider for review.  RN will contact Home Care with information after provider review.    Confirmed ok to leave a detailed message with call back.  Contact information confirmed and updated as needed.      Malu Roe, RN, BSN  [facility]  [place] and [place]

## 2025-01-08 NOTE — TELEPHONE ENCOUNTER
Please call homecare and do the orders below    HHA (Home Health Aide)    Frequency: 1x/week for 2 weeks for showers

## 2025-01-13 ENCOUNTER — NURSE TRIAGE (OUTPATIENT)
Dept: FAMILY MEDICINE | Facility: CLINIC | Age: 75
End: 2025-01-13
Payer: COMMERCIAL

## 2025-01-13 NOTE — TELEPHONE ENCOUNTER
Have never seen this patient in person  I only saw her virtually so I never examined her eyes in the past  It looks like this is an ophthalmological emergency  She has to go to ER if she thinks she is having a retinal detachment as there is nothing we could do for this in the primary care office  The other option is she can try to get an optometry appointment ASAP   We have couple of them in [place] and [place]  You can see if they can get her there ASAP today if not she has to go to ER  I understand  ophthalmology emergency services are only available in [facility]

## 2025-01-13 NOTE — TELEPHONE ENCOUNTER
"Nurse Triage SBAR    Is this a 2nd Level Triage? YES, LICENSED PRACTITIONER REVIEW IS REQUIRED    Situation/Background: Reports is having RIGHT eye problem. Choked on something, had coughing fit and now right eye is flashing, occurred approx 3 days ago. 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 Reports mobility is extremely limited, bed bound with limited transportation options, and needs to go to appropriate place.     Assessment: See assessment info below. Patient also reports that \"I am very scared\".     Protocol Recommended Disposition:   Go To Office Now    Recommendation:     Patient would like recommendation on follow up; wants recommendation for provider that can evaluate and repair if needed. Has a friend who can get her to be seen. Does NOT want to go to [facility].       Routed to provider    Does the patient meet one of the following criteria for ADS visit consideration? 16+ years old, with an [facility] PCP     TIP  Providers, please consider if this condition is appropriate for management at one of our Acute and Diagnostic Services sites.     If patient is a good candidate, please use dotphrase <dot>triageresponse and select Refer to ADS to document.    Reason for Disposition   Flashes of light  (Exception: Brief from pressing on the eyeball.)    Additional Information   Negative: Weakness of the face, arm or leg on one side of the body   Negative: Followed getting substance in the eye   Negative: Foreign body stuck in the eye   Negative: Followed an eye injury   Negative: Followed sun lamp or sun exposure (UV keratitis)   Negative: Yellow or green discharge (pus) in the eye   Negative: Pregnant   Negative: Complete loss of vision in one or both eyes   Negative: Severe eye pain   Negative: Blurred vision or visual changes and present now and sudden onset or new (e.g., minutes, hours, days)  (Exception: Seeing floaters / black specks OR previously diagnosed " "migraine headaches with same symptoms.)    Answer Assessment - Initial Assessment Questions  1. DESCRIPTION: \"How has your vision changed?\" (e.g., complete vision loss, blurred vision, double vision, floaters, etc.)      Reports \"flashing\" in RIGHT eye    2. LOCATION: \"One or both eyes?\" If one, ask: \"Which eye?\"      RIGHT eye only, blind in left eye    3. SEVERITY: \"Can you see anything?\" If Yes, ask: \"What can you see?\" (e.g., fine print)      Able to see as well, not as well    4. ONSET: \"When did this begin?\" \"Did it start suddenly or has this been gradual?\"      Friday    5. PATTERN: \"Does this come and go, or has it been constant since it started?\"      Constant    6. PAIN: \"Is there any pain in your eye(s)?\"  (Scale 1-10; or mild, moderate, severe)      no  7. CONTACTS-GLASSES: \"Do you wear contacts or glasses?\"      na  8. CAUSE: \"What do you think is causing this visual problem?\"      Possible retinal detachment    9. OTHER SYMPTOMS: \"Do you have any other symptoms?\" (e.g., confusion, headache, arm or leg weakness, speech problems)      No  10. PREGNANCY: \"Is there any chance you are pregnant?\" \"When was your last menstrual period?\"        na    Protocols used: Vision Loss or Change-A-OH    "

## 2025-01-14 ENCOUNTER — TRANSCRIBE ORDERS (OUTPATIENT)
Dept: OTHER | Age: 75
End: 2025-01-14

## 2025-01-14 ENCOUNTER — TELEPHONE (OUTPATIENT)
Dept: OPHTHALMOLOGY | Facility: CLINIC | Age: 75
End: 2025-01-14
Payer: COMMERCIAL

## 2025-01-14 DIAGNOSIS — Z86.69 HX OF RETINAL DETACHMENT: ICD-10-CM

## 2025-01-14 DIAGNOSIS — H44.23 DEGENERATIVE MYOPIA OF BOTH EYES, UNSPECIFIED WHETHER COMPLICATION PRESENT: Primary | ICD-10-CM

## 2025-01-14 NOTE — TELEPHONE ENCOUNTER
This encounter is being sent to inform the clinic that this patient has a referral from Dr. Bettie Hernandez, [facility] for the diagnoses of Degenerative myopia of both eyes, unspecified whether complication present   Hx of retinal detachment  and has requested that this patient be seen within 3-5 days and/or with Dr. Roblero Or Dr. Ledesma.  Based on the availability of our provider(s), we are unable to accommodate this request.    Were all sites offered this patient?  Offer all available sites    Does scheduling algorithm request to schedule next available?  Patient has been scheduled for the first available opening with Dr. Roblero  on 01/28.  We have informed the patient that the clinic will review their referral and reach out if a sooner appointment is medically necessary.      Brandy from [facility] called this referral in and pt is a former pt of Dr. Puri and pt refuses to see him again as pt thinks Dr. Puri messed up her eyes per Brandy.  They are requesting Dr. Roblero or Dr. Ledesma only, please review for sooner appt.

## 2025-01-14 NOTE — TELEPHONE ENCOUNTER
Pt seen by Dr. Bettie Hernandez at [facility] today and urgent referral placed to Dr. Roblero/Dr. Ledesma    -- per encounter details--Degenerative myopia of both eyes, unspecified whether complication present (Primary Dx);   History of retinal detachment     I am not able to specific referral exam details.    I will review with retina team scheduling options    Jose Rudolph RN 4:08 PM 01/14/25

## 2025-01-15 ENCOUNTER — TELEPHONE (OUTPATIENT)
Dept: FAMILY MEDICINE | Facility: CLINIC | Age: 75
End: 2025-01-15
Payer: COMMERCIAL

## 2025-01-15 DIAGNOSIS — Z74.2 HOME HELP NEEDED: Primary | ICD-10-CM

## 2025-01-15 DIAGNOSIS — N18.31 TYPE 2 DIABETES MELLITUS WITH STAGE 3A CHRONIC KIDNEY DISEASE, WITH LONG-TERM CURRENT USE OF INSULIN (H): ICD-10-CM

## 2025-01-15 DIAGNOSIS — Z74.09 MOBILITY IMPAIRED: ICD-10-CM

## 2025-01-15 DIAGNOSIS — E11.22 TYPE 2 DIABETES MELLITUS WITH STAGE 3A CHRONIC KIDNEY DISEASE, WITH LONG-TERM CURRENT USE OF INSULIN (H): ICD-10-CM

## 2025-01-15 DIAGNOSIS — Z79.4 TYPE 2 DIABETES MELLITUS WITH STAGE 3A CHRONIC KIDNEY DISEASE, WITH LONG-TERM CURRENT USE OF INSULIN (H): ICD-10-CM

## 2025-01-15 NOTE — TELEPHONE ENCOUNTER
Called patient to see which company she'd like - patient states she'd prefer [facility] referral. States she is currently with [facility] but will have to be private pay for showers and she wants to try [facility] again. Would like [facility] for showers, and PT/OT for mobility    Routing to provider to advise      Malu Roe RN, BSN  [facility]  [place], [place] and [place]

## 2025-01-15 NOTE — TELEPHONE ENCOUNTER
I think this patient currently has a home care service that is dropping her because she has completed the maximum number of days with her  I am not sure which company is not  I cannot obviously refer her to the same company and she might have run out of days to be referred to another company  Regardless I am willing to send a referral but she has to first tell us which company she wants to choose and which company she is currently working with

## 2025-01-15 NOTE — TELEPHONE ENCOUNTER
Pt had scheduled an appt with Dr. Roblero today through [facility] for a NEW Retina visit and then Cx'd through the call center this AM.    Pt is now scheduled for 2 weeks out.    DANISHA Norman 7:55 AM January 15, 2025

## 2025-01-15 NOTE — TELEPHONE ENCOUNTER
Order/Referral Request    Who is requesting: patient    Orders being requested: PT/OT and showering help for home--Would be open to [facility] or [facility]    Reason service is needed/diagnosis: Decreased mobility--osteoarthritis    When are orders needed by: ASAP    Has this been discussed with Provider: Yes    Does patient have a preference on a Group/Provider/Facility? [facility] or [facility]    Does patient have an appointment scheduled?: No    Where to send orders: Fax    Okay to leave a detailed message?: Yes at Cell number on file:    Telephone Information:   Mobile [phone]

## 2025-01-20 ENCOUNTER — TELEPHONE (OUTPATIENT)
Dept: FAMILY MEDICINE | Facility: CLINIC | Age: 75
End: 2025-01-20
Payer: COMMERCIAL

## 2025-01-20 NOTE — TELEPHONE ENCOUNTER
Forms/Letter Request    Type of form/letter: OTHER: Home Care orders-order #[id number]        Do we have the form/letter: Yes: Placed in provider's in basket    Who is the form from? [facility]  (if other please explain)    Where did/will the form come from? form was faxed in    When is form/letter needed by: ASAP    How would you like the form/letter returned: Fax : [phone]

## 2025-01-22 ENCOUNTER — TELEPHONE (OUTPATIENT)
Dept: FAMILY MEDICINE | Facility: CLINIC | Age: 75
End: 2025-01-22
Payer: COMMERCIAL

## 2025-01-22 NOTE — TELEPHONE ENCOUNTER
Please call homecare and convey the message below    OK  to delay Physical Therapy start of care until 1/24/25

## 2025-01-22 NOTE — TELEPHONE ENCOUNTER
Home Care is calling regarding an established patient with [facility].        8/9/2024     1:35 PM 11/15/2023     1:50 PM   Home Care Information   Current following provider Dr. Quentin Medeiros    Name/Phone Number Inna @ [phone] Kalee    Home Care agency [facility]     Requesting orders from: Cole Saavedra  Provider is following patient: Yes  Is this a 60-day recertification request?  No    Orders Requested  Verbal order to delay Physical Therapy start of care until 1/24/25    Physical Therapy  Request for delay in care, service is not able to be provided within same scheduled day.   1/24/25    Information was gathered and will be sent to provider for review.  RN will contact Home Care with information after provider review.  Confirmed ok to leave a detailed message with call back.  Contact information confirmed and updated as needed.    Annmarie Haas RN

## 2025-01-22 NOTE — TELEPHONE ENCOUNTER
Attempted x3 to number provided by nurse and attempted to contact [facility] main number. Kept getting busy signal. [facility] may be having an outage. Will attempt to call again later today.     Need to notify per Cole Saavedra MD, ok to delay start of Physical Therapy as requested. Trev Haas RN, BSN

## 2025-01-27 ENCOUNTER — TELEPHONE (OUTPATIENT)
Dept: FAMILY MEDICINE | Facility: CLINIC | Age: 75
End: 2025-01-27
Payer: COMMERCIAL

## 2025-01-27 NOTE — TELEPHONE ENCOUNTER
Forms/Letter Request    Type of form/letter: Nursing Home/Assisted Living Orders      Do we have the form/letter: Yes: [facility], order number: [id number]    Who is the form from? Home care    Where did/will the form come from? form was faxed in    When is form/letter needed by:     How would you like the form/letter returned: Fax : [phone]

## 2025-01-29 ENCOUNTER — MEDICAL CORRESPONDENCE (OUTPATIENT)
Dept: HEALTH INFORMATION MANAGEMENT | Facility: CLINIC | Age: 75
End: 2025-01-29
Payer: COMMERCIAL

## 2025-01-31 ENCOUNTER — TELEPHONE (OUTPATIENT)
Dept: FAMILY MEDICINE | Facility: CLINIC | Age: 75
End: 2025-01-31
Payer: COMMERCIAL

## 2025-01-31 NOTE — TELEPHONE ENCOUNTER
Forms/Letter Request    Type of form/letter: Nursing Home/Assisted Living Orders      Do we have the form/letter: Yes: [facility], Order Number: [id number]    Who is the form from? Home care    Where did/will the form come from? form was faxed in    When is form/letter needed by:     How would you like the form/letter returned: Fax : [phone]

## 2025-02-03 ENCOUNTER — MEDICAL CORRESPONDENCE (OUTPATIENT)
Dept: HEALTH INFORMATION MANAGEMENT | Facility: CLINIC | Age: 75
End: 2025-02-03
Payer: COMMERCIAL

## 2025-02-05 ENCOUNTER — TELEPHONE (OUTPATIENT)
Dept: UROLOGY | Facility: CLINIC | Age: 75
End: 2025-02-05
Payer: COMMERCIAL

## 2025-02-05 NOTE — TELEPHONE ENCOUNTER
[facility] Call Center    Phone Message    May a detailed message be left on voicemail: yes     Reason for Call: Medication Question or concern regarding medication   Prescription Clarification  Name of Medication: myrbetriq  Prescribing Provider: Jordyn Osorio   Pharmacy: [facility] mail order   What on the order needs clarification? Wants to discuss other medication option. The myrbetriq is $400 copay.      Action Taken: Other: [facility]    Travel Screening: Not Applicable     Date of Service:

## 2025-02-06 ENCOUNTER — TELEPHONE (OUTPATIENT)
Dept: FAMILY MEDICINE | Facility: CLINIC | Age: 75
End: 2025-02-06
Payer: COMMERCIAL

## 2025-02-06 ENCOUNTER — TELEPHONE (OUTPATIENT)
Dept: UROLOGY | Facility: CLINIC | Age: 75
End: 2025-02-06
Payer: COMMERCIAL

## 2025-02-06 NOTE — TELEPHONE ENCOUNTER
Home Care is calling regarding an established patient with  [facility].        8/9/2024     1:35 PM 11/15/2023     1:50 PM   Home Care Information   Current following provider Dr. Quentin Medeiros    Name/Phone Number Inna @ [phone] Kalee    Home Care agency [facility]     Requesting orders from: Cole Saavedra  Provider is following patient: Yes  Is this a 60-day recertification request?  No    Orders Requested    Social Work  Request for continuation of care with increase in frequency  Frequency:  2 additional visits effective 2/06/25 to assist with financial resources         Verbal orders given.  Home Care will send orders for provider to sign.  Confirmed ok to leave a detailed message with call back.  Contact information confirmed and updated as needed.    Catherine Amador RN

## 2025-02-06 NOTE — TELEPHONE ENCOUNTER
Prior Authorization Retail Medication Request    Medication/Dose: Mirabegron ER 50 mg  Diagnosis and ICD code (if different than what is on RX):    New/renewal/insurance change PA/secondary ins. PA:  Previously Tried and Failed:  oxybutynin, detrol, toviaz, trospium, vesicare, and gemtesa   Rationale:  Has tried and failed other alternatives above.  Also, due to age, do not recommend antimuscarinics or risk of memory issues     Insurance   Primary: See Chart  Insurance ID:      Secondary (if applicable):  Insurance ID:      Pharmacy Information (if different than what is on RX)  Name:    Phone:    Fax:    Clinic Information  Preferred routing pool for dept communication:  Clinic Pool

## 2025-02-06 NOTE — TELEPHONE ENCOUNTER
[facility] Call Center    Phone Message    May a detailed message be left on voicemail: yes     Reason for Call: Other: Pharmacy calls about this medications cost to the patient. Aware of PA initiated. Pharmacy requests a call back when an update is available.     Action Taken: Message routed to:  Other: Urology    Travel Screening: Not Applicable

## 2025-02-06 NOTE — TELEPHONE ENCOUNTER
Per  pharmacy patient wants something less expensive. She has tried almost everything. Message sent to PA team. Patient notified via detailed message on VM.  Bella Farrell LPN

## 2025-02-11 NOTE — TELEPHONE ENCOUNTER
PA Initiation    Medication: MYRBETRIQ 50 MG PO TB24 TIER EXCEPTION   Insurance Company: [employer] - Phone [phone] Fax [phone]  Pharmacy Filling the Rx: [facility] - [place], [place] - [facility] [address]  Filling Pharmacy Phone: [phone]  Filling Pharmacy Fax: [phone]  Start Date: 2/11/2025

## 2025-02-12 ENCOUNTER — TELEPHONE (OUTPATIENT)
Dept: FAMILY MEDICINE | Facility: CLINIC | Age: 75
End: 2025-02-12
Payer: COMMERCIAL

## 2025-02-12 NOTE — TELEPHONE ENCOUNTER
No.  The approval letter I received was for brand name.  They approved brand name for patient to use but its a non preferred medication so they cannot lower the cost of that. The rep advised that this year this plan covers the generic without a PA but that I can try to lower the cost on that.  I resent in the request to lower the cost of the generic and that is denied because there are no drugs to treat the condition on a lower tier.  Copay for both brand which was approved (non preferred) and copay for generic (which is preferred) is $395 for 1 a one month supply.

## 2025-02-12 NOTE — TELEPHONE ENCOUNTER
PA Initiation    Medication: MIRABEGRON ER 50 MG PO TB24 TIER EXCEPTION   Insurance Company: [employer] - Phone [phone] Fax [phone]  Pharmacy Filling the Rx: [facility] - [place], [place] - [facility] [address]  Filling Pharmacy Phone: [phone]  Filling Pharmacy Fax: [phone]  Start Date: 2/11/2025

## 2025-02-12 NOTE — TELEPHONE ENCOUNTER
Elena Hidalgo is a [facility] assisting patient in completing her portion of VA benefit forms with Radha Corbett. Verbal consent was given for [place] to fax PT's most recent progress notes to Elena Hidalgo at [phone]. Once patient's portion is completed they will send form to Dr Saavedra to complete.

## 2025-02-12 NOTE — TELEPHONE ENCOUNTER
PRIOR AUTHORIZATION DENIED    Medication: MIRABEGRON ER 50 MG PO TB24 TIER EXCEPTION     Insurance Company: [employer] - Phone [phone] Fax [phone]    Denial Date: 2/12/2025    Denial Reason(s):           Appeal Information:

## 2025-02-12 NOTE — TELEPHONE ENCOUNTER
I have received an approval letter but it didn't lower the cost.  I contacted insurance plan, per rep they cover the generic but don't cover the brand.  She states to resend the tier exception for generic.  I did relay to her that the generic was coming back transition fill.  She states that it is covered.  I will resend in tier exception for generic.

## 2025-02-18 NOTE — TELEPHONE ENCOUNTER
"Spoke with patient at great length, discussed that Jordyn would like patient to continue on Trospium at this time.  Discussed about the need to come into clinic for Jordyn to better evaluate interstim and next steps but due to patient being bed bound patient has denied in the past.  Also discussed resources of medical transport and to reach out to insurance to find out which companies are in network as she was not aware of this as an option.  Due to patient being legally blind will need her friend to accompany her.  She will look into this.      Regarding patient receiving home care visits once per week for showers, patient states this will end after 6 weeks and then she cannot receive these services until next year per her insurance.  Recommended that we reach out to her PCP Dr. Saavedra to see if there are any other resources for patient in this situation but she's a little reluctant as she states \"Dr. Saavedra has already done so much for her and doesn't want to bother him\".  She states she feels she's doing an \"okay\" job at perineal cares after BM's but she does void into a brief and sometimes isn't able to change for hours at a time.  Patient has been UTI free since 1/2024.  Message routed to Jordyn Osorio PA-C to further advise.    Belle Vidal, RN, BSN  Urology Care Coordinator  [facility]  [phone]    "

## 2025-02-19 ENCOUNTER — TELEPHONE (OUTPATIENT)
Dept: FAMILY MEDICINE | Facility: CLINIC | Age: 75
End: 2025-02-19
Payer: COMMERCIAL

## 2025-02-19 ENCOUNTER — TELEPHONE (OUTPATIENT)
Dept: UROLOGY | Facility: CLINIC | Age: 75
End: 2025-02-19
Payer: COMMERCIAL

## 2025-02-19 NOTE — TELEPHONE ENCOUNTER
Relayed provider message below.     Says she spoke with the SW and the  through [facility] today and they said pt would not qualify for any further A visits.     Advised pt that we have not received any documentation of her being discharged from home care services yet. Will plan to call [facility]  tomorrow morning and discuss. Advised pt should expect a call back from this RN tomorrow. Pt verbalized understanding.    Catherine Amador RN, BSN  [facility] Triage

## 2025-02-19 NOTE — TELEPHONE ENCOUNTER
Patient is calling because [facility] is discharging her.     Patient is concerned because she was only given two home health aide visits with a shower and one Physical Therapy visit. Patient states that she was told she refused a shower but patient reports that is not true, her home health aide that helps her was out for two weeks and she had to go two weeks without a shower.     Patient is concerned that this is not enough to help her get better. Patient states that she gets frequent UTIs and is still in need of the assistance. Routing to provider, is there anything we can do to help her with this. Please advise. Trev Haas, RN, BSN

## 2025-02-19 NOTE — TELEPHONE ENCOUNTER
[facility] Call Center    Phone Message    May a detailed message be left on voicemail: yes     Reason for Call: Other:   Eron from [facility] was wondering if pt could get an alternative mirabegron (MYRBETRIQ) 50 MG 24 hr tablet since it is  too expensive for the pt. Please call Eron back at [phone].  Thanks.    Action Taken: Other:   [facility] JUDY IZAGUIRRE     Travel Screening: Not Applicable     Date of Service:

## 2025-02-19 NOTE — TELEPHONE ENCOUNTER
There is nothing I can do about this  Patient does have a  and I believe a  and she can discuss these problems with them

## 2025-02-19 NOTE — TELEPHONE ENCOUNTER
Called pharmacy and informed that myrbetriq should be cancelled as patient is to stay on trospium per Jordyn VITALE.    Vivi Diaz LPN

## 2025-02-20 NOTE — TELEPHONE ENCOUNTER
"Called and spoke to RHODA Parker with [facility].     Elena does confirm that they are cancelling home care services because pt isn't showing progress in therapy and Medicare won't continue to pay. Pt is cancelling therapy sessions, per Elizabeth. Her last dates of service were PT on 1/24, OT on 1/28 and PT-A on 1/30. States she cancelled therapy services on 2/07 and 2/12.     Says she had an extensive conversation with her yesterday regarding her options for assistance after therapy services end. They discussed Assisted Living, private pay (pt stated that she can't afford), and Critical access hospital services (would require a lien on her home). Adult protection worker was talked to who found pt had assets that would prevent her from going on Critical access hospital services. They also discussed using VA benefits through her late , but this would require a doctor's visit to assess mobility and extremities to fill out required paperwork, and per RHODA, pt was not willing to come in for the visit and did not know her 's  information to fill out on the paperwork.     Upon chart review, pt has had conversations regarding this same situation in the past with other care workers trying to set her up with other services (see Patient Outreach (11/22/2024) ). RHODA gives Danyelle Elda [phone]- Clinical Manager, [employer][email or web address] as another point of contact if her PCP has any additional questions or concerns regarding Radha's care.    Called and spoke with pt. Says she didn't cancel the appts, but said she needed a shower before she could have her appointments. Explained to pt that in order to continue receiving home care services, needs to show continual improvement in care goals. Pt says she was able to take a shower \"mostly\" on her own yesterday and considers that an \"major improvement\" in her care goals. Pt says she has an appointment with a [employer] today, so encouraged her to discuss with that provider today and contact us with any updates. Pt " verbalized understanding.    Catherine Amador, RN, BSN  [facility] Triage

## 2025-02-27 DIAGNOSIS — R39.15 URGENCY OF URINATION: ICD-10-CM

## 2025-02-28 RX ORDER — MIRABEGRON 50 MG/1
50 TABLET, FILM COATED, EXTENDED RELEASE ORAL DAILY
Qty: 90 TABLET | Refills: 3 | OUTPATIENT
Start: 2025-02-28

## 2025-03-03 ENCOUNTER — TELEPHONE (OUTPATIENT)
Dept: UROLOGY | Facility: CLINIC | Age: 75
End: 2025-03-03
Payer: COMMERCIAL

## 2025-03-03 DIAGNOSIS — R39.15 URGENCY OF URINATION: ICD-10-CM

## 2025-03-03 NOTE — TELEPHONE ENCOUNTER
Spoke with patient and informed that we had done a prior auth earlier this year and the cost would be $395.00 for a month. Patient is wondering if that cost would be a one-time thing. Instructed patient to call the number on the back on her insurance card ask what the plan coverage is for prescriptions and how much her deductible is before she wouldn't need that out-of-pocket. Patient reports that she would also like to know what her other option are as she stated that she needs the Myrbetriq.     Vivi Diaz LPN

## 2025-03-03 NOTE — TELEPHONE ENCOUNTER
Health Call Center    Phone Message    May a detailed message be left on voicemail: no     Reason for Call: Medication Question or concern regarding medication   Prescription Clarification  Name of Medication: MYRBETRIQ  Prescribing Provider: Jordyn Osorio PA-C    Pharmacy: [facility] - [place], [place] - [address]     What on the order needs clarification? Patient is asking why this medication is being denied. Patient states she urgently needs this to be filled and she is very confused as to why it is being denied. Please call patient back.      Action Taken: Other: [facility] - Urology    Travel Screening: Not Applicable     Date of Service:

## 2025-03-04 RX ORDER — MIRABEGRON 50 MG/1
50 TABLET, FILM COATED, EXTENDED RELEASE ORAL DAILY
Qty: 90 TABLET | Refills: 2 | Status: SHIPPED | OUTPATIENT
Start: 2025-03-04

## 2025-03-05 NOTE — TELEPHONE ENCOUNTER
[facility] Call Center    Phone Message    May a detailed message be left on voicemail: no     Reason for Call: Other: patient called in stating that she received a phone call from her pharmacy stating that her insurance will cover the generic of myrbetriq. Patient is wondering if provider can send in that instead? Preferred pharmacy is [facility]. Please review and call patient her back to discuss.       Action Taken: Message routed to:  Other: [facility]    Travel Screening: Not Applicable     Date of Service:

## 2025-03-11 ENCOUNTER — TELEPHONE (OUTPATIENT)
Dept: FAMILY MEDICINE | Facility: CLINIC | Age: 75
End: 2025-03-11
Payer: COMMERCIAL

## 2025-03-11 NOTE — TELEPHONE ENCOUNTER
Forms/Letter Request    Type of form/letter: Home Health Certification      Do we have the form/letter: Yes: [facility], Order Number: [id number]    Who is the form from? Home care    Where did/will the form come from? form was faxed in    When is form/letter needed by:     How would you like the form/letter returned: Fax : [phone]    Will place form on providers desk for review/signature  FRANCHESCA Rodriguez

## 2025-03-12 ENCOUNTER — MEDICAL CORRESPONDENCE (OUTPATIENT)
Dept: HEALTH INFORMATION MANAGEMENT | Facility: CLINIC | Age: 75
End: 2025-03-12
Payer: COMMERCIAL

## 2025-03-12 NOTE — TELEPHONE ENCOUNTER
Completed form has been faxed to [phone] . Right-Fax reviewed to confirm form was sent without error.[facility].

## 2025-05-07 ENCOUNTER — VIRTUAL VISIT (OUTPATIENT)
Dept: FAMILY MEDICINE | Facility: CLINIC | Age: 75
End: 2025-05-07
Payer: COMMERCIAL

## 2025-05-07 ENCOUNTER — TELEPHONE (OUTPATIENT)
Dept: FAMILY MEDICINE | Facility: CLINIC | Age: 75
End: 2025-05-07

## 2025-05-07 ENCOUNTER — PATIENT OUTREACH (OUTPATIENT)
Dept: CARE COORDINATION | Facility: CLINIC | Age: 75
End: 2025-05-07
Payer: COMMERCIAL

## 2025-05-07 DIAGNOSIS — R53.81 PHYSICAL DECONDITIONING: Primary | ICD-10-CM

## 2025-05-07 DIAGNOSIS — R46.89 SELF NEGLECT: ICD-10-CM

## 2025-05-07 DIAGNOSIS — E11.9 TYPE 2 DIABETES MELLITUS WITHOUT COMPLICATION, WITHOUT LONG-TERM CURRENT USE OF INSULIN (H): ICD-10-CM

## 2025-05-07 DIAGNOSIS — R32 URINARY INCONTINENCE, UNSPECIFIED TYPE: ICD-10-CM

## 2025-05-07 DIAGNOSIS — F41.8 DEPRESSION WITH ANXIETY: ICD-10-CM

## 2025-05-07 DIAGNOSIS — Z78.9 IMPAIRED MOBILITY AND ACTIVITIES OF DAILY LIVING: ICD-10-CM

## 2025-05-07 DIAGNOSIS — Z74.09 IMPAIRED MOBILITY AND ACTIVITIES OF DAILY LIVING: ICD-10-CM

## 2025-05-07 PROCEDURE — 1125F AMNT PAIN NOTED PAIN PRSNT: CPT | Mod: 95 | Performed by: INTERNAL MEDICINE

## 2025-05-07 PROCEDURE — 98006 SYNCH AUDIO-VIDEO EST MOD 30: CPT | Performed by: INTERNAL MEDICINE

## 2025-05-07 ASSESSMENT — PATIENT HEALTH QUESTIONNAIRE - PHQ9: SUM OF ALL RESPONSES TO PHQ QUESTIONS 1-9: 14

## 2025-05-07 NOTE — TELEPHONE ENCOUNTER
Order/Referral Request    Who is requesting:  Radha Corbett     Orders being requested: [facility]     Pt stated she needs physical therapy.     Pt mentioned that she is just lying in her bed waiting to Die and needs help. Offered Triage - pt declined.     When are orders needed by: ASAP    Has this been discussed with Provider: Yes - home orders in the past     Does patient have an appointment scheduled?: No    Where to send orders: Place orders within Epic    Okay to leave a detailed message?: Yes at Cell number on file:    Telephone Information:   Mobile [phone]

## 2025-05-07 NOTE — PROGRESS NOTES
Radha is a 74 year old who is being evaluated via a billable video visit.    How would you like to obtain your AVS? Mail a copy  If the video visit is dropped, the invitation should be resent by: Text to cell phone: [phone]  Will anyone else be joining your video visit? No      Assessment & Plan     Physical deconditioning  Patient today told me that she can hardly walk from her bedroom to bathroom with a walker  She has someone who gets groceries and she does microwave meals  She does not take showers  She only wears nightgown and no other clothes  This is washed once a week by her friend  Someone picks up her groceries and medicines  She does not leave her house  And noted that today she has a rash on her face  She told me this is itchy  Looks like she has got contact dermatitis  She wants physical therapy and somebody to come and do showers  - Home Care Referral  - Primary Care - Care Coordination Referral; Future    Impaired mobility and activities of daily living  She does not leave her house  She has problems with her mobility  She wants somebody to come and give her shower  - Home Care Referral  - Primary Care - Care Coordination Referral; Future    Type 2 diabetes mellitus without complication, without long-term current use of insulin (H)  She is unclear what she is taking exactly but tells me one of the medications is certainly metformin and she takes glipizide up to 3 times a week  She is not sure why she is taking up to 3 times a week and not every day  When asked her about this she told me that is what she likes to do  She does not check her blood sugars at home    Depression with anxiety  PHQ-9 score is high  She expressed some passive thoughts of self-harm but denied any active thoughts  Refused referral to mental health services or counseling  She tells me her daughter lives in [place] and the only reason why she will not do any self-harm is because she loves her daughter and her grandkids  Verbal  "contract made    Urinary incontinence, unspecified type  She follows with urologist and is on trospium and Myrbetriq    Self neglect  She is clearly not getting herself and appears to have poor self hygiene on the video visit today  She has erythematous rash on the face and she is not clear what is causing this although she tells me that it is itchy  I think this could be some sort of contact dermatitis  I see that there is a lot of hoarding in the house and the video call  She was unkempt  She certainly needs to be placed but again she is refusing to leave the house and just wants help there  - Primary Care - Care Coordination Referral; Future          BMI  Estimated body mass index is 28.62 kg/m  as calculated from the following:    Height as of 11/27/24: 1.689 m (5' 6.5\").    Weight as of 8/8/24: 81.6 kg (180 lb).   Weight management plan: Discussed healthy diet and exercise guidelines    Depression Screening Follow Up        5/7/2025     3:37 PM   PHQ   PHQ-9 Total Score 14   Q9: Thoughts of better off dead/self-harm past 2 weeks More than half the days         5/7/2025     3:37 PM   Last PHQ-9   1.  Little interest or pleasure in doing things 3   2.  Feeling down, depressed, or hopeless 3   3.  Trouble falling or staying asleep, or sleeping too much 3   4.  Feeling tired or having little energy 3   5.  Poor appetite or overeating 0   6.  Feeling bad about yourself 0   7.  Trouble concentrating 0   8.  Moving slowly or restless 0   Q9: Thoughts of better off dead/self-harm past 2 weeks 2   PHQ-9 Total Score 14   Difficulty at work, home, or with people Very difficult                No data to display                      Follow Up Actions Taken  Crisis resource information provided in the After Visit Summary    Discussed the following ways the patient can remain in a safe environment:  remove alcohol, remove drugs, and be around others        Domonique Garcia is a 74 year old, presenting for the following " health issues:  Referral (Physical therapy )      5/7/2025     3:36 PM   Additional Questions   Roomed by BB Vf   Accompanied by Self         5/7/2025     3:36 PM   Patient Reported Additional Medications   Patient reports taking the following new medications none     Video Start Time: 530 pm PM    History of Present Illness       Reason for visit:  Home services She is missing 1 dose(s) of medications per week.  She is not taking prescribed medications regularly due to remembering to take.                Review of Systems  Constitutional, HEENT, cardiovascular, pulmonary, gi and gu systems are negative, except as otherwise noted.      Objective    Vitals - Patient Reported  Pain Score: Moderate Pain (4) (when walking it moves to an 11/12 on the pain scale)  Pain Loc: Other - see comment (hips and knees)        Physical Exam   GENERAL: alert and no distress  EYES: Eyes grossly normal to inspection.  No discharge or erythema, or obvious scleral/conjunctival abnormalities.  RESP: No audible wheeze, cough, or visible cyanosis.    SKIN: Visible skin clear. No significant rash, abnormal pigmentation or lesions.  NEURO: Cranial nerves grossly intact.  Mentation and speech appropriate for age.  PSYCH: Appropriate affect, tone, and pace of words          Video-Visit Details    Type of service:  Video Visit   Video End Time:545pm  Originating Location (pt. Location): Home    Distant Location (provider location):  On-site  Platform used for Video Visit: Lizeth  Signed Electronically by: Cole Saavedra MD

## 2025-05-07 NOTE — TELEPHONE ENCOUNTER
"Called pt to discuss her request for PT.    Pt says she spoke with her are worker, Sylwia sheridan, who suggested pt see if she can get home care services through [facility], [facility] or [facility]. Of note, pt has been discharged from Home Care services in the past for \"no longer meeting care care goals\" and SW has documented that she is canceling RN visits and not participating in care. It appears several Vulnerable Adult reports have been submitted to [facility] since 2023, but they are not able to remove her from her home because she is not cognitively impaired and able to make her own decisions.     Pt states that she is \"nearly blind and pretty much bed-bound\" and is just \"laying around waiting to die.\" Says, \"there's not a whole lot keeping me from swallowing a bunch of pills.\" Asked pt how long she has been feeling like this and she stated, \"Quite awhile.\" Asked pt if she had any intention of acting on her thoughts of ending her life and she stated, \"No, but I just need some help around here!\" Pt states she hasn't showered in two weeks. [place] who will come [place] every week or two to help pt shower, but hasn't visited in awhile. Pt says she \"could die from getting septic\" from being dirty. Asked pt if she had any wounds or skin breakdown, to which she replied \"no.\"    Advised pt that she will need to set up an appointment with Dr. Saavedra to discuss her need for therapy services before he would write any orders. Pt agreeable to making a virtual appointment. Says she can't get out of the house because she can't drive and doesn't have a ride. Pt scheduled for VV with PCP for this evening at 5:30pm. Advised that someone will call her to do a check-in for the call prior to the appt and a link to log on to the call will be texted to [phone]. Pt verbalized understanding.    Catherine Amador, RN, BSN  [facility] Triage      "

## 2025-05-08 NOTE — TELEPHONE ENCOUNTER
Patient notified:    [facility] unable to accept pt d/t history of non-compliance.      Per note below, [facility]  stated pt could see if she is able to get services through [facility], [facility], or [facility]. See if pt has preference. Home care order can be faxed by TC to preferred home health.    Patient states that she does not have a preference on which home care services she uses. Patient also inquiring about [facility]. '    Routing to TC, please fax to [facility] to start with and we will work our way through the list to see who will be able to take her case. Trev Haas, RN, BSN, PHN

## 2025-05-08 NOTE — TELEPHONE ENCOUNTER
This writer attempted to contact patient on 05/08/25      Reason for call home care and left message.      If patient calls back:   Relay message  (read verbatim), document that pt called and close encounter    [facility] unable to accept pt d/t history of non-compliance.     Per note below, are  stated pt could see if she is able to get services through [facility], [facility], or [facility]. See if pt has preference. Home care order can be faxed by TC to preferred home health.      Piper Wiley RN

## 2025-05-08 NOTE — TELEPHONE ENCOUNTER
TC faxed Home Care referral to [facility] [phone] on 5/8/2025 at 12:26pm.

## 2025-05-08 NOTE — PROGRESS NOTES
Home Care referral from 5/7/2025 [facility] [phone] on 5/8/2025 at 10:27am.

## 2025-05-08 NOTE — TELEPHONE ENCOUNTER
Brigitte from [facility] calling to say that they have to decline the referral due to patient has a history of being non compliant.  Grace Chance MA/  [facility]   Primary Care

## 2025-05-15 ENCOUNTER — TELEPHONE (OUTPATIENT)
Dept: FAMILY MEDICINE | Facility: CLINIC | Age: 75
End: 2025-05-15
Payer: COMMERCIAL

## 2025-05-15 ENCOUNTER — MEDICAL CORRESPONDENCE (OUTPATIENT)
Dept: HEALTH INFORMATION MANAGEMENT | Facility: CLINIC | Age: 75
End: 2025-05-15

## 2025-05-15 ENCOUNTER — PATIENT OUTREACH (OUTPATIENT)
Dept: CARE COORDINATION | Facility: CLINIC | Age: 75
End: 2025-05-15
Payer: COMMERCIAL

## 2025-05-15 NOTE — TELEPHONE ENCOUNTER
Nicole PT with [facility] is calling regarding an established patient with [facility].  Requesting orders from: Cole Saavedra RN APPROVED: RN able to provide verbal orders.  Home Care will send orders for signature.  RN will close encounter.    Is this a request for a temporary pause in the home care episode?  No    Orders Requested    Physical Therapy  Request for continuation of care with no increase or decrease in frequency  Frequency: 1x a week for 5 weeks, 2x a month for 1 month  RN gave verbal order: Yes          OTHER ACTION: Nicole PT would like to notify provider that the following are medications listed on medlist that pt reports not taking:   - aspirin  - lisinopril  - miralax  - simvastatin  - trazodone    Also, patient is taking Glipizide 3 times per week and the bottle says 10 mg.   Per medlist, patient should be taking Glipizide 2.5 mg 2 tablets (5 mg) by mouth daily.     Routing to provider for review and advise.     Phone number Home Care can be reached at: [phone]  Okay to leave a detailed message?: Yes      Viviana Lynch RN

## 2025-05-15 NOTE — TELEPHONE ENCOUNTER
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

## 2025-05-15 NOTE — PROGRESS NOTES
Clinic Care Coordination Contact  Follow Up Progress Note      Assessment: Pt has home care orders. [facility] CC submitted home care referrals. Home care referrals have been made to the following agencies via Epic for review and their status is as follows:     [facility] [phone]  - Declined, due to capacity  [facility] [phone]  5/15 - Call from [facility]. They are low on nurse, but can start pt the week of 5/26. Pt declined, another agency reached out already.   [facility]   - Declined due to capacity.   [facility] [phone] -ACCEPTED  -Call from [facility], they can accept and will schedule appointment with pt. [phone]    [facility]  outreach to pt. Pt noted [facility] already and they will be out today at 1:00pm to complete intake. Pt reports understanding how home care services works and denies any additional questions or concerns at this time.       Intervention/Education provided during outreach: Home care agency found. Pt has schedule intake with [facility] today at 1 pm.       Plan: Pt to continue to work with home care agency for home care needs. No further outreaches will be made at this time unless a new referral is made or a change in the pt's status occurs.     Mya Owens, IBRAHIMA  Social Work Primary Care Clinic Care Coordinator  [facility]  [phone]  [email or web address]

## 2025-05-15 NOTE — TELEPHONE ENCOUNTER
Called home care and relayed provider message below. Home care verbalized understanding and also reports patient is non compliant. They will call with any additional questions.     Dale Zabala, RN, BSN

## 2025-05-21 ENCOUNTER — PATIENT OUTREACH (OUTPATIENT)
Dept: CARE COORDINATION | Facility: CLINIC | Age: 75
End: 2025-05-21
Payer: COMMERCIAL

## 2025-05-22 ENCOUNTER — TELEPHONE (OUTPATIENT)
Dept: FAMILY MEDICINE | Facility: CLINIC | Age: 75
End: 2025-05-22
Payer: COMMERCIAL

## 2025-05-22 NOTE — TELEPHONE ENCOUNTER
FYI - Status Update    Who is Calling: patient    Update: Pt would like to thank Dr. Saavedra for the referral to PT. States she had her 1st appt and everything went great.    Does caller want a call/response back: No

## 2025-05-28 ENCOUNTER — TELEPHONE (OUTPATIENT)
Dept: FAMILY MEDICINE | Facility: CLINIC | Age: 75
End: 2025-05-28
Payer: COMMERCIAL

## 2025-05-28 NOTE — TELEPHONE ENCOUNTER
Home Care is calling regarding an established patient with [facility].  Requesting orders from: Cole Saavedra  PROVIDER AUTHORIZATION REQUIRED: RN unable to provide verbal approval for all orders.  See below for additional information.  RN will contact Home care with information after provider review.  Is this a request for a temporary pause in the home care episode?  Yes    Please advise: Hold Home Health Aid next week  Additional 1 x a week for 4 wks OT visits.    Phone number Home Care can be reached at: [phone]  Okay to leave a detailed message?: Yes    Contacts       Contact Date/Time Type Contact Phone/Fax    05/28/2025 02:32 PM CDT Phone (Incoming) WILLIAMS Carey [facility] (Home Care) [phone]          Carina Torrez RN

## 2025-05-28 NOTE — TELEPHONE ENCOUNTER
Left detailed message on [facility] [employer] relaying provider's verbal approval for requested HHC orders below. Also left clinic call back number if any further questions/concerns.      Malu Roe, JAIDAN, RN  [facility]

## 2025-05-28 NOTE — TELEPHONE ENCOUNTER
Please call homecare and give the following orders    OK to Hold Home Health Aid next week and   Additional 1 x a week for 4 wks OT visits.

## 2025-05-28 NOTE — TELEPHONE ENCOUNTER
"WILLIAMS Carey from [facility] is calling to report patient showing signs and symptoms of depression.      Patient contacted.      \"My daughter who is a nurse, lives in [place].  She takes Trintellix.  It works for her with no side effects.\"    Patient is interested to try it herself.    Telephone visit scheduled with PCP to discuss.    Patient is not able to come in.  Not able to have a video visit.    Carina Torrez RN, BSN  [facility]    "

## 2025-05-29 ENCOUNTER — TELEPHONE (OUTPATIENT)
Dept: FAMILY MEDICINE | Facility: CLINIC | Age: 75
End: 2025-05-29
Payer: COMMERCIAL

## 2025-05-29 NOTE — TELEPHONE ENCOUNTER
RN huddled with provider.     Home care should call if the pt agrees to let home care provider to physically go through medications with patient and a discrepancy/non-compliance is noted. If pt will not allow home care provider to physically go through medications then the home care provider does not need to call.     RN LVM with the information above and provider note below.     Piper Wiley RN

## 2025-05-29 NOTE — TELEPHONE ENCOUNTER
You can keep on calling  about medication noncompliance and I will keep on saying the same thing that I never met this patient in person and she only uses me to get physical therapy orders and home care orders  I  have no clue what medications she takes  She has on multiple occasions refused to follow my instructions about medications and confabulates to me about the medications she takes  I am actually planning to send a letter to the patient informing her that she should get a new PCP because I have never seen her in person and I am not comfortable taking care of her anymore because she does not come for in person visits and does not follow my instructions  I do not have any comments on the tachycardia because she never came in person so I do not know what her baseline heart rate is  I am not going to put any upper limit for the heart rate  If her heart rate is above 100 when home care visits her next time I recommend that they should send her to the urgent care or ER for further assessment including getting an EKG to make sure that she is not in A-fib

## 2025-05-29 NOTE — TELEPHONE ENCOUNTER
Janette, PT with [facility], calling to give pt update.     Pt is non-compliant with medications. Janette states this is a known issue and knows provider is aware, however, unless provider gives the approval to not call regarding this issue they have to call. Provider: Please advise if home care can cease calling to update clinic on known issue of medication non-compliance.   Pt's pulse today at rest during visit was 113, asymptomatic. Pt reported to Janette that she has a known history of tachycardia. Most recent pulse rate from 6/6/24 was 103. Pt only seen virtually since then and no recorded vitals. Current pulse parameters . Janette asking if provider would like to keep these parameters or extend upper limit? If wanting to extend upper limit, please advise new upper parameter limit.       Piper Wiley RN

## 2025-05-30 ENCOUNTER — TELEPHONE (OUTPATIENT)
Dept: FAMILY MEDICINE | Facility: CLINIC | Age: 75
End: 2025-05-30
Payer: COMMERCIAL

## 2025-05-30 ENCOUNTER — TELEPHONE (OUTPATIENT)
Dept: UROLOGY | Facility: CLINIC | Age: 75
End: 2025-05-30
Payer: COMMERCIAL

## 2025-05-30 ENCOUNTER — TELEPHONE (OUTPATIENT)
Dept: FAMILY MEDICINE | Facility: CLINIC | Age: 75
End: 2025-05-30

## 2025-05-30 DIAGNOSIS — E11.9 TYPE 2 DIABETES MELLITUS WITHOUT COMPLICATION, WITHOUT LONG-TERM CURRENT USE OF INSULIN (H): Primary | ICD-10-CM

## 2025-05-30 DIAGNOSIS — R39.15 URGENCY OF URINATION: Primary | ICD-10-CM

## 2025-05-30 NOTE — TELEPHONE ENCOUNTER
[facility]    Phone Message    May a detailed message be left on voicemail: yes     Reason for Call: Michael who is the nurse of Radha wants to know if Trospium Chloride 60 Mg can be  discontinue since it is not working for the or if the doctor keeps wanting for the pt to take it. Please call Michael [phone]. Thanks.    Action Taken: [facility]    Travel Screening: Not Applicable     Date of Service:

## 2025-05-30 NOTE — TELEPHONE ENCOUNTER
Hello  My answers to the questions are as below    #1 she can stop taking the trazodone if that is making her suicidal but if she wants to discuss about alternate options she should make a virtual appointment with me then we can discuss about alternative options    #2.  I  do not recommend patients to take any over-the-counter stuff which are not FDA approved  If the patient wants to take multivitamin it is entirely up to her but I cannot endorse it or advise against it since this patient is compos mentis she can decide  it is up to her if she wants to take any multivitamin tablet    #3.  She can stop taking the trospium if she does not want to take it because of the side effects and again this patient is compos mentis so in my opinion does not need any orders from physicians to stop medications as it is up to her whether she wants to take a prescription medication or not and as physicians we only recommend and advise and like she stopped the trazodone because it is making her suicidal she can stop any medication if she does not like the side effects of it or she just does not want to take it and she does not need the order for stopping    #4 I have placed an order for A1c

## 2025-05-30 NOTE — TELEPHONE ENCOUNTER
Questions for provider:    Who is Calling: nurse, from [facility]     Update/questions: RN states she updated all of the patients meds and got them ordered for so she will be on track with those.    1. States patient is not taking the trazodone as it made her feel suicidal, wondering if she can be prescribed Rx Trintellix instead.     2. Also wondering if you would recommend a multivitamin since her teeth are falling out.     3. Patient is not taking Rx trospium and RN is wondering if she should get orders from urology to discontinue or continue.     4. RN requesting order for her to do orders to do an A1C at home with patient. If so, please fax orders to [phone].      Does caller want a call/response back: Yes -RN does    Okay to leave a detailed message?: Yes at Other phone number: Please call CHAPIN Rodriguez at [phone] - secure line ok to . RN will relay message to the patient.    This probably should have been transferred to the RN's but I was already typing the message so I apologize if I missed anything.     Malu JUAREZ,    [facility]

## 2025-06-02 ENCOUNTER — TELEPHONE (OUTPATIENT)
Dept: FAMILY MEDICINE | Facility: CLINIC | Age: 75
End: 2025-06-02

## 2025-06-02 ENCOUNTER — VIRTUAL VISIT (OUTPATIENT)
Dept: FAMILY MEDICINE | Facility: CLINIC | Age: 75
End: 2025-06-02
Payer: COMMERCIAL

## 2025-06-02 ENCOUNTER — MEDICAL CORRESPONDENCE (OUTPATIENT)
Dept: HEALTH INFORMATION MANAGEMENT | Facility: CLINIC | Age: 75
End: 2025-06-02

## 2025-06-02 DIAGNOSIS — N18.31 TYPE 2 DIABETES MELLITUS WITH STAGE 3A CHRONIC KIDNEY DISEASE, WITH LONG-TERM CURRENT USE OF INSULIN (H): ICD-10-CM

## 2025-06-02 DIAGNOSIS — Z74.09 MOBILITY IMPAIRED: ICD-10-CM

## 2025-06-02 DIAGNOSIS — E78.5 HYPERLIPIDEMIA LDL GOAL <100: Primary | ICD-10-CM

## 2025-06-02 DIAGNOSIS — Z79.4 TYPE 2 DIABETES MELLITUS WITH STAGE 3A CHRONIC KIDNEY DISEASE, WITH LONG-TERM CURRENT USE OF INSULIN (H): ICD-10-CM

## 2025-06-02 DIAGNOSIS — F41.8 DEPRESSION WITH ANXIETY: ICD-10-CM

## 2025-06-02 DIAGNOSIS — N32.81 OAB (OVERACTIVE BLADDER): ICD-10-CM

## 2025-06-02 DIAGNOSIS — I10 HYPERTENSION GOAL BP (BLOOD PRESSURE) < 140/90: ICD-10-CM

## 2025-06-02 DIAGNOSIS — E11.22 TYPE 2 DIABETES MELLITUS WITH STAGE 3A CHRONIC KIDNEY DISEASE, WITH LONG-TERM CURRENT USE OF INSULIN (H): ICD-10-CM

## 2025-06-02 PROCEDURE — 98014 SYNCH AUDIO-ONLY EST MOD 30: CPT | Performed by: INTERNAL MEDICINE

## 2025-06-02 RX ORDER — GLIPIZIDE 2.5 MG/1
5 TABLET, EXTENDED RELEASE ORAL DAILY
Qty: 90 TABLET | Refills: 0 | Status: CANCELLED | OUTPATIENT
Start: 2025-06-02

## 2025-06-02 RX ORDER — METFORMIN HYDROCHLORIDE 500 MG/1
1000 TABLET, EXTENDED RELEASE ORAL 2 TIMES DAILY WITH MEALS
Qty: 360 TABLET | Refills: 1 | Status: SHIPPED | OUTPATIENT
Start: 2025-06-02

## 2025-06-02 RX ORDER — GLIPIZIDE 5 MG/1
5 TABLET, FILM COATED, EXTENDED RELEASE ORAL DAILY
Qty: 89 TABLET | Refills: 0 | Status: SHIPPED | OUTPATIENT
Start: 2025-06-02

## 2025-06-02 RX ORDER — SIMVASTATIN 20 MG
20 TABLET ORAL AT BEDTIME
Qty: 90 TABLET | Refills: 3 | Status: SHIPPED | OUTPATIENT
Start: 2025-06-02

## 2025-06-02 NOTE — TELEPHONE ENCOUNTER
Home care notified per Cole Saavedra MD:      Medina  My answers to the questions are as below     #1 she can stop taking the trazodone if that is making her suicidal but if she wants to discuss about alternate options she should make a virtual appointment with me then we can discuss about alternative options     #2.  I  do not recommend patients to take any over-the-counter stuff which are not FDA approved  If the patient wants to take multivitamin it is entirely up to her but I cannot endorse it or advise against it since this patient is compos mentis she can decide  it is up to her if she wants to take any multivitamin tablet     #3.  She can stop taking the trospium if she does not want to take it because of the side effects and again this patient is compos mentis so in my opinion does not need any orders from physicians to stop medications as it is up to her whether she wants to take a prescription medication or not and as physicians we only recommend and advise and like she stopped the trazodone because it is making her suicidal she can stop any medication if she does not like the side effects of it or she just does not want to take it and she does not need the order for stopping     #4 I have placed an order for A1c        Trev Haas, RN, BSN, PHN

## 2025-06-02 NOTE — TELEPHONE ENCOUNTER
Home care notified per Cole Saavedra MD:    I actually have a telephone appointment with this patient today and plan to discuss this issue and I am planning to tell her that since she is not compliant with her medications and not willing to come to the clinic to see me I will be probably sending a letter informing her that she should get her new PCP  I will discuss this issue of diabetic medication with her when I see her today and we will talk to her about sending a interim prescription for the reflected dose if that is what she wants to take till she finds a new PCP     Trev Haas, RN, BSN, PHN

## 2025-06-02 NOTE — PROGRESS NOTES
If patient has telephone visit, have they been educated on video visit as preferred visit method and offered to change to video visit? NOT APPLICABLE        Instructions Relayed to Patient by Virtual Roomer:     Patient instructed that provider will initiate telephone visit via phone call      Patient Confirmed they will join visit via: Provider to call patient for telephone visit   Reminded patient to ensure they were logged on to virtual visit by arrival time listed.   Asked if patient has flexibility to initiate visit sooner than arrival time: patient stated yes, documented in appointment notes availability to initiate visit earlier than arrival time     If pediatric virtual visit, ensured pediatric patient along with parent/guardian will be present for video visit.     Patient offered the website [email or web address] and/or phone number to [employer] Help line: [phone]      Radha is a 74 year old who is being evaluated via a billable telephone visit.    What phone number would you like to be contacted at? [phone]   How would you like to obtain your AVS? Home[employer]ada  Originating Location (pt. Location): Home    Distant Location (provider location):  Off-site  Telephone visit completed due to the patient did not have access to video, while the distant provider did.    Assessment & Plan     Hyperlipidemia LDL goal <100  She  was not taking Zocor  I explained to her that she should get back on this  - simvastatin (ZOCOR) 20 MG tablet; Take 1 tablet (20 mg) by mouth at bedtime.    Type 2 diabetes mellitus with stage 3a chronic kidney disease, with long-term current use of insulin (H)  There is some confusion about this patient glipizide  Apparently she is taking glipizide 10 mg  I am not sure who prescribed it to her or how she got out of it but I have concerns that she  does some holding of the medication  Today I explained clearly to the patient that she should be only taking the medication that  I sent today which she is going to be glipizide XL 5 mg  We will update the home health team about the medications   - glipiZIDE (GLUCOTROL XL) 5 MG 24 hr tablet; Take 1 tablet (5 mg) by mouth daily.  - metFORMIN (GLUCOPHAGE XR) 500 MG 24 hr tablet; Take 2 tablets (1,000 mg) by mouth 2 times daily (with meals).    Mobility impaired  She is legally blind and she has mobility issues she wants to explore the option of using the company which provides for services where physicians/providers can come to her home and evaluate her  I am aware of some companies like that in [place] and discussed this with patient as she is keen that she should be examined physically and as she cannot come to a doctor's office she wants a doctor to come to her home  - Primary Care - Care Coordination Referral; Future    Depression with anxiety  She wants to try Trintellix as apparently her daughter is taking it in [place] and  she has good response from it  - vortioxetine (TRINTELLIX) 10 MG tablet; Take 1 tablet (10 mg) by mouth daily.    Hypertension goal BP (blood pressure) < 140/90  She does not want to take any antihypertensives as apparently her blood pressure is doing very good at home so she requested stopping all antihypertensives    OAB (overactive bladder)  She is on Myrbetriq and she does not want to take anymore trospium  We stopped it today                Domonique Garcia is a 74 year old, presenting for the following health issues:  No chief complaint on file.        6/2/2025     1:15 PM   Additional Questions   Roomed by Tshia   Accompanied by self     History of Present Illness       Reason for visit:  Requesting medication                 Review of Systems  Constitutional, HEENT, cardiovascular, pulmonary, gi and gu systems are negative, except as otherwise noted.      Objective           Vitals:  No vitals were obtained today due to virtual visit.    Physical Exam   General: Alert and no distress //Respiratory: No audible  wheeze, cough, or shortness of breath // Psychiatric:  Appropriate affect, tone, and pace of words            Phone call duration: 25 minutes  Signed Electronically by: Cole Saavedra MD

## 2025-06-02 NOTE — TELEPHONE ENCOUNTER
CHAPIN Rodriguez with [facility] is calling to confirm the dosage patient is prescribed for Glipizide. Writer informed patient MAR, dosage is 2.5 mg with sig take 2 tablets (5 mg) as shown below.       Michael reported patient only has Glipizide 10 mg in the home and unable to split pills in half due to patient is blind. Michael is requesting if correct dosage can be sent to [facility] so medication can be sent to her. Michael stated she is unsure what to tell patient to take until correct dosage arrives.     Routing to provider to review and advise.     CHAPIN Lynch  [facility]

## 2025-06-02 NOTE — TELEPHONE ENCOUNTER
I actually have a telephone appointment with this patient today and plan to discuss this issue and I am planning to tell her that since she is not compliant with her medications and not willing to come to the clinic to see me I will be probably sending a letter informing her that she should get her new PCP  I will discuss this issue of diabetic medication with her when I see her today and we will talk to her about sending a interim prescription for the reflected dose if that is what she wants to take till she finds a new PCP

## 2025-06-03 RX ORDER — TROSPIUM CHLORIDE ER 60 MG/1
60 CAPSULE ORAL EVERY MORNING
Qty: 90 CAPSULE | Refills: 1 | Status: SHIPPED | OUTPATIENT
Start: 2025-06-03

## 2025-06-03 NOTE — TELEPHONE ENCOUNTER
[facility] Call Center    Phone Message    May a detailed message be left on voicemail: yes     Reason for Call: Medication Question or concern regarding medication   Prescription Clarification  Name of Medication: Trospium 60MG daily  Prescribing Provider: Jordyn Osorio PA-C    Pharmacy: [facility]   What on the order needs clarification? CHAPIN Rodriguez is calling back saying that pt is willing to take this medication, and asking we send order to pharmacy.      Action Taken: Other: [facility] - Urology    Travel Screening: Not Applicable     Date of Service:

## 2025-06-03 NOTE — TELEPHONE ENCOUNTER
Michael calling from [facility]. RN provided updated med list over the phone. Verbalized understanding of new med list.     Michael would like to know if patient is to continue taking Omeprazole daily. Please advise    Dlae Zabala RN, BSN

## 2025-06-03 NOTE — TELEPHONE ENCOUNTER
Michael will let her know that she wants her to be on both. I can check with Jordyn about switching to 20 mg bid, she will let me know.  Bella Farrell LPN

## 2025-06-03 NOTE — TELEPHONE ENCOUNTER
Trospium 60 mg sent to pharmacy.    Belle Vidal, RN, BSN  Urology Care Coordinator  [facility]  [phone]

## 2025-06-03 NOTE — TELEPHONE ENCOUNTER
Received message below from provider:    Cole Saavedra MD to [facility] - Primary [facility] (Selected Message)          6/2/25  6:44 PM  Please call the homecare nurse taking care of this patient and give an update about the medications that I changed today as I did my best and currently what patient should be taking these what he is on  the list from today      This writer attempted to contact KIM Rodriguez RN on 06/03/25      Reason for call update and left message.      If KIM Rodriguez RN calls back:   Route to RN line, please update [facility] RN w/ med update from [facility] yesterday per provider request. Should be on simvastatin (zocor) 20 mg daily at bedtime for HLD, for her DM - glipizide 5 mg daily and metformin  mg 2 tabs BID, vortioxetine 10 mg daily for depression/anxiety, stopped myrbetriq for OAB (patient didn't want to take), and stopped all meds for HTN as apparently her BP is doing well at home. Trazodone also discontinued and miralax.    If they need updated med list faxed, please get fax #.      Malu Roe, RN, BSN  [facility]  [facility] [place] [facility] [place]

## 2025-06-04 ENCOUNTER — PATIENT OUTREACH (OUTPATIENT)
Dept: CARE COORDINATION | Facility: CLINIC | Age: 75
End: 2025-06-04
Payer: COMMERCIAL

## 2025-06-04 NOTE — PROGRESS NOTES
Clinic Care Coordination Contact  Community Health Worker Initial Outreach    CHW Initial Information Gathering:  Referral Source: PCP  Preferred Hospital: [facility] [place]  [phone]  Preferred Urgent Care: Other  Current living arrangement:: I live alone  Type of residence:: Private home - stairs  Community Resources: None  Supplies Currently Used at Home: None  Equipment Currently Used at Home: none  Informal Support system:: Friends  No PCP office visit in Past Year: No  Transportation means:: None  CHW Additional Questions  If ED/Hospital discharge, follow-up appointment scheduled as recommended?: N/A  Medication changes made following ED/Hospital discharge?: N/A  MyChart active?: No  Patient agreeable to assistance with activating MyChart?: No    Patient accepts CC: Yes. Patient scheduled for assessment with CC SW on 6/5/25 at 10:00 am. Patient noted desire to discuss wanting to set up with a provider/organization that can come to her home and evaluate her for check ups/physicals. Patient is legally blind. Patient asked to be sent a text message of resources since she is blind. CHW informed her we are unable to send texts and asked if she had an email, family member or someone who could join phone call that would be able to text the resources to her. Patient claims she has no support system that would be able to do this and asked if CC SW could call one of her home care workers from [facility] so that they could pass along the info/resources to her. CHW told patient that writer will inform SW of request in note.     MILENA Franklin  [phone]  [facility]

## 2025-06-05 ENCOUNTER — TELEPHONE (OUTPATIENT)
Dept: FAMILY MEDICINE | Facility: CLINIC | Age: 75
End: 2025-06-05

## 2025-06-05 ENCOUNTER — PATIENT OUTREACH (OUTPATIENT)
Dept: NURSING | Facility: CLINIC | Age: 75
End: 2025-06-05
Payer: COMMERCIAL

## 2025-06-05 DIAGNOSIS — I10 BENIGN ESSENTIAL HYPERTENSION: Primary | ICD-10-CM

## 2025-06-05 RX ORDER — LISINOPRIL 10 MG/1
10 TABLET ORAL DAILY
Qty: 90 TABLET | Refills: 1 | Status: SHIPPED | OUTPATIENT
Start: 2025-06-05

## 2025-06-05 NOTE — TELEPHONE ENCOUNTER
Home Care is calling regarding an established patient with [facility].  Requesting orders from: Cole Saavedra  RN APPROVED: RN able to provide verbal orders.  Home Care will send orders for signature.  RN will close encounter.  Is this a request for a temporary pause in the home care episode?  No    Orders Requested    HHA (Home Health Aide)  Request for discontinuation of care   Goals have not been met/not progressing.  FYI: Barriers to care:  patient willingness  RN gave verbal order: Yes        Phone number Home Care can be reached at: [phone]  Okay to leave a detailed message?: Yes    Contacts       Contact Date/Time Type Contact Phone/Fax    06/05/2025 02:10 PM CDT Phone (Incoming) WILLIAMS Rodriguez (Home Care) [phone]     [facility]  confidential voicemail.          Annmarie Haas, RN

## 2025-06-05 NOTE — TELEPHONE ENCOUNTER
Called Michael and relayed message below from pcp. Verbalized understanding.     Patient at home BP from today was 144/73. Please advise    Dale Zabala, RN, BSN

## 2025-06-05 NOTE — TELEPHONE ENCOUNTER
I have started this patient on lisinopril 10 mg and sent a prescription to the pharmacy  Please call homecare and inform them about this

## 2025-06-05 NOTE — TELEPHONE ENCOUNTER
Forms/Letter Request     Type of form/letter: Home Health Certification        Do we have the form/letter: Yes: [facility], Order Number: [id number]     Who is the form from? Home care     Where did/will the form come from? form was faxed in     When is form/letter needed by:     How would you like the form/letter returned: Fax : [phone]     Will place form on providers desk for review/signature  FRANCHESCA Rodriguez

## 2025-06-05 NOTE — TELEPHONE ENCOUNTER
Called home care nurse Michael and relayed provider message. Verbalized understanding.     Dale Zabala, RN, BSN

## 2025-06-05 NOTE — TELEPHONE ENCOUNTER
Unfortunately it is not my job to see and check which companies offer at home visits for patients  There is a job of the patient to call her insurance  provider and request them to help her get a company that provides in-home visits from providers  She has to do all the leg work  I did place a care coordination referral to help with that and more than this I cannot do anything  The home care  company can inform me what the blood pressures of the patient's are at home and depending on that I will decide if she needs to go back on lisinopril 5 mg are not

## 2025-06-05 NOTE — TELEPHONE ENCOUNTER
Home care is calling with an update on the patient:    Patient was originally going to stop taking the Lisinopril 5 mg. She has changed her mind and will continue taking the medication.    Patient mentioned to home care that there was discussion about her transitioning to [facility]. Home care reports that this group typically only rounds on inpatient residents. States that [facility] would be another option, however a majority of the visits are done virtually and patient really needs an in person assessment.     Routing to provider for review. Annmarie Haas, RN, BSN, PHN

## 2025-06-05 NOTE — PROGRESS NOTES
Clinic Care Coordination Contact  Follow Up Progress Note      Assessment: RHODA HEMPHILL outreached to pt for initial assessment per CHW scheduling. SW introduce self and role of clinic care coordination. 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 Pt has limited support and often is not able to get to clinic for visits/follow up.     RHODA HEMPHILL share [facility] with pt. Per their website they work with [facility], group homes and private home pt. Pt shared she is not able to write down the contact information and does not use [employer]. Pt asked SW to text information to her. RHODA HEMPHILL is not able to send text messages. Pt's home care RN is coming by this afternoon at 2:30 pm and asked SW to call back then, RN can help pt take down the information and hep her call as well.     Intervention/Education provided during outreach: [facility]. Pt reports understanding and denies any additional questions or concerns at this time.     Plan: Pt will review resources shared and reach out to resources as discussed. Patient was provided with RHODA HEMPHILL contact information and encouraged to call with any questions or concerns.     No further outreaches will be made at this time unless a new referral is made or a change in the pt's status occurs.     Resources:  [facility] -For additional assistance, contact your community staff or call our main office, toll-free.  [place]: [phone]

## 2025-06-05 NOTE — PROGRESS NOTES
Clinic Care Coordination Contact  Follow Up Progress Note      Assessment: RHODA HEMPHILL outreached to pt to follow up with resources discussed. Pt noted her nurse has came by and left already. RHODA HEMPHILL reached out to colleagues in regards to any additional resources for home provider options. Per colleagues they have not found any services that will send a provider out to the home to complete home visits. [facility] will not do home visits, even though their website states so. RHODA  offer private pay non emergency transportation list be mailed to pt. Pt declined stating she will not ride with any stranger. RHODA CC share that these transportation company can provide handicap wheel chair accessible rides. Pt will opt to call her friend if he can take her, but disagreed by saying her friend will not be able to carry her into his car. RHODA HEMPHILL suggest pt's friend can ride with if she calls the transportation company for  ride. Pt will think about it. Pt will have to clinic to schedule her appointment first.     Pt reports understanding and denied any further needs and declined PC-CC enrollment.    Plan: No further outreaches will be made at this time unless a new referral is made or a change in the pt's status occurs.     Mya Owens [facility]  Social Work Primary Care Clinic Care Coordinator  [facility]  [phone]  [email or web address]

## 2025-06-09 ENCOUNTER — TELEPHONE (OUTPATIENT)
Dept: FAMILY MEDICINE | Facility: CLINIC | Age: 75
End: 2025-06-09

## 2025-06-10 ENCOUNTER — TELEPHONE (OUTPATIENT)
Dept: FAMILY MEDICINE | Facility: CLINIC | Age: 75
End: 2025-06-10
Payer: COMMERCIAL

## 2025-06-10 RX ORDER — MULTIPLE VITAMINS W/ MINERALS TAB 9MG-400MCG
1 TAB ORAL DAILY
COMMUNITY

## 2025-06-10 NOTE — TELEPHONE ENCOUNTER
There is nothing I can advise  Patient wanted that medication because her daughter in [place] was taking it  I prescribed it  If she cannot afford it there is nothing I can do  She can schedule a video visit with me to discuss any other alternate options if she wants to consider any other antidepressants

## 2025-06-10 NOTE — TELEPHONE ENCOUNTER
Sending as an FYI to provider    Doretha, home care RN is calling while she is visiting patient for med set up.    Patient forgot to take her Sunday and Monday evening dose.    Patient is not monitoring her BS.    Today's BP is 129/79 P 98.    Home care nurse cannot locate glipizide 5 MG and Trintellix.    Doretha will look around the house for a package with these meds.     Doretha to call [facility] mail order pharmacy and inquire if medication was mailed.    Carina Torrez, RN, BSN  [facility]

## 2025-06-10 NOTE — TELEPHONE ENCOUNTER
Doretha, home care nurse called the pharmacy.      Trintellix is $300.  Patient cannot afford it. Please advise.    Glipizide was never send from the pharmacy.  Pharmacy will mail it.    Carina Torrez RN, BSN  [facility]

## 2025-06-11 ENCOUNTER — TELEPHONE (OUTPATIENT)
Dept: FAMILY MEDICINE | Facility: CLINIC | Age: 75
End: 2025-06-11
Payer: COMMERCIAL

## 2025-06-11 ENCOUNTER — MEDICAL CORRESPONDENCE (OUTPATIENT)
Dept: HEALTH INFORMATION MANAGEMENT | Facility: CLINIC | Age: 75
End: 2025-06-11
Payer: COMMERCIAL

## 2025-06-11 NOTE — TELEPHONE ENCOUNTER
Dr. Quentin Savage, PT with [facility] reports patient's HR was 112 at the beginning and end of visit today. She does not have any symptoms.     ____      Home Care is calling regarding an established patient with [facility].  Requesting orders from: Cole Saavedra  RN APPROVED: RN able to provide verbal orders.  Home Care will send orders for signature.  RN will close encounter.  Is this a request for a temporary pause in the home care episode?  No    Orders Requested    Physical Therapy  Request for continuation of care with increase in frequency  Frequency: 1x/wk for 4 weeks starting 6/16/25.  RN gave verbal order: Yes        Phone number Home Care can be reached at: see below  Okay to leave a detailed message?: Yes    Contacts       Contact Date/Time Type Contact Phone/Fax    06/11/2025 03:13 PM CDT Phone (Incoming) ALENA Savage (Home Care) [phone]     [facility]          Falguni Blackburn RN

## 2025-06-12 ENCOUNTER — TELEPHONE (OUTPATIENT)
Dept: FAMILY MEDICINE | Facility: CLINIC | Age: 75
End: 2025-06-12
Payer: COMMERCIAL

## 2025-06-12 NOTE — TELEPHONE ENCOUNTER
Home Care is calling regarding an established patient with [facility].  Requesting orders from: Cole Saavedra RN APPROVED: RN able to provide verbal orders.  Home Care will send orders for signature.  RN will close encounter.  Is this a request for a temporary pause in the home care episode?  No    Orders Requested    Social Work  Request for initial evaluation and treatment (one time)   RN gave verbal order: Yes        Phone number Home Care can be reached at: [phone]  Okay to leave a detailed message?: Yes      Annmarie Haas RN

## 2025-06-16 ENCOUNTER — MEDICAL CORRESPONDENCE (OUTPATIENT)
Dept: HEALTH INFORMATION MANAGEMENT | Facility: CLINIC | Age: 75
End: 2025-06-16
Payer: COMMERCIAL

## 2025-06-16 ENCOUNTER — TELEPHONE (OUTPATIENT)
Dept: FAMILY MEDICINE | Facility: CLINIC | Age: 75
End: 2025-06-16

## 2025-06-18 ENCOUNTER — TELEPHONE (OUTPATIENT)
Dept: FAMILY MEDICINE | Facility: CLINIC | Age: 75
End: 2025-06-18
Payer: COMMERCIAL

## 2025-06-18 ENCOUNTER — NURSE TRIAGE (OUTPATIENT)
Dept: FAMILY MEDICINE | Facility: CLINIC | Age: 75
End: 2025-06-18
Payer: COMMERCIAL

## 2025-06-18 NOTE — TELEPHONE ENCOUNTER
should call 911 and send the patient to the ER as patient is having suicidal ideations for commitment

## 2025-06-18 NOTE — TELEPHONE ENCOUNTER
Contacted pharmacy. Confirmed the [employer] on prescription is $300 and patient is unable to afford this. Provider- is there an alternative you would recommend?    Contacted patient to triage symptoms of suicidal ideation and advise on presenting to the ER per Cole Saavedra MD:       should call 911 and send the patient to the ER as patient is having suicidal ideations for commitment        Contacted  and advised on PCP message.  states that the patient was sharing this information with her yesterday, states that the patient was very open about it and that she did not feel that calling 911 was warranted at that time. Routing update to provider, please advise on prescription.     RN, if patient returns call, please triage symptoms of suicidal ideation. Annmarie Haas, RN, BSN, PHN

## 2025-06-18 NOTE — TELEPHONE ENCOUNTER
"Provider:  Please see below.  Patient has an upcoming appointment with you Thursday 6/26/2025.  Disposition is to be seen in clinic today with sometimes having thoughts of suicide.  I have verified that she has no current thought of harming herself or others and has no current plan to harm herself.  She states \"I am proud of myself and I am safe.\" Is ok that she wait for the appointment on 6/26/2025 to address her depression?   She was advised if she has thoughts of harming herself or others she is to be seen at the E.R. or call 911.     Patient reports that she has an upcoming appointment with Dr. Saavedra 6/26/2025 and she will keep that appointment to address the medication.     For the depression symptoms reported below she states that the  is a nut bar and needs to listen a little better. She is not suicidal and does not have thoughts of harming herself or others. She is not going to do that to her daughter and grandchildren.      She states that I am depressed but she is doing good and made great strides today with PT and is very proud of herself. She then states life is not so good and it stinks because she is partially blind and cannot get around well.       She did have thoughts of harming herself in the past, a few months back, but states again that  she would not do that to her daughter and grand kids.     I clarified that she has no current thought of harming herself or others and has no current plan to harm herself.  She states \"I am proud of myself and I am safe.\"     She was advised if she has thoughts of harming herself or others she is to be seen at the E.R. or call 911. To provider.  Patient verbalized good understanding, agrees with plan and needs no further support.  Thank you. Mavis Nye R.N.      Reason for Disposition   Sometimes has thoughts of suicide    Additional Information   Negative: Patient attempted suicide   Negative: Patient is threatening suicide now   Negative: Violent " behavior, or threatening to physically hurt or kill someone   Negative: Patient is very confused (disoriented, slurred speech) and no other adult (e.g., friend or family member) available   Negative: Difficult to awaken or acting confused (disoriented, slurred speech) and new-onset   Negative: Sounds like a life-threatening emergency to the triager   Negative: Depression and unable to do any of normal activities (e.g., self care, school, work; in comparison to baseline).   Negative: Very strange or confused behavior   Negative: Patient sounds very sick or weak to the triager   Negative: Fever > 101 F  (38.3 C)    Protocols used: Depression-A-[place]

## 2025-06-18 NOTE — TELEPHONE ENCOUNTER
Please schedule her for a video visit or a telephone visit with me so that I can discuss the options about alternative treatments  She was keen on this prescription because her daughter was taking it in [place] but I have to talk to her about alternatives so please schedule her for a video or telephone visit with me I should have a lot of openings next week or even this week on Thursday or Friday you can take a same-day slot

## 2025-06-18 NOTE — TELEPHONE ENCOUNTER
Home Care is calling regarding an established patient with [facility].  Requesting orders from: Cole Saavedra: RN able to provide verbal orders.  Sending as FYI only.  Is this a request for a temporary pause in the home care episode?  No    Provider-  is reporting that patient has reported that she is having a lot of suicidal ideation. Reports that she thinks about it daily, states that if she was going to do it, she would just take all of her medications.  reports that patient denies intent of self harm because she wouldn't hurt her daughter like that. Patient was given prescription for Trintellix, reports that the copay on this medication is $300 so she has not purchased it. Patient does have an in person visit scheduled for 6/26/25. Please advise. Annmarie Haas RN, BSN, PHN    Orders Requested    Social Work  Request for continuation of care with no increase or decrease in frequency  Frequency: an additional 2 visits.   RN gave verbal order: Yes          Phone number Home Care can be reached at: [phone]  Okay to leave a detailed message?: Yes    Contacts       Contact Date/Time Type Contact Phone/Fax    06/18/2025 09:34 AM CDT Phone (Incoming) Amanda Mcdaniel (Home Care) [phone]     [facility],  Confidential voicemail.          Annmarie Haas RN

## 2025-06-18 NOTE — TELEPHONE ENCOUNTER
Ok to leave a message with the provider's response.      Home Care is calling regarding an established patient with [facility].  Requesting orders from: Cole Saavedra: RN able to provide verbal orders.  Sending as FYI only.  Is this a request for a temporary pause in the home care episode?  No    Orders Requested    Skilled Nursing  Request for continuation of care with no increase or decrease in frequency. 2 more visits for medication management. Patient is working on setting up her own medications and still needs guidance.     RN gave verbal order: Yes        FYI - She hadn't taken her lisinopril or glipizide yesterday due to confusion around old pill bottles.  B.P. today 121/78 P: 113  She is asymptomatic today     Okay to leave a detailed message?: Yes      Mavis Nye RN

## 2025-06-18 NOTE — TELEPHONE ENCOUNTER
okay to wait till 26 for the appointment since she has no suicidal  ideations at this point  She has been off antidepressants for a long time and we can discuss options for antidepressants on that day

## 2025-06-18 NOTE — TELEPHONE ENCOUNTER
Ok for appointment as scheduled.  Will leave this for Dr. Saavedra review to see if he wants to work in sooner.  No need for immediate evaluation based on additional history as noted.  Patient has been given information for crisis intervention in ED for worsening symptoms as noted in nurse's note.       LIZ

## 2025-06-19 ENCOUNTER — TELEPHONE (OUTPATIENT)
Dept: FAMILY MEDICINE | Facility: CLINIC | Age: 75
End: 2025-06-19
Payer: COMMERCIAL

## 2025-06-19 NOTE — TELEPHONE ENCOUNTER
WILLIAMS Mahajan from [facility] calling with [facility] for PCP.     Visited with patient today and her resting pulse is 110. This falls outside their parameters so they need to call the PCP with it. Patient is asymptomatic. This is not uncommon for her. Other resting heart rates have been 113 and 112.    If no orders from PCP, RN does not need to call back.     Margie SENAN, RN

## 2025-06-20 ENCOUNTER — TELEPHONE (OUTPATIENT)
Dept: FAMILY MEDICINE | Facility: CLINIC | Age: 75
End: 2025-06-20
Payer: COMMERCIAL

## 2025-06-23 ENCOUNTER — MEDICAL CORRESPONDENCE (OUTPATIENT)
Dept: HEALTH INFORMATION MANAGEMENT | Facility: CLINIC | Age: 75
End: 2025-06-23
Payer: COMMERCIAL

## 2025-06-25 ENCOUNTER — TELEPHONE (OUTPATIENT)
Dept: FAMILY MEDICINE | Facility: CLINIC | Age: 75
End: 2025-06-25
Payer: COMMERCIAL

## 2025-06-25 NOTE — TELEPHONE ENCOUNTER
FYI - Status Update    Who is Calling: patient    Update: Patient missed all of her medications on 6/22/25. Patient also missed her pm dose on Thursday on 6/19/25. Also just as an FYI patient is experiencing pain on her bottom when sitting upright. Please discuss at upcoming visit. Please also discuss other options for depression medication and follow up diabetes.     Does caller want a call/response back: No (if wanting to call back, please call [phone] for patient's Registered Nurse with [facility])

## 2025-06-26 ENCOUNTER — TELEPHONE (OUTPATIENT)
Dept: FAMILY MEDICINE | Facility: CLINIC | Age: 75
End: 2025-06-26

## 2025-06-26 ENCOUNTER — TELEPHONE (OUTPATIENT)
Dept: UROLOGY | Facility: CLINIC | Age: 75
End: 2025-06-26

## 2025-06-26 ENCOUNTER — OFFICE VISIT (OUTPATIENT)
Dept: FAMILY MEDICINE | Facility: CLINIC | Age: 75
End: 2025-06-26
Attending: INTERNAL MEDICINE
Payer: COMMERCIAL

## 2025-06-26 ENCOUNTER — ORDERS ONLY (AUTO-RELEASED) (OUTPATIENT)
Dept: FAMILY MEDICINE | Facility: CLINIC | Age: 75
End: 2025-06-26

## 2025-06-26 VITALS
RESPIRATION RATE: 16 BRPM | OXYGEN SATURATION: 97 % | TEMPERATURE: 97.3 F | HEART RATE: 104 BPM | SYSTOLIC BLOOD PRESSURE: 113 MMHG | BODY MASS INDEX: 27.62 KG/M2 | WEIGHT: 176 LBS | DIASTOLIC BLOOD PRESSURE: 65 MMHG | HEIGHT: 67 IN

## 2025-06-26 DIAGNOSIS — E11.9 TYPE 2 DIABETES MELLITUS WITHOUT COMPLICATION, WITHOUT LONG-TERM CURRENT USE OF INSULIN (H): ICD-10-CM

## 2025-06-26 DIAGNOSIS — F33.1 MODERATE EPISODE OF RECURRENT MAJOR DEPRESSIVE DISORDER (H): ICD-10-CM

## 2025-06-26 DIAGNOSIS — Z12.31 VISIT FOR SCREENING MAMMOGRAM: Primary | ICD-10-CM

## 2025-06-26 DIAGNOSIS — M85.80 OSTEOPENIA, UNSPECIFIED LOCATION: ICD-10-CM

## 2025-06-26 DIAGNOSIS — Z79.4 TYPE 2 DIABETES MELLITUS WITH STAGE 2 CHRONIC KIDNEY DISEASE, WITH LONG-TERM CURRENT USE OF INSULIN (H): ICD-10-CM

## 2025-06-26 DIAGNOSIS — N32.81 OAB (OVERACTIVE BLADDER): ICD-10-CM

## 2025-06-26 DIAGNOSIS — Z00.00 ENCOUNTER FOR MEDICARE ANNUAL WELLNESS EXAM: ICD-10-CM

## 2025-06-26 DIAGNOSIS — N18.2 TYPE 2 DIABETES MELLITUS WITH STAGE 2 CHRONIC KIDNEY DISEASE, WITH LONG-TERM CURRENT USE OF INSULIN (H): ICD-10-CM

## 2025-06-26 DIAGNOSIS — Z12.11 SCREEN FOR COLON CANCER: ICD-10-CM

## 2025-06-26 DIAGNOSIS — N18.31 STAGE 3A CHRONIC KIDNEY DISEASE (H): ICD-10-CM

## 2025-06-26 DIAGNOSIS — F41.8 DEPRESSION WITH ANXIETY: ICD-10-CM

## 2025-06-26 DIAGNOSIS — E11.22 TYPE 2 DIABETES MELLITUS WITH STAGE 2 CHRONIC KIDNEY DISEASE, WITH LONG-TERM CURRENT USE OF INSULIN (H): ICD-10-CM

## 2025-06-26 LAB
BASOPHILS # BLD AUTO: 0 10E3/UL (ref 0–0.2)
BASOPHILS NFR BLD AUTO: 0 %
EOSINOPHIL # BLD AUTO: 0.1 10E3/UL (ref 0–0.7)
EOSINOPHIL NFR BLD AUTO: 1 %
ERYTHROCYTE [DISTWIDTH] IN BLOOD BY AUTOMATED COUNT: 14 % (ref 10–15)
EST. AVERAGE GLUCOSE BLD GHB EST-MCNC: 146 MG/DL
HBA1C MFR BLD: 6.7 % (ref 0–5.6)
HCT VFR BLD AUTO: 35.9 % (ref 35–47)
HGB BLD-MCNC: 11.4 G/DL (ref 11.7–15.7)
IMM GRANULOCYTES # BLD: 0 10E3/UL
IMM GRANULOCYTES NFR BLD: 0 %
LYMPHOCYTES # BLD AUTO: 2.8 10E3/UL (ref 0.8–5.3)
LYMPHOCYTES NFR BLD AUTO: 30 %
MCH RBC QN AUTO: 26.3 PG (ref 26.5–33)
MCHC RBC AUTO-ENTMCNC: 31.8 G/DL (ref 31.5–36.5)
MCV RBC AUTO: 83 FL (ref 78–100)
MONOCYTES # BLD AUTO: 0.5 10E3/UL (ref 0–1.3)
MONOCYTES NFR BLD AUTO: 5 %
NEUTROPHILS # BLD AUTO: 6 10E3/UL (ref 1.6–8.3)
NEUTROPHILS NFR BLD AUTO: 64 %
PLATELET # BLD AUTO: 328 10E3/UL (ref 150–450)
RBC # BLD AUTO: 4.34 10E6/UL (ref 3.8–5.2)
WBC # BLD AUTO: 9.4 10E3/UL (ref 4–11)

## 2025-06-26 RX ORDER — FAMOTIDINE 20 MG
1 TABLET ORAL DAILY
Qty: 90 CAPSULE | Refills: 3 | Status: SHIPPED | OUTPATIENT
Start: 2025-06-26

## 2025-06-26 SDOH — HEALTH STABILITY: PHYSICAL HEALTH: ON AVERAGE, HOW MANY MINUTES DO YOU ENGAGE IN EXERCISE AT THIS LEVEL?: 0 MIN

## 2025-06-26 SDOH — HEALTH STABILITY: PHYSICAL HEALTH: ON AVERAGE, HOW MANY DAYS PER WEEK DO YOU ENGAGE IN MODERATE TO STRENUOUS EXERCISE (LIKE A BRISK WALK)?: 0 DAYS

## 2025-06-26 ASSESSMENT — PATIENT HEALTH QUESTIONNAIRE - PHQ9
SUM OF ALL RESPONSES TO PHQ QUESTIONS 1-9: 4
SUM OF ALL RESPONSES TO PHQ QUESTIONS 1-9: 4
10. IF YOU CHECKED OFF ANY PROBLEMS, HOW DIFFICULT HAVE THESE PROBLEMS MADE IT FOR YOU TO DO YOUR WORK, TAKE CARE OF THINGS AT HOME, OR GET ALONG WITH OTHER PEOPLE: EXTREMELY DIFFICULT

## 2025-06-26 ASSESSMENT — SOCIAL DETERMINANTS OF HEALTH (SDOH): HOW OFTEN DO YOU GET TOGETHER WITH FRIENDS OR RELATIVES?: NEVER

## 2025-06-26 ASSESSMENT — PAIN SCALES - GENERAL: PAINLEVEL_OUTOF10: NO PAIN (0)

## 2025-06-26 NOTE — PATIENT INSTRUCTIONS
Patient Education   Preventive Care Advice   This is general advice given by our system to help you stay healthy. However, your care team may have specific advice just for you. Please talk to your care team about your preventive care needs.  Nutrition  Eat 5 or more servings of fruits and vegetables each day.  Try wheat bread, brown rice and whole grain pasta (instead of white bread, rice, and pasta).  Get enough calcium and vitamin D. Check the label on foods and aim for 100% of the RDA (recommended daily allowance).  Lifestyle  Exercise at least 150 minutes each week  (30 minutes a day, 5 days a week).  Do muscle strengthening activities 2 days a week. These help control your weight and prevent disease.  No smoking.  Wear sunscreen to prevent skin cancer.  Have a dental exam and cleaning every 6 months.  Yearly exams  See your health care team every year to talk about:  Any changes in your health.  Any medicines your care team has prescribed.  Preventive care, family planning, and ways to prevent chronic diseases.  Shots (vaccines)   HPV shots (up to age 26), if you've never had them before.  Hepatitis B shots (up to age 59), if you've never had them before.  COVID-19 shot: Get this shot when it's due.  Flu shot: Get a flu shot every year.  Tetanus shot: Get a tetanus shot every 10 years.  Pneumococcal, hepatitis A, and RSV shots: Ask your care team if you need these based on your risk.  Shingles shot (for age 50 and up)  General health tests  Diabetes screening:  Starting at age 35, Get screened for diabetes at least every 3 years.  If you are younger than age 35, ask your care team if you should be screened for diabetes.  Cholesterol test: At age 39, start having a cholesterol test every 5 years, or more often if advised.  Bone density scan (DEXA): At age 50, ask your care team if you should have this scan for osteoporosis (brittle bones).  Hepatitis C: Get tested at least once in your life.  STIs (sexually  transmitted infections)  Before age 24: Ask your care team if you should be screened for STIs.  After age 24: Get screened for STIs if you're at risk. You are at risk for STIs (including HIV) if:  You are sexually active with more than one person.  You don't use condoms every time.  You or a partner was diagnosed with a sexually transmitted infection.  If you are at risk for HIV, ask about PrEP medicine to prevent HIV.  Get tested for HIV at least once in your life, whether you are at risk for HIV or not.  Cancer screening tests  Cervical cancer screening: If you have a cervix, begin getting regular cervical cancer screening tests starting at age 21.  Breast cancer scan (mammogram): If you've ever had breasts, begin having regular mammograms starting at age 40. This is a scan to check for breast cancer.  Colon cancer screening: It is important to start screening for colon cancer at age 45.  Have a colonoscopy test every 10 years (or more often if you're at risk) Or, ask your provider about stool tests like a FIT test every year or Cologuard test every 3 years.  To learn more about your testing options, visit:   .  For help making a decision, visit:   https://bit.ly/ph90148.  Prostate cancer screening test: If you have a prostate, ask your care team if a prostate cancer screening test (PSA) at age 55 is right for you.  Lung cancer screening: If you are a current or former smoker ages 50 to 80, ask your care team if ongoing lung cancer screenings are right for you.  For informational purposes only. Not to replace the advice of your health care provider. Copyright   2023 [facility]. All rights reserved. Clinically reviewed by the [facility]. Shaser 654246 - REV 01/24.  Learning About Activities of Daily Living  What are activities of daily living?     Activities of daily living (ADLs) are the basic self-care tasks you do every day. These include eating, bathing, dressing,  and moving around.  As you age, and if you have health problems, you may find that it's harder to do some of these tasks. If so, your doctor can suggest ideas that may help.  To measure what kind of help you may need, your doctor will ask how well you are able to do ADLs. Let your doctor know if there are any tasks that you are having trouble doing. This is an important first step to getting help. And when you have the help you need, you can stay as independent as possible.  How will a doctor assess your ADLs?  Asking about ADLs is part of a routine health checkup your doctor will likely do as you age. Your health check might be done in a doctor's office, in your home, or at a hospital. The goal is to find out if you are having any problems that could make it hard to care for yourself or that make it unsafe for you to be on your own.  To measure your ADLs, your doctor will ask how hard it is for you to do routine tasks. Your doctor may also want to know if you have changed the way you do a task because of a health problem. Your doctor may watch how you:  Walk back and forth.  Keep your balance while you stand or walk.  Move from sitting to standing or from a bed to a chair.  Button or unbutton a shirt or sweater.  Remove and put on your shoes.  It's common to feel a little worried or anxious if you find you can't do all the things you used to be able to do. Talking with your doctor about ADLs is a way to make sure you're as safe as possible and able to care for yourself as well as you can. You may want to bring a caregiver, friend, or family member to your checkup. They can help you talk to your doctor.  Follow-up care is a key part of your treatment and safety. Be sure to make and go to all appointments, and call your doctor if you are having problems. It's also a good idea to know your test results and keep a list of the medicines you take.  Current as of: October 24, 2024  Content Version: 14.5    7960-6563  [employer].   Care instructions adapted under license by your healthcare professional. If you have questions about a medical condition or this instruction, always ask your healthcare professional. [employer] disclaims any warranty or liability for your use of this information.    Preventing Falls: Care Instructions  Injuries and health problems such as trouble walking or poor eyesight can increase your risk of falling. So can some medicines. But there are things you can do to help prevent falls. You can exercise to get stronger. You can also arrange your home to make it safer.    Talk to your doctor about the medicines you take. Ask if any of them increase the risk of falls and whether they can be changed or stopped.   Try to exercise regularly. It can help improve your strength and balance. This can help lower your risk of falling.         Practice fall safety and prevention.   Wear low-heeled shoes that fit well and give your feet good support. Talk to your doctor if you have foot problems that make this hard.  Carry a cellphone or wear a medical alert device that you can use to call for help.  Use stepladders instead of chairs to reach high objects. Don't climb if you're at risk for falls. Ask for help, if needed.  Wear the correct eyeglasses, if you need them.        Make your home safer.   Remove rugs, cords, clutter, and furniture from walkways.  Keep your house well lit. Use night-lights in hallways and bathrooms.  Install and use sturdy handrails on stairways.  Wear nonskid footwear, even inside. Don't walk barefoot or in socks without shoes.        Be safe outside.   Use handrails, curb cuts, and ramps whenever possible.  Keep your hands free by using a shoulder bag or backpack.  Try to walk in well-lit areas. Watch out for uneven ground, changes in pavement, and debris.  Be careful in the winter. Walk on the grass or gravel when sidewalks are slippery. Use de-icer on steps and  "walkways. Add non-slip devices to shoes.    Put grab bars and nonskid mats in your shower or tub and near the toilet. Try to use a shower chair or bath bench when bathing.   Get into a tub or shower by putting in your weaker leg first. Get out with your strong side first. Have a phone or medical alert device in the bathroom with you.   Where can you learn more?  Go to https://www.PopJax.Taggstr/patiented  Enter G117 in the search box to learn more about \"Preventing Falls: Care Instructions.\"  Current as of: July 31, 2024  Content Version: 14.5 2024-2025 [employer].   Care instructions adapted under license by your healthcare professional. If you have questions about a medical condition or this instruction, always ask your healthcare professional. [employer] disclaims any warranty or liability for your use of this information.    Relationships for Good Health  Relationships are important for our health and happiness. Social isolation, loneliness and lack of support are bad for your health. Studies show that loneliness can harm health and limit your life span as much as high blood pressure and smoking.   Take some time to reflect on your relationships. Then answer these questions:  Are there people in your life that cause you stress or drain your energy? What can you do to set limits?  ________________________________________________________________________________________________________________________________________________________________________________________________________________________________________________________________________________________________________________________________________________  Who do you enjoy spending time with? Who can you go to for " support?  ________________________________________________________________________________________________________________________________________________________________________________________________________________________________________________________________________________________________________________________________________________  What can you do to improve your relationships with others?  __________________________________________________________________________________________________________________________________________________________________________________________________________________  ______________________________________________________________________________________________________________________________  What do you like most about your relationships with others?  ________________________________________________________________________________________________________________________________________________________________________________________________________________________________________________________________________________________________________________________________________________  My goal: ______________________________________________________________________  I will: ______________________________________________________________________________________________________________________________________________________________________________________________    For informational purposes only. Not to replace the advice of your health care provider. Copyright   2018 [facility]. All rights reserved. Clinically reviewed by [facility]  [facility]. SMARTworks 013771 - Rev 06/24.  Learning About Stress  What is stress?     Stress is your body's response to a hard situation. Your body can have a physical, emotional, or mental response. Stress is a fact of life for most people, and it affects everyone differently. What causes stress for you may not be  stressful for someone else.  A lot of things can cause stress. You may feel stress when you go on a job interview, take a test, or run a race. This kind of short-term stress is normal and even useful. It can help you if you need to work hard or react quickly. For example, stress can help you finish an important job on time.  Long-term stress is caused by ongoing stressful situations or events. Examples of long-term stress include long-term health problems, ongoing problems at work, or conflicts in your family. Long-term stress can harm your health.  How does stress affect your health?  When you are stressed, your body responds as though you are in danger. It makes hormones that speed up your heart, make you breathe faster, and give you a burst of energy. This is called the fight-or-flight stress response. If the stress is over quickly, your body goes back to normal and no harm is done.  But if stress happens too often or lasts too long, it can have bad effects. Long-term stress can make you more likely to get sick, and it can make symptoms of some diseases worse. If you tense up when you are stressed, you may develop neck, shoulder, or low back pain. Stress is linked to high blood pressure and heart disease.  Stress also harms your emotional health. It can make you cerda, tense, or depressed. Your relationships may suffer, and you may not do well at work or school.  What can you do to manage stress?  You can try these things to help manage stress:   Do something active. Exercise or activity can help reduce stress. Walking is a great way to get started. Even everyday activities such as housecleaning or yard work can help.  Try yoga or daryl chi. These techniques combine exercise and meditation. You may need some training at first to learn them.  Do something you enjoy. For example, listen to music or go to a movie. Practice your hobby or do volunteer work.  Meditate. This can help you relax, because you are not  "worrying about what happened before or what may happen in the future.  Do guided imagery. Imagine yourself in any setting that helps you feel calm. You can use online videos, books, or a teacher to guide you.  Do breathing exercises. For example:  From a standing position, bend forward from the waist with your knees slightly bent. Let your arms dangle close to the floor.  Breathe in slowly and deeply as you return to a standing position. Roll up slowly and lift your head last.  Hold your breath for just a few seconds in the standing position.  Breathe out slowly and bend forward from the waist.  Let your feelings out. Talk, laugh, cry, and express anger when you need to. Talking with supportive friends or family, a counselor, or a jayleen leader about your feelings is a healthy way to relieve stress. Avoid discussing your feelings with people who make you feel worse.  Write. It may help to write about things that are bothering you. This helps you find out how much stress you feel and what is causing it. When you know this, you can find better ways to cope.  What can you do to prevent stress?  You might try some of these things to help prevent stress:  Manage your time. This helps you find time to do the things you want and need to do.  Get enough sleep. Your body recovers from the stresses of the day while you are sleeping.  Get support. Your family, friends, and community can make a difference in how you experience stress.  Limit your news feed. Avoid or limit time on social media or news that may make you feel stressed.  Do something active. Exercise or activity can help reduce stress. Walking is a great way to get started.  Where can you learn more?  Go to https://www.[employer]sco.net/patiented  Enter N032 in the search box to learn more about \"Learning About Stress.\"  Current as of: October 24, 2024  Content Version: 14.5 2024-2025 [employer] SNTM[employer].   Care instructions adapted under license by your " healthcare professional. If you have questions about a medical condition or this instruction, always ask your healthcare professional. [employer] disclaims any warranty or liability for your use of this information.    Bladder Training: Care Instructions  Your Care Instructions     Bladder training is used to treat urge incontinence and stress incontinence. Urge incontinence means that the need to urinate comes on so fast that you can't get to a toilet in time. Stress incontinence means that you leak urine because of pressure on your bladder. For example, it may happen when you laugh, cough, or lift something heavy.  Bladder training can increase how long you can wait before you have to urinate. It can also help your bladder hold more urine. And it can give you better control over the urge to urinate.  It is important to remember that bladder training takes a few weeks to a few months to make a difference. You may not see results right away, but don't give up.  Follow-up care is a key part of your treatment and safety. Be sure to make and go to all appointments, and call your doctor if you are having problems. It's also a good idea to know your test results and keep a list of the medicines you take.  How can you care for yourself at home?  Work with your doctor to come up with a bladder training program that is right for you. You may use one or more of the following methods.  Delayed urination  In the beginning, try to keep from urinating for 5 minutes after you first feel the need to go.  While you wait, take deep, slow breaths to relax. Kegel exercises can also help you delay the need to go to the bathroom.  After some practice, when you can easily wait 5 minutes to urinate, try to wait 10 minutes before you urinate.  Slowly increase the waiting period until you are able to control when you have to urinate.  Scheduled urination  Empty your bladder when you first wake up in the morning.  Schedule times  "throughout the day when you will urinate.  Start by going to the bathroom every hour, even if you don't need to go.  Slowly increase the time between trips to the bathroom.  When you have found a schedule that works well for you, keep doing it.  If you wake up during the night and have to urinate, do it. Apply your schedule to waking hours only.  Kegel exercises  These tighten and strengthen pelvic muscles, which can help you control the flow of urine. (If doing these exercises causes pain, stop doing them and talk with your doctor.) To do Kegel exercises:  Squeeze your muscles as if you were trying not to pass gas. Or squeeze your muscles as if you were stopping the flow of urine. Your belly, legs, and buttocks shouldn't move.  Hold the squeeze for 3 seconds, then relax for 5 to 10 seconds.  Start with 3 seconds, then add 1 second each week until you are able to squeeze for 10 seconds.  Repeat the exercise 10 times a session. Do 3 to 8 sessions a day.  When should you call for help?  Watch closely for changes in your health, and be sure to contact your doctor if:    Your incontinence is getting worse.     You do not get better as expected.   Where can you learn more?  Go to https://www.[employer].net/patiented  Enter V684 in the search box to learn more about \"Bladder Training: Care Instructions.\"  Current as of: April 30, 2024  Content Version: 14.5 2024-2025 [employer].   Care instructions adapted under license by your healthcare professional. If you have questions about a medical condition or this instruction, always ask your healthcare professional. [employer] disclaims any warranty or liability for your use of this information.  At [facility], we strive to deliver an exceptional experience to you, every time we see you. If you receive a survey, please let us know what we are doing well and/or what we could improve upon, as we do value your feedback.  If " you have MyChart, you can expect to receive results automatically within 24 hours of their completion.  Your provider will send a note interpreting your results as well.   If you do not have MyChart, you should receive your results in about a week by mail.    Your care team:                            Family Medicine Internal Medicine   Antonio Resendiz, MD Yann Pickering, MD Arabella Millan, MD Cole Saavedra, MD Kina Mccloud, PARaymundoC    Familia Medeiros, MD Pediatrics   Shirley Gill, MD Elizabeth Hernandez, MD Marianna Patel, APRN CNP Noreen Almonte APRN CNP   Lexii Rios, MD Magaly Lange, MD Catia Hannon, CNP     Steven Bermeo, CNP Same-Day Provider (No follow-up visits)   NIKKY Rodriguez, DNP Yuliana Taveras, NIKKY Jack, FNP, BC JEREMI ZayasC     Clinic hours: Monday - Thursday 7 am-6 pm; Fridays 7 am-5 pm.   Urgent care: Monday - Friday 10 am- 8 pm; Saturday and Sunday 9 am-5 pm.    Clinic: [phone]       [facility]: Monday - Thursday 8 am - 7 pm; Friday 8 am - 6 pm  [facility]: [phone]

## 2025-06-26 NOTE — TELEPHONE ENCOUNTER
[facility]    Phone Message    May a detailed message be left on voicemail: yes     Reason for Call: Appointment Intake    Referring Provider Name: Jo  Diagnosis and/or Symptoms: 2 mo f/unit(s) around 8/27    Patient was in the background while her friend Alyce called to schedule, saying she needs a phone visit vs VV as she cannot see. Please call Alyce back to schedule a phone visit. Thank you!    Action Taken: Message routed to:  [facility] Viviana sanchez    Travel Screening: Not Applicable     Date of Service:

## 2025-06-26 NOTE — PROGRESS NOTES
Preventive Care Visit  [facility] MAIKOL Saavedra MD, Internal Medicine  Jun 26, 2025      Assessment & Plan     Visit for screening mammogram  Declined mammogram as she feels that it is a lot of pain when she has to do it  She does have family history of breast cancer  Discussed about breast self-exam    Screen for colon cancer  She has no family history of colon cancer or polyps  She wants to do Cologuard  - COLOGUARD([employer]); Future    Stage 3a chronic kidney disease (H)  Blood pressure is  on target  - BASIC METABOLIC PANEL; Future  - Albumin Random Urine Quantitative with Creat Ratio; Future  - Hemoglobin; Future  - BASIC METABOLIC PANEL  - Albumin Random Urine Quantitative with Creat Ratio    Type 2 diabetes mellitus with stage 2 chronic kidney disease, with long-term current use of insulin (H)  She is on metformin 1000 mg twice a day along with glipizide XL 5 mg daily  She does not check her blood sugars because she is legally blind and cannot use the machine however her A1c is very good at 6.7 so I reassured her that  the medications are working and she does not have to check her blood sugars on a regular basis  - Lipid panel reflex to direct LDL Non-fasting; Future  - ALT; Future  - Lipid panel reflex to direct LDL Non-fasting  - ALT    Encounter for Medicare annual wellness exam  Discussed about diet and exercise she cannot do much exercise  However she is trying to watch her diet  We discussed about COVID-19 vaccines  I also discussed about RSV/shingles vaccines/tetanus vaccine and I advised her to get it at a pharmacy  - CBC with platelets and differential; Future  - CBC with platelets and differential    Osteopenia, unspecified location  She does have a history of osteoporosis however she does not want a DEXA scan  She is already taking multivitamin with calcium in it and today discussed with her about taking vitamin D  - Vitamin D, Cholecalciferol, 25 MCG (1000 UT)  CAPS; Take 1 capsule by mouth daily.  - OFFICE/OUTPT VISIT,EST,LEVL IV    Type 2 diabetes mellitus without complication, without long-term current use of insulin (H)    - Hemoglobin A1c  - OFFICE/OUTPT VISIT,EST,LEVL IV    OAB (overactive bladder)  She follows with urologist and she is on Myrbetriq and trospium    Depression with anxiety  She wanted Trintellix for this and I will prescribe that a few weeks ago however the insurance did not approve it and her co-pay was very high so she is not taking it  Today we discussed about other options like Prozac/Celexa but she is not keen on any of them    Patient has been advised of split billing requirements and indicates understanding: No      Reviewed preventive health counseling, as reflected in patient instructions  Counseling  Appropriate preventive services were addressed with this patient via screening, questionnaire, or discussion as appropriate for fall prevention, nutrition, physical activity, Tobacco-use cessation, social engagement, weight loss and cognition.  Checklist reviewing preventive services available has been given to the patient.  Reviewed patient's diet, addressing concerns and/or questions.   Patient is at risk for social isolation and has been provided with information about the benefit of social connection.   The patient was instructed to see the dentist every 6 months.   She is at risk for psychosocial distress and has been provided with information to reduce risk.   Updated plan of care.  Patient reported difficulty with activities of daily living were addressed today.Information on urinary incontinence and treatment options given to patient.             Domonique Garcia is a 74 year old, presenting for the following:  Physical        6/26/2025     2:22 PM   Additional Questions   Roomed by Mi   Accompanied by Selena Mead         6/26/2025   Forms   Any forms needing to be completed Yes         6/26/2025     2:22 PM   Patient Reported  Additional Medications   Patient reports taking the following new medications no       Via the Health Maintenance questionnaire, the patient has reported the following services have been completed -Eye Exam: [facility] 2025-01-01, this information has been sent to the abstraction team.    HPI  Here for Medicare annual illness visit         Advance Care Planning    Discussed advance care planning with patient; however, patient declined at this time.        6/26/2025   General Health   How would you rate your overall physical health? (!) POOR   Feel stress (tense, anxious, or unable to sleep) To some extent   (!) STRESS CONCERN      6/26/2025   Nutrition   Diet: Regular (no restrictions)    Diabetic       Multiple values from one day are sorted in reverse-chronological order         6/26/2025   Exercise   Days per week of moderate/strenous exercise 0 days   Average minutes spent exercising at this level 0 min   (!) EXERCISE CONCERN      6/26/2025   Social Factors   Frequency of gathering with friends or relatives Never   Worry food won't last until get money to buy more No   Food not last or not have enough money for food? No   Do you have housing? (Housing is defined as stable permanent housing and does not include staying outside in a car, in a tent, in an abandoned building, in an overnight shelter, or couch-surfing.) No   Are you worried about losing your housing? No   Lack of transportation? No   Unable to get utilities (heat,electricity)? No   Want help with housing or utility concern? No   (!) HOUSING CONCERN PRESENT(!) SOCIAL CONNECTIONS CONCERN      6/26/2025   Fall Risk   Fallen 2 or more times in the past year? No   Trouble with walking or balance? Yes   Gait Speed Test Interpretation --           6/26/2025   Activities of Daily Living- Home Safety   Needs help with the following daily activites Transportation    Shopping    Preparing meals    Housework    Bathing    Laundry    Dressing   Do you have  the help that you need? (!) NO   Safety concerns in the home None of the above       Multiple values from one day are sorted in reverse-chronological order         6/26/2025   Dental   Dentist two times every year? (!) NO         6/26/2025   Hearing Screening   Hearing concerns? None of the above         6/26/2025   Driving Risk Screening   Patient/family members have concerns about driving (!) DECLINE         6/26/2025   General Alertness/Fatigue Screening   Have you been more tired than usual lately? No         6/26/2025   Urinary Incontinence Screening   Bothered by leaking urine in past 6 months Yes       Today's PHQ-9 Score:       6/26/2025     2:05 PM   PHQ-9 SCORE   PHQ-9 Total Score MyChart 4 (Minimal depression)   PHQ-9 Total Score 4        Patient-reported         6/26/2025   Substance Use   Alcohol more than 3/day or more than 7/wk Not Applicable   Do you have a current opioid prescription? No   How severe/bad is pain from 1 to 10? 7/10   Do you use any other substances recreationally? No     Social History     Tobacco Use    Smoking status: Former     Current packs/day: 0.50     Average packs/day: 0.5 packs/day for 20.1 years (10.0 ttl pk-yrs)     Types: Cigarettes     Start date: 11/21/2018     Passive exposure: Never    Smokeless tobacco: Never    Tobacco comments:     quit at age 35. Restarted in 2018 off and on, not more than 1/2 pack per day   Vaping Use    Vaping status: Never Used   Substance Use Topics    Alcohol use: Yes     Comment: social/3-4 per week    Drug use: No          Mammogram Screening - Mammogram every 1-2 years updated in Health Maintenance based on mutual decision making    ASCVD Risk   The 10-year ASCVD risk score (Isauro WALTERS, et al., 2019) is: 27.6%    Values used to calculate the score:      Age: 74 years      Sex: Female      Is Non- : No      Diabetic: Yes      Tobacco smoker: No      Systolic Blood Pressure: 113 mmHg      Is BP treated: Yes       HDL Cholesterol: 55 mg/dL      Total Cholesterol: 222 mg/dL    Fracture Risk Assessment Tool  Link to Frax Calculator  Use the information below to complete the Frax calculator  : 1950  Sex: female  Weight (kg): 79.8 kg (actual weight)  Height (cm): 170.2 cm  Previous Fragility Fracture:  No  History of parent with fractured hip:  No  Current Smoking:  No  Patient has been on glucocorticoids for more than 3 months (5mg/day or more): No  Rheumatoid Arthritis on Problem List:  No  Secondary Osteoporosis on Problem List:  No  Consumes 3 or more units of alcohol per day: No  Femoral Neck BMD (g/cm2)            Reviewed and updated as needed this visit by Provider                      Current providers sharing in care for this patient include:  Patient Care Team:  Cole Saavedra MD as PCP - General (Internal Medicine)  Dada Baltazar MD as MD (Urology)  Noni Mcdaniel, RN as Registered Nurse (Urology)  Trice Medeiros MD PhD as Referring Physician (Internal Medicine)  Luther Harrison MD as MD (Otolaryngology)  Hayder Krishna MD as MD (Ophthalmology)  Yamilka TOVAR as Care Manager  Trice Medeiros MD PhD as Assigned PCP  Jordyn Osorio PA-C as Assigned Surgical Provider    The following health maintenance items are reviewed in Epic and correct as of today:  Health Maintenance   Topic Date Due    RSV VACCINE (1 - Risk 60-74 years 1-dose series) Never done    MAMMO SCREENING  2021    DEXA  2021    ZOSTER VACCINE (3 of 3) 2022    COLORECTAL CANCER SCREENING  2022    DTAP/TDAP/TD VACCINE (2 - Td or Tdap) 2023    DIABETIC FOOT EXAM  2023    EYE EXAM  07/10/2024    BMP  2025    LIPID  2025    MICROALBUMIN  2025    INFLUENZA VACCINE (Season Ended) 2025    A1C  2025    PHQ-9  2025    COVID-19 VACCINE (5 -  season) 2025    MEDICARE ANNUAL WELLNESS VISIT  2026    ANNUAL REVIEW OF HM ORDERS  2026    FALL RISK  "ASSESSMENT  06/26/2026    HEMOGLOBIN  06/26/2026    ADVANCE CARE PLANNING  06/14/2029    HEPATITIS C SCREENING  Completed    DEPRESSION ACTION PLAN  Completed    PNEUMOCOCCAL VACCINE 50+ YEARS  Completed    URINALYSIS  Completed    HPV VACCINE  Aged Out    MENINGITIS VACCINE  Aged Out    LUNG CANCER SCREENING  Discontinued         Review of Systems  Constitutional, HEENT, cardiovascular, pulmonary, gi and gu systems are negative, except as otherwise noted.     Objective    Exam  /65 (BP Location: Left arm, Patient Position: Sitting, Cuff Size: Adult Regular)   Pulse 104   Temp 97.3  F (36.3  C) (Temporal)   Resp 16   Ht 1.702 m (5' 7\")   Wt 79.8 kg (176 lb)   LMP 11/18/1991 (Exact Date)   SpO2 97%   BMI 27.57 kg/m     Estimated body mass index is 27.57 kg/m  as calculated from the following:    Height as of this encounter: 1.702 m (5' 7\").    Weight as of this encounter: 79.8 kg (176 lb).    Physical Exam  GENERAL: alert and no distress  EYES: Eyes grossly normal to inspection, PERRL and conjunctivae and sclerae normal  HENT: ear canals and TM's normal, nose and mouth without ulcers or lesions  NECK: no adenopathy, no asymmetry, masses, or scars  RESP: lungs clear to auscultation - no rales, rhonchi or wheezes  CV: regular rate and rhythm, normal S1 S2, no S3 or S4, no murmur, click or rub, no peripheral edema  ABDOMEN: soft, nontender, no hepatosplenomegaly, no masses and bowel sounds normal  MS: no gross musculoskeletal defects noted, no edema  SKIN: no suspicious lesions or rashes  NEURO: Normal strength and tone, mentation intact and speech normal  PSYCH: mentation appears normal, affect normal/bright         6/26/2025   Mini Cog   Clock Draw Score 2 Normal   3 Item Recall 3 objects recalled   Mini Cog Total Score 5              Signed Electronically by: Cole Saavedra MD    Answers submitted by the patient for this visit:  Patient Health Questionnaire (Submitted on 6/26/2025)  If you checked off " any problems, how difficult have these problems made it for you to do your work, take care of things at home, or get along with other people?: Extremely difficult  PHQ9 TOTAL SCORE: 4

## 2025-06-26 NOTE — TELEPHONE ENCOUNTER
Home Care is calling regarding an established patient with [facility].  Requesting orders from: Cole Saavedra  RN APPROVED: RN able to provide verbal orders.  Home Care will send orders for signature.  RN will close encounter.  Is this a request for a temporary pause in the home care episode?  No    Orders Requested    Physical Therapy  Request for discontinuation of care next week  Goals have been met/progressing.  RN gave verbal order: Yes          Contacts       Contact Date/Time Type Contact Phone/Fax    06/26/2025 03:46 PM CDT Phone (Incoming) ALENA Savage (Home Care) [phone]     [facility], secure           Piper Wiley RN

## 2025-07-11 ENCOUNTER — TELEPHONE (OUTPATIENT)
Dept: FAMILY MEDICINE | Facility: CLINIC | Age: 75
End: 2025-07-11

## 2025-07-23 ENCOUNTER — MEDICAL CORRESPONDENCE (OUTPATIENT)
Dept: HEALTH INFORMATION MANAGEMENT | Facility: CLINIC | Age: 75
End: 2025-07-23
Payer: COMMERCIAL

## 2025-08-13 LAB — NONINV COLON CA DNA+OCC BLD SCRN STL QL: NORMAL

## 2025-08-18 ENCOUNTER — TELEPHONE (OUTPATIENT)
Dept: UROLOGY | Facility: CLINIC | Age: 75
End: 2025-08-18

## (undated) DEVICE — SU VICRYL 3-0 SH 27" UND J416H

## (undated) DEVICE — CABLE TEST STIMULATION MEDTRONIC INTERSTIM 357501

## (undated) DEVICE — ESU GROUND PAD ADULT W/CORD E7507

## (undated) DEVICE — GLOVE RADIATION RESISTANT SZ 7.5  95-395

## (undated) DEVICE — PREP CHLORAPREP 26ML TINTED ORANGE  260815

## (undated) DEVICE — SOL WATER IRRIG 1000ML BOTTLE 2F7114

## (undated) DEVICE — SYR 10ML FINGER CONTROL W/O NDL 309695

## (undated) DEVICE — SU VICRYL 3-0 RB-1 27" UND J215H

## (undated) DEVICE — BASIN EMESIS STERILE  SSK9005A

## (undated) DEVICE — GLOVE PROTEXIS POWDER FREE 7.0 ORTHOPEDIC 2D73ET70

## (undated) DEVICE — Device

## (undated) DEVICE — ANTENNA MEDTRONIC RSTR 37092

## (undated) DEVICE — PROGRAMMER MEDT SMART HANDSET W/COMMUNICATOR TH90G01

## (undated) DEVICE — GLOVE PROTEXIS MICRO 7.0  2D73PM70

## (undated) DEVICE — DRAPE LAP W/ARMBOARD 29410

## (undated) DEVICE — GOWN LG DISP 9515

## (undated) DEVICE — DRAPE IOBAN INCISE 23X17" 6650EZ

## (undated) DEVICE — SUCTION MANIFOLD NEPTUNE 2 SYS 1 PORT 702-025-000

## (undated) DEVICE — DRAPE BACK TABLE  44X90" 8377

## (undated) DEVICE — NDL 27GA 1.25" 305136

## (undated) DEVICE — KIT ACCESSORY PERIPHERAL LEAD INTR 18CM 3550-18

## (undated) DEVICE — DRSG STERI STRIP 1/2X4" R1547

## (undated) DEVICE — LINEN TOWEL PACK X5 5464

## (undated) DEVICE — SYR EAR BULB 3OZ 0035830

## (undated) DEVICE — DRAPE C-ARMOR 5 SIDED 5523

## (undated) DEVICE — ADHESIVE SWIFTSET 0.8ML OCTYL SS6

## (undated) DEVICE — PROGRAMMER INTERSTIM 3037

## (undated) DEVICE — PACK MINOR CUSTOM ASC

## (undated) DEVICE — SOL BENZOIN 0.5OZ

## (undated) DEVICE — STIMULATOR EXTERNAL VERIFY MEDTRONIC INTERSTIM 353101

## (undated) DEVICE — DRAPE STERI TOWEL LG 1010

## (undated) DEVICE — SUCTION TIP YANKAUER W/O VENT BULBOUS TIP

## (undated) DEVICE — GLOVE PROTEXIS BLUE W/NEU-THERA 7.5  2D73EB75

## (undated) DEVICE — NDL INTERSTIM FORAMEN 20GA 5.0" 041829

## (undated) DEVICE — ADH SKIN CLOSURE PREMIERPRO EXOFIN 1.0ML 3470

## (undated) DEVICE — PREP CHLORAPREP W/ORANGE TINT 10.5ML 260715

## (undated) DEVICE — SUCTION TIP YANKAUER STR K87

## (undated) DEVICE — DRSG PRIMAPORE 02X3" 7133

## (undated) DEVICE — SOL WATER IRRIG 1000ML BOTTLE 07139-09

## (undated) RX ORDER — FENTANYL CITRATE 50 UG/ML
INJECTION, SOLUTION INTRAMUSCULAR; INTRAVENOUS
Status: DISPENSED
Start: 2019-08-06

## (undated) RX ORDER — LIDOCAINE HYDROCHLORIDE 20 MG/ML
INJECTION, SOLUTION EPIDURAL; INFILTRATION; INTRACAUDAL; PERINEURAL
Status: DISPENSED
Start: 2018-07-24

## (undated) RX ORDER — KETAMINE HCL IN 0.9 % NACL 50 MG/5 ML
SYRINGE (ML) INTRAVENOUS
Status: DISPENSED
Start: 2019-08-06

## (undated) RX ORDER — FENTANYL CITRATE 50 UG/ML
INJECTION, SOLUTION INTRAMUSCULAR; INTRAVENOUS
Status: DISPENSED
Start: 2018-07-24

## (undated) RX ORDER — PROPOFOL 10 MG/ML
INJECTION, EMULSION INTRAVENOUS
Status: DISPENSED
Start: 2019-08-06

## (undated) RX ORDER — CEFAZOLIN SODIUM 1 G/3ML
INJECTION, POWDER, FOR SOLUTION INTRAMUSCULAR; INTRAVENOUS
Status: DISPENSED
Start: 2018-07-24

## (undated) RX ORDER — FENTANYL CITRATE 50 UG/ML
INJECTION, SOLUTION INTRAMUSCULAR; INTRAVENOUS
Status: DISPENSED
Start: 2018-11-27

## (undated) RX ORDER — PROPOFOL 10 MG/ML
INJECTION, EMULSION INTRAVENOUS
Status: DISPENSED
Start: 2018-07-24

## (undated) RX ORDER — LIDOCAINE HYDROCHLORIDE 20 MG/ML
INJECTION, SOLUTION EPIDURAL; INFILTRATION; INTRACAUDAL; PERINEURAL
Status: DISPENSED
Start: 2019-08-06

## (undated) RX ORDER — CEFAZOLIN SODIUM 1 G/3ML
INJECTION, POWDER, FOR SOLUTION INTRAMUSCULAR; INTRAVENOUS
Status: DISPENSED
Start: 2019-08-06

## (undated) RX ORDER — BUPIVACAINE HYDROCHLORIDE 2.5 MG/ML
INJECTION, SOLUTION EPIDURAL; INFILTRATION; INTRACAUDAL
Status: DISPENSED
Start: 2019-08-06

## (undated) RX ORDER — SIMETHICONE 20 MG/.3ML
EMULSION ORAL
Status: DISPENSED
Start: 2019-03-04

## (undated) RX ORDER — BUPIVACAINE HYDROCHLORIDE 2.5 MG/ML
INJECTION, SOLUTION EPIDURAL; INFILTRATION; INTRACAUDAL
Status: DISPENSED
Start: 2018-07-24

## (undated) RX ORDER — ONDANSETRON 2 MG/ML
INJECTION INTRAMUSCULAR; INTRAVENOUS
Status: DISPENSED
Start: 2018-07-24

## (undated) RX ORDER — CIPROFLOXACIN 500 MG/1
TABLET, FILM COATED ORAL
Status: DISPENSED
Start: 2018-05-24

## (undated) RX ORDER — BUPIVACAINE HYDROCHLORIDE 2.5 MG/ML
INJECTION, SOLUTION EPIDURAL; INFILTRATION; INTRACAUDAL
Status: DISPENSED
Start: 2018-06-19

## (undated) RX ORDER — ONDANSETRON 2 MG/ML
INJECTION INTRAMUSCULAR; INTRAVENOUS
Status: DISPENSED
Start: 2019-08-06

## (undated) RX ORDER — ACETAMINOPHEN 325 MG/1
TABLET ORAL
Status: DISPENSED
Start: 2019-08-06